# Patient Record
Sex: MALE | Race: WHITE | NOT HISPANIC OR LATINO | Employment: UNEMPLOYED | URBAN - METROPOLITAN AREA
[De-identification: names, ages, dates, MRNs, and addresses within clinical notes are randomized per-mention and may not be internally consistent; named-entity substitution may affect disease eponyms.]

---

## 2018-07-01 ENCOUNTER — OFFICE VISIT (OUTPATIENT)
Dept: URGENT CARE | Facility: CLINIC | Age: 43
End: 2018-07-01
Payer: COMMERCIAL

## 2018-07-01 VITALS
OXYGEN SATURATION: 98 % | RESPIRATION RATE: 18 BRPM | TEMPERATURE: 97.9 F | DIASTOLIC BLOOD PRESSURE: 92 MMHG | SYSTOLIC BLOOD PRESSURE: 160 MMHG | HEART RATE: 69 BPM

## 2018-07-01 DIAGNOSIS — L25.9 CONTACT DERMATITIS, UNSPECIFIED CONTACT DERMATITIS TYPE, UNSPECIFIED TRIGGER: Primary | ICD-10-CM

## 2018-07-01 PROCEDURE — 99283 EMERGENCY DEPT VISIT LOW MDM: CPT | Performed by: PHYSICIAN ASSISTANT

## 2018-07-01 PROCEDURE — G0382 LEV 3 HOSP TYPE B ED VISIT: HCPCS | Performed by: PHYSICIAN ASSISTANT

## 2018-07-01 RX ORDER — PREDNISONE 10 MG/1
TABLET ORAL
Qty: 26 TABLET | Refills: 0 | Status: SHIPPED | OUTPATIENT
Start: 2018-07-01 | End: 2018-12-16 | Stop reason: ALTCHOICE

## 2018-07-01 NOTE — PROGRESS NOTES
[address]    NAME: Beatris Yi is a 39 y o  male  : 1975    MRN: [id number]  DATE: 2018  TIME: 1:37 PM    Assessment and Plan   Contact dermatitis, unspecified contact dermatitis type, unspecified trigger [L25 9]  1  Contact dermatitis, unspecified contact dermatitis type, unspecified trigger  predniSONE 10 mg tablet       Patient Instructions     Patient Instructions   Start prednisone take as directed  Recommend over-the-counter Benadryl to help with itching  Chief Complaint     Chief Complaint   Patient presents with    Rash     Pt c/o a rash on his face and right arm since yesterday  History of Present Illness   54-year-old male here with rash all over his body  Very erythematous and itchy  Patient states that he was fishing and was exposed to poison ivy  Review of Systems   Review of Systems   Constitutional: Negative for activity change, appetite change, chills, diaphoresis, fatigue, fever and unexpected weight change  HENT: Negative for congestion, ear pain, hearing loss, sinus pressure, sneezing, sore throat and tinnitus  Eyes: Negative for photophobia, redness and visual disturbance  Respiratory: Negative for apnea, cough, chest tightness, shortness of breath, wheezing and stridor  Cardiovascular: Negative for chest pain, palpitations and leg swelling  Gastrointestinal: Negative for abdominal distention, abdominal pain, blood in stool, constipation, diarrhea, nausea and vomiting  Endocrine: Negative for cold intolerance, heat intolerance, polydipsia, polyphagia and polyuria  Genitourinary: Negative for difficulty urinating, dysuria, flank pain, hematuria and urgency  Musculoskeletal: Negative for arthralgias, back pain, gait problem, myalgias, neck pain and neck stiffness  Skin: Positive for rash  Negative for wound  Neurological: Negative for dizziness, tremors, seizures, syncope, weakness, light-headedness, numbness and headaches  Hematological: Negative for adenopathy  Does not bruise/bleed easily  Psychiatric/Behavioral: Negative for agitation, behavioral problems, confusion and decreased concentration  The patient is not nervous/anxious  All other systems reviewed and are negative  Current Medications     Current Outpatient Prescriptions:     predniSONE 10 mg tablet, Take 3 tabs BID X 2 days, 2 tabs BID X 2 days, 1 tab BID X 2 days, 1 tab daily X 2 days, Disp: 26 tablet, Rfl: 0    Current Allergies     Allergies as of 07/01/2018    (No Known Allergies)          The following portions of the patient's history were reviewed and updated as appropriate: allergies, current medications, past family history, past medical history, past social history, past surgical history and problem list    No past medical history on file  No past surgical history on file  No family history on file  Social History     Social History    Marital status: Single     Spouse name: N/A    Number of children: N/A    Years of education: N/A     Occupational History    Not on file  Social History Main Topics    Smoking status: Not on file    Smokeless tobacco: Not on file    Alcohol use Not on file    Drug use: Unknown    Sexual activity: Not on file     Other Topics Concern    Not on file     Social History Narrative    No narrative on file     Medications have been verified  Objective   /92   Pulse 69   Temp 97 9 °F (36 6 °C)   Resp 18   SpO2 98%      Physical Exam   Physical Exam   Constitutional: He appears well-developed and well-nourished  No distress  HENT:   Head: Normocephalic  Right Ear: External ear normal    Left Ear: External ear normal    Nose: Nose normal    Mouth/Throat: Oropharynx is clear and moist  No oropharyngeal exudate  Neck: Normal range of motion  Neck supple  Cardiovascular: Normal rate, regular rhythm and normal heart sounds  No murmur heard    Pulmonary/Chest: Effort normal and breath sounds normal  No respiratory distress  He has no wheezes  He has no rales  Abdominal: Soft  Bowel sounds are normal  There is no tenderness  Musculoskeletal: Normal range of motion  Lymphadenopathy:     He has no cervical adenopathy  Skin: Skin is warm  Rash (Erythematous rash with some small vesicles on the surface on face, arms and legs  Vesicles in linear distribution ) noted  Vitals reviewed

## 2018-12-16 ENCOUNTER — HOSPITAL ENCOUNTER (EMERGENCY)
Facility: HOSPITAL | Age: 43
Discharge: HOME | End: 2018-12-16
Attending: EMERGENCY MEDICINE | Admitting: EMERGENCY MEDICINE
Payer: COMMERCIAL

## 2018-12-16 VITALS
SYSTOLIC BLOOD PRESSURE: 142 MMHG | DIASTOLIC BLOOD PRESSURE: 90 MMHG | BODY MASS INDEX: 31.14 KG/M2 | RESPIRATION RATE: 16 BRPM | WEIGHT: 235 LBS | TEMPERATURE: 98.3 F | OXYGEN SATURATION: 98 % | HEART RATE: 72 BPM | HEIGHT: 73 IN

## 2018-12-16 DIAGNOSIS — S61.412A LACERATION OF LEFT HAND WITHOUT FOREIGN BODY, INITIAL ENCOUNTER: Primary | ICD-10-CM

## 2018-12-16 PROCEDURE — 99282 EMERGENCY DEPT VISIT SF MDM: CPT

## 2018-12-16 RX ORDER — GINSENG 100 MG
1 CAPSULE ORAL ONCE
Status: COMPLETED | OUTPATIENT
Start: 2018-12-16 | End: 2018-12-16

## 2018-12-16 RX ORDER — LIDOCAINE HYDROCHLORIDE 10 MG/ML
5 INJECTION, SOLUTION EPIDURAL; INFILTRATION; INTRACAUDAL; PERINEURAL ONCE
Status: COMPLETED | OUTPATIENT
Start: 2018-12-16 | End: 2018-12-16

## 2018-12-16 RX ORDER — LISINOPRIL 10 MG/1
15 TABLET ORAL
COMMUNITY

## 2018-12-16 RX ADMIN — LIDOCAINE HYDROCHLORIDE 5 ML: 10 INJECTION, SOLUTION EPIDURAL; INFILTRATION; INTRACAUDAL; PERINEURAL at 14:25

## 2018-12-16 RX ADMIN — BACITRACIN ZINC 1 SMALL APPLICATION: 500 OINTMENT TOPICAL at 14:25

## 2018-12-16 NOTE — DISCHARGE INSTRUCTIONS
Laceration   WHAT YOU NEED TO KNOW:   A laceration is an injury to the skin and the soft tissue underneath it  Lacerations happen when you are cut or hit by something  They can happen anywhere on the body  DISCHARGE INSTRUCTIONS:   Return to the emergency department if:   · You have heavy bleeding or bleeding that does not stop after 10 minutes of holding firm, direct pressure over the wound  · Your wound opens up  Contact your healthcare provider if:   · You have a fever or chills  · Your laceration is red, warm, or swollen  · You have red streaks on your skin coming from your wound  · You have white or yellow drainage from the wound that smells bad  · You have pain that gets worse, even after treatment  · You have questions or concerns about your condition or care  Medicines:   · Prescription pain medicine  may be given  Ask how to take this medicine safely  · Antibiotics  help treat or prevent a bacterial infection  · Take your medicine as directed  Contact your healthcare provider if you think your medicine is not helping or if you have side effects  Tell him or her if you are allergic to any medicine  Keep a list of the medicines, vitamins, and herbs you take  Include the amounts, and when and why you take them  Bring the list or the pill bottles to follow-up visits  Carry your medicine list with you in case of an emergency  Care for your wound as directed:   · Do not get your wound wet  until your healthcare provider says it is okay  Do not soak your wound in water  Do not go swimming until your healthcare provider says it is okay  Carefully wash the wound with soap and water  Gently pat the area dry or allow it to air dry  · Change your bandages  when they get wet, dirty, or after washing  Apply new, clean bandages as directed  Do not apply elastic bandages or tape too tight  Do not put powders or lotions over your incision  · Apply antibiotic ointment as directed  Your healthcare provider may give you antibiotic ointment to put over your wound if you have stitches  If you have strips of tape over your incision, let them dry up and fall off on their own  If they do not fall off within 14 days, gently remove them  If you have glue over your wound, do not remove or pick at it  If your glue comes off, do not replace it with glue that you have at home  · Check your wound every day for signs of infection such as swelling, redness, or pus  Self-care:   · Apply ice  on your wound for 15 to 20 minutes every hour or as directed  Use an ice pack, or put crushed ice in a plastic bag  Cover it with a towel  Ice helps prevent tissue damage and decreases swelling and pain  · Use a splint as directed  A splint will decrease movement and stress on your wound  It may help it heal faster  A splint may be used for lacerations over joints or areas of your body that bend  Ask your healthcare provider how to apply and remove a splint  · Decrease scarring of your wound  by applying ointments as directed  Do not apply ointments until your healthcare provider says it is okay  You may need to wait until your wound is healed  Ask which ointment to buy and how often to use it  After your wound is healed, use sunscreen over the area when you are out in the sun  You should do this for at least 6 months to 1 year after your injury  Follow up with your healthcare provider as directed: You may need to follow up in 24 to 48 hours to have your wound checked for infection  You will need to return in 3 to 14 days if you have stitches or staples so they can be removed  Care for your wound as directed to prevent infection and help it heal  Write down your questions so you remember to ask them during your visits  © 2017 [employer] Information is for End User's use only and may not be sold, redistributed or otherwise used for commercial purposes   All illustrations and images included in [facility] are the copyrighted property of A [employer] Inc  or Aime Benavides  The above information is an  only  It is not intended as medical advice for individual conditions or treatments  Talk to your doctor, nurse or pharmacist before following any medical regimen to see if it is safe and effective for you

## 2018-12-16 NOTE — ED NOTES
Please disregard this "NO" answer, entered by error  Pt answered "yes" that he feels safe       Barry Albright RN  12/16/18 1082

## 2018-12-16 NOTE — ED PROVIDER NOTES
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 [employer]  39 y o male presents for laceration of left hand that occurred around 11am today  He was working with copper at a [employer]  Patient states last tetanus shot was this year  Patient denies any fever, chills, shortness of breath difficulty breathing, chest pain  Denies any abdominal pain, nausea vomiting diarrhea  Patient went to be evaluated, as he cannot stop bleeding when it 1st occurred  He did apply bacitracin ointment to it when it first happened  It is around the MCP on the palm aspect  Approx 1 5cm  History provided by:  Patient   used: No    Laceration   Location:  Hand  Hand laceration location:  L hand  Length:  1 5cm  Depth: Through dermis  Quality: straight    Bleeding: controlled with pressure    Time since incident:  3 hours  Injury mechanism: copper  Pain details:     Quality:  Unable to specify    Severity:  No pain    Timing:  Constant    Progression:  Unchanged  Foreign body present:  No foreign bodies  Relieved by:  Nothing  Worsened by:  Nothing  Ineffective treatments:  None tried  Tetanus status:  Up to date  Associated symptoms: no fever, no focal weakness, no numbness, no rash, no redness, no swelling and no streaking        Prior to Admission Medications   Prescriptions Last Dose Informant Patient Reported? Taking?   lisinopril (ZESTRIL) 10 mg tablet 12/16/2018 at Unknown time  Yes Yes   Sig: Take 15 mg by mouth daily      Facility-Administered Medications: None       Past Medical History:   Diagnosis Date    Hypertension        Past Surgical History:   Procedure Laterality Date    HERNIA REPAIR         History reviewed  No pertinent family history  I have reviewed and agree with the history as documented      Social History   Substance Use Topics    Smoking status: Former Smoker     Quit date: 12/16/2003    Smokeless tobacco: Never Used    Alcohol use No        Review of Systems   Constitutional: Negative for chills and fever  HENT: Negative for rhinorrhea and sore throat  Eyes: Negative for visual disturbance  Respiratory: Negative for cough and shortness of breath  Cardiovascular: Negative for chest pain and leg swelling  Gastrointestinal: Negative for abdominal pain, diarrhea, nausea and vomiting  Genitourinary: Negative for dysuria  Musculoskeletal: Negative for back pain and myalgias  Skin: Positive for wound  Negative for rash  Neurological: Negative for dizziness, focal weakness and headaches  Psychiatric/Behavioral: Negative for confusion  All other systems reviewed and are negative  Physical Exam  Physical Exam   Constitutional: He is oriented to person, place, and time  He appears well-developed and well-nourished  HENT:   Nose: Nose normal    Mouth/Throat: Oropharynx is clear and moist  No oropharyngeal exudate  Eyes: Pupils are equal, round, and reactive to light  Conjunctivae and EOM are normal  No scleral icterus  Neck: Normal range of motion  Neck supple  No JVD present  No tracheal deviation present  Cardiovascular: Normal rate, regular rhythm and normal heart sounds  Pulmonary/Chest: Effort normal and breath sounds normal  No respiratory distress  He has no wheezes  He has no rales  Abdominal: Soft  Bowel sounds are normal  There is no tenderness  There is no guarding  Musculoskeletal: Normal range of motion  He exhibits no edema or tenderness  Hands:  Small visible, approximately 1 5 cm laceration left palmar aspect close to the 1st MCP  No tendon involved  Patient has full active range of motion of left hand, left fingers  Neurovascular sensation is fully intact around the wound site  Cap refill less than 2 sec  Lymphadenopathy:     He has no cervical adenopathy     Neurological: He is alert and oriented to person, place, and time  No cranial nerve deficit or sensory deficit  He exhibits normal muscle tone  5/5 motor, nl sens   Skin: Skin is warm and dry  Psychiatric: He has a normal mood and affect  His behavior is normal    Nursing note and vitals reviewed  Vital Signs  ED Triage Vitals [12/16/18 1414]   Temperature Pulse Respirations Blood Pressure SpO2   98 3 °F (36 8 °C) 77 16 (!) 175/100 100 %      Temp Source Heart Rate Source Patient Position - Orthostatic VS BP Location FiO2 (%)   Tympanic Monitor Sitting Right arm --      Pain Score       --           Vitals:    12/16/18 1415 12/16/18 1430 12/16/18 1445 12/16/18 1500   BP: (!) 175/100 160/90  142/90   Pulse: 76 81 72 72   Patient Position - Orthostatic VS:           Visual Acuity      ED Medications  Medications   lidocaine (PF) (XYLOCAINE-MPF) 1 % injection 5 mL (5 mL Infiltration Given 12/16/18 1425)   bacitracin topical ointment 1 small application (1 small application Topical Given 12/16/18 1425)       Diagnostic Studies  Results Reviewed     None                 No orders to display              Procedures  Lac Repair  Date/Time: 12/16/2018 2:43 PM  Performed by: Angelica Nelson by: Meera Johnson   Consent: Verbal consent obtained  Consent given by: patient  Patient understanding: patient states understanding of the procedure being performed  Patient consent: the patient's understanding of the procedure matches consent given  Patient identity confirmed: verbally with patient and arm band  Time out: Immediately prior to procedure a "time out" was called to verify the correct patient, procedure, equipment, support staff and site/side marked as required    Body area: upper extremity  Location details: left hand  Laceration length: 1 5 cm  Foreign bodies: no foreign bodies  Tendon involvement: none  Nerve involvement: none  Vascular damage: no  Anesthesia: local infiltration    Anesthesia:  Local Anesthetic: lidocaine 1% without epinephrine  Anesthetic total: 2 mL    Sedation:  Patient sedated: no    Wound Dehiscence:  Superficial Wound Dehiscence: simple closure      Procedure Details:  Preparation: Patient was prepped and draped in the usual sterile fashion  Irrigation solution: saline  Irrigation method: syringe  Amount of cleaning: standard  Debridement: none  Degree of undermining: none  Skin closure: 5-0 nylon  Number of sutures: 1  Technique: simple  Approximation: close  Approximation difficulty: simple  Dressing: antibiotic ointment (band-aid)  Patient tolerance: Patient tolerated the procedure well with no immediate complications             Phone Contacts  ED Phone Contact    ED Course                               MDM  Number of Diagnoses or Management Options  Laceration of left hand without foreign body, initial encounter: new and does not require workup  Diagnosis management comments: Patient with a small superficial laceration that was repaired with 1 suture  Did advise suture would need to be taken out approximately 10 days  He expressed understanding  He left in stable condition  Patient full neurologic, muscular function of that hand  He tolerated the procedure well no immediate complications  He was afebrile  No history of being on any anticoagulation medications, as well as no history of anticoagulation disorders         Amount and/or Complexity of Data Reviewed  Review and summarize past medical records: yes    Risk of Complications, Morbidity, and/or Mortality  Presenting problems: low    Patient Progress  Patient progress: stable    CritCare Time    Disposition  Final diagnoses:   Laceration of left hand without foreign body, initial encounter     Time reflects when diagnosis was documented in both MDM as applicable and the Disposition within this note     Time User Action Codes Description Comment    12/16/2018  2:42 PM Shandra Alberto Add [Z80 348A] Laceration of left hand without foreign body, initial encounter       ED Disposition     ED Disposition Condition Comment    Discharge  [place] discharge to home/self care  Condition at discharge: Stable        Follow-up Information     Follow up With Specialties Details Why Contact Info Additional Carlos Orozco DO Family Medicine   [facility] [address] [place]  [phone]       [address] [facility] In 1 week For suture removal [address] [facility] [id number] [phone] [address] [facility], [address] [facility], [facility], [place], [address]  Emergency Department Emergency Medicine In 1 week For suture removal [address] [facility] [address] [phone]           Discharge Medication List as of 12/16/2018  2:43 PM      CONTINUE these medications which have NOT CHANGED    Details   lisinopril (ZESTRIL) 10 mg tablet Take 15 mg by mouth daily, Historical Med           No discharge procedures on file      ED Provider  Electronically Signed by           Laquita Herrera PA-C  12/16/18 [address]  12/16/18 5405

## 2019-11-27 ENCOUNTER — TELEPHONE (OUTPATIENT)
Dept: SURGERY | Facility: CLINIC | Age: 44
End: 2019-11-27

## 2019-11-27 NOTE — TELEPHONE ENCOUNTER
Left a message for patient to call our office to schedule an appt per a referral from Dr Uri Falk for a ventral hernia

## 2019-12-12 ENCOUNTER — TRANSCRIBE ORDERS (OUTPATIENT)
Dept: SURGERY | Facility: CLINIC | Age: 44
End: 2019-12-12

## 2019-12-12 DIAGNOSIS — K43.9 VENTRAL HERNIA WITHOUT OBSTRUCTION OR GANGRENE: Primary | ICD-10-CM

## 2019-12-20 ENCOUNTER — OFFICE VISIT (OUTPATIENT)
Dept: SURGERY | Facility: CLINIC | Age: 44
End: 2019-12-20
Payer: COMMERCIAL

## 2019-12-20 VITALS
RESPIRATION RATE: 18 BRPM | HEART RATE: 88 BPM | TEMPERATURE: 97.6 F | HEIGHT: 73 IN | BODY MASS INDEX: 32.87 KG/M2 | WEIGHT: 248 LBS | DIASTOLIC BLOOD PRESSURE: 68 MMHG | SYSTOLIC BLOOD PRESSURE: 124 MMHG

## 2019-12-20 DIAGNOSIS — M62.08 DIASTASIS RECTI: ICD-10-CM

## 2019-12-20 PROCEDURE — 99243 OFF/OP CNSLTJ NEW/EST LOW 30: CPT | Performed by: PHYSICIAN ASSISTANT

## 2019-12-20 RX ORDER — SILDENAFIL 100 MG/1
100 TABLET, FILM COATED ORAL AS NEEDED
COMMUNITY
Start: 2019-11-26 | End: 2022-05-10

## 2019-12-20 RX ORDER — PAROXETINE HYDROCHLORIDE 20 MG/1
20 TABLET, FILM COATED ORAL DAILY
COMMUNITY
Start: 2019-11-26 | End: 2022-05-10

## 2019-12-20 NOTE — PROGRESS NOTES
Consult - General Surgery   [facility] Shivamer 38 y o  male MRN: [id number]  Encounter: [id number]    Assessment/Plan    Diastasis recti  Patient presents with painful abdominal lump, present for years exacerbated by exertion  On exam there is diastasis recti of the upper abdomen without appreciable fascial defect or true hernia  This diagnosis explained to the patient  Advised rest, compression, anti-inflammatories for pain, and core strengthening for long-term management  Advised follow-up in 1 month with Dr Luis Suarez for repeat examination  Advised that he feed develops a non reducible lump with associated pain or signs of a true hernia he should return immediately for evaluation  Will hold off on CT scan at this time and try to obtain records from his prior abdominal wall surgery  Questions answered follow-up arranged  Diagnoses and all orders for this visit:    Diastasis recti  -     Ambulatory referral to General Surgery    Other orders  -     sildenafil (VIAGRA) 100 mg tablet; Take 100 mg by mouth as needed  -     PARoxetine (PAXIL) 20 mg tablet; Take 20 mg by mouth daily        History of Present Illness   Chief Complaint   Patient presents with    Hernia     ventral      12- Patient is here today for pre op eval ventral hernia  Patient states that he is having pain at his mid abdominal area and can palpate a lump  Stated that bending over to  things or even to bring up his legs to tie his shoes the abdomen area causes pain and he gets short of breath  Joana Cue    Pleasant 76-year-old male with history of hypertension and bipolar disorder here for evaluation of a lump in possible hernia  Present for a few years and worse and the past 1 year  Exacerbated by heavy lifting and exertion associated  Focal to the upper midline abdomen  Denies a non reducible lump  Associated nausea or vomiting  Occasional constipation  No other complaints today        Review of Systems Constitutional: Negative for chills and fever  HENT: Negative for congestion  Eyes: Negative for visual disturbance  Respiratory: Negative for shortness of breath  Cardiovascular: Negative for chest pain  Gastrointestinal: Positive for abdominal pain and constipation  Negative for diarrhea, nausea and vomiting  Genitourinary: Negative for dysuria  Musculoskeletal: Negative for back pain  Skin: Negative for rash and wound  Neurological: Negative for dizziness and headaches  Psychiatric/Behavioral: Negative for confusion         Historical Information   Past Medical History:   Diagnosis Date    Bipolar disorder in partial remission ([facility] [address] )     Erectile dysfunction     unspecified erectile dysfunction type    Hypertension      Past Surgical History:   Procedure Laterality Date    HERNIA REPAIR       Social History   Social History     Substance and Sexual Activity   Alcohol Use Yes    Comment: occassionally     Social History     Substance and Sexual Activity   Drug Use No     Social History     Tobacco Use   Smoking Status Former Smoker    Types: Pipe, Cigarettes    Last attempt to quit: 2003    Years since quittin 0   Smokeless Tobacco Never Used     Family History   Problem Relation Age of Onset    Diabetes Mother     Thyroid disease Mother     Cancer Father     Thyroid disease Sister     Depression Brother     Cancer Maternal Aunt     Cancer Paternal Uncle     Cancer Paternal Grandmother     No Known Problems Brother     Thyroid disease Sister        Meds/Allergies     Current Outpatient Medications:     lisinopril (ZESTRIL) 10 mg tablet, Take 15 mg by mouth daily, Disp: , Rfl:     PARoxetine (PAXIL) 20 mg tablet, Take 20 mg by mouth daily, Disp: , Rfl:     sildenafil (VIAGRA) 100 mg tablet, Take 100 mg by mouth as needed, Disp: , Rfl:   Allergies   Allergen Reactions    Tomato        The following portions of the patient's history were reviewed and updated as appropriate: allergies, current medications, past family history, past medical history, past social history, past surgical history and problem list     Objective   Current Vitals:   Blood pressure 124/68, pulse 88, temperature 97 6 °F (36 4 °C), temperature source Tympanic, resp  rate 18, height 6' 1" (1 854 m), weight 112 kg (248 lb)  Physical Exam   Constitutional: He is oriented to person, place, and time  He appears well-developed and well-nourished  No distress  HENT:   Head: Normocephalic and atraumatic  Eyes: Pupils are equal, round, and reactive to light  Neck: Normal range of motion  Cardiovascular: Normal rate, regular rhythm, normal heart sounds and intact distal pulses  No murmur heard  Pulmonary/Chest: Effort normal and breath sounds normal  No respiratory distress  He has no wheezes  Abdominal: Soft  Bowel sounds are normal  He exhibits no distension and no mass  There is no tenderness  There is no rebound and no guarding  Diastasis recti of the upper midline  No appreciable fascial defects in this area or elsewhere on the abdominal wall  Musculoskeletal: Normal range of motion  Neurological: He is alert and oriented to person, place, and time  Skin: Skin is warm and dry  He is not diaphoretic  Psychiatric: He has a normal mood and affect         Signature:  Keyla Estes PA-C  Date: 12/20/2019 Time: 10:35 AM

## 2019-12-20 NOTE — ASSESSMENT & PLAN NOTE
Patient presents with painful abdominal lump, present for years exacerbated by exertion  On exam there is diastasis recti of the upper abdomen without appreciable fascial defect or true hernia  This diagnosis explained to the patient  Advised rest, compression, anti-inflammatories for pain, and core strengthening for long-term management  Advised follow-up in 1 month with Dr Sophy Shahid for repeat examination  Advised that he feed develops a non reducible lump with associated pain or signs of a true hernia he should return immediately for evaluation  Will hold off on CT scan at this time and try to obtain records from his prior abdominal wall surgery  Questions answered follow-up arranged

## 2020-01-20 ENCOUNTER — TELEPHONE (OUTPATIENT)
Dept: SURGERY | Facility: CLINIC | Age: 45
End: 2020-01-20

## 2020-01-28 NOTE — TELEPHONE ENCOUNTER
Left a second message for the patient to call our office to reschedule his appointment that he had missed

## 2020-03-09 ENCOUNTER — TELEPHONE (OUTPATIENT)
Dept: SURGERY | Facility: CLINIC | Age: 45
End: 2020-03-09

## 2020-03-09 NOTE — TELEPHONE ENCOUNTER
Patient was a no show for today's appt  I spoke with his mother, Lata Rogers and she took a message for him to call us back to reschedule

## 2021-10-02 ENCOUNTER — APPOINTMENT (EMERGENCY)
Dept: CT IMAGING | Facility: HOSPITAL | Age: 46
DRG: 710 | End: 2021-10-02
Payer: COMMERCIAL

## 2021-10-02 ENCOUNTER — HOSPITAL ENCOUNTER (EMERGENCY)
Facility: HOSPITAL | Age: 46
Discharge: HOME | DRG: 710 | End: 2021-10-02
Attending: EMERGENCY MEDICINE
Payer: COMMERCIAL

## 2021-10-02 VITALS
RESPIRATION RATE: 19 BRPM | TEMPERATURE: 97.5 F | SYSTOLIC BLOOD PRESSURE: 124 MMHG | DIASTOLIC BLOOD PRESSURE: 59 MMHG | OXYGEN SATURATION: 95 % | HEART RATE: 69 BPM

## 2021-10-02 DIAGNOSIS — K59.00 CONSTIPATION: ICD-10-CM

## 2021-10-02 DIAGNOSIS — N20.1 URETERAL STONE: ICD-10-CM

## 2021-10-02 DIAGNOSIS — K63.89 MESENTERIC MASS: Primary | ICD-10-CM

## 2021-10-02 LAB
ALBUMIN SERPL BCP-MCNC: 4.8 G/DL (ref 3.5–5.7)
ALP SERPL-CCNC: 106 U/L (ref 40–150)
ALT SERPL W P-5'-P-CCNC: 28 U/L (ref 7–52)
ANION GAP SERPL CALCULATED.3IONS-SCNC: 12 MMOL/L (ref 4–13)
APTT PPP: 30 SECONDS (ref 23–37)
AST SERPL W P-5'-P-CCNC: 35 U/L (ref 13–39)
BACTERIA UR QL AUTO: ABNORMAL /HPF
BASOPHILS # BLD AUTO: 0.1 THOUSANDS/ΜL (ref 0–0.1)
BASOPHILS NFR BLD AUTO: 1 % (ref 0–2)
BILIRUB SERPL-MCNC: 1.9 MG/DL (ref 0.2–1)
BILIRUB UR QL STRIP: NEGATIVE
BUN SERPL-MCNC: 14 MG/DL (ref 7–25)
CALCIUM SERPL-MCNC: 10 MG/DL (ref 8.6–10.5)
CHLORIDE SERPL-SCNC: 99 MMOL/L (ref 98–107)
CLARITY UR: CLEAR
CO2 SERPL-SCNC: 27 MMOL/L (ref 21–31)
COLOR UR: YELLOW
CREAT SERPL-MCNC: 1.14 MG/DL (ref 0.7–1.3)
EOSINOPHIL # BLD AUTO: 0.2 THOUSAND/ΜL (ref 0–0.61)
EOSINOPHIL NFR BLD AUTO: 2 % (ref 0–5)
ERYTHROCYTE [DISTWIDTH] IN BLOOD BY AUTOMATED COUNT: 14.1 % (ref 11.5–14.5)
ETHANOL SERPL-MCNC: <10 MG/DL
GFR SERPL CREATININE-BSD FRML MDRD: 76 ML/MIN/1.73SQ M
GLUCOSE SERPL-MCNC: 166 MG/DL (ref 65–99)
GLUCOSE UR STRIP-MCNC: ABNORMAL MG/DL
HCT VFR BLD AUTO: 46.2 % (ref 42–47)
HGB BLD-MCNC: 16 G/DL (ref 14–18)
HGB UR QL STRIP.AUTO: ABNORMAL
INR PPP: 1.01 (ref 0.84–1.19)
KETONES UR STRIP-MCNC: ABNORMAL MG/DL
LACTATE SERPL-SCNC: 1.3 MMOL/L (ref 0.5–2)
LEUKOCYTE ESTERASE UR QL STRIP: NEGATIVE
LYMPHOCYTES # BLD AUTO: 2 THOUSANDS/ΜL (ref 0.6–4.47)
LYMPHOCYTES NFR BLD AUTO: 23 % (ref 21–51)
MAGNESIUM SERPL-MCNC: 2.2 MG/DL (ref 1.9–2.7)
MCH RBC QN AUTO: 29.2 PG (ref 26–34)
MCHC RBC AUTO-ENTMCNC: 34.7 G/DL (ref 31–37)
MCV RBC AUTO: 84 FL (ref 81–99)
MONOCYTES # BLD AUTO: 0.5 THOUSAND/ΜL (ref 0.17–1.22)
MONOCYTES NFR BLD AUTO: 5 % (ref 2–12)
MUCOUS THREADS UR QL AUTO: ABNORMAL
NEUTROPHILS # BLD AUTO: 6.3 THOUSANDS/ΜL (ref 1.4–6.5)
NEUTS SEG NFR BLD AUTO: 70 % (ref 42–75)
NITRITE UR QL STRIP: NEGATIVE
NON-SQ EPI CELLS URNS QL MICRO: ABNORMAL /HPF
PH UR STRIP.AUTO: 6 [PH]
PLATELET # BLD AUTO: 238 THOUSANDS/UL (ref 149–390)
PMV BLD AUTO: 7.6 FL (ref 8.6–11.7)
POTASSIUM SERPL-SCNC: 4 MMOL/L (ref 3.5–5.5)
PROT SERPL-MCNC: 8.6 G/DL (ref 6.4–8.9)
PROT UR STRIP-MCNC: NEGATIVE MG/DL
PROTHROMBIN TIME: 13.4 SECONDS (ref 11.6–14.5)
RBC # BLD AUTO: 5.48 MILLION/UL (ref 4.3–5.9)
RBC #/AREA URNS AUTO: ABNORMAL /HPF
SODIUM SERPL-SCNC: 138 MMOL/L (ref 134–143)
SP GR UR STRIP.AUTO: <=1.005 (ref 1–1.03)
TROPONIN I SERPL-MCNC: <0.03 NG/ML
UROBILINOGEN UR QL STRIP.AUTO: 4 E.U./DL
WBC # BLD AUTO: 9 THOUSAND/UL (ref 4.8–10.8)
WBC #/AREA URNS AUTO: ABNORMAL /HPF

## 2021-10-02 PROCEDURE — 99284 EMERGENCY DEPT VISIT MOD MDM: CPT

## 2021-10-02 PROCEDURE — 85025 COMPLETE CBC W/AUTO DIFF WBC: CPT | Performed by: EMERGENCY MEDICINE

## 2021-10-02 PROCEDURE — 82077 ASSAY SPEC XCP UR&BREATH IA: CPT | Performed by: EMERGENCY MEDICINE

## 2021-10-02 PROCEDURE — 81003 URINALYSIS AUTO W/O SCOPE: CPT | Performed by: EMERGENCY MEDICINE

## 2021-10-02 PROCEDURE — 96374 THER/PROPH/DIAG INJ IV PUSH: CPT

## 2021-10-02 PROCEDURE — 83735 ASSAY OF MAGNESIUM: CPT | Performed by: EMERGENCY MEDICINE

## 2021-10-02 PROCEDURE — 36415 COLL VENOUS BLD VENIPUNCTURE: CPT | Performed by: EMERGENCY MEDICINE

## 2021-10-02 PROCEDURE — 99285 EMERGENCY DEPT VISIT HI MDM: CPT | Performed by: PHYSICIAN ASSISTANT

## 2021-10-02 PROCEDURE — 83605 ASSAY OF LACTIC ACID: CPT | Performed by: EMERGENCY MEDICINE

## 2021-10-02 PROCEDURE — 84484 ASSAY OF TROPONIN QUANT: CPT | Performed by: EMERGENCY MEDICINE

## 2021-10-02 PROCEDURE — 71275 CT ANGIOGRAPHY CHEST: CPT

## 2021-10-02 PROCEDURE — 85610 PROTHROMBIN TIME: CPT | Performed by: EMERGENCY MEDICINE

## 2021-10-02 PROCEDURE — 85730 THROMBOPLASTIN TIME PARTIAL: CPT | Performed by: EMERGENCY MEDICINE

## 2021-10-02 PROCEDURE — 96361 HYDRATE IV INFUSION ADD-ON: CPT

## 2021-10-02 PROCEDURE — 81001 URINALYSIS AUTO W/SCOPE: CPT | Performed by: EMERGENCY MEDICINE

## 2021-10-02 PROCEDURE — G1004 CDSM NDSC: HCPCS

## 2021-10-02 PROCEDURE — 96375 TX/PRO/DX INJ NEW DRUG ADDON: CPT

## 2021-10-02 PROCEDURE — 74174 CTA ABD&PLVS W/CONTRAST: CPT

## 2021-10-02 PROCEDURE — 87040 BLOOD CULTURE FOR BACTERIA: CPT | Performed by: PHYSICIAN ASSISTANT

## 2021-10-02 PROCEDURE — 80053 COMPREHEN METABOLIC PANEL: CPT | Performed by: EMERGENCY MEDICINE

## 2021-10-02 RX ORDER — MAGNESIUM CARB/ALUMINUM HYDROX 105-160MG
296 TABLET,CHEWABLE ORAL ONCE
Qty: 296 ML | Refills: 0 | Status: SHIPPED | OUTPATIENT
Start: 2021-10-02 | End: 2021-10-02 | Stop reason: SDUPTHER

## 2021-10-02 RX ORDER — HYDROMORPHONE HCL/PF 1 MG/ML
0.5 SYRINGE (ML) INJECTION ONCE
Status: DISCONTINUED | OUTPATIENT
Start: 2021-10-02 | End: 2021-10-02 | Stop reason: HOSPADM

## 2021-10-02 RX ORDER — OXYCODONE HYDROCHLORIDE 5 MG/1
5 TABLET ORAL EVERY 4 HOURS PRN
Qty: 5 TABLET | Refills: 0 | Status: SHIPPED | OUTPATIENT
Start: 2021-10-02 | End: 2021-10-02

## 2021-10-02 RX ORDER — ONDANSETRON 2 MG/ML
4 INJECTION INTRAMUSCULAR; INTRAVENOUS ONCE
Status: COMPLETED | OUTPATIENT
Start: 2021-10-02 | End: 2021-10-02

## 2021-10-02 RX ORDER — FENTANYL CITRATE 50 UG/ML
100 INJECTION, SOLUTION INTRAMUSCULAR; INTRAVENOUS ONCE
Status: COMPLETED | OUTPATIENT
Start: 2021-10-02 | End: 2021-10-02

## 2021-10-02 RX ORDER — OXYCODONE HYDROCHLORIDE 5 MG/1
5 TABLET ORAL EVERY 4 HOURS PRN
Qty: 5 TABLET | Refills: 0 | Status: SHIPPED | OUTPATIENT
Start: 2021-10-02 | End: 2021-10-12

## 2021-10-02 RX ORDER — MAGNESIUM CARB/ALUMINUM HYDROX 105-160MG
296 TABLET,CHEWABLE ORAL ONCE
Qty: 296 ML | Refills: 0 | Status: SHIPPED | OUTPATIENT
Start: 2021-10-02 | End: 2022-05-10

## 2021-10-02 RX ORDER — KETOROLAC TROMETHAMINE 30 MG/ML
15 INJECTION, SOLUTION INTRAMUSCULAR; INTRAVENOUS ONCE
Status: COMPLETED | OUTPATIENT
Start: 2021-10-02 | End: 2021-10-02

## 2021-10-02 RX ADMIN — FENTANYL CITRATE 100 MCG: 50 INJECTION INTRAMUSCULAR; INTRAVENOUS at 13:55

## 2021-10-02 RX ADMIN — SODIUM CHLORIDE 1000 ML: 0.9 INJECTION, SOLUTION INTRAVENOUS at 14:49

## 2021-10-02 RX ADMIN — IOHEXOL 100 ML: 350 INJECTION, SOLUTION INTRAVENOUS at 14:09

## 2021-10-02 RX ADMIN — ONDANSETRON 4 MG: 2 INJECTION INTRAMUSCULAR; INTRAVENOUS at 16:06

## 2021-10-02 RX ADMIN — KETOROLAC TROMETHAMINE 15 MG: 30 INJECTION, SOLUTION INTRAMUSCULAR; INTRAVENOUS at 16:07

## 2021-10-04 ENCOUNTER — APPOINTMENT (EMERGENCY)
Dept: CT IMAGING | Facility: HOSPITAL | Age: 46
DRG: 710 | End: 2021-10-04
Payer: COMMERCIAL

## 2021-10-04 ENCOUNTER — HOSPITAL ENCOUNTER (INPATIENT)
Facility: HOSPITAL | Age: 46
LOS: 2 days | Discharge: HOME | DRG: 710 | End: 2021-10-06
Attending: INTERNAL MEDICINE | Admitting: INTERNAL MEDICINE
Payer: COMMERCIAL

## 2021-10-04 DIAGNOSIS — R93.89 ABNORMAL CAT SCAN: ICD-10-CM

## 2021-10-04 DIAGNOSIS — N13.30 HYDRONEPHROSIS: ICD-10-CM

## 2021-10-04 DIAGNOSIS — N17.9 AKI (ACUTE KIDNEY INJURY) (HCC): ICD-10-CM

## 2021-10-04 DIAGNOSIS — N13.9 OBSTRUCTIVE UROPATHY: Primary | ICD-10-CM

## 2021-10-04 PROBLEM — I10 ESSENTIAL HYPERTENSION: Chronic | Status: ACTIVE | Noted: 2021-10-04

## 2021-10-04 PROBLEM — N13.2 HYDRONEPHROSIS WITH URINARY OBSTRUCTION DUE TO URETERAL CALCULUS: Status: ACTIVE | Noted: 2021-10-04

## 2021-10-04 PROBLEM — A41.9 SEPSIS (HCC): Status: ACTIVE | Noted: 2021-10-04

## 2021-10-04 LAB
ALBUMIN SERPL BCP-MCNC: 4.8 G/DL (ref 3.5–5.7)
ALP SERPL-CCNC: 106 U/L (ref 40–150)
ALT SERPL W P-5'-P-CCNC: 20 U/L (ref 7–52)
ANION GAP SERPL CALCULATED.3IONS-SCNC: 11 MMOL/L (ref 4–13)
AST SERPL W P-5'-P-CCNC: 18 U/L (ref 13–39)
BACTERIA UR QL AUTO: ABNORMAL /HPF
BILIRUB SERPL-MCNC: 3.2 MG/DL (ref 0.2–1)
BILIRUB UR QL STRIP: NEGATIVE
BUN SERPL-MCNC: 13 MG/DL (ref 7–25)
CALCIUM SERPL-MCNC: 10 MG/DL (ref 8.6–10.5)
CHLORIDE SERPL-SCNC: 93 MMOL/L (ref 98–107)
CLARITY UR: CLEAR
CO2 SERPL-SCNC: 29 MMOL/L (ref 21–31)
COLOR UR: YELLOW
CREAT SERPL-MCNC: 1.61 MG/DL (ref 0.7–1.3)
ERYTHROCYTE [DISTWIDTH] IN BLOOD BY AUTOMATED COUNT: 13.4 % (ref 11.5–14.5)
GFR SERPL CREATININE-BSD FRML MDRD: 50 ML/MIN/1.73SQ M
GLUCOSE SERPL-MCNC: 114 MG/DL (ref 65–99)
GLUCOSE UR STRIP-MCNC: NEGATIVE MG/DL
HCT VFR BLD AUTO: 45.3 % (ref 42–47)
HGB BLD-MCNC: 15.7 G/DL (ref 14–18)
HGB UR QL STRIP.AUTO: ABNORMAL
KETONES UR STRIP-MCNC: ABNORMAL MG/DL
LEUKOCYTE ESTERASE UR QL STRIP: NEGATIVE
LIPASE SERPL-CCNC: <10 U/L (ref 11–82)
LYMPHOCYTES # BLD AUTO: 15 % (ref 20–51)
LYMPHOCYTES # BLD AUTO: 2.1 THOUSAND/UL (ref 0.6–4.47)
MCH RBC QN AUTO: 29.3 PG (ref 26–34)
MCHC RBC AUTO-ENTMCNC: 34.6 G/DL (ref 31–37)
MCV RBC AUTO: 85 FL (ref 81–99)
MONOCYTES # BLD AUTO: 0.7 THOUSAND/UL (ref 0–1.22)
MONOCYTES NFR BLD AUTO: 5 % (ref 4–12)
NEUTS SEG # BLD: 11.2 THOUSAND/UL (ref 1.81–6.82)
NEUTS SEG NFR BLD AUTO: 80 % (ref 42–75)
NITRITE UR QL STRIP: NEGATIVE
NON-SQ EPI CELLS URNS QL MICRO: ABNORMAL /HPF
PH UR STRIP.AUTO: 5.5 [PH]
PLATELET # BLD AUTO: 227 THOUSANDS/UL (ref 149–390)
PLATELET BLD QL SMEAR: ADEQUATE
PMV BLD AUTO: 7.2 FL (ref 8.6–11.7)
POTASSIUM SERPL-SCNC: 3.5 MMOL/L (ref 3.5–5.5)
PROT SERPL-MCNC: 8.9 G/DL (ref 6.4–8.9)
PROT UR STRIP-MCNC: ABNORMAL MG/DL
RBC # BLD AUTO: 5.36 MILLION/UL (ref 4.3–5.9)
RBC #/AREA URNS AUTO: ABNORMAL /HPF
RBC MORPH BLD: NORMAL
SODIUM SERPL-SCNC: 133 MMOL/L (ref 134–143)
SP GR UR STRIP.AUTO: 1.02 (ref 1–1.03)
TOTAL CELLS COUNTED SPEC: 100
UROBILINOGEN UR QL STRIP.AUTO: 1 E.U./DL
WBC # BLD AUTO: 14 THOUSAND/UL (ref 4.8–10.8)
WBC #/AREA URNS AUTO: ABNORMAL /HPF

## 2021-10-04 PROCEDURE — 85027 COMPLETE CBC AUTOMATED: CPT | Performed by: INTERNAL MEDICINE

## 2021-10-04 PROCEDURE — 96374 THER/PROPH/DIAG INJ IV PUSH: CPT

## 2021-10-04 PROCEDURE — G1004 CDSM NDSC: HCPCS

## 2021-10-04 PROCEDURE — 99285 EMERGENCY DEPT VISIT HI MDM: CPT

## 2021-10-04 PROCEDURE — 96375 TX/PRO/DX INJ NEW DRUG ADDON: CPT

## 2021-10-04 PROCEDURE — 80053 COMPREHEN METABOLIC PANEL: CPT | Performed by: INTERNAL MEDICINE

## 2021-10-04 PROCEDURE — 83690 ASSAY OF LIPASE: CPT | Performed by: INTERNAL MEDICINE

## 2021-10-04 PROCEDURE — 74176 CT ABD & PELVIS W/O CONTRAST: CPT

## 2021-10-04 PROCEDURE — 36415 COLL VENOUS BLD VENIPUNCTURE: CPT | Performed by: INTERNAL MEDICINE

## 2021-10-04 PROCEDURE — 99285 EMERGENCY DEPT VISIT HI MDM: CPT | Performed by: INTERNAL MEDICINE

## 2021-10-04 PROCEDURE — 81003 URINALYSIS AUTO W/O SCOPE: CPT | Performed by: INTERNAL MEDICINE

## 2021-10-04 PROCEDURE — 87086 URINE CULTURE/COLONY COUNT: CPT | Performed by: PHYSICIAN ASSISTANT

## 2021-10-04 PROCEDURE — 99223 1ST HOSP IP/OBS HIGH 75: CPT | Performed by: PHYSICIAN ASSISTANT

## 2021-10-04 PROCEDURE — 85007 BL SMEAR W/DIFF WBC COUNT: CPT | Performed by: INTERNAL MEDICINE

## 2021-10-04 PROCEDURE — 96361 HYDRATE IV INFUSION ADD-ON: CPT

## 2021-10-04 PROCEDURE — 81001 URINALYSIS AUTO W/SCOPE: CPT | Performed by: INTERNAL MEDICINE

## 2021-10-04 RX ORDER — ONDANSETRON 2 MG/ML
4 INJECTION INTRAMUSCULAR; INTRAVENOUS EVERY 6 HOURS PRN
Status: DISCONTINUED | OUTPATIENT
Start: 2021-10-04 | End: 2021-10-06 | Stop reason: HOSPADM

## 2021-10-04 RX ORDER — KETOROLAC TROMETHAMINE 30 MG/ML
15 INJECTION, SOLUTION INTRAMUSCULAR; INTRAVENOUS ONCE
Status: COMPLETED | OUTPATIENT
Start: 2021-10-04 | End: 2021-10-04

## 2021-10-04 RX ORDER — ACETAMINOPHEN 325 MG/1
650 TABLET ORAL EVERY 4 HOURS PRN
Status: DISCONTINUED | OUTPATIENT
Start: 2021-10-04 | End: 2021-10-06 | Stop reason: HOSPADM

## 2021-10-04 RX ORDER — SODIUM CHLORIDE 9 MG/ML
150 INJECTION, SOLUTION INTRAVENOUS CONTINUOUS
Status: DISCONTINUED | OUTPATIENT
Start: 2021-10-04 | End: 2021-10-05

## 2021-10-04 RX ORDER — CEFTRIAXONE 1 G/50ML
1000 INJECTION, SOLUTION INTRAVENOUS EVERY 24 HOURS
Status: DISCONTINUED | OUTPATIENT
Start: 2021-10-05 | End: 2021-10-06 | Stop reason: HOSPADM

## 2021-10-04 RX ORDER — MORPHINE SULFATE 4 MG/ML
2 INJECTION, SOLUTION INTRAMUSCULAR; INTRAVENOUS EVERY 4 HOURS PRN
Status: DISCONTINUED | OUTPATIENT
Start: 2021-10-04 | End: 2021-10-06 | Stop reason: HOSPADM

## 2021-10-04 RX ORDER — CEFTRIAXONE 1 G/50ML
1000 INJECTION, SOLUTION INTRAVENOUS ONCE
Status: COMPLETED | OUTPATIENT
Start: 2021-10-04 | End: 2021-10-04

## 2021-10-04 RX ORDER — OXYCODONE HYDROCHLORIDE 5 MG/1
5 TABLET ORAL EVERY 4 HOURS PRN
Status: DISCONTINUED | OUTPATIENT
Start: 2021-10-04 | End: 2021-10-06 | Stop reason: HOSPADM

## 2021-10-04 RX ORDER — SODIUM CHLORIDE 9 MG/ML
1000 INJECTION, SOLUTION INTRAVENOUS CONTINUOUS
Status: DISCONTINUED | OUTPATIENT
Start: 2021-10-04 | End: 2021-10-04

## 2021-10-04 RX ORDER — ONDANSETRON 2 MG/ML
4 INJECTION INTRAMUSCULAR; INTRAVENOUS ONCE
Status: COMPLETED | OUTPATIENT
Start: 2021-10-04 | End: 2021-10-04

## 2021-10-04 RX ORDER — SODIUM CHLORIDE 9 MG/ML
125 INJECTION, SOLUTION INTRAVENOUS CONTINUOUS
Status: DISCONTINUED | OUTPATIENT
Start: 2021-10-04 | End: 2021-10-06 | Stop reason: HOSPADM

## 2021-10-04 RX ADMIN — SODIUM CHLORIDE 150 ML/HR: 0.9 INJECTION, SOLUTION INTRAVENOUS at 22:02

## 2021-10-04 RX ADMIN — CEFTRIAXONE 1000 MG: 1 INJECTION, SOLUTION INTRAVENOUS at 21:11

## 2021-10-04 RX ADMIN — SODIUM CHLORIDE 1000 ML/HR: 0.9 INJECTION, SOLUTION INTRAVENOUS at 18:36

## 2021-10-04 RX ADMIN — ONDANSETRON 4 MG: 2 INJECTION INTRAMUSCULAR; INTRAVENOUS at 18:38

## 2021-10-04 RX ADMIN — KETOROLAC TROMETHAMINE 15 MG: 30 INJECTION, SOLUTION INTRAMUSCULAR; INTRAVENOUS at 18:37

## 2021-10-05 ENCOUNTER — APPOINTMENT (INPATIENT)
Dept: RADIOLOGY | Facility: HOSPITAL | Age: 46
DRG: 710 | End: 2021-10-05
Payer: COMMERCIAL

## 2021-10-05 LAB
ANION GAP SERPL CALCULATED.3IONS-SCNC: 8 MMOL/L (ref 4–13)
BUN SERPL-MCNC: 15 MG/DL (ref 7–25)
CALCIUM SERPL-MCNC: 8.8 MG/DL (ref 8.6–10.5)
CHLORIDE SERPL-SCNC: 99 MMOL/L (ref 98–107)
CO2 SERPL-SCNC: 28 MMOL/L (ref 21–31)
CREAT SERPL-MCNC: 1.66 MG/DL (ref 0.7–1.3)
ERYTHROCYTE [DISTWIDTH] IN BLOOD BY AUTOMATED COUNT: 13.7 % (ref 11.5–14.5)
GFR SERPL CREATININE-BSD FRML MDRD: 48 ML/MIN/1.73SQ M
GLUCOSE SERPL-MCNC: 101 MG/DL (ref 65–99)
HCT VFR BLD AUTO: 36.5 % (ref 42–47)
HGB BLD-MCNC: 12.8 G/DL (ref 14–18)
MCH RBC QN AUTO: 29.4 PG (ref 26–34)
MCHC RBC AUTO-ENTMCNC: 35.2 G/DL (ref 31–37)
MCV RBC AUTO: 84 FL (ref 81–99)
PLATELET # BLD AUTO: 174 THOUSANDS/UL (ref 149–390)
PMV BLD AUTO: 7 FL (ref 8.6–11.7)
POTASSIUM SERPL-SCNC: 3.4 MMOL/L (ref 3.5–5.5)
RBC # BLD AUTO: 4.36 MILLION/UL (ref 4.3–5.9)
SODIUM SERPL-SCNC: 135 MMOL/L (ref 134–143)
WBC # BLD AUTO: 10.2 THOUSAND/UL (ref 4.8–10.8)

## 2021-10-05 PROCEDURE — 85027 COMPLETE CBC AUTOMATED: CPT | Performed by: PHYSICIAN ASSISTANT

## 2021-10-05 PROCEDURE — 36415 COLL VENOUS BLD VENIPUNCTURE: CPT | Performed by: PHYSICIAN ASSISTANT

## 2021-10-05 PROCEDURE — 80048 BASIC METABOLIC PNL TOTAL CA: CPT | Performed by: PHYSICIAN ASSISTANT

## 2021-10-05 PROCEDURE — 99232 SBSQ HOSP IP/OBS MODERATE 35: CPT | Performed by: INTERNAL MEDICINE

## 2021-10-05 PROCEDURE — 74018 RADEX ABDOMEN 1 VIEW: CPT

## 2021-10-05 RX ORDER — AMLODIPINE BESYLATE 5 MG/1
5 TABLET ORAL DAILY
Status: DISCONTINUED | OUTPATIENT
Start: 2021-10-05 | End: 2021-10-06 | Stop reason: HOSPADM

## 2021-10-05 RX ORDER — HYDRALAZINE HYDROCHLORIDE 20 MG/ML
5 INJECTION INTRAMUSCULAR; INTRAVENOUS EVERY 6 HOURS PRN
Status: DISCONTINUED | OUTPATIENT
Start: 2021-10-05 | End: 2021-10-06 | Stop reason: HOSPADM

## 2021-10-05 RX ORDER — TAMSULOSIN HYDROCHLORIDE 0.4 MG/1
0.4 CAPSULE ORAL DAILY
Status: DISCONTINUED | OUTPATIENT
Start: 2021-10-05 | End: 2021-10-06 | Stop reason: HOSPADM

## 2021-10-05 RX ADMIN — TAMSULOSIN HYDROCHLORIDE 0.4 MG: 0.4 CAPSULE ORAL at 20:00

## 2021-10-05 RX ADMIN — HYDRALAZINE HYDROCHLORIDE 5 MG: 20 INJECTION INTRAMUSCULAR; INTRAVENOUS at 17:56

## 2021-10-05 RX ADMIN — AMLODIPINE BESYLATE 5 MG: 5 TABLET ORAL at 17:55

## 2021-10-05 RX ADMIN — SODIUM CHLORIDE 125 ML/HR: 0.9 INJECTION, SOLUTION INTRAVENOUS at 15:52

## 2021-10-05 RX ADMIN — CEFTRIAXONE 1000 MG: 1 INJECTION, SOLUTION INTRAVENOUS at 20:45

## 2021-10-06 ENCOUNTER — ANESTHESIA EVENT (INPATIENT)
Dept: PERIOP | Facility: HOSPITAL | Age: 46
DRG: 710 | End: 2021-10-06
Payer: COMMERCIAL

## 2021-10-06 ENCOUNTER — ANESTHESIA (INPATIENT)
Dept: PERIOP | Facility: HOSPITAL | Age: 46
DRG: 710 | End: 2021-10-06
Payer: COMMERCIAL

## 2021-10-06 ENCOUNTER — APPOINTMENT (INPATIENT)
Dept: RADIOLOGY | Facility: HOSPITAL | Age: 46
DRG: 710 | End: 2021-10-06
Payer: COMMERCIAL

## 2021-10-06 ENCOUNTER — TELEPHONE (OUTPATIENT)
Dept: PULMONOLOGY | Facility: CLINIC | Age: 46
End: 2021-10-06

## 2021-10-06 VITALS
RESPIRATION RATE: 22 BRPM | HEIGHT: 73 IN | DIASTOLIC BLOOD PRESSURE: 72 MMHG | TEMPERATURE: 97.1 F | OXYGEN SATURATION: 97 % | HEART RATE: 85 BPM | BODY MASS INDEX: 30.47 KG/M2 | SYSTOLIC BLOOD PRESSURE: 146 MMHG | WEIGHT: 229.94 LBS

## 2021-10-06 LAB
ALBUMIN SERPL BCP-MCNC: 3.9 G/DL (ref 3.5–5.7)
ALP SERPL-CCNC: 87 U/L (ref 40–150)
ALT SERPL W P-5'-P-CCNC: 13 U/L (ref 7–52)
ANION GAP SERPL CALCULATED.3IONS-SCNC: 9 MMOL/L (ref 4–13)
AST SERPL W P-5'-P-CCNC: 12 U/L (ref 13–39)
BACTERIA UR CULT: NORMAL
BACTERIA UR QL AUTO: NORMAL /HPF
BASOPHILS # BLD AUTO: 0 THOUSANDS/ΜL (ref 0–0.1)
BASOPHILS NFR BLD AUTO: 0 % (ref 0–2)
BILIRUB DIRECT SERPL-MCNC: 0.3 MG/DL (ref 0–0.2)
BILIRUB SERPL-MCNC: 1.3 MG/DL (ref 0.2–1)
BILIRUB UR QL STRIP: NEGATIVE
BUN SERPL-MCNC: 22 MG/DL (ref 7–25)
CALCIUM SERPL-MCNC: 9.1 MG/DL (ref 8.6–10.5)
CHLORIDE SERPL-SCNC: 101 MMOL/L (ref 98–107)
CLARITY UR: CLEAR
CO2 SERPL-SCNC: 25 MMOL/L (ref 21–31)
COLOR UR: YELLOW
CREAT SERPL-MCNC: 1.68 MG/DL (ref 0.7–1.3)
EOSINOPHIL # BLD AUTO: 0.3 THOUSAND/ΜL (ref 0–0.61)
EOSINOPHIL NFR BLD AUTO: 3 % (ref 0–5)
ERYTHROCYTE [DISTWIDTH] IN BLOOD BY AUTOMATED COUNT: 13.6 % (ref 11.5–14.5)
GFR SERPL CREATININE-BSD FRML MDRD: 48 ML/MIN/1.73SQ M
GLUCOSE SERPL-MCNC: 92 MG/DL (ref 65–99)
GLUCOSE UR STRIP-MCNC: NEGATIVE MG/DL
HCT VFR BLD AUTO: 38.2 % (ref 42–47)
HGB BLD-MCNC: 13.3 G/DL (ref 14–18)
HGB UR QL STRIP.AUTO: ABNORMAL
KETONES UR STRIP-MCNC: ABNORMAL MG/DL
LEUKOCYTE ESTERASE UR QL STRIP: NEGATIVE
LYMPHOCYTES # BLD AUTO: 1.7 THOUSANDS/ΜL (ref 0.6–4.47)
LYMPHOCYTES NFR BLD AUTO: 19 % (ref 21–51)
MCH RBC QN AUTO: 29.1 PG (ref 26–34)
MCHC RBC AUTO-ENTMCNC: 34.7 G/DL (ref 31–37)
MCV RBC AUTO: 84 FL (ref 81–99)
MONOCYTES # BLD AUTO: 0.5 THOUSAND/ΜL (ref 0.17–1.22)
MONOCYTES NFR BLD AUTO: 6 % (ref 2–12)
NEUTROPHILS # BLD AUTO: 6.3 THOUSANDS/ΜL (ref 1.4–6.5)
NEUTS SEG NFR BLD AUTO: 72 % (ref 42–75)
NITRITE UR QL STRIP: NEGATIVE
NON-SQ EPI CELLS URNS QL MICRO: NORMAL /HPF
PH UR STRIP.AUTO: 5.5 [PH]
PLATELET # BLD AUTO: 209 THOUSANDS/UL (ref 149–390)
PMV BLD AUTO: 7.6 FL (ref 8.6–11.7)
POTASSIUM SERPL-SCNC: 3.4 MMOL/L (ref 3.5–5.5)
PROT SERPL-MCNC: 7.2 G/DL (ref 6.4–8.9)
PROT UR STRIP-MCNC: NEGATIVE MG/DL
RBC # BLD AUTO: 4.56 MILLION/UL (ref 4.3–5.9)
RBC #/AREA URNS AUTO: NORMAL /HPF
SODIUM SERPL-SCNC: 135 MMOL/L (ref 134–143)
SP GR UR STRIP.AUTO: 1.02 (ref 1–1.03)
UROBILINOGEN UR QL STRIP.AUTO: 0.2 E.U./DL
WBC # BLD AUTO: 8.8 THOUSAND/UL (ref 4.8–10.8)
WBC #/AREA URNS AUTO: NORMAL /HPF

## 2021-10-06 PROCEDURE — C1758 CATHETER, URETERAL: HCPCS | Performed by: UROLOGY

## 2021-10-06 PROCEDURE — 85025 COMPLETE CBC W/AUTO DIFF WBC: CPT | Performed by: INTERNAL MEDICINE

## 2021-10-06 PROCEDURE — 0TC68ZZ EXTIRPATION OF MATTER FROM RIGHT URETER, VIA NATURAL OR ARTIFICIAL OPENING ENDOSCOPIC: ICD-10-PCS | Performed by: UROLOGY

## 2021-10-06 PROCEDURE — 87086 URINE CULTURE/COLONY COUNT: CPT | Performed by: UROLOGY

## 2021-10-06 PROCEDURE — C1769 GUIDE WIRE: HCPCS | Performed by: UROLOGY

## 2021-10-06 PROCEDURE — 90471 IMMUNIZATION ADMIN: CPT | Performed by: INTERNAL MEDICINE

## 2021-10-06 PROCEDURE — 74420 UROGRAPHY RTRGR +-KUB: CPT

## 2021-10-06 PROCEDURE — 82248 BILIRUBIN DIRECT: CPT | Performed by: SURGERY

## 2021-10-06 PROCEDURE — 99239 HOSP IP/OBS DSCHRG MGMT >30: CPT | Performed by: INTERNAL MEDICINE

## 2021-10-06 PROCEDURE — 86316 IMMUNOASSAY TUMOR OTHER: CPT | Performed by: INTERNAL MEDICINE

## 2021-10-06 PROCEDURE — BT1D1ZZ FLUOROSCOPY OF RIGHT KIDNEY, URETER AND BLADDER USING LOW OSMOLAR CONTRAST: ICD-10-PCS | Performed by: UROLOGY

## 2021-10-06 PROCEDURE — 81003 URINALYSIS AUTO W/O SCOPE: CPT | Performed by: UROLOGY

## 2021-10-06 PROCEDURE — C1726 CATH, BAL DIL, NON-VASCULAR: HCPCS | Performed by: UROLOGY

## 2021-10-06 PROCEDURE — 81001 URINALYSIS AUTO W/SCOPE: CPT | Performed by: UROLOGY

## 2021-10-06 PROCEDURE — 82360 CALCULUS ASSAY QUANT: CPT | Performed by: UROLOGY

## 2021-10-06 PROCEDURE — 0T768DZ DILATION OF RIGHT URETER WITH INTRALUMINAL DEVICE, VIA NATURAL OR ARTIFICIAL OPENING ENDOSCOPIC: ICD-10-PCS | Performed by: UROLOGY

## 2021-10-06 PROCEDURE — 80053 COMPREHEN METABOLIC PANEL: CPT | Performed by: INTERNAL MEDICINE

## 2021-10-06 PROCEDURE — C2617 STENT, NON-COR, TEM W/O DEL: HCPCS | Performed by: UROLOGY

## 2021-10-06 PROCEDURE — 90686 IIV4 VACC NO PRSV 0.5 ML IM: CPT | Performed by: INTERNAL MEDICINE

## 2021-10-06 PROCEDURE — 74018 RADEX ABDOMEN 1 VIEW: CPT

## 2021-10-06 DEVICE — INLAY OPTIMA URETERAL STENT W/O GUIDEWIRE
Type: IMPLANTABLE DEVICE | Site: URETER | Status: NON-FUNCTIONAL
Brand: BARD® INLAY OPTIMA® URETERAL STENT

## 2021-10-06 RX ORDER — ONDANSETRON 2 MG/ML
INJECTION INTRAMUSCULAR; INTRAVENOUS AS NEEDED
Status: DISCONTINUED | OUTPATIENT
Start: 2021-10-06 | End: 2021-10-06

## 2021-10-06 RX ORDER — HYDROMORPHONE HCL/PF 1 MG/ML
0.5 SYRINGE (ML) INJECTION
Status: DISCONTINUED | OUTPATIENT
Start: 2021-10-06 | End: 2021-10-06 | Stop reason: HOSPADM

## 2021-10-06 RX ORDER — CEPHALEXIN 500 MG/1
500 CAPSULE ORAL EVERY 6 HOURS SCHEDULED
Qty: 20 CAPSULE | Refills: 0 | Status: SHIPPED | OUTPATIENT
Start: 2021-10-06 | End: 2021-10-11

## 2021-10-06 RX ORDER — MIDAZOLAM HYDROCHLORIDE 2 MG/2ML
INJECTION, SOLUTION INTRAMUSCULAR; INTRAVENOUS AS NEEDED
Status: DISCONTINUED | OUTPATIENT
Start: 2021-10-06 | End: 2021-10-06

## 2021-10-06 RX ORDER — FENTANYL CITRATE 50 UG/ML
INJECTION, SOLUTION INTRAMUSCULAR; INTRAVENOUS AS NEEDED
Status: DISCONTINUED | OUTPATIENT
Start: 2021-10-06 | End: 2021-10-06

## 2021-10-06 RX ORDER — FENTANYL CITRATE/PF 50 MCG/ML
25 SYRINGE (ML) INJECTION
Status: DISCONTINUED | OUTPATIENT
Start: 2021-10-06 | End: 2021-10-06 | Stop reason: HOSPADM

## 2021-10-06 RX ORDER — DEXAMETHASONE SODIUM PHOSPHATE 10 MG/ML
INJECTION, SOLUTION INTRAMUSCULAR; INTRAVENOUS AS NEEDED
Status: DISCONTINUED | OUTPATIENT
Start: 2021-10-06 | End: 2021-10-06

## 2021-10-06 RX ORDER — LIDOCAINE HYDROCHLORIDE 10 MG/ML
INJECTION, SOLUTION EPIDURAL; INFILTRATION; INTRACAUDAL; PERINEURAL AS NEEDED
Status: DISCONTINUED | OUTPATIENT
Start: 2021-10-06 | End: 2021-10-06

## 2021-10-06 RX ORDER — PROPOFOL 10 MG/ML
INJECTION, EMULSION INTRAVENOUS AS NEEDED
Status: DISCONTINUED | OUTPATIENT
Start: 2021-10-06 | End: 2021-10-06

## 2021-10-06 RX ORDER — METOCLOPRAMIDE HYDROCHLORIDE 5 MG/ML
10 INJECTION INTRAMUSCULAR; INTRAVENOUS EVERY 4 HOURS PRN
Status: DISCONTINUED | OUTPATIENT
Start: 2021-10-06 | End: 2021-10-06 | Stop reason: HOSPADM

## 2021-10-06 RX ORDER — EPHEDRINE SULFATE 50 MG/ML
INJECTION INTRAVENOUS AS NEEDED
Status: DISCONTINUED | OUTPATIENT
Start: 2021-10-06 | End: 2021-10-06

## 2021-10-06 RX ORDER — MAGNESIUM HYDROXIDE 1200 MG/15ML
LIQUID ORAL AS NEEDED
Status: DISCONTINUED | OUTPATIENT
Start: 2021-10-06 | End: 2021-10-06 | Stop reason: HOSPADM

## 2021-10-06 RX ORDER — ONDANSETRON 2 MG/ML
4 INJECTION INTRAMUSCULAR; INTRAVENOUS EVERY 4 HOURS PRN
Status: DISCONTINUED | OUTPATIENT
Start: 2021-10-06 | End: 2021-10-06 | Stop reason: HOSPADM

## 2021-10-06 RX ORDER — TAMSULOSIN HYDROCHLORIDE 0.4 MG/1
0.4 CAPSULE ORAL DAILY
Qty: 30 CAPSULE | Refills: 0 | Status: SHIPPED | OUTPATIENT
Start: 2021-10-06 | End: 2022-05-10

## 2021-10-06 RX ORDER — SODIUM CHLORIDE, SODIUM LACTATE, POTASSIUM CHLORIDE, CALCIUM CHLORIDE 600; 310; 30; 20 MG/100ML; MG/100ML; MG/100ML; MG/100ML
125 INJECTION, SOLUTION INTRAVENOUS CONTINUOUS
Status: DISCONTINUED | OUTPATIENT
Start: 2021-10-06 | End: 2021-10-06 | Stop reason: HOSPADM

## 2021-10-06 RX ADMIN — LIDOCAINE HYDROCHLORIDE 100 MG: 10 INJECTION, SOLUTION EPIDURAL; INFILTRATION; INTRACAUDAL; PERINEURAL at 07:38

## 2021-10-06 RX ADMIN — DEXAMETHASONE SODIUM PHOSPHATE 4 MG: 10 INJECTION, SOLUTION INTRAMUSCULAR; INTRAVENOUS at 07:55

## 2021-10-06 RX ADMIN — TAMSULOSIN HYDROCHLORIDE 0.4 MG: 0.4 CAPSULE ORAL at 12:32

## 2021-10-06 RX ADMIN — ONDANSETRON 4 MG: 2 INJECTION INTRAMUSCULAR; INTRAVENOUS at 07:55

## 2021-10-06 RX ADMIN — ENOXAPARIN SODIUM 40 MG: 100 INJECTION SUBCUTANEOUS at 12:32

## 2021-10-06 RX ADMIN — SODIUM CHLORIDE 125 ML/HR: 0.9 INJECTION, SOLUTION INTRAVENOUS at 02:54

## 2021-10-06 RX ADMIN — FENTANYL CITRATE 50 MCG: 50 INJECTION INTRAMUSCULAR; INTRAVENOUS at 07:43

## 2021-10-06 RX ADMIN — MIDAZOLAM HYDROCHLORIDE 2 MG: 1 INJECTION, SOLUTION INTRAMUSCULAR; INTRAVENOUS at 07:34

## 2021-10-06 RX ADMIN — FENTANYL CITRATE 50 MCG: 50 INJECTION INTRAMUSCULAR; INTRAVENOUS at 07:34

## 2021-10-06 RX ADMIN — OXYCODONE HYDROCHLORIDE 5 MG: 5 TABLET ORAL at 06:22

## 2021-10-06 RX ADMIN — INFLUENZA VIRUS VACCINE 0.5 ML: 15; 15; 15; 15 SUSPENSION INTRAMUSCULAR at 12:30

## 2021-10-06 RX ADMIN — PROPOFOL 200 MG: 10 INJECTION, EMULSION INTRAVENOUS at 07:38

## 2021-10-06 RX ADMIN — EPHEDRINE SULFATE 5 MG: 50 INJECTION, SOLUTION INTRAVENOUS at 08:03

## 2021-10-06 RX ADMIN — AMLODIPINE BESYLATE 5 MG: 5 TABLET ORAL at 12:35

## 2021-10-07 LAB — BACTERIA UR CULT: NORMAL

## 2021-10-08 LAB
BACTERIA BLD CULT: NORMAL
BACTERIA BLD CULT: NORMAL
CGA SERPL-MCNC: 134.5 NG/ML (ref 0–101.8)

## 2021-10-11 ENCOUNTER — CONSULT (OUTPATIENT)
Dept: SURGERY | Facility: CLINIC | Age: 46
End: 2021-10-11
Payer: COMMERCIAL

## 2021-10-11 VITALS
HEIGHT: 73 IN | WEIGHT: 229.2 LBS | RESPIRATION RATE: 16 BRPM | SYSTOLIC BLOOD PRESSURE: 142 MMHG | BODY MASS INDEX: 30.38 KG/M2 | TEMPERATURE: 97.6 F | HEART RATE: 89 BPM | DIASTOLIC BLOOD PRESSURE: 90 MMHG

## 2021-10-11 DIAGNOSIS — K63.89 MESENTERIC MASS: Primary | ICD-10-CM

## 2021-10-11 LAB
CALCIUM OXALATE DIHYDRATE MFR STONE IR: 10 %
COLOR STONE: NORMAL
COM MFR STONE: 90 %
COMMENT-STONE3: NORMAL
COMPOSITION: NORMAL
LABORATORY COMMENT REPORT: NORMAL
PHOTO: NORMAL
SIZE STONE: NORMAL MM
SPEC SOURCE SUBJ: NORMAL
STONE ANALYSIS-IMP: NORMAL
WT STONE: 5 MG

## 2021-10-11 PROCEDURE — 99214 OFFICE O/P EST MOD 30 MIN: CPT | Performed by: SURGERY

## 2021-10-12 PROBLEM — K66.8 CALCIFIED MESENTERIC MASS: Status: ACTIVE | Noted: 2021-10-04

## 2021-10-12 PROBLEM — A41.9 SEPSIS (HCC): Status: RESOLVED | Noted: 2021-10-04 | Resolved: 2021-10-12

## 2021-10-13 ENCOUNTER — CONSULT (OUTPATIENT)
Dept: GASTROENTEROLOGY | Facility: CLINIC | Age: 46
End: 2021-10-13
Payer: COMMERCIAL

## 2021-10-13 VITALS
WEIGHT: 230 LBS | TEMPERATURE: 98.1 F | SYSTOLIC BLOOD PRESSURE: 138 MMHG | HEIGHT: 73 IN | HEART RATE: 76 BPM | BODY MASS INDEX: 30.48 KG/M2 | RESPIRATION RATE: 16 BRPM | DIASTOLIC BLOOD PRESSURE: 96 MMHG

## 2021-10-13 DIAGNOSIS — Z12.12 ENCOUNTER FOR COLORECTAL CANCER SCREENING: Primary | ICD-10-CM

## 2021-10-13 DIAGNOSIS — K63.89 MESENTERIC MASS: ICD-10-CM

## 2021-10-13 DIAGNOSIS — Z12.11 ENCOUNTER FOR COLORECTAL CANCER SCREENING: Primary | ICD-10-CM

## 2021-10-13 PROCEDURE — 99243 OFF/OP CNSLTJ NEW/EST LOW 30: CPT | Performed by: INTERNAL MEDICINE

## 2021-10-13 RX ORDER — POLYETHYLENE GLYCOL 3350 17 G/17G
17 POWDER, FOR SOLUTION ORAL DAILY
Qty: 510 G | Refills: 0 | Status: SHIPPED | OUTPATIENT
Start: 2021-10-13 | End: 2022-05-10

## 2021-10-14 ENCOUNTER — TELEPHONE (OUTPATIENT)
Dept: GENETICS | Facility: CLINIC | Age: 46
End: 2021-10-14

## 2021-11-01 ENCOUNTER — TELEPHONE (OUTPATIENT)
Dept: GASTROENTEROLOGY | Facility: CLINIC | Age: 46
End: 2021-11-01

## 2021-11-16 ENCOUNTER — TELEPHONE (OUTPATIENT)
Dept: SURGERY | Facility: CLINIC | Age: 46
End: 2021-11-16

## 2021-12-02 ENCOUNTER — TELEPHONE (OUTPATIENT)
Dept: GASTROENTEROLOGY | Facility: CLINIC | Age: 46
End: 2021-12-02

## 2021-12-06 ENCOUNTER — ANESTHESIA (OUTPATIENT)
Dept: ANESTHESIOLOGY | Facility: HOSPITAL | Age: 46
End: 2021-12-06

## 2021-12-06 ENCOUNTER — ANESTHESIA EVENT (OUTPATIENT)
Dept: ANESTHESIOLOGY | Facility: HOSPITAL | Age: 46
End: 2021-12-06

## 2022-02-01 ENCOUNTER — HOSPITAL ENCOUNTER (EMERGENCY)
Facility: HOSPITAL | Age: 47
Discharge: HOME | End: 2022-02-01
Attending: EMERGENCY MEDICINE | Admitting: EMERGENCY MEDICINE
Payer: COMMERCIAL

## 2022-02-01 ENCOUNTER — APPOINTMENT (EMERGENCY)
Dept: CT IMAGING | Facility: HOSPITAL | Age: 47
End: 2022-02-01
Payer: COMMERCIAL

## 2022-02-01 ENCOUNTER — OFFICE VISIT (OUTPATIENT)
Dept: URGENT CARE | Facility: CLINIC | Age: 47
End: 2022-02-01
Payer: COMMERCIAL

## 2022-02-01 VITALS
OXYGEN SATURATION: 97 % | DIASTOLIC BLOOD PRESSURE: 110 MMHG | HEART RATE: 86 BPM | WEIGHT: 230 LBS | BODY MASS INDEX: 30.34 KG/M2 | SYSTOLIC BLOOD PRESSURE: 188 MMHG | TEMPERATURE: 97.2 F | RESPIRATION RATE: 18 BRPM

## 2022-02-01 VITALS
TEMPERATURE: 98.7 F | DIASTOLIC BLOOD PRESSURE: 98 MMHG | HEART RATE: 82 BPM | HEIGHT: 73 IN | OXYGEN SATURATION: 97 % | BODY MASS INDEX: 30.48 KG/M2 | WEIGHT: 230 LBS | RESPIRATION RATE: 18 BRPM | SYSTOLIC BLOOD PRESSURE: 176 MMHG

## 2022-02-01 DIAGNOSIS — R10.33 PERIUMBILICAL ABDOMINAL PAIN: Primary | ICD-10-CM

## 2022-02-01 DIAGNOSIS — R10.84 GENERALIZED ABDOMINAL PAIN: Primary | ICD-10-CM

## 2022-02-01 LAB
ALBUMIN SERPL BCP-MCNC: 4.8 G/DL (ref 3.5–5)
ALP SERPL-CCNC: 126 U/L (ref 34–104)
ALT SERPL W P-5'-P-CCNC: 19 U/L (ref 7–52)
ANION GAP SERPL CALCULATED.3IONS-SCNC: 7 MMOL/L (ref 4–13)
AST SERPL W P-5'-P-CCNC: 21 U/L (ref 13–39)
BASOPHILS # BLD AUTO: 0.02 THOUSANDS/ΜL (ref 0–0.1)
BASOPHILS NFR BLD AUTO: 0 % (ref 0–1)
BILIRUB SERPL-MCNC: 1.47 MG/DL (ref 0.2–1)
BUN SERPL-MCNC: 13 MG/DL (ref 5–25)
CALCIUM SERPL-MCNC: 10 MG/DL (ref 8.4–10.2)
CHLORIDE SERPL-SCNC: 100 MMOL/L (ref 96–108)
CO2 SERPL-SCNC: 31 MMOL/L (ref 21–32)
CREAT SERPL-MCNC: 0.9 MG/DL (ref 0.6–1.3)
EOSINOPHIL # BLD AUTO: 0.1 THOUSAND/ΜL (ref 0–0.61)
EOSINOPHIL NFR BLD AUTO: 1 % (ref 0–6)
ERYTHROCYTE [DISTWIDTH] IN BLOOD BY AUTOMATED COUNT: 14.1 % (ref 11.6–15.1)
GFR SERPL CREATININE-BSD FRML MDRD: 101 ML/MIN/1.73SQ M
GLUCOSE SERPL-MCNC: 113 MG/DL (ref 65–140)
HCT VFR BLD AUTO: 44.9 % (ref 36.5–49.3)
HGB BLD-MCNC: 15.6 G/DL (ref 12–17)
IMM GRANULOCYTES # BLD AUTO: 0.02 THOUSAND/UL (ref 0–0.2)
IMM GRANULOCYTES NFR BLD AUTO: 0 % (ref 0–2)
LIPASE SERPL-CCNC: 13 U/L (ref 11–82)
LYMPHOCYTES # BLD AUTO: 1.73 THOUSANDS/ΜL (ref 0.6–4.47)
LYMPHOCYTES NFR BLD AUTO: 17 % (ref 14–44)
MCH RBC QN AUTO: 28.7 PG (ref 26.8–34.3)
MCHC RBC AUTO-ENTMCNC: 34.7 G/DL (ref 31.4–37.4)
MCV RBC AUTO: 83 FL (ref 82–98)
MONOCYTES # BLD AUTO: 0.38 THOUSAND/ΜL (ref 0.17–1.22)
MONOCYTES NFR BLD AUTO: 4 % (ref 4–12)
NEUTROPHILS # BLD AUTO: 8.19 THOUSANDS/ΜL (ref 1.85–7.62)
NEUTS SEG NFR BLD AUTO: 78 % (ref 43–75)
NRBC BLD AUTO-RTO: 0 /100 WBCS
PLATELET # BLD AUTO: 196 THOUSANDS/UL (ref 149–390)
PMV BLD AUTO: 9.5 FL (ref 8.9–12.7)
POTASSIUM SERPL-SCNC: 3.9 MMOL/L (ref 3.5–5.3)
PROT SERPL-MCNC: 8.2 G/DL (ref 6.4–8.4)
RBC # BLD AUTO: 5.44 MILLION/UL (ref 3.88–5.62)
SODIUM SERPL-SCNC: 138 MMOL/L (ref 135–147)
WBC # BLD AUTO: 10.44 THOUSAND/UL (ref 4.31–10.16)

## 2022-02-01 PROCEDURE — 83690 ASSAY OF LIPASE: CPT | Performed by: EMERGENCY MEDICINE

## 2022-02-01 PROCEDURE — 74177 CT ABD & PELVIS W/CONTRAST: CPT

## 2022-02-01 PROCEDURE — 85025 COMPLETE CBC W/AUTO DIFF WBC: CPT | Performed by: EMERGENCY MEDICINE

## 2022-02-01 PROCEDURE — 99213 OFFICE O/P EST LOW 20 MIN: CPT | Performed by: PHYSICIAN ASSISTANT

## 2022-02-01 PROCEDURE — G1004 CDSM NDSC: HCPCS

## 2022-02-01 PROCEDURE — 80053 COMPREHEN METABOLIC PANEL: CPT | Performed by: EMERGENCY MEDICINE

## 2022-02-01 PROCEDURE — 99284 EMERGENCY DEPT VISIT MOD MDM: CPT | Performed by: EMERGENCY MEDICINE

## 2022-02-01 PROCEDURE — 99284 EMERGENCY DEPT VISIT MOD MDM: CPT

## 2022-02-01 PROCEDURE — 36415 COLL VENOUS BLD VENIPUNCTURE: CPT | Performed by: EMERGENCY MEDICINE

## 2022-02-01 PROCEDURE — 96360 HYDRATION IV INFUSION INIT: CPT

## 2022-02-01 RX ADMIN — IOHEXOL 100 ML: 350 INJECTION, SOLUTION INTRAVENOUS at 18:11

## 2022-02-01 RX ADMIN — SODIUM CHLORIDE 1000 ML: 0.9 INJECTION, SOLUTION INTRAVENOUS at 17:26

## 2022-02-01 NOTE — ED PROVIDER NOTES
History  Chief Complaint   Patient presents with    Abdominal Pain     Patient is a 41-year-old male with history of hypertension, who presents for evaluation of periumbilical abdominal pain  Patient says the pain started yesterday after lifting something heavy at home  He says the pain is worse with bending and movement  Patient says that he had 2 umbilical hernias fixed in the past   He also admits to a lump in his stomach and he was supposed follow-up in [facility]  According to chart review, the patient was seen in October and found to have what was possibly a neuroendocrine tumor in his abdomen  He has not followed up with GI as an outpatient for this yet  He denies any chest pain, shortness of breath, lightheadedness dizziness, nausea or vomiting  Prior to Admission Medications   Prescriptions Last Dose Informant Patient Reported? Taking?    PARoxetine (PAXIL) 20 mg tablet  Self Yes No   Sig: Take 20 mg by mouth daily   Patient not taking: Reported on 10/11/2021   bisacodyl (DULCOLAX) 5 mg EC tablet  Self No No   Sig: Take 1 tablet (5 mg total) by mouth 2 (two) times a day   Patient not taking: Reported on 10/11/2021   lisinopril (ZESTRIL) 10 mg tablet  Self Yes No   Sig: Take 15 mg by mouth daily   magnesium citrate (CITROMA) 1 745 g/30 mL oral solution   No No   Sig: Take 296 mL by mouth once for 1 dose   Patient not taking: Reported on 10/11/2021   polyethylene glycol (GLYCOLAX) 17 GM/SCOOP powder   No No   Sig: Take 17 g by mouth daily Take daily at least 1 week prior to colonoscopy prep   sildenafil (VIAGRA) 100 mg tablet  Self Yes No   Sig: Take 100 mg by mouth as needed   Patient not taking: Reported on 10/11/2021   tamsulosin (FLOMAX) 0 4 mg  Self No No   Sig: Take 1 capsule (0 4 mg total) by mouth daily   Patient not taking: Reported on 2/1/2022       Facility-Administered Medications: None       Past Medical History:   Diagnosis Date    Allergic     [facility] disorder [facility] partial [facility] ([facility] )     Erectile dysfunction     unspecified erectile dysfunction type    Hypertension        Past Surgical History:   Procedure Laterality Date    FL RETROGRADE PYELOGRAM  10/6/2021    HERNIA REPAIR      RI CYSTOURETHROSCOPY,URETER CATHETER Right 10/6/2021    Procedure: CYSTOSCOPY RETROGRADE PYELOGRAM WITH INSERTION STENT URETERAL, RIGHT URETEROSCOPY, STONE EXTRACTION;  Surgeon: Alicja Ewing MD;  Location: [facility] MAIN OR;  Service: Urology       Family History   Problem Relation Age of Onset    Diabetes Mother     Thyroid disease Mother     Cancer Father     Thyroid disease Sister     Depression Brother     Cancer Maternal Aunt     Cancer Paternal Uncle     Cancer Paternal Grandmother     No Known Problems Brother     Thyroid disease Sister      I have reviewed and agree with the history as documented  E-Cigarette/Vaping    E-Cigarette Use Never User      E-Cigarette/Vaping Substances     Social History     Tobacco Use    Smoking status: Former Smoker     Types: Pipe, Cigarettes     Quit date: 2003     Years since quittin 1    Smokeless tobacco: Never Used   Vaping Use    Vaping Use: Never used   Substance Use Topics    Alcohol use: Yes     Comment: occassionally    Drug use: Not Currently       Review of Systems   Constitutional: Negative for chills, fever and unexpected weight change  HENT: Negative for congestion, sore throat and trouble swallowing  Eyes: Negative for pain, discharge and itching  Respiratory: Negative for cough, chest tightness, shortness of breath and wheezing  Cardiovascular: Negative for chest pain, palpitations and leg swelling  Gastrointestinal: Positive for abdominal pain  Negative for blood in stool, diarrhea, nausea and vomiting  Endocrine: Negative for polyuria  Genitourinary: Negative for difficulty urinating, dysuria, frequency and hematuria  Musculoskeletal: Negative for arthralgias and back pain     Neurological: Negative for dizziness, syncope, weakness, light-headedness and headaches  Physical Exam  Physical Exam  Vitals and nursing note reviewed  Constitutional:       General: He is not in acute distress  Appearance: He is well-developed  HENT:      Head: Normocephalic and atraumatic  Right Ear: External ear normal       Left Ear: External ear normal    Eyes:      Conjunctiva/sclera: Conjunctivae normal       Pupils: Pupils are equal, round, and reactive to light  Cardiovascular:      Rate and Rhythm: Normal rate and regular rhythm  Heart sounds: Normal heart sounds  No murmur heard  No friction rub  No gallop  Pulmonary:      Effort: Pulmonary effort is normal  No respiratory distress  Breath sounds: Normal breath sounds  No wheezing or rales  Abdominal:      General: Bowel sounds are normal  There is no distension  Palpations: Abdomen is soft  Tenderness: There is abdominal tenderness in the periumbilical area  There is no guarding  Musculoskeletal:         General: No tenderness or deformity  Normal range of motion  Cervical back: Normal range of motion  Lymphadenopathy:      Cervical: No cervical adenopathy  Skin:     General: Skin is warm and dry  Neurological:      General: No focal deficit present  Mental Status: He is alert and oriented to person, place, and time  Mental status is at baseline  Cranial Nerves: No cranial nerve deficit  Sensory: No sensory deficit  Motor: No weakness or abnormal muscle tone     Psychiatric:         Behavior: Behavior normal          Vital Signs  ED Triage Vitals [02/01/22 1656]   Temperature Pulse Respirations Blood Pressure SpO2   (!) 97 2 °F (36 2 °C) 86 18 (!) 201/114 97 %      Temp Source Heart Rate Source Patient Position - Orthostatic VS BP Location FiO2 (%)   Tympanic Monitor Sitting Left arm --      Pain Score       7           Vitals:    02/01/22 1656 02/01/22 1914   BP: (!) 201/114 (!) 188/110   Pulse: 86    Patient Position - Orthostatic VS: Sitting          Visual Acuity      ED Medications  Medications   sodium chloride 0 9 % bolus 1,000 mL (0 mL Intravenous Stopped 2/1/22 1826)   iohexol (OMNIPAQUE) 350 MG/ML injection (SINGLE-DOSE) 100 mL (100 mL Intravenous Given 2/1/22 1811)       Diagnostic Studies  Results Reviewed     Procedure Component Value Units Date/Time    Comprehensive metabolic panel [[id number]]  (Abnormal) Collected: 02/01/22 1724    Lab Status: Final result Specimen: Blood from Arm, Right Updated: 02/01/22 1751     Sodium 138 mmol/L      Potassium 3 9 mmol/L      Chloride 100 mmol/L      CO2 31 mmol/L      ANION GAP 7 mmol/L      BUN 13 mg/dL      Creatinine 0 90 mg/dL      Glucose 113 mg/dL      Calcium 10 0 mg/dL      AST 21 U/L      ALT 19 U/L      Alkaline Phosphatase 126 U/L      Total Protein 8 2 g/dL      Albumin 4 8 g/dL      Total Bilirubin 1 47 mg/dL      eGFR 101 ml/min/1 73sq m     Narrative:      [place] guidelines for Chronic Kidney Disease (CKD):     Stage 1 with normal or high GFR (GFR > 90 mL/min/1 73 square meters)    Stage 2 Mild CKD (GFR = 60-89 mL/min/1 73 square meters)    Stage 3A Moderate CKD (GFR = 45-59 mL/min/1 73 square meters)    Stage 3B Moderate CKD (GFR = 30-44 mL/min/1 73 square meters)    Stage 4 Severe CKD (GFR = 15-29 mL/min/1 73 square meters)    Stage 5 End Stage CKD (GFR <15 mL/min/1 73 square meters)  Note: GFR calculation is accurate only with a steady state creatinine    Lipase [[id number]]  (Normal) Collected: 02/01/22 1724    Lab Status: Final result Specimen: Blood from Arm, Right Updated: 02/01/22 1751     Lipase 13 u/L     CBC and differential [[id number]]  (Abnormal) Collected: 02/01/22 1724    Lab Status: Final result Specimen: Blood from Arm, Right Updated: 02/01/22 1730     WBC 10 44 Thousand/uL      RBC 5 44 Million/uL      Hemoglobin 15 6 g/dL      Hematocrit 44 9 %      MCV 83 fL      MCH 28 7 pg      MCHC 34 7 g/dL      RDW 14 1 %      MPV 9 5 fL      Platelets 929 Thousands/uL      nRBC 0 /100 WBCs      Neutrophils Relative 78 %      Immat GRANS % 0 %      Lymphocytes Relative 17 %      Monocytes Relative 4 %      Eosinophils Relative 1 %      Basophils Relative 0 %      Neutrophils Absolute 8 19 Thousands/µL      Immature Grans Absolute 0 02 Thousand/uL      Lymphocytes Absolute 1 73 Thousands/µL      Monocytes Absolute 0 38 Thousand/µL      Eosinophils Absolute 0 10 Thousand/µL      Basophils Absolute 0 02 Thousands/µL                  CT abdomen pelvis with contrast   Final Result by Scarlet Bay MD (02/01 1904)      Since October 2021, no significant change in the partially calcified mesenteric masses with mesenteric tethering concerning for a neuroendocrine tumor  Note is made of patient's elevated, chromogranin A level  Gallium DOTATE PET/CT can provide further    diagnostic information  No hydronephrosis or nephrolithiasis identified  Workstation performed: [id number]                    Procedures  Procedures         ED Course                               SBIRT 22yo+      Most Recent Value   SBIRT (24 yo +)    In order to provide better care to our patients, we are screening all of our patients for alcohol and drug use  Would it be okay to ask you these screening questions? No Filed at: 02/01/2022 1920                    [facility]  Number of Diagnoses or Management Options  Diagnosis management comments: 43-year-old male presenting for evaluation of periumbilical abdominal pain  Occurred after lifting something heavy yesterday  Worse with bending and movement  Does have a history of umbilical hernias in that area  According to chart review, was seen here in October  Found to have Will is possibly a neuroendocrine tumor in the abdomen  Has not followed up with GI as an outpatient for that yet    Has periumbilical tenderness on exam   Is hypertensive, rest of vitals within normal limits  Will obtain belly labs, CT and pelvis with contrast       Disposition  Final diagnoses:   Periumbilical abdominal pain     Time reflects when diagnosis was documented in both MDM as applicable and the Disposition within this note     Time User Action Codes Description Comment    2/1/2022  7:10 PM Nicole Telles Add [D88 11] Periumbilical abdominal pain       ED Disposition     ED Disposition Condition Date/Time Comment    Discharge Stable Tue Feb 1, 2022  7:10 PM Hernandez Kenny discharge to home/self care              Follow-up Information     Follow up With Specialties Details Why Contact Info Additional Information    Darryl Milner,  Family Medicine Schedule an appointment as soon as possible for a visit  For follow up of abdominal pain and your high blood pressure [facility] [address] [place] [address] [facility] Schedule an appointment as soon as possible for a visit  For follow up of CT findings: [address] [id number]  [address] [facility] [place], [address] [facility], [address] [facility] [place], [place], [address], [phone]          Discharge Medication List as of 2/1/2022  7:18 PM      CONTINUE these medications which have NOT CHANGED    Details   bisacodyl (DULCOLAX) 5 mg EC tablet Take 1 tablet (5 mg total) by mouth 2 (two) times a day, Starting Sat 10/2/2021, Normal      lisinopril (ZESTRIL) 10 mg tablet Take 15 mg by mouth daily, Historical Med      magnesium citrate (CITROMA) 1 745 g/30 mL oral solution Take 296 mL by mouth once for 1 dose, Starting Sat 10/2/2021, Normal      PARoxetine (PAXIL) 20 mg tablet Take 20 mg by mouth daily, Starting Tue 11/26/2019, Until Wed 11/25/2020, Historical Med      polyethylene glycol (GLYCOLAX) 17 GM/SCOOP powder Take 17 g by mouth daily Take daily at least 1 week prior to colonoscopy prep, Starting Wed 10/13/2021, Until Fri 11/12/2021, Normal sildenafil (VIAGRA) 100 mg tablet Take 100 mg by mouth as needed, Starting Tue 11/26/2019, Until Wed 11/25/2020, Historical Med      tamsulosin (FLOMAX) 0 4 mg Take 1 capsule (0 4 mg total) by mouth daily, Starting Wed 10/6/2021, Normal             No discharge procedures on file      PDMP Review     None          ED Provider  Electronically Signed by           Constantino Arvizu DO  02/01/22 0609

## 2022-02-01 NOTE — Clinical Note
Gregorio López was seen and treated in our emergency department on 2/1/2022  Diagnosis: abdominal pain/strain    Adalid Busch    He may return on this date: 02/03/2022         If you have any questions or concerns, please don't hesitate to call        Ying Armstrong DO    ______________________________           _______________          _______________  Hospital Representative                              Date                                Time

## 2022-02-01 NOTE — PROGRESS NOTES
[email or web address]        NAME: Roland Pastrana is a 51 y o  male  : 1975    MRN: [id number]  DATE: 2022  TIME: 4:34 PM    Assessment and Plan   Generalized abdominal pain [R10 84]  1  Generalized abdominal pain       Follow up with surgeon who did your hernia surgeries   If the pain in unbearable go to the ER      Patient Instructions   Patient Instructions   Follow up with surgeon who did your hernia surgeries   If the pain in unbearable go to the ER          Follow up with PCP in 3-5 days  Proceed to  ER if symptoms worsen  Chief Complaint     Chief Complaint   Patient presents with    Abdominal Pain     was helping move some filing cabients heavynow having pain         History of Present Illness       The pt is a 54-year-old male presenting for abdominal pain x 1 day  He was helping move cabinets and now is having pain mid abdomen around his diastasis recti  Associate nausea but no vomiting  Review of Systems   Review of Systems   Constitutional: Negative for activity change, appetite change, chills, diaphoresis and fever  HENT: Negative for congestion, rhinorrhea and sore throat  Respiratory: Negative for cough, chest tightness and shortness of breath  Cardiovascular: Negative for chest pain and palpitations  Gastrointestinal: Positive for abdominal pain and nausea  Negative for diarrhea and vomiting  Musculoskeletal: Negative for arthralgias and myalgias  Skin: Negative for color change and pallor  Neurological: Negative for headaches           Current Medications       Current Outpatient Medications:     lisinopril (ZESTRIL) 10 mg tablet, Take 15 mg by mouth daily, Disp: , Rfl:     bisacodyl (DULCOLAX) 5 mg EC tablet, Take 1 tablet (5 mg total) by mouth 2 (two) times a day (Patient not taking: Reported on 10/11/2021), Disp: 14 tablet, Rfl: 0    magnesium citrate (CITROMA) 1 745 g/30 mL oral solution, Take 296 mL by mouth once for 1 dose (Patient not taking: Reported on 10/11/2021), Disp: 296 mL, Rfl: 0    PARoxetine (PAXIL) 20 mg tablet, Take 20 mg by mouth daily (Patient not taking: Reported on 10/11/2021), Disp: , Rfl:     polyethylene glycol (GLYCOLAX) 17 GM/SCOOP powder, Take 17 g by mouth daily Take daily at least 1 week prior to colonoscopy prep, Disp: 510 g, Rfl: 0    sildenafil (VIAGRA) 100 mg tablet, Take 100 mg by mouth as needed (Patient not taking: Reported on 10/11/2021), Disp: , Rfl:     tamsulosin (FLOMAX) 0 4 mg, Take 1 capsule (0 4 mg total) by mouth daily (Patient not taking: Reported on 2/1/2022 ), Disp: 30 capsule, Rfl: 0    Current Allergies     Allergies as of 02/01/2022 - Reviewed 02/01/2022   Allergen Reaction Noted    Tomato - food allergy Anaphylaxis 11/26/2019    Poison sumac extract Hives 10/11/2021    Poison ivy extract Rash 10/11/2021            The following portions of the patient's history were reviewed and updated as appropriate: allergies, current medications, past family history, past medical history, past social history, past surgical history and problem list      Past Medical History:   Diagnosis Date    Allergic     [facility] disorder in partial remission ([facility] )     Erectile dysfunction     unspecified erectile dysfunction type    Hypertension        Past Surgical History:   Procedure Laterality Date    FL RETROGRADE PYELOGRAM  10/6/2021    HERNIA REPAIR      CT CYSTOURETHROSCOPY,URETER CATHETER Right 10/6/2021    Procedure: CYSTOSCOPY RETROGRADE PYELOGRAM WITH INSERTION STENT URETERAL, RIGHT URETEROSCOPY, STONE EXTRACTION;  Surgeon: Marcy Vargas MD;  Location: [facility] MAIN OR;  Service: Urology       Family History   Problem Relation Age of Onset    Diabetes Mother     Thyroid disease Mother     Cancer Father     Thyroid disease Sister     Depression Brother     Cancer Maternal Aunt     Cancer Paternal Uncle     Cancer Paternal Grandmother     No Known Problems Brother     Thyroid disease Sister Medications have been verified  Objective   BP (!) 176/98 Comment: did not take meds today  Pulse 82   Temp 98 7 °F (37 1 °C)   Resp 18   Ht 6' 1" (1 854 m)   Wt 104 kg (230 lb)   SpO2 97%   BMI 30 34 kg/m²        Physical Exam     Physical Exam  Vitals and nursing note reviewed  Constitutional:       General: He is not in acute distress  Appearance: Normal appearance  He is well-developed and normal weight  He is not ill-appearing, toxic-appearing or diaphoretic  HENT:      Head: Normocephalic and atraumatic  Nose: Nose normal  No congestion or rhinorrhea  Mouth/Throat:      Mouth: Mucous membranes are moist    Cardiovascular:      Rate and Rhythm: Normal rate and regular rhythm  Heart sounds: Normal heart sounds  No murmur heard  No friction rub  No gallop  Pulmonary:      Effort: Pulmonary effort is normal  No respiratory distress  Breath sounds: Normal breath sounds  No stridor  No wheezing, rhonchi or rales  Chest:      Chest wall: No tenderness  Abdominal:      General: Abdomen is flat  Bowel sounds are normal  There is no distension or abdominal bruit  There are no signs of injury  Palpations: Abdomen is soft  There is no shifting dullness, fluid wave, hepatomegaly, splenomegaly, mass or pulsatile mass  Tenderness: There is abdominal tenderness in the epigastric area and periumbilical area  There is no right CVA tenderness, left CVA tenderness, guarding or rebound  Negative signs include Figueroa's sign, Rovsing's sign, McBurney's sign, psoas sign and obturator sign  Comments: diastasis recti    Musculoskeletal:      Cervical back: Normal range of motion  Lymphadenopathy:      Cervical: No cervical adenopathy  Skin:     General: Skin is warm and dry  Capillary Refill: Capillary refill takes less than 2 seconds  Neurological:      Mental Status: He is alert

## 2022-02-02 ENCOUNTER — TELEPHONE (OUTPATIENT)
Dept: SURGERY | Facility: CLINIC | Age: 47
End: 2022-02-02

## 2022-02-02 NOTE — TELEPHONE ENCOUNTER
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 I hope that he will take our call after his recent ED visit with actual clinical symptoms  He needs workup and intervention for sure  Thanks

## 2022-02-02 NOTE — DISCHARGE INSTRUCTIONS
It is important that you follow up with your family doctor/GI for the following CT findings:    Since October 2021, no significant change in the partially calcified mesenteric masses with mesenteric tethering concerning for a neuroendocrine tumor  Note is made of patient's elevated, chromogranin A level  Gallium DOTATE PET/CT can provide further   diagnostic information

## 2022-02-08 NOTE — TELEPHONE ENCOUNTER
Placed call to patient, he stated he has a lot going on with his mom right now and she has an enlarged heart  He will call us to schedule after her appointment this week

## 2022-03-03 NOTE — TELEPHONE ENCOUNTER
Placed call to patient and left a message to return our call as I see none of the testing / procedures have been scheduled yet

## 2022-04-11 ENCOUNTER — PREP FOR PROCEDURE (OUTPATIENT)
Dept: GASTROENTEROLOGY | Facility: CLINIC | Age: 47
End: 2022-04-11

## 2022-04-11 DIAGNOSIS — K63.89 MESENTERIC MASS: ICD-10-CM

## 2022-04-11 DIAGNOSIS — Z12.11 ENCOUNTER FOR COLORECTAL CANCER SCREENING: Primary | ICD-10-CM

## 2022-04-11 DIAGNOSIS — Z12.12 ENCOUNTER FOR COLORECTAL CANCER SCREENING: Primary | ICD-10-CM

## 2022-04-11 RX ORDER — POLYETHYLENE GLYCOL 3350 17 G/17G
POWDER, FOR SOLUTION ORAL
Qty: 238 G | Refills: 0 | Status: SHIPPED | OUTPATIENT
Start: 2022-04-11 | End: 2022-05-10

## 2022-04-11 NOTE — TELEPHONE ENCOUNTER
Placed call to patient, they only do pet scans in [facility]  Patient has no way of transportation down there, I did have him schedule his colonoscopy and EGD at [place] with Dr Chung Contreras

## 2022-04-17 ENCOUNTER — APPOINTMENT (EMERGENCY)
Dept: CT IMAGING | Facility: HOSPITAL | Age: 47
End: 2022-04-17
Payer: COMMERCIAL

## 2022-04-17 ENCOUNTER — HOSPITAL ENCOUNTER (EMERGENCY)
Facility: HOSPITAL | Age: 47
Discharge: HOME | End: 2022-04-17
Attending: EMERGENCY MEDICINE
Payer: COMMERCIAL

## 2022-04-17 VITALS
DIASTOLIC BLOOD PRESSURE: 116 MMHG | TEMPERATURE: 97.4 F | RESPIRATION RATE: 18 BRPM | OXYGEN SATURATION: 98 % | HEART RATE: 77 BPM | HEIGHT: 73 IN | WEIGHT: 230 LBS | SYSTOLIC BLOOD PRESSURE: 200 MMHG | BODY MASS INDEX: 30.48 KG/M2

## 2022-04-17 DIAGNOSIS — R19.00 ABDOMINAL MASS: Primary | ICD-10-CM

## 2022-04-17 DIAGNOSIS — D3A.8 NEUROENDOCRINE TUMOR: ICD-10-CM

## 2022-04-17 DIAGNOSIS — I10 HYPERTENSION: ICD-10-CM

## 2022-04-17 LAB
ALBUMIN SERPL BCP-MCNC: 4.6 G/DL (ref 3.5–5)
ALP SERPL-CCNC: 119 U/L (ref 34–104)
ALT SERPL W P-5'-P-CCNC: 12 U/L (ref 7–52)
ANION GAP SERPL CALCULATED.3IONS-SCNC: 10 MMOL/L (ref 4–13)
AST SERPL W P-5'-P-CCNC: 15 U/L (ref 13–39)
BASOPHILS # BLD AUTO: 0.03 THOUSANDS/ΜL (ref 0–0.1)
BASOPHILS NFR BLD AUTO: 0 % (ref 0–1)
BILIRUB SERPL-MCNC: 2.24 MG/DL (ref 0.2–1)
BUN SERPL-MCNC: 8 MG/DL (ref 5–25)
CALCIUM SERPL-MCNC: 9.8 MG/DL (ref 8.4–10.2)
CHLORIDE SERPL-SCNC: 98 MMOL/L (ref 96–108)
CO2 SERPL-SCNC: 27 MMOL/L (ref 21–32)
CREAT SERPL-MCNC: 0.78 MG/DL (ref 0.6–1.3)
EOSINOPHIL # BLD AUTO: 0.14 THOUSAND/ΜL (ref 0–0.61)
EOSINOPHIL NFR BLD AUTO: 1 % (ref 0–6)
ERYTHROCYTE [DISTWIDTH] IN BLOOD BY AUTOMATED COUNT: 13.7 % (ref 11.6–15.1)
GFR SERPL CREATININE-BSD FRML MDRD: 106 ML/MIN/1.73SQ M
GLUCOSE SERPL-MCNC: 119 MG/DL (ref 65–140)
HCT VFR BLD AUTO: 43.8 % (ref 36.5–49.3)
HGB BLD-MCNC: 15.7 G/DL (ref 12–17)
IMM GRANULOCYTES # BLD AUTO: 0.03 THOUSAND/UL (ref 0–0.2)
IMM GRANULOCYTES NFR BLD AUTO: 0 % (ref 0–2)
LIPASE SERPL-CCNC: 9 U/L (ref 11–82)
LYMPHOCYTES # BLD AUTO: 1.52 THOUSANDS/ΜL (ref 0.6–4.47)
LYMPHOCYTES NFR BLD AUTO: 14 % (ref 14–44)
MCH RBC QN AUTO: 29.4 PG (ref 26.8–34.3)
MCHC RBC AUTO-ENTMCNC: 35.8 G/DL (ref 31.4–37.4)
MCV RBC AUTO: 82 FL (ref 82–98)
MONOCYTES # BLD AUTO: 0.58 THOUSAND/ΜL (ref 0.17–1.22)
MONOCYTES NFR BLD AUTO: 5 % (ref 4–12)
NEUTROPHILS # BLD AUTO: 8.98 THOUSANDS/ΜL (ref 1.85–7.62)
NEUTS SEG NFR BLD AUTO: 80 % (ref 43–75)
NRBC BLD AUTO-RTO: 0 /100 WBCS
PLATELET # BLD AUTO: 177 THOUSANDS/UL (ref 149–390)
PMV BLD AUTO: 9.2 FL (ref 8.9–12.7)
POTASSIUM SERPL-SCNC: 3.2 MMOL/L (ref 3.5–5.3)
PROT SERPL-MCNC: 8.3 G/DL (ref 6.4–8.4)
RBC # BLD AUTO: 5.34 MILLION/UL (ref 3.88–5.62)
SODIUM SERPL-SCNC: 135 MMOL/L (ref 135–147)
WBC # BLD AUTO: 11.28 THOUSAND/UL (ref 4.31–10.16)

## 2022-04-17 PROCEDURE — 96361 HYDRATE IV INFUSION ADD-ON: CPT

## 2022-04-17 PROCEDURE — 80053 COMPREHEN METABOLIC PANEL: CPT | Performed by: EMERGENCY MEDICINE

## 2022-04-17 PROCEDURE — 99284 EMERGENCY DEPT VISIT MOD MDM: CPT

## 2022-04-17 PROCEDURE — 96375 TX/PRO/DX INJ NEW DRUG ADDON: CPT

## 2022-04-17 PROCEDURE — G1004 CDSM NDSC: HCPCS

## 2022-04-17 PROCEDURE — 74177 CT ABD & PELVIS W/CONTRAST: CPT

## 2022-04-17 PROCEDURE — 96374 THER/PROPH/DIAG INJ IV PUSH: CPT

## 2022-04-17 PROCEDURE — 83690 ASSAY OF LIPASE: CPT | Performed by: EMERGENCY MEDICINE

## 2022-04-17 PROCEDURE — 85025 COMPLETE CBC W/AUTO DIFF WBC: CPT | Performed by: EMERGENCY MEDICINE

## 2022-04-17 PROCEDURE — 36415 COLL VENOUS BLD VENIPUNCTURE: CPT | Performed by: EMERGENCY MEDICINE

## 2022-04-17 PROCEDURE — 99284 EMERGENCY DEPT VISIT MOD MDM: CPT | Performed by: EMERGENCY MEDICINE

## 2022-04-17 PROCEDURE — 96376 TX/PRO/DX INJ SAME DRUG ADON: CPT

## 2022-04-17 RX ORDER — MAGNESIUM HYDROXIDE/ALUMINUM HYDROXICE/SIMETHICONE 120; 1200; 1200 MG/30ML; MG/30ML; MG/30ML
30 SUSPENSION ORAL ONCE
Status: COMPLETED | OUTPATIENT
Start: 2022-04-17 | End: 2022-04-17

## 2022-04-17 RX ORDER — ONDANSETRON 2 MG/ML
4 INJECTION INTRAMUSCULAR; INTRAVENOUS ONCE
Status: COMPLETED | OUTPATIENT
Start: 2022-04-17 | End: 2022-04-17

## 2022-04-17 RX ORDER — POTASSIUM CHLORIDE 20 MEQ/1
40 TABLET, EXTENDED RELEASE ORAL ONCE
Status: COMPLETED | OUTPATIENT
Start: 2022-04-17 | End: 2022-04-17

## 2022-04-17 RX ORDER — LIDOCAINE HYDROCHLORIDE 20 MG/ML
15 SOLUTION OROPHARYNGEAL ONCE
Status: COMPLETED | OUTPATIENT
Start: 2022-04-17 | End: 2022-04-17

## 2022-04-17 RX ORDER — ONDANSETRON 4 MG/1
4 TABLET, ORALLY DISINTEGRATING ORAL EVERY 6 HOURS PRN
Qty: 28 TABLET | Refills: 0 | Status: SHIPPED | OUTPATIENT
Start: 2022-04-17 | End: 2022-05-10

## 2022-04-17 RX ORDER — OXYCODONE HYDROCHLORIDE 5 MG/1
5 TABLET ORAL EVERY 6 HOURS PRN
Qty: 20 TABLET | Refills: 0 | Status: SHIPPED | OUTPATIENT
Start: 2022-04-17 | End: 2022-04-22

## 2022-04-17 RX ORDER — FENTANYL CITRATE 50 UG/ML
100 INJECTION, SOLUTION INTRAMUSCULAR; INTRAVENOUS ONCE
Status: COMPLETED | OUTPATIENT
Start: 2022-04-17 | End: 2022-04-17

## 2022-04-17 RX ORDER — OXYCODONE HYDROCHLORIDE 5 MG/1
5 TABLET ORAL ONCE
Status: COMPLETED | OUTPATIENT
Start: 2022-04-17 | End: 2022-04-17

## 2022-04-17 RX ADMIN — ONDANSETRON 4 MG: 2 INJECTION INTRAMUSCULAR; INTRAVENOUS at 18:49

## 2022-04-17 RX ADMIN — OXYCODONE HYDROCHLORIDE 5 MG: 5 TABLET ORAL at 18:50

## 2022-04-17 RX ADMIN — SODIUM CHLORIDE 1000 ML: 0.9 INJECTION, SOLUTION INTRAVENOUS at 14:09

## 2022-04-17 RX ADMIN — LIDOCAINE HYDROCHLORIDE 15 ML: 20 SOLUTION ORAL; TOPICAL at 14:01

## 2022-04-17 RX ADMIN — POTASSIUM CHLORIDE 40 MEQ: 1500 TABLET, EXTENDED RELEASE ORAL at 15:17

## 2022-04-17 RX ADMIN — FENTANYL CITRATE 100 MCG: 50 INJECTION, SOLUTION INTRAMUSCULAR; INTRAVENOUS at 16:54

## 2022-04-17 RX ADMIN — FAMOTIDINE 40 MG: 10 INJECTION INTRAVENOUS at 14:01

## 2022-04-17 RX ADMIN — IOHEXOL 100 ML: 350 INJECTION, SOLUTION INTRAVENOUS at 16:19

## 2022-04-17 RX ADMIN — ONDANSETRON 4 MG: 2 INJECTION INTRAMUSCULAR; INTRAVENOUS at 14:01

## 2022-04-17 RX ADMIN — ALUMINUM HYDROXIDE, MAGNESIUM HYDROXIDE, AND SIMETHICONE 30 ML: 200; 200; 20 SUSPENSION ORAL at 14:00

## 2022-04-17 NOTE — DISCHARGE INSTRUCTIONS
Your CT scan is concerning for Neuroendocrine tumors (concerning for cancer) which are larger from prior CT scans  It is very important you follow up with you GI appt on 4/22/22 for colonoscopy and endoscopy  It is also important that you call central scheduling to schedule your PET scan at [phone]  It is very important that you follow up with these tests and appointments as this can condition can significantly worsen if not treated immediately  Please follow up with Dr Laura Lopez for better blood pressure control

## 2022-04-17 NOTE — ED PROVIDER NOTES
History  Chief Complaint   Patient presents with    Abdominal Pain     right upper abdominal pain that began yesterday    Dizziness     48 yo male with chronic abdominal pain presenting to the ed for abdominal pain which started yesterday with associated nausea/vomiting  States hes had intermittent abdominal pain since having hernia surgery about 6 years ago  States this pain seemed to start up after helping his brother move a large television a few days ago  Denies fevers, night sweats chills, CP, sob beyond baseline secondary to multiple broken noses, diarrhea, constipation, dysuria, hematuria  Prior to Admission Medications   Prescriptions Last Dose Informant Patient Reported? Taking?    PARoxetine (PAXIL) 20 mg tablet  Self Yes No   Sig: Take 20 mg by mouth daily   Patient not taking: Reported on 10/11/2021   bisacodyl (DULCOLAX) 5 mg EC tablet  Self No No   Sig: Take 1 tablet (5 mg total) by mouth 2 (two) times a day   Patient not taking: Reported on 10/11/2021   bisacodyl (DULCOLAX) 5 mg EC tablet   No No   Sig: Take 1 tablet (5 mg total) by mouth daily as needed for constipation Take as directed by office   lisinopril (ZESTRIL) 10 mg tablet  Self Yes No   Sig: Take 15 mg by mouth daily   magnesium citrate (CITROMA) 1 745 g/30 mL oral solution   No No   Sig: Take 296 mL by mouth once for 1 dose   Patient not taking: Reported on 10/11/2021   polyethylene glycol (GLYCOLAX) 17 GM/SCOOP powder   No No   Sig: Take 17 g by mouth daily Take daily at least 1 week prior to colonoscopy prep   polyethylene glycol (GLYCOLAX) 17 GM/SCOOP powder   No No   Sig: Take as directed by office   sildenafil (VIAGRA) 100 mg tablet  Self Yes No   Sig: Take 100 mg by mouth as needed   Patient not taking: Reported on 10/11/2021   tamsulosin (FLOMAX) 0 4 mg  Self No No   Sig: Take 1 capsule (0 4 mg total) by mouth daily   Patient not taking: Reported on 2/1/2022       Facility-Administered Medications: None       Past Medical History:   Diagnosis Date    Allergic     Bipolar disorder in partial remission ([facility] [address] )     Erectile dysfunction     unspecified erectile dysfunction type    Hypertension        Past Surgical History:   Procedure Laterality Date    FL RETROGRADE PYELOGRAM  10/6/2021    HERNIA REPAIR      [place] CYSTOURETHROSCOPY,URETER CATHETER Right 10/6/2021    Procedure: CYSTOSCOPY RETROGRADE PYELOGRAM WITH INSERTION STENT URETERAL, RIGHT URETEROSCOPY, STONE EXTRACTION;  Surgeon: Alicia Xiong MD;  Location: [address] [place] [address] MAIN OR;  Service: Urology       Family History   Problem Relation Age of Onset    Diabetes Mother     Thyroid disease Mother     Cancer Father     Thyroid disease Sister     Depression Brother     Cancer Maternal Aunt     Cancer Paternal Uncle     Cancer Paternal Grandmother     No Known Problems Brother     Thyroid disease Sister      I have reviewed and agree with the history as documented  E-Cigarette/Vaping    E-Cigarette Use Never User      E-Cigarette/Vaping Substances     Social History     Tobacco Use    Smoking status: Former Smoker     Types: Pipe, Cigarettes     Quit date: 2003     Years since quittin 3    Smokeless tobacco: Never Used   Vaping Use    Vaping Use: Never used   Substance Use Topics    Alcohol use: Yes     Comment: occassionally    Drug use: Not Currently       Review of Systems   Gastrointestinal: Positive for abdominal pain, nausea and vomiting  All other systems reviewed and are negative  Physical Exam  Physical Exam  Vitals and nursing note reviewed  Constitutional:       General: He is not in acute distress  Appearance: He is well-developed  He is not diaphoretic  HENT:      Head: Normocephalic and atraumatic  Right Ear: External ear normal       Left Ear: External ear normal       Nose: Nose normal    Eyes:      General: No scleral icterus  Right eye: No discharge  Left eye: No discharge        Conjunctiva/sclera: Conjunctivae normal    Cardiovascular:      Rate and Rhythm: Normal rate and regular rhythm  Heart sounds: Normal heart sounds  No murmur heard  No friction rub  No gallop  Pulmonary:      Effort: Pulmonary effort is normal  No respiratory distress  Breath sounds: Normal breath sounds  No wheezing or rales  Abdominal:      General: Bowel sounds are normal  There is no distension  Palpations: Abdomen is soft  There is no mass  Tenderness: There is abdominal tenderness in the right upper quadrant, epigastric area and periumbilical area  There is no right CVA tenderness, left CVA tenderness, guarding or rebound  Negative signs include Figueroa's sign and McBurney's sign  Musculoskeletal:         General: No tenderness or deformity  Normal range of motion  Cervical back: Normal range of motion and neck supple  Skin:     General: Skin is warm and dry  Coloration: Skin is not pale  Findings: No erythema or rash  Neurological:      Mental Status: He is alert and oriented to person, place, and time  Psychiatric:         Behavior: Behavior normal          Thought Content:  Thought content normal          Judgment: Judgment normal          Vital Signs  ED Triage Vitals [04/17/22 1331]   Temperature Pulse Respirations Blood Pressure SpO2   (!) 97 4 °F (36 3 °C) 91 18 (!) 187/131 100 %      Temp Source Heart Rate Source Patient Position - Orthostatic VS BP Location FiO2 (%)   Temporal Monitor Sitting Right arm --      Pain Score       6           Vitals:    04/17/22 1331 04/17/22 1649   BP: (!) 187/131 (!) 200/116   Pulse: 91 77   Patient Position - Orthostatic VS: Sitting Lying         Visual Acuity      ED Medications  Medications   sodium chloride 0 9 % bolus 1,000 mL (0 mL Intravenous Stopped 4/17/22 1852)   ondansetron (ZOFRAN) injection 4 mg (4 mg Intravenous Given 4/17/22 1401)   aluminum-magnesium hydroxide-simethicone (MYLANTA) oral suspension 30 mL (30 mL Oral Given 4/17/22 1400)   Lidocaine Viscous HCl (XYLOCAINE) 2 % mucosal solution 15 mL (15 mL Swish & Spit Given 4/17/22 1401)   famotidine (PEPCID) injection 40 mg (40 mg Intravenous Given 4/17/22 1401)   potassium chloride (K-DUR,KLOR-CON) CR tablet 40 mEq (40 mEq Oral Given 4/17/22 1517)   iohexol (OMNIPAQUE) 350 MG/ML injection (SINGLE-DOSE) 100 mL (100 mL Intravenous Given 4/17/22 1619)   fentanyl citrate (PF) 100 MCG/2ML 100 mcg (100 mcg Intravenous Given 4/17/22 1654)   oxyCODONE (ROXICODONE) IR tablet 5 mg (5 mg Oral Given 4/17/22 1850)   ondansetron (ZOFRAN) injection 4 mg (4 mg Intravenous Given 4/17/22 1849)       Diagnostic Studies  Results Reviewed     Procedure Component Value Units Date/Time    Comprehensive metabolic panel [[id number]]  (Abnormal) Collected: 04/17/22 1406    Lab Status: Final result Specimen: Blood from Arm, Left Updated: 04/17/22 1427     Sodium 135 mmol/L      Potassium 3 2 mmol/L      Chloride 98 mmol/L      CO2 27 mmol/L      ANION GAP 10 mmol/L      BUN 8 mg/dL      Creatinine 0 78 mg/dL      Glucose 119 mg/dL      Calcium 9 8 mg/dL      AST 15 U/L      ALT 12 U/L      Alkaline Phosphatase 119 U/L      Total Protein 8 3 g/dL      Albumin 4 6 g/dL      Total Bilirubin 2 24 mg/dL      eGFR 106 ml/min/1 73sq m     Narrative:      [place] guidelines for Chronic Kidney Disease (CKD):     Stage 1 with normal or high GFR (GFR > 90 mL/min/1 73 square meters)    Stage 2 Mild CKD (GFR = 60-89 mL/min/1 73 square meters)    Stage 3A Moderate CKD (GFR = 45-59 mL/min/1 73 square meters)    Stage 3B Moderate CKD (GFR = 30-44 mL/min/1 73 square meters)    Stage 4 Severe CKD (GFR = 15-29 mL/min/1 73 square meters)    Stage 5 End Stage CKD (GFR <15 mL/min/1 73 square meters)  Note: GFR calculation is accurate only with a steady state creatinine    Lipase [[id number]]  (Abnormal) Collected: 04/17/22 1406    Lab Status: Final result Specimen: Blood from Arm, Left Updated: 04/17/22 1427     Lipase 9 u/L     CBC and differential [[id number]]  (Abnormal) Collected: 04/17/22 1406    Lab Status: Final result Specimen: Blood from Arm, Left Updated: 04/17/22 1412     WBC 11 28 Thousand/uL      RBC 5 34 Million/uL      Hemoglobin 15 7 g/dL      Hematocrit 43 8 %      MCV 82 fL      MCH 29 4 pg      MCHC 35 8 g/dL      RDW 13 7 %      MPV 9 2 fL      Platelets 078 Thousands/uL      nRBC 0 /100 WBCs      Neutrophils Relative 80 %      Immat GRANS % 0 %      Lymphocytes Relative 14 %      Monocytes Relative 5 %      Eosinophils Relative 1 %      Basophils Relative 0 %      Neutrophils Absolute 8 98 Thousands/µL      Immature Grans Absolute 0 03 Thousand/uL      Lymphocytes Absolute 1 52 Thousands/µL      Monocytes Absolute 0 58 Thousand/µL      Eosinophils Absolute 0 14 Thousand/µL      Basophils Absolute 0 03 Thousands/µL                  CT abdomen pelvis with contrast   Final Result by Fredy Jhaveri MD (04/17 1755)      Again seen are mesenteric masses as described above, which has mildly progressed compared to the 2/1/2022 CT  Again, these are suspicious for neuroendocrine tumor  I personally discussed this study with Denton Gonzalez on 4/17/2022 at 5:55 PM                      Workstation performed: [id number]                    Procedures  Procedures         ED Course  ED Course as of 04/17/22 1923   Sun Apr 17, 2022   1430 Potassium(!): 3 2  40mEq ordered   1809 Discussed CT scan findings with patient, mom and sister concerning finding showing that he likely has neuroendocrine tumors and the concern is for cancer  Discussed with them followup and he has a colonscopy and endoscopy in 5 days  He states that he never did the PET scan because they are only done in [address] and "they dont go down there"   [facility] radiology and spoke with Dr Ming Ely and he states unfortunately PET scans are only performed at [facility] and [address] [place] [address]     [address] Message sent to Dr Jorge Luis Lamar SBIRT 20yo+      Most Recent Value   SBIRT (22 yo +)    In order to provide better care to our patients, we are screening all of our patients for alcohol and drug use  Would it be okay to ask you these screening questions? Yes Filed at: 04/17/2022 1409   Initial Alcohol Screen: US AUDIT-C     1  How often do you have a drink containing alcohol? 0 Filed at: 04/17/2022 1409   2  How many drinks containing alcohol do you have on a typical day you are drinking? 0 Filed at: 04/17/2022 1409   3a  Male UNDER 65: How often do you have five or more drinks on one occasion? 0 Filed at: 04/17/2022 1409   3b  FEMALE Any Age, or MALE 65+: How often do you have 4 or more drinks on one occassion? 0 Filed at: 04/17/2022 1409   Audit-C Score 0 Filed at: 04/17/2022 1409   JURGEN: How many times in the past year have you    Used an illegal drug or used a prescription medication for non-medical reasons? Never Filed at: 04/17/2022 1409                    MDM  Number of Diagnoses or Management Options  Abdominal mass  Hypertension  Neuroendocrine tumor  Diagnosis management comments: Abdominal pain with nausea and vomiting  CT scan, abdominal blood work, nausea and pain control  Discussed all CT findings with patient, sister, mother at bedside  Stressed the importance of immediate follow-up as he has a colonoscopy in 5 days but required a PET scan  Stressed that is concerning for possible neuroendocrine tumor which would be cancer  Discharge this time  Pain and nausea control provided        Disposition  Final diagnoses:   Abdominal mass   Neuroendocrine tumor   Hypertension     Time reflects when diagnosis was documented in both MDM as applicable and the Disposition within this note     Time User Action Codes Description Comment    4/17/2022  6:34 PM Tj Ho Add [R19 00] Abdominal mass     4/17/2022  6:35 PM Tj Ho Add [D3A 8] Neuroendocrine tumor     4/17/2022  6:39 PM [address] Hypertension       ED Disposition     ED Disposition Condition Date/Time Comment    Discharge Stable Sun Apr 17, 2022  6:34 PM Ricky Mendiola discharge to home/self care              Follow-up Information     Follow up With Specialties Details Why Contact Info Additional [phone] Edna Andrews, DO Family Medicine   [phone] [facility] [address]       Avani Campbell, DO Gastroenterology   [facility]  [address]  [address] Emergency Department Emergency Medicine Go to  As needed, If symptoms worsen [address] Luiz Reeves [id number] [phone] [facility] Emergency Department, [address], Alicja pimentel, [address]          Discharge Medication List as of 4/17/2022  6:40 PM      START taking these medications    Details   ondansetron (Zofran ODT) 4 mg disintegrating tablet Take 1 tablet (4 mg total) by mouth every 6 (six) hours as needed for nausea or vomiting for up to 7 days, Starting Sun 4/17/2022, Until Sun 4/24/2022 at 2359, Normal      oxyCODONE (ROXICODONE) 5 immediate release tablet Take 1 tablet (5 mg total) by mouth every 6 (six) hours as needed for moderate pain for up to 5 days Max Daily Amount: 20 mg, Starting Sun 4/17/2022, Until Fri 4/22/2022 at 2359, Normal         CONTINUE these medications which have NOT CHANGED    Details   !! bisacodyl (DULCOLAX) 5 mg EC tablet Take 1 tablet (5 mg total) by mouth 2 (two) times a day, Starting Sat 10/2/2021, Normal      !! bisacodyl (DULCOLAX) 5 mg EC tablet Take 1 tablet (5 mg total) by mouth daily as needed for constipation Take as directed by office, Starting Mon 4/11/2022, Normal      lisinopril (ZESTRIL) 10 mg tablet Take 15 mg by mouth daily, Historical Med      magnesium citrate (CITROMA) 1 745 g/30 mL oral solution Take 296 mL by mouth once for 1 dose, Starting Sat 10/2/2021, Normal PARoxetine (PAXIL) 20 mg tablet Take 20 mg by mouth daily, Starting Tue 11/26/2019, Until Wed 11/25/2020, Historical Med      polyethylene glycol (GLYCOLAX) 17 GM/SCOOP powder Take as directed by office, Normal      sildenafil (VIAGRA) 100 mg tablet Take 100 mg by mouth as needed, Starting Tue 11/26/2019, Until Wed 11/25/2020, Historical Med      tamsulosin (FLOMAX) 0 4 mg Take 1 capsule (0 4 mg total) by mouth daily, Starting Wed 10/6/2021, Normal       !! - Potential duplicate medications found  Please discuss with provider  No discharge procedures on file      PDMP Review     None          ED Provider  Electronically Signed by           Sandhya Burr DO  04/17/22 8008

## 2022-04-19 NOTE — TELEPHONE ENCOUNTER
Called to schedule PET scan, they are out of office today  Will call them tomorrow to schedule at [phone]  Emailed [employer] also inquiring about transportation

## 2022-04-20 NOTE — TELEPHONE ENCOUNTER
Placed call to PET scan department and left a message to return my call  Also constantino can transport patient

## 2022-04-20 NOTE — TELEPHONE ENCOUNTER
Scheduled PET scan by calling [phone] as they are the only place who does the gallium protocol for 5/4/22 at 11:30 am, advised patient of this and transportation scheduled  He verbalized understanding and had no questions at this time

## 2022-04-20 NOTE — TELEPHONE ENCOUNTER
[facility] [employer] does not provide this service from [place] to [facility], will contact Zachery

## 2022-04-22 ENCOUNTER — HOSPITAL ENCOUNTER (OUTPATIENT)
Dept: GASTROENTEROLOGY | Facility: HOSPITAL | Age: 47
Setting detail: OUTPATIENT SURGERY
Discharge: HOME | End: 2022-04-22
Payer: COMMERCIAL

## 2022-04-22 ENCOUNTER — TELEPHONE (OUTPATIENT)
Dept: VASCULAR SURGERY | Facility: CLINIC | Age: 47
End: 2022-04-22

## 2022-04-22 ENCOUNTER — ANESTHESIA (OUTPATIENT)
Dept: GASTROENTEROLOGY | Facility: HOSPITAL | Age: 47
End: 2022-04-22

## 2022-04-22 ENCOUNTER — ANESTHESIA EVENT (OUTPATIENT)
Dept: GASTROENTEROLOGY | Facility: HOSPITAL | Age: 47
End: 2022-04-22

## 2022-04-22 VITALS
TEMPERATURE: 97.4 F | RESPIRATION RATE: 16 BRPM | SYSTOLIC BLOOD PRESSURE: 136 MMHG | BODY MASS INDEX: 30.48 KG/M2 | DIASTOLIC BLOOD PRESSURE: 91 MMHG | HEART RATE: 78 BPM | OXYGEN SATURATION: 100 % | WEIGHT: 230 LBS | HEIGHT: 73 IN

## 2022-04-22 DIAGNOSIS — K63.89 MESENTERIC MASS: ICD-10-CM

## 2022-04-22 DIAGNOSIS — Z12.12 ENCOUNTER FOR COLORECTAL CANCER SCREENING: ICD-10-CM

## 2022-04-22 DIAGNOSIS — Z12.11 ENCOUNTER FOR COLORECTAL CANCER SCREENING: ICD-10-CM

## 2022-04-22 PROCEDURE — 88313 SPECIAL STAINS GROUP 2: CPT | Performed by: SPECIALIST

## 2022-04-22 PROCEDURE — 43239 EGD BIOPSY SINGLE/MULTIPLE: CPT | Performed by: INTERNAL MEDICINE

## 2022-04-22 PROCEDURE — 88305 TISSUE EXAM BY PATHOLOGIST: CPT | Performed by: SPECIALIST

## 2022-04-22 PROCEDURE — 45378 DIAGNOSTIC COLONOSCOPY: CPT | Performed by: INTERNAL MEDICINE

## 2022-04-22 RX ORDER — PROPOFOL 10 MG/ML
INJECTION, EMULSION INTRAVENOUS AS NEEDED
Status: DISCONTINUED | OUTPATIENT
Start: 2022-04-22 | End: 2022-04-22

## 2022-04-22 RX ORDER — SODIUM CHLORIDE, SODIUM LACTATE, POTASSIUM CHLORIDE, CALCIUM CHLORIDE 600; 310; 30; 20 MG/100ML; MG/100ML; MG/100ML; MG/100ML
50 INJECTION, SOLUTION INTRAVENOUS CONTINUOUS
Status: DISCONTINUED | OUTPATIENT
Start: 2022-04-22 | End: 2022-04-26 | Stop reason: HOSPADM

## 2022-04-22 RX ADMIN — SODIUM CHLORIDE, SODIUM LACTATE, POTASSIUM CHLORIDE, AND CALCIUM CHLORIDE: .6; .31; .03; .02 INJECTION, SOLUTION INTRAVENOUS at 08:20

## 2022-04-22 RX ADMIN — PROPOFOL 100 MG: 10 INJECTION, EMULSION INTRAVENOUS at 08:59

## 2022-04-22 RX ADMIN — PROPOFOL 50 MG: 10 INJECTION, EMULSION INTRAVENOUS at 09:00

## 2022-04-22 RX ADMIN — PROPOFOL 50 MG: 10 INJECTION, EMULSION INTRAVENOUS at 09:08

## 2022-04-22 RX ADMIN — PROPOFOL 50 MG: 10 INJECTION, EMULSION INTRAVENOUS at 09:05

## 2022-04-22 RX ADMIN — PROPOFOL 50 MG: 10 INJECTION, EMULSION INTRAVENOUS at 09:02

## 2022-04-22 NOTE — H&P
History and Physical - [facility]  Jessie Graff 49 y o  male MRN: [id number]                  HPI: Jessie Graff is a 49y o  year old male who presents for EGD/colonoscopy for evaluation of possible neuroendocrine tumor      REVIEW OF SYSTEMS: Per the HPI, and otherwise unremarkable      Historical Information   Past Medical History:   Diagnosis Date    Allergic     Bipolar disorder in partial remission ([facility] [address] )     Erectile dysfunction     unspecified erectile dysfunction type    Hypertension     Kidney stone      Past Surgical History:   Procedure Laterality Date    FL RETROGRADE PYELOGRAM  10/6/2021    HERNIA REPAIR      [place] CYSTOURETHROSCOPY,URETER CATHETER Right 10/6/2021    Procedure: CYSTOSCOPY RETROGRADE PYELOGRAM WITH INSERTION STENT URETERAL, RIGHT URETEROSCOPY, STONE EXTRACTION;  Surgeon: Anish Carter MD;  Location: [address] [place] [address] MAIN OR;  Service: Urology     Social History   Social History     Substance and Sexual Activity   Alcohol Use Yes    Comment: Drinks a quart of moonshine/night     Social History     Substance and Sexual Activity   Drug Use Yes    Types: Marijuana     Social History     Tobacco Use   Smoking Status Former Smoker    Types: Pipe, Cigarettes    Quit date: 2003    Years since quittin 3   Smokeless Tobacco Never Used     Family History   Problem Relation Age of Onset    Diabetes Mother     Thyroid disease Mother     Cancer Father     Thyroid disease Sister     Depression Brother     Cancer Maternal Aunt     Cancer Paternal Uncle     Cancer Paternal Grandmother     No Known Problems Brother     Thyroid disease Sister        Meds/Allergies       Current Outpatient Medications:     bisacodyl (DULCOLAX) 5 mg EC tablet    bisacodyl (DULCOLAX) 5 mg EC tablet    lisinopril (ZESTRIL) 10 mg tablet    oxyCODONE (ROXICODONE) 5 immediate release tablet    magnesium citrate (CITROMA) 1 745 g/30 mL oral solution    ondansetron (Zofran ODT) 4 mg disintegrating tablet    PARoxetine (PAXIL) 20 mg tablet    polyethylene glycol (GLYCOLAX) 17 GM/SCOOP powder    polyethylene glycol (GLYCOLAX) 17 GM/SCOOP powder    sildenafil (VIAGRA) 100 mg tablet    tamsulosin (FLOMAX) 0 4 mg    Current Facility-Administered Medications:     lactated ringers infusion, 50 mL/hr, Intravenous, Continuous, New Bag at 04/22/22 0820    Allergies   Allergen Reactions    Tomato - Food Allergy Anaphylaxis     raw    Poison Sumac Extract Hives    Poison Ivy Extract Rash       Objective     BP (!) 145/103   Pulse 98   Temp 98 1 °F (36 7 °C) (Temporal)   Resp 18   Ht 6' 1" (1 854 m)   Wt 104 kg (230 lb)   SpO2 100%   BMI 30 34 kg/m²       PHYSICAL EXAM    Gen: NAD  Head: NCAT  CV: RRR  CHEST: Clear  ABD: soft, NT/ND  EXT: no edema      ASSESSMENT/PLAN:  This is a 49y o  year old male here for EGD/colonoscopy, and he is stable and optimized for his procedure

## 2022-04-22 NOTE — ANESTHESIA PREPROCEDURE EVALUATION
Procedure:  COLONOSCOPY  EGD    Relevant Problems   CARDIO   (+) Essential hypertension      /RENAL   (+) MEHDI (acute kidney injury) ([facility] [address] )   (+) Hydronephrosis with urinary obstruction due to ureteral calculus      MUSCULOSKELETAL   (+) Diastasis recti        Physical Exam    Airway    Mallampati score: II  TM Distance: >3 FB  Neck ROM: full     Dental       Cardiovascular      Pulmonary      Other Findings        Anesthesia Plan  ASA Score- 2     Anesthesia Type- IV sedation with anesthesia with ASA Monitors  Additional Monitors:   Airway Plan:           Plan Factors-Exercise tolerance (METS): >4 METS  Chart reviewed  Patient summary reviewed  Patient is not a current smoker  Patient did not smoke on day of surgery  Obstructive sleep apnea risk education given perioperatively  Induction- intravenous  Postoperative Plan-     Informed Consent- Anesthetic plan and risks discussed with patient  I personally reviewed this patient with the CRNA  Discussed and agreed on the Anesthesia Plan with the CRNA  Amy Tinajero

## 2022-04-22 NOTE — ANESTHESIA POSTPROCEDURE EVALUATION
Post-Op Assessment Note    CV Status:  Stable  Pain Score: 0    Pain management: adequate     Mental Status:  Alert   Hydration Status:  Stable   PONV Controlled:  Controlled   Airway Patency:  Patent      Post Op Vitals Reviewed: Yes      Staff: CRNA         No complications documented      BP  110/60   Temp      Pulse 68   Resp 20   SpO2 99

## 2022-05-04 ENCOUNTER — HOSPITAL ENCOUNTER (OUTPATIENT)
Dept: RADIOLOGY | Age: 47
Discharge: HOME | End: 2022-05-04
Payer: COMMERCIAL

## 2022-05-04 DIAGNOSIS — K63.89 MESENTERIC MASS: ICD-10-CM

## 2022-05-04 PROCEDURE — 78815 PET IMAGE W/CT SKULL-THIGH: CPT

## 2022-05-04 PROCEDURE — A9587 GALLIUM GA-68: HCPCS

## 2022-05-09 ENCOUNTER — OFFICE VISIT (OUTPATIENT)
Dept: SURGERY | Facility: CLINIC | Age: 47
End: 2022-05-09
Payer: COMMERCIAL

## 2022-05-09 VITALS
SYSTOLIC BLOOD PRESSURE: 182 MMHG | HEIGHT: 73 IN | HEART RATE: 78 BPM | RESPIRATION RATE: 16 BRPM | DIASTOLIC BLOOD PRESSURE: 98 MMHG | BODY MASS INDEX: 31.36 KG/M2 | OXYGEN SATURATION: 99 % | WEIGHT: 236.6 LBS | TEMPERATURE: 97.9 F

## 2022-05-09 DIAGNOSIS — C7A.8 NEUROENDOCRINE CARCINOMA OF SMALL BOWEL (HCC): Primary | ICD-10-CM

## 2022-05-09 DIAGNOSIS — K66.8 CALCIFIED MESENTERIC MASS: ICD-10-CM

## 2022-05-09 PROCEDURE — 99213 OFFICE O/P EST LOW 20 MIN: CPT | Performed by: SURGERY

## 2022-05-09 RX ORDER — IBUPROFEN 800 MG/1
800 TABLET ORAL EVERY 6 HOURS PRN
COMMUNITY
Start: 2022-04-20 | End: 2022-05-10

## 2022-05-10 NOTE — PROGRESS NOTES
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, no evidence of distant metastatic disease  Patient remains clinically symptomatic with intermittent abdominal pain  EGD/colonoscopy without other acute disease  Gallium PET and CT images reviewed directly by me and with the patient to highlight the findings of a likely small bowel primary with mesenteric galo involvement, no evidence of distant disease, no liver disease  We discussed the recommended management of exploratory laparotomy, small bowel resection with en bloc resection of mesenteric masses  We discussed the risks and benefits and the challenges including the need to preserve enough small bowel to avoid short gut syndrome, malabsorption, and frequent stools but to be able to clear the disease from the small bowel and mesentery leaving in only healthy, well perfused bowel  We discussed that the final surgical pathology will confirm the diagnosis and tell us more about the differentiation and Ki67 and help with overall prognosis and decision making regarding additional adjuvant therapies  Will review this case with my other surgical oncology colleagues and at an upcoming GI tumor board for imaging reviewed  I shared with the patient my concern for the extent of mesenteric disease as well as the centrally located nodularity along the early SMA branches  We will be in touch with him for final planning once his case is discussed at tumor board  He will need a colonoscopy in 1 year as his was normal but with inadequate prep  Will need preop testing and medical clearance if surgery is planned      Subjective       Chief Complaint   Patient presents with    Follow-up      Has intermittent mid abdominal cramping, sometimes related to food, other times not, feels some relief if he lays directly on his abdomen, sometimes has loose stools if he eats poorly but overall has 1-2 BMs per week that are formed  No additional issues with kidney stones which is what he was being treated for when the mesenteric masses were first seen incidentally  Had EGD and colonoscopy without significant findings although colonoscopy prep was inadequate and repeat needed in 1 year  Has had prior umbilical hernia repair with mesh and tacks  Reviewed diagnosis and imaging in detail and talked through typical management goals  Review of Systems   Constitutional: Negative for appetite change, diaphoresis and unexpected weight change  Gastrointestinal: Positive for abdominal pain, constipation and diarrhea  Negative for abdominal distention, blood in stool, nausea and vomiting  Hematological: Negative for adenopathy  Does not bruise/bleed easily  All other systems reviewed and are negative  The following portions of the patient's history were reviewed and updated as appropriate: allergies, current medications, past family history, past medical history, past social history, past surgical history and problem list     Objective      Blood pressure (!) 182/98, pulse 78, temperature 97 9 °F (36 6 °C), temperature source Tympanic, resp  rate 16, height 6' 1" (1 854 m), weight 107 kg (236 lb 9 6 oz), SpO2 99 %  Physical Exam  Vitals reviewed  Constitutional:       General: He is not in acute distress  HENT:      Head: Normocephalic  Mouth/Throat:      Mouth: Mucous membranes are moist    Eyes:      Pupils: Pupils are equal, round, and reactive to light  Cardiovascular:      Rate and Rhythm: Normal rate  Pulmonary:      Effort: Pulmonary effort is normal  No respiratory distress  Abdominal:      General: Abdomen is flat  There is no distension  Palpations: Abdomen is soft  There is no mass  Tenderness: There is no abdominal tenderness  There is no guarding or rebound     Musculoskeletal:      Cervical back: Normal range of motion  Right lower leg: No edema  Left lower leg: No edema  Skin:     General: Skin is warm and dry  Coloration: Skin is not jaundiced or pale  Neurological:      General: No focal deficit present  Mental Status: He is alert  Mental status is at baseline  Signature:   Sigifredo Huizar MD  Date: 5/10/2022 Time: 1:53 PM

## 2022-05-25 ENCOUNTER — TELEPHONE (OUTPATIENT)
Dept: SURGERY | Facility: CLINIC | Age: 47
End: 2022-05-25

## 2022-05-25 NOTE — TELEPHONE ENCOUNTER
Patient called, he was seen in the office a couple weeks ago and said Dr Caleb Shore was meeting with a group to discuss his case  He is looking for an update  Can someone call him please?

## 2022-05-26 NOTE — TELEPHONE ENCOUNTER
Please call the patient and schedule him for an in person visit for next week  I will want to update him on the detailed tumor board discussion and we can decide together about next steps based on the discussion, thanks

## 2022-05-30 ENCOUNTER — HOSPITAL ENCOUNTER (EMERGENCY)
Facility: HOSPITAL | Age: 47
Discharge: HOME | End: 2022-05-30
Attending: FAMILY MEDICINE
Payer: COMMERCIAL

## 2022-05-30 ENCOUNTER — APPOINTMENT (EMERGENCY)
Dept: CT IMAGING | Facility: HOSPITAL | Age: 47
End: 2022-05-30
Payer: COMMERCIAL

## 2022-05-30 VITALS
OXYGEN SATURATION: 98 % | SYSTOLIC BLOOD PRESSURE: 164 MMHG | DIASTOLIC BLOOD PRESSURE: 95 MMHG | RESPIRATION RATE: 16 BRPM | HEART RATE: 72 BPM | TEMPERATURE: 96.9 F

## 2022-05-30 DIAGNOSIS — R10.84 GENERALIZED ABDOMINAL PAIN: Primary | ICD-10-CM

## 2022-05-30 LAB
ANION GAP SERPL CALCULATED.3IONS-SCNC: 10 MMOL/L (ref 4–13)
BASOPHILS # BLD AUTO: 0.02 THOUSANDS/ΜL (ref 0–0.1)
BASOPHILS NFR BLD AUTO: 0 % (ref 0–1)
BUN SERPL-MCNC: 12 MG/DL (ref 5–25)
CALCIUM SERPL-MCNC: 10.3 MG/DL (ref 8.4–10.2)
CHLORIDE SERPL-SCNC: 101 MMOL/L (ref 96–108)
CO2 SERPL-SCNC: 27 MMOL/L (ref 21–32)
CREAT SERPL-MCNC: 0.8 MG/DL (ref 0.6–1.3)
EOSINOPHIL # BLD AUTO: 0.03 THOUSAND/ΜL (ref 0–0.61)
EOSINOPHIL NFR BLD AUTO: 0 % (ref 0–6)
ERYTHROCYTE [DISTWIDTH] IN BLOOD BY AUTOMATED COUNT: 14.2 % (ref 11.6–15.1)
GFR SERPL CREATININE-BSD FRML MDRD: 105 ML/MIN/1.73SQ M
GLUCOSE SERPL-MCNC: 155 MG/DL (ref 65–140)
HCT VFR BLD AUTO: 43.3 % (ref 36.5–49.3)
HGB BLD-MCNC: 15.2 G/DL (ref 12–17)
IMM GRANULOCYTES # BLD AUTO: 0.04 THOUSAND/UL (ref 0–0.2)
IMM GRANULOCYTES NFR BLD AUTO: 0 % (ref 0–2)
LYMPHOCYTES # BLD AUTO: 1.21 THOUSANDS/ΜL (ref 0.6–4.47)
LYMPHOCYTES NFR BLD AUTO: 10 % (ref 14–44)
MCH RBC QN AUTO: 29.5 PG (ref 26.8–34.3)
MCHC RBC AUTO-ENTMCNC: 35.1 G/DL (ref 31.4–37.4)
MCV RBC AUTO: 84 FL (ref 82–98)
MONOCYTES # BLD AUTO: 0.47 THOUSAND/ΜL (ref 0.17–1.22)
MONOCYTES NFR BLD AUTO: 4 % (ref 4–12)
NEUTROPHILS # BLD AUTO: 10.97 THOUSANDS/ΜL (ref 1.85–7.62)
NEUTS SEG NFR BLD AUTO: 86 % (ref 43–75)
NRBC BLD AUTO-RTO: 0 /100 WBCS
PLATELET # BLD AUTO: 181 THOUSANDS/UL (ref 149–390)
PMV BLD AUTO: 9.5 FL (ref 8.9–12.7)
POTASSIUM SERPL-SCNC: 3.6 MMOL/L (ref 3.5–5.3)
RBC # BLD AUTO: 5.16 MILLION/UL (ref 3.88–5.62)
SODIUM SERPL-SCNC: 138 MMOL/L (ref 135–147)
WBC # BLD AUTO: 12.74 THOUSAND/UL (ref 4.31–10.16)

## 2022-05-30 PROCEDURE — 96361 HYDRATE IV INFUSION ADD-ON: CPT

## 2022-05-30 PROCEDURE — G1004 CDSM NDSC: HCPCS

## 2022-05-30 PROCEDURE — 80048 BASIC METABOLIC PNL TOTAL CA: CPT | Performed by: FAMILY MEDICINE

## 2022-05-30 PROCEDURE — 74176 CT ABD & PELVIS W/O CONTRAST: CPT

## 2022-05-30 PROCEDURE — 99284 EMERGENCY DEPT VISIT MOD MDM: CPT

## 2022-05-30 PROCEDURE — 96375 TX/PRO/DX INJ NEW DRUG ADDON: CPT

## 2022-05-30 PROCEDURE — 96376 TX/PRO/DX INJ SAME DRUG ADON: CPT

## 2022-05-30 PROCEDURE — 85025 COMPLETE CBC W/AUTO DIFF WBC: CPT | Performed by: FAMILY MEDICINE

## 2022-05-30 PROCEDURE — 96374 THER/PROPH/DIAG INJ IV PUSH: CPT

## 2022-05-30 PROCEDURE — 36415 COLL VENOUS BLD VENIPUNCTURE: CPT | Performed by: FAMILY MEDICINE

## 2022-05-30 PROCEDURE — 99285 EMERGENCY DEPT VISIT HI MDM: CPT | Performed by: FAMILY MEDICINE

## 2022-05-30 RX ORDER — HYDROMORPHONE HCL/PF 1 MG/ML
1 SYRINGE (ML) INJECTION ONCE
Status: COMPLETED | OUTPATIENT
Start: 2022-05-30 | End: 2022-05-30

## 2022-05-30 RX ORDER — ONDANSETRON 2 MG/ML
4 INJECTION INTRAMUSCULAR; INTRAVENOUS ONCE
Status: COMPLETED | OUTPATIENT
Start: 2022-05-30 | End: 2022-05-30

## 2022-05-30 RX ORDER — HYDROCODONE BITARTRATE AND ACETAMINOPHEN 5; 325 MG/1; MG/1
1 TABLET ORAL EVERY 6 HOURS PRN
Qty: 5 TABLET | Refills: 0 | Status: SHIPPED | OUTPATIENT
Start: 2022-05-30 | End: 2022-06-02

## 2022-05-30 RX ORDER — LABETALOL HYDROCHLORIDE 5 MG/ML
15 INJECTION, SOLUTION INTRAVENOUS ONCE
Status: COMPLETED | OUTPATIENT
Start: 2022-05-30 | End: 2022-05-30

## 2022-05-30 RX ADMIN — LABETALOL HYDROCHLORIDE 15 MG: 5 INJECTION, SOLUTION INTRAVENOUS at 08:52

## 2022-05-30 RX ADMIN — HYDROMORPHONE HYDROCHLORIDE 1 MG: 1 INJECTION, SOLUTION INTRAMUSCULAR; INTRAVENOUS; SUBCUTANEOUS at 08:45

## 2022-05-30 RX ADMIN — SODIUM CHLORIDE 1000 ML: 0.9 INJECTION, SOLUTION INTRAVENOUS at 08:42

## 2022-05-30 RX ADMIN — ONDANSETRON 4 MG: 2 INJECTION INTRAMUSCULAR; INTRAVENOUS at 08:47

## 2022-05-30 RX ADMIN — HYDROMORPHONE HYDROCHLORIDE 1 MG: 1 INJECTION, SOLUTION INTRAMUSCULAR; INTRAVENOUS; SUBCUTANEOUS at 11:30

## 2022-05-30 NOTE — ED PROVIDER NOTES
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 without any evidence of distant metastatic disease presented to ED with complain of abdominal pain and vomiting  Patient states that he is out of his oxycodone and pain was worse which prompted this ED visit  He also complain of 1 episode of vomiting  Patient had consultation with the General surgery who recommended surgery after discussing with other oncologist   Patient states that he has pain is 10/10 at this time nothing makes the pain better or worse and he did not take anything for pain  He is denying any fever chills at this time  Awake alert oriented x3 GCS 15  Patient blood pressure was elevated states that he takes lisinopril but his pressure is always high when he is in a lot of pain  Denies any chest pain or shortness of breath  Prior to Admission Medications   Prescriptions Last Dose Informant Patient Reported?  Taking?   lisinopril (ZESTRIL) 10 mg tablet  Self Yes No   Sig: Take 15 mg by mouth daily      Facility-Administered Medications: None       Past Medical History:   Diagnosis Date    Allergic     Bipolar disorder in partial remission ([facility] [address] )     Erectile dysfunction     unspecified erectile dysfunction type    Hypertension     Kidney stone        Past Surgical History:   Procedure Laterality Date    FL RETROGRADE PYELOGRAM  10/6/2021    HERNIA REPAIR      MS CYSTOURETHROSCOPY,URETER CATHETER Right 10/6/2021    Procedure: CYSTOSCOPY RETROGRADE PYELOGRAM WITH INSERTION STENT URETERAL, RIGHT URETEROSCOPY, STONE EXTRACTION;  Surgeon: Emily Gonzalez MD;  Location: [facility] MAIN OR;  Service: Urology       Family History   Problem Relation Age of Onset    Diabetes Mother     Thyroid disease Mother     Cancer Father     Thyroid disease Sister     Depression Brother     Cancer Maternal Aunt     Cancer Paternal Uncle     Cancer Paternal Grandmother     No Known Problems Brother     Thyroid disease Sister      I have reviewed and agree with the history as documented  E-Cigarette/Vaping    E-Cigarette Use Never User      E-Cigarette/Vaping Substances     Social History     Tobacco Use    Smoking status: Former Smoker     Types: Pipe, Cigarettes     Quit date: 2003     Years since quittin 4    Smokeless tobacco: Never Used   Vaping Use    Vaping Use: Never used   Substance Use Topics    Alcohol use: Yes     Comment: Drinks a quart of moonshine/night    Drug use: Yes     Types: Marijuana       Review of Systems   Constitutional: Negative  HENT: Negative  Eyes: Negative  Respiratory: Negative  Cardiovascular: Negative  Gastrointestinal: Positive for abdominal pain and vomiting  Negative for nausea  Endocrine: Negative  Genitourinary: Negative  Musculoskeletal: Negative  Skin: Negative  Allergic/Immunologic: Negative  Neurological: Negative  Hematological: Negative  Psychiatric/Behavioral: Negative  Physical Exam  Physical Exam  Vitals and nursing note reviewed  Constitutional:       General: He is not in acute distress  Appearance: He is well-developed  He is not diaphoretic  HENT:      Head: Normocephalic and atraumatic  Right Ear: External ear normal       Left Ear: External ear normal       Nose: Nose normal       Mouth/Throat:      Mouth: Mucous membranes are moist       Pharynx: Oropharynx is clear  No posterior oropharyngeal erythema  Eyes:      Conjunctiva/sclera: Conjunctivae normal       Pupils: Pupils are equal, round, and reactive to light  Cardiovascular:      Rate and Rhythm: Normal rate and regular rhythm  Pulses: Normal pulses  Heart sounds: Normal heart sounds  Pulmonary:      Effort: Pulmonary effort is normal  No respiratory distress  Breath sounds: Normal breath sounds  No wheezing  Abdominal:      General: Bowel sounds are normal  There is no distension  Palpations: Abdomen is soft  Tenderness: There is generalized abdominal tenderness  Musculoskeletal:         General: Normal range of motion  Cervical back: Normal range of motion and neck supple  Lymphadenopathy:      Cervical: No cervical adenopathy  Skin:     General: Skin is warm and dry  Capillary Refill: Capillary refill takes less than 2 seconds  Neurological:      Mental Status: He is alert and oriented to person, place, and time     Psychiatric:         Mood and Affect: Mood normal          Behavior: Behavior normal          Vital Signs  ED Triage Vitals   Temperature Pulse Respirations Blood Pressure SpO2   05/30/22 0750 05/30/22 0750 05/30/22 0750 05/30/22 0752 05/30/22 0752   (!) 96 9 °F (36 1 °C) 88 18 (!) 199/112 98 %      Temp Source Heart Rate Source Patient Position - Orthostatic VS BP Location FiO2 (%)   05/30/22 0750 05/30/22 0750 05/30/22 0752 05/30/22 0900 --   Tympanic Monitor Sitting Left arm       Pain Score       05/30/22 0752       9           Vitals:    05/30/22 0930 05/30/22 1000 05/30/22 1100 05/30/22 1130   BP: (!) 169/108 (!) 170/104 (!) 173/107 (!) 185/109   Pulse:    78   Patient Position - Orthostatic VS: Lying Lying Lying Lying         Visual Acuity      ED Medications  Medications   sodium chloride 0 9 % bolus 1,000 mL (0 mL Intravenous Stopped 5/30/22 1000)   HYDROmorphone (DILAUDID) injection 1 mg (1 mg Intravenous Given 5/30/22 0845)   ondansetron (ZOFRAN) injection 4 mg (4 mg Intravenous Given 5/30/22 0847)   labetalol (NORMODYNE) injection 15 mg (15 mg Intravenous Given 5/30/22 0852)   HYDROmorphone (DILAUDID) injection 1 mg (1 mg Intravenous Given 5/30/22 1130)       Diagnostic Studies  Results Reviewed     Procedure Component Value Units Date/Time    Basic metabolic panel [[id number]]  (Abnormal) Collected: 05/30/22 0839    Lab Status: Final result Specimen: Blood from Arm, Left Updated: 05/30/22 0946     Sodium 138 mmol/L      Potassium 3 6 mmol/L      Chloride 101 mmol/L      CO2 27 mmol/L      ANION GAP 10 mmol/L      BUN 12 mg/dL      Creatinine 0 80 mg/dL      Glucose 155 mg/dL      Calcium 10 3 mg/dL      eGFR 105 ml/min/1 73sq m     Narrative:      National Kidney Disease Foundation guidelines for Chronic Kidney Disease (CKD):     Stage 1 with normal or high GFR (GFR > 90 mL/min/1 73 square meters)    Stage 2 Mild CKD (GFR = 60-89 mL/min/1 73 square meters)    Stage 3A Moderate CKD (GFR = 45-59 mL/min/1 73 square meters)    Stage 3B Moderate CKD (GFR = 30-44 mL/min/1 73 square meters)    Stage 4 Severe CKD (GFR = 15-29 mL/min/1 73 square meters)    Stage 5 End Stage CKD (GFR <15 mL/min/1 73 square meters)  Note: GFR calculation is accurate only with a steady state creatinine    CBC and differential [[id number]]  (Abnormal) Collected: 05/30/22 0839    Lab Status: Final result Specimen: Blood from Arm, Left Updated: 05/30/22 0902     WBC 12 74 Thousand/uL      RBC 5 16 Million/uL      Hemoglobin 15 2 g/dL      Hematocrit 43 3 %      MCV 84 fL      MCH 29 5 pg      MCHC 35 1 g/dL      RDW 14 2 %      MPV 9 5 fL      Platelets 758 Thousands/uL      nRBC 0 /100 WBCs      Neutrophils Relative 86 %      Immat GRANS % 0 %      Lymphocytes Relative 10 %      Monocytes Relative 4 %      Eosinophils Relative 0 %      Basophils Relative 0 %      Neutrophils Absolute 10 97 Thousands/µL      Immature Grans Absolute 0 04 Thousand/uL      Lymphocytes Absolute 1 21 Thousands/µL      Monocytes Absolute 0 47 Thousand/µL      Eosinophils Absolute 0 03 Thousand/µL      Basophils Absolute 0 02 Thousands/µL                  CT abdomen pelvis wo contrast   Final Result by Jerri Vann MD (05/30 1017)      Known neuroendocrine tumor is similar in extent to prior study  No bowel obstruction               I personally discussed this study with BUBBA GRULLON on 5/30/2022 at 10:15 AM  Workstation performed: [id number]                    Procedures  Procedures         ED Course  ED Course as of 05/30/22 1140   Mon May 30, 2022   1054 Patient states he has pain is better but not completely resolved requesting for pain medication  CT lab result discussed with patient states he has an appointment with Abe Villarreal on Thursday at 3:30 a m  and will follow-up with them  He is requesting for pain medication to go home with since he is out of his meds  He states he will call if surgery tomorrow  SBIRT 22yo+    Flowsheet Row Most Recent Value   SBIRT (25 yo +)    In order to provide better care to our patients, we are screening all of our patients for alcohol and drug use  Would it be okay to ask you these screening questions? Yes Filed at: 05/30/2022 6735   Initial Alcohol Screen: US AUDIT-C     1  How often do you have a drink containing alcohol? 1 Filed at: 05/30/2022 0853   2  How many drinks containing alcohol do you have on a typical day you are drinking? 5 Filed at: 05/30/2022 0853   3a  Male UNDER 65: How often do you have five or more drinks on one occasion? 0 Filed at: 05/30/2022 0853   3b  FEMALE Any Age, or MALE 65+: How often do you have 4 or more drinks on one occassion? 0 Filed at: 05/30/2022 0853   Audit-C Score 6 Filed at: 05/30/2022 5632   JURGEN: How many times in the past year have you    Used an illegal drug or used a prescription medication for non-medical reasons? Monthly Filed at: 05/30/2022 9995   DAST-10: In the past 12 months       1  Have you used drugs other than those required for medical reasons? 1  [employer] ] Filed at: 05/30/2022 0853   2  Do you use more than one drug at a time? 0 Filed at: 05/30/2022 0853   3  Have you had medical problems as a result of your drug use (e g , memory loss, hepatitis, convulsions, bleeding, etc )? 0 Filed at: 05/30/2022 0853   4  Have you had "blackouts" or "flashbacks" as a result of drug use? YesNo 0 Filed at: 05/30/2022 0853   5  Do you ever feel bad or guilty about your drug use? 0 Filed at: 05/30/2022 0853   6  Does your spouse (or parent) ever complain about your involvement with drugs? 0 Filed at: 05/30/2022 0853   7  Have you neglected your family because of your use of drugs? 0 Filed at: 05/30/2022 0853   8  Have you engaged in illegal activities in order to obtain drugs? 0 Filed at: 05/30/2022 0853   9  Have you ever experienced withdrawal symptoms (felt sick) when you stopped taking drugs? 0 Filed at: 05/30/2022 0853   10  Are you always able to stop using drugs when you want to? 0 Filed at: 05/30/2022 0853   DAST-10 Score 1 Filed at: 05/30/2022 5719                    MDM    Disposition  Final diagnoses:   Generalized abdominal pain     Time reflects when diagnosis was documented in both MDM as applicable and the Disposition within this note     Time User Action Codes Description Comment    5/30/2022 11:39 AM Karolina Ivory Add [R10 84] Generalized abdominal pain       ED Disposition     ED Disposition   Discharge    Condition   Stable    Date/Time   Mon May 30, 2022 11:39 AM    Comment   Bonita Broaden discharge to home/self care  Follow-up Information     Follow up With Specialties Details Why Contact Saul Quarles DO Family Medicine Schedule an appointment as soon as possible for a visit in 2 days If symptoms worsen [id number] [address] MD General Surgery, Surgical Oncology Schedule an appointment as soon as possible for a visit in 2 days If symptoms worsen [address] [facility]  [place] [address] [address]  [phone]            Patient's Medications   Discharge Prescriptions    HYDROCODONE-ACETAMINOPHEN (NORCO) 5-325 MG PER TABLET    Take 1 tablet by mouth every 6 (six) hours as needed for pain for up to 3 days Max Daily Amount: 4 tablets       Start Date: 5/30/2022 End Date: 6/2/2022       Order Dose: 1 tablet       Quantity: 5 tablet    Refills: 0       No discharge procedures on file      PDMP Review     None          ED Provider  Electronically Signed by           Jill Horton MD  05/30/22 7236

## 2022-06-01 ENCOUNTER — DOCUMENTATION (OUTPATIENT)
Dept: HEMATOLOGY ONCOLOGY | Facility: CLINIC | Age: 47
End: 2022-06-01

## 2022-06-01 NOTE — PROGRESS NOTES
RECTAL/GI MULTIDISCIPLINARY CASE REVIEW    DATE: 5/26/2022      PRESENTING DOCTOR: Dr Juju Maharaj      DIAGNOSIS: Small Bowel Neuroendocrine Tumor    Ashley Medina was presented at the Rectal/GI Multidisciplinary Conference today  PHYSICIAN RECOMMENDED PLAN:    -Recommend surgical resection    -Dotatate PET and consider treatment with Octreotide and possibly treat with Luthatera  -Patient is scheduled to see Dr Juju Maharaj on 6/2/2022 to discuss recommendations  Team agreed to plan  The final treatment plan will be left to the discretion of the patient and the treating physician  DISCLAIMERS:  TO THE TREATING PHYSICIAN:  This conference is a meeting of clinicians from various specialty areas who evaluate and discuss patients for whom a multidisciplinary treatment approach is being considered  Please note that the above opinion was a consensus of the conference attendees and is intended only to assist in quality care of the discussed patient  The responsibility for follow up on the input given during the conference, along with any final decisions regarding plan of care, is that of the patient and the patient's provider  Accordingly, appointments have only been recommended based on this information and have NOT been scheduled unless otherwise noted  TO THE PATIENT:  This summary is a brief record of major aspects of your cancer treatment  You may choose to share a copy with any of your doctors or nurses  However, this is not a detailed or comprehensive record of your care        NCCN guidelines were readily available for review at this discussion

## 2022-06-02 ENCOUNTER — OFFICE VISIT (OUTPATIENT)
Dept: SURGERY | Facility: CLINIC | Age: 47
End: 2022-06-02
Payer: COMMERCIAL

## 2022-06-02 VITALS
RESPIRATION RATE: 16 BRPM | BODY MASS INDEX: 29.77 KG/M2 | HEIGHT: 73 IN | HEART RATE: 110 BPM | OXYGEN SATURATION: 99 % | DIASTOLIC BLOOD PRESSURE: 90 MMHG | WEIGHT: 224.6 LBS | TEMPERATURE: 97.5 F | SYSTOLIC BLOOD PRESSURE: 150 MMHG

## 2022-06-02 DIAGNOSIS — K66.8 CALCIFIED MESENTERIC MASS: ICD-10-CM

## 2022-06-02 DIAGNOSIS — D3A.8 NEUROENDOCRINE TUMOR: Primary | ICD-10-CM

## 2022-06-02 PROCEDURE — 99213 OFFICE O/P EST LOW 20 MIN: CPT | Performed by: SURGERY

## 2022-06-08 ENCOUNTER — TELEPHONE (OUTPATIENT)
Dept: SURGERY | Facility: CLINIC | Age: 47
End: 2022-06-08

## 2022-06-08 DIAGNOSIS — D3A.8 NEUROENDOCRINE TUMOR: Primary | ICD-10-CM

## 2022-06-08 NOTE — PROGRESS NOTES
Progress Note - General Surgery   Jsoe Rush 49 y o  male MRN: [id number]  Encounter: [id number]    Assessment/Plan     47M with a clinical picture consistent with small bowel neuroendocrine tumor with slowly increasing disease burden with growth of calcified mesenteric galo disease  Patient is clinically symptomatic  Has no evidence of distant metastatic disease  Previous chromogranin A level elevated at 134  Gallium PET diagnostic although no definitive small bowel primary identified, which is not uncommon  His case was presented at our multidisciplinary tumor board  I updated Doug Molina today on that meeting and we discussed in detail the concerns of the group regarding the proximal nature of the mesenteric galo burden, potentially requiring a very extensive small bowel resection in order to clear the disease which puts him at high risk for short gut syndrome, nutritional, and intestinal issues in the future  He understands that we will use our surgical judgement at the time of planned exploratory laparotomy to determine what is safe and appropriate  Options include exploration without resection, exploration with resection of the primary lesion only, exploration with en bloc resection of the primary lesion and the mesenteric galo masses  We have also discussed his case with our medical oncology colleagues regarding the use of lanreotide and lutathera therapy either in a neoadjuvant or adjuvant fashion  The consensus is that those treatments are often beneficial in the adjuvant setting with lanreotide being effective for disease control and lutathera often with a measurable disease improvement  The patient would like to proceed with resection and understands that there is some unpredictable nature to the exploration but that we will be weighing the pros and cons of resection on his behalf and not proceed with a resection that would severely limit his quality of life in the future   We will work together for surgical planning in the near future  Subjective       Chief Complaint   Patient presents with    Follow-up      Additional intermittent visceral abdominal pain with nausea/vomiting  ED visits continue  Patient discussed at tumor board recently  Patient now willing to proceed with surgical planning, he understands the high risk nature of his situation and that surgical resection remains the curative option for him  Review of Systems   Constitutional: Negative for unexpected weight change  Gastrointestinal: Positive for abdominal pain, nausea and vomiting  Hematological: Does not bruise/bleed easily  All other systems reviewed and are negative  The following portions of the patient's history were reviewed and updated as appropriate: allergies, current medications, past family history, past medical history, past social history, past surgical history and problem list     Objective      Blood pressure 150/90, pulse (!) 110, temperature 97 5 °F (36 4 °C), temperature source Temporal, resp  rate 16, height 6' 1" (1 854 m), weight 102 kg (224 lb 9 6 oz), SpO2 99 %  Physical Exam  Vitals reviewed  Constitutional:       General: He is not in acute distress  HENT:      Head: Normocephalic and atraumatic  Mouth/Throat:      Mouth: Mucous membranes are moist    Eyes:      Extraocular Movements: Extraocular movements intact  Pupils: Pupils are equal, round, and reactive to light  Cardiovascular:      Rate and Rhythm: Tachycardia present  Pulmonary:      Effort: Pulmonary effort is normal  No respiratory distress  Abdominal:      General: There is no distension  Palpations: Abdomen is soft  There is no mass  Tenderness: There is no abdominal tenderness  There is no guarding or rebound  Hernia: No hernia is present  Skin:     General: Skin is warm and dry  Neurological:      Mental Status: He is alert and oriented to person, place, and time  Mental status is at baseline  Psychiatric:         Mood and Affect: Mood normal          Behavior: Behavior normal          Signature:   Chris Tinsley MD  Date: 6/8/2022 Time: 8:11 AM

## 2022-06-08 NOTE — TELEPHONE ENCOUNTER
Spoke with the patient at length  He would like to proceed with operative exploration with both me and Dr Keyona Blas present  Is willing to travel to [address] as needed  Will place booking order when location clear  Will need preop labs with type and cross, chromogranin A prior to OR, will order those now but let's hold on having him go until we know the date/location  Will work on [address] scheduling options at those other campus and update the patient and his PCP on the final plan in the coming days

## 2022-06-10 RX ORDER — HEPARIN SODIUM 5000 [USP'U]/ML
5000 INJECTION, SOLUTION INTRAVENOUS; SUBCUTANEOUS ONCE
Status: CANCELLED | OUTPATIENT
Start: 2022-06-10 | End: 2022-06-10

## 2022-06-10 RX ORDER — CHLORHEXIDINE GLUCONATE 4 G/100ML
SOLUTION TOPICAL DAILY PRN
Status: CANCELLED | OUTPATIENT
Start: 2022-06-10

## 2022-06-10 RX ORDER — SODIUM CHLORIDE, SODIUM LACTATE, POTASSIUM CHLORIDE, CALCIUM CHLORIDE 600; 310; 30; 20 MG/100ML; MG/100ML; MG/100ML; MG/100ML
125 INJECTION, SOLUTION INTRAVENOUS CONTINUOUS
Status: CANCELLED | OUTPATIENT
Start: 2022-06-10

## 2022-06-10 NOTE — PROGRESS NOTES
Discussed case with patient again and Dr Tari Gomez  Plan is for exploratory laparotomy, small bowel resection with en bloc mesenteric galo resection in conjunction with Dr aTri Gomez  We will look to schedule this in [place]  The patient is in agreement  He will need repeat preop labs with chromogranin and type and screen

## 2022-06-13 DIAGNOSIS — D3A.8 NEUROENDOCRINE TUMOR: Primary | ICD-10-CM

## 2022-06-24 ENCOUNTER — LAB REQUISITION (OUTPATIENT)
Dept: LAB | Facility: HOSPITAL | Age: 47
End: 2022-06-24
Payer: COMMERCIAL

## 2022-06-24 ENCOUNTER — APPOINTMENT (OUTPATIENT)
Dept: LAB | Facility: HOSPITAL | Age: 47
End: 2022-06-24
Attending: SURGERY
Payer: COMMERCIAL

## 2022-06-24 ENCOUNTER — OFFICE VISIT (OUTPATIENT)
Dept: LAB | Facility: HOSPITAL | Age: 47
End: 2022-06-24
Attending: SURGERY
Payer: COMMERCIAL

## 2022-06-24 DIAGNOSIS — D3A.8 NEUROENDOCRINE TUMOR: ICD-10-CM

## 2022-06-24 DIAGNOSIS — D3A.8 OTHER BENIGN NEUROENDOCRINE TUMORS: ICD-10-CM

## 2022-06-24 LAB
ABO GROUP BLD: NORMAL
ALBUMIN SERPL BCP-MCNC: 3.8 G/DL (ref 3.5–5)
ALP SERPL-CCNC: 121 U/L (ref 46–116)
ALT SERPL W P-5'-P-CCNC: 21 U/L (ref 12–78)
ANION GAP SERPL CALCULATED.3IONS-SCNC: 3 MMOL/L (ref 4–13)
AST SERPL W P-5'-P-CCNC: 17 U/L (ref 5–45)
BASOPHILS # BLD AUTO: 0.02 THOUSANDS/ΜL (ref 0–0.1)
BASOPHILS NFR BLD AUTO: 0 % (ref 0–1)
BILIRUB SERPL-MCNC: 1.39 MG/DL (ref 0.2–1)
BLD GP AB SCN SERPL QL: NEGATIVE
BUN SERPL-MCNC: 13 MG/DL (ref 5–25)
CALCIUM SERPL-MCNC: 9.8 MG/DL (ref 8.3–10.1)
CHLORIDE SERPL-SCNC: 107 MMOL/L (ref 100–108)
CO2 SERPL-SCNC: 30 MMOL/L (ref 21–32)
CREAT SERPL-MCNC: 0.99 MG/DL (ref 0.6–1.3)
EOSINOPHIL # BLD AUTO: 0.13 THOUSAND/ΜL (ref 0–0.61)
EOSINOPHIL NFR BLD AUTO: 2 % (ref 0–6)
ERYTHROCYTE [DISTWIDTH] IN BLOOD BY AUTOMATED COUNT: 14.1 % (ref 11.6–15.1)
GFR SERPL CREATININE-BSD FRML MDRD: 89 ML/MIN/1.73SQ M
GLUCOSE P FAST SERPL-MCNC: 103 MG/DL (ref 65–99)
HCT VFR BLD AUTO: 40.9 % (ref 36.5–49.3)
HGB BLD-MCNC: 14.5 G/DL (ref 12–17)
IMM GRANULOCYTES # BLD AUTO: 0.02 THOUSAND/UL (ref 0–0.2)
IMM GRANULOCYTES NFR BLD AUTO: 0 % (ref 0–2)
LYMPHOCYTES # BLD AUTO: 1.97 THOUSANDS/ΜL (ref 0.6–4.47)
LYMPHOCYTES NFR BLD AUTO: 30 % (ref 14–44)
MCH RBC QN AUTO: 29.5 PG (ref 26.8–34.3)
MCHC RBC AUTO-ENTMCNC: 35.5 G/DL (ref 31.4–37.4)
MCV RBC AUTO: 83 FL (ref 82–98)
MONOCYTES # BLD AUTO: 0.38 THOUSAND/ΜL (ref 0.17–1.22)
MONOCYTES NFR BLD AUTO: 6 % (ref 4–12)
NEUTROPHILS # BLD AUTO: 4.16 THOUSANDS/ΜL (ref 1.85–7.62)
NEUTS SEG NFR BLD AUTO: 62 % (ref 43–75)
NRBC BLD AUTO-RTO: 0 /100 WBCS
PLATELET # BLD AUTO: 201 THOUSANDS/UL (ref 149–390)
PMV BLD AUTO: 10 FL (ref 8.9–12.7)
POTASSIUM SERPL-SCNC: 3.6 MMOL/L (ref 3.5–5.3)
PROT SERPL-MCNC: 7.9 G/DL (ref 6.4–8.2)
RBC # BLD AUTO: 4.91 MILLION/UL (ref 3.88–5.62)
RH BLD: POSITIVE
SODIUM SERPL-SCNC: 140 MMOL/L (ref 136–145)
SPECIMEN EXPIRATION DATE: NORMAL
WBC # BLD AUTO: 6.68 THOUSAND/UL (ref 4.31–10.16)

## 2022-06-24 PROCEDURE — 85025 COMPLETE CBC W/AUTO DIFF WBC: CPT

## 2022-06-24 PROCEDURE — 80053 COMPREHEN METABOLIC PANEL: CPT

## 2022-06-24 PROCEDURE — 36415 COLL VENOUS BLD VENIPUNCTURE: CPT

## 2022-06-24 PROCEDURE — 93005 ELECTROCARDIOGRAM TRACING: CPT

## 2022-06-24 PROCEDURE — 86850 RBC ANTIBODY SCREEN: CPT | Performed by: SURGERY

## 2022-06-24 PROCEDURE — 86901 BLOOD TYPING SEROLOGIC RH(D): CPT | Performed by: SURGERY

## 2022-06-24 PROCEDURE — 86900 BLOOD TYPING SEROLOGIC ABO: CPT | Performed by: SURGERY

## 2022-06-24 PROCEDURE — 86316 IMMUNOASSAY TUMOR OTHER: CPT

## 2022-06-25 LAB
ATRIAL RATE: 72 BPM
P AXIS: 51 DEGREES
PR INTERVAL: 170 MS
QRS AXIS: -33 DEGREES
QRSD INTERVAL: 88 MS
QT INTERVAL: 380 MS
QTC INTERVAL: 416 MS
T WAVE AXIS: 87 DEGREES
VENTRICULAR RATE: 72 BPM

## 2022-06-25 PROCEDURE — 93010 ELECTROCARDIOGRAM REPORT: CPT | Performed by: INTERNAL MEDICINE

## 2022-06-27 LAB — CGA SERPL-MCNC: 164.3 NG/ML (ref 0–101.8)

## 2022-07-01 NOTE — PRE-PROCEDURE INSTRUCTIONS
Pre-Surgery Instructions:   Medication Instructions    lisinopril (ZESTRIL) 10 mg tablet Hold day of surgery  Pre op instructions per My Surgical Experience booklet,medications per anesthesia guidelines and showering instructions per [facility] protocol reviewed-Patient getting CHG  Pt  Verbalized understanding of current visitor restrictions  Aware he should wear a mask DOS  Instructed to call surgeon with any illness or covid exposure 1 week prior to procedure till DOS  No Bowel prep instructions given to patient by surgeon  Patient states he would feel more comfortable with a non male/non  nurse DOS  Aware that that request may not be possible  Instructed to avoid all ASA and OTC Vit/Supp 1 week prior to surgery and to avoid NSAIDs 3 days prior to surgery per anesthesia instructions  Tylenol ok to take prn  Pt  Verbalized an understanding of all instructions reviewed and offers no concerns at this time

## 2022-07-05 ENCOUNTER — TELEPHONE (OUTPATIENT)
Dept: SURGERY | Facility: CLINIC | Age: 47
End: 2022-07-05

## 2022-07-06 DIAGNOSIS — K63.89 MESENTERIC MASS: Primary | ICD-10-CM

## 2022-07-06 RX ORDER — SIMETHICONE 125 MG
125 CAPSULE ORAL ONCE
Qty: 4 CAPSULE | Refills: 0 | Status: SHIPPED | OUTPATIENT
Start: 2022-07-06 | End: 2022-07-06

## 2022-07-06 RX ORDER — POLYETHYLENE GLYCOL 3350 17 G/17G
POWDER, FOR SOLUTION ORAL
Qty: 238 G | Refills: 0 | Status: ON HOLD | OUTPATIENT
Start: 2022-07-06 | End: 2022-07-21 | Stop reason: ALTCHOICE

## 2022-07-06 NOTE — TELEPHONE ENCOUNTER
Discussed plan for miralax bowel prep should the patient require colon resection as part of his planned procedure  Please send the patient the instructions and review them with him, thank you

## 2022-07-15 DIAGNOSIS — K66.8 CALCIFIED MESENTERIC MASS: ICD-10-CM

## 2022-07-15 DIAGNOSIS — D3A.8 NEUROENDOCRINE TUMOR: ICD-10-CM

## 2022-07-15 PROCEDURE — U0005 INFEC AGEN DETEC AMPLI PROBE: HCPCS

## 2022-07-15 PROCEDURE — U0003 INFECTIOUS AGENT DETECTION BY NUCLEIC ACID (DNA OR RNA); SEVERE ACUTE RESPIRATORY SYNDROME CORONAVIRUS 2 (SARS-COV-2) (CORONAVIRUS DISEASE [COVID-19]), AMPLIFIED PROBE TECHNIQUE, MAKING USE OF HIGH THROUGHPUT TECHNOLOGIES AS DESCRIBED BY CMS-2020-01-R: HCPCS

## 2022-07-16 LAB — SARS-COV-2 RNA RESP QL NAA+PROBE: NEGATIVE

## 2022-07-20 ENCOUNTER — ANESTHESIA EVENT (OUTPATIENT)
Dept: PERIOP | Facility: HOSPITAL | Age: 47
DRG: 680 | End: 2022-07-20
Payer: COMMERCIAL

## 2022-07-21 ENCOUNTER — ANESTHESIA (OUTPATIENT)
Dept: PERIOP | Facility: HOSPITAL | Age: 47
DRG: 680 | End: 2022-07-21
Payer: COMMERCIAL

## 2022-07-21 ENCOUNTER — HOSPITAL ENCOUNTER (INPATIENT)
Facility: HOSPITAL | Age: 47
LOS: 4 days | Discharge: HOME | DRG: 680 | End: 2022-07-25
Attending: SURGERY | Admitting: SURGERY
Payer: COMMERCIAL

## 2022-07-21 DIAGNOSIS — K66.8 CALCIFIED MESENTERIC MASS: ICD-10-CM

## 2022-07-21 DIAGNOSIS — D3A.8 NEUROENDOCRINE TUMOR: Primary | ICD-10-CM

## 2022-07-21 LAB
ABO GROUP BLD: NORMAL
RH BLD: POSITIVE

## 2022-07-21 PROCEDURE — 88309 TISSUE EXAM BY PATHOLOGIST: CPT | Performed by: PATHOLOGY

## 2022-07-21 PROCEDURE — 88307 TISSUE EXAM BY PATHOLOGIST: CPT | Performed by: PATHOLOGY

## 2022-07-21 PROCEDURE — 47100 WEDGE BIOPSY OF LIVER: CPT | Performed by: SURGERY

## 2022-07-21 PROCEDURE — 0DBA0ZZ EXCISION OF JEJUNUM, OPEN APPROACH: ICD-10-PCS | Performed by: SURGERY

## 2022-07-21 PROCEDURE — 44120 REMOVAL OF SMALL INTESTINE: CPT | Performed by: SURGERY

## 2022-07-21 PROCEDURE — 88342 IMHCHEM/IMCYTCHM 1ST ANTB: CPT | Performed by: PATHOLOGY

## 2022-07-21 PROCEDURE — 88331 PATH CONSLTJ SURG 1 BLK 1SPC: CPT | Performed by: PATHOLOGY

## 2022-07-21 PROCEDURE — 88341 IMHCHEM/IMCYTCHM EA ADD ANTB: CPT | Performed by: PATHOLOGY

## 2022-07-21 PROCEDURE — NC001 PR NO CHARGE: Performed by: SURGERY

## 2022-07-21 PROCEDURE — 0FB00ZX EXCISION OF LIVER, OPEN APPROACH, DIAGNOSTIC: ICD-10-PCS | Performed by: SURGERY

## 2022-07-21 PROCEDURE — 0DBB0ZZ EXCISION OF ILEUM, OPEN APPROACH: ICD-10-PCS | Performed by: SURGERY

## 2022-07-21 RX ORDER — HEPARIN SODIUM 5000 [USP'U]/ML
5000 INJECTION, SOLUTION INTRAVENOUS; SUBCUTANEOUS EVERY 8 HOURS SCHEDULED
Status: DISCONTINUED | OUTPATIENT
Start: 2022-07-21 | End: 2022-07-25 | Stop reason: HOSPADM

## 2022-07-21 RX ORDER — LIDOCAINE HYDROCHLORIDE 10 MG/ML
0.5 INJECTION, SOLUTION EPIDURAL; INFILTRATION; INTRACAUDAL; PERINEURAL ONCE AS NEEDED
Status: COMPLETED | OUTPATIENT
Start: 2022-07-21 | End: 2022-07-21

## 2022-07-21 RX ORDER — DIPHENHYDRAMINE HYDROCHLORIDE 50 MG/ML
12.5 INJECTION INTRAMUSCULAR; INTRAVENOUS ONCE AS NEEDED
Status: DISCONTINUED | OUTPATIENT
Start: 2022-07-21 | End: 2022-07-21 | Stop reason: HOSPADM

## 2022-07-21 RX ORDER — FENTANYL CITRATE 50 UG/ML
INJECTION, SOLUTION INTRAMUSCULAR; INTRAVENOUS AS NEEDED
Status: DISCONTINUED | OUTPATIENT
Start: 2022-07-21 | End: 2022-07-21

## 2022-07-21 RX ORDER — LIDOCAINE HYDROCHLORIDE 10 MG/ML
INJECTION, SOLUTION EPIDURAL; INFILTRATION; INTRACAUDAL; PERINEURAL AS NEEDED
Status: DISCONTINUED | OUTPATIENT
Start: 2022-07-21 | End: 2022-07-21

## 2022-07-21 RX ORDER — SODIUM CHLORIDE 9 MG/ML
125 INJECTION, SOLUTION INTRAVENOUS CONTINUOUS
Status: DISCONTINUED | OUTPATIENT
Start: 2022-07-21 | End: 2022-07-21

## 2022-07-21 RX ORDER — BUPIVACAINE HYDROCHLORIDE 2.5 MG/ML
INJECTION, SOLUTION EPIDURAL; INFILTRATION; INTRACAUDAL AS NEEDED
Status: DISCONTINUED | OUTPATIENT
Start: 2022-07-21 | End: 2022-07-21

## 2022-07-21 RX ORDER — HEPARIN SODIUM 5000 [USP'U]/ML
5000 INJECTION, SOLUTION INTRAVENOUS; SUBCUTANEOUS ONCE
Status: COMPLETED | OUTPATIENT
Start: 2022-07-21 | End: 2022-07-21

## 2022-07-21 RX ORDER — SODIUM CHLORIDE, SODIUM LACTATE, POTASSIUM CHLORIDE, CALCIUM CHLORIDE 600; 310; 30; 20 MG/100ML; MG/100ML; MG/100ML; MG/100ML
125 INJECTION, SOLUTION INTRAVENOUS CONTINUOUS
Status: DISCONTINUED | OUTPATIENT
Start: 2022-07-21 | End: 2022-07-22 | Stop reason: ALTCHOICE

## 2022-07-21 RX ORDER — DIPHENHYDRAMINE HYDROCHLORIDE 50 MG/ML
25 INJECTION INTRAMUSCULAR; INTRAVENOUS EVERY 6 HOURS PRN
Status: DISCONTINUED | OUTPATIENT
Start: 2022-07-21 | End: 2022-07-25 | Stop reason: HOSPADM

## 2022-07-21 RX ORDER — DEXAMETHASONE SODIUM PHOSPHATE 10 MG/ML
INJECTION, SOLUTION INTRAMUSCULAR; INTRAVENOUS AS NEEDED
Status: DISCONTINUED | OUTPATIENT
Start: 2022-07-21 | End: 2022-07-21

## 2022-07-21 RX ORDER — ONDANSETRON 2 MG/ML
4 INJECTION INTRAMUSCULAR; INTRAVENOUS ONCE AS NEEDED
Status: DISCONTINUED | OUTPATIENT
Start: 2022-07-21 | End: 2022-07-21 | Stop reason: HOSPADM

## 2022-07-21 RX ORDER — GLYCOPYRROLATE 0.2 MG/ML
INJECTION INTRAMUSCULAR; INTRAVENOUS AS NEEDED
Status: DISCONTINUED | OUTPATIENT
Start: 2022-07-21 | End: 2022-07-21

## 2022-07-21 RX ORDER — CLONIDINE 100 UG/ML
INJECTION, SOLUTION EPIDURAL AS NEEDED
Status: DISCONTINUED | OUTPATIENT
Start: 2022-07-21 | End: 2022-07-21

## 2022-07-21 RX ORDER — NEOSTIGMINE METHYLSULFATE 1 MG/ML
INJECTION INTRAVENOUS AS NEEDED
Status: DISCONTINUED | OUTPATIENT
Start: 2022-07-21 | End: 2022-07-21

## 2022-07-21 RX ORDER — CEFAZOLIN SODIUM 2 G/50ML
2000 SOLUTION INTRAVENOUS ONCE
Status: DISCONTINUED | OUTPATIENT
Start: 2022-07-21 | End: 2022-07-21

## 2022-07-21 RX ORDER — HYDROMORPHONE HCL IN WATER/PF 6 MG/30 ML
0.2 PATIENT CONTROLLED ANALGESIA SYRINGE INTRAVENOUS
Status: DISCONTINUED | OUTPATIENT
Start: 2022-07-21 | End: 2022-07-21 | Stop reason: HOSPADM

## 2022-07-21 RX ORDER — FENTANYL CITRATE/PF 50 MCG/ML
50 SYRINGE (ML) INJECTION
Status: DISCONTINUED | OUTPATIENT
Start: 2022-07-21 | End: 2022-07-21 | Stop reason: HOSPADM

## 2022-07-21 RX ORDER — ONDANSETRON 2 MG/ML
4 INJECTION INTRAMUSCULAR; INTRAVENOUS EVERY 4 HOURS PRN
Status: DISCONTINUED | OUTPATIENT
Start: 2022-07-21 | End: 2022-07-25 | Stop reason: HOSPADM

## 2022-07-21 RX ORDER — HYDROMORPHONE HCL/PF 1 MG/ML
SYRINGE (ML) INJECTION AS NEEDED
Status: DISCONTINUED | OUTPATIENT
Start: 2022-07-21 | End: 2022-07-21

## 2022-07-21 RX ORDER — PROPOFOL 10 MG/ML
INJECTION, EMULSION INTRAVENOUS AS NEEDED
Status: DISCONTINUED | OUTPATIENT
Start: 2022-07-21 | End: 2022-07-21

## 2022-07-21 RX ORDER — ALBUMIN, HUMAN INJ 5% 5 %
SOLUTION INTRAVENOUS CONTINUOUS PRN
Status: DISCONTINUED | OUTPATIENT
Start: 2022-07-21 | End: 2022-07-21

## 2022-07-21 RX ORDER — SODIUM CHLORIDE, SODIUM LACTATE, POTASSIUM CHLORIDE, CALCIUM CHLORIDE 600; 310; 30; 20 MG/100ML; MG/100ML; MG/100ML; MG/100ML
125 INJECTION, SOLUTION INTRAVENOUS CONTINUOUS
Status: DISCONTINUED | OUTPATIENT
Start: 2022-07-21 | End: 2022-07-21

## 2022-07-21 RX ORDER — METOCLOPRAMIDE HYDROCHLORIDE 5 MG/ML
5 INJECTION INTRAMUSCULAR; INTRAVENOUS EVERY 6 HOURS PRN
Status: DISCONTINUED | OUTPATIENT
Start: 2022-07-21 | End: 2022-07-22

## 2022-07-21 RX ORDER — SODIUM CHLORIDE 9 MG/ML
INJECTION, SOLUTION INTRAVENOUS CONTINUOUS PRN
Status: DISCONTINUED | OUTPATIENT
Start: 2022-07-21 | End: 2022-07-21

## 2022-07-21 RX ORDER — ROPIVACAINE HYDROCHLORIDE 2 MG/ML
INJECTION, SOLUTION EPIDURAL; INFILTRATION; PERINEURAL AS NEEDED
Status: DISCONTINUED | OUTPATIENT
Start: 2022-07-21 | End: 2022-07-21

## 2022-07-21 RX ORDER — ONDANSETRON 2 MG/ML
4 INJECTION INTRAMUSCULAR; INTRAVENOUS EVERY 6 HOURS PRN
Status: DISCONTINUED | OUTPATIENT
Start: 2022-07-21 | End: 2022-07-22

## 2022-07-21 RX ORDER — MIDAZOLAM HYDROCHLORIDE 2 MG/2ML
INJECTION, SOLUTION INTRAMUSCULAR; INTRAVENOUS AS NEEDED
Status: DISCONTINUED | OUTPATIENT
Start: 2022-07-21 | End: 2022-07-21

## 2022-07-21 RX ORDER — ROCURONIUM BROMIDE 10 MG/ML
INJECTION, SOLUTION INTRAVENOUS AS NEEDED
Status: DISCONTINUED | OUTPATIENT
Start: 2022-07-21 | End: 2022-07-21

## 2022-07-21 RX ORDER — ONDANSETRON 2 MG/ML
INJECTION INTRAMUSCULAR; INTRAVENOUS AS NEEDED
Status: DISCONTINUED | OUTPATIENT
Start: 2022-07-21 | End: 2022-07-21

## 2022-07-21 RX ORDER — SODIUM CHLORIDE, SODIUM LACTATE, POTASSIUM CHLORIDE, CALCIUM CHLORIDE 600; 310; 30; 20 MG/100ML; MG/100ML; MG/100ML; MG/100ML
INJECTION, SOLUTION INTRAVENOUS CONTINUOUS PRN
Status: DISCONTINUED | OUTPATIENT
Start: 2022-07-21 | End: 2022-07-21

## 2022-07-21 RX ORDER — CHLORHEXIDINE GLUCONATE 4 G/100ML
SOLUTION TOPICAL DAILY PRN
Status: DISCONTINUED | OUTPATIENT
Start: 2022-07-21 | End: 2022-07-25 | Stop reason: HOSPADM

## 2022-07-21 RX ORDER — METRONIDAZOLE 500 MG/100ML
500 INJECTION, SOLUTION INTRAVENOUS ONCE
Status: DISCONTINUED | OUTPATIENT
Start: 2022-07-21 | End: 2022-07-21

## 2022-07-21 RX ORDER — ROPIVACAINE HYDROCHLORIDE 2 MG/ML
INJECTION, SOLUTION EPIDURAL; INFILTRATION; PERINEURAL CONTINUOUS PRN
Status: DISCONTINUED | OUTPATIENT
Start: 2022-07-21 | End: 2022-07-21

## 2022-07-21 RX ORDER — CEFAZOLIN SODIUM 1 G/3ML
INJECTION, POWDER, FOR SOLUTION INTRAMUSCULAR; INTRAVENOUS AS NEEDED
Status: DISCONTINUED | OUTPATIENT
Start: 2022-07-21 | End: 2022-07-21

## 2022-07-21 RX ORDER — HYDROMORPHONE HCL/PF 1 MG/ML
0.5 SYRINGE (ML) INJECTION
Status: DISCONTINUED | OUTPATIENT
Start: 2022-07-21 | End: 2022-07-21 | Stop reason: HOSPADM

## 2022-07-21 RX ORDER — METOCLOPRAMIDE HYDROCHLORIDE 5 MG/ML
10 INJECTION INTRAMUSCULAR; INTRAVENOUS ONCE AS NEEDED
Status: DISCONTINUED | OUTPATIENT
Start: 2022-07-21 | End: 2022-07-21 | Stop reason: HOSPADM

## 2022-07-21 RX ADMIN — LIDOCAINE HYDROCHLORIDE 5 ML: 10 INJECTION, SOLUTION EPIDURAL; INFILTRATION; INTRACAUDAL; PERINEURAL at 11:07

## 2022-07-21 RX ADMIN — SODIUM CHLORIDE: 0.9 INJECTION, SOLUTION INTRAVENOUS at 14:02

## 2022-07-21 RX ADMIN — FENTANYL CITRATE: 50 INJECTION INTRAMUSCULAR; INTRAVENOUS at 15:48

## 2022-07-21 RX ADMIN — ROCURONIUM BROMIDE 10 MG: 10 INJECTION INTRAVENOUS at 13:37

## 2022-07-21 RX ADMIN — HYDROMORPHONE HYDROCHLORIDE 0.5 MG: 1 INJECTION, SOLUTION INTRAMUSCULAR; INTRAVENOUS; SUBCUTANEOUS at 14:49

## 2022-07-21 RX ADMIN — PHENYLEPHRINE HYDROCHLORIDE 30 MCG/MIN: 10 INJECTION INTRAVENOUS at 12:15

## 2022-07-21 RX ADMIN — ALBUMIN (HUMAN): 12.5 INJECTION, SOLUTION INTRAVENOUS at 12:18

## 2022-07-21 RX ADMIN — SODIUM CHLORIDE, SODIUM LACTATE, POTASSIUM CHLORIDE, AND CALCIUM CHLORIDE: .6; .31; .03; .02 INJECTION, SOLUTION INTRAVENOUS at 10:52

## 2022-07-21 RX ADMIN — HEPARIN SODIUM 5000 UNITS: 5000 INJECTION INTRAVENOUS; SUBCUTANEOUS at 12:20

## 2022-07-21 RX ADMIN — PHENYLEPHRINE HYDROCHLORIDE 2 DROP: 1 SPRAY NASAL at 11:41

## 2022-07-21 RX ADMIN — ROPIVACAINE HYDROCHLORIDE 6 ML: 2 INJECTION, SOLUTION EPIDURAL; INFILTRATION; PERINEURAL at 14:20

## 2022-07-21 RX ADMIN — NEOSTIGMINE METHYLSULFATE 5 MG: 1 INJECTION INTRAVENOUS at 14:44

## 2022-07-21 RX ADMIN — BUPIVACAINE HYDROCHLORIDE 5 ML: 2.5 INJECTION, SOLUTION EPIDURAL; INFILTRATION; INTRACAUDAL; PERINEURAL at 11:41

## 2022-07-21 RX ADMIN — Medication 50 MG: at 11:21

## 2022-07-21 RX ADMIN — SODIUM CHLORIDE, SODIUM LACTATE, POTASSIUM CHLORIDE, AND CALCIUM CHLORIDE: .6; .31; .03; .02 INJECTION, SOLUTION INTRAVENOUS at 13:42

## 2022-07-21 RX ADMIN — GLYCOPYRROLATE 0.1 MG: 0.2 INJECTION INTRAMUSCULAR; INTRAVENOUS at 14:05

## 2022-07-21 RX ADMIN — PROPOFOL 200 MG: 10 INJECTION, EMULSION INTRAVENOUS at 11:21

## 2022-07-21 RX ADMIN — CLONIDINE HYDROCHLORIDE 100 MCG: 0.1 INJECTION, SOLUTION EPIDURAL at 11:41

## 2022-07-21 RX ADMIN — FENTANYL CITRATE 100 MCG: 50 INJECTION INTRAMUSCULAR; INTRAVENOUS at 11:21

## 2022-07-21 RX ADMIN — ONDANSETRON HYDROCHLORIDE 4 MG: 2 INJECTION, SOLUTION INTRAMUSCULAR; INTRAVENOUS at 11:21

## 2022-07-21 RX ADMIN — BUPIVACAINE HYDROCHLORIDE 5 ML: 2.5 INJECTION, SOLUTION EPIDURAL; INFILTRATION; INTRACAUDAL; PERINEURAL at 14:58

## 2022-07-21 RX ADMIN — DEXAMETHASONE SODIUM PHOSPHATE 10 MG: 10 INJECTION, SOLUTION INTRAMUSCULAR; INTRAVENOUS at 11:21

## 2022-07-21 RX ADMIN — ROCURONIUM BROMIDE 10 MG: 10 INJECTION INTRAVENOUS at 12:42

## 2022-07-21 RX ADMIN — ROCURONIUM BROMIDE 50 MG: 10 INJECTION INTRAVENOUS at 11:21

## 2022-07-21 RX ADMIN — LIDOCAINE HYDROCHLORIDE 50 MG: 10 INJECTION, SOLUTION EPIDURAL; INFILTRATION; INTRACAUDAL; PERINEURAL at 11:21

## 2022-07-21 RX ADMIN — SODIUM CHLORIDE: 0.9 INJECTION, SOLUTION INTRAVENOUS at 11:30

## 2022-07-21 RX ADMIN — GLYCOPYRROLATE 0.8 MG: 0.2 INJECTION INTRAMUSCULAR; INTRAVENOUS at 14:44

## 2022-07-21 RX ADMIN — ROPIVACAINE HYDROCHLORIDE 6 ML/HR: 2 INJECTION, SOLUTION EPIDURAL; INFILTRATION; PERINEURAL at 14:20

## 2022-07-21 RX ADMIN — HEPARIN SODIUM 5000 UNITS: 5000 INJECTION INTRAVENOUS; SUBCUTANEOUS at 21:41

## 2022-07-21 RX ADMIN — MIDAZOLAM 2 MG: 1 INJECTION INTRAMUSCULAR; INTRAVENOUS at 11:13

## 2022-07-21 RX ADMIN — HYDROMORPHONE HYDROCHLORIDE 0.5 MG: 1 INJECTION, SOLUTION INTRAMUSCULAR; INTRAVENOUS; SUBCUTANEOUS at 14:27

## 2022-07-21 RX ADMIN — ROCURONIUM BROMIDE 10 MG: 10 INJECTION INTRAVENOUS at 13:43

## 2022-07-21 RX ADMIN — SODIUM CHLORIDE 0.3 MCG/KG/HR: 9 INJECTION, SOLUTION INTRAVENOUS at 11:48

## 2022-07-21 RX ADMIN — ALBUMIN (HUMAN): 12.5 INJECTION, SOLUTION INTRAVENOUS at 12:30

## 2022-07-21 RX ADMIN — CEFAZOLIN 2000 MG: 1 INJECTION, POWDER, FOR SOLUTION INTRAMUSCULAR; INTRAVENOUS at 11:36

## 2022-07-21 NOTE — OP NOTE
OPERATIVE REPORT  PATIENT NAME: Marko Rodriguez    :  1975  MRN: [id number]  Pt Location: [facility] OR ROOM 07    SURGERY DATE: 2022    Surgeon(s) and Role:     * Cristopher Guaman MD - Primary     * Trent Gonzales MD - Assisting     * Van Davis MD - Assisting    Preop Diagnosis:  Calcified mesenteric mass [K66 8]  Neuroendocrine tumor [D3A 8]    Post-Op Diagnosis Codes:     * Calcified mesenteric mass [K66 8]     * Neuroendocrine tumor [D3A 8]    Procedure:  Exploratory laparotomy, lysis of adhesions, small bowel resection, segment 5 liver biopsy    Specimen(s):  ID Type Source Tests Collected by Time Destination   1 : SEGMENT OF JEJUNUM AND ILEUM Tissue Small Bowel, NOS TISSUE EXAM Cristopher Guaman MD 2022 1314    2 : SEGMENT 5 Tissue Liver TISSUE EXAM Cristopher Guaman MD 2022 1355      Estimated Blood Loss:   200 mL    Drains:  Epidural  Schaefer  NGT    Anesthesia Type:   General    Operative Indications:  Calcified mesenteric mass [K66 8]  Neuroendocrine tumor [D3A 8]  47M with calcified mesenteric masses, found to be avid on gallium dotatate PET scan with elevated chromogranin A, 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  Imaging shows ongoing growth of the mesenteric galo burden, no confirmed primary  Discussion at tumor board regarding the extent of mesenteric involvement proximally along the superior mesenteric artery  Discussion with patient at length regarding options and high risk nature of a resection attempt in terms of the amount of disease and our ability to control it surgically and leave enough small bowel  Multidisciplinary discussion and multiple discussions with the patient  We all agreed an exploration and resection attempt was the best option  Informed consent was obtained      Operative Findings:  -Extensive mesenteric galo disease, extended proximally to the takeoff of the SMA, unresectable disease  -Multifocal primary small bowel tumors identified, 5 palpated, 2 confirmed on initial gross examination at pathology, largest primary tumor site appeared to be spanning 2/3 of the lumen of the small bowel with desmoplastic reaction, 36cm segment of small bowel resected to include all of the palpated small bowel disease  -Small sites of disease seen and palpated on both the right and left liver, segment 5 liver biopsy confirmed metastatic neuroendocrine tumor     Complications:   None    Procedure and Technique:  The patient was seen and identified in the holding area  An epidural was placed  He was taken to the operating room and placed in the supine position on the operating room table  Pneumoboots were on and activated  Perioperative antibiotic and heparin DVT prophylaxis were given  General endotracheal anesthesia was induced  Schaefer catheter, NGT, arterial line placed  Abdomen prepped and draped in the usual sterile fashion  Surgical timeout performed  Midline laparotomy incision made with a scalpel extending above and below the umbilicus  Dissection carried down through the subcutaneous tissues down to the fascia with the electrocautery  Careful dissection down to the peritoneum which was elevated and entered under direct visualization, no injury to underlying structures  Remaining incision opened in both directions  Previous hernia mesh identified just superior to the umbilicus and was opened with a scissors, fasteners were encountered as well  Some omental adhesions were encountered that were adhered to the mesh on both sides  These were lysed with a combination of electrocautery and enseal energy devices  The large mesenteric galo mass was immediately appreciated upon inspection and palpation  There was a significant desmoplastic reaction with tethering of the mesentery  We carefully identified the ligament of Treitz and ran the small bowel proximally to distally and identified multiple sites of primary disease in the small bowel   There was a dominant site that appeared about 2-3cm in size and was partially obstructing the lumen with a desmoplastic reaction  The other sites along the same distal jejunal/proximal ileal segment were smaller and palpable but not as visible through the serosal layer of the bowel  These sites were marked with silk sutures  Careful examination and evaluation was then undertaken regarding the necessary resection that would need to be performed in order to clear the mesenteric galo disease  The potential margins of bowel and mesentery were marked out and measured  We were all in agreement that the mesenteric galo disease was extending too far proximally to the root of the mesentery and the takeoff of the SMA in order to perform a complete resection  We would not be able to control the disease and leave the patient with enough small bowel  Pictures were taken and put into the record and will be attached here  A small bowel resection was undertaken to clear the primary disease, most notably the dominant tumor site that appeared to be threatening eventual obstruction  This segment was 36cm long  Windows were made in the mesentery just under the small bowel and 75mm blue load GRISEL stapler fires were taken to divide the bowel  The intervening mesentery was controlled with the enseal, staying close to the small bowel, not capturing the more proximal metastatic galo disease  A side to side stapled anastomosis was performed  Silk sutures placed along the mesenteric borders to line up the two limbs of small bowel  Enterotomies made with the bovie next to but not through the previous staple lines  A 75mm reload was taken and a stapled anastomosis was performed  The common channel was closed with a TA 60mm stapler  The mesenteric defect was closed with a running 3-0 vicryl and the staple line was oversewn with some interrupted silk sutures  Crotch stitch placed  We palpated and examined the liver and felt small firm nodules in both the right and left liver   A lesion in segment 5 was isolated and elevated with a figure of eight vicryl suture  It was marked out with electrocautery and an excisional biopsy was performed using a scalpel  Electrocautery turned up to 100 and used to obtained hemostasis  Surgiflo placed over the area and a laparotomy pad for hemostasis which was effective  Small bowel specimen taken down to pathology and examined grossly  Liver specimen taken down to pathology and frozen section performed confirming metastatic neuroendocrine tumor  NGT was palpated within the stomach and secured in place  Abdomen irrigated with warm saline, hemostasis achieved  Fascia closed with nonlooped #1 PDS suture from above and below and tied in the middle  Interrupted 3-0 vicryl sutures placed in the dermis  Skin closed with staples  4 x 4 gauze and tegaderm dressings placed  All counts were correct  The patient was extubated and taken to PACU in stable condition  I was present for all portions of the procedure and a surgeon assistant and resident were required because of skills and techniques relevant to speciality      Patient Disposition:  hemodynamically stable      SIGNATURE: Tammie Parisi MD  DATE: July 21, 2022  TIME: 2:33 PM

## 2022-07-21 NOTE — H&P
Plan for exploratory laparotomy, small bowel resection with en bloc resection of mesenteric galo masses for small bowel neuroendocrine tumor  Prior ventral hernia repair with mesh  Has updated EGD/colonoscopy  Preop workup complete  Signed              Progress Note - General Surgery   Hernandez Kenny 49 y o  male MRN: [id number]  Encounter: [id number]     Assessment/Plan      47M with a clinical picture consistent with small bowel neuroendocrine tumor with slowly increasing disease burden with growth of calcified mesenteric galo disease  Patient is clinically symptomatic  Has no evidence of distant metastatic disease  Previous chromogranin A level elevated at 134  Gallium PET diagnostic although no definitive small bowel primary identified, which is not uncommon  His case was presented at our multidisciplinary tumor board  I updated Elli Bradley today on that meeting and we discussed in detail the concerns of the group regarding the proximal nature of the mesenteric galo burden, potentially requiring a very extensive small bowel resection in order to clear the disease which puts him at high risk for short gut syndrome, nutritional, and intestinal issues in the future  He understands that we will use our surgical judgement at the time of planned exploratory laparotomy to determine what is safe and appropriate  Options include exploration without resection, exploration with resection of the primary lesion only, exploration with en bloc resection of the primary lesion and the mesenteric galo masses  We have also discussed his case with our medical oncology colleagues regarding the use of lanreotide and lutathera therapy either in a neoadjuvant or adjuvant fashion  The consensus is that those treatments are often beneficial in the adjuvant setting with lanreotide being effective for disease control and lutathera often with a measurable disease improvement   The patient would like to proceed with resection and understands that there is some unpredictable nature to the exploration but that we will be weighing the pros and cons of resection on his behalf and not proceed with a resection that would severely limit his quality of life in the future  We will work together for surgical planning in the near future      Subjective                            Chief Complaint   Patient presents with    Follow-up                  Additional intermittent visceral abdominal pain with nausea/vomiting  ED visits continue  Patient discussed at tumor board recently  Patient now willing to proceed with surgical planning, he understands the high risk nature of his situation and that surgical resection remains the curative option for him                  Review of Systems   Constitutional: Negative for unexpected weight change  Gastrointestinal: Positive for abdominal pain, nausea and vomiting  Hematological: Does not bruise/bleed easily  All other systems reviewed and are negative         The following portions of the patient's history were reviewed and updated as appropriate: allergies, current medications, past family history, past medical history, past social history, past surgical history and problem list      Objective                        Blood pressure 150/90, pulse (!) 110, temperature 97 5 °F (36 4 °C), temperature source Temporal, resp  rate 16, height 6' 1" (1 854 m), weight 102 kg (224 lb 9 6 oz), SpO2 99 %  Physical Exam  Vitals reviewed  Constitutional:       General: He is not in acute distress  HENT:      Head: Normocephalic and atraumatic  Mouth/Throat:      Mouth: Mucous membranes are moist    Eyes:      Extraocular Movements: Extraocular movements intact  Pupils: Pupils are equal, round, and reactive to light  Cardiovascular:      Rate and Rhythm: Tachycardia present  Pulmonary:      Effort: Pulmonary effort is normal  No respiratory distress     Abdominal:      General: There is no distension  Palpations: Abdomen is soft  There is no mass  Tenderness: There is no abdominal tenderness  There is no guarding or rebound  Hernia: No hernia is present  Skin:     General: Skin is warm and dry  Neurological:      Mental Status: He is alert and oriented to person, place, and time  Mental status is at baseline  Psychiatric:         Mood and Affect: Mood normal          Behavior: Behavior normal             Signature:   Mariaelena Clark MD  Date: 6/8/2022 Time: 8:11 AM

## 2022-07-21 NOTE — ANESTHESIA PREPROCEDURE EVALUATION
Procedure:  LAPAROTOMY EXPLORATORY W/ SMALL BOWEL RESECTION WITH EN BLOC RESECTION OF MESENTERIC MASSES (N/A Abdomen)    Relevant Problems   ANESTHESIA (within normal limits)      CARDIO   (+) Essential hypertension      ENDO (within normal limits)      GI/HEPATIC (within normal limits)      /RENAL   (+) MEHDI (acute kidney injury) ([address] )   (+) Hydronephrosis with urinary obstruction due to ureteral calculus      HEMATOLOGY (within normal limits)      MUSCULOSKELETAL   (+) Diastasis recti      NEURO/PSYCH (within normal limits)      PULMONARY (within normal limits)      Other   (+) Calcified mesenteric mass   (+) Neuroendocrine tumor        Physical Exam    Airway    Mallampati score: II  TM Distance: >3 FB  Neck ROM: full     Dental   No notable dental hx     Cardiovascular  Rhythm: regular, Rate: normal, Cardiovascular exam normal    Pulmonary  Pulmonary exam normal Breath sounds clear to auscultation,     Other Findings        Anesthesia Plan  ASA Score- 3     Anesthesia Type- general with ASA Monitors  Additional Monitors: arterial line  Airway Plan: ETT  Comment: Thoracic epidural for postop pain control  Plan Factors-    Chart reviewed  Existing labs reviewed  Patient summary reviewed  Induction- intravenous  Postoperative Plan- Plan for postoperative opioid use  Informed Consent- Anesthetic plan and risks discussed with patient  I personally reviewed this patient with the CRNA  Discussed and agreed on the Anesthesia Plan with the YVETTE Desai           Recent labs personally reviewed:  Lab Results   Component Value Date    WBC 6 68 06/24/2022    HGB 14 5 06/24/2022     06/24/2022     Lab Results   Component Value Date    K 3 6 06/24/2022    BUN 13 06/24/2022    CREATININE 0 99 06/24/2022     Lab Results   Component Value Date    PTT 30 10/02/2021      Lab Results   Component Value Date    INR 1 01 10/02/2021       Blood type A    No results found for: HGBA1C    IMordecai Ket Vaughn Potter MD, have personally seen and evaluated the patient prior to anesthetic care  I have reviewed the pre-anesthetic record, and other medical records if appropriate to the anesthetic care  If a CRNA is involved in the case, I have reviewed the CRNA assessment, if present, and agree  Risks/benefits and alternatives discussed with patient including possible PONV, sore throat, and possibility of rare anesthetic and surgical emergencies    ]

## 2022-07-21 NOTE — ANESTHESIA PROCEDURE NOTES
Epidural Block    Patient location during procedure: holding area  Start time: 7/21/2022 11:07 AM  Reason for block: procedure for pain and at surgeon's request  Staffing  Performed: Anesthesiologist   Anesthesiologist: Gabino Tejeda MD  Preanesthetic Checklist  Completed: patient identified, IV checked, site marked, risks and benefits discussed, surgical consent, monitors and equipment checked, pre-op evaluation and timeout performed  Epidural  Patient position: sitting  Prep: ChloraPrep  Patient monitoring: cardiac monitor and frequent blood pressure checks  Approach: midline  Location: thoracic  Injection technique: MARIALUISA saline  Needle  Needle type: Tuohy   Needle gauge: 18 G  Catheter type: side hole  Catheter size: 20 G  Catheter at skin depth: 12 cm  Catheter securement method: stabilization device  Test dose: negativelidocaine (PF) (XYLOCAINE-MPF) 1 % injection 0 5 mL - Epidural   5 mL - 7/21/2022 11:07:00 AM  Assessment  Number of attempts: 2negative aspiration for CSF, negative aspiration for heme and no paresthesia on injection  patient tolerated the procedure well with no immediate complications

## 2022-07-22 LAB
ANION GAP SERPL CALCULATED.3IONS-SCNC: 7 MMOL/L (ref 4–13)
BASOPHILS # BLD AUTO: 0.01 THOUSANDS/ΜL (ref 0–0.1)
BASOPHILS NFR BLD AUTO: 0 % (ref 0–1)
BUN SERPL-MCNC: 12 MG/DL (ref 5–25)
CALCIUM SERPL-MCNC: 8.6 MG/DL (ref 8.3–10.1)
CHLORIDE SERPL-SCNC: 109 MMOL/L (ref 96–108)
CO2 SERPL-SCNC: 24 MMOL/L (ref 21–32)
CREAT SERPL-MCNC: 0.91 MG/DL (ref 0.6–1.3)
EOSINOPHIL # BLD AUTO: 0 THOUSAND/ΜL (ref 0–0.61)
EOSINOPHIL NFR BLD AUTO: 0 % (ref 0–6)
ERYTHROCYTE [DISTWIDTH] IN BLOOD BY AUTOMATED COUNT: 14.6 % (ref 11.6–15.1)
GFR SERPL CREATININE-BSD FRML MDRD: 99 ML/MIN/1.73SQ M
GLUCOSE SERPL-MCNC: 105 MG/DL (ref 65–140)
GLUCOSE SERPL-MCNC: 89 MG/DL (ref 65–140)
HCT VFR BLD AUTO: 32.6 % (ref 36.5–49.3)
HGB BLD-MCNC: 11.5 G/DL (ref 12–17)
IMM GRANULOCYTES # BLD AUTO: 0.04 THOUSAND/UL (ref 0–0.2)
IMM GRANULOCYTES NFR BLD AUTO: 0 % (ref 0–2)
LYMPHOCYTES # BLD AUTO: 0.96 THOUSANDS/ΜL (ref 0.6–4.47)
LYMPHOCYTES NFR BLD AUTO: 8 % (ref 14–44)
MCH RBC QN AUTO: 29.9 PG (ref 26.8–34.3)
MCHC RBC AUTO-ENTMCNC: 35.3 G/DL (ref 31.4–37.4)
MCV RBC AUTO: 85 FL (ref 82–98)
MONOCYTES # BLD AUTO: 0.81 THOUSAND/ΜL (ref 0.17–1.22)
MONOCYTES NFR BLD AUTO: 7 % (ref 4–12)
NEUTROPHILS # BLD AUTO: 9.95 THOUSANDS/ΜL (ref 1.85–7.62)
NEUTS SEG NFR BLD AUTO: 85 % (ref 43–75)
NRBC BLD AUTO-RTO: 0 /100 WBCS
PLATELET # BLD AUTO: 161 THOUSANDS/UL (ref 149–390)
PMV BLD AUTO: 10.7 FL (ref 8.9–12.7)
POTASSIUM SERPL-SCNC: 3.6 MMOL/L (ref 3.5–5.3)
RBC # BLD AUTO: 3.84 MILLION/UL (ref 3.88–5.62)
SODIUM SERPL-SCNC: 140 MMOL/L (ref 135–147)
WBC # BLD AUTO: 11.77 THOUSAND/UL (ref 4.31–10.16)

## 2022-07-22 PROCEDURE — NC001 PR NO CHARGE: Performed by: SURGERY

## 2022-07-22 PROCEDURE — 97162 PT EVAL MOD COMPLEX 30 MIN: CPT

## 2022-07-22 PROCEDURE — 80048 BASIC METABOLIC PNL TOTAL CA: CPT | Performed by: SURGERY

## 2022-07-22 PROCEDURE — 99232 SBSQ HOSP IP/OBS MODERATE 35: CPT | Performed by: SURGERY

## 2022-07-22 PROCEDURE — 82948 REAGENT STRIP/BLOOD GLUCOSE: CPT

## 2022-07-22 PROCEDURE — 85025 COMPLETE CBC W/AUTO DIFF WBC: CPT | Performed by: SURGERY

## 2022-07-22 PROCEDURE — 97166 OT EVAL MOD COMPLEX 45 MIN: CPT

## 2022-07-22 RX ORDER — POTASSIUM CHLORIDE 20 MEQ/1
40 TABLET, EXTENDED RELEASE ORAL ONCE
Status: COMPLETED | OUTPATIENT
Start: 2022-07-22 | End: 2022-07-22

## 2022-07-22 RX ORDER — HYDROMORPHONE HCL/PF 1 MG/ML
0.5 SYRINGE (ML) INJECTION EVERY 2 HOUR PRN
Status: DISCONTINUED | OUTPATIENT
Start: 2022-07-22 | End: 2022-07-25 | Stop reason: HOSPADM

## 2022-07-22 RX ORDER — ACETAMINOPHEN 325 MG/1
975 TABLET ORAL EVERY 8 HOURS SCHEDULED
Status: DISCONTINUED | OUTPATIENT
Start: 2022-07-22 | End: 2022-07-25 | Stop reason: HOSPADM

## 2022-07-22 RX ORDER — METOCLOPRAMIDE HYDROCHLORIDE 5 MG/ML
5 INJECTION INTRAMUSCULAR; INTRAVENOUS EVERY 6 HOURS PRN
Status: DISCONTINUED | OUTPATIENT
Start: 2022-07-22 | End: 2022-07-25 | Stop reason: HOSPADM

## 2022-07-22 RX ORDER — DEXTROSE, SODIUM CHLORIDE, AND POTASSIUM CHLORIDE 5; .45; .15 G/100ML; G/100ML; G/100ML
84 INJECTION INTRAVENOUS CONTINUOUS
Status: DISCONTINUED | OUTPATIENT
Start: 2022-07-22 | End: 2022-07-24

## 2022-07-22 RX ADMIN — FENTANYL CITRATE: 50 INJECTION INTRAMUSCULAR; INTRAVENOUS at 13:14

## 2022-07-22 RX ADMIN — ACETAMINOPHEN 975 MG: 325 TABLET ORAL at 21:57

## 2022-07-22 RX ADMIN — HEPARIN SODIUM 5000 UNITS: 5000 INJECTION INTRAVENOUS; SUBCUTANEOUS at 21:59

## 2022-07-22 RX ADMIN — SODIUM CHLORIDE, SODIUM LACTATE, POTASSIUM CHLORIDE, AND CALCIUM CHLORIDE 125 ML/HR: .6; .31; .03; .02 INJECTION, SOLUTION INTRAVENOUS at 03:09

## 2022-07-22 RX ADMIN — POTASSIUM CHLORIDE 40 MEQ: 1500 TABLET, EXTENDED RELEASE ORAL at 09:47

## 2022-07-22 RX ADMIN — DEXTROSE, SODIUM CHLORIDE, AND POTASSIUM CHLORIDE 84 ML/HR: 5; .45; .15 INJECTION INTRAVENOUS at 21:59

## 2022-07-22 RX ADMIN — ONDANSETRON HYDROCHLORIDE 4 MG: 2 INJECTION, SOLUTION INTRAMUSCULAR; INTRAVENOUS at 19:49

## 2022-07-22 RX ADMIN — DEXTROSE, SODIUM CHLORIDE, AND POTASSIUM CHLORIDE 84 ML/HR: 5; .45; .15 INJECTION INTRAVENOUS at 07:38

## 2022-07-22 RX ADMIN — HYDROMORPHONE HYDROCHLORIDE 0.5 MG: 1 INJECTION, SOLUTION INTRAMUSCULAR; INTRAVENOUS; SUBCUTANEOUS at 19:42

## 2022-07-22 RX ADMIN — HEPARIN SODIUM 5000 UNITS: 5000 INJECTION INTRAVENOUS; SUBCUTANEOUS at 15:21

## 2022-07-22 RX ADMIN — HEPARIN SODIUM 5000 UNITS: 5000 INJECTION INTRAVENOUS; SUBCUTANEOUS at 05:39

## 2022-07-22 NOTE — PROGRESS NOTES
Progress Note - Surgical Oncology   Gray Beal 49 y o  male MRN: [id number]  [facility] Encounter: [id number]    Assessment:  49 M p/w  mesenteric and hepatic masses status post exploratory laparotomy, lysis of adhesions, small-bowel resection and segment 5 liver biopsy on 07/21/2022  VS:  Afebrile, heart rate 80s, no tachypnea, BP 150s to 160s over 100s, 92-97% on room air    UOP:  575 cc recorded, also with unmeasured voids overnight    AM Labs:  WBC 9 4 from 11 7  Hemoglobin 11 7 from 11 5  Chemistry pending    Plan:  -clear liquid diet, advance as tolerated  -maintenance IV fluids  -pain control, PCEA, APS input appreciated  -of bed, ambulate  -CIWA protocol  -incentive spirometry, pulmonary toilet  -p r n  Labetalol/hydralazine for hypertension  -wound care to incision  -PTOT:  Home with walker/durable medical equipment  -DVT prophylaxis  -disposition planning    Subjective/Objective       Subjective:  Episodic acute pain and diaphoresis overnight, patient now reports resolution of pain, no nausea or vomiting, no fevers, no chills    Objective:     Blood pressure (!) 156/107, pulse 86, temperature 98 4 °F (36 9 °C), resp  rate 18, height 6' 1" (1 854 m), weight 97 5 kg (215 lb), SpO2 92 %  ,Body mass index is 28 37 kg/m²        Intake/Output Summary (Last 24 hours) at 7/23/2022 0646  Last data filed at 7/22/2022 2157  Gross per 24 hour   Intake 1969 95 ml   Output 650 ml   Net 1319 95 ml       Invasive Devices  Report    Peripheral Intravenous Line  Duration           Peripheral IV 07/22/22 Distal;Left;Upper;Ventral (anterior) Arm <1 day          Epidural Line  Duration           Epidural Catheter 07/21/22 1 day          Drain  Duration           Ureteral Drain/Stent Right ureter 6 Fr  289 days                Physical Exam:    General:  No acute distress  HEENT:  Normocephalic, atraumatic  Cardiovascular:  Regular rate, regular rhythm  Respiratory:  No distress, room air  Abdomen:  Soft, moderate diffuse tenderness, incision C/D/I, no rebound or guarding  Extremities:  No clubbing, cyanosis, or edema  Neuro:  AAO x3  Skin:  Warm, dry      Lab, Imaging and other studies:  I have personally reviewed pertinent lab results    , CBC:   Lab Results   Component Value Date    WBC 9 43 07/23/2022    HGB 11 7 (L) 07/23/2022    HCT 33 9 (L) 07/23/2022    MCV 85 07/23/2022     07/23/2022    MCH 29 4 07/23/2022    MCHC 34 5 07/23/2022    RDW 14 6 07/23/2022    MPV 10 3 07/23/2022    NRBC 0 07/23/2022   , CMP: No results found for: SODIUM, K, CL, CO2, ANIONGAP, BUN, CREATININE, GLUCOSE, CALCIUM, AST, ALT, ALKPHOS, PROT, BILITOT, EGFR  VTE Pharmacologic Prophylaxis: Heparin  VTE Mechanical Prophylaxis: sequential compression device

## 2022-07-22 NOTE — PHYSICAL THERAPY NOTE
Physical Therapy Evaluation     Patient's Name: Patric Duran    Admitting Diagnosis  Calcified mesenteric mass [K66 8]  Neuroendocrine tumor [D3A 8]    Problem List  Patient Active Problem List   Diagnosis    Diastasis recti    Hydronephrosis with urinary obstruction due to ureteral calculus    Calcified mesenteric mass    MEHDI (acute kidney injury) ([facility] )    Essential hypertension    Neuroendocrine tumor       Past Medical History  Past Medical History:   Diagnosis Date    Allergic     Bipolar disorder in partial remission ([facility] )     Erectile dysfunction     unspecified erectile dysfunction type    Hypertension     Kidney stone        Past Surgical History  Past Surgical History:   Procedure Laterality Date    COLONOSCOPY      EXPLORATORY LAPAROTOMY W/ BOWEL RESECTION N/A 7/21/2022    Procedure: LAPAROTOMY EXPLORATORY W/ SMALL BOWEL RESECTION, liver biopsy;  Surgeon: Adalberto Davison MD;  Location:  MAIN OR;  Service: General    FL RETROGRADE PYELOGRAM  10/06/2021    HERNIA REPAIR      IA CYSTOURETHROSCOPY,URETER CATHETER Right 10/06/2021    Procedure: CYSTOSCOPY RETROGRADE PYELOGRAM WITH INSERTION STENT URETERAL, RIGHT URETEROSCOPY, STONE EXTRACTION;  Surgeon: Dolly Lagos MD;  Location: [address] [place], [place] [address]o [place] MAIN OR;  Service: Urology          07/22/22 1203   PT Last Visit   PT Visit Date 07/22/22   Note Type   Note type Evaluation   Pain Assessment   Pain Assessment Tool 0-10   Pain Score No Pain   Restrictions/Precautions   Weight Bearing Precautions Per Order No   Other Precautions Multiple lines; Fall Risk  (PCA pump)   Home Living   Type of [address] [place] [address] Two level;Bed/bath upstairs; Ramped entrance  (1 DIANDRA vs ramp)   Bathroom Shower/Tub Tub/shower unit   Bathroom Toilet Raised   Home Equipment Walker;Cane  (did not use PTA)   Prior Function   Level of [place] Independent with ADLs and functional mobility   Lives With Family  (mother; reports that mother has dementia and is her primary caregiver)   Receives Help From Family;Friend(s)  (reports having sister who lives nearby who can provide assistance as needed; reports that sister is taking care of mother right now when pt is in hospital)   [address] in the last 6 months 0   Vocational Full time employment   Comments + drive   General   Family/Caregiver Present No   Cognition   Overall Cognitive Status WFL   Attention Within functional limits   Orientation Level Oriented X4   Memory Within functional limits   Following Commands Follows all commands and directions without difficulty   Comments pt pleasant and cooperative to participate in therapy session   RLE Assessment   RLE Assessment WFL   LLE Assessment   LLE Assessment WFL   Bed Mobility   Supine to Sit 5  Supervision   Additional items HOB elevated; Increased time required   Sit to Supine Unable to assess   Additional Comments pt supine in bed upon arrival  Pt returned to recliner with all needs wihtin reach   Transfers   Sit to Stand 5  Supervision   Additional items Increased time required   Stand to Sit 5  Supervision   Additional items Increased time required   Additional Comments transfers without AD initially, progresses to use of RW to ease abdominal discomfort with mobility   Ambulation/Elevation   Gait pattern Excessively slow; Foward flexed   Gait Assistance 5  Supervision   Additional items Verbal cues   Assistive Device Rolling walker   Distance 2 x 120ft   Stair Management Assistance 5  Supervision   Stair Management Technique One rail L;Step to pattern; Foreward   Number of Stairs 3   Balance   Static Sitting Fair +   Dynamic Sitting Fair +   Static Standing Fair +   Dynamic Standing Fair   Ambulatory Fair   Activity Tolerance   Activity Tolerance Patient tolerated treatment well   Medical Staff Made Aware OT; co-eval performed this date 2* increased medical complexity and multiple co-morbidities   Nurse Made Aware RN cleared pt to participate in therapy session Assessment   Prognosis Good   Assessment Pt seen for moderate complexity PT evaluation  Pt with active PT eval/treat and up as tolerated orders  Pt is a 49 y o  male who was admitted to [facility] on 7/21/2022 with Calcified mesenteric mass, s/p small bowel resection and hepatic resection  Pt's active problems include: MEHDI, HTN, neuroendocrine tumor  Pt  has a past medical history of Allergic, Bipolar disorder in partial remission ([address] ), Erectile dysfunction, Hypertension, and Kidney stone  Pt resides with mother in 2  with 1 DIANDRA vs ramp and was independent without AD prior to hospital admission  Currently upon evaluation pt performing bed mobility tasks at S level, funational transfers at S level and ambulation at S level using RW to ease abdominal pain  Pt steady on feet throughout session- no LOB noted  Pt was left sitting OOB in bedside chair at the end of PT session with all needs in reach  Pt with no questions or concerns regarding d/c home; pt with no further acute inpatient PT needs at this time- please re-consult if needed  PT to d/c pt and recommends home with family support  The patient's AM-PAC Basic Mobility Inpatient Short Form Raw Score is 19  A Raw score of greater than 16 suggests the patient may benefit from discharge to home  Please also refer to the recommendation of the Physical Therapist for safe discharge planning  Goals   Patient Goals to go home   Plan   Treatment/Interventions ADL retraining;Functional transfer training;LE strengthening/ROM; Elevations; Patient/family training;Equipment eval/education; Bed mobility;Gait training;Spoke to case management;Spoke to nursing;OT   PT Frequency   (eval only-d/c PT)   Recommendation   PT Discharge Recommendation No rehabilitation needs   Equipment Recommended [facility]   Turning in Bed Without Bedrails 4   Lying on Back to Sitting on Edge of Flat Bed 3   Moving Bed to Chair 3   Standing Up From Chair 3 Walk in Room 3   Climb 3-5 Stairs 3   Basic Mobility Inpatient Raw Score 19   Basic Mobility Standardized Score 42 48   Highest Level Of Mobility   JH-HLM Goal 6: Walk 10 steps or more   JH-HLM Achieved 7: Walk 25 feet or more         Kenna Woods, PT

## 2022-07-22 NOTE — OCCUPATIONAL THERAPY NOTE
Occupational Therapy Evaluation     Patient Name: Lala Hoffmann  OIIYV'A Date: 7/22/2022  Problem List  Principal Problem:    Calcified mesenteric mass    Past Medical History  Past Medical History:   Diagnosis Date    Allergic     Bipolar disorder in partial remission ([address] )     Erectile dysfunction     unspecified erectile dysfunction type    Hypertension     Kidney stone      Past Surgical History  Past Surgical History:   Procedure Laterality Date    COLONOSCOPY      EXPLORATORY LAPAROTOMY W/ BOWEL RESECTION N/A 7/21/2022    Procedure: LAPAROTOMY EXPLORATORY W/ SMALL BOWEL RESECTION, liver biopsy;  Surgeon: Evelyn Hernandez MD;  Location:  MAIN OR;  Service: General    FL RETROGRADE PYELOGRAM  10/06/2021    HERNIA REPAIR      IN CYSTOURETHROSCOPY,URETER CATHETER Right 10/06/2021    Procedure: CYSTOSCOPY RETROGRADE PYELOGRAM WITH INSERTION STENT URETERAL, RIGHT URETEROSCOPY, STONE EXTRACTION;  Surgeon: Malou Cadet MD;  Location: [facility] MAIN OR;  Service: Urology           07/22/22 1200   OT Last Visit   OT Visit Date 07/22/22   Note Type   Note type Evaluation   Restrictions/Precautions   Weight Bearing Precautions Per Order No   Pain Assessment   Pain Assessment Tool 0-10   Pain Score No Pain   Home Living   Type of [address] [facility] Two level   [facility]   Prior Function   Level of [place] Independent with ADLs and functional mobility   Lives With [employer] Help From Family   ADL Assistance Independent   IADLs Independent   Falls in the last 6 months 0   Vocational Full time employment   Lifestyle   Autonomy Pt I w/ ADLs, IADLs, and mobility at baseline     Reciprocal Relationships lives w/ supportive family   Service to Others works   Intrinsic Gratification pt active PTA   Psychosocial   Psychosocial (WDL) WDL   ADL   Where Assessed Edge of bed   Eating Assistance 7  Independent   Grooming Assistance 5  [address] [address] Supervision/Setup   LB Bathing Assistance 5  Supervision/Setup   UB Dressing Assistance 5  Supervision/Setup   LB Dressing Assistance 5  Supervision/Setup   Toileting Assistance  5  Supervision/Setup   Functional Assistance 5  Supervision/Setup   Bed Mobility   Supine to Sit 5  Supervision   Sit to Supine 5  Supervision   Transfers   Sit to Stand 5  Supervision   Stand to Sit 5  Supervision   Functional Mobility   Functional Mobility 5  Supervision   Additional items Rolling walker   Balance   Static Sitting Fair   Dynamic Sitting Fair   Static Standing Fair -   Dynamic Standing Fair -   Activity Tolerance   Activity Tolerance Patient tolerated treatment well   Nurse Made Aware Yes   RUE Assessment   RUE Assessment WFL   LUE Assessment   LUE Assessment WFL   Hand Function   Gross Motor Coordination Functional   Fine Motor Coordination Functional   Cognition   Overall Cognitive Status WFL   Arousal/Participation Alert; Responsive; Cooperative   Attention Within functional limits   Orientation Level Oriented X4   Memory Within functional limits   Following Commands Follows all commands and directions without difficulty   Comments Pt pleasant and agreeable to session   Assessment   Limitation Decreased endurance;Decreased high-level ADLs   Prognosis Good   Assessment Pt is a 49 y o  male who was [facility] to [facility] on 7/21/2022 with Calcified mesenteric mass  Pt s/p small bowel resection and hepatic resection  Pt  has a past medical history of Allergic, Bipolar disorder in partial remission ([facility] ), Erectile dysfunction, Hypertension, and Kidney stone  At baseline pt was I w/ ADLs, IADLs, and mobility  Pt lives in a multi level house with family  Currently pt requires S for overall ADLS and S for functional mobility/transfers w/ RW  Pt currently presents with impairments in the following categories -difficulty performing IADLS , activity tolerance, endurance and standing balance/tolerance   These impairments, as well as pt's fatigue and pain  limit pt's ability to safely engage in all baseline areas of occupation, including laundry, house maintenance and leisure activities  The patient's raw score on the AM-PAC Daily Activity inpatient short form is 21, standardized score is 44 27, greater than 39 4  Patients at this level are likely to benefit from discharge to home  Please refer to the recommendation of the Occupational Therapist for safe discharge planning  From OT standpoint, recommend home w/ increased support upon D/C  Anticipate pt will progress to be able to complete his self care routine with support as needed  Rec pt cont participation in mobility and self care with non OT staff while in the hospital  No further acute OT needs warranted at this time  D/C OT     Goals   Patient Goals to go home   Plan   OT Frequency Eval only   Recommendation   OT Discharge Recommendation No rehabilitation needs   AM-PAC Daily Activity Inpatient   Lower Body Dressing 3   Bathing 3   Toileting 3   Upper Body Dressing 4   Grooming 4   Eating 4   Daily Activity Raw Score 21   Daily Activity Standardized Score (Calc for Raw Score >=11) 44 27       Malick Brown OTR/L

## 2022-07-22 NOTE — UTILIZATION REVIEW
Initial Clinical Review    Elective IP surgical procedure  Age/Sex: 49 y o  male  Surgery Date: 7/21/2022  Procedure: Exploratory laparotomy, lysis of adhesions, small bowel resection, segment 5 liver biopsy  Anesthesia: General  Operative Findings:   -Extensive mesenteric galo disease, extended proximally to the takeoff of the SMA, unresectable disease  -Multifocal primary small bowel tumors identified, 5 palpated, 2 confirmed on initial gross examination at pathology, largest primary tumor site appeared to be spanning 2/3 of the lumen of the small bowel with desmoplastic reaction, 36cm segment of small bowel resected to include all of the palpated small bowel disease  -Small sites of disease seen and palpated on both the right and left liver, segment 5 liver biopsy confirmed metastatic neuroendocrine tumor        POD#1 Progress Note: Pt with no acute events overnight  Pt's pain is well controlled today  Schaefer in place  No flatus or bm   cc  NG tube 100 cc bilious output  NGT to low cont suction  Npo  IVFs @ 125 ml/hr  Pain control, epidural in place  Antiemetics prn  F/u final pathology  Encourage ambulation  Incentive spirometry  SCDs, sq Hep  Admission Orders: Date/Time/Statement:   Admission Orders (From admission, onward)     Ordered        07/21/22 1527  Inpatient Admission  Once                      Orders Placed This Encounter   Procedures    Inpatient Admission     Standing Status:   Standing     Number of Occurrences:   1     Order Specific Question:   Level of Care     Answer:   Med Surg [16]     Order Specific Question:   Estimated length of stay     Answer:   More than 2 Midnights     Order Specific Question:   Certification     Answer:   I certify that inpatient services are medically necessary for this patient for a duration of greater than two midnights  See H&P and MD Progress Notes for additional information about the patient's course of treatment       Vital Signs:   Date/Time Temp Pulse Resp BP MAP (mmHg) SpO2 Calculated FIO2 (%) - Nasal Cannula O2 Flow Rate (L/min) Nasal Cannula O2 Flow Rate (L/min) O2 Device   07/22/22 11:04:45 98 4 °F (36 9 °C) 81 18 123/80 94 95 % -- -- -- None (Room air)   07/22/22 0743 -- -- -- -- -- -- 28 -- 2 L/min Nasal cannula   07/22/22 02:36:43 99 1 °F (37 3 °C) 80 16 115/79 91 96 % -- -- -- --   07/21/22 19:35:08 98 7 °F (37 1 °C) 90 16 118/82 94 98 % -- -- -- --   07/21/22 1900 -- 96 18 127/80 93 97 % 28 -- 2 L/min Nasal cannula   07/21/22 1600 -- 82 19 103/64 80 97 % 24 -- 1 L/min Nasal cannula   07/21/22 1525 97 9 °F (36 6 °C) 81 20 109/70 84 99 % -- 6 L/min -- Simple mask   07/21/22 0936 -- -- -- 143/105 Abnormal   -- -- -- -- -- --   07/21/22 0931 97 °F (36 1 °C) Abnormal  86 16 155/102 Abnormal   -- 99 % -- -- -- None (Room air)       Pertinent Labs/Diagnostic Test Results:   Results from last 7 days   Lab Units 07/15/22  1410   SARS-COV-2  Negative     Results from last 7 days   Lab Units 07/22/22  0511   WBC Thousand/uL 11 77*   HEMOGLOBIN g/dL 11 5*   HEMATOCRIT % 32 6*   PLATELETS Thousands/uL 161   NEUTROS ABS Thousands/µL 9 95*     Results from last 7 days   Lab Units 07/22/22  0511   SODIUM mmol/L 140   POTASSIUM mmol/L 3 6   CHLORIDE mmol/L 109*   CO2 mmol/L 24   ANION GAP mmol/L 7   BUN mg/dL 12   CREATININE mg/dL 0 91   EGFR ml/min/1 73sq m 99   CALCIUM mg/dL 8 6     Results from last 7 days   Lab Units 07/22/22  0511   GLUCOSE RANDOM mg/dL 105            Scheduled Medications:  heparin (porcine), 5,000 Units, Subcutaneous, Q8H [address]    Continuous IV Infusions:  dextrose 5 % and sodium chloride 0 45 % with KCl 20 mEq/L, 84 mL/hr, Intravenous, Continuous  ropivacaine 0 1% and fentaNYL 2 mcg/mL, , Epidural, Continuous    PRN Meds:  chlorhexidine, , Topical, Daily PRN  diphenhydrAMINE, 25 mg, Intravenous, Q6H PRN  metoclopramide, 5 mg, Intravenous, Q6H PRN  ondansetron, 4 mg, Intravenous, Q4H PRN        Network Utilization Review Department  ATTENTION: Please call with any questions or concerns to [phone] and carefully listen to the prompts so that you are directed to the right person  All voicemails are confidential   Maggie Guzman all requests for admission clinical reviews, approved or denied determinations and any other requests to dedicated fax number below belonging to the campus where the patient is receiving treatment   List of dedicated fax numbers for the Facilities:  [address] DENIALS (Administrative/Medical Necessity) [phone]   [address] (Maternity/NICU/Pediatrics) [phone]   [address]  [address] [facility] [place] [address] [address] [address]el[address] [place] [address] [phone]

## 2022-07-22 NOTE — PROGRESS NOTES
Post op check: Surgical Oncology    Assessment   Patient is a 49 y o  male who presented with a small-bowel mesenteric mass suspicious for newer endocrine tumor, now status post small-bowel resection and hepatic resection  Plan:  -NPO/NG tube; low continuous suction  -LR at 1:25 a m    -epidural for pain control   -Schaefer    Subjective: Pt feeling well  Pain is well controlled with the epidural  Denies fevers/chills, chest pain, sob  Schaefer in place  NG tube with minimal bilious output  Objective:  Vitals:    07/21/22 1900 07/21/22 1915 07/21/22 1921 07/21/22 1935   BP: 127/80  123/77 118/82   Pulse: 96 78 76 90   Resp: 18 18 18 16   Temp:    98 7 °F (37 1 °C)   TempSrc:       SpO2: 97% 97% 97% 98%   Weight:       Height:           No intake/output data recorded  Scheduled Meds:  Current Facility-Administered Medications   Medication Dose Route Frequency Provider Last Rate    chlorhexidine   Topical Daily PRN Jericho Dominguez MD      diphenhydrAMINE  25 mg Intravenous Q6H PRN Jericho Dominguez MD      heparin (porcine)  5,000 Units Subcutaneous [facility] Jericho Dominguez MD      lactated ringers  125 mL/hr Intravenous Continuous Jericho Dominguez  mL/hr (07/21/22 1525)    metoclopramide  5 mg Intravenous Q6H PRN Jericho Dominguez MD      ondansetron  4 mg Intravenous Q4H PRN Jericho Dominguez MD      ondansetron  4 mg Intravenous Q6H PRN Jericho Dominguez MD      ropivacaine 0 1% and fentaNYL 2 mcg/mL   Epidural Continuous Jericho Dominguez MD       Continuous Infusions:lactated ringers, 125 mL/hr, Last Rate: 125 mL/hr (07/21/22 1525)  ropivacaine 0 1% and fentaNYL 2 mcg/mL,       PRN Meds:    chlorhexidine    diphenhydrAMINE    metoclopramide    ondansetron    ondansetron      Physical Exam   Gen:  Well-developed, well-nourished male in NAD    HEENT: EOMI, NG tube in place as above  CV: RRR,   Lungs: Normal respiratory effort  Abd: soft, nontender, nondistended, Incisions clean dry and intact   Neuro: AxO x3      Norma Harkins MD

## 2022-07-22 NOTE — CONSULTS
Consult Note- Acute Pain Service   Laura Cooper 49 y o  male MRN: [id number]  Unit/Bed#: [facility] 923-01 Encounter: [id number]               Assessment/Plan     Assessment:   Patient Active Problem List   Diagnosis    Diastasis recti    Hydronephrosis with urinary obstruction due to ureteral calculus    Calcified mesenteric mass    MEHDI (acute kidney injury) ([facility] [address] )    Essential hypertension    Neuroendocrine tumor      Laura Cooper is a 49 y o  male with PMHx of HTN who initially presented with worsening abdominal pain secondary to small-bowel neuroendocrine tumor c/b mesenteric disease  He is now POD1 s/p ex-lap/lysis of adhesions/SBR/liver biopsy  A pre-operative thoracic epidural was placed for post-operative analgesia  APS consulted for post-operative pain/epidural management  Upon bedside evaluation, Ahsan Joaquin was sitting in the chair watching TV  Pain is overall well controlled on epidural PCEA  He has some breakthrough pain with deep inspiration but extra PCEA doses help  He is able to get OOB into chair and walk with assistance  No evidence of excessive sympathectomy-induced hypotension/pressor requirements/abnormal sensorimotor deficits  Denies opioid-induced side effects including nausea/vomiting/itching/constipation  Plan:   - Diet advanced to clears this morning  If able to take PO medications, recommend PO tylenol 975mg q8, PO robaxin 500mg q6 scheduled  - Continue thoracic epidural, 0 1% ropivacaine/2mcg/ml fentanyl @8/5/10/3; anticipate epidural analgesia for another 2-3 days  - Will add IV dilaudid 0 5mg q2hr PRN for breakthrough    Bowel Regimen:  - Bowel regimen when appropriate from surgical perspective    APS will continue to follow  Please contact Acute Pain Service - [facility] via [employer] from [phone] with additional questions or concerns  See Kelly or Ananya for additional contacts and after hours information      History of Present Illness    Admit Date:  7/21/2022  Hospital Day:  1 day  Primary Service:  Oncology-Surgical  Attending Provider:  Martin Costa MD  Reason for Consult / Principal Problem: Post-operative acute pain  HPI: Quinton Chris is a 49 y o  male who presents with small-bowel neuroendocrine tumor c/b mesenteric galo disease s/p ex-lap/lysis of adhesions/SBR/liver biopsy on 7/21  Current pain location(s): Abdomen  Pain Scale:   0-4/10  Quality: Diffuse, achy  Current Analgesic regimen:  See above    Pain History: N/A  Pain Management Provider:  N/A    I have reviewed the patient's controlled substance dispensing history in the Prescription Drug Monitoring Program in compliance with the [facility] regulations before prescribing any controlled substances  Inpatient consult to Acute Pain Service  Consult performed by: Todd Alegre MD  Consult ordered by: Kenrick Mendez MD          Review of Systems   Constitutional: Negative  HENT: Negative  Eyes: Negative  Respiratory: Negative  Cardiovascular: Negative  Gastrointestinal: Positive for abdominal pain  Genitourinary: Negative  Musculoskeletal: Negative  Skin: Negative  Neurological: Negative  Psychiatric/Behavioral: Negative          Historical Information   Past Medical History:   Diagnosis Date    Allergic     Bipolar disorder in partial remission ([facility] [address] )     Erectile dysfunction     unspecified erectile dysfunction type    Hypertension     Kidney stone      Past Surgical History:   Procedure Laterality Date    COLONOSCOPY      FL RETROGRADE PYELOGRAM  10/06/2021    HERNIA REPAIR      RI CYSTOURETHROSCOPY,URETER CATHETER Right 10/06/2021    Procedure: CYSTOSCOPY RETROGRADE PYELOGRAM WITH INSERTION STENT URETERAL, RIGHT URETEROSCOPY, STONE EXTRACTION;  Surgeon: Neva Perez MD;  Location: [address] [place] [address] OR;  Service: Urology     Social History   Social History     Substance and Sexual Activity   Alcohol Use Yes    Comment: Drinks a quart of moonshine/night-As of 7/4/22 will quit drinking Social History     Substance and Sexual Activity   Drug Use Yes    Types: Marijuana    Comment: Socially (1-2 Monthly)     Social History     Tobacco Use   Smoking Status Former Smoker    Types: Pipe, Cigarettes    Quit date: 2003    Years since quittin 6   Smokeless Tobacco Never Used     Family History: non-contributory    Meds/Allergies   all current active meds have been reviewed    Allergies   Allergen Reactions    Tomato - Food Allergy Anaphylaxis     raw    Poison Ivy Extract Hives    Poison Sumac Extract Hives       Objective   Temp:  [97 9 °F (36 6 °C)-99 5 °F (37 5 °C)] 98 4 °F (36 9 °C)  HR:  [74-96] 81  Resp:  [15-23] 18  BP: (100-127)/(63-82) 123/80    Intake/Output Summary (Last 24 hours) at 2022 1248  Last data filed at 2022 1248  Gross per 24 hour   Intake 3540 ml   Output 1800 ml   Net 1740 ml       Physical Exam  Vitals reviewed  HENT:      Head: Normocephalic  Mouth/Throat:      Mouth: Mucous membranes are dry  Eyes:      Extraocular Movements: Extraocular movements intact  Cardiovascular:      Rate and Rhythm: Normal rate  Pulses: Normal pulses  Pulmonary:      Effort: Pulmonary effort is normal    Abdominal:      General: Abdomen is flat  Tenderness: There is abdominal tenderness  Musculoskeletal:         General: Normal range of motion  Cervical back: Normal range of motion  Skin:     General: Skin is dry  Neurological:      General: No focal deficit present  Mental Status: He is alert and oriented to person, place, and time  Psychiatric:         Mood and Affect: Mood normal          Lab Results: I have personally reviewed pertinent labs  Imaging Studies: I have personally reviewed pertinent reports  EKG, Pathology, and Other Studies: I have personally reviewed pertinent reports  Counseling / Coordination of Care  Total floor / unit time spent today Level 1 = 20 minutes   Greater than 50% of total time was spent with the patient and / or family counseling and / or coordination of care  Please note that the APS provides consultative services regarding pain management only  With the exception of ketamine and epidural infusions and except when indicated, final decisions regarding starting or changing doses of analgesic medications are at the discretion of the consulting service  Off hours consultation and/or medication management is generally not available      Joseph Hills MD  Acute Pain Service

## 2022-07-22 NOTE — PLAN OF CARE
Problem: Prexisting or High Potential for Compromised Skin Integrity  Goal: Skin integrity is maintained or improved  Description: INTERVENTIONS:  - Identify patients at risk for skin breakdown  - Assess and monitor skin integrity  - Assess and monitor nutrition and hydration status  - Monitor labs   - Assess for incontinence   - Turn and reposition patient  - Assist with mobility/ambulation  - Relieve pressure over bony prominences  - Avoid friction and shearing  - Provide appropriate hygiene as needed including keeping skin clean and dry  - Evaluate need for skin moisturizer/barrier cream  - Collaborate with interdisciplinary team   - Patient/family teaching  - Consider wound care consult   Outcome: Progressing     Problem: Potential for Falls  Goal: Patient will remain free of falls  Description: INTERVENTIONS:  - Educate patient/family on patient safety including physical limitations  - Instruct patient to call for assistance with activity   - Consult OT/PT to assist with strengthening/mobility   - Keep Call bell within reach  - Keep bed low and locked with side rails adjusted as appropriate  - Keep care items and personal belongings within reach  - Initiate and maintain comfort rounds  - Make Fall Risk Sign visible to staff  - Offer Toileting in advance of need  - Apply yellow socks and bracelet for high fall risk patients  - Consider moving patient to room near nurses station  Outcome: Progressing     Problem: MOBILITY - ADULT  Goal: Maintain or return to baseline ADL function  Description: INTERVENTIONS:  -  Assess patient's ability to carry out ADLs; assess patient's baseline for ADL function and identify physical deficits which impact ability to perform ADLs (bathing, care of mouth/teeth, toileting, grooming, dressing, etc )  - Assess/evaluate cause of self-care deficits   - Assess range of motion  - Assess patient's mobility; develop plan if impaired  - Assess patient's need for assistive devices and provide as appropriate  - Encourage maximum independence but intervene and supervise when necessary  - Involve family in performance of ADLs  - Assess for home care needs following discharge   - Consider OT consult to assist with ADL evaluation and planning for discharge  - Provide patient education as appropriate  Outcome: Progressing  Goal: Maintains/Returns to pre admission functional level  Description: INTERVENTIONS:  - Perform BMAT or MOVE assessment daily    - Set and communicate daily mobility goal to care team and patient/family/caregiver     - Collaborate with rehabilitation services on mobility goals if consulted  - Out of bed for toileting  - Record patient progress and toleration of activity level   Outcome: Progressing

## 2022-07-22 NOTE — QUICK NOTE
Patient evaluated for acute onset of severe abdominal pain, diaphoresis, change in appearance      T-max 100 2°, heart rate 80s, 97% on room air, hypertensive to 160s over 100s    Epidural catheter in place, still afforded relief when PCA button pressed    Abdomen soft, moderately appropriately tender with distractible voluntary guarding    Patient reports history of heavy home moonshine consumption, diaphoresis and hypotension may represent early signs of alcohol withdrawal     Additional entities in differential diagnosis include possible enteric leak, less likely in setting of absent tachycardia and no overt worsening of abdominal examination    -p r n  pain medicine administered  -Tylenol 975 mg q 8 hours initiated  -patient placed on CIWA protocol  -we will proceed with serial abdominal examinations through the night, will consider additional investigations if acute change in exam

## 2022-07-22 NOTE — CASE MANAGEMENT
Case Management Assessment & Discharge Planning Note    Patient name Janis Chan  Location [facility] 923/[facility] 800-77 MRN [id number]  : 1975 Date 2022       Current Admission Date: 2022  Current Admission Diagnosis:Calcified mesenteric mass   Patient Active Problem List    Diagnosis Date Noted    Neuroendocrine tumor 2022    Hydronephrosis with urinary obstruction due to ureteral calculus 10/04/2021    Calcified mesenteric mass 10/04/2021    MEHDI (acute kidney injury) ([facility] [address] ) 10/04/2021    Essential hypertension 10/04/2021    Diastasis recti 2019      LOS (days): 1  Geometric Mean LOS (GMLOS) (days):   Days to GMLOS:     OBJECTIVE:    Risk of Unplanned Readmission Score: 10 36         Current admission status: Inpatient       Preferred Pharmacy:   [address] [place] [id number]  Phone: [phone] Fax: [phone]    Primary Care Provider: Trenton Patel DO    Primary Insurance: Tim Rodriguez  Secondary Insurance:     ASSESSMENT:  [address] [place] [address] Representative - Sister   Primary Phone: [phone] (Mobile)                 Patient Information  Admitted from[de-identified] Home  Mental Status: Alert  During Assessment patient was accompanied by: Not accompanied during assessment  Assessment information provided by[de-identified] Patient  Primary Caregiver: Self  Support Systems: Family members  [place] of Residence: [address] do you live in?: [address] [facility] entry access options   Select all that apply : Stairs  Number of steps to enter home : 1  Type of Current Residence: [address] home  Upon entering residence, is there a bedroom on the main floor (no further steps)?: No  A bedroom is located on the following floor levels of residence (select all that apply):: 2nd Floor  Upon entering residence, is there a bathroom on the main floor (no further steps)?: No  Indicate which floors of current residence have a bathroom (select all the apply):: 2nd Floor  Number of steps to 2nd floor from main floor: One Flight  In the last 12 months, was there a time when you were not able to pay the mortgage or rent on time?: No  In the last 12 months, how many places have you lived?: 1  In the last 12 months, was there a time when you did not have a steady place to sleep or slept in a shelter (including now)?: No  Homeless/housing insecurity resource given?: N/A  Living Arrangements: Lives w/ Parent(s) (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)    Activities of Daily Living Prior to Admission  Functional Status: Independent  Completes ADLs independently?: Yes  Ambulates independently?: Yes  Does patient use assisted devices?: Yes  Assisted Devices (DME) used: Straight Cane  Does patient currently own DME?: Yes  What DME does the patient currently own?: Straight Cane, Rollator, Wheelchair, Bedside Commode  Does patient have a history of Outpatient Therapy (PT/OT)?: No  Does the patient have a history of Short-Term Rehab?: No  Does patient have a history of HHC?: No  Does patient currently have [facility]?: No         Patient Information Continued  Income Source: Employed  Does patient have prescription coverage?: Yes  Within the past 12 months, you worried that your food would run out before you got the money to buy more : Never true  Within the past 12 months, the food you bought just didn't last and you didn't have money to get more : Never true  Food insecurity resource given?: N/A  Does patient receive dialysis treatments?: No  Does patient have a history of substance abuse?: No  Does patient have a history of Mental Health Diagnosis?: Yes  Has patient received inpatient treatment related to mental health in the last 2 years?: No (Per pt he was in patient over 20 yrs ago)         Means of [address] of Transport to Appts[de-identified] Family transport  In the past 12 months, has lack of transportation kept you from medical appointments or from getting medications?: No  In the past 12 months, has lack of transportation kept you from meetings, work, or from getting things needed for daily living?: No  Was application for public transport provided?: N/A        DISCHARGE DETAILS:    Discharge planning discussed with[de-identified] pt  Freedom of Choice: Yes  Comments - Freedom of Choice: pending recs             Patient/caregiver received discharge checklist   Content reviewed  Patient/caregiver encouraged to participate in discharge plan of care prior to discharge home  CM reviewed d/c planning process including the following: identifying help at home, patient preference for d/c planning needs, Discharge Lounge, [facility] Meds to Bed program, availability of treatment team to discuss questions or concerns patient and/or family may have regarding understanding medications and recognizing signs and symptoms once discharged  CM also encouraged patient to follow up with all recommended appointments after discharge  Patient advised of importance for patient and family to participate in managing patients medical well being

## 2022-07-22 NOTE — PROGRESS NOTES
Surgical Oncology  Progress Note   Kilo Stable 49 y o  male MRN: [id number]  Unit/Bed#: [facility] 923-01 Encounter: [id number]    Assessment:  Patient is a 49 y o  male who presented with a small-bowel mesenteric mass suspicious for newer endocrine tumor, now status post small-bowel resection and hepatic resection  Afebrile, vital signs stable, room air  Morning labs pending    Urine output: 850 cc  NG tube:  100 cc bilious output    Plan:  -NG tube; low continuous suction   -NPO and [email or web address]  -pain control; epidural in place  -nausea control as needed   -follow-up final pathology  -encourage ambulation  -incentive spirometer   -DVT prophylaxis   -disposition planning    Subjective/Objective     Subjective: Patient seen and examined at bedside, in no acute distress  No acute events overnight  Patient's pain is well controlled  Schaefer in place  No flatus or bowel movement  Objective:     Vitals:Blood pressure 115/79, pulse 80, temperature 99 1 °F (37 3 °C), resp  rate 16, height 6' 1" (1 854 m), weight 97 5 kg (215 lb), SpO2 96 %  ,Body mass index is 28 37 kg/m²       Temp (24hrs), Av 4 °F (36 9 °C), Min:97 °F (36 1 °C), Max:99 3 °F (37 4 °C)  Current: Temperature: 99 1 °F (37 3 °C)      Intake/Output Summary (Last 24 hours) at 2022 0456  Last data filed at 2022 0236  Gross per 24 hour   Intake 3500 ml   Output 1250 ml   Net 2250 ml       Invasive Devices  Report    Peripheral Intravenous Line  Duration           Peripheral IV 22 Left;Ventral (anterior) Forearm <1 day          Epidural Line  Duration           Epidural Catheter 22 <1 day          Drain  Duration           Ureteral Drain/Stent Right ureter 6 Fr  288 days    NG/OG Tube Right nare <1 day    Urethral Catheter Latex 16 Fr  <1 day                Physical Exam:  General: No acute distress, alert and oriented  CV: Well perfused, regular rate and rhythm  Lungs: Normal work of breathing, no increased respiratory effort  Abdomen: Soft, non-tender, non-distended  Incision/s clean, dry and intact    Extremities: No edema, clubbing or cyanosis  Skin: Warm, dry    Lab Results: Results: I have personally reviewed all pertinent laboratory/tests results  VTE Prophylaxis: Heparin    Lyle Finn MD  7/22/2022

## 2022-07-23 LAB
ANION GAP SERPL CALCULATED.3IONS-SCNC: 7 MMOL/L (ref 4–13)
BASOPHILS # BLD AUTO: 0.02 THOUSANDS/ΜL (ref 0–0.1)
BASOPHILS NFR BLD AUTO: 0 % (ref 0–1)
BUN SERPL-MCNC: 11 MG/DL (ref 5–25)
CALCIUM SERPL-MCNC: 8.5 MG/DL (ref 8.3–10.1)
CHLORIDE SERPL-SCNC: 105 MMOL/L (ref 96–108)
CO2 SERPL-SCNC: 26 MMOL/L (ref 21–32)
CREAT SERPL-MCNC: 0.89 MG/DL (ref 0.6–1.3)
EOSINOPHIL # BLD AUTO: 0.05 THOUSAND/ΜL (ref 0–0.61)
EOSINOPHIL NFR BLD AUTO: 1 % (ref 0–6)
ERYTHROCYTE [DISTWIDTH] IN BLOOD BY AUTOMATED COUNT: 14.6 % (ref 11.6–15.1)
GFR SERPL CREATININE-BSD FRML MDRD: 101 ML/MIN/1.73SQ M
GLUCOSE SERPL-MCNC: 120 MG/DL (ref 65–140)
GLUCOSE SERPL-MCNC: 158 MG/DL (ref 65–140)
HCT VFR BLD AUTO: 33.9 % (ref 36.5–49.3)
HGB BLD-MCNC: 11.7 G/DL (ref 12–17)
IMM GRANULOCYTES # BLD AUTO: 0.03 THOUSAND/UL (ref 0–0.2)
IMM GRANULOCYTES NFR BLD AUTO: 0 % (ref 0–2)
LYMPHOCYTES # BLD AUTO: 1.38 THOUSANDS/ΜL (ref 0.6–4.47)
LYMPHOCYTES NFR BLD AUTO: 15 % (ref 14–44)
MCH RBC QN AUTO: 29.4 PG (ref 26.8–34.3)
MCHC RBC AUTO-ENTMCNC: 34.5 G/DL (ref 31.4–37.4)
MCV RBC AUTO: 85 FL (ref 82–98)
MONOCYTES # BLD AUTO: 0.78 THOUSAND/ΜL (ref 0.17–1.22)
MONOCYTES NFR BLD AUTO: 8 % (ref 4–12)
NEUTROPHILS # BLD AUTO: 7.17 THOUSANDS/ΜL (ref 1.85–7.62)
NEUTS SEG NFR BLD AUTO: 76 % (ref 43–75)
NRBC BLD AUTO-RTO: 0 /100 WBCS
PLATELET # BLD AUTO: 162 THOUSANDS/UL (ref 149–390)
PMV BLD AUTO: 10.3 FL (ref 8.9–12.7)
POTASSIUM SERPL-SCNC: 3.6 MMOL/L (ref 3.5–5.3)
RBC # BLD AUTO: 3.98 MILLION/UL (ref 3.88–5.62)
SODIUM SERPL-SCNC: 138 MMOL/L (ref 135–147)
WBC # BLD AUTO: 9.43 THOUSAND/UL (ref 4.31–10.16)

## 2022-07-23 PROCEDURE — 99232 SBSQ HOSP IP/OBS MODERATE 35: CPT | Performed by: STUDENT IN AN ORGANIZED HEALTH CARE EDUCATION/TRAINING PROGRAM

## 2022-07-23 PROCEDURE — 82948 REAGENT STRIP/BLOOD GLUCOSE: CPT

## 2022-07-23 PROCEDURE — 85025 COMPLETE CBC W/AUTO DIFF WBC: CPT | Performed by: PHYSICIAN ASSISTANT

## 2022-07-23 PROCEDURE — 80048 BASIC METABOLIC PNL TOTAL CA: CPT | Performed by: PHYSICIAN ASSISTANT

## 2022-07-23 RX ORDER — POTASSIUM CHLORIDE 20 MEQ/1
40 TABLET, EXTENDED RELEASE ORAL ONCE
Status: COMPLETED | OUTPATIENT
Start: 2022-07-23 | End: 2022-07-23

## 2022-07-23 RX ORDER — LABETALOL HYDROCHLORIDE 5 MG/ML
10 INJECTION, SOLUTION INTRAVENOUS EVERY 6 HOURS PRN
Status: DISCONTINUED | OUTPATIENT
Start: 2022-07-23 | End: 2022-07-25 | Stop reason: HOSPADM

## 2022-07-23 RX ADMIN — HEPARIN SODIUM 5000 UNITS: 5000 INJECTION INTRAVENOUS; SUBCUTANEOUS at 06:09

## 2022-07-23 RX ADMIN — LABETALOL HYDROCHLORIDE 10 MG: 5 INJECTION, SOLUTION INTRAVENOUS at 10:54

## 2022-07-23 RX ADMIN — ACETAMINOPHEN 975 MG: 325 TABLET ORAL at 13:02

## 2022-07-23 RX ADMIN — HEPARIN SODIUM 5000 UNITS: 5000 INJECTION INTRAVENOUS; SUBCUTANEOUS at 21:57

## 2022-07-23 RX ADMIN — HEPARIN SODIUM 5000 UNITS: 5000 INJECTION INTRAVENOUS; SUBCUTANEOUS at 13:02

## 2022-07-23 RX ADMIN — ACETAMINOPHEN 975 MG: 325 TABLET ORAL at 21:57

## 2022-07-23 RX ADMIN — FENTANYL CITRATE: 50 INJECTION INTRAMUSCULAR; INTRAVENOUS at 23:43

## 2022-07-23 RX ADMIN — FENTANYL CITRATE: 50 INJECTION INTRAMUSCULAR; INTRAVENOUS at 08:07

## 2022-07-23 RX ADMIN — DEXTROSE, SODIUM CHLORIDE, AND POTASSIUM CHLORIDE 84 ML/HR: 5; .45; .15 INJECTION INTRAVENOUS at 08:23

## 2022-07-23 RX ADMIN — POTASSIUM CHLORIDE 40 MEQ: 1500 TABLET, EXTENDED RELEASE ORAL at 08:06

## 2022-07-23 RX ADMIN — DEXTROSE, SODIUM CHLORIDE, AND POTASSIUM CHLORIDE 84 ML/HR: 5; .45; .15 INJECTION INTRAVENOUS at 19:56

## 2022-07-23 NOTE — RESTORATIVE TECHNICIAN NOTE
Restorative Technician Note      Patient Name: Janeen Mena     Restorative Tech Visit Date: 7/23/2022  Note Type: Mobility  Patient Position Upon Consult: Bedside chair  Activity Performed: Ambulated  Assistive Device: Roller walker  Patient Position at End of Consult: All needs within reach;  Supine    Ericka Thomas  Restorative Technician

## 2022-07-23 NOTE — RESTORATIVE TECHNICIAN NOTE
Restorative Technician Note      Patient Name: Collette Guild     Restorative Tech Visit Date: 7/23/2022  Note Type: Mobility  Patient Position Upon Consult: Supine  Activity Performed: Ambulated  Assistive Device: Roller walker  Patient Position at End of Consult: Bedside chair;  All needs within [facility]

## 2022-07-23 NOTE — PROGRESS NOTES
Epidural Follow-up Note - Acute Pain Service    Ligia Cabral 49 y o  male MRN: [id number]  Unit/Bed#: [facility] 923-01 Encounter: [id number]      Assessment:   Principal Problem:    Calcified mesenteric mass  Assessment:       Patient Active Problem List   Diagnosis    Diastasis recti    Hydronephrosis with urinary obstruction due to ureteral calculus    Calcified mesenteric mass    MEHDI (acute kidney injury) ([facility] [address]    Essential hypertension    Neuroendocrine tumor         Ligia Cabral is a 49y o  year old male with PMHx of HTN who initially presented with worsening abdominal pain secondary to small-bowel neuroendocrine tumor c/b mesenteric disease  He is now POD2 s/p ex-lap/lysis of adhesions/SBR/liver biopsy  A pre-operative thoracic epidural was placed for post-operative analgesia  APS consulted for post-operative pain/epidural management    Pt seen and examined today, doing well with epidural   Breakthrough pain with coughing however pushing demand button with relief  Getting OOB as able, no other issues at this time  Denies n/v/pruritus/LE weakness  Advanced to toast diet today    Plan:  Analgesia:  - Continue Thoracic epidural infusion   - plan to remove tomorrow if continues to tolerate diet   - please hold AM SQH  - IV Dilaudid 0 5mg q2hrs PRN breakthrough pain  - MMA when on PO's    Bowel Regimen:  - Bowel regimen when appropriate from surgical perspective    APS will continue to follow  Please contact Acute Pain Service - [facility] via [employer] from [phone] with additional questions or concerns  See Kelly or Ananya for additional contacts and after hours information      Pain History  Current pain location(s): abdomen  Pain Scale:   0-4, as high as 8  Quality: shooting  24 hour history: as above    PCEA use: moderate  Opioid requirement previous 24 hours: 0 5mg IV Dilaudid    Meds/Allergies   all current active meds have been reviewed    Allergies   Allergen Reactions    Tomato - Food Allergy Anaphylaxis     raw    Poison Ivy Extract Hives    Poison Sumac Extract Hives       Objective     Temp:  [97 7 °F (36 5 °C)-100 2 °F (37 9 °C)] 98 6 °F (37 °C)  HR:  [79-94] 79  Resp:  [18-20] 20  BP: (117-165)/() 145/95    Physical Exam  Vitals and nursing note reviewed  Constitutional:       General: He is not in acute distress  Appearance: Normal appearance  HENT:      Head: Normocephalic and atraumatic  Mouth/Throat:      Mouth: Mucous membranes are moist    Eyes:      Extraocular Movements: Extraocular movements intact  Cardiovascular:      Rate and Rhythm: Normal rate  Pulmonary:      Effort: Pulmonary effort is normal    Abdominal:      General: There is no distension  Palpations: Abdomen is soft  Tenderness: There is abdominal tenderness  Musculoskeletal:         General: No swelling  Skin:     General: Skin is warm and dry  Neurological:      Mental Status: He is alert and oriented to person, place, and time  Psychiatric:         Mood and Affect: Mood normal          Behavior: Behavior normal        Epidural: Site clean/dry/intact, no surrounding erythema/edema/induration, infusion functioning appropriately    Lab Results:   Results from last 7 days   Lab Units 07/23/22  0614   WBC Thousand/uL 9 43   HEMOGLOBIN g/dL 11 7*   HEMATOCRIT % 33 9*   PLATELETS Thousands/uL 162      Results from last 7 days   Lab Units 07/23/22  0614   POTASSIUM mmol/L 3 6   CHLORIDE mmol/L 105   CO2 mmol/L 26   BUN mg/dL 11   CREATININE mg/dL 0 89   CALCIUM mg/dL 8 5          Imaging Studies: I have personally reviewed pertinent reports  EKG, Pathology, and Other Studies: I have personally reviewed pertinent reports  Counseling / Coordination of Care  Total floor / unit time spent today 20 minutes  Greater than 50% of total time was spent with the patient and / or family counseling and / or coordination of care   A description of the counseling / coordination of care: chart review, post op pain and regional/neuraxial pain management, discussion/planning with nursing/medical/surgical teams    Please note that the APS provides consultative services regarding pain management only  With the exception of ketamine, peripheral nerve catheters, and epidural infusions (and except when indicated), final decisions regarding starting or changing doses of analgesic medications are at the discretion of the consulting service  Off hours consultation and/or medication management is generally not available      Chante Harrison MD  Acute Pain Service

## 2022-07-23 NOTE — PROGRESS NOTES
Progress Note - Surgical Oncology  Jeni Benavides 49 y o  male MRN: [id number]  Unit/Bed#: [facility] 923-01 Encounter: [id number]    Assessment:  Patient is a 49 y o  male who presented with Small Bowel Mesenteric Mass, now status post Ex-lap, small bowel resection, resection of liver lesion (likely metastasis) on 7/21/POD3  Afebrile,VSS    UOP: 1 2L plus 1x    No BM; minimal flatus and burping  Mildly distended on exam    Plan:  CLD with toast, Ensures--keep on current diet for now  [email or web address]  Epidural--dc on 7/24  Hold am dose of SQH  CIWA protocol  DVT px  Encourage out of bed and ambulation  PT/OT: home with walker    Subjective/Objective     Subjective:   No acute events overnight  Objective:    Blood pressure 139/87, pulse 89, temperature 99 1 °F (37 3 °C), resp  rate 20, height 6' 1" (1 854 m), weight 97 5 kg (215 lb), SpO2 98 %  ,Body mass index is 28 37 kg/m²  Intake/Output Summary (Last 24 hours) at 7/23/2022 1801  Last data filed at 7/23/2022 1309  Gross per 24 hour   Intake 1664 6 ml   Output 700 ml   Net 964 6 ml       Invasive Devices  Report    Peripheral Intravenous Line  Duration           Peripheral IV 07/22/22 Distal;Left;Upper;Ventral (anterior) Arm <1 day          Epidural Line  Duration           Epidural Catheter 07/21/22 2 days          Drain  Duration           Ureteral Drain/Stent Right ureter 6 Fr  290 days                Physical Exam  Vitals reviewed  Constitutional:       General: He is not in acute distress  Appearance: He is not ill-appearing, toxic-appearing or diaphoretic  HENT:      Head: Normocephalic and atraumatic  Eyes:      Extraocular Movements: Extraocular movements intact  Cardiovascular:      Rate and Rhythm: Normal rate  Pulmonary:      Effort: Pulmonary effort is normal  No respiratory distress  Abdominal:      General: There is distension (mild)  Palpations: Abdomen is soft  Tenderness: There is no abdominal tenderness   There is no guarding or rebound  Comments: Incisions clean, dry and intact   Skin:     General: Skin is warm and dry  Neurological:      Mental Status: He is alert and oriented to person, place, and time     Psychiatric:         Mood and Affect: Mood normal          Behavior: Behavior normal              Results from last 7 days   Lab Units 07/23/22  0614 07/22/22  0511   WBC Thousand/uL 9 43 11 77*   HEMOGLOBIN g/dL 11 7* 11 5*   HEMATOCRIT % 33 9* 32 6*   PLATELETS Thousands/uL 162 161     Results from last 7 days   Lab Units 07/23/22  0614 07/22/22  0511   POTASSIUM mmol/L 3 6 3 6   CHLORIDE mmol/L 105 109*   CO2 mmol/L 26 24   BUN mg/dL 11 12   CREATININE mg/dL 0 89 0 91   CALCIUM mg/dL 8 5 8 6

## 2022-07-24 LAB
ANION GAP SERPL CALCULATED.3IONS-SCNC: 3 MMOL/L (ref 4–13)
BUN SERPL-MCNC: 6 MG/DL (ref 5–25)
CALCIUM SERPL-MCNC: 8.7 MG/DL (ref 8.3–10.1)
CHLORIDE SERPL-SCNC: 105 MMOL/L (ref 96–108)
CO2 SERPL-SCNC: 28 MMOL/L (ref 21–32)
CREAT SERPL-MCNC: 0.79 MG/DL (ref 0.6–1.3)
ERYTHROCYTE [DISTWIDTH] IN BLOOD BY AUTOMATED COUNT: 14.6 % (ref 11.6–15.1)
GFR SERPL CREATININE-BSD FRML MDRD: 106 ML/MIN/1.73SQ M
GLUCOSE SERPL-MCNC: 100 MG/DL (ref 65–140)
HCT VFR BLD AUTO: 33.7 % (ref 36.5–49.3)
HGB BLD-MCNC: 11.4 G/DL (ref 12–17)
MAGNESIUM SERPL-MCNC: 2.2 MG/DL (ref 1.6–2.6)
MCH RBC QN AUTO: 29.5 PG (ref 26.8–34.3)
MCHC RBC AUTO-ENTMCNC: 33.8 G/DL (ref 31.4–37.4)
MCV RBC AUTO: 87 FL (ref 82–98)
PHOSPHATE SERPL-MCNC: 3 MG/DL (ref 2.7–4.5)
PLATELET # BLD AUTO: 191 THOUSANDS/UL (ref 149–390)
PMV BLD AUTO: 10.4 FL (ref 8.9–12.7)
POTASSIUM SERPL-SCNC: 3.4 MMOL/L (ref 3.5–5.3)
RBC # BLD AUTO: 3.87 MILLION/UL (ref 3.88–5.62)
SODIUM SERPL-SCNC: 136 MMOL/L (ref 135–147)
WBC # BLD AUTO: 7.48 THOUSAND/UL (ref 4.31–10.16)

## 2022-07-24 PROCEDURE — 85027 COMPLETE CBC AUTOMATED: CPT | Performed by: SURGERY

## 2022-07-24 PROCEDURE — 83735 ASSAY OF MAGNESIUM: CPT | Performed by: SURGERY

## 2022-07-24 PROCEDURE — 80048 BASIC METABOLIC PNL TOTAL CA: CPT | Performed by: SURGERY

## 2022-07-24 PROCEDURE — 84100 ASSAY OF PHOSPHORUS: CPT | Performed by: SURGERY

## 2022-07-24 RX ORDER — POLYETHYLENE GLYCOL 3350 17 G/17G
17 POWDER, FOR SOLUTION ORAL DAILY
Status: DISCONTINUED | OUTPATIENT
Start: 2022-07-24 | End: 2022-07-25 | Stop reason: HOSPADM

## 2022-07-24 RX ORDER — POTASSIUM CHLORIDE 20 MEQ/1
40 TABLET, EXTENDED RELEASE ORAL 2 TIMES DAILY
Status: COMPLETED | OUTPATIENT
Start: 2022-07-24 | End: 2022-07-24

## 2022-07-24 RX ORDER — OXYCODONE HYDROCHLORIDE 5 MG/1
5 TABLET ORAL EVERY 4 HOURS PRN
Status: DISCONTINUED | OUTPATIENT
Start: 2022-07-24 | End: 2022-07-25 | Stop reason: HOSPADM

## 2022-07-24 RX ORDER — OXYCODONE HYDROCHLORIDE 10 MG/1
10 TABLET ORAL EVERY 4 HOURS PRN
Status: DISCONTINUED | OUTPATIENT
Start: 2022-07-24 | End: 2022-07-25 | Stop reason: HOSPADM

## 2022-07-24 RX ADMIN — ACETAMINOPHEN 975 MG: 325 TABLET ORAL at 05:27

## 2022-07-24 RX ADMIN — HEPARIN SODIUM 5000 UNITS: 5000 INJECTION INTRAVENOUS; SUBCUTANEOUS at 21:46

## 2022-07-24 RX ADMIN — ACETAMINOPHEN 975 MG: 325 TABLET ORAL at 13:08

## 2022-07-24 RX ADMIN — POLYETHYLENE GLYCOL 3350 17 G: 17 POWDER, FOR SOLUTION ORAL at 14:58

## 2022-07-24 RX ADMIN — OXYCODONE HYDROCHLORIDE 5 MG: 5 TABLET ORAL at 14:51

## 2022-07-24 RX ADMIN — ACETAMINOPHEN 975 MG: 325 TABLET ORAL at 21:47

## 2022-07-24 RX ADMIN — OXYCODONE HYDROCHLORIDE 10 MG: 10 TABLET ORAL at 20:33

## 2022-07-24 RX ADMIN — POTASSIUM CHLORIDE 40 MEQ: 1500 TABLET, EXTENDED RELEASE ORAL at 17:21

## 2022-07-24 RX ADMIN — POTASSIUM CHLORIDE 40 MEQ: 1500 TABLET, EXTENDED RELEASE ORAL at 09:14

## 2022-07-24 RX ADMIN — HYDROMORPHONE HYDROCHLORIDE 0.5 MG: 1 INJECTION, SOLUTION INTRAMUSCULAR; INTRAVENOUS; SUBCUTANEOUS at 21:47

## 2022-07-24 NOTE — RESTORATIVE TECHNICIAN NOTE
Restorative Technician Note      Patient Name: Patric Duran     Restorative Tech Visit Date: 7/24/2022  Note Type: Mobility  Patient Position Upon Consult: Supine  Activity Performed: Ambulated  Assistive Device: Other (Comment) (none)  Patient Position at End of Consult: Bedside chair;  All needs within [facility]

## 2022-07-24 NOTE — QUICK NOTE
Patient seen and examined  Doing well, passing small amounts of gas, no nausea, tolerating slow diet advancement  Epidural is out and he is taking PO pain medication  He is out of bed to chair on room air  Discussed intraoperative findings again and outpatient follow up plans once pathology is back  Will have our office arrange a postop appointment

## 2022-07-24 NOTE — PROGRESS NOTES
Epidural Follow-up Note - Acute Pain Service    Edu Clarke 49 y o  male MRN: [id number]  Unit/Bed#: [facility] 923-01 Encounter: [id number]      Assessment:   Principal Problem:    Calcified mesenteric mass      Edu Clarke is a 49y o  year old male with PMHx of HTN who initially presented with worsening abdominal pain secondary to small-bowel neuroendocrine tumor c/b mesenteric disease  He is now  s/p ex-lap/lysis of adhesions/SBR/liver biopsy on 7/21  A pre-operative thoracic epidural was placed for post-operative analgesia  APS consulted for post-operative pain/epidural management    Upon bedside evaluation, Milagro Spence was doing well without epidural support  Able to get OOB into chair  Denies opioid-induced side effects including nausea/vomiting/itching/constipation  Reports making urine and passing gas  Plan:  Analgesia:  - Discontinue Thoracic epidural infusion   - heparin SQ last at 2200 on 7/23   - Last platelet count   Lab Results   Component Value Date     07/24/2022     - Transition to standard oral opioid regimen with oxycodone 5 mg/10 mg PO q4hrs PRN for moderate/severe pain, Dilaudid 0 5 mg IV q2hrs PRN for breakthrough pain  Dilaudid 0 5 mg IV q2hr PRN for breakthrough pain   - Consider adding PO robaxin 500mg q6 if pain worsens    Multimodal analgesia:  - Tylenol 975 mg PO q8hrs standing    Bowel Regimen:  - Miralax daily     APS will continue to follow  Please contact Acute Pain Service - [facility] via [employer] from [phone] with additional questions or concerns  See Kelly or Ananya for additional contacts and after hours information      Plan discussed with primary team    Pain History  Current pain location(s): Abdomen  Pain Scale:   5-6/10  Quality: Achy, diffuse  24 hour history: See above    PCEA use: 55 doses requested, 47 doses given  Opioid requirement previous 24 hours: PO oxycodone 5mg    Meds/Allergies   all current active meds have been reviewed    Allergies   Allergen Reactions  Tomato - Food Allergy Anaphylaxis     raw    Poison Ivy Extract Hives    Poison Sumac Extract Hives       Objective     Temp:  [97 7 °F (36 5 °C)-100 °F (37 8 °C)] 98 4 °F (36 9 °C)  HR:  [69-87] 77  Resp:  [18-20] 20  BP: (103-152)/(63-93) 152/93    Physical Exam  Vitals reviewed  HENT:      Head: Normocephalic  Mouth/Throat:      Mouth: Mucous membranes are dry  Eyes:      Extraocular Movements: Extraocular movements intact  Cardiovascular:      Rate and Rhythm: Normal rate  Pulses: Normal pulses  Pulmonary:      Effort: Pulmonary effort is normal    Abdominal:      General: Abdomen is flat  Tenderness: There is abdominal tenderness  Musculoskeletal:         General: Normal range of motion  Cervical back: Normal range of motion  Skin:     General: Skin is dry  Neurological:      General: No focal deficit present  Mental Status: He is alert and oriented to person, place, and time  Psychiatric:         Mood and Affect: Mood normal        Epidural: Site clean/dry/intact, no surrounding erythema/edema/induration, infusion functioning appropriately    Lab Results:   Results from last 7 days   Lab Units 07/24/22  0536   WBC Thousand/uL 7 48   HEMOGLOBIN g/dL 11 4*   HEMATOCRIT % 33 7*   PLATELETS Thousands/uL 191      Results from last 7 days   Lab Units 07/24/22  0536   POTASSIUM mmol/L 3 4*   CHLORIDE mmol/L 105   CO2 mmol/L 28   BUN mg/dL 6   CREATININE mg/dL 0 79   CALCIUM mg/dL 8 7          Imaging Studies: I have personally reviewed pertinent reports  EKG, Pathology, and Other Studies: I have personally reviewed pertinent reports  Counseling / Coordination of Care  Total floor / unit time spent today 20 minutes  Greater than 50% of total time was spent with the patient and / or family counseling and / or coordination of care  Please note that the APS provides consultative services regarding pain management only    With the exception of ketamine, peripheral nerve catheters, and epidural infusions (and except when indicated), final decisions regarding starting or changing doses of analgesic medications are at the discretion of the consulting service  Off hours consultation and/or medication management is generally not available      Margie Ferrell MD  Acute Pain Service None

## 2022-07-24 NOTE — PROGRESS NOTES
Progress Note - Surgical Oncology   Hernandez Bulls 49 y o  male MRN: [id number]  [facility] Encounter: [id number]    Assessment:  49 M p/w mesenteric and hepatic masses s/p laparotomy, small bowel resection, and liver lesion excision on 7/21/22  VS: AVSS, room air     UOP: unmeasured voids    AM Labs: pending     Plan:  -continue soft diet as tolerated  -pain control  -out of bed, ambulate  -pulmonary toilet  -DVT PPx  -disposition planning     Subjective/Objective       Subjective: pain well-controlled, tolerating diet, passing flatus but no BM, and has been ambulating out of bed     Objective:     Blood pressure 155/100, pulse 87, temperature 98 4 °F (36 9 °C), resp  rate 20, height 6' 1" (1 854 m), weight 97 5 kg (215 lb), SpO2 98 %  ,Body mass index is 28 37 kg/m²        Intake/Output Summary (Last 24 hours) at 7/25/2022 0610  Last data filed at 7/24/2022 2125  Gross per 24 hour   Intake 1456 6 ml   Output 0 ml   Net 1456 6 ml       Invasive Devices  Report    Peripheral Intravenous Line  Duration           Peripheral IV 07/22/22 Distal;Left;Upper;Ventral (anterior) Arm 2 days          Epidural Line  Duration           Epidural Catheter 07/21/22 3 days          Drain  Duration           Ureteral Drain/Stent Right ureter 6 Fr  291 days                Physical Exam:     General: NAD  HEENT: normocephalic, atraumatic   Cardiovascular: RRR  Respiratory: no distress, room air   Abdomen: soft, minimally tender, incision c/d/i, non-distended   Extremities: no c/c/e   Neuro: AAOx3  Skin: warm, dry         Lab, Imaging and other studies:pending   VTE Pharmacologic Prophylaxis: Heparin  VTE Mechanical Prophylaxis: sequential compression device

## 2022-07-24 NOTE — PLAN OF CARE
Problem: Prexisting or High Potential for Compromised Skin Integrity  Goal: Skin integrity is maintained or improved  Description: INTERVENTIONS:  - Identify patients at risk for skin breakdown  - Assess and monitor skin integrity  - Assess and monitor nutrition and hydration status  - Monitor labs   - Assess for incontinence   - Turn and reposition patient  - Assist with mobility/ambulation  - Relieve pressure over bony prominences  - Avoid friction and shearing  - Provide appropriate hygiene as needed including keeping skin clean and dry  - Evaluate need for skin moisturizer/barrier cream  - Collaborate with interdisciplinary team   - Patient/family teaching  - Consider wound care consult   Outcome: Progressing     Problem: Potential for Falls  Goal: Patient will remain free of falls  Description: INTERVENTIONS:  - Educate patient/family on patient safety including physical limitations  - Instruct patient to call for assistance with activity   - Consult OT/PT to assist with strengthening/mobility   - Keep Call bell within reach  - Keep bed low and locked with side rails adjusted as appropriate  - Keep care items and personal belongings within reach  - Initiate and maintain comfort rounds  - Make Fall Risk Sign visible to staff  - Offer Toileting in advance of need  - Apply yellow socks and bracelet for high fall risk patients  - Consider moving patient to room near nurses station  Outcome: Progressing

## 2022-07-24 NOTE — RESTORATIVE TECHNICIAN NOTE
Restorative Technician Note      Patient Name: Lala Hoffmann     Restorative Tech Visit Date: 7/24/2022  Note Type: Mobility  Patient Position Upon Consult: Supine  Activity Performed: Ambulated  Assistive Device: Other (Comment) (none)  Patient Position at End of Consult: Bedside chair;  [facility]

## 2022-07-25 VITALS
SYSTOLIC BLOOD PRESSURE: 132 MMHG | RESPIRATION RATE: 18 BRPM | WEIGHT: 215 LBS | HEART RATE: 68 BPM | HEIGHT: 73 IN | DIASTOLIC BLOOD PRESSURE: 80 MMHG | OXYGEN SATURATION: 96 % | TEMPERATURE: 97.8 F | BODY MASS INDEX: 28.49 KG/M2

## 2022-07-25 LAB
ANION GAP SERPL CALCULATED.3IONS-SCNC: 5 MMOL/L (ref 4–13)
BASOPHILS # BLD AUTO: 0.01 THOUSANDS/ΜL (ref 0–0.1)
BASOPHILS NFR BLD AUTO: 0 % (ref 0–1)
BUN SERPL-MCNC: 7 MG/DL (ref 5–25)
CALCIUM SERPL-MCNC: 9.1 MG/DL (ref 8.3–10.1)
CHLORIDE SERPL-SCNC: 104 MMOL/L (ref 96–108)
CO2 SERPL-SCNC: 28 MMOL/L (ref 21–32)
CREAT SERPL-MCNC: 0.81 MG/DL (ref 0.6–1.3)
EOSINOPHIL # BLD AUTO: 0.17 THOUSAND/ΜL (ref 0–0.61)
EOSINOPHIL NFR BLD AUTO: 3 % (ref 0–6)
ERYTHROCYTE [DISTWIDTH] IN BLOOD BY AUTOMATED COUNT: 14.4 % (ref 11.6–15.1)
GFR SERPL CREATININE-BSD FRML MDRD: 105 ML/MIN/1.73SQ M
GLUCOSE SERPL-MCNC: 94 MG/DL (ref 65–140)
HCT VFR BLD AUTO: 33.6 % (ref 36.5–49.3)
HGB BLD-MCNC: 11.5 G/DL (ref 12–17)
IMM GRANULOCYTES # BLD AUTO: 0.01 THOUSAND/UL (ref 0–0.2)
IMM GRANULOCYTES NFR BLD AUTO: 0 % (ref 0–2)
LYMPHOCYTES # BLD AUTO: 1.22 THOUSANDS/ΜL (ref 0.6–4.47)
LYMPHOCYTES NFR BLD AUTO: 21 % (ref 14–44)
MCH RBC QN AUTO: 29.6 PG (ref 26.8–34.3)
MCHC RBC AUTO-ENTMCNC: 34.2 G/DL (ref 31.4–37.4)
MCV RBC AUTO: 87 FL (ref 82–98)
MONOCYTES # BLD AUTO: 0.44 THOUSAND/ΜL (ref 0.17–1.22)
MONOCYTES NFR BLD AUTO: 8 % (ref 4–12)
NEUTROPHILS # BLD AUTO: 3.93 THOUSANDS/ΜL (ref 1.85–7.62)
NEUTS SEG NFR BLD AUTO: 68 % (ref 43–75)
NRBC BLD AUTO-RTO: 0 /100 WBCS
PLATELET # BLD AUTO: 190 THOUSANDS/UL (ref 149–390)
PMV BLD AUTO: 10.1 FL (ref 8.9–12.7)
POTASSIUM SERPL-SCNC: 3.8 MMOL/L (ref 3.5–5.3)
RBC # BLD AUTO: 3.88 MILLION/UL (ref 3.88–5.62)
SODIUM SERPL-SCNC: 137 MMOL/L (ref 135–147)
WBC # BLD AUTO: 5.78 THOUSAND/UL (ref 4.31–10.16)

## 2022-07-25 PROCEDURE — 80048 BASIC METABOLIC PNL TOTAL CA: CPT | Performed by: SURGERY

## 2022-07-25 PROCEDURE — NC001 PR NO CHARGE: Performed by: PHYSICIAN ASSISTANT

## 2022-07-25 PROCEDURE — 85025 COMPLETE CBC W/AUTO DIFF WBC: CPT | Performed by: SURGERY

## 2022-07-25 PROCEDURE — NC001 PR NO CHARGE: Performed by: STUDENT IN AN ORGANIZED HEALTH CARE EDUCATION/TRAINING PROGRAM

## 2022-07-25 PROCEDURE — 99232 SBSQ HOSP IP/OBS MODERATE 35: CPT | Performed by: SURGERY

## 2022-07-25 PROCEDURE — 99232 SBSQ HOSP IP/OBS MODERATE 35: CPT

## 2022-07-25 RX ORDER — POTASSIUM CHLORIDE 20 MEQ/1
40 TABLET, EXTENDED RELEASE ORAL ONCE
Status: COMPLETED | OUTPATIENT
Start: 2022-07-25 | End: 2022-07-25

## 2022-07-25 RX ORDER — ACETAMINOPHEN 325 MG/1
975 TABLET ORAL EVERY 8 HOURS SCHEDULED
Qty: 63 TABLET | Refills: 0 | Status: SHIPPED | OUTPATIENT
Start: 2022-07-25 | End: 2022-08-01

## 2022-07-25 RX ORDER — OXYCODONE HYDROCHLORIDE 5 MG/1
5 TABLET ORAL EVERY 6 HOURS PRN
Qty: 16 TABLET | Refills: 0 | Status: SHIPPED | OUTPATIENT
Start: 2022-07-25 | End: 2022-07-29

## 2022-07-25 RX ORDER — ENOXAPARIN SODIUM 100 MG/ML
40 INJECTION SUBCUTANEOUS DAILY
Qty: 9.6 ML | Refills: 0 | Status: SHIPPED | OUTPATIENT
Start: 2022-07-25 | End: 2022-08-18

## 2022-07-25 RX ADMIN — ACETAMINOPHEN 975 MG: 325 TABLET ORAL at 13:15

## 2022-07-25 RX ADMIN — POTASSIUM CHLORIDE 40 MEQ: 1500 TABLET, EXTENDED RELEASE ORAL at 09:40

## 2022-07-25 RX ADMIN — HEPARIN SODIUM 5000 UNITS: 5000 INJECTION INTRAVENOUS; SUBCUTANEOUS at 05:24

## 2022-07-25 RX ADMIN — OXYCODONE HYDROCHLORIDE 10 MG: 10 TABLET ORAL at 11:43

## 2022-07-25 RX ADMIN — HEPARIN SODIUM 5000 UNITS: 5000 INJECTION INTRAVENOUS; SUBCUTANEOUS at 13:16

## 2022-07-25 RX ADMIN — POLYETHYLENE GLYCOL 3350 17 G: 17 POWDER, FOR SOLUTION ORAL at 09:40

## 2022-07-25 RX ADMIN — ACETAMINOPHEN 975 MG: 325 TABLET ORAL at 05:24

## 2022-07-25 RX ADMIN — OXYCODONE HYDROCHLORIDE 10 MG: 10 TABLET ORAL at 05:25

## 2022-07-25 NOTE — RESTORATIVE TECHNICIAN NOTE
Restorative Technician Note      Patient Name: Gerry Matias     Restorative Tech Visit Date: 7/25/2022  Note Type: Mobility  Patient Position Upon Consult: Bedside chair  Activity Performed: Ambulated  Assistive Device: Other (Comment) (none)  Patient Position at End of Consult: Bedside chair;  All needs within [facility]

## 2022-07-25 NOTE — DISCHARGE INSTRUCTIONS
DISCHARGE INSTRUCTIONS    Follow Up: Follow up with Dr David Aponte on 8/19 at 3:45PM  Follow up with Dr Rosaura Montes on 8/4 at [facility]: You may resume a regular diet    Pain: Oxycodone 5mg every 6 hours as needed for moderate/severe pain  Tylenol for mild pain control  Shower: You may shower over the wound  Do not bathe or use a pool or hot tub until cleared by the physician  Activity: As tolerated  You may go up and down stairs, walk as much as you are comfortable, but walk at least 3 times each day  Do not lift anything heavier than 15 pounds for at least 2-4 weeks, unless cleared by the doctor  Driving: Do not drive or make any important decisions while on narcotic pain medication  Generally, you may drive when your off all narcotic pain medications  Medications: Resume all of your previous medications, unless told otherwise by the doctor  You do not need to take the narcotic pain medications unless you are having significant pain and discomfort  Call the office: If you are experiencing any of the following, fevers above 101 5° or chills, significant nausea or vomiting, increase in abdominal pain, if the wound develops drainage and/or is excessive redness around the wound, or if you have significant diarrhea or other worsening symptoms

## 2022-07-25 NOTE — DISCHARGE SUMMARY
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]  Neuroendocrine tumor [D3A 8]    Discharge Diagnosis: NET s/p ex lap, SB resection, resection of liver lesion    Attending and Service: Dr Javon Mckenzie, Surgical Oncology Services  Consulting Physician(s): DIONTE    Imaging and Procedures Performed:   7/21 Ex-lap, small bowel resection, resection of liver lesion (likely metastasis)- Jane/Nila    Pathology: Pending    Hospital Course: Marcus Tovar is a 50-year-old female with PMHx of HTN and previous hernia repair w/ mesh presented for elective surgical intervention as listed above  Patient underwent surgical intervention without complications  Post-op he was transitioned to the medical surgical floor with an NGT in place, NPO, IVF running, PRN anti-emetics and PCEA, ISS, and DVT prophylaxis  CIWA protocol was started on POD1 for symptoms of withdrawal  His diet was slowly advanced as tolerated  His epidural was removed on POD3 and he was transitioned to PO regimen  On POD4, patient was cleared for dc from a surgical perspective  He was OOB and ambulating, his pain remained controlled on PO regimen, he was tolerating diet without N/V, and he was utilizing his ISS  All discharge instructions as well as post operative appointments were discussed with the patient  Patient was in agreement with plan  All questions answered  On discharge, the patient is instructed to follow-up with the patient's primary care provider within the next 2 weeks to review the events of the recent hospitalization  The patient is instructed to follow the provided discharge instructions       Condition at Discharge: good     Discharge instructions/Information to patient and family:   See after visit summary for information provided to patient and family  Provisions for Follow-Up Care:  See after visit summary for information related to follow-up care and any pertinent home health orders  Disposition: Home w/ walker    Planned Readmission: No    Discharge Statement   I spent 30 minutes discharging the patient  This time was spent on the day of discharge  I had direct contact with the patient on the day of discharge  Additional documentation is required if more than 30 minutes were spent on discharge  Discharge Medications:  See after visit summary for reconciled discharge medications provided to patient and family      Rj Carlson PA-C  7/25/2022  9:37 AM

## 2022-07-25 NOTE — CASE MANAGEMENT
Case Management Discharge Planning Note    Patient name Vaughn Pabon  [facility]N [id number]  : 1975 Date 2022       Current Admission Date: 2022  Current Admission Diagnosis:Calcified mesenteric mass   Patient Active Problem List    Diagnosis Date Noted    Neuroendocrine tumor 2022    Hydronephrosis with urinary obstruction due to ureteral calculus 10/04/2021    Calcified mesenteric mass 10/04/2021    MEHDI (acute kidney injury) ([address] ) 10/04/2021    Essential hypertension 10/04/2021    Diastasis recti 2019      LOS (days): 4  Geometric Mean LOS (GMLOS) (days):   Days to GMLOS:     OBJECTIVE:  Risk of Unplanned Readmission Score: 12 08         Current admission status: Inpatient   Preferred Pharmacy:   [address] [place] [address]  Phone: [phone] Fax: [phone]    Primary Care Provider: Clelia Homans, DO    Primary Insurance: [address] [place] [address]  Secondary Insurance:     DISCHARGE DETAILS:      DME Referral Provided  Referral made for DME?: Yes  DME referral completed for the following items[de-identified] Dianne Camara  DME Supplier Name[de-identified] [employer]              Treatment Team Recommendation: Home  Discharge Destination Plan[de-identified] Home         Additional Comments: CM was made aware that pt is cleared for d/c  CM ordered RW as requested

## 2022-07-25 NOTE — DISCHARGE INSTR - AVS FIRST PAGE
DISCHARGE INSTRUCTIONS    Follow Up: Follow up with Dr Edson Kaur on 8/19 at 3:45PM  Follow up with Dr Kali Christensen on 8/4 at [facility]: You may resume a regular diet    Pain: Oxycodone 5mg every 6 hours as needed for moderate/severe pain  Tylenol for mild pain control  Shower: You may shower over the wound  Do not bathe or use a pool or hot tub until cleared by the physician  Activity: As tolerated  You may go up and down stairs, walk as much as you are comfortable, but walk at least 3 times each day  Do not lift anything heavier than 15 pounds for at least 2-4 weeks, unless cleared by the doctor  Driving: Do not drive or make any important decisions while on narcotic pain medication  Generally, you may drive when your off all narcotic pain medications  Medications: Resume all of your previous medications, unless told otherwise by the doctor  You do not need to take the narcotic pain medications unless you are having significant pain and discomfort  Call the office: If you are experiencing any of the following, fevers above 101 5° or chills, significant nausea or vomiting, increase in abdominal pain, if the wound develops drainage and/or is excessive redness around the wound, or if you have significant diarrhea or other worsening symptoms

## 2022-07-25 NOTE — RESTORATIVE TECHNICIAN NOTE
Restorative Technician Note      Patient Name: Hemanth Gibbons     Restorative Tech Visit Date: 7/25/2022  Note Type: Mobility  Patient Position Upon Consult: Supine  Activity Performed: Ambulated  Assistive Device: Roller walker  Patient Position at End of Consult: Bedside chair;  All needs within [facility]

## 2022-07-25 NOTE — PROGRESS NOTES
Progress Note - Acute Pain Service    Benny Garcia 49 y o  male MRN: [id number]  Unit/Bed#: [facility] 923-01 Encounter: [id number]      Assessment:   Principal Problem:    Calcified mesenteric mass    Benny Garcia is a 49 y o  male  with past medical history of hypertension who initially presented with worsening abdominal pain secondary to small-bowel neuroendocrine tumor and mesenteric disease  He is s/p ex-lap, lysis of adhesions, small bowel resection, and liver biopsy on 07/21/22  A pre-operative thoracic epidural was placed for post-operative analgesia  APS was consulted for post-operative pain/epidural management  Epidural was removed on 7/24 and patient is currently sitting up in chair reporting adequate pain control  He is utilizing minimal breakthrough IV opioids and is cleared for discharge from a pain management standpoint  Discharge recommendations are as follows  Plan:   Patient may be discharged on a short course of oxycodone  - Oxycodone 5 mg Q4hrs PRN for moderate pain  - Oxycodone 10 mg Q4hrs PRN for severe pain    - Continue Tylenol 975 mg Q8hrs Scheduled      Bowel Regimen:  Per surgical team    Treatment plan was discussed with bedside nursing as well as primary care team     APS will sign off at this time  Thank you for the consult  All opioids and other analgesics to be written at discretion of primary team  Please contact Acute Pain Service - [facility] via [employer] from [phone] with additional questions or concerns  See Kelly or Ananya for additional contacts and after hours information  Pain History  Current pain location(s): Abdomen  Pain Scale:   5  Quality: Aching/ Dull  24 hour history:  Patient is sitting up in chair at time of examination  He slept well overnight and his pain is minimal on PO regimen  He is hoping to be discharged this afternoon  He is tolerating his diet with no complaints of nausea/vomiting, constipation/diarrhea        Opioid requirement previous 24 hours:   Oxycodone 25 mg  IV Dilaudid 0 5 mg    Meds/Allergies   all current active meds have been reviewed and current meds:   Current Facility-Administered Medications   Medication Dose Route Frequency    acetaminophen (TYLENOL) tablet 975 mg  975 mg Oral Q8H [facility]    chlorhexidine (HIBICLENS) 4 % topical liquid   Topical Daily PRN    diphenhydrAMINE (BENADRYL) injection 25 mg  25 mg Intravenous Q6H PRN    heparin (porcine) subcutaneous injection 5,000 Units  5,000 Units Subcutaneous Q8H [facility]    HYDROmorphone (DILAUDID) injection 0 5 mg  0 5 mg Intravenous Q2H PRN    labetalol (NORMODYNE) injection 10 mg  10 mg Intravenous Q6H PRN    metoclopramide (REGLAN) injection 5 mg  5 mg Intravenous Q6H PRN    ondansetron (ZOFRAN) injection 4 mg  4 mg Intravenous Q4H PRN    oxyCODONE (ROXICODONE) immediate release tablet 10 mg  10 mg Oral Q4H PRN    oxyCODONE (ROXICODONE) IR tablet 5 mg  5 mg Oral Q4H PRN    polyethylene glycol (MIRALAX) packet 17 g  17 g Oral Daily       Allergies   Allergen Reactions    Tomato - Food Allergy Anaphylaxis     raw    Poison Ivy Extract Hives    Poison Sumac Extract Hives       Objective     Temp:  [97 8 °F (36 6 °C)-98 4 °F (36 9 °C)] 97 8 °F (36 6 °C)  HR:  [68-87] 68  Resp:  [18-20] 18  BP: (132-155)/() 132/80    Physical Exam  Vitals reviewed  Constitutional:       General: He is not in acute distress  Appearance: Normal appearance  He is not ill-appearing or toxic-appearing  HENT:      Head: Normocephalic and atraumatic  Nose: Nose normal  No congestion or rhinorrhea  Mouth/Throat:      Mouth: Mucous membranes are moist    Eyes:      Extraocular Movements: Extraocular movements intact  Cardiovascular:      Rate and Rhythm: Normal rate  Pulmonary:      Effort: Pulmonary effort is normal  No tachypnea, bradypnea or respiratory distress  Breath sounds: No wheezing, rhonchi or rales     Abdominal:      General: Bowel sounds are normal  Palpations: Abdomen is soft  Tenderness: There is abdominal tenderness  Musculoskeletal:         General: No swelling  Normal range of motion  Cervical back: Normal range of motion  Right lower leg: No edema  Left lower leg: No edema  Skin:     General: Skin is warm and dry  Coloration: Skin is not pale  Findings: No rash  Neurological:      Mental Status: He is alert and oriented to person, place, and time  Mental status is at baseline  Sensory: Sensation is intact  No sensory deficit  Motor: Motor function is intact  No weakness or tremor  Psychiatric:         Mood and Affect: Mood normal          Speech: Speech normal          Behavior: Behavior normal  Behavior is cooperative  Lab Results:   Results from last 7 days   Lab Units 07/25/22  0532   WBC Thousand/uL 5 78   HEMOGLOBIN g/dL 11 5*   HEMATOCRIT % 33 6*   PLATELETS Thousands/uL 190      Results from last 7 days   Lab Units 07/25/22  0532   POTASSIUM mmol/L 3 8   CHLORIDE mmol/L 104   CO2 mmol/L 28   BUN mg/dL 7   CREATININE mg/dL 0 81   CALCIUM mg/dL 9 1       Imaging Studies: I have personally reviewed pertinent reports  Please note that the APS provides consultative services regarding pain management only  With the exception of ketamine and epidural infusions and except when indicated, final decisions regarding starting or changing doses of analgesic medications are at the discretion of the consulting service  Off hours consultation and/or medication management is generally not available      PIERRE Lopez  Acute Pain Service

## 2022-07-26 NOTE — UTILIZATION REVIEW
Notification of Discharge   This is a Notification of Discharge from our facility [address]  Please be advised that this patient has been discharge from our facility  Below you will find the admission and discharge date and time including the patients disposition  UTILIZATION REVIEW CONTACT:  Mana Chacon  Utilization   Network Utilization Review Department  Phone: [phone] x carefully listen to the prompts  All voicemails are confidential   Email: [email or web address]     PHYSICIAN ADVISORY SERVICES:  FOR [id number] REVIEW - MEDICAL NECESSITY DENIAL  Phone: [phone]  Fax: [phone]  Email: [email or web address]     PRESENTATION DATE: 7/21/2022  8:50 AM  OBERVATION ADMISSION DATE:   INPATIENT ADMISSION DATE: 7/21/22  3:28 PM   DISCHARGE DATE: 7/25/2022  3:20 PM  DISPOSITION: Home/Self Care Home/Self Care      IMPORTANT INFORMATION:  Send all requests for admission clinical reviews, approved or denied determinations and any other requests to dedicated fax number below belonging to the campus where the patient is receiving treatment   List of dedicated fax numbers:  [address] DENIALS (Administrative/Medical Necessity) [phone]   1000  [facility] (Maternity/NICU/Pediatrics) [phone]   Lito Zaidi [phone]   Nathaniel Vazquez [phone]   [address] [place] [address]  [phone]   [address] [phone]   [facility]  [phone]   [address] [place] [address], [place] [address] [address] [phone]

## 2022-08-04 ENCOUNTER — TELEPHONE (OUTPATIENT)
Dept: HEMATOLOGY ONCOLOGY | Facility: CLINIC | Age: 47
End: 2022-08-04

## 2022-08-04 ENCOUNTER — OFFICE VISIT (OUTPATIENT)
Dept: SURGERY | Facility: CLINIC | Age: 47
End: 2022-08-04

## 2022-08-04 VITALS
TEMPERATURE: 97.6 F | HEART RATE: 112 BPM | WEIGHT: 202.6 LBS | HEIGHT: 73 IN | OXYGEN SATURATION: 99 % | RESPIRATION RATE: 16 BRPM | DIASTOLIC BLOOD PRESSURE: 100 MMHG | BODY MASS INDEX: 26.85 KG/M2 | SYSTOLIC BLOOD PRESSURE: 160 MMHG

## 2022-08-04 DIAGNOSIS — C7B.8 METASTATIC MALIGNANT NEUROENDOCRINE TUMOR TO LIVER (HCC): ICD-10-CM

## 2022-08-04 DIAGNOSIS — K66.8 CALCIFIED MESENTERIC MASS: ICD-10-CM

## 2022-08-04 DIAGNOSIS — C7A.8 NEUROENDOCRINE CARCINOMA OF SMALL BOWEL (HCC): Primary | ICD-10-CM

## 2022-08-04 PROCEDURE — 99024 POSTOP FOLLOW-UP VISIT: CPT | Performed by: SURGERY

## 2022-08-04 RX ORDER — HYDROCODONE BITARTRATE AND ACETAMINOPHEN 5; 325 MG/1; MG/1
TABLET ORAL
COMMUNITY
Start: 2022-05-30 | End: 2022-08-05

## 2022-08-05 ENCOUNTER — TELEPHONE (OUTPATIENT)
Dept: HEMATOLOGY ONCOLOGY | Facility: CLINIC | Age: 47
End: 2022-08-05

## 2022-08-05 PROBLEM — C7B.8 METASTATIC MALIGNANT NEUROENDOCRINE TUMOR TO LIVER (HCC): Status: ACTIVE | Noted: 2022-08-05

## 2022-08-05 PROBLEM — C7A.8 NEUROENDOCRINE CARCINOMA OF SMALL BOWEL (HCC): Status: ACTIVE | Noted: 2022-08-05

## 2022-08-05 NOTE — PROGRESS NOTES
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 that was unresectable based on extension to the root of the SMA  Grade 1 disease with Ki67 1 7%  Recovering well, staples removed today  Some staple line erythema but no signs of infection  Patient avoiding heavy lifting and straining, tolerating a diet, moving his bowels normally  Discussed his pathology at length and gave him a copy of the report  Referral placed to medical oncology and message sent to Dr Steven Sandoval and Neva Weeks regarding his case  We had previously discussed lanreotide preoperatively as well as lutathera  They will see him and finalize a plan  We will work together to make a surveillance plan as well  We will see him back here in about 3 months for a clinical visit or sooner if they are any issues with his recovery  If he has persistent voice concerns over time, he will let us know and we will refer him to ENT  His symptoms are mild and improving and I sense related to intubation/NGT  He will let us know if they persist     Subjective      Chief Complaint   Patient presents with    Post-op     Doing well, no fevers or chills, no abdominal pain, not needing pain medication  Eating well, no nausea/vomiting  Passing gas and stool, voiding normally  Some subtle hoarseness to his voice, will continue to observe this  Avoiding heavy lifting and straining  Pathology resulted and reviewed in detail  Review of Systems   Constitutional: Positive for activity change  Negative for appetite change, fatigue, fever and unexpected weight change  Gastrointestinal: Negative for abdominal distention, abdominal pain, constipation, diarrhea, nausea and vomiting  Skin: Positive for wound  Mild staple erythema   Hematological: Does not bruise/bleed easily  All other systems reviewed and are negative  The following portions of the patient's history were reviewed and updated as appropriate: allergies, current medications, past family history, past medical history, past social history, past surgical history and problem list     Objective      Blood pressure 160/100, pulse (!) 112, temperature 97 6 °F (36 4 °C), temperature source Temporal, resp  rate 16, height 6' 1" (1 854 m), weight 91 9 kg (202 lb 9 6 oz), SpO2 99 %  Physical Exam  Vitals reviewed  Constitutional:       General: He is not in acute distress  HENT:      Head: Normocephalic and atraumatic  Mouth/Throat:      Mouth: Mucous membranes are moist    Eyes:      Conjunctiva/sclera: Conjunctivae normal       Pupils: Pupils are equal, round, and reactive to light  Cardiovascular:      Rate and Rhythm: Tachycardia present  Pulmonary:      Effort: Pulmonary effort is normal  No respiratory distress  Abdominal:      General: Abdomen is flat  There is no distension  Palpations: Abdomen is soft  Tenderness: There is no abdominal tenderness  There is no guarding or rebound  Hernia: No hernia is present  Comments: Incision with staples, staple erythema, no signs of infection, wound healing well, staples removed without incident, wound cleansed and dry dressing placed   Skin:     General: Skin is warm and dry  Neurological:      General: No focal deficit present  Mental Status: He is alert and oriented to person, place, and time  Signature:   Mariaelena Clark MD  Date: 8/5/2022 Time: 7:31 AM

## 2022-08-08 ENCOUNTER — TELEPHONE (OUTPATIENT)
Dept: HEMATOLOGY ONCOLOGY | Facility: CLINIC | Age: 47
End: 2022-08-08

## 2022-08-08 ENCOUNTER — TELEPHONE (OUTPATIENT)
Dept: SURGICAL ONCOLOGY | Facility: CLINIC | Age: 47
End: 2022-08-08

## 2022-08-08 NOTE — TELEPHONE ENCOUNTER
Made a 3rd attempt to schedule a new patient appointment, a voicemail was left, the referral has been closed and a letter has been sent to the patient  Referring provider has been notified

## 2022-08-24 ENCOUNTER — CONSULT (OUTPATIENT)
Dept: HEMATOLOGY ONCOLOGY | Facility: CLINIC | Age: 47
End: 2022-08-24
Payer: COMMERCIAL

## 2022-08-24 ENCOUNTER — DOCUMENTATION (OUTPATIENT)
Dept: HEMATOLOGY ONCOLOGY | Facility: CLINIC | Age: 47
End: 2022-08-24

## 2022-08-24 ENCOUNTER — TELEPHONE (OUTPATIENT)
Dept: HEMATOLOGY ONCOLOGY | Facility: CLINIC | Age: 47
End: 2022-08-24

## 2022-08-24 VITALS
DIASTOLIC BLOOD PRESSURE: 80 MMHG | SYSTOLIC BLOOD PRESSURE: 142 MMHG | TEMPERATURE: 97.9 F | HEIGHT: 73 IN | BODY MASS INDEX: 27.04 KG/M2 | HEART RATE: 113 BPM | OXYGEN SATURATION: 97 % | WEIGHT: 204 LBS | RESPIRATION RATE: 18 BRPM

## 2022-08-24 DIAGNOSIS — D3A.8 NEUROENDOCRINE TUMOR: Primary | ICD-10-CM

## 2022-08-24 DIAGNOSIS — C7A.8 NEUROENDOCRINE CARCINOMA OF SMALL BOWEL (HCC): ICD-10-CM

## 2022-08-24 DIAGNOSIS — C7B.8 METASTATIC MALIGNANT NEUROENDOCRINE TUMOR TO LIVER (HCC): ICD-10-CM

## 2022-08-24 DIAGNOSIS — K66.8 CALCIFIED MESENTERIC MASS: ICD-10-CM

## 2022-08-24 PROCEDURE — 99245 OFF/OP CONSLTJ NEW/EST HI 55: CPT | Performed by: INTERNAL MEDICINE

## 2022-08-24 RX ORDER — LANREOTIDE ACETATE 120 MG/.5ML
120 INJECTION SUBCUTANEOUS ONCE
Status: CANCELLED | OUTPATIENT
Start: 2022-08-29

## 2022-08-24 NOTE — PROGRESS NOTES
Teaching provided to pt regarding Lanreotide 120 mg SQ Q 4 weeks  Pt provided with phone contact list, information on drug and diarrhea management sheet   Pt was scheduled to start at [address] [facility] [address] infusion Chemo and Blood consents were signed

## 2022-08-24 NOTE — PROGRESS NOTES
Hematology/Oncology Outpatient Follow-up  Patric Duran 49 y o  male 1975 [id number]    Date:  8/24/2022        Assessment and Plan:  1  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 which was partial resected  He seems to have bulky disease involving the root of the SMA  The patient does not seem to have significant GI symptoms at this point  He denied diarrhea or flushing  His abdominal pain seems to be intermittent  He was educated about somatostatin analogs, like lanreotide which will be given on every 4 week basis at a dose of 120 mg subcutaneously  We went through the benefit of lanreotide treatment which usually controls symptoms related to hormone secretion associated with well-differentiated small bowel neuroendocrine tumor  Lanreotide has also been shown to slow down the progression by controlling the tumor growth  He was told that we will pursue imaging every 3-4 months on regular basis to monitor the progression of his disease  Peptide receptor radiotherapy like Lutetium Christine-177 dotatate would be used in case of obvious progression of his well-differentiated neuroendocrine tumor  He was educated about the potential side effects from lanreotide extensively  We did discuss referring the patient to the palliative care team for pain management  The patient stated that this is not necessary at this point in time  He was found to have diffuse prostatic activity on the recent DOTATATE PET scan she does not seem to be specific  We will check his PSA level prior to his next visit  He also seems to have mild anemia which is most likely related to the recent surgery  We will check his iron panel prior to his next visit as well      He seems to have stable 8 mm nodule in the right mid lung which will be monitored on the subsequent imaging     - CBC and differential; Future  - Comprehensive metabolic panel; Future  - Iron Panel (Includes Ferritin, Iron Sat%, Iron, and TIBC); Future  - Ferritin; Future  - LD,Blood; Future  - C-reactive protein; Future  - Sedimentation rate, automated; Future  - Chromogranin A  - 5 HIAA, urine, quantitative, 24 hour; Future    2  Calcified mesenteric mass      3  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 [facility])          HPI:  This is a 70-year-old male with history of bipolar disorder, schizophrenic personality, hypertension, etc   The patient apparently was admitted the hospital around October of 2021 for symptomatic left ureteral calculus  Incidentally he was noted to have mesenteric masses with calcification with cefepime on the CTA from 10/02/2021  Another CT scan of the abdomen pelvis with contrast was done on 02/01/2022 which showed no significant change in the partially calcified mesenteric masses with mesenteric tethering concerning for neuroendocrine tumor  He had a DOTATATE PET-CT scan on 05/04/2022 which showed:  IMPRESSION:     1  Dotatate avid clustered central mesenteric masses suspicious for underlying neuroendocrine neoplasm  2   There does appear to be subjacent focal radiotracer uptake in a proximal small bowel loop for which underlying small bowel lesion should be excluded  Consider further evaluation with CT enterography or small bowel pill study  3   A few low-level foci of radiotracer uptake in the bilateral scapula, non-specific  Activity is lower than expected for neuroendocrine metastasis given the intensity of the mesenteric foci, however early metastasis is not entirely excluded  Consider   further evaluation with MRI of these regions vs reassessment on follow up Dotatate PET  4   Diffuse prostatic activity, non-specific, question inflammatory  Correlate with PSA levels and urologic evaluation as warranted  5    Stable 8 mm nodule in the right mid lung  No suspicious radiotracer uptake here    Given the stability since 10/2/2021 this can be followed up with chest CT in 12 months  Chromogranin serum level on 10/06/2021 was 134 5  On 06/24/2022 was 164  The patient then underwent small bowel resection on 07/21/2022 at the area where he was thought to be high-risk for obstruction  The pathology revealed well-differentiated neuroendocrine tumor, G1 come multifocal, the tumor invaded through the muscularis propria into the taye colorectal tissue  He also had liver lesion biopsy at segment 5 which also was compatible with metastatic well-differentiated neuroendocrine tumor consistent with intestinal primary  Ki 67 was 1 7%  The final pathology was pT3(m)rKcpH3w multifocal well differentiated small bowel neuroendocrine tumor with known extensive mesenteric galo involvement that was unresectable based on extension to the root of the SMA  The patient recovered nicely from his recent surgery  Interval history:  He stated that he had mild trauma to his abdomen just recently which cause some pain  The wound healing seems to be normal   Blood work on 07/25/2022 showed hemoglobin of 11 5 with normal white cells and platelets  Creatinine 0 8 with normal calcium  He denied diarrhea or flushing symptoms  ROS: Review of Systems   Constitutional: Positive for appetite change  Negative for chills and fever  HENT: Positive for mouth sores  Negative for ear pain and sore throat  Eyes: Negative for pain and visual disturbance  Respiratory: Positive for cough and shortness of breath  Cardiovascular: Negative for chest pain and palpitations  Gastrointestinal: Positive for abdominal pain  Negative for vomiting  Genitourinary: Negative for dysuria and hematuria  Musculoskeletal: Negative for arthralgias and back pain  Skin: Negative for color change and rash  Neurological: Negative for seizures and syncope  Psychiatric/Behavioral: Positive for sleep disturbance     All other systems reviewed and are negative  Past Medical History:   Diagnosis Date    Allergic     [facility]     Erectile dysfunction     unspecified erectile dysfunction type    Hypertension     Kidney stone        Past Surgical History:   Procedure Laterality Date    COLONOSCOPY      EXPLORATORY LAPAROTOMY W/ BOWEL RESECTION N/A 2022    Procedure: LAPAROTOMY EXPLORATORY W/ SMALL BOWEL RESECTION, liver biopsy;  Surgeon: Edmundo Graff MD;  Location:  MAIN OR;  Service: General    FL RETROGRADE PYELOGRAM  10/06/2021    HERNIA REPAIR      NV CYSTOURETHROSCOPY,URETER CATHETER Right 10/06/2021    Procedure: CYSTOSCOPY RETROGRADE PYELOGRAM WITH INSERTION STENT URETERAL, RIGHT URETEROSCOPY, STONE EXTRACTION;  Surgeon: Charles Scott MD;  Location: [facility] MAIN OR;  Service: Urology       Social History     Socioeconomic History    Marital status: Single     Spouse name: None    Number of children: None    Years of education: None    Highest education level: None   Occupational History    None   Tobacco Use    Smoking status: Former Smoker     Types: Pipe, Cigarettes     Quit date: 2003     Years since quittin 7    Smokeless tobacco: Never Used   Vaping Use    Vaping Use: Never used   Substance and Sexual Activity    Alcohol use: Yes     Comment: Drinks a quart of moonshine/night-As of 21 will quit drinking    Drug use: Yes     Types: Marijuana     Comment: Socially (1-2 Monthly)    Sexual activity: None   Other Topics Concern    None   Social History Narrative    None     Social Determinants of Health     Financial Resource Strain: Not on file   Food Insecurity: No Food Insecurity    Worried About Running Out of Food in the Last Year: Never true    Coco of Food in the Last Year: Never true   Transportation Needs: No Transportation Needs    Lack of Transportation (Medical): No    Lack of Transportation (Non-Medical):  No   Physical Activity: Not on file   Stress: Not on file   Social Connections: Not on file   Intimate Partner Violence: Not on file   Housing Stability: [address] Unable to Pay for Housing in the Last Year: No    Number of Places Lived in the Last Year: 1    Unstable Housing in the Last Year: No       Family History   Problem Relation Age of Onset    Diabetes Mother     Thyroid disease Mother     Cancer Father     Thyroid disease Sister     Depression Brother     Cancer Maternal Aunt     Cancer Paternal Uncle     Cancer Paternal Grandmother     No Known Problems Brother     Thyroid disease Sister        Allergies   Allergen Reactions    Tomato - Food Allergy Anaphylaxis     raw    Poison Ivy Extract Hives    Poison Sumac Extract Hives         Current Outpatient Medications:     lisinopril (ZESTRIL) 10 mg tablet, Take 15 mg by mouth daily in the early morning, Disp: , Rfl:     enoxaparin (Lovenox) 40 mg/0 4 mL, Inject 0 4 mL (40 mg total) under the skin in the morning for 24 days (Patient not taking: Reported on 8/24/2022), Disp: 9 6 mL, Rfl: 0      Physical Exam:  /80 (BP Location: Right arm, Patient Position: Sitting, Cuff Size: Adult)   Pulse (!) 113   Temp 97 9 °F (36 6 °C)   Resp 18   Ht 6' 1" (1 854 m)   Wt 92 5 kg (204 lb)   SpO2 97%   BMI 26 91 kg/m²     Physical Exam  Constitutional:       Appearance: He is well-developed  HENT:      Head: Normocephalic and atraumatic  Nose: Nose normal    Eyes:      General: No scleral icterus  Right eye: No discharge  Left eye: No discharge  Conjunctiva/sclera: Conjunctivae normal       Pupils: Pupils are equal, round, and reactive to light  Neck:      Thyroid: No thyromegaly  Trachea: No tracheal deviation  Cardiovascular:      Rate and Rhythm: Normal rate and regular rhythm  Heart sounds: Normal heart sounds  No murmur heard  No friction rub  Pulmonary:      Effort: Pulmonary effort is normal  No respiratory distress        Breath sounds: Normal breath sounds  No wheezing or rales  Chest:      Chest wall: No tenderness  Abdominal:      General: There is no distension  Palpations: Abdomen is soft  There is no hepatomegaly or splenomegaly  Tenderness: There is abdominal tenderness (On palpation in the mid abdominal area)  There is no guarding or rebound  Comments: Mid abdominal scar well healed   Musculoskeletal:         General: No tenderness or deformity  Normal range of motion  Cervical back: Normal range of motion and neck supple  Lymphadenopathy:      Cervical: No cervical adenopathy  Skin:     General: Skin is warm and dry  Coloration: Skin is not pale  Findings: No erythema or rash  Neurological:      Mental Status: He is alert and oriented to person, place, and time  Cranial Nerves: No cranial nerve deficit  Coordination: Coordination normal       Deep Tendon Reflexes: Reflexes are normal and symmetric  Psychiatric:         Behavior: Behavior normal          Thought Content: Thought content normal          Judgment: Judgment normal            Labs:  Lab Results   Component Value Date    WBC 5 78 07/25/2022    HGB 11 5 (L) 07/25/2022    HCT 33 6 (L) 07/25/2022    MCV 87 07/25/2022     07/25/2022     Lab Results   Component Value Date    K 3 8 07/25/2022     07/25/2022    CO2 28 07/25/2022    BUN 7 07/25/2022    CREATININE 0 81 07/25/2022    GLUF 103 (H) 06/24/2022    CALCIUM 9 1 07/25/2022    AST 17 06/24/2022    ALT 21 06/24/2022    ALKPHOS 121 (H) 06/24/2022    EGFR 105 07/25/2022     No results found for: TSH    Patient voiced understanding and agreement in the above discussion  Aware to contact our office with questions/symptoms in the interim

## 2022-08-24 NOTE — TELEPHONE ENCOUNTER
First 2 injection dates & times reviewed  He will get the rest at his follow up appointment   He has my Teams # to call for any questions

## 2022-08-24 NOTE — TELEPHONE ENCOUNTER
While we try to accommodate patient requests, our priority is to schedule treatment according to Doctor's orders and site availability  1  Does the Provider use the intake sheet or checkout note?yes  2  What would be a preferred day of the week that would work best for your infusion appointment? anyday  3  Do you prefer mornings or afternoons for your appointments? Any time after 10 am  4  Are there any days or dates that do not work for your schedule, including any upcoming vacations? none  5  We are going to try our best to schedule you at the infusion center closest to your home  In the event that we are unable to what would be your next preferred infusion site or sites? 1  gh  2  gh  3  gh    6  Do you have transportation to take you to all of your appointments? yes  7   Would you like the infusion center to draw labs from your port? (disregard if patient doesn't have a port or need labs for infusion appointment)

## 2022-08-24 NOTE — TELEPHONE ENCOUNTER
Called patient, he was at another doctor appointment and said he would call back when he was finished

## 2022-08-29 ENCOUNTER — HOSPITAL ENCOUNTER (OUTPATIENT)
Dept: INFUSION CENTER | Facility: HOSPITAL | Age: 47
Discharge: HOME | End: 2022-08-29
Attending: INTERNAL MEDICINE
Payer: COMMERCIAL

## 2022-08-29 VITALS
HEART RATE: 73 BPM | SYSTOLIC BLOOD PRESSURE: 152 MMHG | DIASTOLIC BLOOD PRESSURE: 93 MMHG | RESPIRATION RATE: 18 BRPM | TEMPERATURE: 98.2 F

## 2022-08-29 DIAGNOSIS — D3A.8 NEUROENDOCRINE TUMOR: Primary | ICD-10-CM

## 2022-08-29 DIAGNOSIS — C7A.8 NEUROENDOCRINE CARCINOMA OF SMALL BOWEL (HCC): ICD-10-CM

## 2022-08-29 PROCEDURE — 96372 THER/PROPH/DIAG INJ SC/IM: CPT

## 2022-08-29 RX ORDER — LANREOTIDE ACETATE 120 MG/.5ML
120 INJECTION SUBCUTANEOUS ONCE
Status: COMPLETED | OUTPATIENT
Start: 2022-08-29 | End: 2022-08-29

## 2022-08-29 RX ORDER — PAROXETINE HYDROCHLORIDE 20 MG/1
20 TABLET, FILM COATED ORAL DAILY
COMMUNITY

## 2022-08-29 RX ORDER — LANREOTIDE ACETATE 120 MG/.5ML
120 INJECTION SUBCUTANEOUS ONCE
Status: CANCELLED | OUTPATIENT
Start: 2022-09-26

## 2022-08-29 RX ADMIN — LANREOTIDE ACETATE 120 MG: 120 INJECTION SUBCUTANEOUS at 11:53

## 2022-08-29 NOTE — PROGRESS NOTES
Pt offers no complaints  Tolerated lanreotide injection well without incident  Discharged in stable condition and is aware of next infusion appointment  AVS provided

## 2022-09-26 ENCOUNTER — TELEPHONE (OUTPATIENT)
Dept: HEMATOLOGY ONCOLOGY | Facility: CLINIC | Age: 47
End: 2022-09-26

## 2022-09-26 ENCOUNTER — HOSPITAL ENCOUNTER (OUTPATIENT)
Dept: INFUSION CENTER | Facility: HOSPITAL | Age: 47
Discharge: HOME | End: 2022-09-26
Attending: INTERNAL MEDICINE

## 2022-09-26 NOTE — TELEPHONE ENCOUNTER
LVM to the patient in regards to his lab work that needs to be completed prior to his appt on 9/28/22 at 1:20pm

## 2022-09-28 ENCOUNTER — OFFICE VISIT (OUTPATIENT)
Dept: HEMATOLOGY ONCOLOGY | Facility: CLINIC | Age: 47
End: 2022-09-28
Payer: COMMERCIAL

## 2022-09-28 ENCOUNTER — HOSPITAL ENCOUNTER (OUTPATIENT)
Dept: INFUSION CENTER | Facility: HOSPITAL | Age: 47
Discharge: HOME | End: 2022-09-28
Attending: INTERNAL MEDICINE
Payer: COMMERCIAL

## 2022-09-28 VITALS
HEART RATE: 82 BPM | WEIGHT: 196 LBS | BODY MASS INDEX: 25.98 KG/M2 | OXYGEN SATURATION: 99 % | SYSTOLIC BLOOD PRESSURE: 146 MMHG | RESPIRATION RATE: 18 BRPM | TEMPERATURE: 97.8 F | DIASTOLIC BLOOD PRESSURE: 84 MMHG | HEIGHT: 73 IN

## 2022-09-28 VITALS — TEMPERATURE: 96 F

## 2022-09-28 DIAGNOSIS — C7A.8 NEUROENDOCRINE CARCINOMA OF SMALL BOWEL (HCC): Primary | ICD-10-CM

## 2022-09-28 DIAGNOSIS — D3A.8 NEUROENDOCRINE TUMOR: ICD-10-CM

## 2022-09-28 PROCEDURE — 99214 OFFICE O/P EST MOD 30 MIN: CPT | Performed by: INTERNAL MEDICINE

## 2022-09-28 PROCEDURE — 96372 THER/PROPH/DIAG INJ SC/IM: CPT

## 2022-09-28 RX ORDER — LANREOTIDE ACETATE 120 MG/.5ML
120 INJECTION SUBCUTANEOUS ONCE
Status: CANCELLED | OUTPATIENT
Start: 2022-10-24

## 2022-09-28 RX ORDER — LANREOTIDE ACETATE 120 MG/.5ML
120 INJECTION SUBCUTANEOUS ONCE
Status: COMPLETED | OUTPATIENT
Start: 2022-09-28 | End: 2022-09-28

## 2022-09-28 RX ADMIN — LANREOTIDE ACETATE 120 MG: 120 INJECTION SUBCUTANEOUS at 15:43

## 2022-09-28 NOTE — PROGRESS NOTES
Hematology/Oncology Outpatient Follow-up  Melvi Ingram 49 y o  male 1975 [id number]    Date:  9/28/2022        Assessment and Plan:  1  Neuroendocrine carcinoma of small bowel ([facility] [address] )  The patient seems to be tolerating the lanreotide which will be continued on every 4 week basis  He did seem to be concerned about his weight loss and decreased appetite  I did ask him to eat healthy on regular basis  I also asked him to get his blood work prior to each office visit  - CBC and differential; Future  - Comprehensive metabolic panel; Future  - Magnesium; Future  - Iron Panel (Includes Ferritin, Iron Sat%, Iron, and TIBC); Future  - Ferritin; Future  - LD,Blood; Future  - Chromogranin A; Future        HPI:  This is a 54-year-old male with history of bipolar disorder, schizophrenic personality, hypertension, etc   The patient apparently was admitted the hospital around October of 2021 for symptomatic left ureteral calculus  Incidentally he was noted to have mesenteric masses with calcification with cefepime on the CTA from 10/02/2021  Another CT scan of the abdomen pelvis with contrast was done on 02/01/2022 which showed no significant change in the partially calcified mesenteric masses with mesenteric tethering concerning for neuroendocrine tumor  He had a DOTATATE PET-CT scan on 05/04/2022 which showed:  IMPRESSION:     1   Dotatate avid clustered central mesenteric masses suspicious for underlying neuroendocrine neoplasm  2  Dellie Siskin does appear to be subjacent focal radiotracer uptake in a proximal small bowel loop for which underlying small bowel lesion should be excluded   Consider further evaluation with CT enterography or small bowel pill study  3   A few low-level foci of radiotracer uptake in the bilateral scapula, non-specific   Activity is lower than expected for neuroendocrine metastasis given the intensity of the mesenteric foci, however early metastasis is not entirely excluded   Consider   further evaluation with MRI of these regions vs reassessment on follow up Dotatate PET  4   Diffuse prostatic activity, non-specific, question inflammatory   Correlate with PSA levels and urologic evaluation as warranted  5    Stable 8 mm nodule in the right mid lung   No suspicious radiotracer uptake here  Sudhir Tobias the stability since 10/2/2021 this can be followed up with chest CT in 12 months  Chromogranin serum level on 10/06/2021 was 134 5  On 06/24/2022 was 164  The patient then underwent small bowel resection on 07/21/2022 at the area where he was thought to be high-risk for obstruction  The pathology revealed well-differentiated neuroendocrine tumor, G1 come multifocal, the tumor invaded through the muscularis propria into the taye colorectal tissue  He also had liver lesion biopsy at segment 5 which also was compatible with metastatic well-differentiated neuroendocrine tumor consistent with intestinal primary  Ki 67 was 1 7%  The final pathology was pT3(m)[id number] multifocal well differentiated small bowel neuroendocrine tumor with known extensive mesenteric galo involvement that was unresectable based on extension to the root of the SMA  Interval history:  The patient came today for follow-up visit  He was recently started on lanreotide  He denies any diarrhea or abdominal symptoms  He did not do his blood work prior to this office visit for unknown reason  He did complain about decreased appetite and some weight loss  ROS: Review of Systems   Constitutional: Positive for appetite change and unexpected weight change  Negative for chills and fever  HENT: Positive for mouth sores  Negative for ear pain and sore throat  Eyes: Negative for pain and visual disturbance  Respiratory: Negative for cough and shortness of breath  Cardiovascular: Negative for chest pain and palpitations  Gastrointestinal: Negative for abdominal pain and vomiting     Genitourinary: Negative for dysuria and hematuria  Musculoskeletal: Negative for arthralgias and back pain  Skin: Negative for color change and rash  Neurological: Positive for numbness  Negative for seizures and syncope  Psychiatric/Behavioral: Positive for sleep disturbance  All other systems reviewed and are negative        Past Medical History:   Diagnosis Date    Allergic     Bipolar disorder in partial remission ([facility] [address] )     Erectile dysfunction     unspecified erectile dysfunction type    Hypertension     Kidney stone        Past Surgical History:   Procedure Laterality Date    COLONOSCOPY      EXPLORATORY LAPAROTOMY W/ BOWEL RESECTION N/A 2022    Procedure: LAPAROTOMY EXPLORATORY W/ SMALL BOWEL RESECTION, liver biopsy;  Surgeon: Mallory Wayne MD;  Location:  MAIN OR;  Service: General    FL RETROGRADE PYELOGRAM  10/06/2021    HERNIA REPAIR      IN CYSTOURETHROSCOPY,URETER CATHETER Right 10/06/2021    Procedure: CYSTOSCOPY RETROGRADE PYELOGRAM WITH INSERTION STENT URETERAL, RIGHT URETEROSCOPY, STONE EXTRACTION;  Surgeon: Maria R Lloyd MD;  Location: [facility] MAIN OR;  Service: Urology       Social History     Socioeconomic History    Marital status: Single     Spouse name: None    Number of children: None    Years of education: None    Highest education level: None   Occupational History    None   Tobacco Use    Smoking status: Former Smoker     Types: Pipe, Cigarettes     Quit date: 2003     Years since quittin 7    Smokeless tobacco: Never Used   Vaping Use    Vaping Use: Never used   Substance and Sexual Activity    Alcohol use: Yes     Comment: Drinks a quart of moonshine/night-As of 21 will quit drinking    Drug use: Yes     Types: Marijuana     Comment: Socially (1-2 Monthly)    Sexual activity: None   Other Topics Concern    None   Social History Narrative    None     Social Determinants of Health     Financial Resource Strain: Not on file   Food Insecurity: No Food Insecurity    Worried About [address] in the Last Year: Never true    Coco of Food in the Last Year: Never true   Transportation Needs: No Transportation Needs    Lack of Transportation (Medical): No    Lack of Transportation (Non-Medical): No   Physical Activity: Not on file   Stress: Not on file   Social Connections: Not on file   Intimate Partner Violence: Not on file   Housing Stability: Low Risk     Unable to Pay for Housing in the Last Year: No    Number of Places Lived in the Last Year: 1    Unstable Housing in the Last Year: No       Family History   Problem Relation Age of Onset    Diabetes Mother     Thyroid disease Mother     Cancer Father     Thyroid disease Sister     Depression Brother     Cancer Maternal Aunt     Cancer Paternal Uncle     Cancer Paternal Grandmother     No Known Problems Brother     Thyroid disease Sister        Allergies   Allergen Reactions    Tomato - Food Allergy Anaphylaxis     raw    Poison Ivy Extract Hives    Poison Sumac Extract Hives         Current Outpatient Medications:     lisinopril (ZESTRIL) 10 mg tablet, Take 15 mg by mouth daily in the early morning, Disp: , Rfl:     PARoxetine (PAXIL) 20 mg tablet, Take 20 mg by mouth daily, Disp: , Rfl:     enoxaparin (Lovenox) 40 mg/0 4 mL, Inject 0 4 mL (40 mg total) under the skin in the morning for 24 days (Patient not taking: Reported on 9/28/2022), Disp: 9 6 mL, Rfl: 0      Physical Exam:  /84 (BP Location: Left arm, Patient Position: Sitting, Cuff Size: Adult)   Pulse 82   Temp 97 8 °F (36 6 °C)   Resp 18   Ht 6' 1" (1 854 m)   Wt 88 9 kg (196 lb)   SpO2 99%   BMI 25 86 kg/m²     Physical Exam  Constitutional:       Appearance: He is well-developed  HENT:      Head: Normocephalic and atraumatic  Nose: Nose normal    Eyes:      General: No scleral icterus  Right eye: No discharge  Left eye: No discharge        Conjunctiva/sclera: Conjunctivae normal       Pupils: Pupils are equal, round, and reactive to light  Neck:      Thyroid: No thyromegaly  Trachea: No tracheal deviation  Cardiovascular:      Rate and Rhythm: Normal rate and regular rhythm  Heart sounds: Normal heart sounds  No murmur heard  No friction rub  Pulmonary:      Effort: Pulmonary effort is normal  No respiratory distress  Breath sounds: Normal breath sounds  No wheezing or rales  Chest:      Chest wall: No tenderness  Abdominal:      General: There is no distension  Palpations: Abdomen is soft  There is no hepatomegaly or splenomegaly  Tenderness: There is no abdominal tenderness  There is no guarding or rebound  Comments: Mid abdominal scar well-healed   Musculoskeletal:         General: No tenderness or deformity  Normal range of motion  Cervical back: Normal range of motion and neck supple  Lymphadenopathy:      Cervical: No cervical adenopathy  Skin:     General: Skin is warm and dry  Coloration: Skin is not pale  Findings: No erythema or rash  Neurological:      Mental Status: He is alert and oriented to person, place, and time  Cranial Nerves: No cranial nerve deficit  Coordination: Coordination normal       Deep Tendon Reflexes: Reflexes are normal and symmetric  Psychiatric:         Behavior: Behavior normal          Thought Content:  Thought content normal          Judgment: Judgment normal            Labs:  Lab Results   Component Value Date    WBC 5 78 07/25/2022    HGB 11 5 (L) 07/25/2022    HCT 33 6 (L) 07/25/2022    MCV 87 07/25/2022     07/25/2022     Lab Results   Component Value Date    K 3 8 07/25/2022     07/25/2022    CO2 28 07/25/2022    BUN 7 07/25/2022    CREATININE 0 81 07/25/2022    GLUF 103 (H) 06/24/2022    CALCIUM 9 1 07/25/2022    AST 17 06/24/2022    ALT 21 06/24/2022    ALKPHOS 121 (H) 06/24/2022    EGFR 105 07/25/2022     No results found for: TSH    Patient voiced understanding and agreement in the above discussion  Aware to contact our office with questions/symptoms in the interim

## 2022-09-28 NOTE — PROGRESS NOTES
Patient tolerated injection well, patient left in stable condition  AVS printed  Griseofulvin Pregnancy And Lactation Text: This medication is Pregnancy Category X and is known to cause serious birth defects. It is unknown if this medication is excreted in breast milk but breast feeding should be avoided.

## 2022-09-29 ENCOUNTER — TELEPHONE (OUTPATIENT)
Dept: HEMATOLOGY ONCOLOGY | Facility: CLINIC | Age: 47
End: 2022-09-29

## 2022-09-29 NOTE — TELEPHONE ENCOUNTER
Called pt and let him know when his next appt with Karen Roberts is and reminded him not to be late and he was fine with that

## 2022-10-13 ENCOUNTER — TELEPHONE (OUTPATIENT)
Dept: HEMATOLOGY ONCOLOGY | Facility: CLINIC | Age: 47
End: 2022-10-13

## 2022-10-13 ENCOUNTER — HOSPITAL ENCOUNTER (EMERGENCY)
Facility: HOSPITAL | Age: 47
Discharge: HOME | End: 2022-10-13
Attending: EMERGENCY MEDICINE
Payer: COMMERCIAL

## 2022-10-13 VITALS
DIASTOLIC BLOOD PRESSURE: 77 MMHG | OXYGEN SATURATION: 94 % | WEIGHT: 180 LBS | TEMPERATURE: 97.9 F | HEIGHT: 73 IN | BODY MASS INDEX: 23.86 KG/M2 | HEART RATE: 83 BPM | RESPIRATION RATE: 16 BRPM | SYSTOLIC BLOOD PRESSURE: 139 MMHG

## 2022-10-13 DIAGNOSIS — T07.XXXA MULTIPLE EXCORIATIONS: ICD-10-CM

## 2022-10-13 DIAGNOSIS — F15.10 METHAMPHETAMINE USE (HCC): Primary | ICD-10-CM

## 2022-10-13 DIAGNOSIS — F22 DELUSIONS OF PARASITOSIS (HCC): ICD-10-CM

## 2022-10-13 PROCEDURE — 99282 EMERGENCY DEPT VISIT SF MDM: CPT

## 2022-10-13 PROCEDURE — 99284 EMERGENCY DEPT VISIT MOD MDM: CPT | Performed by: EMERGENCY MEDICINE

## 2022-10-13 PROCEDURE — 96372 THER/PROPH/DIAG INJ SC/IM: CPT

## 2022-10-13 RX ORDER — GINSENG 100 MG
1 CAPSULE ORAL ONCE
Status: DISCONTINUED | OUTPATIENT
Start: 2022-10-13 | End: 2022-10-13 | Stop reason: HOSPADM

## 2022-10-13 RX ORDER — LORAZEPAM 2 MG/ML
2 INJECTION INTRAMUSCULAR ONCE
Status: DISCONTINUED | OUTPATIENT
Start: 2022-10-13 | End: 2022-10-13

## 2022-10-13 RX ORDER — OLANZAPINE 10 MG/1
10 INJECTION, POWDER, LYOPHILIZED, FOR SOLUTION INTRAMUSCULAR ONCE
Status: COMPLETED | OUTPATIENT
Start: 2022-10-13 | End: 2022-10-13

## 2022-10-13 RX ORDER — HYDROXYZINE HYDROCHLORIDE 25 MG/1
25 TABLET, FILM COATED ORAL ONCE
Status: DISCONTINUED | OUTPATIENT
Start: 2022-10-13 | End: 2022-10-13

## 2022-10-13 RX ADMIN — OLANZAPINE 10 MG: 10 INJECTION, POWDER, FOR SOLUTION INTRAMUSCULAR at 02:01

## 2022-10-13 NOTE — ED NOTES
Report given to WILDA Lowe  No additional questions at this time        Júnior Awan, WILDA  10/13/22 3000
Home

## 2022-10-13 NOTE — TELEPHONE ENCOUNTER
LVM to the patient in regards to his lab work that needs to be completed prior to his appt on 10/20/22 at 2:00pm

## 2022-10-13 NOTE — ED PROVIDER NOTES
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 presents to the emergency department for evaluation of delusional parasitosis  Patient reports that he helped a friend “mom” a house because it had a lot of different bugs in it, states after that used meth and then began to see different in 10 and worms crawling on his skin  He reports diffuse pruritus and has multiple superficial wounds from scratching  He continues to report the feeling of bugs crawling on him and he states that he sees them as well  He states this is the 1st time he ever used meth  Denies any headache, fever, chills, chest pain, shortness of breath, abdominal pain, nausea or vomiting  No SI or HI  Prior to Admission Medications   Prescriptions Last Dose Informant Patient Reported? Taking?    PARoxetine (PAXIL) 20 mg tablet  Self Yes No   Sig: Take 20 mg by mouth daily   enoxaparin (Lovenox) 40 mg/0 4 mL   No No   Sig: Inject 0 4 mL (40 mg total) under the skin in the morning for 24 days   Patient not taking: Reported on 9/28/2022   lisinopril (ZESTRIL) 10 mg tablet  Self Yes No   Sig: Take 15 mg by mouth daily in the early morning      Facility-Administered Medications: None       Past Medical History:   Diagnosis Date   • Allergic    • Bipolar disorder in partial remission ([facility] [address] )    • Erectile dysfunction     unspecified erectile dysfunction type   • Hypertension    • Kidney stone        Past Surgical History:   Procedure Laterality Date   • COLONOSCOPY     • EXPLORATORY LAPAROTOMY W/ BOWEL RESECTION N/A 7/21/2022    Procedure: LAPAROTOMY EXPLORATORY W/ SMALL BOWEL RESECTION, liver biopsy;  Surgeon: Shira Smith MD;  Location: [facility] MAIN OR;  Service: General   • FL RETROGRADE PYELOGRAM  10/06/2021   • HERNIA REPAIR     • NV CYSTOURETHROSCOPY,URETER CATHETER Right 10/06/2021 Procedure: CYSTOSCOPY RETROGRADE PYELOGRAM WITH INSERTION STENT URETERAL, RIGHT URETEROSCOPY, STONE EXTRACTION;  Surgeon: Michael Topete MD;  Location: [facility] MAIN OR;  Service: Urology       Family History   Problem Relation Age of Onset   • Diabetes Mother    • Thyroid disease Mother    • Cancer Father    • Thyroid disease Sister    • Depression Brother    • Cancer Maternal Aunt    • Cancer Paternal Uncle    • Cancer Paternal Grandmother    • No Known Problems Brother    • Thyroid disease Sister      I have reviewed and agree with the history as documented  E-Cigarette/Vaping   • E-Cigarette Use Never User      E-Cigarette/Vaping Substances     Social History     Tobacco Use   • Smoking status: Former Smoker     Types: Pipe, Cigarettes     Quit date: 2003     Years since quittin 8   • Smokeless tobacco: Never Used   Vaping Use   • Vaping Use: Never used   Substance Use Topics   • Alcohol use: Yes     Comment: Drinks a quart of moonshine/night-As of 21 will quit drinking   • Drug use: Yes     Types: Marijuana     Comment: Socially (1-2 Monthly)       Review of Systems   Unable to perform ROS: Psychiatric disorder       Physical Exam  Physical Exam  Vitals and nursing note reviewed  Constitutional:       General: He is not in acute distress  HENT:      Head: Normocephalic and atraumatic  Right Ear: External ear normal       Left Ear: External ear normal       Nose: Nose normal       Mouth/Throat:      Mouth: Mucous membranes are moist    Eyes:      Extraocular Movements: Extraocular movements intact  Conjunctiva/sclera: Conjunctivae normal       Pupils: Pupils are equal, round, and reactive to light  Cardiovascular:      Rate and Rhythm: Normal rate and regular rhythm  Pulses: Normal pulses  Heart sounds: Normal heart sounds  Pulmonary:      Effort: Pulmonary effort is normal  No respiratory distress  Breath sounds: No stridor  Abdominal:      General: Abdomen is flat  Bowel sounds are normal       Tenderness: There is no abdominal tenderness  There is no guarding or rebound  Comments: Vertical scar from prior abdominal surgery appears to be well healing, no surrounding erythema, fluctuance, or induration  Musculoskeletal:         General: No deformity  Normal range of motion  Cervical back: Normal range of motion and neck supple  Skin:     General: Skin is warm and dry  Capillary Refill: Capillary refill takes less than 2 seconds  Comments: The multiple superficial wounds from excoriations, no active bleeding   Neurological:      General: No focal deficit present  Mental Status: He is alert  Psychiatric:      Comments: Anxious, having delusions of parasites, no SI or HI         Vital Signs  ED Triage Vitals [10/13/22 0122]   Temperature Pulse Respirations Blood Pressure SpO2   97 9 °F (36 6 °C) 104 (!) 24 (!) 172/121 98 %      Temp Source Heart Rate Source Patient Position - Orthostatic VS BP Location FiO2 (%)   Oral Monitor Sitting Left arm --      Pain Score       9           Vitals:    10/13/22 0122 10/13/22 0235   BP: (!) 172/121 139/77   Pulse: 104 83   Patient Position - Orthostatic VS: Sitting Lying         Visual Acuity      ED Medications  Medications   OLANZapine (ZyPREXA) IM injection 10 mg (10 mg Intramuscular Given 10/13/22 0201)       Diagnostic Studies  Results Reviewed     None                 No orders to display              Procedures  Procedures         ED Course  ED Course as of 10/13/22 0645   Thu Oct 13, 2022   0229 Following Zyprexa, patient is now resting comfortably in bed                                             MDM  Number of Diagnoses or Management Options  Delusions of parasitosis ([facility] [address] )  Methamphetamine use ([address] )  Multiple excoriations  Diagnosis management comments: 58-year-old male presents the ED for evaluation of delusional parasitosis in the setting of methamphetamine abuse    On exam he is very anxious appearing  No SI or HI  Does admit to methamphetamine abuse prior to the onset of symptoms  Will treat symptomatically with Zyprexa and monitor clinically  Patient signed out to Dr Jennifer Allen pending repeat evaluation as patient remained sedated and sleeping throughout the night  He remained hemodynamically stable  Family was at bedside reports that he has not slept in 2 days  Disposition  Final diagnoses:   Methamphetamine use ([facility] )   Delusions of parasitosis ([facility] )   Multiple excoriations     Time reflects when diagnosis was documented in both MDM as applicable and the Disposition within this note     Time User Action Codes Description Comment    10/13/2022  1:34 AM Check, Marguerite Face Add [F15 10] [facility] ([facility] )     10/13/2022  1:36 AM Check, Marguerite Face Add [F22] Ekbom's delusional parasitosis ([facility] )     10/13/2022  1:36 AM Check, Yrn Bowles [F22] Ekbom's delusional parasitosis ([facility] )     10/13/2022  1:36 AM Check, Marguerite Face Add [F22] Delusions of parasitosis ([facility] )     10/13/2022  6:45 AM Check, Marguerite Face Add [T07  XXXA] Multiple excoriations       ED Disposition     None      Follow-up Information    None         Patient's Medications   Discharge Prescriptions    No medications on file       No discharge procedures on file      PDMP Review     None          ED Provider  Electronically Signed by           Leeanna Beach MD  10/13/22 9457

## 2022-10-21 ENCOUNTER — APPOINTMENT (OUTPATIENT)
Dept: LAB | Facility: CLINIC | Age: 47
End: 2022-10-21
Payer: COMMERCIAL

## 2022-10-21 DIAGNOSIS — C7A.8 NEUROENDOCRINE CARCINOMA OF SMALL BOWEL (HCC): ICD-10-CM

## 2022-10-21 LAB
ALBUMIN SERPL BCP-MCNC: 3.1 G/DL (ref 3.5–5)
ALP SERPL-CCNC: 137 U/L (ref 46–116)
ALT SERPL W P-5'-P-CCNC: 20 U/L (ref 12–78)
ANION GAP SERPL CALCULATED.3IONS-SCNC: 5 MMOL/L (ref 4–13)
AST SERPL W P-5'-P-CCNC: 23 U/L (ref 5–45)
BASOPHILS # BLD AUTO: 0.02 THOUSANDS/ÂΜL (ref 0–0.1)
BASOPHILS NFR BLD AUTO: 0 % (ref 0–1)
BILIRUB SERPL-MCNC: 0.96 MG/DL (ref 0.2–1)
BUN SERPL-MCNC: 14 MG/DL (ref 5–25)
CALCIUM ALBUM COR SERPL-MCNC: 9.7 MG/DL (ref 8.3–10.1)
CALCIUM SERPL-MCNC: 9 MG/DL (ref 8.3–10.1)
CHLORIDE SERPL-SCNC: 106 MMOL/L (ref 96–108)
CO2 SERPL-SCNC: 29 MMOL/L (ref 21–32)
CREAT SERPL-MCNC: 0.85 MG/DL (ref 0.6–1.3)
CRP SERPL QL: 16.7 MG/L
EOSINOPHIL # BLD AUTO: 0.17 THOUSAND/ÂΜL (ref 0–0.61)
EOSINOPHIL NFR BLD AUTO: 2 % (ref 0–6)
ERYTHROCYTE [DISTWIDTH] IN BLOOD BY AUTOMATED COUNT: 15.5 % (ref 11.6–15.1)
ERYTHROCYTE [SEDIMENTATION RATE] IN BLOOD: 41 MM/HOUR (ref 0–14)
FERRITIN SERPL-MCNC: 120 NG/ML (ref 8–388)
GFR SERPL CREATININE-BSD FRML MDRD: 103 ML/MIN/1.73SQ M
GLUCOSE P FAST SERPL-MCNC: 130 MG/DL (ref 65–99)
HCT VFR BLD AUTO: 36.8 % (ref 36.5–49.3)
HGB BLD-MCNC: 12.8 G/DL (ref 12–17)
IMM GRANULOCYTES # BLD AUTO: 0.06 THOUSAND/UL (ref 0–0.2)
IMM GRANULOCYTES NFR BLD AUTO: 1 % (ref 0–2)
IRON SATN MFR SERPL: 26 % (ref 20–50)
IRON SERPL-MCNC: 58 UG/DL (ref 65–175)
LDH SERPL-CCNC: 166 U/L (ref 81–234)
LYMPHOCYTES # BLD AUTO: 1.51 THOUSANDS/ÂΜL (ref 0.6–4.47)
LYMPHOCYTES NFR BLD AUTO: 16 % (ref 14–44)
MAGNESIUM SERPL-MCNC: 2 MG/DL (ref 1.6–2.6)
MCH RBC QN AUTO: 29.3 PG (ref 26.8–34.3)
MCHC RBC AUTO-ENTMCNC: 34.8 G/DL (ref 31.4–37.4)
MCV RBC AUTO: 84 FL (ref 82–98)
MONOCYTES # BLD AUTO: 0.46 THOUSAND/ÂΜL (ref 0.17–1.22)
MONOCYTES NFR BLD AUTO: 5 % (ref 4–12)
NEUTROPHILS # BLD AUTO: 7.34 THOUSANDS/ÂΜL (ref 1.85–7.62)
NEUTS SEG NFR BLD AUTO: 76 % (ref 43–75)
NRBC BLD AUTO-RTO: 0 /100 WBCS
PLATELET # BLD AUTO: 248 THOUSANDS/UL (ref 149–390)
PMV BLD AUTO: 9.2 FL (ref 8.9–12.7)
POTASSIUM SERPL-SCNC: 3.5 MMOL/L (ref 3.5–5.3)
PROT SERPL-MCNC: 7.5 G/DL (ref 6.4–8.4)
PSA SERPL-MCNC: 0.4 NG/ML (ref 0–4)
RBC # BLD AUTO: 4.37 MILLION/UL (ref 3.88–5.62)
SODIUM SERPL-SCNC: 140 MMOL/L (ref 135–147)
TIBC SERPL-MCNC: 227 UG/DL (ref 250–450)
WBC # BLD AUTO: 9.56 THOUSAND/UL (ref 4.31–10.16)

## 2022-10-21 PROCEDURE — 84153 ASSAY OF PSA TOTAL: CPT | Performed by: INTERNAL MEDICINE

## 2022-10-21 PROCEDURE — 83735 ASSAY OF MAGNESIUM: CPT

## 2022-10-21 PROCEDURE — 86316 IMMUNOASSAY TUMOR OTHER: CPT | Performed by: INTERNAL MEDICINE

## 2022-10-21 PROCEDURE — 83550 IRON BINDING TEST: CPT

## 2022-10-21 PROCEDURE — 86140 C-REACTIVE PROTEIN: CPT

## 2022-10-21 PROCEDURE — 82728 ASSAY OF FERRITIN: CPT

## 2022-10-21 PROCEDURE — 83540 ASSAY OF IRON: CPT

## 2022-10-21 PROCEDURE — 85652 RBC SED RATE AUTOMATED: CPT

## 2022-10-21 PROCEDURE — 83615 LACTATE (LD) (LDH) ENZYME: CPT

## 2022-10-21 PROCEDURE — 85025 COMPLETE CBC W/AUTO DIFF WBC: CPT

## 2022-10-21 PROCEDURE — 80053 COMPREHEN METABOLIC PANEL: CPT

## 2022-10-21 PROCEDURE — 36415 COLL VENOUS BLD VENIPUNCTURE: CPT

## 2022-10-24 ENCOUNTER — HOSPITAL ENCOUNTER (OUTPATIENT)
Dept: INFUSION CENTER | Facility: HOSPITAL | Age: 47
Discharge: HOME | End: 2022-10-24
Attending: INTERNAL MEDICINE
Payer: COMMERCIAL

## 2022-10-24 VITALS
TEMPERATURE: 97 F | HEART RATE: 100 BPM | RESPIRATION RATE: 18 BRPM | SYSTOLIC BLOOD PRESSURE: 162 MMHG | DIASTOLIC BLOOD PRESSURE: 98 MMHG | OXYGEN SATURATION: 98 %

## 2022-10-24 DIAGNOSIS — C7A.8 NEUROENDOCRINE CARCINOMA OF SMALL BOWEL (HCC): ICD-10-CM

## 2022-10-24 DIAGNOSIS — D3A.8 NEUROENDOCRINE TUMOR: Primary | ICD-10-CM

## 2022-10-24 RX ORDER — LANREOTIDE ACETATE 120 MG/.5ML
120 INJECTION SUBCUTANEOUS ONCE
Status: COMPLETED | OUTPATIENT
Start: 2022-10-24 | End: 2022-10-24

## 2022-10-24 RX ORDER — LANREOTIDE ACETATE 120 MG/.5ML
120 INJECTION SUBCUTANEOUS ONCE
OUTPATIENT
Start: 2022-10-26

## 2022-10-24 RX ADMIN — LANREOTIDE ACETATE 120 MG: 120 INJECTION SUBCUTANEOUS at 11:24

## 2022-10-24 NOTE — PROGRESS NOTES
Pt no showed office appointment on 10/20  Per Nazia Roque RN, pt needs labs performed (pt had labs drawn 10/21) and ok to be treated with lanreotide today  Pt to reschedule office appointment

## 2022-10-24 NOTE — PROGRESS NOTES
Pt tolerated lanreotide injection well without incident  Office visit rescheduled and pt made aware to time and day  Pt discharged in stable condition and AVS provided

## 2022-10-25 LAB — CGA SERPL-MCNC: 112.7 NG/ML (ref 0–101.8)

## 2022-11-10 ENCOUNTER — OFFICE VISIT (OUTPATIENT)
Dept: SURGERY | Facility: CLINIC | Age: 47
End: 2022-11-10

## 2022-11-10 VITALS
HEART RATE: 107 BPM | SYSTOLIC BLOOD PRESSURE: 142 MMHG | HEIGHT: 73 IN | WEIGHT: 201.8 LBS | RESPIRATION RATE: 16 BRPM | DIASTOLIC BLOOD PRESSURE: 86 MMHG | TEMPERATURE: 97.5 F | OXYGEN SATURATION: 99 % | BODY MASS INDEX: 26.74 KG/M2

## 2022-11-10 DIAGNOSIS — B96.89 SKIN INFECTION, BACTERIAL: ICD-10-CM

## 2022-11-10 DIAGNOSIS — C7B.8 METASTATIC MALIGNANT NEUROENDOCRINE TUMOR TO LIVER (HCC): Primary | ICD-10-CM

## 2022-11-10 DIAGNOSIS — L08.9 SKIN INFECTION, BACTERIAL: ICD-10-CM

## 2022-11-10 DIAGNOSIS — C7A.8 NEUROENDOCRINE CARCINOMA OF SMALL BOWEL (HCC): ICD-10-CM

## 2022-11-10 RX ORDER — SULFAMETHOXAZOLE AND TRIMETHOPRIM 800; 160 MG/1; MG/1
1 TABLET ORAL EVERY 12 HOURS SCHEDULED
Qty: 14 TABLET | Refills: 0 | Status: SHIPPED | OUTPATIENT
Start: 2022-11-10 | End: 2022-11-17

## 2022-11-10 NOTE — PATIENT INSTRUCTIONS
-Do warm compresses 3 times day on the abdominal wall skin infection sites  -Take ibuprofen 600mg 3 times a day with food  -Take bactrim twice a day x 7 days  -Follow up with Dr Ron Bustos as scheduled for the office visit and the octreotide medication doses  -We will schedule imaging in the coming weeks  -Return in 6 months

## 2022-11-10 NOTE — PROGRESS NOTES
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 with known extensive mesenteric galo involvement that was unresectable based on extension to the root of the SMA  Grade 1 disease with Ki67 1 7%       He is continuing on monthly lanreotide injections with medical oncology and due to see them again in a week or so for a clinical visit  Recent labwork reviewed by me notable for albumin low at 3 1, chromogranin downtrended to 112 from 164, elevated ESR and CRP  Clinically today, he reports normal PO intake, normal bowel function, no abdominal pain, no ED visits for abdominal related complaints  Abdominal wall with multiple bacterial skin infections, patient reports spider bites and skin picking, I am concerned about poor hygiene and his overall social situation as he mentions multiple times during our visit exposure to drugs, violence, court dates, challenging situations  I have prescribed bactrim and advised NSAIDs, warm compresses, and an antibiotic course for his abdominal wall  I have reached out to Dr Ron Bustos to give him a heads up on the abdominal wall skin infections and regarding next steps in imaging surveillance, likely needs imaging before end of 2022 to assess disease stability  Patient reports a strong preference for CT scan imaging as he currently does not have easy access to rides outside of the area for gallium PET scan imaging   We will likely need to arrange [facility] for him if he does require gallium PET imaging  He will follow up here for a clinical visit in 6 months or sooner as needed  Subjective       Chief Complaint   Patient presents with   • Follow-up      Denies diarrhea or constipation, denies abdominal pain, denies nausea/vomiting  Says PO intake is variable but no major issues  Has multiple abdominal wall scabs and skin infections, look consistent with MRSA abscesses and he admits to skin picking, poor hygiene, spider bites  Has been getting lanreotide monthly injections, due for imaging this fall to assess disease stability  Upcoming visit with Dr Naheed Baird  Review of Systems   Constitutional: Negative for activity change, appetite change, chills, diaphoresis, fever and unexpected weight change  Gastrointestinal: Negative for abdominal distention, abdominal pain, blood in stool, constipation, diarrhea, nausea and vomiting  Skin: Positive for color change, rash and wound  Hematological: Does not bruise/bleed easily  Psychiatric/Behavioral: Positive for dysphoric mood  All other systems reviewed and are negative  The following portions of the patient's history were reviewed and updated as appropriate: allergies, current medications, past family history, past medical history, past social history, past surgical history and problem list     Objective      Blood pressure 142/86, pulse (!) 107, temperature 97 5 °F (36 4 °C), temperature source Temporal, resp  rate 16, height 6' 1" (1 854 m), weight 91 5 kg (201 lb 12 8 oz), SpO2 99 %  Physical Exam  Vitals reviewed  Constitutional:       General: He is not in acute distress  HENT:      Head: Normocephalic  Mouth/Throat:      Mouth: Mucous membranes are moist    Eyes:      Pupils: Pupils are equal, round, and reactive to light  Cardiovascular:      Rate and Rhythm: Tachycardia present  Pulmonary:      Effort: Pulmonary effort is normal  No respiratory distress  Abdominal:      General: Abdomen is flat  There is no distension  Palpations: Abdomen is soft  Tenderness: There is no abdominal tenderness  There is no guarding or rebound  Skin:     General: Skin is warm and dry  Capillary Refill: Capillary refill takes less than 2 seconds  Findings: Erythema, lesion and rash present  Comments: Scattered abdominal wall scabs, erythema, wounds, abscesses look consistent with MRSA infection   Neurological:      General: No focal deficit present  Mental Status: He is alert  Psychiatric:         Mood and Affect: Mood normal          Signature:   Rom Berman MD  Date: 11/10/2022 Time: 10:08 AM

## 2022-11-11 ENCOUNTER — HOSPITAL ENCOUNTER (OUTPATIENT)
Dept: CT IMAGING | Facility: HOSPITAL | Age: 47
End: 2022-11-11
Attending: SURGERY

## 2022-11-11 DIAGNOSIS — C7A.8 NEUROENDOCRINE CARCINOMA OF SMALL BOWEL (HCC): ICD-10-CM

## 2022-11-11 DIAGNOSIS — C7B.8 METASTATIC MALIGNANT NEUROENDOCRINE TUMOR TO LIVER (HCC): ICD-10-CM

## 2022-11-11 RX ADMIN — IOHEXOL 100 ML: 350 INJECTION, SOLUTION INTRAVENOUS at 17:26

## 2022-11-17 ENCOUNTER — OFFICE VISIT (OUTPATIENT)
Dept: HEMATOLOGY ONCOLOGY | Facility: CLINIC | Age: 47
End: 2022-11-17

## 2022-11-17 VITALS
WEIGHT: 203.6 LBS | OXYGEN SATURATION: 99 % | RESPIRATION RATE: 18 BRPM | TEMPERATURE: 97.6 F | HEART RATE: 85 BPM | DIASTOLIC BLOOD PRESSURE: 100 MMHG | SYSTOLIC BLOOD PRESSURE: 162 MMHG | BODY MASS INDEX: 26.98 KG/M2 | HEIGHT: 73 IN

## 2022-11-17 DIAGNOSIS — C7B.8 METASTATIC MALIGNANT NEUROENDOCRINE TUMOR TO LIVER (HCC): Primary | ICD-10-CM

## 2022-11-17 DIAGNOSIS — C7A.8 NEUROENDOCRINE CARCINOMA OF SMALL BOWEL (HCC): Primary | ICD-10-CM

## 2022-11-17 DIAGNOSIS — C7B.8 METASTATIC MALIGNANT NEUROENDOCRINE TUMOR TO LIVER (HCC): ICD-10-CM

## 2022-11-17 NOTE — PROGRESS NOTES
Hematology/Oncology Outpatient Follow-up  Patric Duran 49 y o  male 1975 [id number]    Date:  11/17/2022        Assessment and Plan:  1  Neuroendocrine carcinoma of small bowel ([facility] [address] )  The patient was educated about the recent CT scan of the chest abdomen pelvis which showed unexpected progression of his well-differentiated neuroendocrine tumor with a new liver lesion and several subcentimeter mesenteric nodes which have increased in size with new mild nodularity in the mesentery lateral to the ascending colon concerning for progression  The patient was told that we will need to pursue a DOTATATE PET-CT scan then see if he would be a candidate for peptide receptor radionuclide gain therapy with lutetium 177  Meanwhile we will continue him on the long-acting somatostatin analog the lanreotide on every 4 week basis  - CBC and differential; Future  - Comprehensive metabolic panel; Future  - Magnesium  - Chromogranin A  - NM PET CT skull base to mid thigh; Future    2  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 [facility])          HPI:    This is a 44-year-old male with history of bipolar disorder, schizophrenic personality, hypertension, etc   The patient apparently was admitted the hospital around October of 2021 for symptomatic left ureteral calculus   Incidentally he was noted to have mesenteric masses with calcification with cefepime on the CTA from 10/02/2021   Another CT scan of the abdomen pelvis with contrast was done on 02/01/2022 which showed no significant change in the partially calcified mesenteric masses with mesenteric tethering concerning for neuroendocrine tumor  Tigre Boudreaux had a DOTATATE PET-CT scan on 05/04/2022 which showed:  IMPRESSION:     1   Dotatate avid clustered central mesenteric masses suspicious for underlying neuroendocrine neoplasm    2  Aryan Fontan does appear to be subjacent focal radiotracer uptake in a proximal small bowel loop for which underlying small bowel lesion should be excluded  Alonzo Ponsusanne further evaluation with CT enterography or small bowel pill study  3   A few low-level foci of radiotracer uptake in the bilateral scapula, non-specific   Activity is lower than expected for neuroendocrine metastasis given the intensity of the mesenteric foci, however early metastasis is not entirely excluded   Consider   further evaluation with MRI of these regions vs reassessment on follow up Dotatate PET  4   Diffuse prostatic activity, non-specific, question inflammatory   Correlate with PSA levels and urologic evaluation as warranted  5    Stable 8 mm nodule in the right mid lung   No suspicious radiotracer uptake here  Polly Ku the stability since 10/2/2021 this can be followed up with chest CT in 12 months  Chromogranin serum level on 10/06/2021 was 134 5   On 06/24/2022 was 164  The patient then underwent small bowel resection on 07/21/2022 at the area where he was thought to be high-risk for obstruction   The pathology revealed well-differentiated neuroendocrine tumor, G1 come multifocal, the tumor invaded through the muscularis propria into the taye colorectal tissue   He also had liver lesion biopsy at segment 5 which also was compatible with metastatic well-differentiated neuroendocrine tumor consistent with intestinal primary   Ki 67 was 1 7%  The final pathology was [id number] multifocal well differentiated small bowel neuroendocrine tumor with known extensive mesenteric galo involvement that was unresectable based on extension to the root of the SMA      Interval history:  The patient came today for follow-up visit  He continues to be on the lanreotide which is being given on every 4 week basis  Due to abdominal wall skin infection he had another CT scan of the chest abdomen pelvis with contrast ordered by the surgical team on 11/11/2022  The CT scan showed:  IMPRESSION:     Mesenteric mass compatible with known neuroendocrine tumor is stable in size     New 1 2 cm liver lesion, incompletely characterized but suspicious for metastasis  Several subcentimeter mesenteric nodes have increased in size and new mild nodularity in the mesentery lateral to the ascending colon , also concerning for progression of disease  Continued stability of 9 mm right lung nodule  Recent blood work on 10/21/2022 showed hemoglobin of 12 8 with normal white cells and platelets  Creatinine 0 8 with normal calcium and liver enzymes  LDH was normal   C-reactive protein 16 7  Sed rate 41  Magnesium 2 0  Ferritin 120  Chromogranin serum level went down from 164 to 112  ROS: Review of Systems   Constitutional: Negative for chills and fever  HENT: Negative for ear pain and sore throat  Eyes: Negative for pain and visual disturbance  Respiratory: Positive for shortness of breath  Negative for cough  Cardiovascular: Negative for chest pain and palpitations  Gastrointestinal: Negative for abdominal pain and vomiting  Genitourinary: Negative for dysuria and hematuria  Musculoskeletal: Positive for arthralgias  Negative for back pain  Skin: Negative for color change and rash  Neurological: Negative for seizures and syncope  Psychiatric/Behavioral: Positive for sleep disturbance  All other systems reviewed and are negative        Past Medical History:   Diagnosis Date   • Allergic    • Bipolar disorder in partial remission ([facility] [address] )    • Erectile dysfunction     unspecified erectile dysfunction type   • Hypertension    • Kidney stone        Past Surgical History:   Procedure Laterality Date   • COLONOSCOPY     • EXPLORATORY LAPAROTOMY W/ BOWEL RESECTION N/A 7/21/2022    Procedure: LAPAROTOMY EXPLORATORY W/ SMALL BOWEL RESECTION, liver biopsy;  Surgeon: Inocencio Alas MD;  Location: [facility] MAIN OR;  Service: General   • FL RETROGRADE PYELOGRAM  10/06/2021   • HERNIA REPAIR     • CA CYSTOURETHROSCOPY,URETER CATHETER Right 10/06/2021    Procedure: CYSTOSCOPY RETROGRADE PYELOGRAM WITH INSERTION STENT URETERAL, RIGHT URETEROSCOPY, STONE EXTRACTION;  Surgeon: Che Pisano MD;  Location: [facility] MAIN OR;  Service: Urology       Social History     Socioeconomic History   • Marital status: Single     Spouse name: None   • Number of children: None   • Years of education: None   • Highest education level: None   Occupational History   • None   Tobacco Use   • Smoking status: Former     Types: Pipe, Cigarettes     Quit date: 2003     Years since quittin 9   • Smokeless tobacco: Never   Vaping Use   • Vaping Use: Never used   Substance and Sexual Activity   • Alcohol use: Yes     Comment: Drinks a quart of moonshine/night-As of 21 will quit drinking   • Drug use: Yes     Types: Marijuana     Comment: Socially (1-2 Monthly)   • Sexual activity: None   Other Topics Concern   • None   Social History Narrative   • None     Social Determinants of Health     Financial Resource Strain: Not on file   Food Insecurity: No Food Insecurity   • Worried About Running Out of Food in the Last Year: Never true   • Ran Out of Food in the Last Year: Never true   Transportation Needs: No Transportation Needs   • Lack of Transportation (Medical): No   • Lack of Transportation (Non-Medical):  No   Physical Activity: Not on file   Stress: Not on file   Social Connections: Not on file   Intimate Partner Violence: Not on file   Housing Stability: Low Risk    • Unable to Pay for Housing in the Last Year: No   • Number of Places Lived in the Last Year: 1   • Unstable Housing in the Last Year: No       Family History   Problem Relation Age of Onset   • Diabetes Mother    • Thyroid disease Mother    • Cancer Father    • Thyroid disease Sister    • Depression Brother    • Cancer Maternal Aunt    • Cancer Paternal Uncle    • Cancer Paternal Grandmother    • No Known Problems Brother    • Thyroid disease Sister        Allergies   Allergen Reactions   • Tomato - Food Allergy Anaphylaxis     raw   • Poison Ivy Extract Hives   • Poison Sumisael Extract Hives         Current Outpatient Medications:   •  lisinopril (ZESTRIL) 10 mg tablet, Take 15 mg by mouth daily in the early morning, Disp: , Rfl:   •  PARoxetine (PAXIL) 20 mg tablet, Take 20 mg by mouth daily, Disp: , Rfl:   •  sulfamethoxazole-trimethoprim (BACTRIM DS) 800-160 mg per tablet, Take 1 tablet by mouth every 12 (twelve) hours for 7 days (Patient not taking: Reported on 11/17/2022), Disp: 14 tablet, Rfl: 0      Physical Exam:  /100 (BP Location: Right leg, Patient Position: Sitting, Cuff Size: Standard)   Pulse 85   Temp 97 6 °F (36 4 °C)   Resp 18   Ht 6' 1" (1 854 m)   Wt 92 4 kg (203 lb 9 6 oz)   SpO2 99%   BMI 26 86 kg/m²     Physical Exam  Constitutional:       Appearance: He is well-developed and well-nourished  HENT:      Head: Normocephalic and atraumatic  Mouth/Throat:      Mouth: Oropharynx is clear and moist    Eyes:      General: No scleral icterus  Right eye: No discharge  Left eye: No discharge  Extraocular Movements: EOM normal       Conjunctiva/sclera: Conjunctivae normal       Pupils: Pupils are equal, round, and reactive to light  Neck:      Thyroid: No thyromegaly  Trachea: No tracheal deviation  Cardiovascular:      Rate and Rhythm: Normal rate and regular rhythm  Heart sounds: Normal heart sounds  No murmur heard  No friction rub  Pulmonary:      Effort: Pulmonary effort is normal  No respiratory distress  Breath sounds: Normal breath sounds  No wheezing or rales  Chest:      Chest wall: No tenderness  Comments: Mild redness and irritation at the right anterior chest wall  Abdominal:      General: There is no distension  Palpations: Abdomen is soft  There is no hepatomegaly, splenomegaly or hepatosplenomegaly  Tenderness: There is no abdominal tenderness  There is no guarding or rebound  Comments: The mid abdominal scar is healing as expected with some redness  Musculoskeletal:         General: No tenderness, deformity or edema  Normal range of motion  Cervical back: Normal range of motion and neck supple  Lymphadenopathy:      Cervical: No cervical adenopathy  Skin:     General: Skin is warm and dry  Coloration: Skin is not pale  Findings: No erythema or rash  Neurological:      Mental Status: He is alert and oriented to person, place, and time  Cranial Nerves: No cranial nerve deficit  Coordination: Coordination normal       Deep Tendon Reflexes: Reflexes are normal and symmetric  Psychiatric:         Mood and Affect: Mood and affect normal          Behavior: Behavior normal          Thought Content: Thought content normal          Judgment: Judgment normal            Labs:  Lab Results   Component Value Date    WBC 9 56 10/21/2022    HGB 12 8 10/21/2022    HCT 36 8 10/21/2022    MCV 84 10/21/2022     10/21/2022     Lab Results   Component Value Date    K 3 5 10/21/2022     10/21/2022    CO2 29 10/21/2022    BUN 14 10/21/2022    CREATININE 0 85 10/21/2022    GLUF 130 (H) 10/21/2022    CALCIUM 9 0 10/21/2022    CORRECTEDCA 9 7 10/21/2022    AST 23 10/21/2022    ALT 20 10/21/2022    ALKPHOS 137 (H) 10/21/2022    EGFR 103 10/21/2022     No results found for: TSH    Patient voiced understanding and agreement in the above discussion  Aware to contact our office with questions/symptoms in the interim

## 2022-11-21 ENCOUNTER — HOSPITAL ENCOUNTER (OUTPATIENT)
Dept: INFUSION CENTER | Facility: HOSPITAL | Age: 47
Discharge: HOME | End: 2022-11-21
Attending: INTERNAL MEDICINE

## 2022-11-21 VITALS — TEMPERATURE: 97.7 F

## 2022-11-21 DIAGNOSIS — D3A.8 NEUROENDOCRINE TUMOR: Primary | ICD-10-CM

## 2022-11-21 DIAGNOSIS — C7A.8 NEUROENDOCRINE CARCINOMA OF SMALL BOWEL (HCC): ICD-10-CM

## 2022-11-21 RX ORDER — LANREOTIDE ACETATE 120 MG/.5ML
120 INJECTION SUBCUTANEOUS ONCE
OUTPATIENT
Start: 2022-12-19

## 2022-11-21 RX ORDER — LANREOTIDE ACETATE 120 MG/.5ML
120 INJECTION SUBCUTANEOUS ONCE
Status: COMPLETED | OUTPATIENT
Start: 2022-11-21 | End: 2022-11-21

## 2022-11-21 RX ADMIN — LANREOTIDE ACETATE 120 MG: 120 INJECTION SUBCUTANEOUS at 10:53

## 2022-11-21 NOTE — PROGRESS NOTES
Patient tolerated Lanreotide injection in right upper outer gluteal without reaction or issues  AVS given to patient  Patient ambulated off unit without incident  All personal belongings taken with patient

## 2022-12-06 ENCOUNTER — TELEPHONE (OUTPATIENT)
Dept: HEMATOLOGY ONCOLOGY | Facility: CLINIC | Age: 47
End: 2022-12-06

## 2022-12-06 NOTE — TELEPHONE ENCOUNTER
Called pt letting him know that I scheduled Eder for his PET Scan and his next f/u appt wit Dr Shahram Noel and to call back if any of the dates and times dont work for him

## 2022-12-07 ENCOUNTER — TELEPHONE (OUTPATIENT)
Dept: SURGERY | Facility: CLINIC | Age: 47
End: 2022-12-07

## 2022-12-07 NOTE — TELEPHONE ENCOUNTER
Patient called, he was upset and stated he wanted to cancel "all" his appointments  He would not confirm his name or  with me but I was able to find his chart  I did try to let him know that he should contact each physician he has appointments with to cancel those appointments  I spoke with Eliceo Peter (via Teams) and she will try to reach out to this patient  I did not cancel his appointment with Dr Sunil Miramontes with the hopes that he will reconsider once someone is able to speak with him  Patient is currently scheduled for follow with Dr Sunil Miramontes on 2023

## 2022-12-07 NOTE — TELEPHONE ENCOUNTER
Placed call to patient and left a message with my direct line to call back at  Patient is scheduled for infusions, imaging, Dr Wily Jurado, etc  Allegra Prophet from this month until May

## 2022-12-08 NOTE — TELEPHONE ENCOUNTER
Placed call to patient and left a message to return our call, as his PET scan is scheduled for next week

## 2022-12-12 NOTE — TELEPHONE ENCOUNTER
Placed call to patient and left a message to return our call, sending as an FYI as I know he has a PET scan scheduled in 2 days and has not answered his phone yet

## 2022-12-12 NOTE — TELEPHONE ENCOUNTER
Certified letter mailed to patient as we are unable to contact him, certified letter with number [phone]

## 2022-12-14 ENCOUNTER — HOSPITAL ENCOUNTER (OUTPATIENT)
Dept: RADIOLOGY | Age: 47
Discharge: HOME | End: 2022-12-14

## 2022-12-22 ENCOUNTER — TELEPHONE (OUTPATIENT)
Dept: HEMATOLOGY ONCOLOGY | Facility: CLINIC | Age: 47
End: 2022-12-22

## 2022-12-22 NOTE — TELEPHONE ENCOUNTER
LVM to the patient in regards to his lab work that needs to be completed prior to his appt on 1/5/23 at 1:00pm  Informed patient that I do see he also has a PET CT that will need to be scheduled as well  Informed patient if he has any questions or concerns he can give the office a call back at [phone]

## 2023-03-21 ENCOUNTER — HOSPITAL ENCOUNTER (INPATIENT)
Facility: HOSPITAL | Age: 48
LOS: 4 days | Discharge: HOME | End: 2023-03-27
Attending: EMERGENCY MEDICINE | Admitting: SPECIALIST

## 2023-03-21 ENCOUNTER — APPOINTMENT (EMERGENCY)
Dept: RADIOLOGY | Facility: HOSPITAL | Age: 48
End: 2023-03-21

## 2023-03-21 ENCOUNTER — APPOINTMENT (EMERGENCY)
Dept: CT IMAGING | Facility: HOSPITAL | Age: 48
End: 2023-03-21

## 2023-03-21 DIAGNOSIS — R10.9 ABDOMINAL PAIN: Primary | ICD-10-CM

## 2023-03-21 DIAGNOSIS — C7B.8 METASTATIC MALIGNANT NEUROENDOCRINE TUMOR TO LIVER (HCC): ICD-10-CM

## 2023-03-21 DIAGNOSIS — D3A.8 NEUROENDOCRINE TUMOR: ICD-10-CM

## 2023-03-21 LAB
2HR DELTA HS TROPONIN: 0 NG/L
4HR DELTA HS TROPONIN: 0 NG/L
ABO GROUP BLD: NORMAL
ALBUMIN SERPL BCP-MCNC: 4.6 G/DL (ref 3.5–5)
ALP SERPL-CCNC: 107 U/L (ref 34–104)
ALT SERPL W P-5'-P-CCNC: 8 U/L (ref 7–52)
ANION GAP SERPL CALCULATED.3IONS-SCNC: 9 MMOL/L (ref 4–13)
APTT PPP: 30 SECONDS (ref 23–37)
AST SERPL W P-5'-P-CCNC: 12 U/L (ref 13–39)
BASOPHILS # BLD AUTO: 0.02 THOUSANDS/ÂΜL (ref 0–0.1)
BASOPHILS NFR BLD AUTO: 0 % (ref 0–1)
BILIRUB SERPL-MCNC: 1.25 MG/DL (ref 0.2–1)
BLD GP AB SCN SERPL QL: NEGATIVE
BUN SERPL-MCNC: 17 MG/DL (ref 5–25)
CALCIUM SERPL-MCNC: 9.7 MG/DL (ref 8.4–10.2)
CARDIAC TROPONIN I PNL SERPL HS: 5 NG/L
CHLORIDE SERPL-SCNC: 98 MMOL/L (ref 96–108)
CO2 SERPL-SCNC: 27 MMOL/L (ref 21–32)
CREAT SERPL-MCNC: 0.78 MG/DL (ref 0.6–1.3)
EOSINOPHIL # BLD AUTO: 0.17 THOUSAND/ÂΜL (ref 0–0.61)
EOSINOPHIL NFR BLD AUTO: 2 % (ref 0–6)
ERYTHROCYTE [DISTWIDTH] IN BLOOD BY AUTOMATED COUNT: 13.8 % (ref 11.6–15.1)
GFR SERPL CREATININE-BSD FRML MDRD: 105 ML/MIN/1.73SQ M
GLUCOSE SERPL-MCNC: 111 MG/DL (ref 65–140)
HCT VFR BLD AUTO: 46.4 % (ref 36.5–49.3)
HGB BLD-MCNC: 16 G/DL (ref 12–17)
IMM GRANULOCYTES # BLD AUTO: 0.03 THOUSAND/UL (ref 0–0.2)
IMM GRANULOCYTES NFR BLD AUTO: 0 % (ref 0–2)
INR PPP: 1.07 (ref 0.84–1.19)
LACTATE SERPL-SCNC: 1.7 MMOL/L (ref 0.5–2)
LIPASE SERPL-CCNC: 22 U/L (ref 11–82)
LYMPHOCYTES # BLD AUTO: 1.51 THOUSANDS/ÂΜL (ref 0.6–4.47)
LYMPHOCYTES NFR BLD AUTO: 16 % (ref 14–44)
MCH RBC QN AUTO: 29 PG (ref 26.8–34.3)
MCHC RBC AUTO-ENTMCNC: 34.5 G/DL (ref 31.4–37.4)
MCV RBC AUTO: 84 FL (ref 82–98)
MONOCYTES # BLD AUTO: 0.37 THOUSAND/ÂΜL (ref 0.17–1.22)
MONOCYTES NFR BLD AUTO: 4 % (ref 4–12)
NEUTROPHILS # BLD AUTO: 7.41 THOUSANDS/ÂΜL (ref 1.85–7.62)
NEUTS SEG NFR BLD AUTO: 78 % (ref 43–75)
NRBC BLD AUTO-RTO: 0 /100 WBCS
PLATELET # BLD AUTO: 170 THOUSANDS/UL (ref 149–390)
PMV BLD AUTO: 8.8 FL (ref 8.9–12.7)
POTASSIUM SERPL-SCNC: 4 MMOL/L (ref 3.5–5.3)
PROCALCITONIN SERPL-MCNC: 0.09 NG/ML
PROT SERPL-MCNC: 8.1 G/DL (ref 6.4–8.4)
PROTHROMBIN TIME: 13.9 SECONDS (ref 11.6–14.5)
RBC # BLD AUTO: 5.51 MILLION/UL (ref 3.88–5.62)
RH BLD: POSITIVE
SODIUM SERPL-SCNC: 134 MMOL/L (ref 135–147)
SPECIMEN EXPIRATION DATE: NORMAL
WBC # BLD AUTO: 9.51 THOUSAND/UL (ref 4.31–10.16)

## 2023-03-21 RX ORDER — PAROXETINE HYDROCHLORIDE 20 MG/1
20 TABLET, FILM COATED ORAL DAILY
Status: DISCONTINUED | OUTPATIENT
Start: 2023-03-22 | End: 2023-03-27 | Stop reason: HOSPADM

## 2023-03-21 RX ORDER — AMLODIPINE BESYLATE 5 MG/1
5 TABLET ORAL ONCE
Status: COMPLETED | OUTPATIENT
Start: 2023-03-21 | End: 2023-03-21

## 2023-03-21 RX ORDER — ENOXAPARIN SODIUM 100 MG/ML
40 INJECTION SUBCUTANEOUS DAILY
Status: DISCONTINUED | OUTPATIENT
Start: 2023-03-22 | End: 2023-03-27 | Stop reason: HOSPADM

## 2023-03-21 RX ORDER — HYDROMORPHONE HCL/PF 1 MG/ML
0.5 SYRINGE (ML) INJECTION ONCE
Status: COMPLETED | OUTPATIENT
Start: 2023-03-21 | End: 2023-03-21

## 2023-03-21 RX ORDER — HYDROMORPHONE HCL/PF 1 MG/ML
1 SYRINGE (ML) INJECTION ONCE
Status: DISCONTINUED | OUTPATIENT
Start: 2023-03-21 | End: 2023-03-21

## 2023-03-21 RX ORDER — HYDROMORPHONE HCL/PF 1 MG/ML
0.5 SYRINGE (ML) INJECTION EVERY 2 HOUR PRN
Status: DISCONTINUED | OUTPATIENT
Start: 2023-03-21 | End: 2023-03-27 | Stop reason: HOSPADM

## 2023-03-21 RX ORDER — DEXTROSE, SODIUM CHLORIDE, AND POTASSIUM CHLORIDE 5; .45; .15 G/100ML; G/100ML; G/100ML
100 INJECTION INTRAVENOUS CONTINUOUS
Status: DISCONTINUED | OUTPATIENT
Start: 2023-03-21 | End: 2023-03-24

## 2023-03-21 RX ORDER — HYDROMORPHONE HCL/PF 1 MG/ML
1 SYRINGE (ML) INJECTION ONCE
Status: COMPLETED | OUTPATIENT
Start: 2023-03-21 | End: 2023-03-21

## 2023-03-21 RX ORDER — ONDANSETRON 2 MG/ML
4 INJECTION INTRAMUSCULAR; INTRAVENOUS EVERY 4 HOURS PRN
Status: DISCONTINUED | OUTPATIENT
Start: 2023-03-21 | End: 2023-03-21 | Stop reason: SDUPTHER

## 2023-03-21 RX ORDER — ONDANSETRON 2 MG/ML
4 INJECTION INTRAMUSCULAR; INTRAVENOUS EVERY 6 HOURS PRN
Status: DISCONTINUED | OUTPATIENT
Start: 2023-03-21 | End: 2023-03-27 | Stop reason: HOSPADM

## 2023-03-21 RX ORDER — AMOXICILLIN 250 MG
1 CAPSULE ORAL
Status: DISCONTINUED | OUTPATIENT
Start: 2023-03-21 | End: 2023-03-27 | Stop reason: HOSPADM

## 2023-03-21 RX ORDER — OXYCODONE HYDROCHLORIDE 5 MG/1
5 TABLET ORAL EVERY 4 HOURS PRN
Status: DISCONTINUED | OUTPATIENT
Start: 2023-03-21 | End: 2023-03-27 | Stop reason: HOSPADM

## 2023-03-21 RX ORDER — OXYCODONE HYDROCHLORIDE 10 MG/1
10 TABLET ORAL EVERY 4 HOURS PRN
Status: DISCONTINUED | OUTPATIENT
Start: 2023-03-21 | End: 2023-03-27 | Stop reason: HOSPADM

## 2023-03-21 RX ADMIN — AMLODIPINE BESYLATE 5 MG: 5 TABLET ORAL at 21:48

## 2023-03-21 RX ADMIN — SENNOSIDES AND DOCUSATE SODIUM 1 TABLET: 50; 8.6 TABLET ORAL at 23:04

## 2023-03-21 RX ADMIN — HYDROMORPHONE HYDROCHLORIDE 0.5 MG: 1 INJECTION, SOLUTION INTRAMUSCULAR; INTRAVENOUS; SUBCUTANEOUS at 19:24

## 2023-03-21 RX ADMIN — SODIUM CHLORIDE 1000 ML: 0.9 INJECTION, SOLUTION INTRAVENOUS at 18:47

## 2023-03-21 RX ADMIN — DEXTROSE, SODIUM CHLORIDE, AND POTASSIUM CHLORIDE 50 ML/HR: 5; .45; .15 INJECTION INTRAVENOUS at 23:04

## 2023-03-21 RX ADMIN — HYDROMORPHONE HYDROCHLORIDE 1 MG: 1 INJECTION, SOLUTION INTRAMUSCULAR; INTRAVENOUS; SUBCUTANEOUS at 18:27

## 2023-03-21 RX ADMIN — OXYCODONE HYDROCHLORIDE 10 MG: 10 TABLET ORAL at 21:48

## 2023-03-21 RX ADMIN — IOHEXOL 100 ML: 350 INJECTION, SOLUTION INTRAVENOUS at 19:25

## 2023-03-21 NOTE — ED PROVIDER NOTES
History  Chief Complaint   Patient presents with   • Abdominal Pain     50-year-old male presenting to the ED for severe abdominal pain  States that this is been ongoing for 5 or 6 days  Per patient and family members he was diagnosed with some form of stomach cancer and had surgery remove some of the tumors last year but there were reportedly more tumors  Patient denies fevers, sore throat, cough, chest pain, shortness of breath, nausea vomiting, diarrhea, dysuria, hematuria  Patient states the pain is severe and in the upper abdomen and is constant, last BM was reportedly 3 days ago  Prior to Admission Medications   Prescriptions Last Dose Informant Patient Reported? Taking?    PARoxetine (PAXIL) 20 mg tablet 3/21/2023  Yes Yes   Sig: Take 20 mg by mouth daily   lisinopril (ZESTRIL) 10 mg tablet 3/21/2023  Yes Yes   Sig: Take 15 mg by mouth daily in the early morning      Facility-Administered Medications: None       Past Medical History:   Diagnosis Date   • Allergic    • Bipolar disorder in partial remission ([facility] [address] )    • Erectile dysfunction     unspecified erectile dysfunction type   • Hypertension    • Kidney stone        Past Surgical History:   Procedure Laterality Date   • COLONOSCOPY     • EXPLORATORY LAPAROTOMY W/ BOWEL RESECTION N/A 7/21/2022    Procedure: LAPAROTOMY EXPLORATORY W/ SMALL BOWEL RESECTION, liver biopsy;  Surgeon: Sheila Funes MD;  Location:  MAIN OR;  Service: General   • FL RETROGRADE PYELOGRAM  10/06/2021   • HERNIA REPAIR     • SC CYSTO BLADDER W/URETERAL CATHETERIZATION Right 10/06/2021    Procedure: CYSTOSCOPY RETROGRADE PYELOGRAM WITH INSERTION STENT URETERAL, RIGHT URETEROSCOPY, STONE EXTRACTION;  Surgeon: Yousuf Bradley MD;  [facility] MA[facility] OR;  Service: Urology       Family History   Problem Relation Age of Onset   • Diabetes Mother    • Thyroid disease Mother    • Cancer Father    • Thyroid disease Sister    • Depression Brother    • Cancer Maternal Aunt    • Cancer Paternal Uncle    • Cancer Paternal Grandmother    • No Known Problems Brother    • Thyroid disease Sister      I have reviewed and agree with the history as documented  E-Cigarette/Vaping   • E-Cigarette Use Never User      E-Cigarette/Vaping Substances     Social History     Tobacco Use   • Smoking status: Former     Types: Pipe, Cigarettes     Quit date: 2003     Years since quittin 2   • Smokeless tobacco: Never   Vaping Use   • Vaping Use: Never used   Substance Use Topics   • Alcohol use: Yes     Comment: Drinks a quart of moonshine/night-As of 21 will quit drinking   • Drug use: Yes     Types: Marijuana     Comment: Socially (1-2 Monthly)       Review of Systems   Gastrointestinal: Positive for abdominal pain and constipation  All other systems reviewed and are negative  Physical Exam  Physical Exam  Vitals and nursing note reviewed  Constitutional:       General: He is not in acute distress  Appearance: He is well-developed  He is not diaphoretic  HENT:      Head: Normocephalic and atraumatic  Right Ear: External ear normal       Left Ear: External ear normal       Nose: Nose normal    Eyes:      General: No scleral icterus  Right eye: No discharge  Left eye: No discharge  Conjunctiva/sclera: Conjunctivae normal    Cardiovascular:      Rate and Rhythm: Normal rate and regular rhythm  Heart sounds: Normal heart sounds  No murmur heard  No friction rub  No gallop  Pulmonary:      Effort: Pulmonary effort is normal  No respiratory distress  Breath sounds: Normal breath sounds  No wheezing or rales  Abdominal:      General: Bowel sounds are normal  There is no distension  Palpations: Abdomen is soft  There is no mass  Tenderness: There is no abdominal tenderness  There is no guarding  Musculoskeletal:         General: No tenderness or deformity  Normal range of motion        Cervical back: Normal range of motion and neck supple  Skin:     General: Skin is warm and dry  Coloration: Skin is not pale  Findings: No erythema or rash  Neurological:      Mental Status: He is alert and oriented to person, place, and time  Psychiatric:         Behavior: Behavior normal          Thought Content:  Thought content normal          Judgment: Judgment normal          Vital Signs  ED Triage Vitals   Temperature Pulse Respirations Blood Pressure SpO2   03/21/23 1821 03/21/23 1820 03/21/23 1820 03/21/23 1820 03/21/23 1820   (!) 97 4 °F (36 3 °C) 91 22 (!) 213/122 98 %      Temp Source Heart Rate Source Patient Position - Orthostatic VS BP Location FiO2 (%)   03/21/23 1845 03/21/23 1820 03/21/23 1820 03/21/23 1820 --   Tympanic Monitor Lying Left arm       Pain Score       03/21/23 1820       10 - Worst Possible Pain           Vitals:    03/21/23 2145 03/21/23 2238 03/22/23 0622 03/22/23 0743   BP: (!) 184/114 (!) 191/122 (!) 184/119 (!) 183/128   Pulse: 84 79 80 95   Patient Position - Orthostatic VS:             Visual Acuity  Visual Acuity    Flowsheet Row Most Recent Value   L Pupil Size (mm) 3   R Pupil Size (mm) 3   L Pupil Shape Round   R Pupil Shape Round          ED Medications  Medications   oxyCODONE (ROXICODONE) IR tablet 5 mg (has no administration in time range)   oxyCODONE (ROXICODONE) immediate release tablet 10 mg (10 mg Oral Given 3/22/23 0754)   HYDROmorphone (DILAUDID) injection 0 5 mg (0 5 mg Intravenous Given 3/22/23 0914)   senna-docusate sodium (SENOKOT S) 8 6-50 mg per tablet 1 tablet (1 tablet Oral Given 3/21/23 2304)   naloxone (NARCAN) 0 04 mg/mL syringe 0 04 mg (has no administration in time range)   dextrose 5 % and sodium chloride 0 45 % with KCl 20 mEq/L infusion (50 mL/hr Intravenous New Bag 3/22/23 1148)   ondansetron (ZOFRAN) injection 4 mg (has no administration in time range)   enoxaparin (LOVENOX) subcutaneous injection 40 mg (40 mg Subcutaneous Given 3/22/23 5903)   lisinopril (ZESTRIL) tablet 15 mg (15 mg Oral Given 3/22/23 0623)   PARoxetine (PAXIL) tablet 20 mg (20 mg Oral Given 3/22/23 0909)   polyethylene glycol (MIRALAX) packet 17 g (17 g Oral Given 3/22/23 1150)   octreotide (SandoSTATIN) injection 100 mcg (100 mcg Subcutaneous Given 3/22/23 1341)   HYDROmorphone (DILAUDID) injection 1 mg (1 mg Intravenous Given 3/21/23 1827)   sodium chloride 0 9 % bolus 1,000 mL (0 mL Intravenous Stopped 3/21/23 2047)   HYDROmorphone (DILAUDID) injection 0 5 mg (0 5 mg Intravenous Given 3/21/23 1924)   iohexol (OMNIPAQUE) 350 MG/ML injection (SINGLE-DOSE) 100 mL (100 mL Intravenous Given 3/21/23 1925)   amLODIPine (NORVASC) tablet 5 mg (5 mg Oral Given 3/21/23 2148)       Diagnostic Studies  Results Reviewed     Procedure Component Value Units Date/Time    Lipase [[id number]]  (Normal) Collected: 03/22/23 0434    Lab Status: Final result Specimen: Blood from Arm, Right Updated: 03/22/23 0531     Lipase 13 u/L     Rapid drug screen, urine [[id number]]  (Abnormal) Collected: 03/22/23 0434    Lab Status: Final result Specimen: Urine, Clean Catch Updated: 03/22/23 0501     Amph/Meth UR Negative     Barbiturate Ur Negative     Benzodiazepine Urine Negative     Cocaine Urine Negative     Methadone Urine Negative     Opiate Urine Positive     PCP Ur Negative     THC Urine Negative     Oxycodone Urine Positive    Narrative:      Presumptive report  If requested, specimen will be sent to reference lab for confirmation  FOR MEDICAL PURPOSES ONLY  IF CONFIRMATION NEEDED PLEASE CONTACT THE LAB WITHIN 5 DAYS      Drug Screen Cutoff Levels:  AMPHETAMINE/METHAMPHETAMINES  1000 ng/mL  BARBITURATES     200 ng/mL  BENZODIAZEPINES     200 ng/mL  COCAINE      300 ng/mL  METHADONE      300 ng/mL  OPIATES      300 ng/mL  PHENCYCLIDINE     25 ng/mL  THC       50 ng/mL  OXYCODONE      100 ng/mL    Blood culture #1 [[id number]] Collected: 03/21/23 1830    Lab Status: Preliminary result Specimen: Blood from Arm, Right Updated: 03/22/23 0101 Blood Culture Received in Microbiology Lab  Culture in Progress  Blood culture #2 [[id number]] Collected: 03/21/23 1830    Lab Status: Preliminary result Specimen: Blood from Arm, Right Updated: 03/22/23 0101     Blood Culture Received in Microbiology Lab  Culture in Progress      HS Troponin I 4hr [[id number]]  (Normal) Collected: 03/21/23 2255    Lab Status: Final result Specimen: Blood from Arm, Left Updated: 03/21/23 2358     hs TnI 4hr 5 ng/L      Delta 4hr hsTnI 0 ng/L     HS Troponin I 2hr [[id number]]  (Normal) Collected: 03/21/23 2151    Lab Status: Final result Specimen: Blood from Arm, Left Updated: 03/21/23 2219     hs TnI 2hr 5 ng/L      Delta 2hr hsTnI 0 ng/L     Procalcitonin [[id number]]  (Normal) Collected: 03/21/23 1830    Lab Status: Final result Specimen: Blood from Arm, Right Updated: 03/21/23 1911     Procalcitonin 0 09 ng/ml     HS Troponin 0hr (reflex protocol) [[id number]]  (Normal) Collected: 03/21/23 1830    Lab Status: Final result Specimen: Blood from Arm, Right Updated: 03/21/23 1908     hs TnI 0hr 5 ng/L     Comprehensive metabolic panel [[id number]]  (Abnormal) Collected: 03/21/23 1830    Lab Status: Final result Specimen: Blood from Arm, Right Updated: 03/21/23 1902     Sodium 134 mmol/L      Potassium 4 0 mmol/L      Chloride 98 mmol/L      CO2 27 mmol/L      ANION GAP 9 mmol/L      BUN 17 mg/dL      Creatinine 0 78 mg/dL      Glucose 111 mg/dL      Calcium 9 7 mg/dL      AST 12 U/L      ALT 8 U/L      Alkaline Phosphatase 107 U/L      Total Protein 8 1 g/dL      Albumin 4 6 g/dL      Total Bilirubin 1 25 mg/dL      eGFR 105 ml/min/1 73sq m     Narrative:      [place] guidelines for Chronic Kidney Disease (CKD):   •  Stage 1 with normal or high GFR (GFR > 90 mL/min/1 73 square meters)  •  Stage 2 Mild CKD (GFR = 60-89 mL/min/1 73 square meters)  •  Stage 3A Moderate CKD (GFR = 45-59 mL/min/1 73 square meters)  •  Stage 3B Moderate CKD (GFR = 30-44 mL/min/1 73 square meters)  •  Stage 4 Severe CKD (GFR = 15-29 mL/min/1 73 square meters)  •  Stage 5 End Stage CKD (GFR <15 mL/min/1 73 square meters)  Note: GFR calculation is accurate only with a steady state creatinine    Lipase [[id number]]  (Normal) Collected: 03/21/23 1830    Lab Status: Final result Specimen: Blood from Arm, Right Updated: 03/21/23 1902     Lipase 22 u/L     Lactic acid [[id number]]  (Normal) Collected: 03/21/23 1830    Lab Status: Final result Specimen: Blood from Arm, Right Updated: 03/21/23 1900     LACTIC ACID 1 7 mmol/L     Narrative:      Result may be elevated if tourniquet was used during collection      Protime-INR [[id number]]  (Normal) Collected: 03/21/23 1830    Lab Status: Final result Specimen: Blood from Arm, Right Updated: 03/21/23 1855     Protime 13 9 seconds      INR 1 07    APTT [[id number]]  (Normal) Collected: 03/21/23 1830    Lab Status: Final result Specimen: Blood from Arm, Right Updated: 03/21/23 1855     PTT 30 seconds     CBC and differential [[id number]]  (Abnormal) Collected: 03/21/23 1830    Lab Status: Final result Specimen: Blood from Arm, Right Updated: 03/21/23 1843     WBC 9 51 Thousand/uL      RBC 5 51 Million/uL      Hemoglobin 16 0 g/dL      Hematocrit 46 4 %      MCV 84 fL      MCH 29 0 pg      MCHC 34 5 g/dL      RDW 13 8 %      MPV 8 8 fL      Platelets 641 Thousands/uL      nRBC 0 /100 WBCs      Neutrophils Relative 78 %      Immat GRANS % 0 %      Lymphocytes Relative 16 %      Monocytes Relative 4 %      Eosinophils Relative 2 %      Basophils Relative 0 %      Neutrophils Absolute 7 41 Thousands/µL      Immature Grans Absolute 0 03 Thousand/uL      Lymphocytes Absolute 1 51 Thousands/µL      Monocytes Absolute 0 37 Thousand/µL      Eosinophils Absolute 0 17 Thousand/µL      Basophils Absolute 0 02 Thousands/µL                  CT abdomen pelvis with contrast   Final Result by Madhuri Mccormack MD (03/21 2054)      Stable mesenteric masses/nodes compatible with neuroendocrine tumor  Stable right lobe liver lesion measuring 1 2 cm  No acute inflammatory process identified  Workstation performed: [id number]         XR chest 1 view portable   Final Result by Marta Holland MD (03/22 0630)      No acute cardiopulmonary disease  Findings are stable, although the previous 9 mm right upper lobe nodule is not well visualized            Workstation performed: [id number]                    Procedures  ECG 12 Lead Documentation Only    Date/Time: 3/21/2023 6:34 PM  Performed by: Marian Whitfield DO  Authorized by: Marian Whitfield DO     Interpretation:     Interpretation: abnormal    Rate:     ECG rate:  85    ECG rate assessment: normal    Rhythm:     Rhythm: sinus rhythm    Ectopy:     Ectopy: none    QRS:     QRS axis:  Left    QRS intervals:  Normal  Conduction:     Conduction: normal    ST segments:     ST segments:  Normal  T waves:     T waves: normal               ED Course             HEART Risk Score    Flowsheet Row Most Recent Value   Heart Score Risk Calculator    History 0 Filed at: 03/22/2023 1435   ECG 0 Filed at: 03/22/2023 1435   Age 1 Filed at: 03/22/2023 1435   Risk Factors 1 Filed at: 03/22/2023 1435   Troponin 0 Filed at: 03/22/2023 1435   HEART Score 2 Filed at: 03/22/2023 1435                        SBIRT 22yo+    Flowsheet Row Most Recent Value   SBIRT (23 yo +)    In order to provide better care to our patients, we are screening all of our patients for alcohol and drug use  Would it be okay to ask you these screening questions? Yes Filed at: 03/21/2023 1853   Initial Alcohol Screen: US AUDIT-C     1  How often do you have a drink containing alcohol? 0 Filed at: 03/21/2023 1853   2  How many drinks containing alcohol do you have on a typical day you are drinking? 0 Filed at: 03/21/2023 1853   3a  Male UNDER 65: How often do you have five or more drinks on one occasion? 0 Filed at: 03/21/2023 1853   3b  "FEMALE Any Age, or MALE 65+: How often do you have 4 or more drinks on one occassion? 0 Filed at: 03/21/2023 1853   Audit-C Score 0 Filed at: 03/21/2023 1853   JURGEN: How many times in the past year have you    Used an illegal drug or used a prescription medication for non-medical reasons? Once or Twice Filed at: 03/21/2023 1853   DAST-10: In the past 12 months       1  Have you used drugs other than those required for medical reasons? 0 Filed at: 03/21/2023 1853   2  Do you use more than one drug at a time? 0 Filed at: 03/21/2023 1853   3  Have you had medical problems as a result of your drug use (e g , memory loss, hepatitis, convulsions, bleeding, etc )? 0 Filed at: 03/21/2023 1853   4  Have you had \"blackouts\" or \"flashbacks\" as a result of drug use? YesNo 0 Filed at: 03/21/2023 1853   5  Do you ever feel bad or guilty about your drug use? 0 Filed at: 03/21/2023 1853   6  Does your spouse (or parent) ever complain about your involvement with drugs? 0 Filed at: 03/21/2023 1853   7  Have you neglected your family because of your use of drugs? 0 Filed at: 03/21/2023 1853   8  Have you engaged in illegal activities in order to obtain drugs? 0 Filed at: 03/21/2023 1853   9  Have you ever experienced withdrawal symptoms (felt sick) when you stopped taking drugs? 0 Filed at: 03/21/2023 1853   10  Are you always able to stop using drugs when you want to? 0 Filed at: 03/21/2023 1853   DAST-10 Score 0 Filed at: 03/21/2023 Merlyn Pedraza Making  Patient with history of what appears to be neuroendocrine tumor based on chart review presenting for significant abdominal pain for the last 5 to 6 days with no reported bowel movement last 3 days  Septic work-up along with EKG and troponin and CT abdomen pelvis and pain control  We will give IV fluids      Case discussed with on call General Surgery as patient with severe uncontrolled abdominal pain likely related to underlying Neuroendocrine tumors " without obvious acute process noted on CT scans/bloodwork  Patient admitted to their service as observation for pain control and further evaluation  Amount and/or Complexity of Data Reviewed  Labs: ordered  Radiology: ordered  Risk  OTC drugs  Prescription drug management  Decision regarding hospitalization  Disposition  Final diagnoses:   Abdominal pain   Neuroendocrine tumor     Time reflects when diagnosis was documented in both MDM as applicable and the Disposition within this note     Time User Action Codes Description Comment    3/21/2023  6:31 PM Ning Moore Add [Q69 231] Right knee pain     3/21/2023  9:26 PM Yina Damme [Q82 431] Right knee pain     3/21/2023  9:28 PM Jignesh Alosa Add [R10 9] Abdominal pain     3/21/2023  9:29 PM Jignesh Alosa Add [C7A 8] Neuroendocrine cancer ([facility] [address] )     3/21/2023  9:29 PM Jignesh Alosa Remove [C7A 8] Neuroendocrine cancer ([facility] [address] )     3/21/2023  9:29 PM Jignesh Alosa Add [D3A 8] Neuroendocrine tumor     3/22/2023  2:11 PM Rosana COX Add [C7B 8] 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 [facility])       ED Disposition     ED Disposition   Admit    Condition   Stable    Date/Time   Tue Mar 21, 2023  9:26 PM    Comment   Case was discussed with General Surgery and the patient's admission status was agreed to be Admission Status: observation status to the service of Dr Gala Dorado  Follow-up Information     Follow up With Specialties Details Why Tasha [address], DO Family Medicine   [address]  [phone]            Current Discharge Medication List      CONTINUE these medications which have NOT CHANGED    Details   lisinopril (ZESTRIL) 10 mg tablet Take 15 mg by mouth daily in the early morning      PARoxetine (PAXIL) 20 mg tablet Take 20 mg by mouth daily             No discharge procedures on file      PDMP Review     None          ED Provider  Electronically Signed by           Sindy Vegas, DO  03/22/23 7699

## 2023-03-22 LAB
AMPHETAMINES SERPL QL SCN: NEGATIVE
ATRIAL RATE: 85 BPM
ATRIAL RATE: 88 BPM
BARBITURATES UR QL: NEGATIVE
BENZODIAZ UR QL: NEGATIVE
COCAINE UR QL: NEGATIVE
LIPASE SERPL-CCNC: 13 U/L (ref 11–82)
METHADONE UR QL: NEGATIVE
OPIATES UR QL SCN: POSITIVE
OXYCODONE+OXYMORPHONE UR QL SCN: POSITIVE
P AXIS: 45 DEGREES
P AXIS: 57 DEGREES
PCP UR QL: NEGATIVE
PR INTERVAL: 152 MS
PR INTERVAL: 160 MS
QRS AXIS: -43 DEGREES
QRS AXIS: -47 DEGREES
QRSD INTERVAL: 80 MS
QRSD INTERVAL: 82 MS
QT INTERVAL: 350 MS
QT INTERVAL: 354 MS
QTC INTERVAL: 416 MS
QTC INTERVAL: 428 MS
T WAVE AXIS: 32 DEGREES
T WAVE AXIS: 49 DEGREES
THC UR QL: NEGATIVE
VENTRICULAR RATE: 85 BPM
VENTRICULAR RATE: 88 BPM

## 2023-03-22 RX ORDER — LANREOTIDE ACETATE 120 MG/.5ML
90 INJECTION SUBCUTANEOUS ONCE
Status: DISCONTINUED | OUTPATIENT
Start: 2023-03-22 | End: 2023-03-22

## 2023-03-22 RX ORDER — OCTREOTIDE ACETATE 100 UG/ML
100 INJECTION, SOLUTION INTRAVENOUS; SUBCUTANEOUS EVERY 8 HOURS SCHEDULED
Status: DISCONTINUED | OUTPATIENT
Start: 2023-03-22 | End: 2023-03-27 | Stop reason: HOSPADM

## 2023-03-22 RX ORDER — POLYETHYLENE GLYCOL 3350 17 G/17G
17 POWDER, FOR SOLUTION ORAL DAILY
Status: DISCONTINUED | OUTPATIENT
Start: 2023-03-22 | End: 2023-03-27 | Stop reason: HOSPADM

## 2023-03-22 RX ORDER — METOCLOPRAMIDE HYDROCHLORIDE 5 MG/ML
10 INJECTION INTRAMUSCULAR; INTRAVENOUS EVERY 6 HOURS PRN
Status: DISCONTINUED | OUTPATIENT
Start: 2023-03-22 | End: 2023-03-23

## 2023-03-22 RX ADMIN — DEXTROSE, SODIUM CHLORIDE, AND POTASSIUM CHLORIDE 150 ML/HR: 5; .45; .15 INJECTION INTRAVENOUS at 19:18

## 2023-03-22 RX ADMIN — DEXTROSE, SODIUM CHLORIDE, AND POTASSIUM CHLORIDE 50 ML/HR: 5; .45; .15 INJECTION INTRAVENOUS at 11:48

## 2023-03-22 RX ADMIN — ONDANSETRON 4 MG: 2 INJECTION INTRAMUSCULAR; INTRAVENOUS at 14:37

## 2023-03-22 RX ADMIN — OXYCODONE HYDROCHLORIDE 10 MG: 10 TABLET ORAL at 22:29

## 2023-03-22 RX ADMIN — LISINOPRIL 15 MG: 10 TABLET ORAL at 06:23

## 2023-03-22 RX ADMIN — PAROXETINE HYDROCHLORIDE 20 MG: 20 TABLET, FILM COATED ORAL at 09:09

## 2023-03-22 RX ADMIN — POLYETHYLENE GLYCOL 3350 17 G: 17 POWDER, FOR SOLUTION ORAL at 11:50

## 2023-03-22 RX ADMIN — HYDROMORPHONE HYDROCHLORIDE 0.5 MG: 1 INJECTION, SOLUTION INTRAMUSCULAR; INTRAVENOUS; SUBCUTANEOUS at 09:14

## 2023-03-22 RX ADMIN — ENOXAPARIN SODIUM 40 MG: 40 INJECTION SUBCUTANEOUS at 09:10

## 2023-03-22 RX ADMIN — OCTREOTIDE ACETATE 100 MCG: 100 INJECTION, SOLUTION INTRAVENOUS; SUBCUTANEOUS at 22:29

## 2023-03-22 RX ADMIN — OCTREOTIDE ACETATE 100 MCG: 100 INJECTION, SOLUTION INTRAVENOUS; SUBCUTANEOUS at 13:41

## 2023-03-22 RX ADMIN — SENNOSIDES AND DOCUSATE SODIUM 1 TABLET: 50; 8.6 TABLET ORAL at 22:29

## 2023-03-22 RX ADMIN — OXYCODONE HYDROCHLORIDE 10 MG: 10 TABLET ORAL at 07:54

## 2023-03-22 NOTE — QUICK NOTE
Discussed with the patient at some length  His primary concern is intractable epigastric pain, as well as ongoing issues with constipation  The patient has not received lanreotide for some time, and has shunned treatment from Dr Yamileth Horvath, but is now willing to resume outpatient care  Started octreotide 100 mcg q 8 hr, as lanreotide is not available through the inpatient pharmacy  MiraLax ordered  With transient response to oxycodone  His social situation is now apparently more stable, and he should be more compliant with follow up care

## 2023-03-22 NOTE — UTILIZATION REVIEW
"Initial Clinical Review    Admission: Date/Time/Statement:   Admission Orders (From admission, onward)     Ordered        03/21/23 2124  Place in Observation  Once                      Orders Placed This Encounter   Procedures   • Place in Observation     Standing Status:   Standing     Number of Occurrences:   1     Order Specific Question:   Level of Care     Answer:   Med Surg [16]     ED Arrival Information     Expected   -    Arrival   3/21/2023 18:16    Acuity   Emergent            Means of arrival   Wheelchair    Escorted by   Family Member    Service   Surgery-General    Admission type   Emergency            Arrival complaint   abdominal pain           Chief Complaint   Patient presents with   • Abdominal Pain       Initial Presentation: 51 y o  male with past medical history significant for bipolar disorder, HTN, neuroendocrine tumor s/p exploratory laparotomy with small bowel resection and liver biopsy in July 2022 presenting with epigastric abdominal pain and dizziness  Patient reports he has had multiple episodes of dizziness with at least 2 falls at home  Patient reports that his legs \"gave out\" as he was walking  Patient required assistance of 2 family members/friends after episodes  Associated with blurred vision and hot flashes  Additionally, with onset of dizziness patient notes epigastric abdominal pain  He describes pain as squeezing in character  Plan: Observation for abdominal pain: regular diet, IV fluids, antiemetics and algesics as needed, initiate octretide 100 mcg q 8 hrs, serial abd exams  Miralax  3/22:    Surgery: Patient appears uncomfortable but in no acute distress  Rates his pain at 10/10  Abdomen soft, tender with epigastric palpation, bowel sounds hypoactive  Octreotide ordered, begin bowel regime with MiraLax      ED Triage Vitals   Temperature Pulse Respirations Blood Pressure SpO2   03/21/23 1821 03/21/23 1820 03/21/23 1820 03/21/23 1820 03/21/23 1820   (!) 97 4 °F (36 3 " °C) 91 22 (!) 213/122 98 %      Temp Source Heart Rate Source Patient Position - Orthostatic VS BP Location FiO2 (%)   03/21/23 1845 03/21/23 1820 03/21/23 1820 03/21/23 1820 --   Tympanic Monitor Lying Left arm       Pain Score       03/21/23 1820       10 - Worst Possible Pain          Wt Readings from Last 1 Encounters:   03/21/23 95 kg (209 lb 5 2 oz)     Additional Vital Signs:     Date/Time Temp Pulse Resp BP MAP (mmHg) SpO2 O2 Device   03/22/23 07:43:11 96 6 °F (35 9 °C) Abnormal  95 20 183/128 Abnormal  146 98 % --   03/22/23 06:22:02 -- 80 -- 184/119 Abnormal  141 98 % --   03/21/23 22:38:34 98 °F (36 7 °C) 79 18 191/122 Abnormal  145 98 % --   03/21/23 2145 -- 84 26 Abnormal  184/114 Abnormal  146 97 % --   03/21/23 2100 -- 73 14 194/108 Abnormal  144 97 % --   03/21/23 2050 -- 76 19 202/111 Abnormal  147 97 % --   03/21/23 2030 -- 76 20 202/112 Abnormal  149 98 % --   03/21/23 2020 -- 75 16 203/117 Abnormal  152 98 % --   03/21/23 2000 -- 78 16 199/116 Abnormal  150 98 % --   03/21/23 1950 -- 79 14 199/116 Abnormal  149 97 % --   03/21/23 1930 -- 83 15 197/109 Abnormal  149 98 % --   03/21/23 1900 -- 80 19 206/117 Abnormal  150 97 % None (Room air)   03/21/23 1845 -- 84 21 201/118 Abnormal  153 97 % None (Room air)   03/21/23 1821 97 4 °F (36 3 °C) Abnormal  -- -- -- -- -- --     Pertinent Labs/Diagnostic Test Results:   CT abdomen pelvis with contrast   Final Result by Allen Hardy MD (03/21 2054)      Stable mesenteric masses/nodes compatible with neuroendocrine tumor  Stable right lobe liver lesion measuring 1 2 cm  No acute inflammatory process identified  Workstation performed: [id number]         XR chest 1 view portable   Final Result by Edgardo Romero MD (03/22 0630)      No acute cardiopulmonary disease        Findings are stable, although the previous 9 mm right upper lobe nodule is not well visualized            Workstation performed: [id number]               Results from last 7 days   Lab Units 03/21/23  1830   WBC Thousand/uL 9 51   HEMOGLOBIN g/dL 16 0   HEMATOCRIT % 46 4   PLATELETS Thousands/uL 170   NEUTROS ABS Thousands/µL 7 41         Results from last 7 days   Lab Units 03/21/23  1830   SODIUM mmol/L 134*   POTASSIUM mmol/L 4 0   CHLORIDE mmol/L 98   CO2 mmol/L 27   ANION GAP mmol/L 9   BUN mg/dL 17   CREATININE mg/dL 0 78   EGFR ml/min/1 73sq m 105   CALCIUM mg/dL 9 7     Results from last 7 days   Lab Units 03/21/23  1830   AST U/L 12*   ALT U/L 8   ALK PHOS U/L 107*   TOTAL PROTEIN g/dL 8 1   ALBUMIN g/dL 4 6   TOTAL BILIRUBIN mg/dL 1 25*         Results from last 7 days   Lab Units 03/21/23  1830   GLUCOSE RANDOM mg/dL 111           Results from last 7 days   Lab Units 03/21/23  2255 03/21/23  2151 03/21/23  1830   HS TNI 0HR ng/L  --   --  5   HS TNI 2HR ng/L  --  5  --    HSTNI D2 ng/L  --  0  --    HS TNI 4HR ng/L 5  --   --    HSTNI D4 ng/L 0  --   --          Results from last 7 days   Lab Units 03/21/23  1830   PROTIME seconds 13 9   INR  1 07   PTT seconds 30         Results from last 7 days   Lab Units 03/21/23  1830   PROCALCITONIN ng/ml 0 09     Results from last 7 days   Lab Units 03/21/23  1830   LACTIC ACID mmol/L 1 7           Results from last 7 days   Lab Units 03/22/23  0434 03/21/23  1830   LIPASE u/L 13 22           Results from last 7 days   Lab Units 03/22/23  0434   AMPH/METH  Negative   BARBITURATE UR  Negative   BENZODIAZEPINE UR  Negative   COCAINE UR  Negative   METHADONE URINE  Negative   OPIATE UR  Positive*   PCP UR  Negative   THC UR  Negative         Results from last 7 days   Lab Units 03/21/23  1830   BLOOD CULTURE  Received in Microbiology Lab  Culture in Progress  Received in Microbiology Lab  Culture in Progress                 ED Treatment:   Medication Administration from 03/21/2023 1816 to 03/21/2023 2214       Date/Time Order Dose Route Action     03/21/2023 1827 EDT HYDROmorphone (DILAUDID) injection 1 mg 1 mg Intravenous Given     03/21/2023 1847 EDT sodium chloride 0 9 % bolus 1,000 mL 1,000 mL Intravenous New Bag     03/21/2023 1924 EDT HYDROmorphone (DILAUDID) injection 0 5 mg 0 5 mg Intravenous Given     03/21/2023 1925 EDT iohexol (OMNIPAQUE) 350 MG/ML injection (SINGLE-DOSE) 100 mL 100 mL Intravenous Given     03/21/2023 2148 EDT amLODIPine (NORVASC) tablet 5 mg 5 mg Oral Given     03/21/2023 2148 EDT oxyCODONE (ROXICODONE) immediate release tablet 10 mg 10 mg Oral Given        Past Medical History:   Diagnosis Date   • Allergic    • Bipolar disorder in partial remission (HCC)    • Erectile dysfunction     unspecified erectile dysfunction type   • Hypertension    • Kidney stone      Present on Admission:  **None**      Admitting Diagnosis: Abdominal pain [R10 9]  Neuroendocrine tumor [D3A 8]  Age/Sex: 51 y o  male  Admission Orders:  Scheduled Medications:  enoxaparin, 40 mg, Subcutaneous, Daily  lisinopril, 15 mg, Oral, Early Morning  octreotide, 100 mcg, Subcutaneous, Q8H BERENICE  PARoxetine, 20 mg, Oral, Daily  polyethylene glycol, 17 g, Oral, Daily  senna-docusate sodium, 1 tablet, Oral, HS      Continuous IV Infusions:  dextrose 5 % and sodium chloride 0 45 % with KCl 20 mEq/L, 150 mL/hr, Intravenous, Continuous      PRN Meds:  HYDROmorphone, 0 5 mg, Intravenous, Q2H PRN  metoclopramide, 10 mg, Intravenous, Q6H PRN  naloxone, 0 04 mg, Intravenous, Q1MIN PRN  ondansetron, 4 mg, Intravenous, Q6H PRN  oxyCODONE, 10 mg, Oral, Q4H PRN  oxyCODONE, 5 mg, Oral, Q4H PRN        IP CONSULT TO PALLIATIVE CARE    Network Utilization Review Department  ATTENTION: Please call with any questions or concerns to [phone] and carefully listen to the prompts so that you are directed to the right person  All voicemails are confidential   [facility] all requests for admission clinical reviews, approved or denied determinations and any other requests to dedicated fax number below belonging to the campus where the patient is receiving treatment   List of dedicated fax numbers for the Facilities:  [address] [place] [address] DENIALS (Administrative/Medical Necessity) [phone]   [address] (Maternity/NICU/Pediatrics) [phone]   [address] Ariella Fernández [phone]   To [place]  [phone]   [address] [place] [address] [facility] [phone]   [address] [facility] Adrianna Szymanski Critical access hospital [address] [phone]

## 2023-03-22 NOTE — CASE MANAGEMENT
Case Management Assessment & Discharge Planning Note    Patient name Pia Cody  Location /-51 MRN [id number]  : 1975 Date 3/22/2023       Current Admission Date: 3/21/2023  Current Admission Diagnosis:Abdominal pain, Neuroendocrine tumor   Patient Active Problem List    Diagnosis Date Noted   • Neuroendocrine carcinoma of small bowel ([facility] [address] ) 2022   • 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 ([facility] [address] ) 2022   • Neuroendocrine tumor 2022   • Hydronephrosis with urinary obstruction due to ureteral calculus 10/04/2021   • Calcified mesenteric mass 10/04/2021   • MEHDI (acute kidney injury) ([facility] [address] ) 10/04/2021   • Essential hypertension 10/04/2021   • Diastasis recti 2019      LOS (days): 0  Geometric Mean LOS (GMLOS) (days):   Days to GMLOS:     OBJECTIVE:           Current admission status: Observation  Referral Reason: Other (assess for discharge needs)    Preferred Pharmacy:   [address] [place] [id number]  Phone: [phone] Fax: [phone]    Primary Care Provider: Robin Meredith DO    Primary Insurance: [address] [place] [address]  Secondary Insurance:     ASSESSMENT:  [address] [place] [address] Representative - [facility]   Primary Phone: [phone] (Mobile)               Readmission Root Cause  30 Day Readmission: No    Patient Information  Admitted from[de-identified] Home  Mental Status: Alert  During Assessment patient was accompanied by: Not accompanied during assessment  Assessment information provided by[de-identified] Patient  Primary Caregiver: Self  Support Systems: Spouse/significant other, Self, Family members  [place] of Residence: [address] do you live in?: [address] [email or web address] entry access options   Select all that apply : No steps to enter home  Type of Current Residence: 2 story home  Upon entering residence, is there a bedroom on the main floor (no further steps)?: No  A bedroom is located on the following floor levels of residence (select all that apply):: 2nd Floor  Upon entering residence, is there a bathroom on the main floor (no further steps)?: Yes  Number of steps to 2nd floor from main floor: One Flight  In the last 12 months, was there a time when you were not able to pay the mortgage or rent on time?: No  In the last 12 months, how many places have you lived?: 1  In the last 12 months, was there a time when you did not have a steady place to sleep or slept in a shelter (including now)?: No  Homeless/housing insecurity resource given?: No  Living Arrangements: Lives Alone  Is patient a ?: No    Activities of Daily Living Prior to Admission  Functional Status: Independent  Completes ADLs independently?: Yes  Ambulates independently?: Yes  Does patient use assisted devices?: No  Does patient currently own DME?: No  Does patient have a history of Outpatient Therapy (PT/OT)?: No  Does the patient have a history of Short-Term Rehab?: No  Does patient have a history of HHC?: No  Does patient currently have [employer] ?: No    Patient Information Continued  Income Source: Unknown  Does patient have prescription coverage?: Yes  Within the past 12 months, you worried that your food would run out before you got the money to buy more : Never true  Within the past 12 months, the food you bought just didn't last and you didn't have money to get more : Never true  Food insecurity resource given?: N/A  Does patient receive dialysis treatments?: No  Does patient have a history of Mental Health Diagnosis?: Yes  Is patient receiving treatment for mental health?: Yes  Has patient received inpatient treatment related to mental health in the last 2 years?: No    Means of Transportation  Means of Transport to Appts[de-identified] Drives Self  In the past 12 months, has lack of transportation kept you from medical appointments or from getting medications?: No  In the past 12 months, has lack of transportation kept you from meetings, work, or from getting things needed for daily living?: No  Was application for public transport provided?: N/A    DISCHARGE DETAILS:    Discharge planning discussed with[de-identified] Patient     CM contacted family/caregiver?: No- see comments (Patient AAOx3)    Other Referral/Resources/Interventions Provided:  Interventions: Transportation to treatment    Treatment Team Recommendation: Home  Discharge Destination Plan[de-identified] Home  Transport at Discharge : Family     Additional Comments: Met with patient at bedside  No DC needs identified

## 2023-03-22 NOTE — PROGRESS NOTES
Pt admitted to [address] [place] [address]  Family at bedside  Ambulates with SBA  BP elevated, provider aware  Abdominal tenderness and guarding present  Safety measures in place, call bell within reach

## 2023-03-22 NOTE — PLAN OF CARE
Problem: Potential for Falls  Goal: Patient will remain free of falls  Description: INTERVENTIONS:  - Educate patient/family on patient safety including physical limitations  - Instruct patient to call for assistance with activity   - Consult OT/PT to assist with strengthening/mobility   - Keep Call bell within reach  - Keep bed low and locked with side rails adjusted as appropriate  - Keep care items and personal belongings within reach  - Initiate and maintain comfort rounds  - Make Fall Risk Sign visible to staff  - Offer Toileting every 2 Hours, in advance of need  - Initiate/Maintain bed alarm  - Obtain necessary fall risk management equipment:  - Apply yellow socks and bracelet for high fall risk patients  - Consider moving patient to room near nurses station  3/22/2023 0031 by Lew Vega RN  Outcome: Progressing  3/22/2023 0031 by Lew Vega RN  Outcome: Progressing     Problem: PAIN - ADULT  Goal: Verbalizes/displays adequate comfort level or baseline comfort level  Description: Interventions:  - Encourage patient to monitor pain and request assistance  - Assess pain using appropriate pain scale  - Administer analgesics based on type and severity of pain and evaluate response  - Implement non-pharmacological measures as appropriate and evaluate response  - Consider cultural and social influences on pain and pain management  - Notify physician/advanced practitioner if interventions unsuccessful or patient reports new pain  Outcome: Progressing

## 2023-03-22 NOTE — NURSING NOTE
Per patient request, the following family members are not welcome to visit for the duration of his hospital stay: Nereida Amador, Gina Flores, [facility], Rosalee Pino, Geena Landry, Effie Alicea, and [address]  Per patient request, the following few are approved visitors: Maya Finney, [employer] Samuel, and Jay Trevino

## 2023-03-22 NOTE — H&P
"H&P - General Surgery   Melany Saul 51 y o  male MRN: [id number]  Unit/Bed#: -01 Encounter: [id number]  Assessment:  25-year-old male with past medical history significant for bipolar disorder, HTN, neuroendocrine tumor s/p exploratory laparotomy with small bowel resection and liver biopsy in July 2022 presenting with epigastric abdominal pain and dizziness  -Afebrile, hypertensive with SBP 180s to 200s, other vital signs stable on room air  - cc  -No new labs this morning however labs reviewed from arrival to ED which are within normal limits other than elevated T  bili at 1 25  -CTAP 03/21/2023 with \"Stable mesenteric masses/nodes compatible with neuroendocrine tumor  Stable right lobe liver lesion measuring 1 2 cm  No acute inflammatory process identified  \"    Plan:  Admit to the general surgery service for observation  Regular diet as tolerated  IVF @ 49, can likely DC later today versus tomorrow  Antiemetics and analgesics as needed  Home meds  Will initiate octreotide 100 mcg every 8 hours  Serial abdominal exams  Monitor bowel function  Added MiraLAX for constipation  Evaluated at bedside by attending    History of Present Illness   Chief Complaint: Dizziness, abdominal pain    HPI:  Melany Saul is a 51 y o  male with past medical history significant for bipolar disorder, HTN, neuroendocrine tumor who initially presented to [facility] ED on 03/21/2023 with complaints of epigastric abdominal pain and dizziness  Patient reports he has had multiple episodes of dizziness with at least 2 falls at home  Patient reports that his legs \"gave out\" as he was walking  Patient required assistance of 2 family members/friends after episodes  Associated with blurred vision and hot flashes  Additionally, with onset of dizziness patient notes epigastric abdominal pain  He describes pain as squeezing in character  Denies nausea or vomiting, diarrhea    Reports that his last bowel movement was approximately 3 " to 4 days ago  Reorts uneventful recovery from surgery in July 2022  No significant changes to past medical or surgical history since last evaluation  Review of Systems   Constitutional: Positive for diaphoresis  Negative for activity change, appetite change, chills and fever  HENT: Negative  Eyes: Positive for visual disturbance  Respiratory: Positive for shortness of breath  Negative for cough  Cardiovascular: Negative for chest pain, palpitations and leg swelling  Gastrointestinal: Positive for abdominal pain and constipation  Negative for abdominal distention, diarrhea, nausea and vomiting  Endocrine: Negative for polydipsia, polyphagia and polyuria  Genitourinary: Negative for difficulty urinating, dysuria, flank pain, frequency, hematuria and urgency  Musculoskeletal: Negative for arthralgias and myalgias  Skin: Negative for color change, rash and wound  Neurological: Positive for dizziness and light-headedness  Negative for weakness and headaches       Historical Information   Past Medical History:   Diagnosis Date   • Allergic    • Bipolar disorder in partial remission ([facility] [address] )    • Erectile dysfunction     unspecified erectile dysfunction type   • Hypertension    • Kidney stone      Past Surgical History:   Procedure Laterality Date   • COLONOSCOPY     • EXPLORATORY LAPAROTOMY W/ BOWEL RESECTION N/A 7/21/2022    Procedure: LAPAROTOMY EXPLORATORY W/ SMALL BOWEL RESECTION, liver biopsy;  Surgeon: Sandhya Huitron MD;  Location:  MAIN OR;  Service: General   • FL RETROGRADE PYELOGRAM  10/06/2021   • HERNIA REPAIR     • DE CYSTO BLADDER W/URETERAL CATHETERIZATION Right 10/06/2021    Procedure: CYSTOSCOPY RETROGRADE PYELOGRAM WITH INSERTION STENT URETERAL, RIGHT URETEROSCOPY, STONE EXTRACTION;  Surgeon: Butch Cruz MD;  Location: [address] [place] [address] MAIN OR;  Service: Urology     Social History   Social History     Substance and Sexual Activity   Alcohol Use Yes    Comment: Drinks a quart of "moonshine/night-As of 21 will quit drinking     Social History     Substance and Sexual Activity   Drug Use Yes   • Types: Marijuana    Comment: Socially (1-2 Monthly)     Social History     Tobacco Use   Smoking Status Former   • Types: Pipe, Cigarettes   • Quit date: 2003   • Years since quittin 2   Smokeless Tobacco Never     E-Cigarette/Vaping   • E-Cigarette Use Never User      E-Cigarette/Vaping Substances     Family History:   Family History   Problem Relation Age of Onset   • Diabetes Mother    • Thyroid disease Mother    • Cancer Father    • Thyroid disease Sister    • Depression Brother    • Cancer Maternal Aunt    • Cancer Paternal Uncle    • Cancer Paternal Grandmother    • No Known Problems Brother    • Thyroid disease Sister        Meds/Allergies   all medications and allergies reviewed  Allergies   Allergen Reactions   • Tomato - Food Allergy Anaphylaxis     raw   • Poison Ivy Extract Hives   • Poison Sumac Extract Hives       Objective   First Vitals:   Blood Pressure: (!) 213/122 (23)  Pulse: 91 (23)  Temperature: (!) 97 4 °F (36 3 °C) (23)  Temp Source: Tympanic (23 184)  Respirations: 22 (23)  Height: 6' 1\" (185 4 cm) (23)  Weight - Scale: 92 1 kg (203 lb) (23)  SpO2: 98 % (23)    Current Vitals:   Blood Pressure: (!) 183/128 (23 0743)  Pulse: 95 (23)  Temperature: (!) 96 6 °F (35 9 °C) (23)  Temp Source: Tympanic (23 184)  Respirations: 20 (23)  Height: 6' 1\" (185 4 cm) (23)  Weight - Scale: 95 kg (209 lb 5 2 oz) (23)  SpO2: 98 % (23 0743)      Intake/Output Summary (Last 24 hours) at 3/22/2023 0919  Last data filed at 3/22/2023 0847  Gross per 24 hour   Intake 120 ml   Output 350 ml   Net -230 ml       Invasive Devices     Peripheral Intravenous Line  Duration           Peripheral IV 23 Distal;Left;Upper;Ventral " (anterior) Arm <1 day    Peripheral IV 03/21/23 Left;Ventral (anterior) Forearm <1 day          Drain  Duration           Ureteral Drain/Stent Right ureter 6 Fr  532 days                Physical Exam  Vitals and nursing note reviewed  Constitutional:       General: He is not in acute distress  Appearance: Normal appearance  He is obese  He is not ill-appearing or toxic-appearing  HENT:      Head: Normocephalic and atraumatic  Cardiovascular:      Rate and Rhythm: Normal rate and regular rhythm  Pulses: Normal pulses  Heart sounds: Normal heart sounds  Pulmonary:      Effort: Pulmonary effort is normal    Abdominal:      General: A surgical scar is present  Bowel sounds are normal  There is no distension  Palpations: Abdomen is soft  Tenderness: There is abdominal tenderness in the right upper quadrant and epigastric area  There is no guarding  Musculoskeletal:         General: Normal range of motion  Right lower leg: No edema  Left lower leg: No edema  Skin:     General: Skin is warm and dry  Neurological:      General: No focal deficit present  Mental Status: He is alert  Mental status is at baseline  Psychiatric:         Mood and Affect: Mood normal          Behavior: Behavior normal          Thought Content: Thought content normal        Lab Results:   I have personally reviewed pertinent lab results      CBC:   Lab Results   Component Value Date    WBC 9 51 03/21/2023    HGB 16 0 03/21/2023    HCT 46 4 03/21/2023    MCV 84 03/21/2023     03/21/2023    MCH 29 0 03/21/2023    MCHC 34 5 03/21/2023    RDW 13 8 03/21/2023    MPV 8 8 (L) 03/21/2023    NRBC 0 03/21/2023     CMP:   Lab Results   Component Value Date    SODIUM 134 (L) 03/21/2023    K 4 0 03/21/2023    CL 98 03/21/2023    CO2 27 03/21/2023    BUN 17 03/21/2023    CREATININE 0 78 03/21/2023    CALCIUM 9 7 03/21/2023    AST 12 (L) 03/21/2023    ALT 8 03/21/2023    ALKPHOS 107 (H) 03/21/2023 EGFR 105 03/21/2023   , Coagulation:   Lab Results   Component Value Date    INR 1 07 03/21/2023     Lipase:   Lab Results   Component Value Date    LIPASE 13 03/22/2023     Imaging: I have personally reviewed pertinent reports  EKG, Pathology, and Other Studies: I have personally reviewed pertinent reports  Code Status: Level 1 - Full Code  Advance Directive and Living Will:      Power of :    POLST:      Counseling / Coordination of Care  Total floor / unit time spent today 5 minutes  Greater than 50% of total time was spent with the patient and / or family counseling and / or coordination of care  A description of the counseling / coordination of care: Reviewing pertinent diagnostic imaging and laboratory studies, evaluation of patient, discussion with attending        Jad Vigil PA-C

## 2023-03-23 LAB
ANION GAP SERPL CALCULATED.3IONS-SCNC: 10 MMOL/L (ref 4–13)
BASOPHILS # BLD AUTO: 0.02 THOUSANDS/ÂΜL (ref 0–0.1)
BASOPHILS NFR BLD AUTO: 0 % (ref 0–1)
BUN SERPL-MCNC: 8 MG/DL (ref 5–25)
CALCIUM SERPL-MCNC: 9.6 MG/DL (ref 8.4–10.2)
CHLORIDE SERPL-SCNC: 96 MMOL/L (ref 96–108)
CO2 SERPL-SCNC: 27 MMOL/L (ref 21–32)
CREAT SERPL-MCNC: 0.74 MG/DL (ref 0.6–1.3)
EOSINOPHIL # BLD AUTO: 0.35 THOUSAND/ÂΜL (ref 0–0.61)
EOSINOPHIL NFR BLD AUTO: 4 % (ref 0–6)
ERYTHROCYTE [DISTWIDTH] IN BLOOD BY AUTOMATED COUNT: 13.6 % (ref 11.6–15.1)
GFR SERPL CREATININE-BSD FRML MDRD: 108 ML/MIN/1.73SQ M
GLUCOSE P FAST SERPL-MCNC: 128 MG/DL (ref 65–99)
GLUCOSE SERPL-MCNC: 128 MG/DL (ref 65–140)
HCT VFR BLD AUTO: 45.7 % (ref 36.5–49.3)
HGB BLD-MCNC: 16.2 G/DL (ref 12–17)
IMM GRANULOCYTES # BLD AUTO: 0.03 THOUSAND/UL (ref 0–0.2)
IMM GRANULOCYTES NFR BLD AUTO: 0 % (ref 0–2)
LYMPHOCYTES # BLD AUTO: 1.47 THOUSANDS/ÂΜL (ref 0.6–4.47)
LYMPHOCYTES NFR BLD AUTO: 15 % (ref 14–44)
MAGNESIUM SERPL-MCNC: 1.9 MG/DL (ref 1.9–2.7)
MCH RBC QN AUTO: 28.9 PG (ref 26.8–34.3)
MCHC RBC AUTO-ENTMCNC: 35.4 G/DL (ref 31.4–37.4)
MCV RBC AUTO: 82 FL (ref 82–98)
MONOCYTES # BLD AUTO: 0.58 THOUSAND/ÂΜL (ref 0.17–1.22)
MONOCYTES NFR BLD AUTO: 6 % (ref 4–12)
NEUTROPHILS # BLD AUTO: 7.42 THOUSANDS/ÂΜL (ref 1.85–7.62)
NEUTS SEG NFR BLD AUTO: 75 % (ref 43–75)
NRBC BLD AUTO-RTO: 0 /100 WBCS
PLATELET # BLD AUTO: 195 THOUSANDS/UL (ref 149–390)
PMV BLD AUTO: 8.9 FL (ref 8.9–12.7)
POTASSIUM SERPL-SCNC: 3.9 MMOL/L (ref 3.5–5.3)
RBC # BLD AUTO: 5.6 MILLION/UL (ref 3.88–5.62)
SODIUM SERPL-SCNC: 133 MMOL/L (ref 135–147)
WBC # BLD AUTO: 9.87 THOUSAND/UL (ref 4.31–10.16)

## 2023-03-23 RX ORDER — METOCLOPRAMIDE HYDROCHLORIDE 5 MG/ML
10 INJECTION INTRAMUSCULAR; INTRAVENOUS EVERY 6 HOURS PRN
Status: DISCONTINUED | OUTPATIENT
Start: 2023-03-23 | End: 2023-03-27 | Stop reason: HOSPADM

## 2023-03-23 RX ORDER — LISINOPRIL 20 MG/1
20 TABLET ORAL
Status: DISCONTINUED | OUTPATIENT
Start: 2023-03-24 | End: 2023-03-27 | Stop reason: HOSPADM

## 2023-03-23 RX ADMIN — OXYCODONE HYDROCHLORIDE 10 MG: 10 TABLET ORAL at 22:18

## 2023-03-23 RX ADMIN — DEXTROSE, SODIUM CHLORIDE, AND POTASSIUM CHLORIDE 150 ML/HR: 5; .45; .15 INJECTION INTRAVENOUS at 00:58

## 2023-03-23 RX ADMIN — OXYCODONE HYDROCHLORIDE 10 MG: 10 TABLET ORAL at 05:47

## 2023-03-23 RX ADMIN — LISINOPRIL 15 MG: 10 TABLET ORAL at 05:48

## 2023-03-23 RX ADMIN — OCTREOTIDE ACETATE 100 MCG: 100 INJECTION, SOLUTION INTRAVENOUS; SUBCUTANEOUS at 22:18

## 2023-03-23 RX ADMIN — ENOXAPARIN SODIUM 40 MG: 40 INJECTION SUBCUTANEOUS at 09:26

## 2023-03-23 RX ADMIN — DEXTROSE, SODIUM CHLORIDE, AND POTASSIUM CHLORIDE 150 ML/HR: 5; .45; .15 INJECTION INTRAVENOUS at 08:26

## 2023-03-23 RX ADMIN — POLYETHYLENE GLYCOL 3350 17 G: 17 POWDER, FOR SOLUTION ORAL at 09:26

## 2023-03-23 RX ADMIN — SENNOSIDES AND DOCUSATE SODIUM 1 TABLET: 50; 8.6 TABLET ORAL at 22:18

## 2023-03-23 RX ADMIN — OXYCODONE HYDROCHLORIDE 10 MG: 10 TABLET ORAL at 18:12

## 2023-03-23 RX ADMIN — DEXTROSE, SODIUM CHLORIDE, AND POTASSIUM CHLORIDE 100 ML/HR: 5; .45; .15 INJECTION INTRAVENOUS at 18:19

## 2023-03-23 RX ADMIN — PAROXETINE HYDROCHLORIDE 20 MG: 20 TABLET, FILM COATED ORAL at 09:25

## 2023-03-23 RX ADMIN — OCTREOTIDE ACETATE 100 MCG: 100 INJECTION, SOLUTION INTRAVENOUS; SUBCUTANEOUS at 05:48

## 2023-03-23 RX ADMIN — OCTREOTIDE ACETATE 100 MCG: 100 INJECTION, SOLUTION INTRAVENOUS; SUBCUTANEOUS at 14:21

## 2023-03-23 NOTE — PLAN OF CARE
Problem: Nutrition/Hydration-ADULT  Goal: Nutrient/Hydration intake appropriate for improving, restoring or maintaining nutritional needs  Description: Monitor and assess patient's nutrition/hydration status for malnutrition  Collaborate with interdisciplinary team and initiate plan and interventions as ordered  Monitor patient's weight and dietary intake as ordered or per policy  Utilize nutrition screening tool and intervene as necessary  Determine patient's food preferences and provide high-protein, high-caloric foods as appropriate       INTERVENTIONS:  - Monitor oral intake, urinary output, labs, and treatment plans  - Assess nutrition and hydration status and recommend course of action  - Evaluate amount of meals eaten  - Assist patient with eating if necessary   - Allow adequate time for meals  - Recommend/ encourage appropriate diets, oral nutritional supplements, and vitamin/mineral supplements  - Order, calculate, and assess calorie counts as needed  - Recommend, monitor, and adjust tube feedings and TPN/PPN based on assessed needs  - Assess need for intravenous fluids  - Provide specific nutrition/hydration education as appropriate  - Include patient/family/caregiver in decisions related to nutrition  Outcome: Progressing     Problem: MOBILITY - ADULT  Goal: Maintain or return to baseline ADL function  Description: INTERVENTIONS:  -  Assess patient's ability to carry out ADLs; assess patient's baseline for ADL function and identify physical deficits which impact ability to perform ADLs (bathing, care of mouth/teeth, toileting, grooming, dressing, etc )  - Assess/evaluate cause of self-care deficits   - Assess range of motion  - Assess patient's mobility; develop plan if impaired  - Assess patient's need for assistive devices and provide as appropriate  - Encourage maximum independence but intervene and supervise when necessary  - Involve family in performance of ADLs  - Assess for home care needs following discharge   - Consider OT consult to assist with ADL evaluation and planning for discharge  - Provide patient education as appropriate  Outcome: Progressing     Problem: INFECTION - ADULT  Goal: Absence or prevention of progression during hospitalization  Description: INTERVENTIONS:  - Assess and monitor for signs and symptoms of infection  - Monitor lab/diagnostic results  - Monitor all insertion sites, i e  indwelling lines, tubes, and drains  - Monitor endotracheal if appropriate and nasal secretions for changes in amount and color  - [place] appropriate cooling/warming therapies per order  - Administer medications as ordered  - Instruct and encourage patient and family to use good hand hygiene technique  - Identify and instruct in appropriate isolation precautions for identified infection/condition  Outcome: Progressing     Problem: SAFETY ADULT  Goal: Maintain or return to baseline ADL function  Description: INTERVENTIONS:  -  Assess patient's ability to carry out ADLs; assess patient's baseline for ADL function and identify physical deficits which impact ability to perform ADLs (bathing, care of mouth/teeth, toileting, grooming, dressing, etc )  - Assess/evaluate cause of self-care deficits   - Assess range of motion  - Assess patient's mobility; develop plan if impaired  - Assess patient's need for assistive devices and provide as appropriate  - Encourage maximum independence but intervene and supervise when necessary  - Involve family in performance of ADLs  - Assess for home care needs following discharge   - Consider OT consult to assist with ADL evaluation and planning for discharge  - Provide patient education as appropriate  Outcome: Progressing

## 2023-03-23 NOTE — UTILIZATION REVIEW
"Continued Stay Review    Date:   3/23                          Admission Orders (From admission, onward)     Ordered        03/23/23 1507  Inpatient Admission  Once            03/21/23 2124  Place in Observation  Once                        HPI:48 y o  male initially admitted on  3/21  @  2124,  OBS Status/MS  Level of care     W/epigastric abd pain/dizziness   (h/o neuroendocrine tumor s/p exploratory laparotomy with small bowel resection and liver biopsy in July 2022)     CTAP 03/21/2023 with \"Stable mesenteric masses/nodes compatible with neuroendocrine tumor  Stable right lobe liver lesion measuring 1 2 cm  No acute inflammatory process identified  \"  Admitted to Surgery service,  OBS status/ MS Level of care   for serial abd exams, IVF/ IV electrolytes repletion prn ,    clears  diet,  IV pain/nausea control,  Senokot tab daily      3/22    Continues c/o discomfort but not as \"acute\"  As yesterday  Abdomen soft, tender with epigastric palpation, bowel sounds hypoactive  Octreotide ordered, begin bowel regime with MiraLax  3/23   CHANGED to INPATIENT   STATUS,  MS  Level of care       For ongoing management of   abd pain/ constipation in setting of metastatic Ca  Initiate   Octreotide IV q8H,  miralax for constipation/bowel regimen  Passing flatus, no bowel movement  Denies nausea  Med adjustments for better BP control  Hypertensive (192/124) -likely an element of carcinoid syndrome, remainder of vital signs stable  Hyponatremit  Passing flatus, no BM  Will adv to surgical soft diet, cont IVF @  100 cc/hr  increased lisinopril dosing from 15mg to 20mg daily - hypertension likely a component of carcinoid syndrome, will continue to monitor  Order Palliative Care & Case mgt consults for  difficult social situation, and metastatic malignant neuroendocrine tumor , assist w/pain control  ENC  Ambulation          Vital Signs:   03/23/23 1500 97 2 °F (36 2 °C) Abnormal  72 20 155/104 Abnormal  -- " 96 %   03/23/23 08:03:57 -- 83 20 155/104 Abnormal  121 100 %   03/22/23 22:23:27 -- 87 18 192/124 Abnormal  147 98 %   03/22/23 07:43:11 96 6 °F (35 9 °C) Abnormal  95 20 183/128 Abnormal  146 98 %   03/22/23 06:22:02 -- 80 -- 184/119 Abnormal  141 98 %   03/21/23 22:38:34 98 °F (36 7 °C) 79 18 191/122 Abnormal  145 98 %   03/21/23 2145 -- 84 26  184/114 Abnormal  146 97 %   03/21/23 2100 -- 73 14 194/108 Abnormal  144 97 %   03/21/23 2050 -- 76 19 202/111 Abnormal  147 97 %   03/21/23 2030 -- 76 20 202/112 Abnormal  149 98 %   03/21/23 2020 -- 75 16 203/117 Abnormal  152 98 %   03/21/23 2000 -- 78 16 199/116 Abnormal  150 98 %   03/21/23 1950 -- 79 14 199/116 Abnormal  149 97 %   03/21/23 1930 -- 83 15 197/109 Abnormal  149 98 %   03/21/23 1900 -- 80 19 206/117 Abnormal  150 97 %   03/21/23 1845 -- 84 21 201/118 Abnormal  153 97 %     Pertinent Labs/Diagnostic Results:       Results from last 7 days   Lab Units 03/23/23  0601 03/21/23  1830   WBC Thousand/uL 9 87 9 51   HEMOGLOBIN g/dL 16 2 16 0   HEMATOCRIT % 45 7 46 4   PLATELETS Thousands/uL 195 170   NEUTROS ABS Thousands/µL 7 42 7 41         Results from last 7 days   Lab Units 03/23/23  0601 03/21/23  1830   SODIUM mmol/L 133* 134*   POTASSIUM mmol/L 3 9 4 0   CHLORIDE mmol/L 96 98   CO2 mmol/L 27 27   ANION GAP mmol/L 10 9   BUN mg/dL 8 17   CREATININE mg/dL 0 74 0 78   EGFR ml/min/1 73sq m 108 105   CALCIUM mg/dL 9 6 9 7   MAGNESIUM mg/dL 1 9  --      Results from last 7 days   Lab Units 03/21/23  1830   AST U/L 12*   ALT U/L 8   ALK PHOS U/L 107*   TOTAL PROTEIN g/dL 8 1   ALBUMIN g/dL 4 6   TOTAL BILIRUBIN mg/dL 1 25*         Results from last 7 days   Lab Units 03/23/23  0601 03/21/23  1830   GLUCOSE RANDOM mg/dL 128 111       Results from last 7 days   Lab Units 03/21/23  2255 03/21/23  2151 03/21/23  1830   HS TNI 0HR ng/L  --   --  5   HS TNI 2HR ng/L  --  5  --    HSTNI D2 ng/L  --  0  --    HS TNI 4HR ng/L 5  --   --    HSTNI D4 ng/L 0  --   -- Results from last 7 days   Lab Units 03/21/23  1830   PROTIME seconds 13 9   INR  1 07   PTT seconds 30         Results from last 7 days   Lab Units 03/21/23  1830   PROCALCITONIN ng/ml 0 09     Results from last 7 days   Lab Units 03/21/23  1830   LACTIC ACID mmol/L 1 7       Results from last 7 days   Lab Units 03/22/23  0434 03/21/23  1830   LIPASE u/L 13 22       Results from last 7 days   Lab Units 03/22/23  0434   AMPH/METH  Negative   BARBITURATE UR  Negative   BENZODIAZEPINE UR  Negative   COCAINE UR  Negative   METHADONE URINE  Negative   OPIATE UR  Positive*   PCP UR  Negative   THC UR  Negative     Results from last 7 days   Lab Units 03/21/23  1830   BLOOD CULTURE  No Growth at 24 hrs  No Growth at 24 hrs  Medications:   Scheduled Medications:  enoxaparin, 40 mg, Subcutaneous, Daily  [START ON 3/24/2023] lisinopril, 20 mg, Oral, Early Morning  octreotide, 100 mcg, Subcutaneous, Q8H BERENICE  PARoxetine, 20 mg, Oral, Daily  polyethylene glycol, 17 g, Oral, Daily  senna-docusate sodium, 1 tablet, Oral, HS      Continuous IV Infusions:  dextrose 5 % and sodium chloride 0 45 % with KCl 20 mEq/L, 100 mL/hr, Intravenous, Continuous      PRN Meds:  HYDROmorphone, 0 5 mg, Intravenous, Q2H PRN  metoclopramide, 10 mg, Intravenous, Q6H PRN  naloxone, 0 04 mg, Intravenous, Q1MIN PRN  ondansetron, 4 mg, Intravenous, Q6H PRN  oxyCODONE, 10 mg, Oral, Q4H PRN  oxyCODONE, 5 mg, Oral, Q4H PRN        Discharge Plan: [facility]    Network Utilization Review Department  ATTENTION: Please call with any questions or concerns to [phone] and carefully listen to the prompts so that you are directed to the right person  All voicemails are confidential   Parker Best all requests for admission clinical reviews, approved or denied determinations and any other requests to dedicated fax number below belonging to the campus where the patient is receiving treatment   List of dedicated fax numbers for the Facilities:  Mauricio Ryan NAME UR FAX NUMBER   ADMISSION DENIALS (Administrative/Medical Necessity) [phone]   PARENT CHILD HEALTH (Maternity/NICU/Pediatrics) Alyssa Cox [address] [place]  [phone]   [address] [place] [address] [facility] [address] [facility] 310 [facility] 134 815 [facility] [phone]

## 2023-03-23 NOTE — NURSING NOTE
Patient awake and alert  Pain to upper abdomen, mostly on R, pain 5 out of 10    Denies any nausea at present, tolerated clear liquids for breakfast

## 2023-03-23 NOTE — PROGRESS NOTES
"Progress Note - General Surgery   Primo Perez 51 y o  male MRN: [id number]  Unit/Bed#: -01 Encounter: [id number]    Assessment:  51 y o  presented with dizziness and abdominal pain  Past medical history significant for neuroendocrine tumor status post exploratory laparotomy with small bowel resection and liver biopsy (July 2022), bipolar disorder, hypertension  -Afebrile, hypertensive (192/124) -likely an element of carcinoid syndrome, remainder of vital signs stable  -No leukocytosis  -Hyponatremia, 133  -Creatinine, 0 74  -Passing flatus, no bowel movement    Plan:  - Advanced to surgical soft diet  - decreased IVF to rate of 100   -Octreotide started yesterday; was previously on lanreotide infusions - patient is to follow up with Dr Mango Garcia to re-establish care and arrange these outpatient infusions  - increased lisinopril dosing from 15mg to 20mg daily - hypertension likely a component of carcinoid syndrome, will continue to monitor   - Monitor abdominal exams, vitals, and labs  - Analgesia and antiemetics prn -palliative care consult ordered to assist in pain control, difficult social situation, and metastatic malignant neuroendocrine tumor  - Bowel regimen   - Encourage ambulation   - DVT ppx   - Seen with Dr Vern Alegre    Subjective/Objective   Subjective: Passing flatus, no bowel movement  Denies nausea  Objective:     Blood pressure (!) 155/104, pulse 83, temperature (!) 96 6 °F (35 9 °C), resp  rate 20, height 6' 1\" (1 854 m), weight 95 kg (209 lb 5 2 oz), SpO2 100 %  ,Body mass index is 27 62 kg/m²        Intake/Output Summary (Last 24 hours) at 3/23/2023 1115  Last data filed at 3/23/2023 0502  Gross per 24 hour   Intake 2320 ml   Output 1900 ml   Net 420 ml       Invasive Devices     Peripheral Intravenous Line  Duration           Peripheral IV 03/21/23 Distal;Left;Upper;Ventral (anterior) Arm 1 day    Peripheral IV 03/21/23 Left;Ventral (anterior) Forearm 1 day          Drain  Duration           " Ureteral Drain/Stent Right ureter 6 Fr  533 days                Physical Exam:   General - no acute distress  Heart - RRR  Lungs - unlabored respirations, no supplemental O2  Abdomen -distended, mild generalized tenderness to palpation; no guarding or rebound (abdominal exam completed by Dr Nancy Rebolledo)  Neuro - no focal deficit    Lab, Imaging and other studies:I have personally reviewed pertinent lab results      VTE Pharmacologic Prophylaxis: Enoxaparin (Lovenox)    Ida Topete  3/23/2023

## 2023-03-23 NOTE — PLAN OF CARE
Problem: Potential for Falls  Goal: Patient will remain free of falls  Description: INTERVENTIONS:  - Educate patient/family on patient safety including physical limitations  - Instruct patient to call for assistance with activity   - Consult OT/PT to assist with strengthening/mobility   - Keep Call bell within reach  - Keep bed low and locked with side rails adjusted as appropriate  - Keep care items and personal belongings within reach  - Initiate and maintain comfort rounds  - Make Fall Risk Sign visible to staff  Problem: Knowledge Deficit  Goal: Patient/family/caregiver demonstrates understanding of disease process, treatment plan, medications, and discharge instructions  Description: Complete learning assessment and assess knowledge base    Interventions:  - Provide teaching at level of understanding  - Provide teaching via preferred learning methods  Outcome: Progressing     Problem: PAIN - ADULT  Goal: Verbalizes/displays adequate comfort level or baseline comfort level  Description: Interventions:  - Encourage patient to monitor pain and request assistance  - Assess pain using appropriate pain scale  - Administer analgesics based on type and severity of pain and evaluate response  - Implement non-pharmacological measures as appropriate and evaluate response  - Consider cultural and social influences on pain and pain management  - Notify physician/advanced practitioner if interventions unsuccessful or patient reports new pain  Outcome: Progressing     - Apply yellow socks and bracelet for high fall risk patients  - Consider moving patient to room near nurses station  Outcome: Progressing

## 2023-03-24 LAB
ANION GAP SERPL CALCULATED.3IONS-SCNC: 7 MMOL/L (ref 4–13)
BASOPHILS # BLD AUTO: 0.02 THOUSANDS/ÂΜL (ref 0–0.1)
BASOPHILS NFR BLD AUTO: 0 % (ref 0–1)
BUN SERPL-MCNC: 14 MG/DL (ref 5–25)
CALCIUM SERPL-MCNC: 9.5 MG/DL (ref 8.4–10.2)
CHLORIDE SERPL-SCNC: 99 MMOL/L (ref 96–108)
CO2 SERPL-SCNC: 27 MMOL/L (ref 21–32)
CREAT SERPL-MCNC: 0.87 MG/DL (ref 0.6–1.3)
EOSINOPHIL # BLD AUTO: 0.31 THOUSAND/ÂΜL (ref 0–0.61)
EOSINOPHIL NFR BLD AUTO: 4 % (ref 0–6)
ERYTHROCYTE [DISTWIDTH] IN BLOOD BY AUTOMATED COUNT: 13.9 % (ref 11.6–15.1)
GFR SERPL CREATININE-BSD FRML MDRD: 101 ML/MIN/1.73SQ M
GLUCOSE SERPL-MCNC: 111 MG/DL (ref 65–140)
HCT VFR BLD AUTO: 43.1 % (ref 36.5–49.3)
HGB BLD-MCNC: 14.9 G/DL (ref 12–17)
IMM GRANULOCYTES # BLD AUTO: 0.02 THOUSAND/UL (ref 0–0.2)
IMM GRANULOCYTES NFR BLD AUTO: 0 % (ref 0–2)
LYMPHOCYTES # BLD AUTO: 2.05 THOUSANDS/ÂΜL (ref 0.6–4.47)
LYMPHOCYTES NFR BLD AUTO: 26 % (ref 14–44)
MCH RBC QN AUTO: 28.9 PG (ref 26.8–34.3)
MCHC RBC AUTO-ENTMCNC: 34.6 G/DL (ref 31.4–37.4)
MCV RBC AUTO: 84 FL (ref 82–98)
MONOCYTES # BLD AUTO: 0.56 THOUSAND/ÂΜL (ref 0.17–1.22)
MONOCYTES NFR BLD AUTO: 7 % (ref 4–12)
NEUTROPHILS # BLD AUTO: 4.8 THOUSANDS/ÂΜL (ref 1.85–7.62)
NEUTS SEG NFR BLD AUTO: 63 % (ref 43–75)
NRBC BLD AUTO-RTO: 0 /100 WBCS
PLATELET # BLD AUTO: 160 THOUSANDS/UL (ref 149–390)
PMV BLD AUTO: 8.8 FL (ref 8.9–12.7)
POTASSIUM SERPL-SCNC: 4.2 MMOL/L (ref 3.5–5.3)
RBC # BLD AUTO: 5.15 MILLION/UL (ref 3.88–5.62)
SODIUM SERPL-SCNC: 133 MMOL/L (ref 135–147)
WBC # BLD AUTO: 7.76 THOUSAND/UL (ref 4.31–10.16)

## 2023-03-24 RX ADMIN — ENOXAPARIN SODIUM 40 MG: 40 INJECTION SUBCUTANEOUS at 08:13

## 2023-03-24 RX ADMIN — OCTREOTIDE ACETATE 100 MCG: 100 INJECTION, SOLUTION INTRAVENOUS; SUBCUTANEOUS at 05:42

## 2023-03-24 RX ADMIN — POLYETHYLENE GLYCOL 3350 17 G: 17 POWDER, FOR SOLUTION ORAL at 08:13

## 2023-03-24 RX ADMIN — DEXTROSE, SODIUM CHLORIDE, AND POTASSIUM CHLORIDE 100 ML/HR: 5; .45; .15 INJECTION INTRAVENOUS at 05:44

## 2023-03-24 RX ADMIN — SENNOSIDES AND DOCUSATE SODIUM 1 TABLET: 50; 8.6 TABLET ORAL at 22:26

## 2023-03-24 RX ADMIN — PAROXETINE HYDROCHLORIDE 20 MG: 20 TABLET, FILM COATED ORAL at 08:13

## 2023-03-24 RX ADMIN — OCTREOTIDE ACETATE 100 MCG: 100 INJECTION, SOLUTION INTRAVENOUS; SUBCUTANEOUS at 13:26

## 2023-03-24 RX ADMIN — OXYCODONE HYDROCHLORIDE 10 MG: 10 TABLET ORAL at 22:26

## 2023-03-24 RX ADMIN — DEXTROSE, SODIUM CHLORIDE, AND POTASSIUM CHLORIDE 100 ML/HR: 5; .45; .15 INJECTION INTRAVENOUS at 13:25

## 2023-03-24 RX ADMIN — OCTREOTIDE ACETATE 100 MCG: 100 INJECTION, SOLUTION INTRAVENOUS; SUBCUTANEOUS at 22:26

## 2023-03-24 RX ADMIN — LISINOPRIL 20 MG: 20 TABLET ORAL at 05:42

## 2023-03-24 NOTE — CONSULTS
TeleConsultation - [address] 51 y o  male MRN: [id number]  Unit/Bed#: -01 Encounter: [id number]      REQUIRED DOCUMENTATION:     1  This service was provided via Telemedicine  2  Provider located at [facility]  3  TeleMed provider: Kirstin Ferrera MD   4  Identify all parties in room with patient during tele consult:  None - RN Ms Post set up iPad and left room  5 Patient was then informed that this was a Telemedicine visit and that the exam was being conducted confidentially over secure lines  My office door was closed  No one else was in the room  Patient acknowledged consent and understanding of privacy and security of the Telemedicine visit, and gave us permission to have the assistant stay in the room in order to assist with the history and to conduct the exam   I informed the patient that I have reviewed their record in Epic and presented the opportunity for them to ask any questions regarding the visit today  The patient or their Representative/Agent agreed to participate  Patient Active Problem List   Diagnosis   • Diastasis recti   • Hydronephrosis with urinary obstruction due to ureteral calculus   • Calcified mesenteric mass   • MEHDI (acute kidney injury) ([facility] [address] )   • Essential hypertension   • Neuroendocrine tumor   • Neuroendocrine carcinoma of small bowel ([facility] [address] )   • 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 [facility])     Active issues specifically addressed today include:   - concrete thinking  - vulnerable adult  - cancer pain  -     Recommendations:  1  Symptom management - pt may continue opioids as written   - I will personally manage his opioid medications if he is able to follow in my clinic in [address] [place] [address]   - Defer all Psychiatric medication management to an appropriate professional; it may be useful to have a Psychiatry or Neuropsychology visit to better understand this gentlemans' neurocognitive and behavioral status      2  Goals - full cares, no limits   - Pt does not appear very insightful today     Code Status: Level 1 - Full Code   Decisional apparatus:  Patient is not independently competent on my exam today  If competence is lost, patient's substitute decision maker would default to siblings by [place]  Advance Directive and Living Will:      Power of :    POLST:           We appreciate the invitation to be involved in this patient's care  We will not be able to follow in person, but please do not hesitate to reach our on-call provider for acute symptom control concerns  We can continue to support the primary team remotely via phone and medidametricsect  After hours and on weekends, please use our clinic answering service at   Dagmar Anaya MD  Palliative and Supportive Care  Clinic/Answering Service: [phone]  You can find me on [employer]! History of Present Illness     Reason for Consult / Principal Problem: goals, support, pain  Hx and PE limited by: wooden affect, concrete thinking  HPI: Mathew Willis is a 51y o  year old male who presents with fall and epigastric pain  Inpatient consult to Palliative Care  Consult performed by: Dagmar Anaya MD  Consult ordered by: Marisa Unger MD          Pt's conversation with me appears quite distracted, and his history appears scattered and of limited orientation to the current situation  Pt notes that he has 'intermittent explosive disorder' and for this he takes Paxil  He previously was on Lithium for this  He states he stopped taking Lithium because of side effects of severe tiredness  He states that his illness is 'like bipolar', but he does not relate any other history besides anxiety  Medically, he can report that multiple tumors have been discovered throughout the abdomen and liver, and some of these have been removed recently    He does not state why he has not been better connected to the health care system, aside from 'I manage a double home' 'Mom is a packrat because I can't call her a hoarder' and 'I cant drive unless my sister borrows my brother's car'  Attempts to have pt explain himself, his understanding of his health situation, or elaborate on his social system are more confusing than revealing  He seems to have little insight to the nature or treatment plan for his cancer; he denies that any community members or family are involved in his life as helpful people; and he appears to become very distracted and fixated on Dr Karoline Kawasaki and getting a surgery when I attempt to help him identify that my clinic in [facility] occupies the same place that Dr Joaquín Mariano uses for her clinic      Review of Systems   Unable to perform ROS: Psychiatric disorder (rigid, concrete, and bradyphonic)       Historical Information   Past Medical History:   Diagnosis Date   • Allergic    • Bipolar disorder in partial remission ([facility] [address] )    • Erectile dysfunction     unspecified erectile dysfunction type   • Hypertension    • Kidney stone      Past Surgical History:   Procedure Laterality Date   • COLONOSCOPY     • EXPLORATORY LAPAROTOMY W/ BOWEL RESECTION N/A 7/21/2022    Procedure: LAPAROTOMY EXPLORATORY W/ SMALL BOWEL RESECTION, liver biopsy;  Surgeon: Milvia Mayo MD;  Location:  MAIN OR;  Service: General   • FL RETROGRADE PYELOGRAM  10/06/2021   • HERNIA REPAIR     • KY CYSTO BLADDER W/URETERAL CATHETERIZATION Right 10/06/2021    Procedure: CYSTOSCOPY RETROGRADE PYELOGRAM WITH INSERTION STENT URETERAL, RIGHT URETEROSCOPY, STONE EXTRACTION;  Surgeon: Kaylah Roque MD;  Location: [address] [place] [address] MAIN OR;  Service: Urology     E-Cigarette/Vaping   • E-Cigarette Use Never User      E-Cigarette/Vaping Substances     Social History     Socioeconomic History   • Marital status: Single     Spouse name: None   • Number of children: None   • Years of education: None   • Highest education level: None   Occupational History   • None   Tobacco Use   • Smoking status: Former     Types: Pipe, Cigarettes     Quit date: 2003     Years since quittin 2   • Smokeless tobacco: Never   Vaping Use   • Vaping Use: Never used   Substance and Sexual Activity   • Alcohol use: Yes     Comment: Drinks a quart of moonshine/night-As of 21 will quit drinking   • Drug use: Yes     Types: Marijuana     Comment: Socially (1-2 Monthly)   • Sexual activity: None   Other Topics Concern   • None   Social History Narrative   • None     Social Determinants of Health     Financial Resource Strain: Not on file   Food Insecurity: No Food Insecurity   • Worried About Running Out of Food in the Last Year: Never true   • Ran Out of Food in the Last Year: Never true   Transportation Needs: No Transportation Needs   • Lack of Transportation (Medical): No   • Lack of Transportation (Non-Medical):  No   Physical Activity: Not on file   Stress: Not on file   Social Connections: Not on file   Intimate Partner Violence: Not on file   Housing Stability: Low Risk    • Unable to Pay for Housing in the Last Year: No   • Number of Places Lived in the Last Year: 1   • Unstable Housing in the Last Year: No     Family History   Problem Relation Age of Onset   • Diabetes Mother    • Thyroid disease Mother    • Cancer Father    • Thyroid disease Sister    • Depression Brother    • Cancer Maternal Aunt    • Cancer Paternal Uncle    • Cancer Paternal Grandmother    • No Known Problems Brother    • Thyroid disease Sister        Meds/Allergies   all current active meds have been reviewed and current meds:  Current Facility-Administered Medications   Medication Dose Route Frequency   • dextrose 5 % and sodium chloride 0 45 % with KCl 20 mEq/L infusion  100 mL/hr Intravenous Continuous   • enoxaparin (LOVENOX) subcutaneous injection 40 mg  40 mg Subcutaneous Daily   • HYDROmorphone (DILAUDID) injection 0 5 mg  0 5 mg Intravenous Q2H PRN   • lisinopril (ZESTRIL) tablet 20 mg  20 mg Oral Early Morning   • metoclopramide (REGLAN) injection 10 mg  10 mg Intravenous Q6H PRN   • naloxone (NARCAN) 0 04 mg/mL syringe 0 04 mg  0 04 mg Intravenous Q1MIN PRN   • octreotide (SandoSTATIN) injection 100 mcg  100 mcg Subcutaneous Q8H [facility]   • ondansetron (ZOFRAN) injection 4 mg  4 mg Intravenous Q6H PRN   • oxyCODONE (ROXICODONE) immediate release tablet 10 mg  10 mg Oral Q4H PRN   • oxyCODONE (ROXICODONE) IR tablet 5 mg  5 mg Oral Q4H PRN   • PARoxetine (PAXIL) tablet 20 mg  20 mg Oral Daily   • polyethylene glycol (MIRALAX) packet 17 g  17 g Oral Daily   • senna-docusate sodium (SENOKOT S) 8 6-50 mg per tablet 1 tablet  1 tablet Oral HS       Allergies   Allergen Reactions   • Tomato - Food Allergy Anaphylaxis     raw   • Poison Ivy Extract Hives   • Poison Sumac Extract Hives       Objective     Physical Exam  Constitutional:       General: He is not in acute distress  Appearance: He is obese  He is ill-appearing  He is not toxic-appearing or diaphoretic  HENT:      Head: Normocephalic and atraumatic  Right Ear: External ear normal       Left Ear: External ear normal       Nose: No congestion  Mouth/Throat:      Mouth: Mucous membranes are dry  Eyes:      General:         Right eye: No discharge  Left eye: No discharge  Conjunctiva/sclera: Conjunctivae normal       Pupils: Pupils are equal, round, and reactive to light  Neck:      Trachea: No tracheal deviation  Cardiovascular:      Rate and Rhythm: Normal rate and regular rhythm  Comments: On monitors  Pulmonary:      Effort: Pulmonary effort is normal  No respiratory distress  Breath sounds: No stridor  Skin:     General: Skin is dry  Coloration: Skin is pale  Findings: No erythema or rash  Neurological:      General: No focal deficit present  Mental Status: He is oriented to person, place, and time  Cranial Nerves: No cranial nerve deficit     Psychiatric:      Comments: Poor eye contact  Wooden affect, with little to no mobility  Thoughts concrete and hyperlinear, occ difficult to redirect  Mood not endorsed  Judgment and insight seem limited  Appears to be responding to internal stimuli       Lab Results:   I have personally reviewed pertinent labs  , CBC:   Lab Results   Component Value Date    WBC 7 76 03/24/2023    HGB 14 9 03/24/2023    HCT 43 1 03/24/2023    MCV 84 03/24/2023     03/24/2023    MCH 28 9 03/24/2023    MCHC 34 6 03/24/2023    RDW 13 9 03/24/2023    MPV 8 8 (L) 03/24/2023    NRBC 0 03/24/2023   , CMP:   Lab Results   Component Value Date    SODIUM 133 (L) 03/24/2023    K 4 2 03/24/2023    CL 99 03/24/2023    CO2 27 03/24/2023    BUN 14 03/24/2023    CREATININE 0 87 03/24/2023    CALCIUM 9 5 03/24/2023    EGFR 101 03/24/2023   , BMP:  Lab Results   Component Value Date    SODIUM 133 (L) 03/24/2023    K 4 2 03/24/2023    CL 99 03/24/2023    CO2 27 03/24/2023    BUN 14 03/24/2023    CREATININE 0 87 03/24/2023    GLUC 111 03/24/2023    CALCIUM 9 5 03/24/2023    AGAP 7 03/24/2023    EGFR 101 03/24/2023   , PT/PTT:No results found for: PT, PTT  Imaging Studies: I have personally reviewed pertinent reports  EKG, Pathology, and Other Studies: I have personally reviewed pertinent reports  Counseling / Coordination of Care  Total floor / unit time spent today 40+ minutes  Greater than 50% of total time was spent with the patient and / or family counseling and / or coordination of care  A description of the counseling / coordination of care: chart review; symptom pursuit; supportive listening; anticipatory guidance

## 2023-03-24 NOTE — UTILIZATION REVIEW
"Initial Clinical Review    Admission: Date/Time/Statement:   Admission Orders (From admission, onward)     Ordered        03/23/23 1507  Inpatient Admission  Once            03/21/23 2124  Place in Observation  Once                      Orders Placed This Encounter   Procedures   • Place in Observation     Standing Status:   Standing     Number of Occurrences:   1     Order Specific Question:   Level of Care     Answer:   Med Surg [16]   • Inpatient Admission     Standing Status:   Standing     Number of Occurrences:   1     Order Specific Question:   Level of Care     Answer:   Med Surg [16]     Order Specific Question:   Estimated length of stay     Answer:   More than 2 Midnights     Order Specific Question:   Certification     Answer:   I certify that inpatient services are medically necessary for this patient for a duration of greater than two midnights  See H&P and MD Progress Notes for additional information about the patient's course of treatment  ED Arrival Information     Expected   -    Arrival   3/21/2023 18:16    Acuity   Emergent            Means of arrival   Wheelchair    Escorted by   Family Member    Service   Surgery-General    Admission type   Emergency            Arrival complaint   abdominal pain           Chief Complaint   Patient presents with   • Abdominal Pain       Initial Presentation: 51 y o  male with past medical history significant for bipolar disorder, HTN, neuroendocrine tumor s/p exploratory laparotomy with small bowel resection and liver biopsy in July 2022 presenting with epigastric abdominal pain and dizziness  Patient reports he has had multiple episodes of dizziness with at least 2 falls at home  Patient reports that his legs \"gave out\" as he was walking  Patient required assistance of 2 family members/friends after episodes  Associated with blurred vision and hot flashes  Additionally, with onset of dizziness patient notes epigastric abdominal pain   He describes pain as " squeezing in character  Plan: Observation for abdominal pain: regular diet, IV fluids, antiemetics and algesics as needed, initiate octretide 100 mcg q 8 hrs, serial abd exams  Miralax  3/22:    Surgery: Patient appears uncomfortable but in no acute distress  Rates his pain at 10/10  Abdomen soft, tender with epigastric palpation, bowel sounds hypoactive  Octreotide ordered, begin bowel regime with MiraLax      ED Triage Vitals   Temperature Pulse Respirations Blood Pressure SpO2   03/21/23 1821 03/21/23 1820 03/21/23 1820 03/21/23 1820 03/21/23 1820   (!) 97 4 °F (36 3 °C) 91 22 (!) 213/122 98 %      Temp Source Heart Rate Source Patient Position - Orthostatic VS BP Location FiO2 (%)   03/21/23 1845 03/21/23 1820 03/21/23 1820 03/21/23 1820 --   Tympanic Monitor Lying Left arm       Pain Score       03/21/23 1820       10 - Worst Possible Pain          Wt Readings from Last 1 Encounters:   03/21/23 95 kg (209 lb 5 2 oz)     Additional Vital Signs:     Date/Time Temp Pulse Resp BP MAP (mmHg) SpO2 O2 Device   03/22/23 07:43:11 96 6 °F (35 9 °C) Abnormal  95 20 183/128 Abnormal  146 98 % --   03/22/23 06:22:02 -- 80 -- 184/119 Abnormal  141 98 % --   03/21/23 22:38:34 98 °F (36 7 °C) 79 18 191/122 Abnormal  145 98 % --   03/21/23 2145 -- 84 26 Abnormal  184/114 Abnormal  146 97 % --   03/21/23 2100 -- 73 14 194/108 Abnormal  144 97 % --   03/21/23 2050 -- 76 19 202/111 Abnormal  147 97 % --   03/21/23 2030 -- 76 20 202/112 Abnormal  149 98 % --   03/21/23 2020 -- 75 16 203/117 Abnormal  152 98 % --   03/21/23 2000 -- 78 16 199/116 Abnormal  150 98 % --   03/21/23 1950 -- 79 14 199/116 Abnormal  149 97 % --   03/21/23 1930 -- 83 15 197/109 Abnormal  149 98 % --   03/21/23 1900 -- 80 19 206/117 Abnormal  150 97 % None (Room air)   03/21/23 1845 -- 84 21 201/118 Abnormal  153 97 % None (Room air)   03/21/23 1821 97 4 °F (36 3 °C) Abnormal  -- -- -- -- -- --     Pertinent Labs/Diagnostic Test Results:   CT abdomen pelvis with contrast   Final Result by Nova Samson MD (03/21 2054)      Stable mesenteric masses/nodes compatible with neuroendocrine tumor  Stable right lobe liver lesion measuring 1 2 cm  No acute inflammatory process identified  Workstation performed: [id number]         XR chest 1 view portable   Final Result by Coy Beatty MD (03/22 0630)      No acute cardiopulmonary disease        Findings are stable, although the previous 9 mm right upper lobe nodule is not well visualized            Workstation performed: [id number]               Results from last 7 days   Lab Units 03/24/23  0533 03/23/23  0601 03/21/23  1830   WBC Thousand/uL 7 76 9 87 9 51   HEMOGLOBIN g/dL 14 9 16 2 16 0   HEMATOCRIT % 43 1 45 7 46 4   PLATELETS Thousands/uL 160 195 170   NEUTROS ABS Thousands/µL 4 80 7 42 7 41         Results from last 7 days   Lab Units 03/24/23  0533 03/23/23  0601 03/21/23  1830   SODIUM mmol/L 133* 133* 134*   POTASSIUM mmol/L 4 2 3 9 4 0   CHLORIDE mmol/L 99 96 98   CO2 mmol/L 27 27 27   ANION GAP mmol/L 7 10 9   BUN mg/dL 14 8 17   CREATININE mg/dL 0 87 0 74 0 78   EGFR ml/min/1 73sq m 101 108 105   CALCIUM mg/dL 9 5 9 6 9 7   MAGNESIUM mg/dL  --  1 9  --      Results from last 7 days   Lab Units 03/21/23  1830   AST U/L 12*   ALT U/L 8   ALK PHOS U/L 107*   TOTAL PROTEIN g/dL 8 1   ALBUMIN g/dL 4 6   TOTAL BILIRUBIN mg/dL 1 25*         Results from last 7 days   Lab Units 03/24/23  0533 03/23/23  0601 03/21/23  1830   GLUCOSE RANDOM mg/dL 111 128 111           Results from last 7 days   Lab Units 03/21/23  2255 03/21/23  2151 03/21/23  1830   HS TNI 0HR ng/L  --   --  5   HS TNI 2HR ng/L  --  5  --    HSTNI D2 ng/L  --  0  --    HS TNI 4HR ng/L 5  --   --    HSTNI D4 ng/L 0  --   --          Results from last 7 days   Lab Units 03/21/23  1830   PROTIME seconds 13 9   INR  1 07   PTT seconds 30         Results from last 7 days   Lab Units 03/21/23  1830   PROCALCITONIN ng/ml 0 09 Results from last 7 days   Lab Units 03/21/23  1830   LACTIC ACID mmol/L 1 7           Results from last 7 days   Lab Units 03/22/23  0434 03/21/23  1830   LIPASE u/L 13 22           Results from last 7 days   Lab Units 03/22/23  0434   AMPH/METH  Negative   BARBITURATE UR  Negative   BENZODIAZEPINE UR  Negative   COCAINE UR  Negative   METHADONE URINE  Negative   OPIATE UR  Positive*   PCP UR  Negative   THC UR  Negative         Results from last 7 days   Lab Units 03/21/23  1830   BLOOD CULTURE  No Growth at 48 hrs  No Growth at 48 hrs                 ED Treatment:   Medication Administration from 03/21/2023 1816 to 03/21/2023 2214       Date/Time Order Dose Route Action     03/21/2023 1827 EDT HYDROmorphone (DILAUDID) injection 1 mg 1 mg Intravenous Given     03/21/2023 1847 EDT sodium chloride 0 9 % bolus 1,000 mL 1,000 mL Intravenous New Bag     03/21/2023 1924 EDT HYDROmorphone (DILAUDID) injection 0 5 mg 0 5 mg Intravenous Given     03/21/2023 1925 EDT iohexol (OMNIPAQUE) 350 MG/ML injection (SINGLE-DOSE) 100 mL 100 mL Intravenous Given     03/21/2023 2148 EDT amLODIPine (NORVASC) tablet 5 mg 5 mg Oral Given     03/21/2023 2148 EDT oxyCODONE (ROXICODONE) immediate release tablet 10 mg 10 mg Oral Given        Past Medical History:   Diagnosis Date   • Allergic    • Bipolar disorder in partial remission (HCC)    • Erectile dysfunction     unspecified erectile dysfunction type   • Hypertension    • Kidney stone      Present on Admission:  **None**      Admitting Diagnosis: Abdominal pain [R10 9]  Neuroendocrine tumor [D3A 8]  Age/Sex: 51 y o  male  Admission Orders:  Scheduled Medications:  enoxaparin, 40 mg, Subcutaneous, Daily  lisinopril, 20 mg, Oral, Early Morning  octreotide, 100 mcg, Subcutaneous, Q8H BERENICE  PARoxetine, 20 mg, Oral, Daily  polyethylene glycol, 17 g, Oral, Daily  senna-docusate sodium, 1 tablet, Oral, HS      Continuous IV Infusions:  dextrose 5 % and sodium chloride 0 45 % with KCl 20 mEq/L, 100 mL/hr, Intravenous, Continuous      PRN Meds:  HYDROmorphone, 0 5 mg, Intravenous, Q2H PRN  metoclopramide, 10 mg, Intravenous, Q6H PRN  naloxone, 0 04 mg, Intravenous, Q1MIN PRN  ondansetron, 4 mg, Intravenous, Q6H PRN  oxyCODONE, 10 mg, Oral, Q4H PRN  oxyCODONE, 5 mg, Oral, Q4H PRN        IP CONSULT TO PALLIATIVE CARE    Network Utilization Review Department  ATTENTION: Please call with any questions or concerns to [phone] and carefully listen to the prompts so that you are directed to the right person  All voicemails are confidential   Tiago Clemente all requests for admission clinical reviews, approved or denied determinations and any other requests to dedicated fax number below belonging to the campus where the patient is receiving treatment   List of dedicated fax numbers for the Facilities:  [address] DENIALS (Administrative/Medical Necessity) [phone]   [address] (Maternity/NICU/Pediatrics) [phone]   3 Ariella Fernández [phone]   [facility] 77 [phone]   [address] [place] [address] Albert Mullen 28 [phone]   155 [facility] Adrianna Szymanski [facility] 134 815 [facility] [phone]

## 2023-03-24 NOTE — PLAN OF CARE
Problem: Potential for Falls  Goal: Patient will remain free of falls  Description: INTERVENTIONS:  - Educate patient/family on patient safety including physical limitations  - Instruct patient to call for assistance with activity   - Consult OT/PT to assist with strengthening/mobility   - Keep Call bell within reach  - Keep bed low and locked with side rails adjusted as appropriate  - Keep care items and personal belongings within reach  - Initiate and maintain comfort rounds  - Make Fall Risk Sign visible to staff  - Offer Toileting every 2 Hours, in advance of need  - Initiate/Maintain alarm  - Obtain necessary fall risk management equipment:   - Apply yellow socks and bracelet for high fall risk patients  - Consider moving patient to room near nurses station  Outcome: Progressing     Problem: Nutrition/Hydration-ADULT  Goal: Nutrient/Hydration intake appropriate for improving, restoring or maintaining nutritional needs  Description: Monitor and assess patient's nutrition/hydration status for malnutrition  Collaborate with interdisciplinary team and initiate plan and interventions as ordered  Monitor patient's weight and dietary intake as ordered or per policy  Utilize nutrition screening tool and intervene as necessary  Determine patient's food preferences and provide high-protein, high-caloric foods as appropriate       INTERVENTIONS:  - Monitor oral intake, urinary output, labs, and treatment plans  - Assess nutrition and hydration status and recommend course of action  - Evaluate amount of meals eaten  - Assist patient with eating if necessary   - Allow adequate time for meals  - Recommend/ encourage appropriate diets, oral nutritional supplements, and vitamin/mineral supplements  - Order, calculate, and assess calorie counts as needed  - Recommend, monitor, and adjust tube feedings and TPN/PPN based on assessed needs  - Assess need for intravenous fluids  - Provide specific nutrition/hydration education as appropriate  - Include patient/family/caregiver in decisions related to nutrition  Outcome: Progressing     Problem: MOBILITY - ADULT  Goal: Maintain or return to baseline ADL function  Description: INTERVENTIONS:  -  Assess patient's ability to carry out ADLs; assess patient's baseline for ADL function and identify physical deficits which impact ability to perform ADLs (bathing, care of mouth/teeth, toileting, grooming, dressing, etc )  - Assess/evaluate cause of self-care deficits   - Assess range of motion  - Assess patient's mobility; develop plan if impaired  - Assess patient's need for assistive devices and provide as appropriate  - Encourage maximum independence but intervene and supervise when necessary  - Involve family in performance of ADLs  - Assess for home care needs following discharge   - Consider OT consult to assist with ADL evaluation and planning for discharge  - Provide patient education as appropriate  Outcome: Progressing  Goal: Maintains/Returns to pre admission functional level  Description: INTERVENTIONS:  - Perform BMAT or MOVE assessment daily    - Set and communicate daily mobility goal to care team and patient/family/caregiver  - Collaborate with rehabilitation services on mobility goals if consulted  - Perform Range of Motion 3 times a day  - Reposition patient every 2 hours    - Dangle patient 3 times a day  - Stand patient 3 times a day  - Ambulate patient 3 times a day  - Out of bed to chair 3 times a day   - Out of bed for meals 3 times a day  - Out of bed for toileting  - Record patient progress and toleration of activity level   Outcome: Progressing     Problem: PAIN - ADULT  Goal: Verbalizes/displays adequate comfort level or baseline comfort level  Description: Interventions:  - Encourage patient to monitor pain and request assistance  - Assess pain using appropriate pain scale  - Administer analgesics based on type and severity of pain and evaluate response  - Implement non-pharmacological measures as appropriate and evaluate response  - Consider cultural and social influences on pain and pain management  - Notify physician/advanced practitioner if interventions unsuccessful or patient reports new pain  Outcome: Progressing     Problem: INFECTION - ADULT  Goal: Absence or prevention of progression during hospitalization  Description: INTERVENTIONS:  - Assess and monitor for signs and symptoms of infection  - Monitor lab/diagnostic results  - Monitor all insertion sites, i e  indwelling lines, tubes, and drains  - Monitor endotracheal if appropriate and nasal secretions for changes in amount and color  - Flomot appropriate cooling/warming therapies per order  - Administer medications as ordered  - Instruct and encourage patient and family to use good hand hygiene technique  - Identify and instruct in appropriate isolation precautions for identified infection/condition  Outcome: Progressing  Goal: Absence of fever/infection during neutropenic period  Description: INTERVENTIONS:  - Monitor WBC    Outcome: Progressing     Problem: SAFETY ADULT  Goal: Patient will remain free of falls  Description: INTERVENTIONS:  - Educate patient/family on patient safety including physical limitations  - Instruct patient to call for assistance with activity   - Consult OT/PT to assist with strengthening/mobility   - Keep Call bell within reach  - Keep bed low and locked with side rails adjusted as appropriate  - Keep care items and personal belongings within reach  - Initiate and maintain comfort rounds  - Make Fall Risk Sign visible to staff  - Offer Toileting every 2 Hours, in advance of need  - Initiate/Maintain alarm  - Obtain necessary fall risk management equipment:   - Apply yellow socks and bracelet for high fall risk patients  - Consider moving patient to room near nurses station  Outcome: Progressing  Goal: Maintain or return to baseline ADL function  Description: INTERVENTIONS:  - Assess patient's ability to carry out ADLs; assess patient's baseline for ADL function and identify physical deficits which impact ability to perform ADLs (bathing, care of mouth/teeth, toileting, grooming, dressing, etc )  - Assess/evaluate cause of self-care deficits   - Assess range of motion  - Assess patient's mobility; develop plan if impaired  - Assess patient's need for assistive devices and provide as appropriate  - Encourage maximum independence but intervene and supervise when necessary  - Involve family in performance of ADLs  - Assess for home care needs following discharge   - Consider OT consult to assist with ADL evaluation and planning for discharge  - Provide patient education as appropriate  Outcome: Progressing  Goal: Maintains/Returns to pre admission functional level  Description: INTERVENTIONS:  - Perform BMAT or MOVE assessment daily    - Set and communicate daily mobility goal to care team and patient/family/caregiver  - Collaborate with rehabilitation services on mobility goals if consulted  - Perform Range of Motion 3 times a day  - Reposition patient every 2 hours    - Dangle patient 3 times a day  - Stand patient 3 times a day  - Ambulate patient 3 times a day  - Out of bed to chair 3 times a day   - Out of bed for meals 3 times a day  - Out of bed for toileting  - Record patient progress and toleration of activity level   Outcome: Progressing     Problem: DISCHARGE PLANNING  Goal: Discharge to home or other facility with appropriate resources  Description: INTERVENTIONS:  - Identify barriers to discharge w/patient and caregiver  - Arrange for needed discharge resources and transportation as appropriate  - Identify discharge learning needs (meds, wound care, etc )  - Arrange for interpretive services to assist at discharge as needed  - Refer to Case Management Department for coordinating discharge planning if the patient needs post-hospital services based on physician/advanced practitioner order or complex needs related to functional status, cognitive ability, or social support system  Outcome: Progressing     Problem: Knowledge Deficit  Goal: Patient/family/caregiver demonstrates understanding of disease process, treatment plan, medications, and discharge instructions  Description: Complete learning assessment and assess knowledge base    Interventions:  - Provide teaching at level of understanding  - Provide teaching via preferred learning methods  Outcome: Progressing

## 2023-03-24 NOTE — PROGRESS NOTES
"Progress Note - General Surgery   Mathew Willis 51 y o  male MRN: [id number]  Unit/Bed#: -01 Encounter: [id number]    Assessment:  51year old male with PMH of neuroendocrine tumor s/p ex lap with small bowel resection and liver biopsy in 7/2022, HTN, bipolar disorder presented with dizziness and abdominal pain   · Afebrile, hypertensive, likely secondary to carcinoid syndrome   · Hyponatremia, 133  · Cr 0 87  · No leukocytosis   · Passing flatus and reports one episode of a small bowel movement    Plan:  · Await palliative care consult for assistance in pain control, neuroendocrine tumor  Patient has difficult social situation  Reached out via TT  · Analgesia and antiemetics prn   · Continue surgical soft diet   · Continue octreotide inpatient and recommended to re-establish outpatient care with Dr Bere Del Valle  · Bowel regimen   · Encourage OOB  · Serial abdominal exams  · Will discuss with Dr Gala Dorado    Subjective/Objective   Subjective: Patient has no complaints at this time  Denies n/v, abdominal pain  States he is passing flatus, and had one episode of a small bowel movement  Feels he will really have to move his bowels soon  Tolerating his diet  Objective:   Blood pressure (!) 151/108, pulse 81, temperature 98 °F (36 7 °C), resp  rate 17, height 6' 1\" (1 854 m), weight 95 kg (209 lb 5 2 oz), SpO2 97 %  ,Body mass index is 27 62 kg/m²  Intake/Output Summary (Last 24 hours) at 3/24/2023 0857  Last data filed at 3/24/2023 0730  Gross per 24 hour   Intake 240 ml   Output 2900 ml   Net -2660 ml       Invasive Devices     Peripheral Intravenous Line  Duration           Peripheral IV 03/21/23 Left;Ventral (anterior) Forearm 2 days          Drain  Duration           Ureteral Drain/Stent Right ureter 6 Fr  534 days                Physical Exam  Vitals reviewed  Constitutional:       General: He is not in acute distress  Appearance: He is normal weight  HENT:      Head: Normocephalic and atraumatic   " Cardiovascular:      Rate and Rhythm: Normal rate and regular rhythm  Heart sounds: Normal heart sounds  Pulmonary:      Effort: Pulmonary effort is normal  No respiratory distress  Abdominal:      General: Abdomen is flat  Bowel sounds are normal  There is distension  Palpations: Abdomen is soft  Tenderness: There is no abdominal tenderness  There is no guarding  Musculoskeletal:      Right lower leg: No edema  Left lower leg: No edema  Skin:     General: Skin is warm and dry  Neurological:      General: No focal deficit present  Mental Status: He is alert  Mental status is at baseline  Psychiatric:         Mood and Affect: Affect is flat  Lab, Imaging and other studies:  I have personally reviewed pertinent lab results      CBC:   Lab Results   Component Value Date    WBC 7 76 03/24/2023    HGB 14 9 03/24/2023    HCT 43 1 03/24/2023    MCV 84 03/24/2023     03/24/2023    MCH 28 9 03/24/2023    MCHC 34 6 03/24/2023    RDW 13 9 03/24/2023    MPV 8 8 (L) 03/24/2023    NRBC 0 03/24/2023     CMP:   Lab Results   Component Value Date    SODIUM 133 (L) 03/24/2023    K 4 2 03/24/2023    CL 99 03/24/2023    CO2 27 03/24/2023    BUN 14 03/24/2023    CREATININE 0 87 03/24/2023    CALCIUM 9 5 03/24/2023    EGFR 101 03/24/2023     VTE Pharmacologic Prophylaxis: Enoxaparin (Lovenox)  VTE Mechanical Prophylaxis: sequential compression device  Mee Leonard PA-C

## 2023-03-25 RX ADMIN — OCTREOTIDE ACETATE 100 MCG: 100 INJECTION, SOLUTION INTRAVENOUS; SUBCUTANEOUS at 13:35

## 2023-03-25 RX ADMIN — ENOXAPARIN SODIUM 40 MG: 40 INJECTION SUBCUTANEOUS at 08:28

## 2023-03-25 RX ADMIN — PAROXETINE HYDROCHLORIDE 20 MG: 20 TABLET, FILM COATED ORAL at 08:28

## 2023-03-25 RX ADMIN — POLYETHYLENE GLYCOL 3350 17 G: 17 POWDER, FOR SOLUTION ORAL at 08:28

## 2023-03-25 RX ADMIN — OCTREOTIDE ACETATE 100 MCG: 100 INJECTION, SOLUTION INTRAVENOUS; SUBCUTANEOUS at 21:57

## 2023-03-25 RX ADMIN — SENNOSIDES AND DOCUSATE SODIUM 1 TABLET: 50; 8.6 TABLET ORAL at 21:57

## 2023-03-25 NOTE — PLAN OF CARE
Problem: PAIN - ADULT  Goal: Verbalizes/displays adequate comfort level or baseline comfort level  Description: Interventions:  - Encourage patient to monitor pain and request assistance  - Assess pain using appropriate pain scale  - Administer analgesics based on type and severity of pain and evaluate response  - Implement non-pharmacological measures as appropriate and evaluate response  - Consider cultural and social influences on pain and pain management  - Notify physician/advanced practitioner if interventions unsuccessful or patient reports new pain  3/25/2023 0038 by Magdi Chawla RN  Outcome: Progressing  3/25/2023 0038 by Magdi Chawla RN  Outcome: Progressing     Problem: SAFETY ADULT  Goal: Patient will remain free of falls  Description: INTERVENTIONS:  - Educate patient/family on patient safety including physical limitations  - Instruct patient to call for assistance with activity   - Consult OT/PT to assist with strengthening/mobility   - Keep Call bell within reach  - Keep bed low and locked with side rails adjusted as appropriate  - Keep care items and personal belongings within reach  - Initiate and maintain comfort rounds  - Make Fall Risk Sign visible to staff  - Apply yellow socks and bracelet for high fall risk patients  - Consider moving patient to room near nurses station  3/25/2023 0038 by Magdi Chawla RN  Outcome: Progressing  3/25/2023 0038 by Magdi Chawla RN  Outcome: Progressing

## 2023-03-25 NOTE — PROGRESS NOTES
"Progress Note - General Surgery   Ale Boswell 51 y o  male MRN: [id number]  Unit/Bed#: -01 Encounter: [id number]    ASSESSMENT:  51year old male with PMH of neuroendocrine tumor s/p ex lap with small bowel resection and liver biopsy in 7/2022, HTN, bipolar disorder presented with dizziness and abdominal pain   · Symptoms resolved   · AF, VSS  · Mild stable hyponatremia 133  · No other lab abnormal values   · Blood cx NGTD  · UO 2 6L  · Last BM 3/23     PLAN:  · Palliative care evaluated and provided pain regimen recommendations upon discharge  Appreciate recs  · Soft surg diet as tolerated  · Continue miralax regimen  · Symptoms much improved after receiving octreotide  · In the process of setting up outpatient infusions of lanreotide, however this can not be done over the weekend due to availability  · Plan for D/C Monday once outpatient infusion plan in place  SUBJECTIVE:  No acute complaints  Tolerating diet  Having bowel fx  OBJECTIVE:   Vitals:  Blood pressure 139/88, pulse 80, temperature 98 °F (36 7 °C), resp  rate 19, height 6' 1\" (1 854 m), weight 95 kg (209 lb 5 2 oz), SpO2 98 %  ,Body mass index is 27 62 kg/m²  I/Os:    Intake/Output Summary (Last 24 hours) at 3/25/2023 0737  Last data filed at 3/25/2023 2883  Gross per 24 hour   Intake 1110 ml   Output 1875 ml   Net -765 ml       Lines/Drains:  Invasive Devices     Peripheral Intravenous Line  Duration           Peripheral IV 03/21/23 Left;Ventral (anterior) Forearm 3 days          Drain  Duration           Ureteral Drain/Stent Right ureter 6 Fr  534 days                Physical Exam  Vitals reviewed  Constitutional:       General: He is not in acute distress  HENT:      Head: Normocephalic and atraumatic  Cardiovascular:      Rate and Rhythm: Normal rate and regular rhythm  Heart sounds: No murmur heard  Pulmonary:      Effort: Pulmonary effort is normal       Breath sounds: No wheezing, rhonchi or rales   " Abdominal:      General: Bowel sounds are normal  There is no distension  Palpations: Abdomen is soft  Tenderness: There is no abdominal tenderness  There is no guarding or rebound  Neurological:      Mental Status: He is alert  Diagnostics:  I have personally reviewed pertinent lab results  XR chest 1 view portable    Result Date: 3/22/2023  Impression: No acute cardiopulmonary disease  Findings are stable, although the previous 9 mm right upper lobe nodule is not well visualized Workstation performed: [id number]     CT abdomen pelvis with contrast    Result Date: 3/21/2023  Impression: Stable mesenteric masses/nodes compatible with neuroendocrine tumor  Stable right lobe liver lesion measuring 1 2 cm  No acute inflammatory process identified   Workstation performed: [id number]     Recent Results (from the past 36 hour(s))   CBC and differential    Collection Time: 03/24/23  5:33 AM   Result Value Ref Range    WBC 7 76 4 31 - 10 16 Thousand/uL    RBC 5 15 3 88 - 5 62 Million/uL    Hemoglobin 14 9 12 0 - 17 0 g/dL    Hematocrit 43 1 36 5 - 49 3 %    MCV 84 82 - 98 fL    MCH 28 9 26 8 - 34 3 pg    MCHC 34 6 31 4 - 37 4 g/dL    RDW 13 9 11 6 - 15 1 %    MPV 8 8 (L) 8 9 - 12 7 fL    Platelets 092 539 - 112 Thousands/uL    nRBC 0 /100 WBCs    Neutrophils Relative 63 43 - 75 %    Immat GRANS % 0 0 - 2 %    Lymphocytes Relative 26 14 - 44 %    Monocytes Relative 7 4 - 12 %    Eosinophils Relative 4 0 - 6 %    Basophils Relative 0 0 - 1 %    Neutrophils Absolute 4 80 1 85 - 7 62 Thousands/µL    Immature Grans Absolute 0 02 0 00 - 0 20 Thousand/uL    Lymphocytes Absolute 2 05 0 60 - 4 47 Thousands/µL    Monocytes Absolute 0 56 0 17 - 1 22 Thousand/µL    Eosinophils Absolute 0 31 0 00 - 0 61 Thousand/µL    Basophils Absolute 0 02 0 00 - 0 10 Thousands/µL   Basic metabolic panel    Collection Time: 03/24/23  5:33 AM   Result Value Ref Range    Sodium 133 (L) 135 - 147 mmol/L    Potassium 4 2 3 5 - 5 3 mmol/L    Chloride 99 96 - 108 mmol/L    CO2 27 21 - 32 mmol/L    ANION GAP 7 4 - 13 mmol/L    BUN 14 5 - 25 mg/dL    Creatinine 0 87 0 60 - 1 30 mg/dL    Glucose 111 65 - 140 mg/dL    Calcium 9 5 8 4 - 10 2 mg/dL    eGFR 101 ml/min/1 73sq m       Current Medications:  Scheduled Meds:  Current Facility-Administered Medications   Medication Dose Route Frequency Provider Last Rate   • enoxaparin  40 mg Subcutaneous Daily Michelle Gaming MD     • HYDROmorphone  0 5 mg Intravenous Q2H PRN Kristine Card DO     • lisinopril  20 mg Oral Early Morning Ida Casas PA-C     • metoclopramide  10 mg Intravenous Q6H PRN Michelle Gaming MD     • naloxone  0 04 mg Intravenous Q1MIN PRN Kristine Card DO     • octreotide  100 mcg Subcutaneous [facility] MD     • ondansetron  4 mg Intravenous Q6H PRN Michelle Gaming MD     • oxyCODONE  10 mg Oral Q4H PRN Kristine Card DO     • oxyCODONE  5 mg Oral Q4H PRN Kristine Card DO     • PARoxetine  20 mg Oral Daily Michelle Gaming MD     • polyethylene glycol  17 g Oral Daily Michelle Gaming MD     • senna-docusate sodium  1 tablet Oral HS Christopher Monsalve DO       Continuous Infusions:   PRN Meds:  •  HYDROmorphone  •  metoclopramide  •  naloxone  •  ondansetron  •  oxyCODONE  •  oxyCODONE      Josue Fraser PA-C  3/25/2023

## 2023-03-26 RX ADMIN — POLYETHYLENE GLYCOL 3350 17 G: 17 POWDER, FOR SOLUTION ORAL at 09:23

## 2023-03-26 RX ADMIN — OCTREOTIDE ACETATE 100 MCG: 100 INJECTION, SOLUTION INTRAVENOUS; SUBCUTANEOUS at 05:47

## 2023-03-26 RX ADMIN — LISINOPRIL 20 MG: 20 TABLET ORAL at 05:48

## 2023-03-26 RX ADMIN — OCTREOTIDE ACETATE 100 MCG: 100 INJECTION, SOLUTION INTRAVENOUS; SUBCUTANEOUS at 21:13

## 2023-03-26 RX ADMIN — OCTREOTIDE ACETATE 100 MCG: 100 INJECTION, SOLUTION INTRAVENOUS; SUBCUTANEOUS at 13:54

## 2023-03-26 RX ADMIN — SENNOSIDES AND DOCUSATE SODIUM 1 TABLET: 50; 8.6 TABLET ORAL at 21:14

## 2023-03-26 RX ADMIN — PAROXETINE HYDROCHLORIDE 20 MG: 20 TABLET, FILM COATED ORAL at 09:23

## 2023-03-26 RX ADMIN — ENOXAPARIN SODIUM 40 MG: 40 INJECTION SUBCUTANEOUS at 09:24

## 2023-03-26 NOTE — PLAN OF CARE
Problem: Potential for Falls  Goal: Patient will remain free of falls  Description: INTERVENTIONS:  - Educate patient/family on patient safety including physical limitations  - Instruct patient to call for assistance with activity   - Consult OT/PT to assist with strengthening/mobility   - Keep Call bell within reach  - Keep bed low and locked with side rails adjusted as appropriate  - Keep care items and personal belongings within reach  - Initiate and maintain comfort rounds  - Make Fall Risk Sign visible to staff  - Offer Toileting every 2 Hours, in advance of need  - Initiate/Maintain alarm  - Obtain necessary fall risk management equipment:   - Apply yellow socks and bracelet for high fall risk patients  - Consider moving patient to room near nurses station  Outcome: Progressing     Problem: Nutrition/Hydration-ADULT  Goal: Nutrient/Hydration intake appropriate for improving, restoring or maintaining nutritional needs  Description: Monitor and assess patient's nutrition/hydration status for malnutrition  Collaborate with interdisciplinary team and initiate plan and interventions as ordered  Monitor patient's weight and dietary intake as ordered or per policy  Utilize nutrition screening tool and intervene as necessary  Determine patient's food preferences and provide high-protein, high-caloric foods as appropriate       INTERVENTIONS:  - Monitor oral intake, urinary output, labs, and treatment plans  - Assess nutrition and hydration status and recommend course of action  - Evaluate amount of meals eaten  - Assist patient with eating if necessary   - Allow adequate time for meals  - Recommend/ encourage appropriate diets, oral nutritional supplements, and vitamin/mineral supplements  - Order, calculate, and assess calorie counts as needed  - Recommend, monitor, and adjust tube feedings and TPN/PPN based on assessed needs  - Assess need for intravenous fluids  - Provide specific nutrition/hydration education as appropriate  - Include patient/family/caregiver in decisions related to nutrition  Outcome: Progressing     Problem: MOBILITY - ADULT  Goal: Maintain or return to baseline ADL function  Description: INTERVENTIONS:  -  Assess patient's ability to carry out ADLs; assess patient's baseline for ADL function and identify physical deficits which impact ability to perform ADLs (bathing, care of mouth/teeth, toileting, grooming, dressing, etc )  - Assess/evaluate cause of self-care deficits   - Assess range of motion  - Assess patient's mobility; develop plan if impaired  - Assess patient's need for assistive devices and provide as appropriate  - Encourage maximum independence but intervene and supervise when necessary  - Involve family in performance of ADLs  - Assess for home care needs following discharge   - Consider OT consult to assist with ADL evaluation and planning for discharge  - Provide patient education as appropriate  Outcome: Progressing  Goal: Maintains/Returns to pre admission functional level  Description: INTERVENTIONS:  - Perform BMAT or MOVE assessment daily    - Set and communicate daily mobility goal to care team and patient/family/caregiver  - Collaborate with rehabilitation services on mobility goals if consulted  - Perform Range of Motion 3 times a day  - Reposition patient every 2 hours    - Dangle patient 3 times a day  - Stand patient 3 times a day  - Ambulate patient 3 times a day  - Out of bed to chair 3 times a day   - Out of bed for meals 3 times a day  - Out of bed for toileting  - Record patient progress and toleration of activity level   Outcome: Progressing     Problem: PAIN - ADULT  Goal: Verbalizes/displays adequate comfort level or baseline comfort level  Description: Interventions:  - Encourage patient to monitor pain and request assistance  - Assess pain using appropriate pain scale  - Administer analgesics based on type and severity of pain and evaluate response  - Implement non-pharmacological measures as appropriate and evaluate response  - Consider cultural and social influences on pain and pain management  - Notify physician/advanced practitioner if interventions unsuccessful or patient reports new pain  Outcome: Progressing     Problem: INFECTION - ADULT  Goal: Absence or prevention of progression during hospitalization  Description: INTERVENTIONS:  - Assess and monitor for signs and symptoms of infection  - Monitor lab/diagnostic results  - Monitor all insertion sites, i e  indwelling lines, tubes, and drains  - Monitor endotracheal if appropriate and nasal secretions for changes in amount and color  - [place] appropriate cooling/warming therapies per order  - Administer medications as ordered  - Instruct and encourage patient and family to use good hand hygiene technique  - Identify and instruct in appropriate isolation precautions for identified infection/condition  Outcome: Progressing  Goal: Absence of fever/infection during neutropenic period  Description: INTERVENTIONS:  - Monitor WBC    Outcome: Progressing     Problem: SAFETY ADULT  Goal: Patient will remain free of falls  Description: INTERVENTIONS:  - Educate patient/family on patient safety including physical limitations  - Instruct patient to call for assistance with activity   - Consult OT/PT to assist with strengthening/mobility   - Keep Call bell within reach  - Keep bed low and locked with side rails adjusted as appropriate  - Keep care items and personal belongings within reach  - Initiate and maintain comfort rounds  - Make Fall Risk Sign visible to staff  - Offer Toileting every 2 Hours, in advance of need  - Initiate/Maintain alarm  - Obtain necessary fall risk management equipment:   - Apply yellow socks and bracelet for high fall risk patients  - Consider moving patient to room near nurses station  Outcome: Progressing  Goal: Maintain or return to baseline ADL function  Description: INTERVENTIONS:  - Assess patient's ability to carry out ADLs; assess patient's baseline for ADL function and identify physical deficits which impact ability to perform ADLs (bathing, care of mouth/teeth, toileting, grooming, dressing, etc )  - Assess/evaluate cause of self-care deficits   - Assess range of motion  - Assess patient's mobility; develop plan if impaired  - Assess patient's need for assistive devices and provide as appropriate  - Encourage maximum independence but intervene and supervise when necessary  - Involve family in performance of ADLs  - Assess for home care needs following discharge   - Consider OT consult to assist with ADL evaluation and planning for discharge  - Provide patient education as appropriate  Outcome: Progressing  Goal: Maintains/Returns to pre admission functional level  Description: INTERVENTIONS:  - Perform BMAT or MOVE assessment daily    - Set and communicate daily mobility goal to care team and patient/family/caregiver  - Collaborate with rehabilitation services on mobility goals if consulted  - Perform Range of Motion 3 times a day  - Reposition patient every 2 hours    - Dangle patient 3 times a day  - Stand patient 3 times a day  - Ambulate patient 3 times a day  - Out of bed to chair 3 times a day   - Out of bed for meals 3 times a day  - Out of bed for toileting  - Record patient progress and toleration of activity level   Outcome: Progressing     Problem: DISCHARGE PLANNING  Goal: Discharge to home or other facility with appropriate resources  Description: INTERVENTIONS:  - Identify barriers to discharge w/patient and caregiver  - Arrange for needed discharge resources and transportation as appropriate  - Identify discharge learning needs (meds, wound care, etc )  - Arrange for interpretive services to assist at discharge as needed  - Refer to Case Management Department for coordinating discharge planning if the patient needs post-hospital services based on physician/advanced practitioner order or complex needs related to functional status, cognitive ability, or social support system  Outcome: Progressing     Problem: Knowledge Deficit  Goal: Patient/family/caregiver demonstrates understanding of disease process, treatment plan, medications, and discharge instructions  Description: Complete learning assessment and assess knowledge base    Interventions:  - Provide teaching at level of understanding  - Provide teaching via preferred learning methods  Outcome: Progressing

## 2023-03-26 NOTE — PROGRESS NOTES
"Progress Note - General Surgery   Ria Zambrano 51 y o  male MRN: [id number]  Unit/Bed#: -01 Encounter: [id number]    ASSESSMENT:  51 M with PMH of neuroendocrine tumor s/p ex lap with small bowel resection and liver biopsy in 7/2022, HTN, bipolar disorder who presented with dizziness and abdominal pain  Hospital day #5  · AF, VSS  • Mild stable hyponatremia 133  • No other lab abnormal values   • Blood cx NGTD    PLAN:  • Soft surg diet as tolerated  • Continue miralax regimen  • Symptoms much improved after receiving octreotide  • Will discuss with CM to ensure home lanreotide home infusion is in the process of being set up  • Plan for D/C tomorrow once outpatient infusion plan in place  SUBJECTIVE:  No acute complaints  Voiding  Moving his bowels  OBJECTIVE:   Vitals:  Blood pressure (!) 143/101, pulse 84, temperature 98 6 °F (37 °C), resp  rate 18, height 6' 1\" (1 854 m), weight 95 kg (209 lb 5 2 oz), SpO2 94 %  ,Body mass index is 27 62 kg/m²  I/Os:  No intake or output data in the 24 hours ending 03/26/23 1058    Lines/Drains:  Invasive Devices     Peripheral Intravenous Line  Duration           Peripheral IV 03/21/23 Left;Ventral (anterior) Forearm 4 days          Drain  Duration           Ureteral Drain/Stent Right ureter 6 Fr  536 days                Physical Exam  Vitals reviewed  Constitutional:       General: He is not in acute distress  HENT:      Head: Normocephalic and atraumatic  Cardiovascular:      Rate and Rhythm: Normal rate and regular rhythm  Heart sounds: No murmur heard  Pulmonary:      Effort: Pulmonary effort is normal       Breath sounds: No wheezing, rhonchi or rales  Abdominal:      General: Bowel sounds are normal  There is no distension  Palpations: Abdomen is soft  Tenderness: There is no abdominal tenderness  There is no guarding or rebound     Neurological:      Mental Status: He is alert          Diagnostics:  I have personally " reviewed pertinent lab results  XR chest 1 view portable    Result Date: 3/22/2023  Impression: No acute cardiopulmonary disease  Findings are stable, although the previous 9 mm right upper lobe nodule is not well visualized Workstation performed: [id number]     CT abdomen pelvis with contrast    Result Date: 3/21/2023  Impression: Stable mesenteric masses/nodes compatible with neuroendocrine tumor  Stable right lobe liver lesion measuring 1 2 cm  No acute inflammatory process identified  Workstation performed: [id number]     No results found for this or any previous visit (from the past 36 hour(s))      Current Medications:  Scheduled Meds:  Current Facility-Administered Medications   Medication Dose Route Frequency Provider Last Rate   • enoxaparin  40 mg Subcutaneous Daily Vera Kwon MD     • HYDROmorphone  0 5 mg Intravenous Q2H PRN Rue Siemens, DO     • lisinopril  20 mg Oral Early Morning Ida May Casas, [place]     • metoclopramide  10 mg Intravenous Q6H PRN Vera Kwon MD     • naloxone  0 04 mg Intravenous Q1MIN PRN Rue Siemens, DO     • octreotide  100 mcg Subcutaneous [facility] Vera Kwon MD     • ondansetron  4 mg Intravenous Q6H PRN Vera Kwon MD     • oxyCODONE  10 mg Oral Q4H PRN Alyssa Siemens, DO     • oxyCODONE  5 mg Oral Q4H PRN Rue Siemens, DO     • PARoxetine  20 mg Oral Daily Vera Kwon MD     • polyethylene glycol  17 g Oral Daily Vera Kwon MD     • senna-docusate sodium  1 tablet Oral HS Christopher Monsalve DO       Continuous Infusions:   PRN Meds:  •  HYDROmorphone  •  metoclopramide  •  naloxone  •  ondansetron  •  oxyCODONE  •  oxyCODONE      Tima Flores PA-C  3/26/2023

## 2023-03-26 NOTE — NURSING NOTE
AWAKE AND ALERT/ORIENTED X4  R/A LUNGS CLEAR 02 SAT 97%  HR 86/MIN  PAINIS 4/10 TO ABDOMEN  PASSING FLATUS  NO DISTRESS  CALL BELL NEAR

## 2023-03-27 ENCOUNTER — TELEPHONE (OUTPATIENT)
Dept: HEMATOLOGY ONCOLOGY | Facility: CLINIC | Age: 48
End: 2023-03-27

## 2023-03-27 VITALS
HEART RATE: 75 BPM | SYSTOLIC BLOOD PRESSURE: 140 MMHG | OXYGEN SATURATION: 96 % | TEMPERATURE: 97.1 F | RESPIRATION RATE: 20 BRPM | BODY MASS INDEX: 27.74 KG/M2 | WEIGHT: 209.33 LBS | HEIGHT: 73 IN | DIASTOLIC BLOOD PRESSURE: 102 MMHG

## 2023-03-27 PROBLEM — R10.9 ABDOMINAL PAIN: Status: ACTIVE | Noted: 2023-03-27

## 2023-03-27 LAB
BACTERIA BLD CULT: NORMAL
BACTERIA BLD CULT: NORMAL

## 2023-03-27 RX ADMIN — ENOXAPARIN SODIUM 40 MG: 40 INJECTION SUBCUTANEOUS at 09:24

## 2023-03-27 RX ADMIN — OCTREOTIDE ACETATE 100 MCG: 100 INJECTION, SOLUTION INTRAVENOUS; SUBCUTANEOUS at 14:18

## 2023-03-27 RX ADMIN — PAROXETINE HYDROCHLORIDE 20 MG: 20 TABLET, FILM COATED ORAL at 09:24

## 2023-03-27 RX ADMIN — POLYETHYLENE GLYCOL 3350 17 G: 17 POWDER, FOR SOLUTION ORAL at 09:24

## 2023-03-27 RX ADMIN — OCTREOTIDE ACETATE 100 MCG: 100 INJECTION, SOLUTION INTRAVENOUS; SUBCUTANEOUS at 05:14

## 2023-03-27 RX ADMIN — LISINOPRIL 20 MG: 20 TABLET ORAL at 05:14

## 2023-03-27 NOTE — DISCHARGE SUMMARY
"Discharge Summary - General Surgery  Junior Jacobo 51 y o  male MRN: [id number]  Unit/Bed#: -01 Encounter: [id number]    Admission Date:   Admission Orders (From admission, onward)     Ordered        03/23/23 1507  Inpatient Admission  Once            03/21/23 2124  Place in Observation  Once                         Discharge Date: 3/27/2023    Admitting Diagnosis: Abdominal pain [R10 9]  Neuroendocrine tumor [D3A 8]     Discharge Diagnosis: Same as admitting    Medical Problems     Resolved Problems  Date Reviewed: 3/27/2023   None         Attending: Dr Anayeli Bonner Physician(s): Palliative care    Significant Findings: None    HPI: as per admit note by Ugo Drew PA-C: Vern Dominguez is a 51 y o  male with past medical history significant for bipolar disorder, HTN, neuroendocrine tumor who initially presented to [facility] ED on 03/21/2023 with complaints of epigastric abdominal pain and dizziness  Patient reports he has had multiple episodes of dizziness with at least 2 falls at home  Patient reports that his legs \"gave out\" as he was walking  Patient required assistance of 2 family members/friends after episodes  Associated with blurred vision and hot flashes  Additionally, with onset of dizziness patient notes epigastric abdominal pain  He describes pain as squeezing in character  Denies nausea or vomiting, diarrhea  Reports that his last bowel movement was approximately 3 to 4 days ago  Reorts uneventful recovery from surgery in July 2022  No significant changes to past medical or surgical history since last evaluation  \"    Physical Exam:  Visit Vitals  BP (!) 140/102   Pulse 75   Temp (!) 97 1 °F (36 2 °C) (Temporal)   Resp 20   Ht 6' 1\" (1 854 m)   Wt 95 kg (209 lb 5 2 oz)   SpO2 96%   BMI 27 62 kg/m²   Smoking Status Former   BSA 2 19 m²       Physical Exam  Vitals reviewed  Constitutional:       General: He is not in acute distress  Appearance: He is obese     HENT:      " Head: Normocephalic and atraumatic  Cardiovascular:      Rate and Rhythm: Normal rate and regular rhythm  Pulmonary:      Effort: Pulmonary effort is normal  No respiratory distress  Abdominal:      General: There is no distension  Palpations: Abdomen is soft  Tenderness: There is no abdominal tenderness  There is no guarding  Musculoskeletal:      Right lower leg: No edema  Left lower leg: No edema  Skin:     General: Skin is warm and dry  Neurological:      General: No focal deficit present  Mental Status: He is alert  Mental status is at baseline  Psychiatric:         Mood and Affect: Mood normal          Behavior: Behavior normal         Hospital Course: Patient presented to the ED with epigastric pain and dizziness  He was subsequently admitted and was started on Octreotide given his history of neuroendocrine tumor  Patient was monitored and his diet was adjusted as tolerated  His abdominal pain and distension improved over the course of his stay  Palliative care was also consulted for assistance in pain secondary to his neuroendocrine tumor  After discussion with the patient, he is agreeable to reestablishing outpatient follow up with Dr Sukhjinder Landis to continue receiving the lanreotide injections  The patient was having bowel movements and able to tolerate his diet  No difficulty urinating or n/v  Denies fever, chills, chest pain, sob  Patient was stable for discharge with recommendations to follow up with Dr Ibarra Prom  The general surgery team called Dr Benny Nagy office who was able to schedule him an office visit for this Friday 3/31/2023  All questions were answered and the patient was agreeable to discharge  Condition at Discharge: stable     Discharge instructions/Information to patient and family:   See after visit summary for information provided to patient and family        Provisions for Follow-Up Care:  See after visit summary for information related to follow-up care and any pertinent home health orders  Disposition: Home    Planned Readmission: No    Discharge Statement   I spent 25 minutes discharging the patient  This time was spent on the day of discharge  I had direct contact with the patient on the day of discharge  Additional documentation is required if more than 30 minutes were spent on discharge  Discharge Medications:  See after visit summary for reconciled discharge medications provided to patient and family        Myles Messina PA-C

## 2023-03-27 NOTE — DISCHARGE INSTR - AVS FIRST PAGE
DISCHARGE INSTRUCTIONS:     Please follow up with Dr Lori Pond on Friday 3/31/2023 at 1pm in the [facility] office  If you need Octreotide before Friday, please return to the ED       Please notify general surgery office if you experience:  Fever over 101 5F  Persistent nausea or vomiting  Severe uncontrolled pain  Redness, tenderness, or signs of infection near incisions (pain, swelling, redness, yellow/green drainage)  Active or persistent bleeding from incisions  Chest pain, shortness of breath     Please call to schedule a follow up appointment in general surgery office in 2 weeks   Please call our office with any additional questions or concerns

## 2023-03-27 NOTE — TELEPHONE ENCOUNTER
Received a call from Dr Ana Rosa Gomes requesting for a much sooner appointment  I have rescheduled his appointment and have reached out to patient and left voicemail to inform of new appointment date , time and location  Appointment Cancellation or Reschedule     Person calling in Physician Office   If someone other than patient calling, are they listed on the communication consent form? N/A   Provider Dr Jagruti Rubin   Office Visit Date and Time 04/26 at 2:20pm   Office Visit Location [address] [place] [address]   Did patient want to reschedule their visit? If so, when was it scheduled to? 03/31 at 1:00pm with Chrystal    Did the patient have STAR scheduled for this appointment? No   Does the patient need STAR scheduled for their new appointment? No   Is this patient calling to reschedule an infusion appointment? No   When is their next infusion appointment? n/a   Is this patient a Chemo patient? No   Reason for Cancellation or Reschedule Earlier appointment requested    Was the No show policy reviewed with patient if patient is canceling within 24 hours? No     If the patient is cancelling an appointment and needs their STAR Transport cancelled, please route to [facility]  If the patient is a treatment patient, please route this to the office nurse  If the patient is not on treatment, please route to the Clerical pool based on location  If the patient is a surgical oncology patient, please route to surg/onc clinical pool  Route message as high priority if same day cancellation

## 2023-03-28 NOTE — UTILIZATION REVIEW
NOTIFICATION OF ADMISSION DISCHARGE   This is a Notification of Discharge from [address]  Please be advised that this patient has been discharge from our facility  Below you will find the admission and discharge date and time including the patient’s disposition  UTILIZATION REVIEW CONTACT:  Anselmo Miramontes  Utilization   Network Utilization Review Department  Phone: [phone] carefully listen to the prompts  All voicemails are confidential   Email: [email or web address]     ADMISSION INFORMATION  PRESENTATION DATE: 3/21/2023  6:25 PM  OBERVATION ADMISSION DATE:   INPATIENT ADMISSION DATE: 3/23/23  3:07 PM   DISCHARGE DATE: 3/27/2023  3:27 PM   DISPOSITION:Home/Self Care    IMPORTANT INFORMATION:  Send all requests for admission clinical reviews, approved or denied determinations and any other requests to dedicated fax number below belonging to the campus where the patient is receiving treatment   List of dedicated fax numbers:  [address] DENIALS (Administrative/Medical Necessity) [phone]   [address] (Maternity/NICU/Pediatrics) [phone]   [facility] [phone]   [place] [address] [phone]   [facility] [phone]   220 [facility] [phone]   [facility] [phone]   [address] [place] [address] 119 [phone]   [facility]  [phone]   [address] [facility] [phone]   412 [facility] [address] [facility] [phone]

## 2023-03-29 ENCOUNTER — TELEPHONE (OUTPATIENT)
Dept: OTHER | Facility: OTHER | Age: 48
End: 2023-03-29

## 2023-03-30 NOTE — TELEPHONE ENCOUNTER
Pt will not be able to come to appointment on Friday 03/31 @1pm  Would like a call back to reschedule

## 2023-05-02 ENCOUNTER — OFFICE VISIT (OUTPATIENT)
Dept: HEMATOLOGY ONCOLOGY | Facility: CLINIC | Age: 48
End: 2023-05-02

## 2023-05-02 VITALS
SYSTOLIC BLOOD PRESSURE: 160 MMHG | WEIGHT: 218 LBS | OXYGEN SATURATION: 100 % | HEART RATE: 98 BPM | RESPIRATION RATE: 17 BRPM | DIASTOLIC BLOOD PRESSURE: 100 MMHG | HEIGHT: 73 IN | BODY MASS INDEX: 28.89 KG/M2 | TEMPERATURE: 97.2 F

## 2023-05-02 DIAGNOSIS — D3A.8 NEUROENDOCRINE TUMOR: ICD-10-CM

## 2023-05-02 DIAGNOSIS — C7B.8 METASTATIC MALIGNANT NEUROENDOCRINE TUMOR TO LIVER (HCC): ICD-10-CM

## 2023-05-02 DIAGNOSIS — C7A.8 NEUROENDOCRINE CARCINOMA OF SMALL BOWEL (HCC): Primary | ICD-10-CM

## 2023-05-02 RX ORDER — LANREOTIDE ACETATE 120 MG/.5ML
120 INJECTION SUBCUTANEOUS ONCE
OUTPATIENT
Start: 2023-05-08

## 2023-05-02 NOTE — PROGRESS NOTES
Hematology/Oncology Outpatient Follow-up  Chely Reyna 51 y o  male 1975 [id number]    Date:  5/2/2023        Assessment and Plan:  1  Neuroendocrine carcinoma of small bowel ([address] )  The patient seems to have stable metastatic carcinoid tumor according to the recent CT scan of the abdomen pelvis which was compared with the prior imaging from November of last year  He seems to have stable mesenteric masses/nodules and stable 1 2 cm liver lesion  The patient seems to be interested in going back on the lanreotide on every 4-week basis which most likely will improve his symptoms  At one point in the near future once he is more stable, we would consider dotatate PET CT scan to see if he would be a candidate for peptide receptor radionucleotide therapy with lutetium 177     - CBC and differential; Standing  - Comprehensive metabolic panel; Standing  - Magnesium; Standing  - Chromogranin A; Standing  - CBC and differential  - Comprehensive metabolic panel  - Magnesium  - Chromogranin A  - Ambulatory referral to Palliative Care; Future    2  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 [facility])  Since he has abdominal pain and multiple psych symptoms I think would be appropriate to get palliative care involved for symptom management  The patient agreed with the plan  - Ambulatory referral to Palliative Care; Future        HPI:  The patient came there for follow-up visit after lengthy interruption of care  His last office visit was on 11/17/2022  He stated that he has been taking care of his sick mother  Today he complained about abdominal pain and flushing symptoms and change of his taste    Recent imaging from 3/21/2023 with a CT scan of the abdomen and pelvis showed:  IMPRESSION:     Stable mesenteric masses/nodes compatible with neuroendocrine tumor      Stable right lobe liver lesion measuring 1 2 cm      No acute inflammatory process identified      Recent blood work from 3/24/2023 showed hemoglobin of 14 9 with normal white cells and platelets  Creatinine was 0 8 with normal calcium  Oncology History Overview Note   This is a 43-year-old male with history of bipolar disorder, schizophrenic personality, hypertension, etc   The patient apparently was admitted the hospital around October of 2021 for symptomatic left ureteral calculus  Incidentally he was noted to have mesenteric masses with calcification with cefepime on the CTA from 10/02/2021  Another CT scan of the abdomen pelvis with contrast was done on 02/01/2022 which showed no significant change in the partially calcified mesenteric masses with mesenteric tethering concerning for neuroendocrine tumor  He had a DOTATATE PET-CT scan on 05/04/2022 which showed:  IMPRESSION:     1  Dotatate avid clustered central mesenteric masses suspicious for underlying neuroendocrine neoplasm  2   There does appear to be subjacent focal radiotracer uptake in a proximal small bowel loop for which underlying small bowel lesion should be excluded  Consider further evaluation with CT enterography or small bowel pill study  3   A few low-level foci of radiotracer uptake in the bilateral scapula, non-specific  Activity is lower than expected for neuroendocrine metastasis given the intensity of the mesenteric foci, however early metastasis is not entirely excluded  Consider   further evaluation with MRI of these regions vs reassessment on follow up Dotatate PET  4   Diffuse prostatic activity, non-specific, question inflammatory  Correlate with PSA levels and urologic evaluation as warranted  5    Stable 8 mm nodule in the right mid lung  No suspicious radiotracer uptake here  Given the stability since 10/2/2021 this can be followed up with chest CT in 12 months  Chromogranin serum level on 10/06/2021 was 134 5  On 06/24/2022 was 164  The patient then underwent small bowel resection on 07/21/2022 at the area where he was thought to be high-risk for obstruction  The pathology revealed well-differentiated neuroendocrine tumor, G1 come multifocal, the tumor invaded through the muscularis propria into the taye colorectal tissue  He also had liver lesion biopsy at segment 5 which also was compatible with metastatic well-differentiated neuroendocrine tumor consistent with intestinal primary  Ki 67 was 1 7%  The final pathology was pT3(m)[id number] multifocal well differentiated small bowel neuroendocrine tumor with known extensive mesenteric galo involvement that was unresectable based on extension to the root of the SMA  Neuroendocrine carcinoma of small bowel ([facility] [address] )   8/5/2022 Initial Diagnosis    Neuroendocrine carcinoma of small bowel (HCC)         Interval history:    ROS: Review of Systems   Constitutional: Positive for appetite change and fatigue  Negative for chills and fever  HENT: Negative for ear pain and sore throat  Eyes: Negative for pain and visual disturbance  Respiratory: Positive for cough  Negative for shortness of breath  Cardiovascular: Negative for chest pain and palpitations  Gastrointestinal: Negative for abdominal pain and vomiting  Genitourinary: Negative for dysuria and hematuria  Musculoskeletal: Negative for arthralgias and back pain  Skin: Negative for color change and rash  Neurological: Positive for dizziness, numbness and headaches  Negative for seizures and syncope  Psychiatric/Behavioral: Positive for sleep disturbance  All other systems reviewed and are negative        Past Medical History:   Diagnosis Date    Allergic     [facility]     Erectile dysfunction     unspecified erectile dysfunction type    Hypertension     Kidney stone        Past Surgical History:   Procedure Laterality Date    COLONOSCOPY      EXPLORATORY LAPAROTOMY W/ BOWEL RESECTION N/A 7/21/2022    Procedure: LAPAROTOMY EXPLORATORY W/ SMALL BOWEL RESECTION, liver biopsy;  Surgeon: David Harris MD; Location:  MAIN OR;  Service: General    FL RETROGRADE PYELOGRAM  10/06/2021    HERNIA REPAIR      MT CYSTO BLADDER W/URETERAL CATHETERIZATION Right 10/06/2021    Procedure: CYSTOSCOPY RETROGRADE PYELOGRAM WITH INSERTION STENT URETERAL, RIGHT URETEROSCOPY, STONE EXTRACTION;  Surgeon: Es Talyor MD;  Location: [address] [place] [address] MAIN OR;  Service: Urology       Social History     Socioeconomic History    Marital status: Single     Spouse name: None    Number of children: None    Years of education: None    Highest education level: None   Occupational History    None   Tobacco Use    Smoking status: Former     Types: Pipe, Cigarettes     Quit date: 2003     Years since quittin 3    Smokeless tobacco: Never   Vaping Use    Vaping Use: Never used   Substance and Sexual Activity    Alcohol use: Yes     Comment: Drinks a quart of moonshine/night-As of 21 will quit drinking    Drug use: Yes     Types: Marijuana     Comment: Socially (1-2 Monthly)    Sexual activity: None   Other Topics Concern    None   Social History Narrative    None     Social Determinants of Health     Financial Resource Strain: Not on file   Food Insecurity: No Food Insecurity    Worried About Running Out of Food in the Last Year: Never true    Coco of Food in the Last Year: Never true   Transportation Needs: No Transportation Needs    Lack of Transportation (Medical): No    Lack of Transportation (Non-Medical):  No   Physical Activity: Not on file   Stress: Not on file   Social Connections: Not on file   Intimate Partner Violence: Not on file   Housing Stability: Low Risk     Unable to Pay for Housing in the Last Year: No    Number of Places Lived in the Last Year: 1    Unstable Housing in the Last Year: No       Family History   Problem Relation Age of Onset    Diabetes Mother     Thyroid disease Mother     Cancer Father     Thyroid disease Sister     Depression Brother     Cancer Maternal Aunt     Cancer Paternal "Uncle     Cancer Paternal Grandmother     No Known Problems Brother     Thyroid disease Sister        Allergies   Allergen Reactions    Tomato - Food Allergy Anaphylaxis     raw    Poison Ivy Extract Hives    Poison Sumac Extract Hives         Current Outpatient Medications:     lisinopril (ZESTRIL) 10 mg tablet, Take 15 mg by mouth daily in the early morning (Patient not taking: Reported on 5/2/2023), Disp: , Rfl:     PARoxetine (PAXIL) 20 mg tablet, Take 20 mg by mouth daily (Patient not taking: Reported on 5/2/2023), Disp: , Rfl:       Physical Exam:  /100 (BP Location: Left arm, Patient Position: Sitting, Cuff Size: Adult)   Pulse 98   Temp (!) 97 2 °F (36 2 °C)   Resp 17   Ht 6' 1\" (1 854 m)   Wt 98 9 kg (218 lb)   SpO2 100%   BMI 28 76 kg/m²     Physical Exam  Constitutional:       Appearance: He is well-developed  HENT:      Head: Normocephalic and atraumatic  Nose: Nose normal    Eyes:      General: No scleral icterus  Right eye: No discharge  Left eye: No discharge  Conjunctiva/sclera: Conjunctivae normal       Pupils: Pupils are equal, round, and reactive to light  Neck:      Thyroid: No thyromegaly  Trachea: No tracheal deviation  Cardiovascular:      Rate and Rhythm: Normal rate and regular rhythm  Heart sounds: Normal heart sounds  No murmur heard  No friction rub  Pulmonary:      Effort: Pulmonary effort is normal  No respiratory distress  Breath sounds: Normal breath sounds  No wheezing or rales  Chest:      Chest wall: No tenderness  Abdominal:      General: There is no distension  Palpations: Abdomen is soft  There is no hepatomegaly or splenomegaly  Tenderness: There is abdominal tenderness (On palpation in the mid abdominal area)  There is no guarding or rebound  Musculoskeletal:         General: No tenderness or deformity  Normal range of motion  Cervical back: Normal range of motion and neck supple   " Lymphadenopathy:      Cervical: No cervical adenopathy  Skin:     General: Skin is warm and dry  Coloration: Skin is not pale  Findings: No erythema or rash  Neurological:      Mental Status: He is alert and oriented to person, place, and time  Cranial Nerves: No cranial nerve deficit  Coordination: Coordination normal       Deep Tendon Reflexes: Reflexes are normal and symmetric  Psychiatric:         Behavior: Behavior normal          Thought Content: Thought content normal          Judgment: Judgment normal            Labs:  Lab Results   Component Value Date    WBC 7 76 03/24/2023    HGB 14 9 03/24/2023    HCT 43 1 03/24/2023    MCV 84 03/24/2023     03/24/2023     Lab Results   Component Value Date    K 4 2 03/24/2023    CL 99 03/24/2023    CO2 27 03/24/2023    BUN 14 03/24/2023    CREATININE 0 87 03/24/2023    GLUF 128 (H) 03/23/2023    CALCIUM 9 5 03/24/2023    CORRECTEDCA 9 7 10/21/2022    AST 12 (L) 03/21/2023    ALT 8 03/21/2023    ALKPHOS 107 (H) 03/21/2023    EGFR 101 03/24/2023     No results found for: TSH    Patient voiced understanding and agreement in the above discussion  Aware to contact our office with questions/symptoms in the interim

## 2023-05-08 ENCOUNTER — HOSPITAL ENCOUNTER (OUTPATIENT)
Dept: INFUSION CENTER | Facility: HOSPITAL | Age: 48
Discharge: HOME | End: 2023-05-08
Attending: INTERNAL MEDICINE

## 2023-05-08 VITALS — TEMPERATURE: 96.9 F

## 2023-05-08 DIAGNOSIS — C7A.8 NEUROENDOCRINE CARCINOMA OF SMALL BOWEL (HCC): ICD-10-CM

## 2023-05-08 DIAGNOSIS — D3A.8 NEUROENDOCRINE TUMOR: Primary | ICD-10-CM

## 2023-05-08 RX ORDER — LANREOTIDE ACETATE 120 MG/.5ML
120 INJECTION SUBCUTANEOUS ONCE
OUTPATIENT
Start: 2023-06-01

## 2023-05-08 RX ORDER — LANREOTIDE ACETATE 120 MG/.5ML
120 INJECTION SUBCUTANEOUS ONCE
Status: COMPLETED | OUTPATIENT
Start: 2023-05-08 | End: 2023-05-08

## 2023-05-08 RX ADMIN — LANREOTIDE ACETATE 120 MG: 120 INJECTION SUBCUTANEOUS at 11:01

## 2023-05-10 ENCOUNTER — OFFICE VISIT (OUTPATIENT)
Dept: SURGERY | Facility: CLINIC | Age: 48
End: 2023-05-10

## 2023-05-10 VITALS
BODY MASS INDEX: 28.6 KG/M2 | SYSTOLIC BLOOD PRESSURE: 170 MMHG | HEART RATE: 84 BPM | OXYGEN SATURATION: 98 % | HEIGHT: 73 IN | WEIGHT: 215.8 LBS | DIASTOLIC BLOOD PRESSURE: 98 MMHG | TEMPERATURE: 98 F | RESPIRATION RATE: 16 BRPM

## 2023-05-10 DIAGNOSIS — C7A.8 NEUROENDOCRINE CARCINOMA OF SMALL BOWEL (HCC): ICD-10-CM

## 2023-05-10 DIAGNOSIS — C7B.8 METASTATIC MALIGNANT NEUROENDOCRINE TUMOR TO LIVER (HCC): Primary | ICD-10-CM

## 2023-05-11 ENCOUNTER — PATIENT OUTREACH (OUTPATIENT)
Dept: CASE MANAGEMENT | Facility: HOSPITAL | Age: 48
End: 2023-05-11

## 2023-05-11 NOTE — PROGRESS NOTES
Progress Note - Surgical Oncology  Miquel Oliver 51 y o  male MRN: [id number]  Encounter: [id number]    Assessment/Plan     48M 10 months status post exploratory laparotomy, small bowel resection, liver biopsy for pT3(m)[id number] multifocal well differentiated small bowel neuroendocrine tumor with known extensive mesenteric galo involvement that was unresectable based on extension to the root of the SMA  Grade 1 disease with Ki67 1 7%, biopsy proven liver involvement      He was receiving monthly lanreotide injections with medical oncology but was then lost to follow up and off of his adjuvant treatment for about 6 months due to social and personal situations  He has now reestablished after a recent hospital admission for abdominal pain  His known residual disease is stable by CT imaging upon my review  He is now receiving his injections again  Dr Meet Ratliff may pursue nuclear medicine imaging and possible lutathera therapy in the coming months once he is settled back out on his lanreotide      He is asking for help from a  regarding his complex social, financial, and personal situations at home  I have placed a referral to oncology social work and reached out to them with an update, they will contact him  He will continue his medical oncology follow up and his lanreotide  Dr Meet Ratliff had referred him to palliative care, not yet scheduled  Patient willing to proceed with scheduling to help manage his chronic symptoms as he undergoes ongoing care for his incurable metastatic small bowel neuroendocrine tumor  We have helped him to schedule today  We will see him back for a clinical visit in 6 months  Subjective       Chief Complaint   Patient presents with   • Follow-up      Recent admission to the hospital for increasing abdominal pain, no CT evidence of bowel obstruction   Now reestablishing for his oncology follow up and management, had been lost to follow up for a handful of months due to "personal social situations, had been off of his lanreotide, started noticing some symptoms of flushing and abdominal pain per his report  Now back on therapy with Dr Mars Lundy  CT evidence shows stable disease  Patient asking for social work assistance for applying for disability and other financial challenges  Patient pending palliative care evaluation, not yet scheduled  Review of Systems   Constitutional: Positive for diaphoresis and fatigue  Negative for activity change, appetite change, chills and fever  Gastrointestinal: Positive for abdominal pain  Negative for constipation, diarrhea, nausea and vomiting  Musculoskeletal: Positive for back pain  Psychiatric/Behavioral: Positive for agitation and dysphoric mood  The patient is hyperactive  All other systems reviewed and are negative  The following portions of the patient's history were reviewed and updated as appropriate: allergies, current medications, past family history, past medical history, past social history, past surgical history and problem list     Objective      Blood pressure 170/98, pulse 84, temperature 98 °F (36 7 °C), temperature source Temporal, resp  rate 16, height 6' 1\" (1 854 m), weight 97 9 kg (215 lb 12 8 oz), SpO2 98 %  Physical Exam  Vitals reviewed  Constitutional:       General: He is not in acute distress  Appearance: He is not ill-appearing or toxic-appearing  HENT:      Head: Normocephalic  Mouth/Throat:      Mouth: Mucous membranes are moist    Eyes:      Pupils: Pupils are equal, round, and reactive to light  Cardiovascular:      Rate and Rhythm: Normal rate  Pulmonary:      Effort: Pulmonary effort is normal  No respiratory distress  Abdominal:      General: Abdomen is flat  There is no distension  Palpations: Abdomen is soft  There is no mass  Tenderness: There is no abdominal tenderness  There is no guarding or rebound  Hernia: No hernia is present     Musculoskeletal:         " General: Normal range of motion  Cervical back: Normal range of motion  Skin:     General: Skin is warm and dry  Capillary Refill: Capillary refill takes less than 2 seconds  Neurological:      Mental Status: He is alert  Mental status is at baseline  Psychiatric:      Comments: Somewhat agitated and irregular mood         Signature:   Delisa Forbes MD  Date: 5/11/2023 Time: 1:38 PM

## 2023-05-11 NOTE — PROGRESS NOTES
OncSW referral received, chart review completed  Pt is already receiving tx and is asking for a call to assist with psychosocial needs  MSW will outreach in the near future to assess and assist as I am able

## 2023-05-15 ENCOUNTER — CONSULT (OUTPATIENT)
Dept: PALLIATIVE MEDICINE | Facility: CLINIC | Age: 48
End: 2023-05-15

## 2023-05-15 VITALS
OXYGEN SATURATION: 98 % | SYSTOLIC BLOOD PRESSURE: 140 MMHG | TEMPERATURE: 98 F | WEIGHT: 217.6 LBS | HEIGHT: 73 IN | DIASTOLIC BLOOD PRESSURE: 90 MMHG | HEART RATE: 75 BPM | BODY MASS INDEX: 28.84 KG/M2

## 2023-05-15 DIAGNOSIS — F99 PSYCHIATRIC DISORDER: ICD-10-CM

## 2023-05-15 DIAGNOSIS — C7B.8 METASTATIC MALIGNANT NEUROENDOCRINE TUMOR TO LIVER (HCC): ICD-10-CM

## 2023-05-15 DIAGNOSIS — C7A.8 NEUROENDOCRINE CARCINOMA OF SMALL BOWEL (HCC): Primary | ICD-10-CM
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
## 2023-05-15 NOTE — PATIENT INSTRUCTIONS
Refer to psychiatry  Will have palliative care  follow up  Follow up in 3 months or sooner if needed

## 2023-05-16 ENCOUNTER — PATIENT OUTREACH (OUTPATIENT)
Dept: CASE MANAGEMENT | Facility: HOSPITAL | Age: 48
End: 2023-05-16

## 2023-05-25 ENCOUNTER — TELEMEDICINE (OUTPATIENT)
Dept: PALLIATIVE MEDICINE | Facility: CLINIC | Age: 48
End: 2023-05-25

## 2023-05-25 DIAGNOSIS — Z71.89 COUNSELING AND COORDINATION OF CARE: Primary | ICD-10-CM

## 2023-05-26 NOTE — PROGRESS NOTES
Palliative Outpatient Assessment of Need    LSW completed an assessment of need which was completed with patient via phone    Relationship status:   Duration of relationship:   Name of significant other:  Children and Ages:   Pets:  Other important family information:  Living situation (where and whom):  Alone in parents old home  Patient's primary caregiver:  Self   Any limitations of caregiver:  Environmental concerns or barriers:   history:  Employment history/source of income: Applied for disability a couple of times  Patient reported that his sister helped with recent application  Patient reported that he is unable to work for a variety of reasons such as Intermittent explosive d/o, cancer  Patient has been selling things in the home to pay bills  Disability:    Concerns regarding literacy:   Spirituality/ Protestant:  Dad was an    Patient's strengths, social supports, and resources:  Patient reported that his sister is a support to him but reported that she recently had surgery on her foot so she is process of healing  Patient reported that he does have a friend and his goddaughter who is supportive of him  Cultural information:   Mental Health current or previous: Patient reported that he was involved with [employer] in the past but stopped  Patient reported that he plans on walking to a local [employer] and seeing if he can re-engage  Per chart review, hx of bipolar, paranoid delusions, auditory hallucinations, hx suicide attempts, intermittent explosive d/o  Substance use or history:   Sleep: patient reported that his sleep is so-so  Reported that he worries about his mom who has dementia and is staying with his brother now that he doesn't speak with  He reported that his brother will not let him speak/see his mother  Exercise:  Diet/nutrition: Patient does access 2 local food pantries to assist with meeting his needs     Durable Medical Equipment needs:  Transportation: Reported that "his sister assists with transport  Financial concerns: patient does have difficulty making bills  Reported that he has applied for disability and is waiting on determination  Advanced Directive: none  Other medical or social work providers involved:oncology   Patient/caregiver current level of coping:  Understanding: Patient stating that he is feeling \"okay\"  Reported that he is going to reach out to [facility] [address] supports   Patient/family concerns and areas of need: financial   Patient's interests:     I have spent 20 minutes with Patient  today in which greater than 50% of this time was spent in counseling/coordination of care regarding Counseling / Coordination of care and Documenting in the medical record  *All questions may not be answered due to constraints    Follow-up discussions may need to occur          "

## 2023-06-05 ENCOUNTER — PATIENT OUTREACH (OUTPATIENT)
Dept: CASE MANAGEMENT | Facility: HOSPITAL | Age: 48
End: 2023-06-05

## 2023-06-05 ENCOUNTER — TELEPHONE (OUTPATIENT)
Dept: INFUSION CENTER | Facility: HOSPITAL | Age: 48
End: 2023-06-05
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
## 2023-06-06 ENCOUNTER — TELEPHONE (OUTPATIENT)
Dept: PSYCHIATRY | Facility: CLINIC | Age: 48
End: 2023-06-06

## 2023-06-06 NOTE — TELEPHONE ENCOUNTER
MARIELA Llanes   APRN NOTE    Janna Willis is a 86 y.o. female with PMH diabetes who presents to the 2A ICU s/p [facility] with Dr. Miner for chest pain. Patient presented to hospital today after developing chest pain followed by unresponsiveness, blank stare and paleness. She was emergently taken to the cath lab where she was found to be in complete heart block. A temporary transvenous pacer was placed. She additionally was found to have 80% proximal RCA stenosis for which she underwent PCI with HANK placement. Following procedure she was brought to the ICU for management.    I spent 3 minutes of time on this.  Mary Ang, MSN, AGACNP-BC, APRN    Electronically signed by MARIELA Llanse, 06/05/23, 8:20 PM EDT.    [x]   Information obtained by review of electronic health records.(non-face-to-face)  []   Information obtained by face-to-face contact with the patient.    INTENSIVIST   PROGRESS NOTE          SUBJECTIVE     Janna 86 y.o. female is followed for:Slow Heart Rate       Heart block AV third degree    As an Intensivist, we provide an integrated approach to the ICU patient and family, medical management of comorbid conditions, including but not limited to electrolytes, glycemic control, organ dysfunction, lead interdisciplinary rounds and coordinate the care with all other services, including those from other specialists.     HPI:  POD: Day of Surgery ([facility] and Temporary Pacemaker)    Janna is a 86 y.o. female, who presented to this Hospital on 6/5/2023 because of chest pain.    In addition to the chest pain, she became unresponsive. Her HR was in the 20s.    She went emergently to the Cath Lab where she got a temporary PM and a PCI and HANK to the RCA which had an 80% stenosis.    I saw after arrival to 2A ICU, where she is feeling better. Just a bit hungry.    Temp  Min: 97.7 °F (36.5 °C)  Max: 97.7 °F (36.5 °C)     PMH: She  has a past medical history of Diabetes.   PSxH: She  has a past surgical history that  Placed call to [place] transportation / Jennifer and set up transportation for his PET scan on 5/4/2022 in [facility]  Placed call to patient and made him aware of 10:30 am  time  includes [place] surgery.     Medications:  No current facility-administered medications on file prior to encounter.     Current Outpatient Medications on File Prior to Encounter   Medication Sig    aspirin 81 MG EC tablet Take 1 tablet by mouth Daily.    carvedilol (COREG) 6.25 MG tablet Take 6.25 mg by mouth 2 (Two) Times a Day.    Cholecalciferol 25 MCG (1000 UT) tablet Take 2 tablets by mouth Daily.    escitalopram (LEXAPRO) 20 MG tablet Take 1 tablet by mouth Daily.    ezetimibe (ZETIA) 10 MG tablet Take  by mouth Daily.    fluorometholone (FML) 0.1 % ophthalmic suspension Instill 1 drop into both eyes three times a day    gabapentin (NEURONTIN) 100 MG capsule Take 3 capsules by mouth every night at bedtime.    levothyroxine (SYNTHROID, LEVOTHROID) 50 MCG tablet TAKE ONE TABLET EVERY DAY FOR FOR THYROID    losartan-hydrochlorothiazide (HYZAAR) 100-25 MG per tablet take ONE-HALF TABLET ONCE DAILY    omeprazole (priLOSEC) 40 MG capsule TAKE ONE CAPSULE EVERY MORNING FOR gerd    venlafaxine XR (EFFEXOR-XR) 150 MG 24 hr capsule elva 1 capsule with food Once a day for 90 days    vitamin B-12 (CYANOCOBALAMIN) 1000 MCG tablet Take 1 tablet by mouth 1 (One) Time Per Week.    [DISCONTINUED] amLODIPine (NORVASC) 5 MG tablet TAKE ONE TABLET EVERY DAY FOR high blood PRESSURE    [DISCONTINUED] metFORMIN (GLUCOPHAGE) 500 MG tablet Take  by mouth 3 (Three) Times a Day.    [DISCONTINUED] omeprazole (priLOSEC) 20 MG capsule take 1 capsule each morning for gerd.        Allergies: She is allergic to avelox [moxifloxacin]; 2,4-d dimethylamine; azithromycin; lisinopril; metoclopramide; and statins.   FH: Her family history includes Heart attack in her mother; Stroke in her mother.   SH: She  reports that she has never smoked. She has never used smokeless tobacco. She reports that she does not drink alcohol and does not use drugs.     The patient's relevant past medical, surgical and social history were reviewed and updated in Epic as  appropriate.        History     Last Reviewed by Parth Saldana MD on 2023 at 10:18 PM    Sections Reviewed    Medical, Family, Surgical, Tobacco, Alcohol, Drug Use, Sexual Activity,   Social Documentation    Problem list reviewed by Parth Saldana MD on 2023 at 10:18 PM  Medicines reviewed by Parth Saldana MD on 2023 at 10:18 PM  Allergies reviewed by Parth Saldana MD on 2023 at 10:18 PM       Review of Systems  As described in the HPI.       OBJECTIVE     Vitals:  Temp: 97.7 °F (36.5 °C) (23) Temp  Min: 97.7 °F (36.5 °C)  Max: 97.7 °F (36.5 °C)   Temp core:      BP: 132/89 (23) BP  Min: 121/80  Max: 147/71   MAP (non-invasive) Noninvasive MAP (mmHg): 124 (23) Noninvasive MAP (mmHg)  Av.2  Min: 85  Max: 126   Pulse: 80 (23) Pulse  Min: 0  Max: 88   Resp: 18 (23) Resp  Min: 14  Max: 24   SpO2: 96 % (23) SpO2  Min: 89 %  Max: 100 %   Device: nasal cannula (23)    Flow Rate: 2 (23) Flow (L/min)  Min: 2  Max: 2         23  175   Weight: 90.7 kg (200 lb)        Intake/Ouptut 24 hrs (7:00AM - 6:59 AM)  Intake & Output (last 3 days)          0701  06/04 0700  0701   0700  0701   0700    IV Piggyback   260    Total Intake(mL/kg)   260 (2.9)    Net   +260                   Medications (drips):  DOPamine, Last Rate: 5 mcg/kg/min (23)        Physical Examination  Telemetry:  Rhythm: paced rhythm (23)         Constitutional:  No acute distress.   Cardiovascular: RRR.    Respiratory: Normal breath sounds  No adventitious sounds   Abdominal:  Soft with no tenderness.   Extremities: No Edema  R groin hematoma.   Neurological:   Alert, Oriented, Cooperative.  Best Eye Response: 4-->(E4) spontaneous (23)  Best Motor Response: 6-->(M6) obeys commands (23)  Best Verbal Response: 5-->(V5) oriented (23)  Alberto Coma Scale Score: 15  (06/05/23 2017)     Results Reviewed:  Laboratory  Microbiology  Radiology  Pathology    Hematology:  Results from last 7 days   Lab Units 06/05/23  1757   WBC 10*3/mm3 7.47   NEUTROS ABS 10*3/mm3 5.48   IMM GRAN % % 0.5   LYMPHS ABS 10*3/mm3 1.31   MONOS ABS 10*3/mm3 0.51   EOS ABS 10*3/mm3 0.10   BASOS ABS 10*3/mm3 0.03     Results from last 7 days   Lab Units 06/05/23  1757 06/05/23  1756   HEMOGLOBIN g/dL 12.2  --    HEMOGLOBIN, POC g/dL  --  11.9*   MCV fL 90.9  --    RDW % 12.7  --    PLATELETS 10*3/mm3 142  --       Chemistry:  Estimated Creatinine Clearance: 34.6 mL/min (A) (by C-G formula based on SCr of 1.25 mg/dL (H)).    Results from last 7 days   Lab Units 06/05/23  1757 06/05/23  1756   SODIUM mmol/L 132*  --    POTASSIUM mmol/L 4.7  --    CHLORIDE mmol/L 97*  --    CO2 mmol/L 22.0  --    BUN mg/dL 23  --    CREATININE mg/dL 1.25* 1.40*   GLUCOSE mg/dL 175*  --      Results from last 7 days   Lab Units 06/05/23  1757   CALCIUM mg/dL 8.5*   MAGNESIUM mg/dL 1.9       Hepatic Panel:  Results from last 7 days   Lab Units 06/05/23  1757   ALBUMIN g/dL 3.3*   BILIRUBIN mg/dL 0.6   AST (SGOT) U/L 78*   ALT (SGPT) U/L 70*   ALK PHOS U/L 60        Coagulation Labs:  Results from last 7 days   Lab Units 06/05/23  1757   PROTIME Seconds 14.0   INR  1.07   APTT seconds 24.2*        Cardiac Labs:  Results from last 7 days   Lab Units 06/05/23  1757   PROBNP pg/mL 6,329.0*   HSTROP T ng/L 30*       Biomarkers:  Results from last 7 days   Lab Units 06/05/23  1757   D DIMER QUANT MCGFEU/mL 0.44     Images:  XR Chest 1 View    Result Date: 6/5/2023  Impression: Increased opacity at the AP window could be seen with lymphadenopathy or vascular abnormality including aneurysm. A contrast-enhanced CT chest is recommended. Cardiomegaly. Pulmonary edema suspected. Electronically Signed: Rhona Gonzalez  6/5/2023 6:16 PM EDT  Workstation ID: [id number]     Echo:      Results: Reviewed.  I reviewed the patient's new laboratory and  imaging results.  I independently reviewed the patient's new images.    Medications: Reviewed.    Assessment    A/P     Hospital:  LOS: 0 days   ICU: 2h     Active Hospital Problems    Diagnosis  POA    **Heart block AV third degree [I44.2]  Yes     Janna is a 86 y.o. female admitted on 6/5/2023 with Heart block AV third degree [I44.2]    Assessment/Management/Treatment Plan:    CAD with angina  LHC: PCI and HANK to RCA which had an 80% stenosis.  3° AVB - S/P Temporary Pacemaker on admission. Currently 100% paced.  HTN  Dyslipidemia   Endocrine  Body mass index is 34.33 kg/m². Obese Class I: 30-34.9kg/m2  Hypothyroidism    Lab Results   Component Value Date    TSH 11.530 (H) 06/05/2023    FREET4 1.00 06/05/2023      T2 Diabetes per history.    No results found for: HGBA1C    Results from last 7 days   Lab Units 06/05/23  1756   GLUCOSE mg/dL 171*       Diet: NPO Diet NPO Type: Sips with Meds  No active supplement orders      Advance Directives: Code Status and Medical Interventions:   Ordered at: 06/05/23 1950     Level Of Support Discussed With:    Patient     Code Status (Patient has no pulse and is not breathing):    CPR (Attempt to Resuscitate)     Medical Interventions (Patient has pulse or is breathing):    Full        DVT prophylaxis:  No DVT prophylaxis order currently exists.       In brief:  Evaluation per Cardiology re: Permanent Pacemaker.  Goal: Glucose < 180 mg/dL.   Follow up Hb in AM  TSH is elevated but FT4 still within normal limits. Her dose of synthroid may need to be increased a bit, if the dose has not been adjusted recently.  A1c in AM  Disposition: Admit to ICU    Plan of care and goals reviewed during interdisciplinary rounds.  I discussed the patient's findings and my recommendations with patient    MDM:    Problem(s) High due to: Acute or Chronic illness or injury that may poses a threat to life or bodily function  Data: Moderate due to: Review of prior external records from each unique  source, Review or results of each unique test, and Ordering of each unique test    Moderate      [] Primary Attending Intensive Care Medicine - Nutrition Support   [x] Consultant    Copied text in this note has been reviewed and is accurate as of 06/05/23

## 2023-06-06 NOTE — TELEPHONE ENCOUNTER
Patient has been added to the Medication Management and Talk Therapy wait list with a referral     Insurance: [employer]  Insurance Type:    Commercial []   Medicaid [x]   [place] (if applicable)   Medicare []  Location Preference: [place]  Provider Preference: none  Virtual: Yes [] No []      [facility]

## 2023-06-08 ENCOUNTER — PATIENT OUTREACH (OUTPATIENT)
Dept: CASE MANAGEMENT | Facility: HOSPITAL | Age: 48
End: 2023-06-08

## 2023-06-08 DIAGNOSIS — D3A.8 NEUROENDOCRINE TUMOR: ICD-10-CM

## 2023-06-08 DIAGNOSIS — C7A.8 NEUROENDOCRINE CARCINOMA OF SMALL BOWEL (HCC): Primary | ICD-10-CM

## 2023-06-08 RX ORDER — LANREOTIDE ACETATE 120 MG/.5ML
120 INJECTION SUBCUTANEOUS ONCE
Status: CANCELLED | OUTPATIENT
Start: 2023-06-12

## 2023-06-08 NOTE — PROGRESS NOTES
Biopsychosocial and Barriers Assessment    Cancer Diagnosis: metastatic malignant neuroendocrine tumor to liver and small bowel  Home/Cell Phone: [phone]  Emergency Contact: Ariadne Velazquez (Sister) [phone]  Marital Status: single  Interpretation concerns, speaks another language, preferred language: english  Cultural concerns: no  Ability to read or write: yes    Caregiver/Support: sister and goddaughter  Children: no  Child/Elder care: no    Housing: no issues  Home Setup: two story house  Lives With: alone  Daily Living Activities: independent  [address]: no  Ambulation: independent    Preferred Pharmacy: [facility] co-pays with insurance:   High co-pays with medication coverage:  No medication coverage: has MA    Primary Care Provider: Carla Mclean  Hx of [address]: no  Hx of Short term rehab: no  Mental Health Hx: yes, involved with [employer]  Substance Abuse Hx: Hx of drug use  Employment: no   Status/Location: no  Ability to pay bills: yes, family is helping  POA/LW/AD: no  Transportation Plan/Concerns: sister provides      What do you know about your Cancer Diagnosis    What has your doctor told you about your cancer diagnosis: pt reports bowel and liver    What has your doctor told you about your cancer treatment: receiving chemo    What specific concerns do you have about your diagnosis and treatment: no answer    Have you been made aware of any hair loss associated with treatment: yes    Additional Comments:    LSW spoke with patient via phone  A referral was made to [facility] for possible needs of finances and situation at home  Patient lives at home alone  He has a hx of mental health: intermittent explosive disorder and Bipolar  He also has a hx of drug use  LSW offered to obtain counseling services for him  Patient reports e is involved in [employer] from past and he has an appointment tomorrow to complete paperwork       When speaking to patient; he denies any need for financial assistance  LSW explained that [address] [place] [address] has a compassion fund for cancer patients and we can assist with bills that he may be behind on or any other needs he has  Patient said his sister and goddaughter are helping pay his bills  LSW asked if he has food to eat and he answered, yes  Patient was difficult to understand at times during our conversation  Patient was cooperative  He said his anxiety mostly comes from worrying about appointments  Patient said his sister provides transportation  LSW provided patient with name and phone number if any needs arise  Emotional support provided  LSW will also follow up

## 2023-06-12 ENCOUNTER — HOSPITAL ENCOUNTER (OUTPATIENT)
Dept: INFUSION CENTER | Facility: HOSPITAL | Age: 48
Discharge: HOME | End: 2023-06-12
Attending: INTERNAL MEDICINE
Payer: COMMERCIAL

## 2023-06-12 DIAGNOSIS — C7A.8 NEUROENDOCRINE CARCINOMA OF SMALL BOWEL (HCC): Primary | ICD-10-CM

## 2023-06-12 DIAGNOSIS — D3A.8 NEUROENDOCRINE TUMOR: ICD-10-CM

## 2023-06-12 RX ORDER — LANREOTIDE ACETATE 120 MG/.5ML
120 INJECTION SUBCUTANEOUS ONCE
OUTPATIENT
Start: 2023-07-10

## 2023-06-12 RX ORDER — LANREOTIDE ACETATE 120 MG/.5ML
120 INJECTION SUBCUTANEOUS ONCE
Status: COMPLETED | OUTPATIENT
Start: 2023-06-12 | End: 2023-06-12

## 2023-06-12 RX ADMIN — LANREOTIDE ACETATE 120 MG: 120 INJECTION SUBCUTANEOUS at 12:08

## 2023-06-12 NOTE — PROGRESS NOTES
Pt toelrated todays lanreotide injection to left buttock without incident  Next appt rescheduled from 7/3 to 7/10 as he is off schedule  Pt aware   Discharged ambulatory with avs

## 2023-06-20 ENCOUNTER — PATIENT OUTREACH (OUTPATIENT)
Dept: CASE MANAGEMENT | Facility: HOSPITAL | Age: 48
End: 2023-06-20

## 2023-06-20 NOTE — PROGRESS NOTES
LSW reached out to patient this afternoon  Patient had an appointment with [facility] last we spoke  The local [employer] does not offer the support patient needs  Patient has mental health hx and needs outpatient therapist  [place] explained about the [facility] and also local therapists, if he would rather telehealth or live  Patient would rather live counseling  When I began to explain, he put his sister, Carlyn Pacheco on the phone  I provided Carlyn Pacheco with the [facility] phone number and obtained her email and emailed her a link to psychologytoday  com to find a local therapist      Then Carlyn Pacheco placed patient back on the phone  He reports he owes $3000 in back taxes on his family property because his mother and brother did not pay the last quarter last year  LSW suggested he discuss with his family to all pitch in  He said his godmother often helps but he hates to ask her again  LSW suggested maybe she can loan the pal[place]ios to your family and they can slowly pay her back  Pt said he would see if that would work  Otherwise, patient is doing okay  LSW provided emotional support and will follow up as needed  My name and phone number was left with pt's sister who helps explain things to patient

## 2023-06-25 ENCOUNTER — HOSPITAL ENCOUNTER (EMERGENCY)
Facility: HOSPITAL | Age: 48
Discharge: HOME | End: 2023-06-25
Attending: FAMILY MEDICINE | Admitting: FAMILY MEDICINE
Payer: COMMERCIAL

## 2023-06-25 ENCOUNTER — APPOINTMENT (EMERGENCY)
Dept: RADIOLOGY | Facility: HOSPITAL | Age: 48
End: 2023-06-25
Payer: COMMERCIAL

## 2023-06-25 VITALS
OXYGEN SATURATION: 96 % | SYSTOLIC BLOOD PRESSURE: 170 MMHG | HEIGHT: 73 IN | TEMPERATURE: 98.2 F | HEART RATE: 72 BPM | WEIGHT: 240 LBS | DIASTOLIC BLOOD PRESSURE: 90 MMHG | BODY MASS INDEX: 31.81 KG/M2 | RESPIRATION RATE: 17 BRPM

## 2023-06-25 DIAGNOSIS — M25.571 ACUTE RIGHT ANKLE PAIN: ICD-10-CM

## 2023-06-25 DIAGNOSIS — S62.309A METACARPAL BONE FRACTURE: Primary | ICD-10-CM

## 2023-06-25 PROCEDURE — 73130 X-RAY EXAM OF HAND: CPT

## 2023-06-25 PROCEDURE — 73610 X-RAY EXAM OF ANKLE: CPT

## 2023-06-25 RX ORDER — IBUPROFEN 400 MG/1
800 TABLET ORAL ONCE
Status: COMPLETED | OUTPATIENT
Start: 2023-06-25 | End: 2023-06-25

## 2023-06-25 RX ADMIN — IBUPROFEN 800 MG: 400 TABLET, FILM COATED ORAL at 13:57

## 2023-06-25 NOTE — ED PROVIDER NOTES
History  Chief Complaint   Patient presents with   • Hand Injury     Left hand swollen hit door; left ankle pain        HPI  21-year-old male presented to ED with complaint of right hand injury  Patient states that someone was people were trying to get into his house he reached for the door and had his back of his hand on the door  States he noticed the swelling good this morning  Also complained of left ankle pain history of gout  Rating his pain 5 out of 10  States he did not take anything for pain  Denies any fever or chills  Prior to Admission Medications   Prescriptions Last Dose Informant Patient Reported? Taking?    PARoxetine (PAXIL) 20 mg tablet  Self Yes No   Sig: Take 20 mg by mouth daily   lisinopril (ZESTRIL) 10 mg tablet  Self Yes No   Sig: Take 15 mg by mouth daily in the early morning      Facility-Administered Medications: None       Past Medical History:   Diagnosis Date   • Allergic    • Bipolar disorder in partial remission ([facility] [address] )    • Erectile dysfunction     unspecified erectile dysfunction type   • Hypertension    • Kidney stone        Past Surgical History:   Procedure Laterality Date   • COLONOSCOPY     • EXPLORATORY LAPAROTOMY W/ BOWEL RESECTION N/A 7/21/2022    Procedure: LAPAROTOMY EXPLORATORY W/ SMALL BOWEL RESECTION, liver biopsy;  Surgeon: Júnior Knight MD;  Location:  MAIN OR;  Service: General   • FL RETROGRADE PYELOGRAM  10/06/2021   • HERNIA REPAIR     • AR CYSTO BLADDER W/URETERAL CATHETERIZATION Right 10/06/2021    Procedure: CYSTOSCOPY RETROGRADE PYELOGRAM WITH INSERTION STENT URETERAL, RIGHT URETEROSCOPY, STONE EXTRACTION;  Surgeon: Meron Marin MD;  Location: [facility] MAIN OR;  Service: Urology       Family History   Problem Relation Age of Onset   • Diabetes Mother    • Thyroid disease Mother    • Cancer Father    • Thyroid disease Sister    • Depression Brother    • Cancer Maternal Aunt    • Cancer Paternal Uncle    • Cancer Paternal Grandmother    • No Known Problems Brother    • Thyroid disease Sister      I have reviewed and agree with the history as documented  E-Cigarette/Vaping   • E-Cigarette Use Never User      E-Cigarette/Vaping Substances     Social History     Tobacco Use   • Smoking status: Former     Types: Pipe, Cigarettes     Quit date: 2003     Years since quittin 5   • Smokeless tobacco: Never   Vaping Use   • Vaping Use: Never used   Substance Use Topics   • Alcohol use: Yes     Comment: Drinks a quart of moonshine/night-As of 21 will quit drinking   • Drug use: Yes     Types: Marijuana     Comment: Socially (1-2 Monthly)       Review of Systems   Constitutional: Negative  HENT: Negative  Eyes: Negative  Respiratory: Negative  Cardiovascular: Negative  Gastrointestinal: Negative  Endocrine: Negative  Genitourinary: Negative  Musculoskeletal:        Right hand pain and swelling   Right ankle swelling    Skin: Negative  Neurological: Negative  Hematological: Negative  Psychiatric/Behavioral: Negative  Physical Exam  Physical Exam  Vitals and nursing note reviewed  Constitutional:       General: He is not in acute distress  Appearance: Normal appearance  He is well-developed  He is not diaphoretic  HENT:      Head: Normocephalic and atraumatic  Right Ear: External ear normal       Left Ear: External ear normal       Nose: Nose normal       Mouth/Throat:      Mouth: Mucous membranes are moist       Pharynx: Oropharynx is clear  No posterior oropharyngeal erythema  Eyes:      Conjunctiva/sclera: Conjunctivae normal       Pupils: Pupils are equal, round, and reactive to light  Cardiovascular:      Rate and Rhythm: Normal rate and regular rhythm  Pulses: Normal pulses  Heart sounds: Normal heart sounds  Pulmonary:      Effort: Pulmonary effort is normal  No respiratory distress  Breath sounds: Normal breath sounds  No wheezing     Abdominal:      General: Bowel sounds are normal  There is no distension  Palpations: Abdomen is soft  Tenderness: There is no abdominal tenderness  Musculoskeletal:         General: Swelling and tenderness present  Normal range of motion  Cervical back: Normal range of motion and neck supple  Comments: Right hand: there is moderate swelling present at the right hand  ROM intact    Lymphadenopathy:      Cervical: No cervical adenopathy  Skin:     General: Skin is warm and dry  Capillary Refill: Capillary refill takes less than 2 seconds  Neurological:      Mental Status: He is alert and oriented to person, place, and time  Psychiatric:         Mood and Affect: Mood normal          Behavior: Behavior normal          Vital Signs  ED Triage Vitals [06/25/23 1333]   Temperature Pulse Respirations Blood Pressure SpO2   98 2 °F (36 8 °C) 83 18 (!) 211/110 (!) 83 %      Temp Source Heart Rate Source Patient Position - Orthostatic VS BP Location FiO2 (%)   Oral Monitor Sitting Left arm --      Pain Score       7           Vitals:    06/25/23 1354 06/25/23 1500 06/25/23 1600 06/25/23 1650   BP: (!) 173/112 (!) 168/109 (!) 172/115 170/90   Pulse:  72 72    Patient Position - Orthostatic VS:  Sitting Sitting          Visual Acuity      ED Medications  Medications   ibuprofen (MOTRIN) tablet 800 mg (800 mg Oral Given 6/25/23 1357)       Diagnostic Studies  Results Reviewed     None                 XR hand 3+ views RIGHT    (Results Pending)   XR ankle 3+ views RIGHT    (Results Pending)              Procedures  Procedures         ED Course  ED Course as of 06/25/23 1652   Sun Jun 25, 2023   1553 Patient was placed in a ulnar gutter splint  Neurovascular intact  SBIRT 20yo+    Flowsheet Row Most Recent Value   Initial Alcohol Screen: US AUDIT-C     1  How often do you have a drink containing alcohol? 0 Filed at: 06/25/2023 1335   2  How many drinks containing alcohol do you have on a typical day you are drinking? "0 Filed at: 06/25/2023 1335   3a  Male UNDER 65: How often do you have five or more drinks on one occasion? 0 Filed at: 06/25/2023 1335   3b  FEMALE Any Age, or MALE 65+: How often do you have 4 or more drinks on one occassion? 0 Filed at: 06/25/2023 1335   Audit-C Score 0 Filed at: 06/25/2023 1335   JURGEN: How many times in the past year have you    Used an illegal drug or used a prescription medication for non-medical reasons? Once or Twice Filed at: 06/25/2023 1335   DAST-10: In the past 12 months       1  Have you used drugs other than those required for medical reasons? 1 Filed at: 06/25/2023 1335   2  Do you use more than one drug at a time? 0 Filed at: 06/25/2023 1335   3  Have you had medical problems as a result of your drug use (e g , memory loss, hepatitis, convulsions, bleeding, etc )? 0 Filed at: 06/25/2023 1335   4  Have you had \"blackouts\" or \"flashbacks\" as a result of drug use? YesNo 0 Filed at: 06/25/2023 1335   5  Do you ever feel bad or guilty about your drug use? 0 Filed at: 06/25/2023 1335   6  Does your spouse (or parent) ever complain about your involvement with drugs? 0 Filed at: 06/25/2023 1335   7  Have you neglected your family because of your use of drugs? 0 Filed at: 06/25/2023 1335   8  Have you engaged in illegal activities in order to obtain drugs? 0 Filed at: 06/25/2023 1335   9  Have you ever experienced withdrawal symptoms (felt sick) when you stopped taking drugs? 0 Filed at: 06/25/2023 1335   10  Are you always able to stop using drugs when you want to? 0 Filed at: 06/25/2023 1335   DAST-10 Score 1 Filed at: 06/25/2023 1335                    Medical Decision Making  40-year-old male presented with the complaint of right hand pain  The patient sustained an injury by hitting his hand against a cabinet or the door  Denies any other injury  Patient blood pressure is elevated states that today he is anxious about being in the ED  Denies any headache blurry vision double vision   " Does have a history of a  High pressure but does not take any medication  Will obtain x-ray of the hand and x-ray of the ankle  X-ray reviewed which shows right fifth fracture  Case discussed with Dr Morgan Argueta orthopedic on-call recommending to place him on ulnar gutter splint and follow-up outpatient  He was also found to have other problems every 2 improved while in the ED  Recommending follow-up with PCP  Patient was placed in a splint neurovascular intact  Recommending follow-up with orthopedic  Did take Tylenol/ibuprofen as needed for pain  Continue with ice for swelling  Strict precaution regarding return if noticed worsening swelling pain paresthesia return back to the ED immediately  Amount and/or Complexity of Data Reviewed  Radiology: ordered  Risk  Prescription drug management  Disposition  Final diagnoses:   Metacarpal bone fracture   Acute right ankle pain     Time reflects when diagnosis was documented in both MDM as applicable and the Disposition within this note     Time User Action Codes Description Comment    6/25/2023  3:29 PM Osman Gathers [N06 940A] Metacarpal bone fracture     6/25/2023  3:31 PM Jan Kaycee Remove [J11 [id number]] Metacarpal bone fracture     6/25/2023  3:31 PM Jan Kaycee Add [C86 879J] Metacarpal bone fracture     6/25/2023  4:50 PM Jan Kaycee Add [M25 [id number]] Acute right ankle pain       ED Disposition     ED Disposition   Discharge    Condition   Stable    Date/Time   Sun Jun 25, 2023  3:31 PM    Comment   Katherine Mukherjee discharge to home/self care                 Follow-up Information     Follow up With Specialties Details Why Contact Info    Winston Zamarripa DO Family Medicine Schedule an appointment as soon as possible for a visit in 2 days If symptoms worsen [address] DO [facility] Schedule an appointment as soon as possible for a visit in 2 days If symptoms worsen Lien Bourgeois [address] [facility]            Discharge Medication List as of 6/25/2023  3:34 PM      CONTINUE these medications which have NOT CHANGED    Details   lisinopril (ZESTRIL) 10 mg tablet Take 15 mg by mouth daily in the early morning, Historical Med      PARoxetine (PAXIL) 20 mg tablet Take 20 mg by mouth daily, Historical Med             No discharge procedures on file      PDMP Review       Value Time User    PDMP Reviewed  Yes 5/15/2023  1:50 PM Lenka Bartlett PA-C          ED Provider  Electronically Signed by           Delon Kelley MD  06/25/23 7836

## 2023-06-26 ENCOUNTER — TELEPHONE (OUTPATIENT)
Dept: OBGYN CLINIC | Facility: HOSPITAL | Age: 48
End: 2023-06-26

## 2023-06-26 NOTE — TELEPHONE ENCOUNTER
Patient is calling to schedule with hand specialist after ED Visit  Please schedule accordingly      [address]   [phone]

## 2023-06-27 NOTE — TELEPHONE ENCOUNTER
Caller: [email or web address]    Doctor: n/a    Reason for call: sister called to schedule appt for patients right hand fx    Call back#: [phone]

## 2023-07-03 ENCOUNTER — OFFICE VISIT (OUTPATIENT)
Dept: PODIATRY | Facility: CLINIC | Age: 48
End: 2023-07-03
Payer: COMMERCIAL

## 2023-07-03 VITALS
HEIGHT: 73 IN | SYSTOLIC BLOOD PRESSURE: 150 MMHG | BODY MASS INDEX: 29.69 KG/M2 | WEIGHT: 224 LBS | DIASTOLIC BLOOD PRESSURE: 92 MMHG | HEART RATE: 73 BPM

## 2023-07-03 DIAGNOSIS — M25.571 ACUTE RIGHT ANKLE PAIN: Primary | ICD-10-CM

## 2023-07-03 DIAGNOSIS — S93.431A SPRAIN OF TIBIOFIBULAR LIGAMENT OF RIGHT ANKLE, INITIAL ENCOUNTER: ICD-10-CM

## 2023-07-03 PROCEDURE — 99203 OFFICE O/P NEW LOW 30 MIN: CPT | Performed by: STUDENT IN AN ORGANIZED HEALTH CARE EDUCATION/TRAINING PROGRAM

## 2023-07-03 RX ORDER — IBUPROFEN 200 MG
200 TABLET ORAL EVERY 6 HOURS PRN
COMMUNITY

## 2023-07-03 NOTE — PROGRESS NOTES
Assessment/Plan:    No problem-specific Assessment & Plan notes found for this encounter. Diagnoses and all orders for this visit:    Acute right ankle pain  -     Ambulatory referral to Physical Therapy; Future    Sprain of tibiofibular ligament of right ankle, initial encounter  -     Ambulatory referral to Physical Therapy; Future    Other orders  -     ibuprofen (MOTRIN) 200 mg tablet; Take 200 mg by mouth every 6 (six) hours as needed for mild pain      Plan:     Diagnosis and treatment discussed with patient   - Reviewed xrays and discussed findings with patient, no osseous abnormalities noted. - I suspect left ankle sprain with ATFL ligament tear. I educated patient on importance of ankle sprain functional rehab ROM, strengthening, stretching and balance control exercises. Provided rehab protocol and recommended to wear ASO ankle brace. I informed patient it will take about 8-12 weeks for ankle ligament to heal and swelling can take up to an year to subside. Rest, ice and compress with ace wrap. May take NSAIDs as needed for pain. Expresses understanding and will continue with these recommendations. - Rx for physical therapy to start in 2 weeks  - Return in 6-8 weeks for re-evaluation      Subjective:      Patient ID: Madison Marshall is a 51 y.o. male. 39-year-old male with past medical history as below presents for an evaluation of right ankle pain. Patient reports a week ago he had an injury to his right ankle. Patient has history of recurrent ankle sprain. His pain is improving since the injury. Swelling is also controlled. He denies any other complaints at this time. Denies nausea vomit fever chills shortness of breath. The following portions of the patient's history were reviewed and updated as appropriate:   He  has a past medical history of Allergic, Bipolar disorder in partial remission ([address]), Erectile dysfunction, Hypertension, and Kidney stone.   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 .    Review of Systems   All other systems reviewed and are negative. Objective:      /92 (BP Location: Left arm, Patient Position: Sitting, Cuff Size: Standard)   Pulse 73   Ht 6' 1" (1.854 m)   Wt 102 kg (224 lb)   BMI 29.55 kg/m²          Physical Exam  Vitals reviewed. Cardiovascular:      Rate and Rhythm: Normal rate. Musculoskeletal:         General: Tenderness present. Right ankle: Tenderness present over the ATF ligament. Comments: Pain with palpation noted to the right ankle along the ATFL as well as deltoid ligament. Discomfort with palpation noted to the posterior tibial tendon. Weakness with eversion and inversion. No bruising or edema present at this time. Skin:     General: Skin is warm. Neurological:      General: No focal deficit present. Mental Status: He is alert.    Psychiatric:         Mood and Affect: Mood normal.

## 2023-07-03 NOTE — PATIENT INSTRUCTIONS
Ankle Sprain   AMBULATORY CARE:   An ankle sprain  happens when 1 or more ligaments in your ankle joint stretch or tear. Ligaments are tough tissues that connect bones. Ligaments support your joints and keep your bones in place. Common signs and symptoms:   Trouble moving your ankle or foot    Pain when you touch or put weight on your ankle    Bruised, swollen, or misshapen ankle    Seek care immediately if:   You have severe pain in your ankle. Your foot or toes are cold or numb. Your ankle becomes more weak or unstable (wobbly). You are unable to put any weight on your ankle or foot. Your swelling has increased or returned. Call your doctor if:   Your pain does not go away, even after treatment. You have questions or concerns about your condition or care. Treatment:   Medicines:      NSAIDs , such as ibuprofen, help decrease swelling, pain, and fever. This medicine is available with or without a doctor's order. NSAIDs can cause stomach bleeding or kidney problems in certain people. If you take blood thinner medicine, always ask your healthcare provider if NSAIDs are safe for you. Always read the medicine label and follow directions. Acetaminophen  decreases pain and fever. It is available without a doctor's order. Ask how much to take and how often to take it. Follow directions. Read the labels of all other medicines you are using to see if they also contain acetaminophen, or ask your doctor or pharmacist. Acetaminophen can cause liver damage if not taken correctly. Prescription pain medicine  may be given. Ask your healthcare provider how to take this medicine safely. Some prescription pain medicines contain acetaminophen. Do not take other medicines that contain acetaminophen without talking to your healthcare provider. Too much acetaminophen may cause liver damage. Prescription pain medicine may cause constipation.  Ask your healthcare provider how to prevent or treat constipation. Surgery  may be needed to repair or replace a torn ligament if your sprain does not heal with other treatments. Your healthcare provider may use screws to attach the bones in your ankle together. The screws may help support your ankle and make it stable. Ask your healthcare provider for more information about surgery to treat your ankle sprain. Self-care:   Use support devices , such as a brace, cast, or splint, to limit your movement and protect your joint. You may need to use crutches to decrease your pain as you move around. Go to physical therapy  as directed. A physical therapist teaches you exercises to help improve movement and strength, and to decrease pain. Rest  your ankle so that it can heal. Return to normal activities as directed. Apply ice  on your ankle for 15 to 20 minutes every hour or as directed. Use an ice pack, or put crushed ice in a plastic bag. Cover the ice pack or bag with a towel before you put it on your injury. Ice helps prevent tissue damage and decreases swelling and pain. Compress  your ankle. Ask if you should wrap an elastic bandage around your injured ligament. An elastic bandage provides support and helps decrease swelling and movement so your joint can heal. Wear as long as directed. Elevate  your ankle above the level of your heart as often as you can. This will help decrease swelling and pain. Prop your ankle on pillows or blankets to keep it elevated comfortably. Prevent another ankle sprain:   Let your ankle heal.  Find out how long your ligament needs to heal. Do not do any physical activity until your healthcare provider says it is okay. If you start activity too soon, you may develop a more serious injury. Warm up and stretch before you exercise or play sports. This helps your joints become strong and flexible. Use the right equipment. Always wear shoes that fit well and are made for the activity that you are doing.  You may also need ankle supports, elbow and knee pads, or braces. Follow up with your doctor as directed:  Write down your questions so you remember to ask them during your visits. © Copyright Rashawn Bourne 2022 Information is for End User's use only and may not be sold, redistributed or otherwise used for commercial purposes. The above information is an  only. It is not intended as medical advice for individual conditions or treatments. Talk to your doctor, nurse or pharmacist before following any medical regimen to see if it is safe and effective for you.

## 2023-07-05 ENCOUNTER — TELEPHONE (OUTPATIENT)
Dept: HEMATOLOGY ONCOLOGY | Facility: CLINIC | Age: 48
End: 2023-07-05

## 2023-07-05 DIAGNOSIS — D3A.8 NEUROENDOCRINE TUMOR: ICD-10-CM

## 2023-07-05 DIAGNOSIS — C7A.8 NEUROENDOCRINE CARCINOMA OF SMALL BOWEL (HCC): Primary | ICD-10-CM

## 2023-07-05 RX ORDER — LANREOTIDE ACETATE 120 MG/.5ML
120 INJECTION SUBCUTANEOUS ONCE
Status: CANCELLED | OUTPATIENT
Start: 2023-07-10

## 2023-07-05 NOTE — TELEPHONE ENCOUNTER
LVM with labs and appointment reminder. Active standing lab order on file under Dr. Johann Mederos name. [facility] number provided for call back.

## 2023-07-06 ENCOUNTER — APPOINTMENT (OUTPATIENT)
Dept: LAB | Facility: CLINIC | Age: 48
End: 2023-07-06
Payer: COMMERCIAL

## 2023-07-06 ENCOUNTER — OFFICE VISIT (OUTPATIENT)
Dept: HEMATOLOGY ONCOLOGY | Facility: CLINIC | Age: 48
End: 2023-07-06
Payer: COMMERCIAL

## 2023-07-06 VITALS
SYSTOLIC BLOOD PRESSURE: 160 MMHG | DIASTOLIC BLOOD PRESSURE: 94 MMHG | WEIGHT: 220 LBS | HEART RATE: 106 BPM | OXYGEN SATURATION: 97 % | TEMPERATURE: 97.8 F | BODY MASS INDEX: 29.16 KG/M2 | RESPIRATION RATE: 16 BRPM | HEIGHT: 73 IN

## 2023-07-06 DIAGNOSIS — C7A.8 NEUROENDOCRINE CARCINOMA OF SMALL BOWEL (HCC): ICD-10-CM

## 2023-07-06 DIAGNOSIS — C7B.8 METASTATIC MALIGNANT NEUROENDOCRINE TUMOR TO LIVER (HCC): ICD-10-CM

## 2023-07-06 DIAGNOSIS — C7A.8 NEUROENDOCRINE CARCINOMA OF SMALL BOWEL (HCC): Primary | ICD-10-CM

## 2023-07-06 LAB
ALBUMIN SERPL BCP-MCNC: 4.4 G/DL (ref 3.5–5)
ALP SERPL-CCNC: 124 U/L (ref 46–116)
ALT SERPL W P-5'-P-CCNC: 17 U/L (ref 12–78)
ANION GAP SERPL CALCULATED.3IONS-SCNC: 3 MMOL/L
AST SERPL W P-5'-P-CCNC: 15 U/L (ref 5–45)
BASOPHILS # BLD AUTO: 0.04 THOUSANDS/ÂΜL (ref 0–0.1)
BASOPHILS NFR BLD AUTO: 0 % (ref 0–1)
BILIRUB SERPL-MCNC: 2.66 MG/DL (ref 0.2–1)
BUN SERPL-MCNC: 16 MG/DL (ref 5–25)
CALCIUM SERPL-MCNC: 9.8 MG/DL (ref 8.3–10.1)
CHLORIDE SERPL-SCNC: 109 MMOL/L (ref 96–108)
CO2 SERPL-SCNC: 27 MMOL/L (ref 21–32)
CREAT SERPL-MCNC: 1.34 MG/DL (ref 0.6–1.3)
EOSINOPHIL # BLD AUTO: 0.55 THOUSAND/ÂΜL (ref 0–0.61)
EOSINOPHIL NFR BLD AUTO: 6 % (ref 0–6)
ERYTHROCYTE [DISTWIDTH] IN BLOOD BY AUTOMATED COUNT: 14.6 % (ref 11.6–15.1)
GFR SERPL CREATININE-BSD FRML MDRD: 61 ML/MIN/1.73SQ M
GLUCOSE SERPL-MCNC: 108 MG/DL (ref 65–140)
HCT VFR BLD AUTO: 43 % (ref 36.5–49.3)
HGB BLD-MCNC: 15.3 G/DL (ref 12–17)
IMM GRANULOCYTES # BLD AUTO: 0.02 THOUSAND/UL (ref 0–0.2)
IMM GRANULOCYTES NFR BLD AUTO: 0 % (ref 0–2)
LYMPHOCYTES # BLD AUTO: 2.28 THOUSANDS/ÂΜL (ref 0.6–4.47)
LYMPHOCYTES NFR BLD AUTO: 24 % (ref 14–44)
MAGNESIUM SERPL-MCNC: 2.6 MG/DL (ref 1.6–2.6)
MCH RBC QN AUTO: 29.5 PG (ref 26.8–34.3)
MCHC RBC AUTO-ENTMCNC: 35.6 G/DL (ref 31.4–37.4)
MCV RBC AUTO: 83 FL (ref 82–98)
MONOCYTES # BLD AUTO: 0.47 THOUSAND/ÂΜL (ref 0.17–1.22)
MONOCYTES NFR BLD AUTO: 5 % (ref 4–12)
NEUTROPHILS # BLD AUTO: 6.24 THOUSANDS/ÂΜL (ref 1.85–7.62)
NEUTS SEG NFR BLD AUTO: 65 % (ref 43–75)
NRBC BLD AUTO-RTO: 0 /100 WBCS
PLATELET # BLD AUTO: 251 THOUSANDS/UL (ref 149–390)
PMV BLD AUTO: 9.7 FL (ref 8.9–12.7)
POTASSIUM SERPL-SCNC: 3.6 MMOL/L (ref 3.5–5.3)
PROT SERPL-MCNC: 8.7 G/DL (ref 6.4–8.4)
RBC # BLD AUTO: 5.19 MILLION/UL (ref 3.88–5.62)
SODIUM SERPL-SCNC: 139 MMOL/L (ref 135–147)
WBC # BLD AUTO: 9.6 THOUSAND/UL (ref 4.31–10.16)

## 2023-07-06 PROCEDURE — 99214 OFFICE O/P EST MOD 30 MIN: CPT | Performed by: NURSE PRACTITIONER

## 2023-07-06 NOTE — PROGRESS NOTES
Hematology/Oncology Outpatient Follow-up  Johnnie Arboleda 51 y.o. male 1975 [id number]    Date:  7/6/2023      Assessment and Plan:  1. Neuroendocrine carcinoma of small bowel ([address])  Patient will be continued on his current treatment with lanreotide injections on a monthly basis. He is due for another injection this Monday, 7/10/2023. He denies any carcinoid symptoms. Does mention that particular foods will cause some discomfort and sweating and he avoids these things. Patient was told that we will continue to monitor him and his laboratory studies closely. He will getting repeat labs today after the visit which we will review and address once they become available. Request he follow-up again with repeat labs in about 2 months from now or sooner should the need arise. He will be due for repeat imaging before his next office visit. Did recommend dotatate PET CT scan however he would rather do CT imaging locally as he had has difficulty with transportation out of the local area. Recently establish care with palliative care who will be following.    - CBC and differential; Future  - Comprehensive metabolic panel; Future  - Magnesium; Future  - Chromogranin A; Future  - CT chest abdomen pelvis w contrast; Future    2. 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 He inadvertently fractured his right hand on his door recently. Was evaluated in the emergency department for this 6/25/2023. He is being scheduled with a hand specialist at [facility]. Reports that he does have some pain to his right hand that he rates 2 out of 10. Otherwise he has no new complaints today.   He does mention that with particular foods he sometimes will get heartburn or sweats so he cautiously watches what he eats. Has noticed that he cannot tolerate Rob dressing, pepperoni, bologna as well as other things which he avoids. He denies any loose stools or facial flushing. Denies any chest pain or shortness of breath. He did not get his ordered laboratory studies for today's visit but promises to get them immediately after the visit which we will review and address once they become available. Oncology History Overview Note   Patient with history of bipolar disorder, schizophrenic personality, hypertension, etc.  The patient apparently was admitted the hospital around October of 2021 for symptomatic left ureteral calculus. Incidentally he was noted to have mesenteric masses with calcification with cefepime on the CTA from 10/02/2021. Another CT scan of the abdomen pelvis with contrast was done on 02/01/2022 which showed no significant change in the partially calcified mesenteric masses with mesenteric tethering concerning for neuroendocrine tumor. He had a DOTATATE PET-CT scan on 05/04/2022 which showed:  IMPRESSION:  1. Dotatate avid clustered central mesenteric masses suspicious for underlying neuroendocrine neoplasm. 2.  There does appear to be subjacent focal radiotracer uptake in a proximal small bowel loop for which underlying small bowel lesion should be excluded. Consider further evaluation with CT enterography or small bowel pill study. 3.  A few low-level foci of radiotracer uptake in the bilateral scapula, non-specific. Activity is lower than expected for neuroendocrine metastasis given the intensity of the mesenteric foci, however early metastasis is not entirely excluded. Consider   further evaluation with MRI of these regions vs reassessment on follow up Dotatate PET. 4.  Diffuse prostatic activity, non-specific, question inflammatory. Correlate with PSA levels and urologic evaluation as warranted. 5.   Stable 8 mm nodule in the right mid lung.   No suspicious radiotracer uptake here. Given the stability since 10/2/2021 this can be followed up with chest CT in 12 months. Chromogranin A serum level on 10/06/2021 was 134.5. On 06/24/2022 was 164. The patient then underwent small bowel resection on 07/21/2022 at the area where he was thought to be high-risk for obstruction. The pathology revealed well-differentiated neuroendocrine tumor, G1 come multifocal, the tumor invaded through the muscularis propria into the taye colorectal tissue. He also had liver lesion biopsy at segment 5 which also was compatible with metastatic well-differentiated neuroendocrine tumor consistent with intestinal primary. Ki 67 was 1.7%. The final pathology was pT3(m)lLauC2b multifocal well differentiated small bowel neuroendocrine tumor with known extensive mesenteric galo involvement that was unresectable based on extension to the root of the SMA. Neuroendocrine carcinoma of small bowel ([address])   8/5/2022 Initial Diagnosis    Neuroendocrine carcinoma of small bowel (HCC)         Interval history:    ROS: Review of Systems   Constitutional: Positive for appetite change. Musculoskeletal: Positive for arthralgias (right hand d/t fx). Skin: Positive for rash. Psychiatric/Behavioral: Positive for dysphoric mood and sleep disturbance. All other systems reviewed and are negative.       Past Medical History:   Diagnosis Date   • Allergic    • Bipolar disorder in partial remission ([address])    • Erectile dysfunction     unspecified erectile dysfunction type   • Hypertension    • Kidney stone        Past Surgical History:   Procedure Laterality Date   • COLONOSCOPY     • EXPLORATORY LAPAROTOMY W/ BOWEL RESECTION N/A 7/21/2022    Procedure: LAPAROTOMY EXPLORATORY W/ SMALL BOWEL RESECTION, liver biopsy;  Surgeon: Ligia Ann MD;  Location: [facility];  Service: General   • FL RETROGRADE PYELOGRAM  10/06/2021   • HERNIA REPAIR     • PA CYSTO BLADDER W/URETERAL CATHETERIZATION Right 10/06/2021    Procedure: CYSTOSCOPY RETROGRADE PYELOGRAM WITH INSERTION STENT URETERAL, RIGHT URETEROSCOPY, STONE EXTRACTION;  Surgeon: Jessica Orantes MD;  Location: [facility] MAIN OR;  Service: Urology       Social History     Socioeconomic History   • Marital status: Single     Spouse name: Not on file   • Number of children: Not on file   • Years of education: Not on file   • Highest education level: Not on file   Occupational History   • Not on file   Tobacco Use   • Smoking status: Former     Types: Pipe, Cigarettes     Quit date: 2003     Years since quittin.5   • Smokeless tobacco: Never   Vaping Use   • Vaping Use: Some days   • Substances: THC   Substance and Sexual Activity   • Alcohol use: Not Currently     Comment: Drinks a quart of moonshine/night-As of 21 will quit drinking   • Drug use: Yes     Types: Marijuana     Comment: Socially (1-2 Monthly)   • Sexual activity: Not on file   Other Topics Concern   • Not on file   Social History Narrative   • Not on file     Social Determinants of Health     Financial Resource Strain: Not on file   Food Insecurity: No Food Insecurity (3/22/2023)    Hunger Vital Sign    • Worried About Running Out of Food in the Last Year: Never true    • Ran Out of Food in the Last Year: Never true   Transportation Needs: No Transportation Needs (3/22/2023)    PRAPARE - Transportation    • Lack of Transportation (Medical): No    • Lack of Transportation (Non-Medical):  No   Physical Activity: Not on file   Stress: Not on file   Social Connections: Not on file   Intimate Partner Violence: Not on file   Housing Stability: Low Risk  (3/22/2023)    Housing Stability Vital Sign    • Unable to Pay for Housing in the Last Year: No    • Number of Places Lived in the Last Year: 1    • Unstable Housing in the Last Year: No       Family History   Problem Relation Age of Onset   • Diabetes Mother    • Thyroid disease Mother    • Cancer Father    • Thyroid disease Sister    • Depression Brother    • Cancer Maternal Aunt    • Cancer Paternal Uncle    • Cancer Paternal Grandmother    • No Known Problems Brother    • Thyroid disease Sister        Allergies   Allergen Reactions   • Tomato - Food Allergy Anaphylaxis     raw   • Poison Ivy Extract Hives   • Poison Sumac Extract Hives         Current Outpatient Medications:   •  ibuprofen (MOTRIN) 200 mg tablet, Take 200 mg by mouth every 6 (six) hours as needed for mild pain, Disp: , Rfl:   •  lisinopril (ZESTRIL) 10 mg tablet, Take 20 mg by mouth daily in the early morning, Disp: , Rfl:   •  PARoxetine (PAXIL) 20 mg tablet, Take 20 mg by mouth daily (Patient not taking: Reported on 7/3/2023), Disp: , Rfl:       Physical Exam:  /94 (BP Location: Left arm, Patient Position: Sitting, Cuff Size: Adult)   Pulse (!) 106   Temp 97.8 °F (36.6 °C)   Resp 16   Ht 6' 1" (1.854 m)   Wt 99.8 kg (220 lb)   SpO2 97%   BMI 29.03 kg/m²     Physical Exam  Vitals reviewed. Constitutional:       General: He is not in acute distress. Appearance: He is well-developed. He is not diaphoretic. HENT:      Head: Normocephalic and atraumatic. Eyes:      General: Lids are normal. No scleral icterus. Conjunctiva/sclera: Conjunctivae normal.      Pupils: Pupils are equal, round, and reactive to light. Neck:      Thyroid: No thyromegaly. Cardiovascular:      Rate and Rhythm: Normal rate and regular rhythm. Heart sounds: Normal heart sounds. No murmur heard. Pulmonary:      Effort: Pulmonary effort is normal. No respiratory distress. Breath sounds: Normal breath sounds. Abdominal:      General: There is no distension. Palpations: Abdomen is soft. There is no hepatomegaly or splenomegaly. Tenderness: There is no abdominal tenderness. Musculoskeletal:         General: No swelling. Normal range of motion. Cervical back: Normal range of motion and neck supple. Lymphadenopathy:      Cervical: No cervical adenopathy. Upper Body:      Right upper body: No axillary adenopathy. Left upper body: No axillary adenopathy. Skin:     General: Skin is warm and dry. Findings: No erythema or rash. Neurological:      General: No focal deficit present. Mental Status: He is alert and oriented to person, place, and time. Psychiatric:         Mood and Affect: Mood is anxious. Affect is flat. Behavior: Behavior normal. Behavior is cooperative. Thought Content: Thought content normal.         Judgment: Judgment normal.      Comments: Talkative, pleasant           Labs:  Lab Results   Component Value Date    WBC 7.76 03/24/2023    HGB 14.9 03/24/2023    HCT 43.1 03/24/2023    MCV 84 03/24/2023     03/24/2023        Patient voiced understanding and agreement in the above discussion. Aware to contact our office with questions/symptoms in the interim. This note has been generated by voice recognition software system. Therefore, there may be spelling, grammar, and or syntax errors. Please contact if questions arise.

## 2023-07-10 ENCOUNTER — HOSPITAL ENCOUNTER (EMERGENCY)
Facility: HOSPITAL | Age: 48
Discharge: HOME | End: 2023-07-11
Attending: INTERNAL MEDICINE
Payer: COMMERCIAL

## 2023-07-10 ENCOUNTER — HOSPITAL ENCOUNTER (OUTPATIENT)
Dept: INFUSION CENTER | Facility: HOSPITAL | Age: 48
Discharge: HOME | End: 2023-07-10
Attending: INTERNAL MEDICINE
Payer: COMMERCIAL

## 2023-07-10 DIAGNOSIS — C7A.8 NEUROENDOCRINE CARCINOMA OF SMALL BOWEL (HCC): Primary | ICD-10-CM

## 2023-07-10 DIAGNOSIS — D3A.8 NEUROENDOCRINE TUMOR: ICD-10-CM

## 2023-07-10 DIAGNOSIS — R10.9 ABDOMINAL PAIN: Primary | ICD-10-CM

## 2023-07-10 DIAGNOSIS — R11.2 NAUSEA AND VOMITING: ICD-10-CM

## 2023-07-10 LAB
ALBUMIN SERPL BCP-MCNC: 4.4 G/DL (ref 3.5–5)
ALP SERPL-CCNC: 117 U/L (ref 34–104)
ALT SERPL W P-5'-P-CCNC: 12 U/L (ref 7–52)
ANION GAP SERPL CALCULATED.3IONS-SCNC: 10 MMOL/L
AST SERPL W P-5'-P-CCNC: 15 U/L (ref 13–39)
BASOPHILS # BLD AUTO: 0.02 THOUSANDS/ÂΜL (ref 0–0.1)
BASOPHILS NFR BLD AUTO: 0 % (ref 0–1)
BILIRUB SERPL-MCNC: 1.06 MG/DL (ref 0.2–1)
BUN SERPL-MCNC: 12 MG/DL (ref 5–25)
CALCIUM SERPL-MCNC: 9.5 MG/DL (ref 8.4–10.2)
CGA SERPL-MCNC: 96.6 NG/ML (ref 0–101.8)
CHLORIDE SERPL-SCNC: 107 MMOL/L (ref 96–108)
CO2 SERPL-SCNC: 21 MMOL/L (ref 21–32)
CREAT SERPL-MCNC: 1.06 MG/DL (ref 0.6–1.3)
EOSINOPHIL # BLD AUTO: 0.43 THOUSAND/ÂΜL (ref 0–0.61)
EOSINOPHIL NFR BLD AUTO: 5 % (ref 0–6)
ERYTHROCYTE [DISTWIDTH] IN BLOOD BY AUTOMATED COUNT: 14.6 % (ref 11.6–15.1)
GFR SERPL CREATININE-BSD FRML MDRD: 82 ML/MIN/1.73SQ M
GLUCOSE SERPL-MCNC: 184 MG/DL (ref 65–140)
HCT VFR BLD AUTO: 42 % (ref 36.5–49.3)
HGB BLD-MCNC: 14.8 G/DL (ref 12–17)
IMM GRANULOCYTES # BLD AUTO: 0.02 THOUSAND/UL (ref 0–0.2)
IMM GRANULOCYTES NFR BLD AUTO: 0 % (ref 0–2)
LIPASE SERPL-CCNC: 14 U/L (ref 11–82)
LYMPHOCYTES # BLD AUTO: 1.77 THOUSANDS/ÂΜL (ref 0.6–4.47)
LYMPHOCYTES NFR BLD AUTO: 19 % (ref 14–44)
MCH RBC QN AUTO: 29.9 PG (ref 26.8–34.3)
MCHC RBC AUTO-ENTMCNC: 35.2 G/DL (ref 31.4–37.4)
MCV RBC AUTO: 85 FL (ref 82–98)
MONOCYTES # BLD AUTO: 0.52 THOUSAND/ÂΜL (ref 0.17–1.22)
MONOCYTES NFR BLD AUTO: 6 % (ref 4–12)
NEUTROPHILS # BLD AUTO: 6.51 THOUSANDS/ÂΜL (ref 1.85–7.62)
NEUTS SEG NFR BLD AUTO: 70 % (ref 43–75)
NRBC BLD AUTO-RTO: 0 /100 WBCS
PLATELET # BLD AUTO: 211 THOUSANDS/UL (ref 149–390)
PMV BLD AUTO: 9.7 FL (ref 8.9–12.7)
POTASSIUM SERPL-SCNC: 3.6 MMOL/L (ref 3.5–5.3)
PROT SERPL-MCNC: 7.6 G/DL (ref 6.4–8.4)
RBC # BLD AUTO: 4.95 MILLION/UL (ref 3.88–5.62)
SODIUM SERPL-SCNC: 138 MMOL/L (ref 135–147)
WBC # BLD AUTO: 9.27 THOUSAND/UL (ref 4.31–10.16)

## 2023-07-10 PROCEDURE — 80053 COMPREHEN METABOLIC PANEL: CPT | Performed by: INTERNAL MEDICINE

## 2023-07-10 PROCEDURE — 96374 THER/PROPH/DIAG INJ IV PUSH: CPT

## 2023-07-10 PROCEDURE — 99284 EMERGENCY DEPT VISIT MOD MDM: CPT

## 2023-07-10 PROCEDURE — 85025 COMPLETE CBC W/AUTO DIFF WBC: CPT | Performed by: INTERNAL MEDICINE

## 2023-07-10 PROCEDURE — 36415 COLL VENOUS BLD VENIPUNCTURE: CPT | Performed by: INTERNAL MEDICINE

## 2023-07-10 PROCEDURE — 96372 THER/PROPH/DIAG INJ SC/IM: CPT

## 2023-07-10 PROCEDURE — 96361 HYDRATE IV INFUSION ADD-ON: CPT

## 2023-07-10 PROCEDURE — 83690 ASSAY OF LIPASE: CPT | Performed by: INTERNAL MEDICINE

## 2023-07-10 RX ORDER — LANREOTIDE ACETATE 120 MG/.5ML
120 INJECTION SUBCUTANEOUS ONCE
Status: COMPLETED | OUTPATIENT
Start: 2023-07-10 | End: 2023-07-10

## 2023-07-10 RX ORDER — SODIUM CHLORIDE 9 MG/ML
1000 INJECTION, SOLUTION INTRAVENOUS CONTINUOUS
Status: DISCONTINUED | OUTPATIENT
Start: 2023-07-10 | End: 2023-07-11 | Stop reason: HOSPADM

## 2023-07-10 RX ORDER — HYDROMORPHONE HCL/PF 1 MG/ML
1 SYRINGE (ML) INJECTION ONCE
Status: COMPLETED | OUTPATIENT
Start: 2023-07-10 | End: 2023-07-10

## 2023-07-10 RX ORDER — LANREOTIDE ACETATE 120 MG/.5ML
120 INJECTION SUBCUTANEOUS ONCE
OUTPATIENT
Start: 2023-08-07

## 2023-07-10 RX ADMIN — SODIUM CHLORIDE 1000 ML/HR: 0.9 INJECTION, SOLUTION INTRAVENOUS at 22:36

## 2023-07-10 RX ADMIN — HYDROMORPHONE HYDROCHLORIDE 1 MG: 1 INJECTION, SOLUTION INTRAMUSCULAR; INTRAVENOUS; SUBCUTANEOUS at 22:37

## 2023-07-10 RX ADMIN — LANREOTIDE ACETATE 120 MG: 120 INJECTION SUBCUTANEOUS at 10:37

## 2023-07-10 NOTE — PLAN OF CARE
Problem: Knowledge Deficit  Goal: Patient/family/caregiver demonstrates understanding of disease process, treatment plan, medications, and discharge instructions  Description: Complete learning assessment and assess knowledge base.   Interventions:  - Provide teaching at level of understanding  - Provide teaching via preferred learning methods  Reactivated

## 2023-07-10 NOTE — PROGRESS NOTES
Pt offers no complaints. Tolerated lanreotide injection in right buttocks well without incident. Discharged in stable condition and next infusion appointment scheduled in 4 weeks. AVS provided.

## 2023-07-11 ENCOUNTER — APPOINTMENT (EMERGENCY)
Dept: CT IMAGING | Facility: HOSPITAL | Age: 48
End: 2023-07-11
Payer: COMMERCIAL

## 2023-07-11 ENCOUNTER — TELEPHONE (OUTPATIENT)
Dept: HEMATOLOGY ONCOLOGY | Facility: CLINIC | Age: 48
End: 2023-07-11

## 2023-07-11 ENCOUNTER — TELEPHONE (OUTPATIENT)
Dept: OTHER | Facility: HOSPITAL | Age: 48
End: 2023-07-11

## 2023-07-11 VITALS
DIASTOLIC BLOOD PRESSURE: 111 MMHG | WEIGHT: 220 LBS | HEART RATE: 63 BPM | BODY MASS INDEX: 29.16 KG/M2 | TEMPERATURE: 97.6 F | HEIGHT: 73 IN | SYSTOLIC BLOOD PRESSURE: 163 MMHG | RESPIRATION RATE: 18 BRPM | OXYGEN SATURATION: 97 %

## 2023-07-11 LAB
BACTERIA UR QL AUTO: ABNORMAL /HPF
BILIRUB UR QL STRIP: NEGATIVE
CLARITY UR: CLEAR
COLOR UR: YELLOW
FINE GRAN CASTS URNS QL MICRO: ABNORMAL /LPF
GLUCOSE UR STRIP-MCNC: NEGATIVE MG/DL
HGB UR QL STRIP.AUTO: ABNORMAL
HYALINE CASTS #/AREA URNS LPF: ABNORMAL /LPF
KETONES UR STRIP-MCNC: NEGATIVE MG/DL
LEUKOCYTE ESTERASE UR QL STRIP: NEGATIVE
MUCOUS THREADS UR QL AUTO: ABNORMAL
NITRITE UR QL STRIP: NEGATIVE
NON-SQ EPI CELLS URNS QL MICRO: ABNORMAL /HPF
PH UR STRIP.AUTO: 5.5 [PH]
PROT UR STRIP-MCNC: NEGATIVE MG/DL
RBC #/AREA URNS AUTO: ABNORMAL /HPF
SP GR UR STRIP.AUTO: 1.02
UROBILINOGEN UR QL STRIP.AUTO: 1 E.U./DL
WBC #/AREA URNS AUTO: ABNORMAL /HPF

## 2023-07-11 PROCEDURE — 87086 URINE CULTURE/COLONY COUNT: CPT | Performed by: EMERGENCY MEDICINE

## 2023-07-11 PROCEDURE — 96376 TX/PRO/DX INJ SAME DRUG ADON: CPT

## 2023-07-11 PROCEDURE — 74177 CT ABD & PELVIS W/CONTRAST: CPT

## 2023-07-11 PROCEDURE — G1004 CDSM NDSC: HCPCS

## 2023-07-11 PROCEDURE — 81003 URINALYSIS AUTO W/O SCOPE: CPT | Performed by: INTERNAL MEDICINE

## 2023-07-11 PROCEDURE — 81001 URINALYSIS AUTO W/SCOPE: CPT | Performed by: INTERNAL MEDICINE

## 2023-07-11 RX ORDER — OXYCODONE HYDROCHLORIDE AND ACETAMINOPHEN 5; 325 MG/1; MG/1
1-2 TABLET ORAL EVERY 4 HOURS PRN
Qty: 12 TABLET | Refills: 0 | Status: SHIPPED | OUTPATIENT
Start: 2023-07-11 | End: 2023-07-23

## 2023-07-11 RX ORDER — HYDROMORPHONE HCL/PF 1 MG/ML
1 SYRINGE (ML) INJECTION ONCE
Status: COMPLETED | OUTPATIENT
Start: 2023-07-11 | End: 2023-07-11

## 2023-07-11 RX ORDER — ONDANSETRON 4 MG/1
4 TABLET, ORALLY DISINTEGRATING ORAL EVERY 6 HOURS PRN
Qty: 20 TABLET | Refills: 0 | Status: SHIPPED | OUTPATIENT
Start: 2023-07-11

## 2023-07-11 RX ADMIN — IOHEXOL 100 ML: 350 INJECTION, SOLUTION INTRAVENOUS at 01:15

## 2023-07-11 RX ADMIN — HYDROMORPHONE HYDROCHLORIDE 1 MG: 1 INJECTION, SOLUTION INTRAMUSCULAR; INTRAVENOUS; SUBCUTANEOUS at 03:14

## 2023-07-11 NOTE — ED CARE HANDOFF
Emergency Department Sign Out Note        Sign out and transfer of care from Dr Rogelio Rouse. See Separate Emergency Department note. The patient, Annabel Calvin, was evaluated by the previous provider for abdominal pain n/v. Workup Completed:  Cbc,cmp    ED Course / Workup Pending (followup):                                      ED Course as of 07/11/23 0253   Tue Jul 11, 2023   0122 Sign out:     Pt had 5 Neuroendocrine tumors removed. Gets monthly infusions    He has had abdominal pain, n/v, which are typical reactions to the infusion     Has done well here  Likely can be discharged  Awaiting CT scan results    0136 Pt seen and evaluated  Pt is comfortable. Feeling much better    Pt follows at [facility]. Dr Myrtle Blair w/ Dr Thom Galarza here locally and gets infusions for neuroendocrine tumors     Labs all look fine    CT ab/pelv pending      0204 CT ABDOMEN AND PELVIS WITH IV CONTRAST        FINDINGS:     ABDOMEN     LOWER CHEST: Clear lung bases.     LIVER/BILIARY TREE: Hypoattenuating lesion in the right lobe is smaller measuring 0.9 cm.     GALLBLADDER:  No calcified gallstones. No pericholecystic inflammatory change.     SPLEEN:  Unremarkable.     PANCREAS:  Unremarkable.     ADRENAL GLANDS:  Unremarkable.     KIDNEYS/URETERS: Symmetric nephrographic phase enhancement of the kidneys. Mild urothelial enhancement throughout the left renal pelvis and ureter with subtle adjacent fat stranding. No obstructive uropathy.     STOMACH AND BOWEL: Postsurgical change in the small bowel. Diverticulosis without evidence of diverticulitis or colitis. Ventral herniorrhaphy.     APPENDIX: Normal appendix.     ABDOMINOPELVIC CAVITY: Stable spiculated mass with calcification at the root of the mesentery measuring 3 x 3.1 x 5.6 cm.  Stable surrounding lymphadenopathy.     VESSELS: No abdominal aortic aneurysm.     PELVIS     REPRODUCTIVE ORGANS: No prostate enlargement.     URINARY BLADDER: Prominent bladder wall enhancement.     ABDOMINAL WALL/INGUINAL REGIONS:  Unremarkable.     OSSEOUS STRUCTURES:  No acute fracture or destructive osseous lesion.     IMPRESSION:        1. Findings consistent with left pyelitis and ureteritis. No evidence of obstructive uropathy or nephritis. 2. Bladder wall enhancement suggest cystitis. 3. Stable mesenteric mass and surrounding lymphadenopathy consistent with neuroendocrine tumor of the small bowel. 4. Lesion in the right lobe of the liver is smaller, measuring 0.9 cm.   0215 Tiger Texted Dr Eloise Lopez , on for Hem Onc    0228 Second tiger text to Dr Moura Shows Dr Fitz Marino does not see any hard reasons to admit the pt, though he cannot blame the urethritis/Pyelitis findings on the Lanreotide infusion     0248 Pt is happy w plan of dc   Will f/u w/ Hem onc in am  Will return if worse in any way      Procedures  Medical Decision Making    Patient with history as above presented with Patient presents with:  Abdominal Pain: Had infusion today now having abd/back. V/N/D. Shooting pain on the left side. History obtained from patient, sign out    Patient was nontoxic, stable. Ambulatory. Exam as above. Labs reviewed. Differential diagnosis considered. Overall presentation is consistent with post chemo infusion pain/n/v. Low suspicion for sepsis, surgical causes of pain    Patient was treated with IVF, IV dilaudid  with improvement in symptoms. Consideration was given for admission, but the patient declined, felt fine for outpatient management. Disposition:   Discussed need to follow up in am w/ Hem Onc  Discharged with instructions to obtain outpatient follow up of patient's symptoms and findings, with strict return precautions if patient develops new or worsening symptoms. This medical documentation was created using an electronic medical record system with [facility] Mod[employer] voice recognition.   Although this document has been carefully reviewed, there may still be some phonetic and typographical errors. These errors are purely typographical and due to imperfections of the software program, do not reflect any compromise in the patient's medical care. Abdominal pain: self-limited or minor problem  Nausea and vomiting: self-limited or minor problem  Amount and/or Complexity of Data Reviewed  Labs: ordered. Radiology: ordered. Risk  OTC drugs. Prescription drug management. Disposition  Final diagnoses:   Abdominal pain   Nausea and vomiting     Time reflects when diagnosis was documented in both MDM as applicable and the Disposition within this note     Time User Action Codes Description Comment    7/11/2023  2:51 AM Kip Efren Add [R10.9] Abdominal pain     7/11/2023  2:51 AM Kip Efren Add [R11.2] Nausea and vomiting       ED Disposition     ED Disposition   Discharge    Condition   Stable    Date/Time   Tue Jul 11, 2023  2:51 AM    Comment   Agata Prior discharge to home/self care. Follow-up Information     Follow up With Specialties Details Why Contact Info    Your Oncology  Call today      Lyle Salazar DO Family Medicine Call today  [address]  [phone]          Patient's Medications   Discharge Prescriptions    ONDANSETRON (ZOFRAN ODT) 4 MG DISINTEGRATING TABLET    Take 1 tablet (4 mg total) by mouth every 6 (six) hours as needed for nausea or vomiting       Start Date: 7/11/2023 End Date: --       Order Dose: 4 mg       Quantity: 20 tablet    Refills: 0    OXYCODONE-ACETAMINOPHEN (PERCOCET) 5-325 MG PER TABLET    Take 1-2 tablets by mouth every 4 (four) hours as needed for moderate pain for up to 12 days Max Daily Amount: 12 tablets       Start Date: 7/11/2023 End Date: 7/23/2023       Order Dose: 1-2 tablets       Quantity: 12 tablet    Refills: 0     No discharge procedures on file.        ED Provider  Electronically Signed by     Griffin Drew MD  07/11/23 5671

## 2023-07-11 NOTE — DISCHARGE INSTRUCTIONS
RETURN IF WORSE IN ANY WAY:   WORSENING PAIN,   FEVER OR FLU LIKE SYMPTOMS,   OR NEW AND CONCERNING SYMPTOMS SIGNS OR SYMPTOMS      PLEASE CALL YOUR PRIMARY DOCTOR IN THE MORNING TO SET UP FOLLOW UP for TOMORROW or the Next day  PLEASE REVIEW THE WORK UP RESULTS WITH YOUR DOCTOR  Please continue to drink plenty of fluids.     You can take Zofran for nausea or vomiting    USE EXTREME CAUTION WHILE TAKING NARCOTICS FOR PAIN  ONLY TAKE FOR ACUTE PAIN  NEVER TAKE WITH OTHER SEDATIVE MEDICATIONS OR SUBSTANCES, SUCH AS SLEEPING MEDICATIONS OR ALCOHOL OR OTHER SUCH SUBSTANCES  YOU MAY NEED TO TAKE LAXATIVES WHILE TAKING THIS MEDICATION  PLEASE DISCUSS THE ABOVE WITH YOUR FAMILY DOCTOR

## 2023-07-11 NOTE — ED PROVIDER NOTES
History  Chief Complaint   Patient presents with   • Abdominal Pain     Had infusion today now having abd/back. V/N/D. Shooting pain on the left side. 50-year-old male presents with severe abdominal pain and intractable nausea vomiting and diarrhea started today. Patient received a infusion which she receives monthly for his neuroendocrine tumors. She had a small bowel resection done less than a year ago and has had infusions monthly and these are some of the side effects of the infusions according to patient's discharge paperwork. Patient states the pain is 10/10, he is grimacing in pain at this time. He denies any fever, chills, dysuria hematuria bright red blood per rectum or black tarry stool. Prior to Admission Medications   Prescriptions Last Dose Informant Patient Reported? Taking?    PARoxetine (PAXIL) 20 mg tablet  Self Yes No   Sig: Take 20 mg by mouth daily   Patient not taking: Reported on 7/3/2023   ibuprofen (MOTRIN) 200 mg tablet  Self Yes No   Sig: Take 200 mg by mouth every 6 (six) hours as needed for mild pain   lisinopril (ZESTRIL) 10 mg tablet  Self Yes No   Sig: Take 20 mg by mouth daily in the early morning      Facility-Administered Medications: None       Past Medical History:   Diagnosis Date   • Allergic    • Bipolar disorder in partial remission ([address])    • Erectile dysfunction     unspecified erectile dysfunction type   • Hypertension    • Kidney stone        Past Surgical History:   Procedure Laterality Date   • COLONOSCOPY     • EXPLORATORY LAPAROTOMY W/ BOWEL RESECTION N/A 7/21/2022    Procedure: LAPAROTOMY EXPLORATORY W/ SMALL BOWEL RESECTION, liver biopsy;  Surgeon: Jones Severs, MD;  Location: [facility];  Service: General   • FL RETROGRADE PYELOGRAM  10/06/2021   • HERNIA REPAIR     • [place] CYSTO BLADDER W/URETERAL CATHETERIZATION Right 10/06/2021    Procedure: CYSTOSCOPY RETROGRADE PYELOGRAM WITH INSERTION STENT URETERAL, RIGHT URETEROSCOPY, STONE EXTRACTION; Surgeon: Cintia Sutton MD;  Location: [address] [place] [address] MAIN OR;  Service: Urology       Family History   Problem Relation Age of Onset   • Diabetes Mother    • Thyroid disease Mother    • Cancer Father    • Thyroid disease Sister    • Depression Brother    • Cancer Maternal Aunt    • Cancer Paternal Uncle    • Cancer Paternal Grandmother    • No Known Problems Brother    • Thyroid disease Sister      I have reviewed and agree with the history as documented. E-Cigarette/Vaping   • E-Cigarette Use Current Some Day User      E-Cigarette/Vaping Substances   • Nicotine No    • THC Yes    • CBD No    • Flavoring No      Social History     Tobacco Use   • Smoking status: Former     Types: Pipe, Cigarettes     Quit date: 2003     Years since quittin.5   • Smokeless tobacco: Never   Vaping Use   • Vaping Use: Some days   • Substances: THC   Substance Use Topics   • Alcohol use: Not Currently     Comment: Drinks a quart of moonshine/night-As of 21 will quit drinking   • Drug use: Yes     Types: Marijuana     Comment: Socially (1-2 Monthly)       Review of Systems   Constitutional: Negative. Respiratory: Negative. Cardiovascular: Negative. Gastrointestinal: Positive for abdominal pain, diarrhea, nausea and vomiting. Negative for abdominal distention, anal bleeding, blood in stool, constipation and rectal pain. Genitourinary: Negative. Skin: Negative. Neurological: Negative. Hematological: Negative. Psychiatric/Behavioral: Negative. Physical Exam  Physical Exam  Vitals and nursing note reviewed. Constitutional:       General: He is not in acute distress. Appearance: He is well-developed. He is not ill-appearing. HENT:      Head: Normocephalic and atraumatic. Eyes:      General: No scleral icterus. Extraocular Movements: Extraocular movements intact. Cardiovascular:      Rate and Rhythm: Normal rate and regular rhythm. Heart sounds: Normal heart sounds.    Pulmonary: Effort: Pulmonary effort is normal.      Comments: Breath sounds are diminished throughout. Abdominal:      General: Abdomen is flat. Bowel sounds are decreased. There is no distension or abdominal bruit. Tenderness: There is generalized abdominal tenderness and tenderness in the periumbilical area, left upper quadrant and left lower quadrant. There is no right CVA tenderness or left CVA tenderness. Hernia: No hernia is present. Skin:     General: Skin is warm and dry. Neurological:      General: No focal deficit present. Mental Status: He is alert.    Psychiatric:         Behavior: Behavior normal.         Vital Signs  ED Triage Vitals [07/10/23 2133]   Temperature Pulse Respirations Blood Pressure SpO2   97.6 °F (36.4 °C) 83 20 (!) 164/104 96 %      Temp Source Heart Rate Source Patient Position - Orthostatic VS BP Location FiO2 (%)   Tympanic Monitor Lying Left arm --      Pain Score       10 - Worst Possible Pain           Vitals:    07/10/23 2133   BP: (!) 164/104   Pulse: 83   Patient Position - Orthostatic VS: Lying         Visual Acuity      ED Medications  Medications - No data to display    Diagnostic Studies  Results Reviewed     Procedure Component Value Units Date/Time    CBC and differential [[id number]] Collected: 07/10/23 2200    Lab Status: Final result Specimen: Blood from Arm, Left Updated: 07/10/23 2206     WBC 9.27 Thousand/uL      RBC 4.95 Million/uL      Hemoglobin 14.8 g/dL      Hematocrit 42.0 %      MCV 85 fL      MCH 29.9 pg      MCHC 35.2 g/dL      RDW 14.6 %      MPV 9.7 fL      Platelets 229 Thousands/uL      nRBC 0 /100 WBCs      Neutrophils Relative 70 %      Immat GRANS % 0 %      Lymphocytes Relative 19 %      Monocytes Relative 6 %      Eosinophils Relative 5 %      Basophils Relative 0 %      Neutrophils Absolute 6.51 Thousands/µL      Immature Grans Absolute 0.02 Thousand/uL      Lymphocytes Absolute 1.77 Thousands/µL      Monocytes Absolute 0.52 Thousand/µL Eosinophils Absolute 0.43 Thousand/µL      Basophils Absolute 0.02 Thousands/µL     Comprehensive metabolic panel [[id number]] Collected: 07/10/23 2200    Lab Status: In process Specimen: Blood from Arm, Left Updated: 07/10/23 2204    Lipase [[id number]] Collected: 07/10/23 2200    Lab Status: In process Specimen: Blood from Arm, Left Updated: 07/10/23 2204    UA (URINE) with reflex to Scope [[id number]]     Lab Status: No result Specimen: Urine                  No orders to display              Procedures  Procedures         ED Course                               SBIRT 22yo+    Flowsheet Row Most Recent Value   Initial Alcohol Screen: US AUDIT-C     1. How often do you have a drink containing alcohol? 0 Filed at: 07/10/2023 2133   2. How many drinks containing alcohol do you have on a typical day you are drinking? 0 Filed at: 07/10/2023 2133   3a. Male UNDER 65: How often do you have five or more drinks on one occasion? 0 Filed at: 07/10/2023 2133   3b. FEMALE Any Age, or MALE 65+: How often do you have 4 or more drinks on one occassion? 0 Filed at: 07/10/2023 2133   Audit-C Score 0 Filed at: 07/10/2023 2133   JURGEN: How many times in the past year have you. .. Used an illegal drug or used a prescription medication for non-medical reasons? Never Filed at: 07/10/2023 2133                    MDM    Disposition  Final diagnoses:   None     ED Disposition     None      Follow-up Information    None         Patient's Medications   Discharge Prescriptions    No medications on file       No discharge procedures on file.     PDMP Review       Value Time User    PDMP Reviewed  Yes 5/15/2023  1:50 PM Sarah Castillo PA-C          ED Provider  Electronically Signed by           Gayle Shay MD  07/14/23 0496

## 2023-07-11 NOTE — TELEPHONE ENCOUNTER
Phoned pt and left a voice message to see how pt was doing. I did mention that a referral to Urology was placed and they will reach out to pt to schedule an appt.  I asked that pt return a call to me at [phone]

## 2023-07-11 NOTE — TELEPHONE ENCOUNTER
Patient has metastatic neuroendocrine tumor with met to the liver. On lanreotide most recent dose 7/10/23. Patient presented to the ED with abdominal pain, Nausea / vomiting. Pain improved with dilaudid. Imaging indicates pyelitits / ureteritis / cystitis but labs donot indicate any infection. Patient as per Ed physician is stable otherwise. Decision whether antibiotics should be given or not is being taken now at Ed.  ED wanted someone from primary oncology team to follow up with patient in AM.

## 2023-07-12 ENCOUNTER — TELEPHONE (OUTPATIENT)
Dept: HEMATOLOGY ONCOLOGY | Facility: CLINIC | Age: 48
End: 2023-07-12

## 2023-07-12 ENCOUNTER — TELEPHONE (OUTPATIENT)
Dept: UROLOGY | Facility: AMBULATORY SURGERY CENTER | Age: 48
End: 2023-07-12

## 2023-07-12 DIAGNOSIS — Z87.442 HISTORY OF KIDNEY STONES: ICD-10-CM

## 2023-07-12 DIAGNOSIS — N30.90 CYSTITIS: Primary | ICD-10-CM

## 2023-07-12 DIAGNOSIS — R31.29 OTHER MICROSCOPIC HEMATURIA: ICD-10-CM

## 2023-07-12 NOTE — TELEPHONE ENCOUNTER
Spoke with patient's sister/caregiver today. She states that he was evaluated in the emergency department the other day after he presented with back pain, abdominal pain and nausea/vomiting. She states that he does have a history of kidney stones. He had a CT scan of the abdomen and pelvis with contrast done 7/11/2023:  IMPRESSION:  1. Findings consistent with left pyelitis and ureteritis. No evidence of obstructive uropathy or nephritis. 2. Bladder wall enhancement suggest cystitis. 3. Stable mesenteric mass and surrounding lymphadenopathy consistent with neuroendocrine tumor of the small bowel. 4. Lesion in the right lobe of the liver is smaller, measuring 0.9 cm. He was not started on any antibiotics since his urinalysis/overall situation was not suggestive of infection. Urinalysis showed +3 occult blood but otherwise normal, may uroscopic urine did show some red cells, mucus threads and hyaline casts. Urine culture is still pending-await results. ? Recent kidney stone passing. He does have findings consistent with cystitis, left pyelitis and ureteritis without obstructive uropathy noted on imaging. Recommended further evaluation with urology. Patient's sister would like us to order and schedule appointment for her brother today.

## 2023-07-12 NOTE — TELEPHONE ENCOUNTER
New Patient    What is the reason for the patient’s appointment?: History of kidney stones, Other microscopic hematuria, Cystitis    What office location does the patient prefer?: [place]     Does patient have Imaging/Lab Results:    7/11/23 CT     Have patient records been requested?:  If No, are the records showing in Epic:       INSURANCE:   Do we accept the patient's insurance or is the patient Self-Pay?: yes    Insurance Provider: Shan Lopez  Plan Type/Number:   Member ID#: [id number]      HISTORY:   Has the patient had any previous Urologist(s)?: Surgery  10/2021    Was the patient seen in the ED?: 7/10/23    Has the patient had any outside testing done?: no     Does the patient have a personal history of cancer?: no

## 2023-07-13 LAB — BACTERIA UR CULT: NORMAL

## 2023-07-28 ENCOUNTER — PATIENT OUTREACH (OUTPATIENT)
Dept: CASE MANAGEMENT | Facility: HOSPITAL | Age: 48
End: 2023-07-28

## 2023-07-28 NOTE — PROGRESS NOTES
LSW attempted to contact patient for a follow up supportive call. LSW left a voicemail with my reason for calling, along with my name and phone number to return my call.

## 2023-08-04 ENCOUNTER — PATIENT OUTREACH (OUTPATIENT)
Dept: CASE MANAGEMENT | Facility: HOSPITAL | Age: 48
End: 2023-08-04

## 2023-08-07 ENCOUNTER — HOSPITAL ENCOUNTER (OUTPATIENT)
Dept: INFUSION CENTER | Facility: HOSPITAL | Age: 48
Discharge: HOME | End: 2023-08-07
Attending: INTERNAL MEDICINE
Payer: COMMERCIAL

## 2023-08-07 VITALS
DIASTOLIC BLOOD PRESSURE: 98 MMHG | OXYGEN SATURATION: 99 % | HEART RATE: 75 BPM | RESPIRATION RATE: 18 BRPM | TEMPERATURE: 97.1 F | SYSTOLIC BLOOD PRESSURE: 172 MMHG

## 2023-08-07 DIAGNOSIS — C7A.8 NEUROENDOCRINE CARCINOMA OF SMALL BOWEL (HCC): ICD-10-CM

## 2023-08-07 DIAGNOSIS — D3A.8 NEUROENDOCRINE TUMOR: Primary | ICD-10-CM

## 2023-08-07 RX ORDER — LANREOTIDE ACETATE 120 MG/.5ML
120 INJECTION SUBCUTANEOUS ONCE
OUTPATIENT
Start: 2023-09-04

## 2023-08-07 RX ORDER — LANREOTIDE ACETATE 120 MG/.5ML
120 INJECTION SUBCUTANEOUS ONCE
Status: COMPLETED | OUTPATIENT
Start: 2023-08-07 | End: 2023-08-07

## 2023-08-07 RX ADMIN — LANREOTIDE ACETATE 120 MG: 120 INJECTION SUBCUTANEOUS at 11:02

## 2023-08-07 NOTE — PROGRESS NOTES
Pt offers no complaints today. Has first urology consult next week. Tolerated lanreotide injection well without incident and pt discharged in stable condition. Next infusion appointment scheduled in 4 weeks and AVS provided.

## 2023-09-05 ENCOUNTER — HOSPITAL ENCOUNTER (OUTPATIENT)
Dept: INFUSION CENTER | Facility: HOSPITAL | Age: 48
Discharge: HOME | End: 2023-09-05
Attending: INTERNAL MEDICINE
Payer: COMMERCIAL

## 2023-09-05 VITALS — TEMPERATURE: 96.7 F

## 2023-09-05 DIAGNOSIS — D3A.8 NEUROENDOCRINE TUMOR: Primary | ICD-10-CM

## 2023-09-05 DIAGNOSIS — C7A.8 NEUROENDOCRINE CARCINOMA OF SMALL BOWEL (HCC): ICD-10-CM

## 2023-09-05 PROCEDURE — 96372 THER/PROPH/DIAG INJ SC/IM: CPT

## 2023-09-05 RX ORDER — LANREOTIDE ACETATE 120 MG/.5ML
120 INJECTION SUBCUTANEOUS ONCE
Status: COMPLETED | OUTPATIENT
Start: 2023-09-05 | End: 2023-09-05

## 2023-09-05 RX ORDER — LANREOTIDE ACETATE 120 MG/.5ML
120 INJECTION SUBCUTANEOUS ONCE
OUTPATIENT
Start: 2023-10-02

## 2023-09-05 RX ADMIN — LANREOTIDE ACETATE 120 MG: 120 INJECTION SUBCUTANEOUS at 11:03

## 2023-09-05 NOTE — PROGRESS NOTES
Patient tolerated Lanreotide injection in right upper outer gluteal without reaction or issues. AVS declined. Patient given appointment card for next appointment. Patient ambulated off unit without incident. All personal belongings taken with patient.

## 2023-09-06 ENCOUNTER — OFFICE VISIT (OUTPATIENT)
Dept: HEMATOLOGY ONCOLOGY | Facility: CLINIC | Age: 48
End: 2023-09-06
Payer: COMMERCIAL

## 2023-09-06 VITALS
TEMPERATURE: 97.7 F | HEART RATE: 76 BPM | BODY MASS INDEX: 29.95 KG/M2 | DIASTOLIC BLOOD PRESSURE: 100 MMHG | HEIGHT: 73 IN | OXYGEN SATURATION: 99 % | RESPIRATION RATE: 18 BRPM | WEIGHT: 226 LBS | SYSTOLIC BLOOD PRESSURE: 140 MMHG

## 2023-09-06 DIAGNOSIS — C7A.8 NEUROENDOCRINE CARCINOMA OF SMALL BOWEL (HCC): Primary | ICD-10-CM

## 2023-09-06 DIAGNOSIS — C7B.8 METASTATIC MALIGNANT NEUROENDOCRINE TUMOR TO LIVER (HCC): ICD-10-CM

## 2023-09-06 PROCEDURE — 99214 OFFICE O/P EST MOD 30 MIN: CPT | Performed by: INTERNAL MEDICINE

## 2023-09-06 NOTE — PROGRESS NOTES
Hematology/Oncology Outpatient Follow-up  Halie Panda 51 y.o. male 1975 [id number]    Date:  9/6/2023        Assessment and Plan:  1. 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 He stated that his GI symptoms have improved after the start of the lanreotide. We did discuss the potential benefit of pursuing a dotatate PET CT scan prior to his next visit in about 8 weeks from now for close monitoring of his disease while on the current treatment. - CBC and differential; Future  - Comprehensive metabolic panel; Future  - Magnesium; Future  - Chromogranin A; Future  - NM PET CT skull base to mid thigh; Future    2. 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 (HCC)    - CBC and differential; Future  - Comprehensive metabolic panel; Future  - Magnesium; Future  - Chromogranin A; Future  - NM PET CT skull base to mid thigh; Future        HPI:  The patient came there for a follow-up visit. He apparently had a CT scan of the abdomen pelvis on 7/11/2023 which showed findings consistent with left pyelitis and ureteritis which was treated with antibiotics. The lesion in the liver seems smaller with stable mesenteric mass and surrounding lymphadenopathy. The patient continues to be on the lanreotide on every 4-week basis. He denied GI symptoms. He did not do blood work prior to this office visit. Oncology History Overview Note   Patient with history of bipolar disorder, schizophrenic personality, hypertension, etc.  The patient apparently was admitted the hospital around October of 2021 for symptomatic left ureteral calculus. Incidentally he was noted to have mesenteric masses with calcification with cefepime on the CTA from 10/02/2021.   Another CT scan of the abdomen pelvis with contrast was done on 02/01/2022 which showed no significant change in the partially calcified mesenteric masses with mesenteric tethering concerning for neuroendocrine tumor. He had a DOTATATE PET-CT scan on 05/04/2022 which showed:  IMPRESSION:  1. Dotatate avid clustered central mesenteric masses suspicious for underlying neuroendocrine neoplasm. 2.  There does appear to be subjacent focal radiotracer uptake in a proximal small bowel loop for which underlying small bowel lesion should be excluded. Consider further evaluation with CT enterography or small bowel pill study. 3.  A few low-level foci of radiotracer uptake in the bilateral scapula, non-specific. Activity is lower than expected for neuroendocrine metastasis given the intensity of the mesenteric foci, however early metastasis is not entirely excluded. Consider   further evaluation with MRI of these regions vs reassessment on follow up Dotatate PET. 4.  Diffuse prostatic activity, non-specific, question inflammatory. Correlate with PSA levels and urologic evaluation as warranted. 5.   Stable 8 mm nodule in the right mid lung. No suspicious radiotracer uptake here. Given the stability since 10/2/2021 this can be followed up with chest CT in 12 months. Chromogranin A serum level on 10/06/2021 was 134.5. On 06/24/2022 was 164. The patient then underwent small bowel resection on 07/21/2022 at the area where he was thought to be high-risk for obstruction. The pathology revealed well-differentiated neuroendocrine tumor, G1 come multifocal, the tumor invaded through the muscularis propria into the taye colorectal tissue. He also had liver lesion biopsy at segment 5 which also was compatible with metastatic well-differentiated neuroendocrine tumor consistent with intestinal primary. Ki 67 was 1.7%.   The final pathology was pT3([id number] multifocal well differentiated small bowel neuroendocrine tumor with known extensive mesenteric galo involvement that was unresectable based on extension to the root of the SMA. Neuroendocrine carcinoma of small bowel ([address])   8/5/2022 Initial Diagnosis    Neuroendocrine carcinoma of small bowel (HCC)         Interval history:    ROS: Review of Systems   Constitutional: Positive for appetite change and fatigue. Negative for chills and fever. HENT: Negative for ear pain and sore throat. Eyes: Negative for pain and visual disturbance. Respiratory: Positive for shortness of breath. Negative for cough. Cardiovascular: Negative for chest pain and palpitations. Gastrointestinal: Negative for abdominal pain and vomiting. Genitourinary: Negative for dysuria and hematuria. Musculoskeletal: Positive for back pain. Negative for arthralgias. Skin: Negative for color change and rash. Neurological: Positive for numbness. Negative for seizures and syncope. Psychiatric/Behavioral: Positive for sleep disturbance. All other systems reviewed and are negative.       Past Medical History:   Diagnosis Date   • Allergic    • Bipolar disorder in partial remission ([address])    • Erectile dysfunction     unspecified erectile dysfunction type   • Hypertension    • Kidney stone        Past Surgical History:   Procedure Laterality Date   • COLONOSCOPY     • EXPLORATORY LAPAROTOMY W/ BOWEL RESECTION N/A 7/21/2022    Procedure: LAPAROTOMY EXPLORATORY W/ SMALL BOWEL RESECTION, liver biopsy;  Surgeon: Siomara Iyer MD;  Location:  MAIN OR;  Service: General   • FL RETROGRADE PYELOGRAM  10/06/2021   • HERNIA REPAIR     • [place] CYSTO BLADDER W/URETERAL CATHETERIZATION Right 10/06/2021    Procedure: CYSTOSCOPY RETROGRADE PYELOGRAM WITH INSERTION STENT URETERAL, RIGHT URETEROSCOPY, STONE EXTRACTION;  Surgeon: Pam Quevedo MD;  Location: [address] [place] [address] MAIN OR;  Service: Urology       Social History     Socioeconomic History   • Marital status: Single     Spouse name: None   • Number of children: None   • Years of education: None   • Highest education level: None Occupational History   • None   Tobacco Use   • Smoking status: Former     Types: Pipe, Cigarettes     Quit date: 2003     Years since quittin.7   • Smokeless tobacco: Never   Vaping Use   • Vaping Use: Some days   • Substances: THC   Substance and Sexual Activity   • Alcohol use: Not Currently     Comment: Drinks a quart of moonshine/night-As of 21 will quit drinking   • Drug use: Yes     Types: Marijuana     Comment: Socially (1-2 Monthly)   • Sexual activity: None   Other Topics Concern   • None   Social History Narrative   • None     Social Determinants of Health     Financial Resource Strain: Not on file   Food Insecurity: No Food Insecurity (3/22/2023)    Hunger Vital Sign    • Worried About Running Out of Food in the Last Year: Never true    • Ran Out of Food in the Last Year: Never true   Transportation Needs: No Transportation Needs (3/22/2023)    PRAPARE - Transportation    • Lack of Transportation (Medical): No    • Lack of Transportation (Non-Medical):  No   Physical Activity: Not on file   Stress: Not on file   Social Connections: Not on file   Intimate Partner Violence: Not on file   Housing Stability: Low Risk  (3/22/2023)    Housing Stability Vital Sign    • Unable to Pay for Housing in the Last Year: No    • Number of Places Lived in the Last Year: 1    • Unstable Housing in the Last Year: No       Family History   Problem Relation Age of Onset   • Diabetes Mother    • Thyroid disease Mother    • Cancer Father    • Thyroid disease Sister    • Depression Brother    • Cancer Maternal Aunt    • Cancer Paternal Uncle    • Cancer Paternal Grandmother    • No Known Problems Brother    • Thyroid disease Sister        Allergies   Allergen Reactions   • Tomato - Food Allergy Anaphylaxis     raw   • Poison Ivy Extract Hives   • Poison Sumac Extract Hives         Current Outpatient Medications:   •  ibuprofen (MOTRIN) 200 mg tablet, Take 200 mg by mouth every 6 (six) hours as needed for mild pain, Disp: , Rfl:   •  lisinopril (ZESTRIL) 10 mg tablet, Take 20 mg by mouth daily in the early morning, Disp: , Rfl:   •  ondansetron (Zofran ODT) 4 mg disintegrating tablet, Take 1 tablet (4 mg total) by mouth every 6 (six) hours as needed for nausea or vomiting, Disp: 20 tablet, Rfl: 0  •  PARoxetine (PAXIL) 20 mg tablet, Take 20 mg by mouth daily (Patient not taking: Reported on 7/3/2023), Disp: , Rfl:       Physical Exam:  /100 (BP Location: Right arm, Patient Position: Sitting, Cuff Size: Adult)   Pulse 76   Temp 97.7 °F (36.5 °C)   Resp 18   Ht 6' 1" (1.854 m)   Wt 103 kg (226 lb)   SpO2 99%   BMI 29.82 kg/m²     Physical Exam  Constitutional:       Appearance: He is well-developed. HENT:      Head: Normocephalic and atraumatic. Nose: Nose normal.   Eyes:      General: No scleral icterus. Right eye: No discharge. Left eye: No discharge. Conjunctiva/sclera: Conjunctivae normal.      Pupils: Pupils are equal, round, and reactive to light. Neck:      Thyroid: No thyromegaly. Trachea: No tracheal deviation. Cardiovascular:      Rate and Rhythm: Normal rate and regular rhythm. Heart sounds: Normal heart sounds. No murmur heard. No friction rub. Pulmonary:      Effort: Pulmonary effort is normal. No respiratory distress. Breath sounds: Normal breath sounds. No wheezing or rales. Chest:      Chest wall: No tenderness. Abdominal:      General: There is no distension. Palpations: Abdomen is soft. There is no hepatomegaly or splenomegaly. Tenderness: There is no abdominal tenderness. There is no guarding or rebound. Musculoskeletal:         General: No tenderness or deformity. Normal range of motion. Cervical back: Normal range of motion and neck supple. Lymphadenopathy:      Cervical: No cervical adenopathy. Skin:     General: Skin is warm and dry. Coloration: Skin is not pale. Findings: No erythema or rash. Neurological:      Mental Status: He is alert and oriented to person, place, and time. Cranial Nerves: No cranial nerve deficit. Coordination: Coordination normal.      Deep Tendon Reflexes: Reflexes are normal and symmetric. Psychiatric:         Behavior: Behavior normal.         Thought Content: Thought content normal.         Judgment: Judgment normal.           Labs:  Lab Results   Component Value Date    WBC 9.27 07/10/2023    HGB 14.8 07/10/2023    HCT 42.0 07/10/2023    MCV 85 07/10/2023     07/10/2023     Lab Results   Component Value Date    K 3.6 07/10/2023     07/10/2023    CO2 21 07/10/2023    BUN 12 07/10/2023    CREATININE 1.06 07/10/2023    GLUF 128 (H) 03/23/2023    CALCIUM 9.5 07/10/2023    CORRECTEDCA 9.7 10/21/2022    AST 15 07/10/2023    ALT 12 07/10/2023    ALKPHOS 117 (H) 07/10/2023    EGFR 82 07/10/2023     No results found for: "TSH"    Patient voiced understanding and agreement in the above discussion. Aware to contact our office with questions/symptoms in the interim.

## 2023-09-07 ENCOUNTER — OFFICE VISIT (OUTPATIENT)
Dept: PALLIATIVE MEDICINE | Facility: CLINIC | Age: 48
End: 2023-09-07
Payer: COMMERCIAL

## 2023-09-07 ENCOUNTER — HOSPITAL ENCOUNTER (OUTPATIENT)
Dept: CT IMAGING | Facility: HOSPITAL | Age: 48
End: 2023-09-07
Payer: COMMERCIAL

## 2023-09-07 VITALS
RESPIRATION RATE: 20 BRPM | OXYGEN SATURATION: 99 % | HEART RATE: 78 BPM | BODY MASS INDEX: 30.09 KG/M2 | DIASTOLIC BLOOD PRESSURE: 88 MMHG | SYSTOLIC BLOOD PRESSURE: 142 MMHG | TEMPERATURE: 98 F | WEIGHT: 227 LBS | HEIGHT: 73 IN

## 2023-09-07 DIAGNOSIS — F99 PSYCHIATRIC DISORDER: ICD-10-CM

## 2023-09-07 DIAGNOSIS — Z51.5 PALLIATIVE CARE BY SPECIALIST: ICD-10-CM

## 2023-09-07 DIAGNOSIS — Z71.89 COUNSELING AND COORDINATION OF CARE: ICD-10-CM

## 2023-09-07 DIAGNOSIS — C7B.8 METASTATIC MALIGNANT NEUROENDOCRINE TUMOR TO LIVER (HCC): ICD-10-CM

## 2023-09-07 DIAGNOSIS — C7A.8 NEUROENDOCRINE CARCINOMA OF SMALL BOWEL (HCC): Primary | ICD-10-CM

## 2023-09-07 DIAGNOSIS — C7A.8 NEUROENDOCRINE CARCINOMA OF SMALL BOWEL (HCC): ICD-10-CM

## 2023-09-07 PROCEDURE — G1004 CDSM NDSC: HCPCS

## 2023-09-07 PROCEDURE — 99213 OFFICE O/P EST LOW 20 MIN: CPT | Performed by: PHYSICIAN ASSISTANT

## 2023-09-07 PROCEDURE — 71260 CT THORAX DX C+: CPT

## 2023-09-07 PROCEDURE — 74177 CT ABD & PELVIS W/CONTRAST: CPT

## 2023-09-07 RX ADMIN — IOHEXOL 100 ML: 350 INJECTION, SOLUTION INTRAVENOUS at 10:50

## 2023-09-07 NOTE — PATIENT INSTRUCTIONS
The [facility] is located at [address] [place] [address]. [facility], [address] [place] [address]  [phone].

## 2023-09-07 NOTE — PROGRESS NOTES
Follow-up with Palliative and [address] [place] [address] 51 y.o. male [id number]    ASSESSMENT & PLAN:  1. Neuroendocrine carcinoma of small bowel ([address])    2. 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 ([address])    3. Counseling and coordination of care    4. Palliative care by specialist    5. Psychiatric disorder      • No med changes or symptom management today  • Patient has not had Psychiatric follow up. He has ongoing challenges with anger/explosive disorder, paranoia, hallucinations. o He has been compliant with medicines and medical follow ups. His sister helps care for him and track his appointments. o Referred to [facility] for evaluation of psychiatric conditions. Return in about 3 months (around 12/7/2023) for Next scheduled follow up. • Emotional support provided. Requested Prescriptions      No prescriptions requested or ordered in this encounter       There are no discontinued medications. Zamzam Ceballos was seen today for symptoms and planning cares related to above illnesses. I have reviewed the patient's controlled substance dispensing history in the Prescription Drug Monitoring Program in compliance with the [facility] regulations before prescribing any controlled substances. They are invited to continue to follow with us. If there are questions or concerns, please contact us through our clinic/answering service 24 hours a day, seven days a week. 40 minutes were spent in this ambulatory visit with greater than 50% of the time spent face to face with patient in counseling or coordination of care including symptom assessment and management, psychosocial support, chart review, imaging review, lab review, supportive listening and anticipatory guidance. All of the patient's questions were answered during this discussion.     Visit Information    Accompanied By: No one    Source of History: Self    History Limitations: None    Contacts: Extended Emergency Contact Information  Primary Emergency Contact: Tosin Andrews  Mobile Phone: [phone]  Relation: Sister     SUBJECTIVE:  Chief Complaint   Patient presents with   • Follow-up   • Anger Issues        HPI  Alicja Ann is a 51 y.o. male with history of neuroendocrine tumor of small bowel presents for palliative follow up for symptom management. Patient referred by Dr. Edson Vila (Oncology). Dr. Mallory Hernández (PCP)    Today, he appears to be doing reasonably well. However, he expresses frustration regarding the ongoing wait for disability approval, mentioning that he has enlisted the services of a  to handle the paperwork submission on his behalf. He has not yet had a follow-up appointment with a psychiatrist.     He continues to grapple with persistent issues, including paranoia, anger, and auditory hallucinations. Nevertheless, he remains largely compliant with his doctor's appointments and prescribed treatments. Furthermore, he demonstrates a willingness to explore additional avenues for enhancing his overall well-being and quality of life. Today, he has not reported any specific symptoms of concern. It's worth noting that he recently had a hospital stay due to a boxer fracture. This injury occurred when he inadvertently struck a door while attempting to secure it, prompted by late-night disturbances caused by friends. He attributed this incident to his anger at the time. PDMP shows no concerns.   Filled  Written  Sold  ID  Drug  QTY  Days  Prescriber  RX #  Dispenser  Refill  Daily Dose*  Pymt Type  [facility]    07/11/2023 07/11/2023   1  Oxycodone-Acetaminophen 5-325 12.00  3  [facility]  [id number]   Fir (8238)  0  30.00 MME  [place]    07/25/2022 07/25/2022   1  Oxycodone Hcl (Ir) 5 Mg Tablet 16.00  4  Ja O\'  [id number]   Fir (8238)  0  30.00 MME  [place]    05/30/2022 05/30/2022   1  Hydrocodone-Acetamin 5-325 Mg 5.00  1  [facility]  [id number]   Fir (8238)  0  25.00 MME  [place]        The following portions of the medical history were reviewed: past medical history, surgical history, problem list, medication list, family history, and social history. Current Outpatient Medications:   •  ibuprofen (MOTRIN) 200 mg tablet, Take 200 mg by mouth every 6 (six) hours as needed for mild pain, Disp: , Rfl:   •  lisinopril (ZESTRIL) 10 mg tablet, Take 20 mg by mouth daily in the early morning, Disp: , Rfl:   •  ondansetron (Zofran ODT) 4 mg disintegrating tablet, Take 1 tablet (4 mg total) by mouth every 6 (six) hours as needed for nausea or vomiting, Disp: 20 tablet, Rfl: 0  •  PARoxetine (PAXIL) 20 mg tablet, Take 20 mg by mouth daily (Patient not taking: Reported on 7/3/2023), Disp: , Rfl:   No current facility-administered medications for this visit. Past medical, surgical, social, and family histories are reviewed and pertinent updates are made. Review of Systems   Constitutional: Negative for appetite change and fatigue. HENT: Negative. Eyes: Negative. Respiratory: Negative for shortness of breath. Cardiovascular: Negative for chest pain. Gastrointestinal: Negative for abdominal distention, abdominal pain, constipation, diarrhea, nausea and vomiting. Genitourinary: Negative. Musculoskeletal: Negative for arthralgias, back pain, gait problem and myalgias. Skin: Negative for color change and wound. Neurological: Negative for dizziness, weakness, light-headedness and headaches. Psychiatric/Behavioral: Positive for agitation and behavioral problems. Negative for confusion, dysphoric mood, self-injury, sleep disturbance and suicidal ideas. The patient is not nervous/anxious. OBJECTIVE:  /88   Pulse 78   Temp 98 °F (36.7 °C) (Tympanic)   Resp 20   Ht 6' 1" (1.854 m)   Wt 103 kg (227 lb)   SpO2 99%   BMI 29.95 kg/m²   Physical Exam  Vitals and nursing note reviewed. Constitutional:       General: He is not in acute distress.      Appearance: He is well-developed. HENT:      Head: Normocephalic and atraumatic. Eyes:      Conjunctiva/sclera: Conjunctivae normal.   Cardiovascular:      Rate and Rhythm: Normal rate. Heart sounds: No murmur heard. Pulmonary:      Effort: Pulmonary effort is normal. No respiratory distress. Abdominal:      Palpations: Abdomen is soft. Tenderness: There is no abdominal tenderness. Musculoskeletal:         General: No swelling. Cervical back: Neck supple. Skin:     General: Skin is warm and dry. Capillary Refill: Capillary refill takes less than 2 seconds. Neurological:      Mental Status: He is alert. Psychiatric:         Attention and Perception: Attention normal.         Speech: Speech normal.          Radhika AMADA Blue  [facility]  [phone]    Portions of this document may have been created using dictation software and as such some "sound alike" terms may have been generated by the system. Do not hesitate to contact me with any questions or clarifications.

## 2023-09-08 ENCOUNTER — PATIENT OUTREACH (OUTPATIENT)
Dept: CASE MANAGEMENT | Facility: HOSPITAL | Age: 48
End: 2023-09-08

## 2023-09-22 ENCOUNTER — PATIENT OUTREACH (OUTPATIENT)
Dept: CASE MANAGEMENT | Facility: HOSPITAL | Age: 48
End: 2023-09-22

## 2023-10-03 ENCOUNTER — APPOINTMENT (OUTPATIENT)
Dept: LAB | Facility: CLINIC | Age: 48
End: 2023-10-03
Payer: COMMERCIAL

## 2023-10-03 ENCOUNTER — HOSPITAL ENCOUNTER (OUTPATIENT)
Dept: INFUSION CENTER | Facility: HOSPITAL | Age: 48
Discharge: HOME | End: 2023-10-03
Attending: INTERNAL MEDICINE
Payer: COMMERCIAL

## 2023-10-03 DIAGNOSIS — D3A.8 NEUROENDOCRINE TUMOR: Primary | ICD-10-CM

## 2023-10-03 DIAGNOSIS — C7A.8 NEUROENDOCRINE CARCINOMA OF SMALL BOWEL (HCC): ICD-10-CM

## 2023-10-03 DIAGNOSIS — C7B.8 METASTATIC MALIGNANT NEUROENDOCRINE TUMOR TO LIVER (HCC): ICD-10-CM

## 2023-10-03 PROCEDURE — 96372 THER/PROPH/DIAG INJ SC/IM: CPT

## 2023-10-03 RX ORDER — LANREOTIDE ACETATE 120 MG/.5ML
120 INJECTION SUBCUTANEOUS ONCE
OUTPATIENT
Start: 2023-10-31

## 2023-10-03 RX ORDER — LANREOTIDE ACETATE 120 MG/.5ML
120 INJECTION SUBCUTANEOUS ONCE
Status: COMPLETED | OUTPATIENT
Start: 2023-10-03 | End: 2023-10-03

## 2023-10-03 RX ADMIN — LANREOTIDE ACETATE 120 MG: 120 INJECTION SUBCUTANEOUS at 13:04

## 2023-10-03 NOTE — PROGRESS NOTES
Lanreotide administered as ordered in L funmi without incident. AVS provided, next appt reviewed. Discharged in stable condition.

## 2023-10-09 ENCOUNTER — PATIENT OUTREACH (OUTPATIENT)
Dept: CASE MANAGEMENT | Facility: HOSPITAL | Age: 48
End: 2023-10-09

## 2023-10-09 NOTE — PROGRESS NOTES
LSW attempted to contact patient for a follow up supportive call. Patient has not returned my last 4 calls. LSW left a voicemail with my reason for calling, along with my name and phone number to return my call. LSW will close pt's case with [facility]W for now. Pt is welcome to call me if he has any needs.

## 2023-10-23 ENCOUNTER — HOSPITAL ENCOUNTER (OUTPATIENT)
Dept: RADIOLOGY | Age: 48
Discharge: HOME | End: 2023-10-23
Payer: COMMERCIAL

## 2023-10-23 DIAGNOSIS — C7A.8 NEUROENDOCRINE CARCINOMA OF SMALL BOWEL (HCC): ICD-10-CM

## 2023-10-23 DIAGNOSIS — C7B.8 METASTATIC MALIGNANT NEUROENDOCRINE TUMOR TO LIVER (HCC): ICD-10-CM

## 2023-10-23 PROCEDURE — G1004 CDSM NDSC: HCPCS

## 2023-10-23 PROCEDURE — 78815 PET IMAGE W/CT SKULL-THIGH: CPT

## 2023-10-23 PROCEDURE — A9587 GALLIUM GA-68: HCPCS

## 2023-10-31 ENCOUNTER — HOSPITAL ENCOUNTER (OUTPATIENT)
Dept: INFUSION CENTER | Facility: HOSPITAL | Age: 48
Discharge: HOME | End: 2023-10-31
Attending: INTERNAL MEDICINE
Payer: COMMERCIAL

## 2023-10-31 VITALS — TEMPERATURE: 96.9 F

## 2023-10-31 DIAGNOSIS — D3A.8 NEUROENDOCRINE TUMOR: ICD-10-CM

## 2023-10-31 DIAGNOSIS — C7A.8 NEUROENDOCRINE CARCINOMA OF SMALL BOWEL (HCC): Primary | ICD-10-CM

## 2023-10-31 PROCEDURE — 96372 THER/PROPH/DIAG INJ SC/IM: CPT

## 2023-10-31 RX ORDER — LANREOTIDE ACETATE 120 MG/.5ML
120 INJECTION SUBCUTANEOUS ONCE
OUTPATIENT
Start: 2023-11-28

## 2023-10-31 RX ORDER — LITHIUM CARBONATE 300 MG
300 TABLET ORAL 3 TIMES DAILY
COMMUNITY
End: 2023-10-31

## 2023-10-31 RX ORDER — LANREOTIDE ACETATE 120 MG/.5ML
120 INJECTION SUBCUTANEOUS ONCE
Status: COMPLETED | OUTPATIENT
Start: 2023-10-31 | End: 2023-10-31

## 2023-10-31 RX ADMIN — LANREOTIDE ACETATE 120 MG: 120 INJECTION SUBCUTANEOUS at 13:30

## 2023-11-01 ENCOUNTER — OFFICE VISIT (OUTPATIENT)
Dept: HEMATOLOGY ONCOLOGY | Facility: CLINIC | Age: 48
End: 2023-11-01
Payer: COMMERCIAL

## 2023-11-01 VITALS
WEIGHT: 236 LBS | SYSTOLIC BLOOD PRESSURE: 136 MMHG | DIASTOLIC BLOOD PRESSURE: 80 MMHG | OXYGEN SATURATION: 100 % | HEIGHT: 73 IN | RESPIRATION RATE: 18 BRPM | HEART RATE: 81 BPM | TEMPERATURE: 97.2 F | BODY MASS INDEX: 31.28 KG/M2

## 2023-11-01 DIAGNOSIS — C7B.8 METASTATIC MALIGNANT NEUROENDOCRINE TUMOR TO LIVER (HCC): ICD-10-CM

## 2023-11-01 DIAGNOSIS — C7A.8 NEUROENDOCRINE CARCINOMA OF SMALL BOWEL (HCC): Primary | ICD-10-CM

## 2023-11-01 PROCEDURE — 99214 OFFICE O/P EST MOD 30 MIN: CPT | Performed by: NURSE PRACTITIONER

## 2023-11-01 RX ORDER — PRAZOSIN HYDROCHLORIDE 5 MG/1
CAPSULE ORAL
COMMUNITY
Start: 2023-10-19

## 2023-11-01 RX ORDER — LITHIUM CARBONATE 300 MG/1
TABLET, FILM COATED, EXTENDED RELEASE ORAL
COMMUNITY
Start: 2023-10-19

## 2023-11-01 RX ORDER — RISPERIDONE 1 MG/1
TABLET ORAL
COMMUNITY
Start: 2023-10-19

## 2023-11-01 NOTE — PROGRESS NOTES
Hematology/Oncology Outpatient Follow-up  Juan J Youngblood 51 y.o. male 1975 [id number]    Date:  11/1/2023      Assessment and Plan:  1. Neuroendocrine carcinoma of small bowel ([address])  Patient will be continued on his current treatment with lanreotide injections monthly. He denies any symptoms suggestive of carcinoid syndrome/symptoms. Overall his most recent dotatate PET CT scan seems to be stable. Did note new 0.5 left para-aortic lymph node with some activity which will be monitored closely. He was also noted to have persistent foci of activity in both scapula which would be unusual/atypical pattern of metastasis-likely reactive will monitor. He is following with palliative care regularly and states he recently establish care with a new psychiatry team.    We will continue to monitor him and his laboratory studies on a regular basis. Request he follow-up again with repeat labs in about 8 weeks from now or sooner should the need arise.    - CBC and differential; Future  - Comprehensive metabolic panel; Future  - Chromogranin A; Future    2. 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 (HCC)  - CBC and differential; Future  - Comprehensive metabolic panel; Future  - Chromogranin A; Future     HPI:  Patient presents today for a follow-up visit. He states that he establish care with a new psychiatry team who made some changes to the medications and he is pleased with the results thus far. He has no new complaints today. States that he does get pain in his back and right hand where he had former fracture at times typically when the weather is cool and damp. He denies any loose stools, chest pain or shortness of breath. Continues to get his lanreotide injections monthly.     His most recent laboratory studies 10/3/2023 showed normal CBC hemoglobin 14.0.  Glucose 135, total bili elevated 1.67 which is stable remaining liver enzymes/metabolic panel are normal.  Magnesium normal.  Chromogranin A level in the normal range. Component      Latest Ref Rng 10/6/2021 6/24/2022 10/21/2022 7/6/2023   CHROMOGRANIN A      0.0 - 101.8 ng/mL 134.5 (H)  164.3 (H)  112.7 (H)  96.6      Component      Latest Ref Rng 10/3/2023   CHROMOGRANIN A      0.0 - 101.8 ng/mL 70.1       He had repeat imaging with dotatate PET CT scan recently 10/23/2023:  IMPRESSION:  1. The left mesenteric mass is without a significant change in size or radiotracer activity. Adjacent smaller less radiotracer avid nodularity is unchanged. .  2. 0.5 cm left para-aortic lymph node with SUV max of 5.7 may represent small/early galo metastasis  3. Persistent foci of activity within both scapula. Although this would represent an unusual/atypical pattern, osseous metastases are not excluded. 4. No radiotracer avid liver lesions. No evidence of intrathoracic radiotracer avid metastatic disease  5. Hepatic steatosis. 0.8 cm right midlung nodule unchanged in size and without radiotracer activity  6. Mildly enlarged prostate gland with increased radiotracer activity, similar to prior study. Oncology History Overview Note   Patient with history of bipolar disorder, schizophrenic personality, hypertension, etc.  The patient apparently was admitted the hospital around October of 2021 for symptomatic left ureteral calculus. Incidentally he was noted to have mesenteric masses with calcification with cefepime on the CTA from 10/02/2021. Another CT scan of the abdomen pelvis with contrast was done on 02/01/2022 which showed no significant change in the partially calcified mesenteric masses with mesenteric tethering concerning for neuroendocrine tumor. He had a DOTATATE PET-CT scan on 05/04/2022 which showed:  IMPRESSION:  1. Dotatate avid clustered central mesenteric masses suspicious for underlying neuroendocrine neoplasm.   2.  There does appear to be subjacent focal radiotracer uptake in a proximal small bowel loop for which underlying small bowel lesion should be excluded. Consider further evaluation with CT enterography or small bowel pill study. 3.  A few low-level foci of radiotracer uptake in the bilateral scapula, non-specific. Activity is lower than expected for neuroendocrine metastasis given the intensity of the mesenteric foci, however early metastasis is not entirely excluded. Consider   further evaluation with MRI of these regions vs reassessment on follow up Dotatate PET. 4.  Diffuse prostatic activity, non-specific, question inflammatory. Correlate with PSA levels and urologic evaluation as warranted. 5.   Stable 8 mm nodule in the right mid lung. No suspicious radiotracer uptake here. Given the stability since 10/2/2021 this can be followed up with chest CT in 12 months. Chromogranin A serum level on 10/06/2021 was 134.5. On 06/24/2022 was 164. The patient then underwent small bowel resection on 07/21/2022 at the area where he was thought to be high-risk for obstruction. The pathology revealed well-differentiated neuroendocrine tumor, G1 come multifocal, the tumor invaded through the muscularis propria into the taye colorectal tissue. He also had liver lesion biopsy at segment 5 which also was compatible with metastatic well-differentiated neuroendocrine tumor consistent with intestinal primary. Ki 67 was 1.7%. The final pathology was [id number]([id number] multifocal well differentiated small bowel neuroendocrine tumor with known extensive mesenteric galo involvement that was unresectable based on extension to the root of the SMA. Neuroendocrine carcinoma of small bowel ([address])   8/5/2022 Initial Diagnosis    Neuroendocrine carcinoma of small bowel ([address])         ECOG performance status: 0    ROS: Review of Systems   Respiratory:  Positive for cough. Musculoskeletal:  Positive for back pain and myalgias. Neurological:  Positive for numbness. Psychiatric/Behavioral:  Positive for dysphoric mood and sleep disturbance.     All other systems reviewed and are negative.       Past Medical History:   Diagnosis Date    Allergic     Bipolar disorder in partial remission ([address])     Erectile dysfunction     unspecified erectile dysfunction type    Hypertension     Kidney stone        Past Surgical History:   Procedure Laterality Date    COLONOSCOPY      EXPLORATORY LAPAROTOMY W/ BOWEL RESECTION N/A 2022    Procedure: LAPAROTOMY EXPLORATORY W/ SMALL BOWEL RESECTION, liver biopsy;  Surgeon: Esthela Clarke MD;  Location:  MAIN OR;  Service: General    FL RETROGRADE PYELOGRAM  10/06/2021    HERNIA REPAIR      FL CYSTO BLADDER W/URETERAL CATHETERIZATION Right 10/06/2021    Procedure: CYSTOSCOPY RETROGRADE PYELOGRAM WITH INSERTION STENT URETERAL, RIGHT URETEROSCOPY, STONE EXTRACTION;  Surgeon: Alma Camargo MD;  Location: [address] [place] [address] MAIN OR;  Service: Urology       Social History     Socioeconomic History    Marital status: Single     Spouse name: None    Number of children: None    Years of education: None    Highest education level: None   Occupational History    None   Tobacco Use    Smoking status: Former     Types: Pipe, Cigarettes     Quit date: 2003     Years since quittin.8    Smokeless tobacco: Never   Vaping Use    Vaping Use: Some days    Substances: THC   Substance and Sexual Activity    Alcohol use: Not Currently     Comment: Drinks a quart of moonshine/night-As of 21 will quit drinking    Drug use: Yes     Types: Marijuana     Comment: Socially (1-2 Monthly)    Sexual activity: None   Other Topics Concern    None   Social History Narrative    None     Social Determinants of Health     Financial Resource Strain: Not on file   Food Insecurity: No Food Insecurity (3/22/2023)    Hunger Vital Sign     Worried About Running Out of Food in the Last Year: Never true     Ran Out of Food in the Last Year: Never true   Transportation Needs: No Transportation Needs (3/22/2023)    PRAPARE - Transportation     Lack of Transportation (Medical): No     Lack of Transportation (Non-Medical): No   Physical Activity: Not on file   Stress: Not on file   Social Connections: Not on file   Intimate Partner Violence: Not on file   Housing Stability: Low Risk  (3/22/2023)    Housing Stability Vital Sign     Unable to Pay for Housing in the Last Year: No     Number of Places Lived in the Last Year: 1     Unstable Housing in the Last Year: No       Family History   Problem Relation Age of Onset    Diabetes Mother     Thyroid disease Mother     Cancer Father     Thyroid disease Sister     Depression Brother     Cancer Maternal Aunt     Cancer Paternal Uncle     Cancer Paternal Grandmother     No Known Problems Brother     Thyroid disease Sister        Allergies   Allergen Reactions    Tomato - Food Allergy Anaphylaxis     raw    Poison Ivy Extract Hives    Poison Sumac Extract Hives         Current Outpatient Medications:     ibuprofen (MOTRIN) 200 mg tablet, Take 200 mg by mouth every 6 (six) hours as needed for mild pain, Disp: , Rfl:     lisinopril (ZESTRIL) 10 mg tablet, Take 20 mg by mouth daily in the early morning, Disp: , Rfl:     lithium carbonate (LITHOBID) 300 mg CR tablet, , Disp: , Rfl:     prazosin (MINIPRESS) 5 mg capsule, , Disp: , Rfl:     risperiDONE (RisperDAL) 1 mg tablet, , Disp: , Rfl:     ondansetron (Zofran ODT) 4 mg disintegrating tablet, Take 1 tablet (4 mg total) by mouth every 6 (six) hours as needed for nausea or vomiting (Patient not taking: Reported on 11/1/2023), Disp: 20 tablet, Rfl: 0    PARoxetine (PAXIL) 20 mg tablet, Take 20 mg by mouth daily (Patient not taking: Reported on 7/3/2023), Disp: , Rfl:       Physical Exam:  /80 (BP Location: Right arm, Patient Position: Sitting, Cuff Size: Adult)   Pulse 81   Temp (!) 97.2 °F (36.2 °C)   Resp 18   Ht 6' 1" (1.854 m)   Wt 107 kg (236 lb)   SpO2 100%   BMI 31.14 kg/m²     Physical Exam  Vitals reviewed. Constitutional:       General: He is not in acute distress.      Appearance: He is well-developed. He is not diaphoretic. HENT:      Head: Normocephalic and atraumatic. Eyes:      General: Lids are normal. No scleral icterus. Conjunctiva/sclera: Conjunctivae normal.      Pupils: Pupils are equal, round, and reactive to light. Neck:      Thyroid: No thyromegaly. Cardiovascular:      Rate and Rhythm: Normal rate and regular rhythm. Heart sounds: Normal heart sounds. No murmur heard. Pulmonary:      Effort: Pulmonary effort is normal. No respiratory distress. Breath sounds: Normal breath sounds. Abdominal:      General: There is no distension. Palpations: Abdomen is soft. There is no hepatomegaly or splenomegaly. Tenderness: There is no abdominal tenderness. Musculoskeletal:         General: No swelling. Normal range of motion. Cervical back: Normal range of motion and neck supple. Lymphadenopathy:      Cervical: No cervical adenopathy. Upper Body:      Right upper body: No axillary adenopathy. Left upper body: No axillary adenopathy. Skin:     General: Skin is warm and dry. Findings: No erythema or rash. Neurological:      General: No focal deficit present. Mental Status: He is alert and oriented to person, place, and time. Psychiatric:         Mood and Affect: Affect is flat. Behavior: Behavior normal. Behavior is cooperative. Thought Content: Thought content normal.         Judgment: Judgment normal.           Labs:  Lab Results   Component Value Date    WBC 8.19 10/03/2023    HGB 14.0 10/03/2023    HCT 39.5 10/03/2023    MCV 84 10/03/2023     10/03/2023        Patient voiced understanding and agreement in the above discussion. Aware to contact our office with questions/symptoms in the interim. This note has been generated by voice recognition software system. Therefore, there may be spelling, grammar, and or syntax errors. Please contact if questions arise.

## 2023-11-24 ENCOUNTER — TELEPHONE (OUTPATIENT)
Dept: SURGERY | Facility: CLINIC | Age: 48
End: 2023-11-24

## 2023-11-24 NOTE — TELEPHONE ENCOUNTER
Called and left voicemail to give our office a call to reschedule his appt on 11/30/23 for a later date due to provider not being in the office.

## 2023-11-28 ENCOUNTER — HOSPITAL ENCOUNTER (OUTPATIENT)
Dept: INFUSION CENTER | Facility: HOSPITAL | Age: 48
Discharge: HOME | End: 2023-11-28
Attending: INTERNAL MEDICINE
Payer: COMMERCIAL

## 2023-11-28 VITALS — TEMPERATURE: 96.9 F

## 2023-11-28 DIAGNOSIS — C7A.8 NEUROENDOCRINE CARCINOMA OF SMALL BOWEL (HCC): Primary | ICD-10-CM

## 2023-11-28 DIAGNOSIS — D3A.8 NEUROENDOCRINE TUMOR: ICD-10-CM

## 2023-11-28 PROCEDURE — 96372 THER/PROPH/DIAG INJ SC/IM: CPT

## 2023-11-28 RX ORDER — LANREOTIDE ACETATE 120 MG/.5ML
120 INJECTION SUBCUTANEOUS ONCE
OUTPATIENT
Start: 2023-12-26

## 2023-11-28 RX ORDER — LANREOTIDE ACETATE 120 MG/.5ML
120 INJECTION SUBCUTANEOUS ONCE
Status: COMPLETED | OUTPATIENT
Start: 2023-11-28 | End: 2023-11-28

## 2023-11-28 RX ADMIN — LANREOTIDE ACETATE 120 MG: 120 INJECTION SUBCUTANEOUS at 13:03

## 2023-11-28 NOTE — PROGRESS NOTES
Lanreotide administered as ordered in L funmi without incident. Next appt scheduled & appt card provided. Pt discharged in stable condition.

## 2023-11-30 ENCOUNTER — OFFICE VISIT (OUTPATIENT)
Dept: PALLIATIVE MEDICINE | Facility: CLINIC | Age: 48
End: 2023-11-30
Payer: COMMERCIAL

## 2023-11-30 VITALS
WEIGHT: 232 LBS | HEIGHT: 73 IN | DIASTOLIC BLOOD PRESSURE: 88 MMHG | BODY MASS INDEX: 30.75 KG/M2 | SYSTOLIC BLOOD PRESSURE: 148 MMHG | HEART RATE: 62 BPM | OXYGEN SATURATION: 99 % | RESPIRATION RATE: 18 BRPM | TEMPERATURE: 98.6 F

## 2023-11-30 DIAGNOSIS — C7B.8 METASTATIC MALIGNANT NEUROENDOCRINE TUMOR TO LIVER (HCC): ICD-10-CM

## 2023-11-30 DIAGNOSIS — F99 PSYCHIATRIC DISORDER: ICD-10-CM

## 2023-11-30 DIAGNOSIS — Z51.5 PALLIATIVE CARE BY SPECIALIST: ICD-10-CM

## 2023-11-30 DIAGNOSIS — C7A.8 NEUROENDOCRINE CARCINOMA OF SMALL BOWEL (HCC): Primary | ICD-10-CM

## 2023-11-30 PROCEDURE — 99213 OFFICE O/P EST LOW 20 MIN: CPT | Performed by: PHYSICIAN ASSISTANT
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
## 2023-12-13 ENCOUNTER — OFFICE VISIT (OUTPATIENT)
Dept: SURGERY | Facility: CLINIC | Age: 48
End: 2023-12-13
Payer: COMMERCIAL

## 2023-12-13 VITALS
SYSTOLIC BLOOD PRESSURE: 150 MMHG | OXYGEN SATURATION: 97 % | DIASTOLIC BLOOD PRESSURE: 98 MMHG | HEART RATE: 99 BPM | WEIGHT: 237 LBS | HEIGHT: 73 IN | RESPIRATION RATE: 16 BRPM | TEMPERATURE: 97.6 F | BODY MASS INDEX: 31.41 KG/M2

## 2023-12-13 DIAGNOSIS — C7A.8 NEUROENDOCRINE CARCINOMA OF SMALL BOWEL (HCC): Primary | ICD-10-CM

## 2023-12-13 DIAGNOSIS — C7B.8 METASTATIC MALIGNANT NEUROENDOCRINE TUMOR TO LIVER (HCC): ICD-10-CM

## 2023-12-13 PROCEDURE — 99212 OFFICE O/P EST SF 10 MIN: CPT | Performed by: SURGERY

## 2023-12-15 ENCOUNTER — TELEPHONE (OUTPATIENT)
Dept: PSYCHIATRY | Facility: CLINIC | Age: 48
End: 2023-12-15

## 2023-12-15 NOTE — TELEPHONE ENCOUNTER
Contacted patient off of Medication Management  to verify needs of services in attempts to offer patient an appointment at [facility] . spoke with patient whom stated they are still interested in services
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
## 2023-12-26 ENCOUNTER — HOSPITAL ENCOUNTER (OUTPATIENT)
Dept: INFUSION CENTER | Facility: HOSPITAL | Age: 48
Discharge: HOME | End: 2023-12-26
Attending: INTERNAL MEDICINE
Payer: COMMERCIAL

## 2023-12-26 DIAGNOSIS — D3A.8 NEUROENDOCRINE TUMOR: ICD-10-CM

## 2023-12-26 DIAGNOSIS — C7A.8 NEUROENDOCRINE CARCINOMA OF SMALL BOWEL (HCC): Primary | ICD-10-CM

## 2023-12-26 PROCEDURE — 96372 THER/PROPH/DIAG INJ SC/IM: CPT

## 2023-12-26 RX ORDER — LANREOTIDE ACETATE 120 MG/.5ML
120 INJECTION SUBCUTANEOUS ONCE
OUTPATIENT
Start: 2024-01-23

## 2023-12-26 RX ORDER — LANREOTIDE ACETATE 120 MG/.5ML
120 INJECTION SUBCUTANEOUS ONCE
Status: COMPLETED | OUTPATIENT
Start: 2023-12-26 | End: 2023-12-26

## 2023-12-26 RX ADMIN — LANREOTIDE ACETATE 120 MG: 120 INJECTION SUBCUTANEOUS at 13:52

## 2023-12-26 NOTE — PROGRESS NOTES
Pt offers no complaints. Tolerated lanreotide injection in right gluteal without incident. Discharged in stable condition and next infusion appointment scheduled on 1/23/24 at 1:30PM. AVS provided.

## 2024-01-02 ENCOUNTER — OFFICE VISIT (OUTPATIENT)
Dept: HEMATOLOGY ONCOLOGY | Facility: CLINIC | Age: 49
End: 2024-01-02
Payer: COMMERCIAL

## 2024-01-02 VITALS
HEIGHT: 73 IN | SYSTOLIC BLOOD PRESSURE: 124 MMHG | HEART RATE: 66 BPM | OXYGEN SATURATION: 100 % | RESPIRATION RATE: 17 BRPM | TEMPERATURE: 97.3 F | WEIGHT: 233 LBS | BODY MASS INDEX: 30.88 KG/M2 | DIASTOLIC BLOOD PRESSURE: 80 MMHG

## 2024-01-02 DIAGNOSIS — C7B.8 METASTATIC MALIGNANT NEUROENDOCRINE TUMOR TO LIVER (HCC): ICD-10-CM

## 2024-01-02 DIAGNOSIS — C7A.8 NEUROENDOCRINE CARCINOMA OF SMALL BOWEL (HCC): Primary | ICD-10-CM

## 2024-01-02 PROCEDURE — 99214 OFFICE O/P EST MOD 30 MIN: CPT | Performed by: INTERNAL MEDICINE

## 2024-01-02 NOTE — PROGRESS NOTES
Hematology/Oncology Outpatient Follow-up  Osei Trimble 48 y.o. male 1975 [id number]    Date:  1/2/2024        Assessment and Plan:  1. Neuroendocrine carcinoma of small bowel (HCC)  The patient will be continued on the lanreotide injections on a monthly basis.  We did discuss pursuing another dotatate PET CT scan around the end of April or the beginning of May of this year for close monitoring since he did have a new 0.5 left periaortic lymph node with some activity as well as persistent foci of activity in both scapula which is unusual/atypical for the metastatic neuroendocrine carcinoma.  - CBC and differential; Future  - Comprehensive metabolic panel; Future  - Magnesium  - Chromogranin A  - NM PET CT skull base to mid thigh; Future    2. 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 (HCC)    - CBC and differential; Future  - Comprehensive metabolic panel; Future  - Magnesium  - Chromogranin A  - NM PET CT skull base to mid thigh; Future        HPI:  The patient came today for follow-up visit.  He did complain about abdominal pain on and off mainly in the mid abdominal region.  He did complain about some constipation.  He was supposed to get blood work done prior to this office visit which was not done for unknown reason.  Oncology History Overview Note   Patient with history of bipolar disorder, schizophrenic personality, hypertension, etc.  The patient apparently was admitted the hospital around October of 2021 for symptomatic left ureteral calculus.  Incidentally he was noted to have mesenteric masses with calcification with cefepime on the CTA from 10/02/2021.  Another CT scan of the abdomen pelvis with contrast was done on 02/01/2022 which showed no significant change in the partially calcified mesenteric masses with mesenteric tethering concerning for neuroendocrine tumor.  He had a DOTATATE PET-CT scan on 05/04/2022 which showed:  IMPRESSION:  1.  Dotatate avid clustered central mesenteric  masses suspicious for underlying neuroendocrine neoplasm.  2.  There does appear to be subjacent focal radiotracer uptake in a proximal small bowel loop for which underlying small bowel lesion should be excluded.  Consider further evaluation with CT enterography or small bowel pill study.  3.  A few low-level foci of radiotracer uptake in the bilateral scapula, non-specific.  Activity is lower than expected for neuroendocrine metastasis given the intensity of the mesenteric foci, however early metastasis is not entirely excluded.  Consider   further evaluation with MRI of these regions vs reassessment on follow up Dotatate PET.  4.  Diffuse prostatic activity, non-specific, question inflammatory.  Correlate with PSA levels and urologic evaluation as warranted.  5.   Stable 8 mm nodule in the right mid lung.  No suspicious radiotracer uptake here.  Given the stability since 10/2/2021 this can be followed up with chest CT in 12 months.    Chromogranin A serum level on 10/06/2021 was 134.5.  On 06/24/2022 was 164.    The patient then underwent small bowel resection on 07/21/2022 at the area where he was thought to be high-risk for obstruction.  The pathology revealed well-differentiated neuroendocrine tumor, G1 come multifocal, the tumor invaded through the muscularis propria into the taye colorectal tissue.  He also had liver lesion biopsy at segment 5 which also was compatible with metastatic well-differentiated neuroendocrine tumor consistent with intestinal primary.  Ki 67 was 1.7%.  The final pathology was pT3([id number] multifocal well differentiated small bowel neuroendocrine tumor with known extensive mesenteric galo involvement that was unresectable based on extension to the root of the SMA.      Neuroendocrine carcinoma of small bowel (HCC)   8/5/2022 Initial Diagnosis    Neuroendocrine carcinoma of small bowel (HCC)         Interval history:    ROS: Review of Systems   Constitutional:  Positive for appetite  change. Negative for chills and fever.   HENT:  Negative for ear pain and sore throat.    Eyes:  Negative for pain and visual disturbance.   Respiratory:  Positive for cough. Negative for shortness of breath.    Cardiovascular:  Negative for chest pain and palpitations.   Gastrointestinal:  Positive for constipation. Negative for abdominal pain and vomiting.   Genitourinary:  Negative for dysuria and hematuria.   Musculoskeletal:  Negative for arthralgias and back pain.   Skin:  Negative for color change and rash.   Neurological:  Positive for numbness. Negative for seizures and syncope.   Psychiatric/Behavioral:  Positive for sleep disturbance.    All other systems reviewed and are negative.      Past Medical History:   Diagnosis Date    Allergic     Bipolar disorder in partial remission (HCC)     Erectile dysfunction     unspecified erectile dysfunction type    Hypertension     Kidney stone        Past Surgical History:   Procedure Laterality Date    COLONOSCOPY      EXPLORATORY LAPAROTOMY W/ BOWEL RESECTION N/A 2022    Procedure: LAPAROTOMY EXPLORATORY W/ SMALL BOWEL RESECTION, liver biopsy;  Surgeon: Rose Mary Lara MD;  Location: [facility] OR;  Service: General    FL RETROGRADE PYELOGRAM  10/06/2021    HERNIA REPAIR      NM CYSTO BLADDER W/URETERAL CATHETERIZATION Right 10/06/2021    Procedure: CYSTOSCOPY RETROGRADE PYELOGRAM WITH INSERTION STENT URETERAL, RIGHT URETEROSCOPY, STONE EXTRACTION;  Surgeon: Bradly Rivera MD;  Location:  MAIN OR;  Service: Urology       Social History     Socioeconomic History    Marital status: Single     Spouse name: None    Number of children: None    Years of education: None    Highest education level: None   Occupational History    None   Tobacco Use    Smoking status: Former     Current packs/day: 0.00     Types: Pipe, Cigarettes     Quit date: 2003     Years since quittin.0    Smokeless tobacco: Never   Vaping Use    Vaping status: Some Days    Substances: THC    Substance and Sexual Activity    Alcohol use: Not Currently     Comment: Drinks a quart of moonshine/night-As of 7/4/22 will quit drinking    Drug use: Yes     Types: Marijuana     Comment: Socially (1-2 Monthly)    Sexual activity: None   Other Topics Concern    None   Social History Narrative    None     Social Determinants of Health     Financial Resource Strain: Not on file   Food Insecurity: No Food Insecurity (3/22/2023)    Hunger Vital Sign     Worried About Running Out of Food in the Last Year: Never true     Ran Out of Food in the Last Year: Never true   Transportation Needs: No Transportation Needs (3/22/2023)    PRAPARE - Transportation     Lack of Transportation (Medical): No     Lack of Transportation (Non-Medical): No   Physical Activity: Not on file   Stress: Not on file   Social Connections: Not on file   Intimate Partner Violence: Not on file   Housing Stability: Low Risk  (3/22/2023)    Housing Stability Vital Sign     Unable to Pay for Housing in the Last Year: No     Number of Places Lived in the Last Year: 1     Unstable Housing in the Last Year: No       Family History   Problem Relation Age of Onset    Diabetes Mother     Thyroid disease Mother     Cancer Father     Thyroid disease Sister     Depression Brother     Cancer Maternal Aunt     Cancer Paternal Uncle     Cancer Paternal Grandmother     No Known Problems Brother     Thyroid disease Sister        Allergies   Allergen Reactions    Tomato - Food Allergy Anaphylaxis     raw    Poison Ivy Extract Hives    Poison Sumac Extract Hives         Current Outpatient Medications:     ibuprofen (MOTRIN) 200 mg tablet, Take 200 mg by mouth every 6 (six) hours as needed for mild pain, Disp: , Rfl:     lisinopril (ZESTRIL) 10 mg tablet, Take 20 mg by mouth daily in the early morning, Disp: , Rfl:     lithium carbonate (LITHOBID) 300 mg CR tablet, , Disp: , Rfl:     ondansetron (Zofran ODT) 4 mg disintegrating tablet, Take 1 tablet (4 mg total) by  "mouth every 6 (six) hours as needed for nausea or vomiting (Patient not taking: Reported on 11/1/2023), Disp: 20 tablet, Rfl: 0    PARoxetine (PAXIL) 20 mg tablet, Take 20 mg by mouth daily (Patient not taking: Reported on 7/3/2023), Disp: , Rfl:     prazosin (MINIPRESS) 5 mg capsule, , Disp: , Rfl:     risperiDONE (RisperDAL) 1 mg tablet, , Disp: , Rfl:       Physical Exam:  /80 (BP Location: Left arm, Patient Position: Sitting, Cuff Size: Adult)   Pulse 66   Temp (!) 97.3 °F (36.3 °C)   Resp 17   Ht 6' 1\" (1.854 m)   Wt 106 kg (233 lb)   SpO2 100%   BMI 30.74 kg/m²     Physical Exam  Constitutional:       Appearance: He is well-developed.   HENT:      Head: Normocephalic and atraumatic.      Nose: Nose normal.   Eyes:      General: No scleral icterus.        Right eye: No discharge.         Left eye: No discharge.      Conjunctiva/sclera: Conjunctivae normal.      Pupils: Pupils are equal, round, and reactive to light.   Neck:      Thyroid: No thyromegaly.      Trachea: No tracheal deviation.   Cardiovascular:      Rate and Rhythm: Normal rate and regular rhythm.      Heart sounds: Normal heart sounds. No murmur heard.     No friction rub.   Pulmonary:      Effort: Pulmonary effort is normal. No respiratory distress.      Breath sounds: Normal breath sounds. No wheezing or rales.   Chest:      Chest wall: No tenderness.   Abdominal:      General: There is no distension.      Palpations: Abdomen is soft. There is no hepatomegaly or splenomegaly.      Tenderness: There is no abdominal tenderness. There is no guarding or rebound.   Musculoskeletal:         General: No tenderness or deformity. Normal range of motion.      Cervical back: Normal range of motion and neck supple.   Lymphadenopathy:      Cervical: No cervical adenopathy.   Skin:     General: Skin is warm and dry.      Coloration: Skin is not pale.      Findings: No erythema or rash.   Neurological:      Mental Status: He is alert and oriented " "to person, place, and time.      Cranial Nerves: No cranial nerve deficit.      Coordination: Coordination normal.      Deep Tendon Reflexes: Reflexes are normal and symmetric.   Psychiatric:         Behavior: Behavior normal.         Thought Content: Thought content normal.         Judgment: Judgment normal.           Labs:  Lab Results   Component Value Date    WBC 8.19 10/03/2023    HGB 14.0 10/03/2023    HCT 39.5 10/03/2023    MCV 84 10/03/2023     10/03/2023     Lab Results   Component Value Date    K 3.5 10/03/2023     10/03/2023    CO2 29 10/03/2023    BUN 11 10/03/2023    CREATININE 0.87 10/03/2023    GLUF 135 (H) 10/03/2023    CALCIUM 9.8 10/03/2023    CORRECTEDCA 9.7 10/21/2022    AST 19 10/03/2023    ALT 18 10/03/2023    ALKPHOS 102 10/03/2023    EGFR 101 10/03/2023     No results found for: \"TSH\"    Patient voiced understanding and agreement in the above discussion. Aware to contact our office with questions/symptoms in the interim.   "

## 2024-01-10 ENCOUNTER — APPOINTMENT (OUTPATIENT)
Dept: LAB | Facility: CLINIC | Age: 49
End: 2024-01-10
Payer: COMMERCIAL

## 2024-01-10 DIAGNOSIS — C7A.8 NEUROENDOCRINE CARCINOMA OF SMALL BOWEL (HCC): ICD-10-CM

## 2024-01-10 DIAGNOSIS — C7B.8 METASTATIC MALIGNANT NEUROENDOCRINE TUMOR TO LIVER (HCC): ICD-10-CM

## 2024-01-10 LAB
ALBUMIN SERPL BCP-MCNC: 4.8 G/DL (ref 3.5–5)
ALP SERPL-CCNC: 97 U/L (ref 34–104)
ALT SERPL W P-5'-P-CCNC: 15 U/L (ref 7–52)
ANION GAP SERPL CALCULATED.3IONS-SCNC: 10 MMOL/L
AST SERPL W P-5'-P-CCNC: 16 U/L (ref 13–39)
BASOPHILS # BLD AUTO: 0.05 THOUSANDS/ÂΜL (ref 0–0.1)
BASOPHILS NFR BLD AUTO: 1 % (ref 0–1)
BILIRUB SERPL-MCNC: 1.97 MG/DL (ref 0.2–1)
BUN SERPL-MCNC: 6 MG/DL (ref 5–25)
CALCIUM SERPL-MCNC: 9.8 MG/DL (ref 8.4–10.2)
CHLORIDE SERPL-SCNC: 102 MMOL/L (ref 96–108)
CO2 SERPL-SCNC: 29 MMOL/L (ref 21–32)
CREAT SERPL-MCNC: 0.99 MG/DL (ref 0.6–1.3)
EOSINOPHIL # BLD AUTO: 0.3 THOUSAND/ÂΜL (ref 0–0.61)
EOSINOPHIL NFR BLD AUTO: 3 % (ref 0–6)
ERYTHROCYTE [DISTWIDTH] IN BLOOD BY AUTOMATED COUNT: 14.6 % (ref 11.6–15.1)
GFR SERPL CREATININE-BSD FRML MDRD: 89 ML/MIN/1.73SQ M
GLUCOSE P FAST SERPL-MCNC: 150 MG/DL (ref 65–99)
HCT VFR BLD AUTO: 42.7 % (ref 36.5–49.3)
HGB BLD-MCNC: 15.1 G/DL (ref 12–17)
IMM GRANULOCYTES # BLD AUTO: 0.04 THOUSAND/UL (ref 0–0.2)
IMM GRANULOCYTES NFR BLD AUTO: 0 % (ref 0–2)
LYMPHOCYTES # BLD AUTO: 2.7 THOUSANDS/ÂΜL (ref 0.6–4.47)
LYMPHOCYTES NFR BLD AUTO: 26 % (ref 14–44)
MAGNESIUM SERPL-MCNC: 2.1 MG/DL (ref 1.9–2.7)
MCH RBC QN AUTO: 30 PG (ref 26.8–34.3)
MCHC RBC AUTO-ENTMCNC: 35.4 G/DL (ref 31.4–37.4)
MCV RBC AUTO: 85 FL (ref 82–98)
MONOCYTES # BLD AUTO: 0.55 THOUSAND/ÂΜL (ref 0.17–1.22)
MONOCYTES NFR BLD AUTO: 5 % (ref 4–12)
NEUTROPHILS # BLD AUTO: 6.9 THOUSANDS/ÂΜL (ref 1.85–7.62)
NEUTS SEG NFR BLD AUTO: 65 % (ref 43–75)
NRBC BLD AUTO-RTO: 0 /100 WBCS
PLATELET # BLD AUTO: 275 THOUSANDS/UL (ref 149–390)
PMV BLD AUTO: 9.4 FL (ref 8.9–12.7)
POTASSIUM SERPL-SCNC: 3.8 MMOL/L (ref 3.5–5.3)
PROT SERPL-MCNC: 7.7 G/DL (ref 6.4–8.4)
RBC # BLD AUTO: 5.04 MILLION/UL (ref 3.88–5.62)
SODIUM SERPL-SCNC: 141 MMOL/L (ref 135–147)
WBC # BLD AUTO: 10.54 THOUSAND/UL (ref 4.31–10.16)

## 2024-01-10 PROCEDURE — 85025 COMPLETE CBC W/AUTO DIFF WBC: CPT

## 2024-01-10 PROCEDURE — 86316 IMMUNOASSAY TUMOR OTHER: CPT | Performed by: INTERNAL MEDICINE

## 2024-01-10 PROCEDURE — 80053 COMPREHEN METABOLIC PANEL: CPT

## 2024-01-10 PROCEDURE — 83735 ASSAY OF MAGNESIUM: CPT | Performed by: INTERNAL MEDICINE

## 2024-01-10 PROCEDURE — 36415 COLL VENOUS BLD VENIPUNCTURE: CPT

## 2024-01-12 LAB — CGA SERPL-MCNC: 85.6 NG/ML (ref 0–101.8)

## 2024-01-19 NOTE — PROGRESS NOTES
"Progress Note - Surgical Oncology  Osei Trimble 48 y.o. 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 Grade 1 disease with Ki67 1.7%, biopsy proven liver involvement.     He is receiving lanreotide again on a regular basis under Dr. Green's care after a period of lapse in his therapy. He is overall doing well and tolerating the therapy. Prior imaging reviewed in detail by me from 9/2023 CT and 10/2023 PET. Overall disease is stable. He will continue his therapy, follow up with palliative care, and medical oncology. Next PET scan in 4/2024 with med onc visit to follow. I will see him back in 1 year for a clinical visit, sooner on an as needed basis.    Subjective       Chief Complaint   Patient presents with    Follow-up      Overall well. Pain under control. No recent obstructive symptoms, moving his bowels normally. Taking lanreotide at [facility] center with Dr. Green. Had a prior lapse in treatment and now is back on and compliant. Following with palliative care and medical oncology. Prior imaging reviewed by me.       Review of Systems   Constitutional: Negative.    Cardiovascular:  Positive for palpitations.   Gastrointestinal:         Intermittent abdominal pain   All other systems reviewed and are negative.      The following portions of the patient's history were reviewed and updated as appropriate: allergies, current medications, past family history, past medical history, past social history, past surgical history, and problem list.    Objective      Blood pressure 150/98, pulse 99, temperature 97.6 °F (36.4 °C), temperature source Temporal, resp. rate 16, height 6' 1\" (1.854 m), weight 108 kg (237 lb), SpO2 " 97%.   Physical Exam  Vitals reviewed.   Constitutional:       General: He is not in acute distress.  HENT:      Head: Normocephalic.      Mouth/Throat:      Mouth: Mucous membranes are moist.   Eyes:      Pupils: Pupils are equal, round, and reactive to light.   Cardiovascular:      Rate and Rhythm: Normal rate.   Pulmonary:      Effort: Pulmonary effort is normal. No respiratory distress.   Abdominal:      General: There is no distension.      Palpations: Abdomen is soft.      Tenderness: There is no abdominal tenderness. There is no guarding or rebound.      Hernia: No hernia is present.      Comments: Incision well healed   Musculoskeletal:         General: Normal range of motion.      Cervical back: Normal range of motion.   Skin:     General: Skin is warm.      Capillary Refill: Capillary refill takes less than 2 seconds.   Neurological:      General: No focal deficit present.      Mental Status: He is alert.   Psychiatric:         Mood and Affect: Mood normal.         Signature:  Rose Mary Lara MD  Date: 1/19/2024 Time: 4:31 PM

## 2024-01-23 ENCOUNTER — HOSPITAL ENCOUNTER (OUTPATIENT)
Dept: INFUSION CENTER | Facility: HOSPITAL | Age: 49
Discharge: HOME | End: 2024-01-23
Attending: INTERNAL MEDICINE
Payer: COMMERCIAL

## 2024-01-23 DIAGNOSIS — D3A.8 NEUROENDOCRINE TUMOR: ICD-10-CM

## 2024-01-23 DIAGNOSIS — C7A.8 NEUROENDOCRINE CARCINOMA OF SMALL BOWEL (HCC): Primary | ICD-10-CM

## 2024-01-23 PROCEDURE — 96372 THER/PROPH/DIAG INJ SC/IM: CPT

## 2024-01-23 RX ORDER — LANREOTIDE ACETATE 120 MG/.5ML
120 INJECTION SUBCUTANEOUS ONCE
Status: COMPLETED | OUTPATIENT
Start: 2024-01-23 | End: 2024-01-23

## 2024-01-23 RX ORDER — LANREOTIDE ACETATE 120 MG/.5ML
120 INJECTION SUBCUTANEOUS ONCE
OUTPATIENT
Start: 2024-02-20

## 2024-01-23 RX ADMIN — LANREOTIDE ACETATE 120 MG: 120 INJECTION SUBCUTANEOUS at 13:06

## 2024-01-23 NOTE — PROGRESS NOTES
Osei Trimble  tolerated treatment well with no complications.  Lanreotide to L glute.    Osei Trimble is aware of future appt on 2/20 at 2 pm.     AVS declined by Osei Trimble.  Appt card provided to pt with next appt date & time reminder.

## 2024-02-20 ENCOUNTER — HOSPITAL ENCOUNTER (OUTPATIENT)
Dept: INFUSION CENTER | Facility: HOSPITAL | Age: 49
Discharge: HOME | End: 2024-02-20
Attending: INTERNAL MEDICINE
Payer: COMMERCIAL

## 2024-02-20 VITALS — TEMPERATURE: 96.9 F

## 2024-02-20 DIAGNOSIS — D3A.8 NEUROENDOCRINE TUMOR: ICD-10-CM

## 2024-02-20 DIAGNOSIS — C7A.8 NEUROENDOCRINE CARCINOMA OF SMALL BOWEL (HCC): Primary | ICD-10-CM

## 2024-02-20 PROCEDURE — 96372 THER/PROPH/DIAG INJ SC/IM: CPT

## 2024-02-20 RX ORDER — LANREOTIDE ACETATE 120 MG/.5ML
120 INJECTION SUBCUTANEOUS ONCE
Status: COMPLETED | OUTPATIENT
Start: 2024-02-20 | End: 2024-02-20

## 2024-02-20 RX ORDER — LANREOTIDE ACETATE 120 MG/.5ML
120 INJECTION SUBCUTANEOUS ONCE
OUTPATIENT
Start: 2024-03-19

## 2024-02-20 RX ADMIN — LANREOTIDE ACETATE 120 MG: 120 INJECTION SUBCUTANEOUS at 13:52

## 2024-02-20 NOTE — PROGRESS NOTES
Osei Trimble  tolerated Lanreotide treatment well with no complications.      Osei Trimble is aware of future appt on 3/19 at 2PM.     AVS printed and given to Osei Trimble.

## 2024-02-28 RX ORDER — LISINOPRIL 20 MG/1
TABLET ORAL
COMMUNITY
Start: 2024-02-11

## 2024-02-29 ENCOUNTER — PATIENT OUTREACH (OUTPATIENT)
Dept: CASE MANAGEMENT | Facility: HOSPITAL | Age: 49
End: 2024-02-29

## 2024-02-29 ENCOUNTER — OFFICE VISIT (OUTPATIENT)
Dept: PALLIATIVE MEDICINE | Facility: CLINIC | Age: 49
End: 2024-02-29
Payer: COMMERCIAL

## 2024-02-29 VITALS
TEMPERATURE: 98 F | DIASTOLIC BLOOD PRESSURE: 84 MMHG | OXYGEN SATURATION: 99 % | RESPIRATION RATE: 18 BRPM | HEIGHT: 73 IN | HEART RATE: 75 BPM | WEIGHT: 235.6 LBS | SYSTOLIC BLOOD PRESSURE: 132 MMHG | BODY MASS INDEX: 31.23 KG/M2

## 2024-02-29 DIAGNOSIS — C7B.8 METASTATIC MALIGNANT NEUROENDOCRINE TUMOR TO LIVER (HCC): Primary | ICD-10-CM

## 2024-02-29 DIAGNOSIS — F99 PSYCHIATRIC DISORDER: ICD-10-CM

## 2024-02-29 DIAGNOSIS — Z51.5 PALLIATIVE CARE BY SPECIALIST: ICD-10-CM

## 2024-02-29 DIAGNOSIS — Z71.89 COUNSELING AND COORDINATION OF CARE: ICD-10-CM

## 2024-02-29 DIAGNOSIS — C7A.8 NEUROENDOCRINE CARCINOMA OF SMALL BOWEL (HCC): ICD-10-CM

## 2024-02-29 PROCEDURE — 99213 OFFICE O/P EST LOW 20 MIN: CPT | Performed by: PHYSICIAN ASSISTANT

## 2024-02-29 NOTE — PROGRESS NOTES
LSW received a message from José Luis Soni requesting OSW contact pt's sister, Ariadne because pt has financial concerns. LSW called and left a voicemail for Ariadne with my reason for calling and my direct number to return my call.

## 2024-02-29 NOTE — Clinical Note
Hello, would you be able to reach out to his sister Ariadne regarding his financial concerns. He is not a good historian. States trouble with getting disability and says he is looking into getting a ? He is having trouble paying for his mother's home and fears he will lose it or have to sell to his brother. His sister manages appointments and finances for him.

## 2024-02-29 NOTE — PROGRESS NOTES
Follow-up with Palliative and Supportive Care  Osei Trimble 48 y.o. male [id number]    ASSESSMENT & PLAN:  1. 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 Neuroendocrine carcinoma of small bowel (HCC)    3. Palliative care by specialist    4. Psychiatric disorder    5. Counseling and coordination of care      Reports mid back pain that limits him occasionally. He was approved for MMJ and waiting to get card. He plans to use this to address pain.  No other symptom complaints  He has financial concerns, patient is a poor historian - will task oncology social worker to follow up with sister who helps manage this and his follow ups.   Continue to follow with [facility].   Return in about 3 months (around 5/29/2024) for Next scheduled follow up.  Emotional support provided.  Reviewed recent notes, labs, imaging.    Requested Prescriptions      No prescriptions requested or ordered in this encounter       There are no discontinued medications.    Osei Trimble was seen today for symptoms and planning cares related to above illnesses.  I have reviewed the patient's controlled substance dispensing history in the Prescription Drug Monitoring Program in compliance with the [facility] regulations before prescribing any controlled substances.    They are invited to continue to follow with us.  If there are questions or concerns, please contact us through our clinic/answering service 24 hours a day, seven days a week.    40 minutes were spent in this ambulatory visit with greater than 50% of the time spent face to face with patient in counseling or coordination of care including symptom assessment and management, medication review, psychosocial support, chart review, imaging review, lab review, supportive listening, and anticipatory guidance. All of the patient's questions were answered during this discussion.    Visit Information    Accompanied By: No one    Source of History: Self    History Limitations: None     Contacts: Extended Emergency Contact Information  Primary Emergency Contact: Ariadne Velazquez  Mobile Phone: [phone]  Relation: Sister     SUBJECTIVE:  Chief Complaint   Patient presents with    Follow-up    Pain    Counseling        HPI  Osei Trimble is a 48 y.o. male with history of neuroendocrine tumor of small bowel presents for palliative follow up for symptom management. Patient referred by Dr. Green (Oncology). Dr. Mayes (PCP).    Since last visit got MMJ certification. Still pending his card because he needs to get an updated photo license. Main symptom complaints is mood issues which is better controlled with lithium and Risperdal.  He has some pain in his back which he has had since getting an epidural form his surgery. This pain rarely limits his ADLs. He would like to explore using MMJ for this. He has ongoing financial concerns. He states he is talking to a  to help get him approved for disability. Oncology SW had reache dout to him back in the fall to address financial issues but he had not returned calls. He says his sister helps him with his finances and with keeping his medical appointments. He asked that the SW reach out to his sister. He has no other complaints or concerns today.       PDMP shows no concerns.  Filled  Written  ID  Drug  QTY  Days  Prescriber  RX #  Dispenser  Refill  Daily Dose*  Pymt Type      07/11/2023 07/11/2023 2 Oxycodone-Acetaminophen 5-325 12.00 3 Sinhala Bau [id number] Fir (8238) 0 30.00 MME [place]   07/25/2022 07/25/2022 2 Oxycodone Hcl (Ir) 5 Mg Tablet 16.00 4 Ja O\' [id number] Fir (8238) 0 30.00 MME Private Pay [place]     The following portions of the medical history were reviewed: past medical history, surgical history, problem list, medication list, family history, and social history.      Current Outpatient Medications:     ibuprofen (MOTRIN) 200 mg tablet, Take 200 mg by mouth every 6 (six) hours as needed for mild pain, Disp: , Rfl:     lisinopril (ZESTRIL) 10  "mg tablet, Take 20 mg by mouth daily in the early morning, Disp: , Rfl:     lisinopril (ZESTRIL) 20 mg tablet, , Disp: , Rfl:     lithium carbonate (LITHOBID) 300 mg CR tablet, , Disp: , Rfl:     prazosin (MINIPRESS) 5 mg capsule, , Disp: , Rfl:     risperiDONE (RisperDAL) 1 mg tablet, , Disp: , Rfl:     ondansetron (Zofran ODT) 4 mg disintegrating tablet, Take 1 tablet (4 mg total) by mouth every 6 (six) hours as needed for nausea or vomiting (Patient not taking: Reported on 11/1/2023), Disp: 20 tablet, Rfl: 0    PARoxetine (PAXIL) 20 mg tablet, Take 20 mg by mouth daily (Patient not taking: Reported on 7/3/2023), Disp: , Rfl:     Past medical, surgical, social, and family histories are reviewed and pertinent updates are made.    Review of Systems   Constitutional:  Negative for activity change, appetite change, fatigue and unexpected weight change.   HENT:  Negative for mouth sores, trouble swallowing and voice change.    Eyes: Negative.    Respiratory:  Negative for shortness of breath.    Cardiovascular:  Negative for chest pain.   Gastrointestinal:  Negative for abdominal pain, constipation, diarrhea, nausea and vomiting.   Genitourinary: Negative.    Musculoskeletal:  Positive for back pain. Negative for arthralgias, gait problem and myalgias.   Skin:  Negative for color change, pallor and wound.   Neurological:  Negative for dizziness, weakness, light-headedness and headaches.   Hematological: Negative.    Psychiatric/Behavioral:  Negative for confusion and sleep disturbance. The patient is not nervous/anxious.          OBJECTIVE:  /84   Pulse 75   Temp 98 °F (36.7 °C)   Resp 18   Ht 6' 1\" (1.854 m)   Wt 107 kg (235 lb 9.6 oz)   SpO2 99%   BMI 31.08 kg/m²   Physical Exam  Nursing note reviewed.   Constitutional:       General: He is not in acute distress.  HENT:      Head: Normocephalic and atraumatic.      Right Ear: External ear normal.      Left Ear: External ear normal.      Nose: Nose " "normal.      Mouth/Throat:      Pharynx: Oropharynx is clear.   Eyes:      Extraocular Movements: Extraocular movements intact.   Cardiovascular:      Rate and Rhythm: Normal rate.   Pulmonary:      Effort: Pulmonary effort is normal.   Abdominal:      General: Abdomen is flat.   Musculoskeletal:         General: No deformity.   Skin:     Findings: No rash.   Neurological:      Mental Status: He is alert and oriented to person, place, and time.   Psychiatric:         Mood and Affect: Mood normal.         Behavior: Behavior normal.          José Luis Soni PA-C  [facility]  [phone]    Portions of this document may have been created using dictation software and as such some \"sound alike\" terms may have been generated by the system. Do not hesitate to contact me with any questions or clarifications.       "

## 2024-03-19 ENCOUNTER — HOSPITAL ENCOUNTER (OUTPATIENT)
Dept: INFUSION CENTER | Facility: HOSPITAL | Age: 49
Discharge: HOME | End: 2024-03-19
Attending: INTERNAL MEDICINE
Payer: COMMERCIAL

## 2024-03-19 DIAGNOSIS — C7A.8 NEUROENDOCRINE CARCINOMA OF SMALL BOWEL (HCC): Primary | ICD-10-CM

## 2024-03-19 DIAGNOSIS — D3A.8 NEUROENDOCRINE TUMOR: ICD-10-CM

## 2024-03-19 PROCEDURE — 96372 THER/PROPH/DIAG INJ SC/IM: CPT

## 2024-03-19 RX ORDER — LANREOTIDE ACETATE 120 MG/.5ML
120 INJECTION SUBCUTANEOUS ONCE
OUTPATIENT
Start: 2024-04-16

## 2024-03-19 RX ORDER — LANREOTIDE ACETATE 120 MG/.5ML
120 INJECTION SUBCUTANEOUS ONCE
Status: COMPLETED | OUTPATIENT
Start: 2024-03-19 | End: 2024-03-19

## 2024-03-19 RX ADMIN — LANREOTIDE ACETATE 120 MG: 120 INJECTION SUBCUTANEOUS at 11:57

## 2024-03-19 NOTE — PROGRESS NOTES
Osei Trimble  tolerated lanreotide injection to rt buttock well with no complications.      Osei Trimble is aware of future appt on 4/16 at 1130.     AVS printed and given to Osei Trimble:  Yes

## 2024-03-22 ENCOUNTER — TELEPHONE (OUTPATIENT)
Dept: HEMATOLOGY ONCOLOGY | Facility: CLINIC | Age: 49
End: 2024-03-22

## 2024-03-22 NOTE — TELEPHONE ENCOUNTER
Patient Call    Who are you speaking with? Ariande     If it is not the patient, are they listed on an active communication consent form? Yes   What is the reason for this call? For the last 3 days or so, he wants to sleep all day and night. Sister would like a call back to discuss options   Does this require a call back? Yes   If a call back is required, please list best call back number [phone]   If a call back is required, advise that a message will be forwarded to their care team and someone will return their call as soon as possible.   Did you relay this information to the patient? Yes

## 2024-04-16 ENCOUNTER — HOSPITAL ENCOUNTER (OUTPATIENT)
Dept: INFUSION CENTER | Facility: HOSPITAL | Age: 49
Discharge: HOME | End: 2024-04-16
Attending: INTERNAL MEDICINE
Payer: COMMERCIAL

## 2024-04-16 DIAGNOSIS — D3A.8 NEUROENDOCRINE TUMOR: ICD-10-CM

## 2024-04-16 DIAGNOSIS — C7A.8 NEUROENDOCRINE CARCINOMA OF SMALL BOWEL (HCC): Primary | ICD-10-CM

## 2024-04-16 PROCEDURE — 96372 THER/PROPH/DIAG INJ SC/IM: CPT

## 2024-04-16 RX ORDER — LANREOTIDE ACETATE 120 MG/.5ML
120 INJECTION SUBCUTANEOUS ONCE
OUTPATIENT
Start: 2024-05-14

## 2024-04-16 RX ORDER — LANREOTIDE ACETATE 120 MG/.5ML
120 INJECTION SUBCUTANEOUS ONCE
Status: COMPLETED | OUTPATIENT
Start: 2024-04-16 | End: 2024-04-16

## 2024-04-16 RX ADMIN — LANREOTIDE ACETATE 120 MG: 120 INJECTION SUBCUTANEOUS at 11:39

## 2024-04-16 NOTE — PROGRESS NOTES
Osei Trimble tolerated lanreotide injection well with no complications. Pt offers no complaints.    Osei Trimble is aware of future appt on 5/14 at 12PM.    AVS printed and given to Osei Trimble.

## 2024-05-02 ENCOUNTER — OFFICE VISIT (OUTPATIENT)
Dept: HEMATOLOGY ONCOLOGY | Facility: CLINIC | Age: 49
End: 2024-05-02
Payer: COMMERCIAL

## 2024-05-02 VITALS
SYSTOLIC BLOOD PRESSURE: 126 MMHG | HEART RATE: 83 BPM | DIASTOLIC BLOOD PRESSURE: 78 MMHG | HEIGHT: 73 IN | BODY MASS INDEX: 30.62 KG/M2 | WEIGHT: 231 LBS | RESPIRATION RATE: 18 BRPM | TEMPERATURE: 98 F | OXYGEN SATURATION: 98 %

## 2024-05-02 DIAGNOSIS — C7A.8 NEUROENDOCRINE CARCINOMA OF SMALL BOWEL (HCC): Primary | ICD-10-CM

## 2024-05-02 DIAGNOSIS — C7B.8 METASTATIC MALIGNANT NEUROENDOCRINE TUMOR TO LIVER (HCC): ICD-10-CM

## 2024-05-02 PROCEDURE — 99214 OFFICE O/P EST MOD 30 MIN: CPT | Performed by: INTERNAL MEDICINE

## 2024-05-02 NOTE — PROGRESS NOTES
Hematology/Oncology Outpatient Follow-up  Osei Trimble 49 y.o. male 1975 [id number]    Date:  5/2/2024        Assessment and Plan:  1. Neuroendocrine carcinoma of small bowel (HCC)  Small bowel resection on 7/21/2022 at the area where he was thought to be high risk for obstruction.  The pathology revealed well-differentiated neuroendocrine tumor, G1, multifocal, through muscularis propria into the pericolorectal tissue.  Ki-67 was 1.7%.  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      The patient has been on lanreotide injections on every 4-week basis.  He was supposed to get gallium dotatate PET CT scan prior to this office visit to evaluate the status of his disease on the current treatment.  Scan is apparently scheduled for 10 May.  We will get in touch with the patient regarding the result of the PET scan and then act accordingly.    - CBC and differential; Future  - Comprehensive metabolic panel; Future  - Magnesium; Future  - Chromogranin A; Future    2. 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  - CBC and differential; Future  - Comprehensive metabolic panel; Future  - Magnesium; Future  - Chromogranin A; Future        HPI:  The patient came there for follow-up visit.  He continues to be on lanreotide on every 4-week basis.  He stated that this is helping with his symptoms.  He did complain today about abdominal pain especially after he eats with some diaphoresis.  On examination he had epigastric pain on palpation.  He was supposed to get dotatate PET CT scan prior to this office visit which is scheduled for 10 May.  Blood work from 1/10/2024 showed white cell count of 10.5 with normal hemoglobin hematocrit.  Platelet count  was normal as well.  Creatinine 0.9 with normal calcium and liver enzymes.  Chromogranin a level was normal.    Oncology History Overview Note   Patient with history of bipolar disorder, schizophrenic personality, hypertension, etc.  The patient apparently was admitted the hospital around October of 2021 for symptomatic left ureteral calculus.  Incidentally he was noted to have mesenteric masses with calcification with cefepime on the CTA from 10/02/2021.  Another CT scan of the abdomen pelvis with contrast was done on 02/01/2022 which showed no significant change in the partially calcified mesenteric masses with mesenteric tethering concerning for neuroendocrine tumor.  He had a DOTATATE PET-CT scan on 05/04/2022 which showed:  IMPRESSION:  1.  Dotatate avid clustered central mesenteric masses suspicious for underlying neuroendocrine neoplasm.  2.  There does appear to be subjacent focal radiotracer uptake in a proximal small bowel loop for which underlying small bowel lesion should be excluded.  Consider further evaluation with CT enterography or small bowel pill study.  3.  A few low-level foci of radiotracer uptake in the bilateral scapula, non-specific.  Activity is lower than expected for neuroendocrine metastasis given the intensity of the mesenteric foci, however early metastasis is not entirely excluded.  Consider   further evaluation with MRI of these regions vs reassessment on follow up Dotatate PET.  4.  Diffuse prostatic activity, non-specific, question inflammatory.  Correlate with PSA levels and urologic evaluation as warranted.  5.   Stable 8 mm nodule in the right mid lung.  No suspicious radiotracer uptake here.  Given the stability since 10/2/2021 this can be followed up with chest CT in 12 months.    Chromogranin A serum level on 10/06/2021 was 134.5.  On 06/24/2022 was 164.    The patient then underwent small bowel resection on 07/21/2022 at the area where he was thought to be high-risk for  obstruction.  The pathology revealed well-differentiated neuroendocrine tumor, G1 come multifocal, the tumor invaded through the muscularis propria into the taye colorectal tissue.  He also had liver lesion biopsy at segment 5 which also was compatible with metastatic well-differentiated neuroendocrine tumor consistent with intestinal primary.  Ki 67 was 1.7%.  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      Neuroendocrine carcinoma of small bowel (HCC)   8/5/2022 Initial Diagnosis    Neuroendocrine carcinoma of small bowel (HCC)         Interval history:    ROS: Review of Systems   Constitutional:  Negative for chills and fever.   HENT:  Negative for ear pain and sore throat.    Eyes:  Negative for pain and visual disturbance.   Respiratory:  Positive for shortness of breath. Negative for cough.    Cardiovascular:  Negative for chest pain and palpitations.   Gastrointestinal:  Negative for abdominal pain and vomiting.   Genitourinary:  Negative for dysuria and hematuria.   Musculoskeletal:  Negative for arthralgias and back pain.   Skin:  Negative for color change and rash.   Neurological:  Negative for seizures and syncope.   Psychiatric/Behavioral:  Positive for sleep disturbance.    All other systems reviewed and are negative.      Past Medical History:   Diagnosis Date    Allergic     Bipolar disorder in partial remission (HCC)     Erectile dysfunction     unspecified erectile dysfunction type    Hypertension     Kidney stone        Past Surgical History:   Procedure Laterality Date    COLONOSCOPY      EXPLORATORY LAPAROTOMY W/ BOWEL RESECTION N/A 7/21/2022    Procedure: LAPAROTOMY EXPLORATORY W/ SMALL BOWEL RESECTION, liver biopsy;  Surgeon: Rose Mary Lara MD;  Location: [facility];  Service: General    FL RETROGRADE PYELOGRAM  10/06/2021     HERNIA REPAIR      CO CYSTO BLADDER W/URETERAL CATHETERIZATION Right 10/06/2021    Procedure: CYSTOSCOPY RETROGRADE PYELOGRAM WITH INSERTION STENT URETERAL, RIGHT URETEROSCOPY, STONE EXTRACTION;  Surgeon: Bradly Rivera MD;  Location:  MAIN OR;  Service: Urology       Social History     Socioeconomic History    Marital status: Single     Spouse name: None    Number of children: None    Years of education: None    Highest education level: None   Occupational History    None   Tobacco Use    Smoking status: Former     Current packs/day: 0.00     Types: Pipe, Cigarettes     Quit date: 2003     Years since quittin.3    Smokeless tobacco: Never   Vaping Use    Vaping status: Some Days    Substances: THC   Substance and Sexual Activity    Alcohol use: Not Currently     Comment: Drinks a quart of moonshine/night-As of 21 will quit drinking    Drug use: Yes     Types: Marijuana     Comment: Socially (1-2 Monthly)    Sexual activity: None   Other Topics Concern    None   Social History Narrative    None     Social Determinants of Health     Financial Resource Strain: Not on file   Food Insecurity: No Food Insecurity (3/22/2023)    Hunger Vital Sign     Worried About Running Out of Food in the Last Year: Never true     Ran Out of Food in the Last Year: Never true   Transportation Needs: No Transportation Needs (3/22/2023)    PRAPARE - Transportation     Lack of Transportation (Medical): No     Lack of Transportation (Non-Medical): No   Physical Activity: Not on file   Stress: Not on file   Social Connections: Not on file   Intimate Partner Violence: Not on file   Housing Stability: Low Risk  (3/22/2023)    Housing Stability Vital Sign     Unable to Pay for Housing in the Last Year: No     Number of Places Lived in the Last Year: 1     Unstable Housing in the Last Year: No       Family History   Problem Relation Age of Onset    Diabetes Mother     Thyroid disease Mother     Cancer Father     Thyroid disease  "Sister     Depression Brother     Cancer Maternal Aunt     Cancer Paternal Uncle     Cancer Paternal Grandmother     No Known Problems Brother     Thyroid disease Sister        Allergies   Allergen Reactions    Tomato - Food Allergy Anaphylaxis     raw    Poison Ivy Extract Hives    Poison Sumac Extract Hives         Current Outpatient Medications:     ibuprofen (MOTRIN) 200 mg tablet, Take 200 mg by mouth every 6 (six) hours as needed for mild pain, Disp: , Rfl:     lisinopril (ZESTRIL) 10 mg tablet, Take 20 mg by mouth daily in the early morning, Disp: , Rfl:     lisinopril (ZESTRIL) 20 mg tablet, , Disp: , Rfl:     lithium carbonate (LITHOBID) 300 mg CR tablet, , Disp: , Rfl:     prazosin (MINIPRESS) 5 mg capsule, , Disp: , Rfl:     risperiDONE (RisperDAL) 1 mg tablet, , Disp: , Rfl:     ondansetron (Zofran ODT) 4 mg disintegrating tablet, Take 1 tablet (4 mg total) by mouth every 6 (six) hours as needed for nausea or vomiting (Patient not taking: Reported on 11/1/2023), Disp: 20 tablet, Rfl: 0    PARoxetine (PAXIL) 20 mg tablet, Take 20 mg by mouth daily (Patient not taking: Reported on 7/3/2023), Disp: , Rfl:       Physical Exam:  /78 (BP Location: Right arm, Patient Position: Sitting, Cuff Size: Adult)   Pulse 83   Temp 98 °F (36.7 °C)   Resp 18   Ht 6' 1\" (1.854 m)   Wt 105 kg (231 lb)   SpO2 98%   BMI 30.48 kg/m²     Physical Exam  Constitutional:       Appearance: He is well-developed.   HENT:      Head: Normocephalic and atraumatic.      Nose: Nose normal.   Eyes:      General: No scleral icterus.        Right eye: No discharge.         Left eye: No discharge.      Conjunctiva/sclera: Conjunctivae normal.      Pupils: Pupils are equal, round, and reactive to light.   Neck:      Thyroid: No thyromegaly.      Trachea: No tracheal deviation.   Cardiovascular:      Rate and Rhythm: Normal rate and regular rhythm.      Heart sounds: Normal heart sounds. No murmur heard.     No friction rub. " "  Pulmonary:      Effort: Pulmonary effort is normal. No respiratory distress.      Breath sounds: Normal breath sounds. No wheezing or rales.   Chest:      Chest wall: No tenderness.   Abdominal:      General: There is no distension.      Palpations: Abdomen is soft. There is no hepatomegaly, splenomegaly or mass.      Tenderness: There is abdominal tenderness (Epigastric pain on palpation). There is no guarding or rebound.   Musculoskeletal:         General: No tenderness or deformity. Normal range of motion.      Cervical back: Normal range of motion and neck supple.   Lymphadenopathy:      Cervical: No cervical adenopathy.   Skin:     General: Skin is warm and dry.      Coloration: Skin is not pale.      Findings: No erythema or rash.   Neurological:      Mental Status: He is alert and oriented to person, place, and time.      Cranial Nerves: No cranial nerve deficit.      Coordination: Coordination normal.      Deep Tendon Reflexes: Reflexes are normal and symmetric. Reflexes normal.   Psychiatric:         Behavior: Behavior normal.         Thought Content: Thought content normal.         Judgment: Judgment normal.           Labs:  Lab Results   Component Value Date    WBC 10.54 (H) 01/10/2024    HGB 15.1 01/10/2024    HCT 42.7 01/10/2024    MCV 85 01/10/2024     01/10/2024     Lab Results   Component Value Date    K 3.8 01/10/2024     01/10/2024    CO2 29 01/10/2024    BUN 6 01/10/2024    CREATININE 0.99 01/10/2024    GLUF 150 (H) 01/10/2024    CALCIUM 9.8 01/10/2024    CORRECTEDCA 9.7 10/21/2022    AST 16 01/10/2024    ALT 15 01/10/2024    ALKPHOS 97 01/10/2024    EGFR 89 01/10/2024     No results found for: \"TSH\"    Patient voiced understanding and agreement in the above discussion. Aware to contact our office with questions/symptoms in the interim.   "

## 2024-05-08 ENCOUNTER — TELEPHONE (OUTPATIENT)
Dept: HEMATOLOGY ONCOLOGY | Facility: CLINIC | Age: 49
End: 2024-05-08

## 2024-05-08 NOTE — TELEPHONE ENCOUNTER
Disability paperwork completed and faxed to number on form. Unable to get in touch with patient or sister due to their phones both not having a voicemail option.

## 2024-05-10 ENCOUNTER — HOSPITAL ENCOUNTER (OUTPATIENT)
Dept: RADIOLOGY | Age: 49
Discharge: HOME | End: 2024-05-10
Payer: COMMERCIAL

## 2024-05-10 ENCOUNTER — TELEPHONE (OUTPATIENT)
Dept: PALLIATIVE MEDICINE | Facility: CLINIC | Age: 49
End: 2024-05-10

## 2024-05-10 DIAGNOSIS — C7B.8 METASTATIC MALIGNANT NEUROENDOCRINE TUMOR TO LIVER (HCC): ICD-10-CM

## 2024-05-10 DIAGNOSIS — C7A.8 NEUROENDOCRINE CARCINOMA OF SMALL BOWEL (HCC): ICD-10-CM

## 2024-05-10 PROCEDURE — G1004 CDSM NDSC: HCPCS

## 2024-05-10 PROCEDURE — 78815 PET IMAGE W/CT SKULL-THIGH: CPT

## 2024-05-10 NOTE — TELEPHONE ENCOUNTER
Attempted to call pt due to pt no showing 3 month follow up with José Luis Maddox. Unable to leave  due to it not being set up.

## 2024-05-14 ENCOUNTER — HOSPITAL ENCOUNTER (OUTPATIENT)
Dept: INFUSION CENTER | Facility: HOSPITAL | Age: 49
Discharge: HOME | End: 2024-05-14
Attending: INTERNAL MEDICINE
Payer: COMMERCIAL

## 2024-05-14 VITALS — TEMPERATURE: 97.3 F

## 2024-05-14 DIAGNOSIS — D3A.8 NEUROENDOCRINE TUMOR: ICD-10-CM

## 2024-05-14 DIAGNOSIS — C7A.8 NEUROENDOCRINE CARCINOMA OF SMALL BOWEL (HCC): Primary | ICD-10-CM

## 2024-05-14 PROCEDURE — 96372 THER/PROPH/DIAG INJ SC/IM: CPT

## 2024-05-14 RX ORDER — LANREOTIDE ACETATE 120 MG/.5ML
120 INJECTION SUBCUTANEOUS ONCE
OUTPATIENT
Start: 2024-06-11

## 2024-05-14 RX ORDER — LANREOTIDE ACETATE 120 MG/.5ML
120 INJECTION SUBCUTANEOUS ONCE
Status: COMPLETED | OUTPATIENT
Start: 2024-05-14 | End: 2024-05-14

## 2024-05-14 RX ADMIN — LANREOTIDE ACETATE 120 MG: 120 INJECTION SUBCUTANEOUS at 11:03

## 2024-05-14 NOTE — PROGRESS NOTES
Osei Trimble  tolerated Lanreotide treatment well with no complications.      Osei Trimble is aware of future appt on 6/11 at 2PM.     AVS printed and given to Osei Trimble.

## 2024-05-21 ENCOUNTER — TELEPHONE (OUTPATIENT)
Dept: HEMATOLOGY ONCOLOGY | Facility: CLINIC | Age: 49
End: 2024-05-21

## 2024-05-21 NOTE — TELEPHONE ENCOUNTER
Patient Call    Who are you speaking with? Isai     If it is not the patient, are they listed on an active communication consent form? Yes   What is the reason for this call? Pts sister is calling in regards to some shaking pt has been experiencing in his arm. Pt states that it started on Sunday and yesterday the shaking got really bad. Pt was unable to hold anything without spilling it on himself. Pts sister wanted to let Dr Green know that this had happened and if there is anything he can do for it. Pt sister reports the shaking is pretty constant. Pts sister would like a call back regarding this please.    Does this require a call back? Yes   If a call back is required, please list best call back number [phone]   If a call back is required, advise that a message will be forwarded to their care team and someone will return their call as soon as possible.   Did you relay this information to the patient? Yes

## 2024-05-22 NOTE — TELEPHONE ENCOUNTER
Returned call to Ariadne.  Reviewed current treatment plan does not have typical side effects of tremors/shaking.  Encouraged Ariadne to have patient evaluated by PCP.  Ariadne aware and agreeable, stating she will call PCP for appt.  No additional questions at this time

## 2024-06-01 ENCOUNTER — HOSPITAL ENCOUNTER (EMERGENCY)
Facility: HOSPITAL | Age: 49
Discharge: HOME | End: 2024-06-01
Attending: EMERGENCY MEDICINE
Payer: COMMERCIAL

## 2024-06-01 ENCOUNTER — APPOINTMENT (EMERGENCY)
Dept: CT IMAGING | Facility: HOSPITAL | Age: 49
End: 2024-06-01
Payer: COMMERCIAL

## 2024-06-01 ENCOUNTER — APPOINTMENT (EMERGENCY)
Dept: RADIOLOGY | Facility: HOSPITAL | Age: 49
End: 2024-06-01
Payer: COMMERCIAL

## 2024-06-01 VITALS
DIASTOLIC BLOOD PRESSURE: 87 MMHG | RESPIRATION RATE: 21 BRPM | SYSTOLIC BLOOD PRESSURE: 165 MMHG | TEMPERATURE: 97.9 F | BODY MASS INDEX: 30.74 KG/M2 | OXYGEN SATURATION: 98 % | HEART RATE: 80 BPM | WEIGHT: 233 LBS

## 2024-06-01 DIAGNOSIS — R42 DIZZINESS: ICD-10-CM

## 2024-06-01 DIAGNOSIS — R53.83 FATIGUE: Primary | ICD-10-CM

## 2024-06-01 LAB
ALBUMIN SERPL BCP-MCNC: 4.5 G/DL (ref 3.5–5)
ALP SERPL-CCNC: 133 U/L (ref 34–104)
ALT SERPL W P-5'-P-CCNC: 25 U/L (ref 7–52)
ANION GAP SERPL CALCULATED.3IONS-SCNC: 9 MMOL/L (ref 4–13)
AST SERPL W P-5'-P-CCNC: 20 U/L (ref 13–39)
BASOPHILS # BLD AUTO: 0.03 THOUSANDS/ÂΜL (ref 0–0.1)
BASOPHILS NFR BLD AUTO: 0 % (ref 0–1)
BILIRUB SERPL-MCNC: 1.48 MG/DL (ref 0.2–1)
BILIRUB UR QL STRIP: NEGATIVE
BUN SERPL-MCNC: 16 MG/DL (ref 5–25)
CALCIUM SERPL-MCNC: 9.6 MG/DL (ref 8.4–10.2)
CARDIAC TROPONIN I PNL SERPL HS: 3 NG/L
CHLORIDE SERPL-SCNC: 104 MMOL/L (ref 96–108)
CLARITY UR: CLEAR
CO2 SERPL-SCNC: 23 MMOL/L (ref 21–32)
COLOR UR: YELLOW
CREAT SERPL-MCNC: 0.98 MG/DL (ref 0.6–1.3)
EOSINOPHIL # BLD AUTO: 0.21 THOUSAND/ÂΜL (ref 0–0.61)
EOSINOPHIL NFR BLD AUTO: 3 % (ref 0–6)
ERYTHROCYTE [DISTWIDTH] IN BLOOD BY AUTOMATED COUNT: 13.9 % (ref 11.6–15.1)
GFR SERPL CREATININE-BSD FRML MDRD: 89 ML/MIN/1.73SQ M
GLUCOSE SERPL-MCNC: 192 MG/DL (ref 65–140)
GLUCOSE UR STRIP-MCNC: ABNORMAL MG/DL
HCT VFR BLD AUTO: 41.9 % (ref 36.5–49.3)
HGB BLD-MCNC: 14.9 G/DL (ref 12–17)
HGB UR QL STRIP.AUTO: NEGATIVE
IMM GRANULOCYTES # BLD AUTO: 0.03 THOUSAND/UL (ref 0–0.2)
IMM GRANULOCYTES NFR BLD AUTO: 0 % (ref 0–2)
KETONES UR STRIP-MCNC: NEGATIVE MG/DL
LEUKOCYTE ESTERASE UR QL STRIP: NEGATIVE
LIPASE SERPL-CCNC: 9 U/L (ref 11–82)
LYMPHOCYTES # BLD AUTO: 1.69 THOUSANDS/ÂΜL (ref 0.6–4.47)
LYMPHOCYTES NFR BLD AUTO: 20 % (ref 14–44)
MCH RBC QN AUTO: 29.6 PG (ref 26.8–34.3)
MCHC RBC AUTO-ENTMCNC: 35.6 G/DL (ref 31.4–37.4)
MCV RBC AUTO: 83 FL (ref 82–98)
MONOCYTES # BLD AUTO: 0.49 THOUSAND/ÂΜL (ref 0.17–1.22)
MONOCYTES NFR BLD AUTO: 6 % (ref 4–12)
NEUTROPHILS # BLD AUTO: 6.07 THOUSANDS/ÂΜL (ref 1.85–7.62)
NEUTS SEG NFR BLD AUTO: 71 % (ref 43–75)
NITRITE UR QL STRIP: NEGATIVE
NRBC BLD AUTO-RTO: 0 /100 WBCS
PH UR STRIP.AUTO: 6 [PH]
PLATELET # BLD AUTO: 209 THOUSANDS/UL (ref 149–390)
PMV BLD AUTO: 9.6 FL (ref 8.9–12.7)
POTASSIUM SERPL-SCNC: 3.3 MMOL/L (ref 3.5–5.3)
PROT SERPL-MCNC: 7.8 G/DL (ref 6.4–8.4)
PROT UR STRIP-MCNC: NEGATIVE MG/DL
RBC # BLD AUTO: 5.04 MILLION/UL (ref 3.88–5.62)
SODIUM SERPL-SCNC: 136 MMOL/L (ref 135–147)
SP GR UR STRIP.AUTO: 1.01
UROBILINOGEN UR QL STRIP.AUTO: 1 E.U./DL
WBC # BLD AUTO: 8.52 THOUSAND/UL (ref 4.31–10.16)

## 2024-06-01 PROCEDURE — 96361 HYDRATE IV INFUSION ADD-ON: CPT

## 2024-06-01 PROCEDURE — 74177 CT ABD & PELVIS W/CONTRAST: CPT

## 2024-06-01 PROCEDURE — 83690 ASSAY OF LIPASE: CPT | Performed by: EMERGENCY MEDICINE

## 2024-06-01 PROCEDURE — 80053 COMPREHEN METABOLIC PANEL: CPT | Performed by: EMERGENCY MEDICINE

## 2024-06-01 PROCEDURE — 81003 URINALYSIS AUTO W/O SCOPE: CPT | Performed by: EMERGENCY MEDICINE

## 2024-06-01 PROCEDURE — 70496 CT ANGIOGRAPHY HEAD: CPT

## 2024-06-01 PROCEDURE — 84484 ASSAY OF TROPONIN QUANT: CPT | Performed by: EMERGENCY MEDICINE

## 2024-06-01 PROCEDURE — 85025 COMPLETE CBC W/AUTO DIFF WBC: CPT | Performed by: EMERGENCY MEDICINE

## 2024-06-01 PROCEDURE — 36415 COLL VENOUS BLD VENIPUNCTURE: CPT | Performed by: EMERGENCY MEDICINE

## 2024-06-01 PROCEDURE — 93005 ELECTROCARDIOGRAM TRACING: CPT

## 2024-06-01 PROCEDURE — 96360 HYDRATION IV INFUSION INIT: CPT

## 2024-06-01 PROCEDURE — 99285 EMERGENCY DEPT VISIT HI MDM: CPT | Performed by: EMERGENCY MEDICINE

## 2024-06-01 PROCEDURE — 71045 X-RAY EXAM CHEST 1 VIEW: CPT

## 2024-06-01 PROCEDURE — 70498 CT ANGIOGRAPHY NECK: CPT

## 2024-06-01 PROCEDURE — 99285 EMERGENCY DEPT VISIT HI MDM: CPT

## 2024-06-01 RX ORDER — POTASSIUM CHLORIDE 20 MEQ/1
20 TABLET, EXTENDED RELEASE ORAL ONCE
Status: COMPLETED | OUTPATIENT
Start: 2024-06-01 | End: 2024-06-01

## 2024-06-01 RX ORDER — MECLIZINE HCL 12.5 MG/1
25 TABLET ORAL ONCE
Status: COMPLETED | OUTPATIENT
Start: 2024-06-01 | End: 2024-06-01

## 2024-06-01 RX ADMIN — SODIUM CHLORIDE 1000 ML: 0.9 INJECTION, SOLUTION INTRAVENOUS at 19:22

## 2024-06-01 RX ADMIN — MECLIZINE 25 MG: 12.5 TABLET ORAL at 20:54

## 2024-06-01 RX ADMIN — IOHEXOL 100 ML: 350 INJECTION, SOLUTION INTRAVENOUS at 19:56

## 2024-06-01 RX ADMIN — POTASSIUM CHLORIDE 20 MEQ: 1500 TABLET, EXTENDED RELEASE ORAL at 20:05

## 2024-06-02 LAB
ATRIAL RATE: 81 BPM
P AXIS: 47 DEGREES
PR INTERVAL: 174 MS
QRS AXIS: -44 DEGREES
QRSD INTERVAL: 88 MS
QT INTERVAL: 372 MS
QTC INTERVAL: 432 MS
T WAVE AXIS: 61 DEGREES
VENTRICULAR RATE: 81 BPM

## 2024-06-02 PROCEDURE — 93010 ELECTROCARDIOGRAM REPORT: CPT | Performed by: INTERNAL MEDICINE

## 2024-06-06 NOTE — ED PROVIDER NOTES
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  He says that he is having some room spinning dizziness, some intermittent blurred vision, generalized fatigue and weakness.  These symptoms have been recurrent and happening every couple weeks but this episode is worse than normal.  He also does have a mild dull/achy headache and epigastric abdominal discomfort.        Prior to Admission Medications   Prescriptions Last Dose Informant Patient Reported? Taking?   PARoxetine (PAXIL) 20 mg tablet  Self Yes No   Sig: Take 20 mg by mouth daily   Patient not taking: Reported on 7/3/2023   ibuprofen (MOTRIN) 200 mg tablet  Self Yes No   Sig: Take 200 mg by mouth every 6 (six) hours as needed for mild pain   lisinopril (ZESTRIL) 10 mg tablet  Self Yes No   Sig: Take 20 mg by mouth daily in the early morning   lisinopril (ZESTRIL) 20 mg tablet  Self Yes No   lithium carbonate (LITHOBID) 300 mg CR tablet  Self Yes No   ondansetron (Zofran ODT) 4 mg disintegrating tablet  Self No No   Sig: Take 1 tablet (4 mg total) by mouth every 6 (six) hours as needed for nausea or vomiting   Patient not taking: Reported on 11/1/2023   prazosin (MINIPRESS) 5 mg capsule  Self Yes No   risperiDONE (RisperDAL) 1 mg tablet  Self Yes No      Facility-Administered Medications: None       Past Medical History:   Diagnosis Date    Allergic     Bipolar disorder in partial remission (HCC)     Erectile dysfunction     unspecified erectile dysfunction type    Hypertension     Kidney stone        Past Surgical History:   Procedure Laterality Date    COLONOSCOPY      EXPLORATORY LAPAROTOMY W/ BOWEL RESECTION N/A 7/21/2022    Procedure: LAPAROTOMY EXPLORATORY W/ SMALL BOWEL RESECTION, liver biopsy;  Surgeon: Rose Mary Lara MD;  Location:  [facility];  Service: General    FL RETROGRADE PYELOGRAM  10/06/2021    HERNIA REPAIR      AZ CYSTO BLADDER W/URETERAL CATHETERIZATION Right 10/06/2021    Procedure: CYSTOSCOPY RETROGRADE PYELOGRAM WITH INSERTION STENT URETERAL, RIGHT URETEROSCOPY, STONE EXTRACTION;  Surgeon: Bradly Rivera MD;  Location:  MAIN OR;  Service: Urology       Family History   Problem Relation Age of Onset    Diabetes Mother     Thyroid disease Mother     Cancer Father     Thyroid disease Sister     Depression Brother     Cancer Maternal Aunt     Cancer Paternal Uncle     Cancer Paternal Grandmother     No Known Problems Brother     Thyroid disease Sister      I have reviewed and agree with the history as documented.    E-Cigarette/Vaping    E-Cigarette Use Current Some Day User      E-Cigarette/Vaping Substances    Nicotine No     THC Yes     CBD No     Flavoring No      Social History     Tobacco Use    Smoking status: Former     Current packs/day: 0.00     Types: Pipe, Cigarettes     Quit date: 2003     Years since quittin.4    Smokeless tobacco: Never   Vaping Use    Vaping status: Some Days    Substances: THC   Substance Use Topics    Alcohol use: Not Currently     Comment: Drinks a quart of moonshine/night-As of 21 will quit drinking    Drug use: Yes     Types: Marijuana     Comment: Socially (1-2 Monthly)       Review of Systems   Constitutional:  Negative for fever.   HENT:  Negative for sore throat.    Eyes:  Negative for photophobia.   Respiratory:  Negative for shortness of breath.    Cardiovascular:  Negative for chest pain.   Gastrointestinal:  Positive for abdominal pain.   Genitourinary:  Negative for dysuria.   Musculoskeletal:  Negative for back pain.   Skin:  Negative for rash.   Neurological:  Positive for dizziness and headaches. Negative for light-headedness.   Hematological:  Negative for adenopathy.   Psychiatric/Behavioral:  Negative for agitation.    All other systems reviewed and are  negative.      Physical Exam  Physical Exam  Vitals reviewed.   Constitutional:       General: He is not in acute distress.     Appearance: He is well-developed.   HENT:      Head: Normocephalic.   Eyes:      Pupils: Pupils are equal, round, and reactive to light.   Cardiovascular:      Rate and Rhythm: Normal rate and regular rhythm.      Heart sounds: Normal heart sounds. No murmur heard.     No friction rub. No gallop.   Pulmonary:      Effort: Pulmonary effort is normal.      Breath sounds: Normal breath sounds.   Abdominal:      General: Bowel sounds are normal. There is no distension.      Palpations: Abdomen is soft.      Tenderness: There is abdominal tenderness. There is no guarding.      Comments: Mild tenderness over the epigastrium   Musculoskeletal:         General: Normal range of motion.      Cervical back: Normal range of motion and neck supple.   Skin:     Capillary Refill: Capillary refill takes less than 2 seconds.   Neurological:      Mental Status: He is alert and oriented to person, place, and time.      Cranial Nerves: No cranial nerve deficit.      Sensory: No sensory deficit.      Motor: No abnormal muscle tone.      Comments: Alert oriented x3.  GCS 15. Cranial nerves 2-12 intact.  Strength 5/5 in all 4 extremities.  Sensation intact throughout.       Psychiatric:         Behavior: Behavior normal.         Thought Content: Thought content normal.         Judgment: Judgment normal.         Vital Signs  ED Triage Vitals   Temperature Pulse Respirations Blood Pressure SpO2   06/01/24 2018 06/01/24 1855 06/01/24 1855 06/01/24 1855 06/01/24 1855   97.9 °F (36.6 °C) 91 18 (!) 154/105 97 %      Temp Source Heart Rate Source Patient Position - Orthostatic VS BP Location FiO2 (%)   06/01/24 2018 -- -- -- --   Temporal          Pain Score       06/01/24 1855       4           Vitals:    06/01/24 2015 06/01/24 2030 06/01/24 2100 06/01/24 2130   BP: (!) 174/108 169/98 168/99 165/87   Pulse: 83 78 72 80          Visual Acuity  Visual Acuity      Flowsheet Row Most Recent Value   Visual acuity R eye is 20/70   Visual acuity Left eye is 20/30   Visual acuity in both eyes is 20/20   Wearing corrective eyewear/lenses? No   No corrective eyewear/lenses Yes            ED Medications  Medications   sodium chloride 0.9 % bolus 1,000 mL (0 mL Intravenous Stopped 6/1/24 2122)   potassium chloride (Klor-Con M20) CR tablet 20 mEq (20 mEq Oral Given 6/1/24 2005)   iohexol (OMNIPAQUE) 350 MG/ML injection (MULTI-DOSE) 100 mL (100 mL Intravenous Given 6/1/24 1956)   meclizine (ANTIVERT) tablet 25 mg (25 mg Oral Given 6/1/24 2054)       Diagnostic Studies  Results Reviewed       Procedure Component Value Units Date/Time    UA w Reflex to Microscopic w Reflex to Culture [[id number]]  (Abnormal) Collected: 06/01/24 2120    Lab Status: Final result Specimen: Urine, Clean Catch Updated: 06/01/24 2128     Color, UA Yellow     Clarity, UA Clear     Specific Gravity, UA 1.010     pH, UA 6.0     Leukocytes, UA Negative     Nitrite, UA Negative     Protein, UA Negative mg/dl      Glucose, UA 2+ mg/dl      Ketones, UA Negative mg/dl      Urobilinogen, UA 1.0 E.U./dl      Bilirubin, UA Negative     Occult Blood, UA Negative    HS Troponin 0hr (reflex protocol) [[id number]]  (Normal) Collected: 06/01/24 1919    Lab Status: Final result Specimen: Blood from Arm, Left Updated: 06/01/24 1945     hs TnI 0hr 3 ng/L     Lipase [[id number]]  (Abnormal) Collected: 06/01/24 1919    Lab Status: Final result Specimen: Blood from Arm, Left Updated: 06/01/24 1938     Lipase 9 u/L     CMP [[id number]]  (Abnormal) Collected: 06/01/24 1919    Lab Status: Final result Specimen: Blood from Arm, Left Updated: 06/01/24 1938     Sodium 136 mmol/L      Potassium 3.3 mmol/L      Chloride 104 mmol/L      CO2 23 mmol/L      ANION GAP 9 mmol/L      BUN 16 mg/dL      Creatinine 0.98 mg/dL      Glucose 192 mg/dL      Calcium 9.6 mg/dL      AST 20 U/L      ALT 25 U/L       Alkaline Phosphatase 133 U/L      Total Protein 7.8 g/dL      Albumin 4.5 g/dL      Total Bilirubin 1.48 mg/dL      eGFR 89 ml/min/1.73sq m     Narrative:      National Kidney Disease Foundation guidelines for Chronic Kidney Disease (CKD):     Stage 1 with normal or high GFR (GFR > 90 mL/min/1.73 square meters)    Stage 2 Mild CKD (GFR = 60-89 mL/min/1.73 square meters)    Stage 3A Moderate CKD (GFR = 45-59 mL/min/1.73 square meters)    Stage 3B Moderate CKD (GFR = 30-44 mL/min/1.73 square meters)    Stage 4 Severe CKD (GFR = 15-29 mL/min/1.73 square meters)    Stage 5 End Stage CKD (GFR <15 mL/min/1.73 square meters)  Note: GFR calculation is accurate only with a steady state creatinine    CBC and differential [[id number]] Collected: 06/01/24 1919    Lab Status: Final result Specimen: Blood from Arm, Left Updated: 06/01/24 1925     WBC 8.52 Thousand/uL      RBC 5.04 Million/uL      Hemoglobin 14.9 g/dL      Hematocrit 41.9 %      MCV 83 fL      MCH 29.6 pg      MCHC 35.6 g/dL      RDW 13.9 %      MPV 9.6 fL      Platelets 209 Thousands/uL      nRBC 0 /100 WBCs      Segmented % 71 %      Immature Grans % 0 %      Lymphocytes % 20 %      Monocytes % 6 %      Eosinophils Relative 3 %      Basophils Relative 0 %      Absolute Neutrophils 6.07 Thousands/µL      Absolute Immature Grans 0.03 Thousand/uL      Absolute Lymphocytes 1.69 Thousands/µL      Absolute Monocytes 0.49 Thousand/µL      Eosinophils Absolute 0.21 Thousand/µL      Basophils Absolute 0.03 Thousands/µL                    CT abdomen pelvis with contrast   Final Result by Parker Torres MD (06/01 2052)      No acute findings in the abdomen or pelvis.      Unchanged calcified mesenteric mass with adjacent lymphadenopathy in keeping with known neuroendocrine tumor.         Workstation performed: [id number]         CTA head and neck with and without contrast   Final Result by Juan Daniel Johnson MD (06/01 2057)      Please note noncontrast CT head exam  "was not performed due to technical error. This portion of the exam can be repeated as clinically warranted.      No evidence of hemodynamically significant stenosis or large vessel occlusive disease within the major vessels of the [place]. No aneurysm.      No significant stenosis of the cervical vertebral or carotid arteries.            Workstation performed: [id number]         XR chest 1 view portable   Final Result by Ariadne Jade MD (06/01 2123)      No acute cardiopulmonary disease.            Workstation performed: [id number]         MRI brain wo contrast    (Results Pending)              Procedures  Procedures         ED Course                               SBIRT 22yo+      Flowsheet Row Most Recent Value   Initial Alcohol Screen: US AUDIT-C     1. How often do you have a drink containing alcohol? 0 Filed at: 06/01/2024 1857   2. How many drinks containing alcohol do you have on a typical day you are drinking?  0 Filed at: 06/01/2024 1857   3a. Male UNDER 65: How often do you have five or more drinks on one occasion? 0 Filed at: 06/01/2024 1857   3b. FEMALE Any Age, or MALE 65+: How often do you have 4 or more drinks on one occassion? 0 Filed at: 06/01/2024 1857   Audit-C Score 0 Filed at: 06/01/2024 1857   JURGEN: How many times in the past year have you...    Used an illegal drug or used a prescription medication for non-medical reasons? Monthly Filed at: 06/01/2024 1857   DAST-10: In the past 12 months...    1. Have you used drugs other than those required for medical reasons? 0 Filed at: 06/01/2024 1857   2. Do you use more than one drug at a time? 0 Filed at: 06/01/2024 1857   3. Have you had medical problems as a result of your drug use (e.g., memory loss, hepatitis, convulsions, bleeding, etc.)? 0 Filed at: 06/01/2024 1857   4. Have you had \"blackouts\" or \"flashbacks\" as a result of drug use?YesNo 0 Filed at: 06/01/2024 1857   5. Do you ever feel bad or guilty about your drug use? 0 Filed " at: 06/01/2024 1857   6. Does your spouse (or parent) ever complain about your involvement with drugs? 0 Filed at: 06/01/2024 1857   7. Have you neglected your family because of your use of drugs? 0 Filed at: 06/01/2024 1857   8. Have you engaged in illegal activities in order to obtain drugs? 0 Filed at: 06/01/2024 1857   9. Have you ever experienced withdrawal symptoms (felt sick) when you stopped taking drugs? 0 Filed at: 06/01/2024 1857   10. Are you always able to stop using drugs when you want to? 0 Filed at: 06/01/2024 1857   DAST-10 Score 0 Filed at: 06/01/2024 1857                      Medical Decision Making  Patient is a 49-year-old male who presents for evaluation of multiple complaints.  Workup here is largely negative including blood work, CT scans.  Discussed with heme/unk Dr. Green who does not feel like this is necessarily related to chemotherapy.  Possible peripheral vertigo, patient otherwise was advised to follow-up outpatient.  I wrote her for MRI brain to help rule out intracranial metastasis.  Advised outpatient follow-up with heme-onc and strict return precautions.    Amount and/or Complexity of Data Reviewed  Labs: ordered.  Radiology: ordered.    Risk  Prescription drug management.             Disposition  Final diagnoses:   Fatigue   Dizziness     Time reflects when diagnosis was documented in both MDM as applicable and the Disposition within this note       Time User Action Codes Description Comment    6/1/2024  9:41 PM Jose Brewer Add [R53.83] Fatigue     6/1/2024  9:41 PM Jose Brewer Add [R42] Dizziness           ED Disposition       ED Disposition   Discharge    Condition   Stable    Date/Time   Sat Jun 1, 2024 2141    Comment   Osei Trimble discharge to home/self care.                   Follow-up Information       Follow up With Specialties Details Why Contact Info Additional Information    [facility] Emergency Department Emergency Medicine  If symptoms  [address] [place] [id number] [phone] [facility]ly [facility] Emergency Department, [address], [place], [place] [address]            Discharge Medication List as of 6/1/2024  9:41 PM        CONTINUE these medications which have NOT CHANGED    Details   ibuprofen (MOTRIN) 200 mg tablet Take 200 mg by mouth every 6 (six) hours as needed for mild pain, Historical Med      !! lisinopril (ZESTRIL) 10 mg tablet Take 20 mg by mouth daily in the early morning, Historical Med      !! lisinopril (ZESTRIL) 20 mg tablet Historical Med      lithium carbonate (LITHOBID) 300 mg CR tablet Historical Med      ondansetron (Zofran ODT) 4 mg disintegrating tablet Take 1 tablet (4 mg total) by mouth every 6 (six) hours as needed for nausea or vomiting, Starting Tue 7/11/2023, Normal      PARoxetine (PAXIL) 20 mg tablet Take 20 mg by mouth daily, Historical Med      prazosin (MINIPRESS) 5 mg capsule Historical Med      risperiDONE (RisperDAL) 1 mg tablet Historical Med       !! - Potential duplicate medications found. Please discuss with provider.          Outpatient Discharge Orders   MRI brain wo contrast   Standing Status: Future Standing Exp. Date: 06/01/28       PDMP Review         Value Time User    PDMP Reviewed  Yes 2/29/2024  1:16 PM José Luis Soni PA-C            ED Provider  Electronically Signed by             Jose Brewer MD  06/06/24 5698

## 2024-06-11 ENCOUNTER — OFFICE VISIT (OUTPATIENT)
Dept: PALLIATIVE MEDICINE | Facility: CLINIC | Age: 49
End: 2024-06-11
Payer: COMMERCIAL

## 2024-06-11 ENCOUNTER — HOSPITAL ENCOUNTER (OUTPATIENT)
Dept: INFUSION CENTER | Facility: HOSPITAL | Age: 49
Discharge: HOME | End: 2024-06-11
Attending: INTERNAL MEDICINE
Payer: COMMERCIAL

## 2024-06-11 VITALS
HEART RATE: 89 BPM | DIASTOLIC BLOOD PRESSURE: 90 MMHG | RESPIRATION RATE: 18 BRPM | BODY MASS INDEX: 31.01 KG/M2 | SYSTOLIC BLOOD PRESSURE: 142 MMHG | OXYGEN SATURATION: 98 % | WEIGHT: 234 LBS | HEIGHT: 73 IN

## 2024-06-11 DIAGNOSIS — D3A.8 NEUROENDOCRINE TUMOR: ICD-10-CM

## 2024-06-11 DIAGNOSIS — Z51.5 PALLIATIVE CARE BY SPECIALIST: ICD-10-CM

## 2024-06-11 DIAGNOSIS — C7B.8 METASTATIC MALIGNANT NEUROENDOCRINE TUMOR TO LIVER (HCC): ICD-10-CM

## 2024-06-11 DIAGNOSIS — C7A.8 NEUROENDOCRINE CARCINOMA OF SMALL BOWEL (HCC): Primary | ICD-10-CM

## 2024-06-11 DIAGNOSIS — Z71.89 COUNSELING AND COORDINATION OF CARE: ICD-10-CM

## 2024-06-11 PROCEDURE — 96372 THER/PROPH/DIAG INJ SC/IM: CPT

## 2024-06-11 PROCEDURE — 99213 OFFICE O/P EST LOW 20 MIN: CPT | Performed by: PHYSICIAN ASSISTANT

## 2024-06-11 RX ORDER — LANREOTIDE ACETATE 120 MG/.5ML
120 INJECTION SUBCUTANEOUS ONCE
Status: COMPLETED | OUTPATIENT
Start: 2024-06-11 | End: 2024-06-11

## 2024-06-11 RX ORDER — LANREOTIDE ACETATE 120 MG/.5ML
120 INJECTION SUBCUTANEOUS ONCE
OUTPATIENT
Start: 2024-07-09

## 2024-06-11 RX ADMIN — LANREOTIDE ACETATE 120 MG: 120 INJECTION SUBCUTANEOUS at 14:10

## 2024-06-11 NOTE — PROGRESS NOTES
"Ambulatory Visit  Name: Osei Trimble      : 1975      MRN: [id number]  Encounter Provider: José Luis Soni PA-C  Encounter Date: 2024   Encounter department: [facility] [place]    Assessment & Plan   1. Neuroendocrine carcinoma of small bowel (HCC)  2. Palliative care by specialist  3. 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 (HCC)  4. Counseling and coordination of care    No medication changes today  Patient doing well  Follow up in 3 months    History of Present Illness     Osei Trimble is a 49 y.o. male with history of neuroendocrine tumor of small bowel presents for palliative follow up for symptom management. Patient referred by Dr. Green (Oncology). Dr. Mayes (PCP).     Patient presents alone today and reports doing okay. States he is tolerating treatments. Denies any symptom complaints. He is sleeping well, has good appetite. Things are going well at home. Him and his sister are still working at [employer]/disability approved and working with a .       Review of Systems   Constitutional:  Negative for activity change, appetite change, fatigue and unexpected weight change.   HENT:  Negative for mouth sores, trouble swallowing and voice change.    Eyes: Negative.    Respiratory:  Negative for shortness of breath.    Cardiovascular:  Negative for chest pain.   Gastrointestinal:  Negative for abdominal pain, constipation, diarrhea, nausea and vomiting.   Genitourinary: Negative.    Musculoskeletal:  Negative for arthralgias, back pain, gait problem and myalgias.   Skin:  Negative for color change, pallor and wound.   Neurological:  Negative for dizziness, weakness, light-headedness and headaches.   Hematological: Negative.    Psychiatric/Behavioral:  Negative for confusion and sleep disturbance. The patient is not nervous/anxious.      Objective     /90   Pulse 89   Resp 18   Ht 6' 1\" (1.854 m)   Wt 106 kg (234 lb)   SpO2 98%   BMI 30.87 kg/m² "     Physical Exam  Nursing note reviewed.   Constitutional:       General: He is not in acute distress.  HENT:      Head: Normocephalic and atraumatic.      Right Ear: External ear normal.      Left Ear: External ear normal.      Nose: Nose normal.      Mouth/Throat:      Pharynx: Oropharynx is clear.   Eyes:      Extraocular Movements: Extraocular movements intact.   Cardiovascular:      Rate and Rhythm: Normal rate.   Pulmonary:      Effort: Pulmonary effort is normal.   Abdominal:      General: Abdomen is flat.   Musculoskeletal:         General: No deformity.   Neurological:      Mental Status: He is alert and oriented to person, place, and time.   Psychiatric:         Mood and Affect: Mood normal.         Behavior: Behavior normal.       Recent labs:  Lab Results   Component Value Date/Time    SODIUM 136 06/01/2024 07:19 PM    K 3.3 (L) 06/01/2024 07:19 PM    BUN 16 06/01/2024 07:19 PM    CREATININE 0.98 06/01/2024 07:19 PM    GLUC 192 (H) 06/01/2024 07:19 PM    CALCIUM 9.6 06/01/2024 07:19 PM    AST 20 06/01/2024 07:19 PM    ALT 25 06/01/2024 07:19 PM    ALB 4.5 06/01/2024 07:19 PM    TP 7.8 06/01/2024 07:19 PM    EGFR 89 06/01/2024 07:19 PM     Lab Results   Component Value Date/Time    HGB 14.9 06/01/2024 07:19 PM    WBC 8.52 06/01/2024 07:19 PM     06/01/2024 07:19 PM    INR 1.07 03/21/2023 06:30 PM    PTT 30 03/21/2023 06:30 PM       Recent Imaging:  Procedure: XR chest 1 view portable    Result Date: 6/1/2024  Narrative: XR CHEST PORTABLE INDICATION: abd pain. COMPARISON: CTA neck and abdomen CT 6/1/2024, CXR 3/21/2023., PET/CT 5/10/2024, FINDINGS: Clear lungs. No pneumothorax or pleural effusion. Normal cardiomediastinal silhouette. Bones are unremarkable for age. Normal upper abdomen.     Impression: No acute cardiopulmonary disease. Workstation performed: [id number]     Procedure: CTA head and neck with and without contrast    Result Date: 6/1/2024  Narrative: CTA NECK AND BRAIN WITH AND WITHOUT  CONTRAST INDICATION: dizziness and blurred vision COMPARISON:   None. TECHNIQUE:  Routine CT imaging of the Brain without contrast not performed due to technical error.  Post contrast imaging was performed after administration of iodinated contrast through the neck and brain. Post contrast axial 0.625 mm images timed to opacify the arterial system. 3D rendering was performed on an independent workstation.   MIP reconstructions performed. Coronal reconstructions were performed of the noncontrast portion of the brain. Radiation dose length product (DLP) for this visit:  615 mGy-cm .  This examination, like all CT scans performed in the [facility], was performed utilizing techniques to minimize radiation dose exposure, including the use of iterative reconstruction and automated exposure control. IV Contrast:  100 mL of iohexol (OMNIPAQUE) IMAGE QUALITY:   Diagnostic FINDINGS: CERVICAL VASCULATURE somewhat limited secondary artifact from adjacent contrast bolus. Findings below within limitation. AORTIC ARCH AND GREAT VESSELS:  Normal aortic arch and great vessel origins. Normal visualized subclavian vessels. RIGHT VERTEBRAL ARTERY CERVICAL SEGMENT:  Normal origin. The vessel is normal in caliber throughout the neck. LEFT VERTEBRAL ARTERY CERVICAL SEGMENT:  Normal origin. The vessel is normal in caliber throughout the neck. RIGHT EXTRACRANIAL CAROTID SEGMENT:  Normal caliber common carotid artery.  Normal bifurcation and cervical internal carotid artery.  No stenosis or dissection. LEFT EXTRACRANIAL CAROTID SEGMENT:  Normal caliber common carotid artery.  Normal bifurcation and cervical internal carotid artery.  No stenosis or dissection. NASCET criteria was used to determine the degree of internal carotid artery diameter stenosis. INTRACRANIAL VASCULATURE INTERNAL CAROTID ARTERIES:  Normal enhancement of the intracranial portions of the internal carotid arteries.  Normal ophthalmic artery origins.   Normal ICA terminus. ANTERIOR CIRCULATION:  Symmetric A1 segments and anterior cerebral arteries with normal enhancement.  Normal anterior communicating artery. MIDDLE CEREBRAL ARTERY CIRCULATION:  M1 segment and middle cerebral artery branches demonstrate normal enhancement bilaterally. DISTAL VERTEBRAL ARTERIES:  Normal distal vertebral arteries.  Posterior inferior cerebellar artery origins are normal. Normal vertebral basilar junction. BASILAR ARTERY:  Basilar artery is normal in caliber.  Normal superior cerebellar arteries. POSTERIOR CEREBRAL ARTERIES: Both posterior cerebral arteries arises from the basilar tip.  Both arteries demonstrate normal enhancement.   Hypoplastic posterior communicating arteries. VENOUS STRUCTURES: Patent. NON VASCULAR ANATOMY BONY STRUCTURES:  No acute osseous abnormality. SOFT TISSUES OF THE NECK:  Unremarkable. THORACIC INLET:  Normal.     Impression: Please note noncontrast CT head exam was not performed due to technical error. This portion of the exam can be repeated as clinically warranted. No evidence of hemodynamically significant stenosis or large vessel occlusive disease within the major vessels of the [place]. No aneurysm. No significant stenosis of the cervical vertebral or carotid arteries. Workstation performed: [id number]     Procedure: CT abdomen pelvis with contrast    Result Date: 6/1/2024  Narrative: CT ABDOMEN AND PELVIS WITH IV CONTRAST INDICATION: epigastric abd pain hx of cancer. COMPARISON: CT of the chest abdomen and pelvis 9/7/2023. PET/CT 5/10/2024. TECHNIQUE: CT examination of the abdomen and pelvis was performed. Multiplanar 2D reformatted images were created from the source data. This examination, like all CT scans performed in the [facility], was performed utilizing techniques to minimize radiation dose exposure, including the use of iterative reconstruction and automated exposure control. Radiation dose length product (DLP) for  this visit: 1095 mGy-cm IV Contrast: 100 mL of iohexol (OMNIPAQUE) Enteric Contrast: Not administered. FINDINGS: ABDOMEN LOWER CHEST: No acute abnormality in the visualized lower chest. LIVER/BILIARY TREE: Hypoattenuating lesion noted on 9/7/2023 CT, not FDG avid on prior PET CTs, suboptimally visualized due to streak artifacts and likely faintly seen on series 3, image 59.. GALLBLADDER: No calcified gallstones. No pericholecystic inflammatory change. SPLEEN: Unremarkable. PANCREAS: Unremarkable. ADRENAL GLANDS: Unremarkable. KIDNEYS/URETERS: Unremarkable. No hydronephrosis. STOMACH AND BOWEL: No disproportionate dilation of the small or large bowel. Left lower quadrant anastomosis. APPENDIX: No findings to suggest appendicitis. ABDOMINOPELVIC CAVITY: No ascites. No pneumoperitoneum. No lymphadenopathy. Unchanged partially calcified mesenteric mass measures 2.9 x 5.6 cm. Unchanged adjacent mesenteric stranding and lymphadenopathy with lymph nodes measuring up to 9 mm short axis. VESSELS: Unremarkable for patient's age. PELVIS REPRODUCTIVE ORGANS: Unremarkable for patient's age. URINARY BLADDER: Unremarkable. ABDOMINAL WALL/INGUINAL REGIONS: Soft tissue nodularity in the bilateral gluteal regions, unchanged, likely injection sites. Ventral herniorrhaphy. BONES: No acute fracture or suspicious osseous lesion. Unchanged sclerotic lesions in the right iliac wing and right hemisacrum, not previously FDG avid.     Impression: No acute findings in the abdomen or pelvis. Unchanged calcified mesenteric mass with adjacent lymphadenopathy in keeping with known neuroendocrine tumor. Workstation performed: [id number]     Procedure: XR wrist 3+ vw right    Result Date: 5/28/2024  Narrative: History: Bilateral carpal tunnel syndrome Study: XR WRIST 3+ VW BILATERAL Comparison: None    Impression: Findings/impression: Bilateral wrist joint spaces are normal with no erosions. No acute fracture or malalignment. Healed fracture  deformity of the right fifth metacarpal neck with apex posterior angulation noted. Workstation:[id number]    Procedure: NM PET CT skull base to mid thigh    Result Date: 5/10/2024  Narrative: NETSPOT PET/CT SCAN INDICATION:   Metastatic NET of the bowel. C7A.8: Other malignant neuroendocrine tumors C7B.8: Other secondary neuroendocrine tumors MODIFIER: PS COMPARISON: PET/CT 10/23/2023 CELL TYPE: Well-differentiated recurrent carcinoma TECHNIQUE:    5.2   mCi Gallium-68 Dotatate Netspot administered IV. Multiplanar attenuation corrected and non-attenuation corrected PET images were acquired 60 minutes post injection. Contiguous, low dose, axial CT sections were obtained from the vertex through the femurs for anatomic localization. Intravenous contrast was not utilized. FINDINGS: BRAIN:    Normal pituitary gland uptake is demonstrated.  No acute abnormalities are seen. HEAD/NECK:  There is a physiologic distribution of the radiotracer. Normal salivary gland and thyroid uptake is demonstrated. CT images:  Unremarkable. CHEST:   There is a physiologic distribution of the radiotracer. CT images: Stable 8 mm right middle lobe nodule centrally without focal uptake image 124 series 3. Mild coronary artery calcifications. ABDOMEN: Dotatate avid left central mesenteric mass again seen, SUV max of 24, previously 20. Current dimensions are 3.3 x 2.7 cm with central calcification image 210 series 3, previously 3.2 x 2.9 cm measured in a similar fashion, not a significant change. Superiorly, activity may extend to adjacent small bowel similar to prior radiograph Previously noted left para-aortic focus not seen on the current study. This may have been related to adjacent bowel activity rather than galo activity. Normal liver, spleen, kidney, bowel and adrenal gland activity is demonstrated. CT images: Postsurgical changes at the anterior abdominal wall. Again findings for small bowel resection in the left hemiabdomen. Hepatic  steatosis. 2 mm left midpole calculus. PELVIS: No significant activity remaining in the small bilateral inguinal lymph nodes. There is again diffuse prostate activity, SUV max of 12, previously 11. CT images: Scattered nodular foci of uptake in the subcutaneous tissues of the bilateral buttock regions without focal uptake. Prostate is moderately enlarged. OSSEOUS STRUCTURES:   Focal uptake at the base of the left acromion, SUV max of 4.1, previously 3.8. Right scapular focus centrally demonstrates SUV max of 3.0, previously 2.4. Focal uptake in the left upper arm subcutaneous tissues may be related to the site injection in the left arm. CT images: Stable focal sclerosis at the right iliac wing without increased uptake. Stable sclerotic lesion at the right mid sacrum without focal uptake.     Impression: 1. Overall, fairly stable exam. No new findings suspicious for Dotatate avid metastasis. 2. Persistent increased uptake in the left mesenteric mass similar to prior PET exam. This is stable in size. 3. Previously noted small left para-aortic retroperitoneal focus is not seen on the current study. 4. Stable foci of increased uptake in both scapula which may represent metastasis. 5. Stable non-specific diffuse prostate activity. Workstation performed: [id number]        Administrative Statements   I have spent a total time of 25 minutes on 06/11/24 In caring for this patient including Diagnostic results, Risks and benefits of tx options, Instructions for management, Patient and family education, Importance of tx compliance, Risk factor reductions, Counseling / Coordination of care, Documenting in the medical record, Obtaining or reviewing history  , and Communicating with other healthcare professionals .

## 2024-06-11 NOTE — PROGRESS NOTES
Osei Trimble  tolerated lanreotide injection well with no complications.      Osei Trimble is aware of future appt on 7/9/24 at 2pm     AVS printed and given to Osei Trimble

## 2024-06-21 ENCOUNTER — APPOINTMENT (EMERGENCY)
Dept: RADIOLOGY | Facility: HOSPITAL | Age: 49
DRG: 469 | End: 2024-06-21
Payer: COMMERCIAL

## 2024-06-21 ENCOUNTER — APPOINTMENT (EMERGENCY)
Dept: CT IMAGING | Facility: HOSPITAL | Age: 49
DRG: 469 | End: 2024-06-21
Payer: COMMERCIAL

## 2024-06-21 ENCOUNTER — APPOINTMENT (EMERGENCY)
Dept: NON INVASIVE DIAGNOSTICS | Facility: HOSPITAL | Age: 49
DRG: 469 | End: 2024-06-21
Payer: COMMERCIAL

## 2024-06-21 ENCOUNTER — HOSPITAL ENCOUNTER (INPATIENT)
Facility: HOSPITAL | Age: 49
LOS: 6 days | Discharge: HOME | DRG: 469 | End: 2024-06-27
Attending: INTERNAL MEDICINE | Admitting: STUDENT IN AN ORGANIZED HEALTH CARE EDUCATION/TRAINING PROGRAM
Payer: COMMERCIAL

## 2024-06-21 DIAGNOSIS — G93.40 ACUTE ENCEPHALOPATHY: ICD-10-CM

## 2024-06-21 DIAGNOSIS — I95.9 HYPOTENSION: ICD-10-CM

## 2024-06-21 DIAGNOSIS — N17.9 ACUTE KIDNEY INJURY (HCC): Primary | ICD-10-CM

## 2024-06-21 DIAGNOSIS — N17.9 AKI (ACUTE KIDNEY INJURY) (HCC): ICD-10-CM

## 2024-06-21 DIAGNOSIS — M54.50 LOW BACK PAIN, UNSPECIFIED BACK PAIN LATERALITY, UNSPECIFIED CHRONICITY, UNSPECIFIED WHETHER SCIATICA PRESENT: ICD-10-CM

## 2024-06-21 DIAGNOSIS — R07.9 CHEST PAIN: ICD-10-CM

## 2024-06-21 DIAGNOSIS — F60.3 EXPLOSIVE PERSONALITY DISORDER (HCC): ICD-10-CM

## 2024-06-21 DIAGNOSIS — F10.10 ALCOHOL ABUSE: ICD-10-CM

## 2024-06-21 DIAGNOSIS — F31.9 BIPOLAR 1 DISORDER (HCC): ICD-10-CM

## 2024-06-21 DIAGNOSIS — J96.00 ACUTE RESPIRATORY FAILURE (HCC): ICD-10-CM

## 2024-06-21 DIAGNOSIS — R73.9 HYPERGLYCEMIA: ICD-10-CM

## 2024-06-21 DIAGNOSIS — C7A.8 NEUROENDOCRINE CARCINOMA OF SMALL BOWEL (HCC): ICD-10-CM

## 2024-06-21 PROBLEM — R07.89 OTHER CHEST PAIN: Status: ACTIVE | Noted: 2024-06-21

## 2024-06-21 LAB
2HR DELTA HS TROPONIN: -4 NG/L
4HR DELTA HS TROPONIN: -6 NG/L
ALBUMIN SERPL BCG-MCNC: 5.3 G/DL (ref 3.5–5)
ALP SERPL-CCNC: 152 U/L (ref 34–104)
ALT SERPL W P-5'-P-CCNC: 59 U/L (ref 7–52)
ANION GAP SERPL CALCULATED.3IONS-SCNC: 13 MMOL/L (ref 4–13)
APTT PPP: 28 SECONDS (ref 23–37)
AST SERPL W P-5'-P-CCNC: 38 U/L (ref 13–39)
BASOPHILS # BLD AUTO: 0.06 THOUSANDS/ÂΜL (ref 0–0.1)
BASOPHILS NFR BLD AUTO: 1 % (ref 0–1)
BILIRUB SERPL-MCNC: 1.79 MG/DL (ref 0.2–1)
BUN SERPL-MCNC: 21 MG/DL (ref 5–25)
CALCIUM SERPL-MCNC: 10.5 MG/DL (ref 8.4–10.2)
CARDIAC TROPONIN I PNL SERPL HS: 10 NG/L
CARDIAC TROPONIN I PNL SERPL HS: 14 NG/L
CARDIAC TROPONIN I PNL SERPL HS: 8 NG/L
CHLORIDE SERPL-SCNC: 99 MMOL/L (ref 96–108)
CO2 SERPL-SCNC: 22 MMOL/L (ref 21–32)
CREAT SERPL-MCNC: 2.38 MG/DL (ref 0.6–1.3)
CREAT UR-MCNC: 186.7 MG/DL
EOSINOPHIL # BLD AUTO: 0.3 THOUSAND/ÂΜL (ref 0–0.61)
EOSINOPHIL NFR BLD AUTO: 2 % (ref 0–6)
ERYTHROCYTE [DISTWIDTH] IN BLOOD BY AUTOMATED COUNT: 14.6 % (ref 11.6–15.1)
GFR SERPL CREATININE-BSD FRML MDRD: 30 ML/MIN/1.73SQ M
GLUCOSE SERPL-MCNC: 242 MG/DL (ref 65–140)
HCT VFR BLD AUTO: 46.3 % (ref 36.5–49.3)
HGB BLD-MCNC: 16.1 G/DL (ref 12–17)
IMM GRANULOCYTES # BLD AUTO: 0.05 THOUSAND/UL (ref 0–0.2)
IMM GRANULOCYTES NFR BLD AUTO: 0 % (ref 0–2)
INR PPP: 1.12 (ref 0.84–1.19)
LITHIUM SERPL-SCNC: <0.1 MMOL/L (ref 0.6–1.2)
LYMPHOCYTES # BLD AUTO: 2.69 THOUSANDS/ÂΜL (ref 0.6–4.47)
LYMPHOCYTES NFR BLD AUTO: 21 % (ref 14–44)
MCH RBC QN AUTO: 29 PG (ref 26.8–34.3)
MCHC RBC AUTO-ENTMCNC: 34.8 G/DL (ref 31.4–37.4)
MCV RBC AUTO: 83 FL (ref 82–98)
MONOCYTES # BLD AUTO: 1.03 THOUSAND/ÂΜL (ref 0.17–1.22)
MONOCYTES NFR BLD AUTO: 8 % (ref 4–12)
NEUTROPHILS # BLD AUTO: 8.75 THOUSANDS/ÂΜL (ref 1.85–7.62)
NEUTS SEG NFR BLD AUTO: 68 % (ref 43–75)
NRBC BLD AUTO-RTO: 0 /100 WBCS
PLATELET # BLD AUTO: 299 THOUSANDS/UL (ref 149–390)
PMV BLD AUTO: 9.6 FL (ref 8.9–12.7)
POTASSIUM SERPL-SCNC: 4.3 MMOL/L (ref 3.5–5.3)
PROT SERPL-MCNC: 9.2 G/DL (ref 6.4–8.4)
PROT UR-MCNC: 51 MG/DL
PROT/CREAT UR: 0.27 MG/G{CREAT} (ref 0–0.1)
PROTHROMBIN TIME: 14.5 SECONDS (ref 11.6–14.5)
RBC # BLD AUTO: 5.56 MILLION/UL (ref 3.88–5.62)
SODIUM SERPL-SCNC: 134 MMOL/L (ref 135–147)
WBC # BLD AUTO: 12.88 THOUSAND/UL (ref 4.31–10.16)

## 2024-06-21 PROCEDURE — 84484 ASSAY OF TROPONIN QUANT: CPT

## 2024-06-21 PROCEDURE — 99285 EMERGENCY DEPT VISIT HI MDM: CPT

## 2024-06-21 PROCEDURE — 82570 ASSAY OF URINE CREATININE: CPT | Performed by: PHYSICIAN ASSISTANT

## 2024-06-21 PROCEDURE — 85025 COMPLETE CBC W/AUTO DIFF WBC: CPT

## 2024-06-21 PROCEDURE — 99285 EMERGENCY DEPT VISIT HI MDM: CPT | Performed by: INTERNAL MEDICINE

## 2024-06-21 PROCEDURE — 85730 THROMBOPLASTIN TIME PARTIAL: CPT | Performed by: INTERNAL MEDICINE

## 2024-06-21 PROCEDURE — 85610 PROTHROMBIN TIME: CPT | Performed by: INTERNAL MEDICINE

## 2024-06-21 PROCEDURE — 96360 HYDRATION IV INFUSION INIT: CPT

## 2024-06-21 PROCEDURE — 80178 ASSAY OF LITHIUM: CPT | Performed by: PHYSICIAN ASSISTANT

## 2024-06-21 PROCEDURE — 84156 ASSAY OF PROTEIN URINE: CPT | Performed by: PHYSICIAN ASSISTANT

## 2024-06-21 PROCEDURE — 71250 CT THORAX DX C-: CPT

## 2024-06-21 PROCEDURE — 93970 EXTREMITY STUDY: CPT

## 2024-06-21 PROCEDURE — 36415 COLL VENOUS BLD VENIPUNCTURE: CPT

## 2024-06-21 PROCEDURE — 71045 X-RAY EXAM CHEST 1 VIEW: CPT

## 2024-06-21 PROCEDURE — 93005 ELECTROCARDIOGRAM TRACING: CPT

## 2024-06-21 PROCEDURE — 99223 1ST HOSP IP/OBS HIGH 75: CPT | Performed by: PHYSICIAN ASSISTANT

## 2024-06-21 PROCEDURE — 80053 COMPREHEN METABOLIC PANEL: CPT

## 2024-06-21 RX ORDER — PRAZOSIN HYDROCHLORIDE 5 MG/1
5 CAPSULE ORAL DAILY
Status: DISCONTINUED | OUTPATIENT
Start: 2024-06-22 | End: 2024-06-22

## 2024-06-21 RX ORDER — HYDRALAZINE HYDROCHLORIDE 20 MG/ML
5 INJECTION INTRAMUSCULAR; INTRAVENOUS EVERY 6 HOURS PRN
Status: DISCONTINUED | OUTPATIENT
Start: 2024-06-21 | End: 2024-06-22

## 2024-06-21 RX ORDER — ONDANSETRON 2 MG/ML
4 INJECTION INTRAMUSCULAR; INTRAVENOUS EVERY 6 HOURS PRN
Status: DISCONTINUED | OUTPATIENT
Start: 2024-06-21 | End: 2024-06-27 | Stop reason: HOSPADM

## 2024-06-21 RX ORDER — MAGNESIUM HYDROXIDE/ALUMINUM HYDROXICE/SIMETHICONE 120; 1200; 1200 MG/30ML; MG/30ML; MG/30ML
30 SUSPENSION ORAL EVERY 4 HOURS PRN
Status: DISCONTINUED | OUTPATIENT
Start: 2024-06-21 | End: 2024-06-22

## 2024-06-21 RX ORDER — SODIUM CHLORIDE, SODIUM GLUCONATE, SODIUM ACETATE, POTASSIUM CHLORIDE, MAGNESIUM CHLORIDE, SODIUM PHOSPHATE, DIBASIC, AND POTASSIUM PHOSPHATE .53; .5; .37; .037; .03; .012; .00082 G/100ML; G/100ML; G/100ML; G/100ML; G/100ML; G/100ML; G/100ML
75 INJECTION, SOLUTION INTRAVENOUS CONTINUOUS
Status: DISCONTINUED | OUTPATIENT
Start: 2024-06-21 | End: 2024-06-22

## 2024-06-21 RX ADMIN — SODIUM CHLORIDE, SODIUM GLUCONATE, SODIUM ACETATE, POTASSIUM CHLORIDE, MAGNESIUM CHLORIDE, SODIUM PHOSPHATE, DIBASIC, AND POTASSIUM PHOSPHATE 125 ML/HR: .53; .5; .37; .037; .03; .012; .00082 INJECTION, SOLUTION INTRAVENOUS at 19:50

## 2024-06-21 RX ADMIN — SODIUM CHLORIDE 1000 ML: 0.9 INJECTION, SOLUTION INTRAVENOUS at 18:58

## 2024-06-21 NOTE — ED PROVIDER NOTES
History  Chief Complaint   Patient presents with    Chest Pain     According to the patient, he has had chest pain that started about 1 hour with left sided neck stiffness.     Is a 49-year-old male being treated for neuroendocrine tumor and developed chest pain today.  He was simply sitting in the laundromat, developed pain across his chest that went to his neck.  His neck pain was seem to be reproducible and down his arm.  He is diabetic, hypertensive, and vapes from time to time.  Recently getting the medical marijuana card.  He denies fever chills or night sweats.  Has had no cough or congestion.  Remains on chemotherapy.  Recent PET scan revealing some metastatic disease.  He also suffers from intermittent explosive disorder.        Prior to Admission Medications   Prescriptions Last Dose Informant Patient Reported? Taking?   ibuprofen (MOTRIN) 200 mg tablet  Self Yes No   Sig: Take 200 mg by mouth every 6 (six) hours as needed for mild pain   lisinopril (ZESTRIL) 10 mg tablet  Self Yes No   Sig: Take 20 mg by mouth daily in the early morning   lisinopril (ZESTRIL) 20 mg tablet  Self Yes No   lithium carbonate (LITHOBID) 300 mg CR tablet  Self Yes No   prazosin (MINIPRESS) 5 mg capsule  Self Yes No   risperiDONE (RisperDAL) 1 mg tablet  Self Yes No      Facility-Administered Medications: None       Past Medical History:   Diagnosis Date    Allergic     Bipolar disorder in partial remission (HCC)     Erectile dysfunction     unspecified erectile dysfunction type    Hypertension     Kidney stone        Past Surgical History:   Procedure Laterality Date    COLONOSCOPY      EXPLORATORY LAPAROTOMY W/ BOWEL RESECTION N/A 7/21/2022    Procedure: LAPAROTOMY EXPLORATORY W/ SMALL BOWEL RESECTION, liver biopsy;  Surgeon: Rose Mary Lara MD;  Location: [facility];  Service: General    FL RETROGRADE PYELOGRAM  10/06/2021    HERNIA REPAIR      NC CYSTO BLADDER W/URETERAL CATHETERIZATION Right 10/06/2021    Procedure:  CYSTOSCOPY RETROGRADE PYELOGRAM WITH INSERTION STENT URETERAL, RIGHT URETEROSCOPY, STONE EXTRACTION;  Surgeon: Bradly Rivera MD;  Location:  MAIN OR;  Service: Urology       Family History   Problem Relation Age of Onset    Diabetes Mother     Thyroid disease Mother     Cancer Father     Thyroid disease Sister     Depression Brother     Cancer Maternal Aunt     Cancer Paternal Uncle     Cancer Paternal Grandmother     No Known Problems Brother     Thyroid disease Sister      I have reviewed and agree with the history as documented.    E-Cigarette/Vaping    E-Cigarette Use Current Some Day User      E-Cigarette/Vaping Substances    Nicotine No     THC Yes     CBD No     Flavoring No      Social History     Tobacco Use    Smoking status: Former     Current packs/day: 0.00     Types: Pipe, Cigarettes     Quit date: 2003     Years since quittin.5    Smokeless tobacco: Never   Vaping Use    Vaping status: Some Days    Substances: THC   Substance Use Topics    Alcohol use: Not Currently     Comment: Drinks a quart of moonshine/night-As of 21 will quit drinking    Drug use: Yes     Types: Marijuana     Comment: Socially (1-2 Monthly)       Review of Systems   Constitutional:  Negative for chills and fever.   HENT:  Negative for ear pain and sore throat.    Eyes:  Negative for pain and visual disturbance.   Respiratory:  Negative for cough and shortness of breath.    Cardiovascular:  Positive for chest pain. Negative for palpitations.        Chest pains with radiation to the neck and shoulder.   Gastrointestinal:  Negative for abdominal pain and vomiting.   Genitourinary:  Negative for dysuria and hematuria.   Musculoskeletal:  Negative for arthralgias and back pain.   Skin:  Negative for color change and rash.   Neurological:  Negative for seizures and syncope.   All other systems reviewed and are negative.      Physical Exam  Physical Exam  Vitals and nursing note reviewed.   Constitutional:       General:  He is not in acute distress.     Appearance: He is well-developed.   HENT:      Head: Normocephalic and atraumatic.   Eyes:      Conjunctiva/sclera: Conjunctivae normal.   Neck:      Comments: Tenderness of the left-sided strap muscles of the neck.  Cardiovascular:      Rate and Rhythm: Normal rate and regular rhythm.      Heart sounds: Normal heart sounds. No murmur heard.  Pulmonary:      Effort: Pulmonary effort is normal. No respiratory distress.      Breath sounds: Normal breath sounds.   Chest:      Chest wall: No tenderness.   Abdominal:      Palpations: Abdomen is soft.      Tenderness: There is no abdominal tenderness.   Musculoskeletal:         General: No swelling.      Cervical back: Neck supple.   Skin:     General: Skin is warm and dry.      Capillary Refill: Capillary refill takes less than 2 seconds.   Neurological:      Mental Status: He is alert.   Psychiatric:         Mood and Affect: Mood normal.         Vital Signs  ED Triage Vitals   Temperature Pulse Respirations Blood Pressure SpO2   06/21/24 1706 06/21/24 1701 06/21/24 1701 06/21/24 1701 06/21/24 1701   97.5 °F (36.4 °C) 97 18 114/82 97 %      Temp Source Heart Rate Source Patient Position - Orthostatic VS BP Location FiO2 (%)   06/21/24 1706 06/21/24 2034 06/21/24 1701 06/21/24 1701 --   Tympanic Monitor Lying Right arm       Pain Score       06/21/24 1701       3           Vitals:    06/21/24 1900 06/21/24 1950 06/21/24 2034 06/21/24 2216   BP: 112/80 127/76 123/87 145/97   Pulse: 87 77 76    Patient Position - Orthostatic VS:   Lying          Visual Acuity      ED Medications  Medications   prazosin (MINIPRESS) capsule 5 mg (has no administration in time range)   ondansetron (ZOFRAN) injection 4 mg (has no administration in time range)   multi-electrolyte (PLASMALYTE-A/ISOLYTE-S PH 7.4) IV solution (125 mL/hr Intravenous New Bag 6/21/24 1950)   hydrALAZINE (APRESOLINE) injection 5 mg (has no administration in time range)    aluminum-magnesium hydroxide-simethicone (MAALOX) oral suspension 30 mL (has no administration in time range)   sodium chloride 0.9 % bolus 1,000 mL (0 mL Intravenous Stopped 6/21/24 1958)       Diagnostic Studies  Results Reviewed       Procedure Component Value Units Date/Time    HS Troponin I 4hr [[id number]]  (Normal) Collected: 06/21/24 2128    Lab Status: Final result Specimen: Blood from Arm, Right Updated: 06/21/24 2207     hs TnI 4hr 8 ng/L      Delta 4hr hsTnI -6 ng/L     Protein / creatinine ratio, urine [[id number]]  (Abnormal) Collected: 06/21/24 2128    Lab Status: Final result Specimen: Urine, Clean Catch Updated: 06/21/24 2156     Creatinine, Ur 186.7 mg/dL      Protein Urine Random 51 mg/dL      Prot/Creat Ratio, Ur 0.27    HS Troponin I 2hr [[id number]]  (Normal) Collected: 06/21/24 1902    Lab Status: Final result Specimen: Blood from Arm, Left Updated: 06/21/24 1927     hs TnI 2hr 10 ng/L      Delta 2hr hsTnI -4 ng/L     Protime-INR [[id number]]  (Normal) Collected: 06/21/24 1734    Lab Status: Final result Specimen: Blood from Arm, Left Updated: 06/21/24 1800     Protime 14.5 seconds      INR 1.12    APTT [[id number]]  (Normal) Collected: 06/21/24 1734    Lab Status: Final result Specimen: Blood from Arm, Left Updated: 06/21/24 1800     PTT 28 seconds     Comprehensive metabolic panel [[id number]]  (Abnormal) Collected: 06/21/24 1714    Lab Status: Final result Specimen: Blood from Arm, Left Updated: 06/21/24 1755     Sodium 134 mmol/L      Potassium 4.3 mmol/L      Chloride 99 mmol/L      CO2 22 mmol/L      ANION GAP 13 mmol/L      BUN 21 mg/dL      Creatinine 2.38 mg/dL      Glucose 242 mg/dL      Calcium 10.5 mg/dL      AST 38 U/L      ALT 59 U/L      Alkaline Phosphatase 152 U/L      Total Protein 9.2 g/dL      Albumin 5.3 g/dL      Total Bilirubin 1.79 mg/dL      eGFR 30 ml/min/1.73sq m     Narrative:      National Kid[employer] Foundation guidelines for Chronic Kidney Disease (CKD):     Stage  1 with normal or high GFR (GFR > 90 mL/min/1.73 square meters)    Stage 2 Mild CKD (GFR = 60-89 mL/min/1.73 square meters)    Stage 3A Moderate CKD (GFR = 45-59 mL/min/1.73 square meters)    Stage 3B Moderate CKD (GFR = 30-44 mL/min/1.73 square meters)    Stage 4 Severe CKD (GFR = 15-29 mL/min/1.73 square meters)    Stage 5 End Stage CKD (GFR <15 mL/min/1.73 square meters)  Note: GFR calculation is accurate only with a steady state creatinine    HS Troponin 0hr (reflex protocol) [[id number]]  (Normal) Collected: 06/21/24 1714    Lab Status: Final result Specimen: Blood from Arm, Left Updated: 06/21/24 1744     hs TnI 0hr 14 ng/L     CBC and differential [[id number]]  (Abnormal) Collected: 06/21/24 1714    Lab Status: Final result Specimen: Blood from Arm, Left Updated: 06/21/24 1721     WBC 12.88 Thousand/uL      RBC 5.56 Million/uL      Hemoglobin 16.1 g/dL      Hematocrit 46.3 %      MCV 83 fL      MCH 29.0 pg      MCHC 34.8 g/dL      RDW 14.6 %      MPV 9.6 fL      Platelets 299 Thousands/uL      nRBC 0 /100 WBCs      Segmented % 68 %      Immature Grans % 0 %      Lymphocytes % 21 %      Monocytes % 8 %      Eosinophils Relative 2 %      Basophils Relative 1 %      Absolute Neutrophils 8.75 Thousands/µL      Absolute Immature Grans 0.05 Thousand/uL      Absolute Lymphocytes 2.69 Thousands/µL      Absolute Monocytes 1.03 Thousand/µL      Eosinophils Absolute 0.30 Thousand/µL      Basophils Absolute 0.06 Thousands/µL                    CT chest without contrast   Final Result by Parker Torres MD (06/21 1844)      No acute findings in the chest.      Unchanged 9 mm right middle lobe pulmonary nodule, not FDG avid on PET/CT 5/10/2024.               Workstation performed: [id number]         XR chest 1 view portable   ED Interpretation by Tawanda Jerry DO (06/21 1831)   There is no active disease in the chest      Final Result by Parker Torres MD (06/21 1847)      No acute cardiopulmonary  disease.            Workstation performed: [id number]          VAS VENOUS DUPLEX - LOWER LIMB BILATERAL    (Results Pending)   US kidney and bladder with pvr    (Results Pending)              Procedures  ECG 12 Lead Documentation Only    Date/Time: 6/21/2024 5:06 PM    Performed by: Tawanda Jerry DO  Authorized by: Tawanda Jerry DO    Indications / Diagnosis:  Chest pain  ECG reviewed by me, the ED Provider: yes    Patient location:  ED  Previous ECG:     Previous ECG:  Compared to current    Similarity:  No change  Interpretation:     Interpretation: non-specific    Rate:     ECG rate:  91    ECG rate assessment: normal    Rhythm:     Rhythm: sinus rhythm    Ectopy:     Ectopy: none    QRS:     QRS axis:  Left    QRS intervals:  Normal  Conduction:     Conduction: normal    ST segments:     ST segments:  Non-specific  T waves:     T waves: non-specific             ED Course             HEART Risk Score      Flowsheet Row Most Recent Value   Heart Score Risk Calculator    History 1 Filed at: 06/21/2024 1821   ECG 1 Filed at: 06/21/2024 1821   Age 1 Filed at: 06/21/2024 1821   Risk Factors 1 Filed at: 06/21/2024 1821   Troponin 1 Filed at: 06/21/2024 1821   HEART Score 5 Filed at: 06/21/2024 1821                          SBIRT 22yo+      Flowsheet Row Most Recent Value   Initial Alcohol Screen: US AUDIT-C     1. How often do you have a drink containing alcohol? 0 Filed at: 06/21/2024 1705   2. How many drinks containing alcohol do you have on a typical day you are drinking?  0 Filed at: 06/21/2024 1705   3a. Male UNDER 65: How often do you have five or more drinks on one occasion? 0 Filed at: 06/21/2024 1705   Audit-C Score 0 Filed at: 06/21/2024 1705   JURGEN: How many times in the past year have you...    Used an illegal drug or used a prescription medication for non-medical reasons? Never Filed at: 06/21/2024 1705          ERCIK Risk Score      Flowsheet Row Most Recent Value   Age >= 65 0 Filed at: 06/21/2024  2238   Known CAD (stenosis >= 50%) 0 Filed at: 06/21/2024 2238   Recent (<=24 hrs) Service Angina 1 Filed at: 06/21/2024 2238   ST Deviation >= 0.5 mm 0 Filed at: 06/21/2024 2238   3+ CAD Risk Factors (FHx, HTN, HLP, DM, Smoker) 0 Filed at: 06/21/2024 2238   Aspirin Use Past 7 Days 0 Filed at: 06/21/2024 2238   Elevated Cardiac Markers 0 Filed at: 06/21/2024 2238   ERICK Risk Score (Calculated) 1 Filed at: 06/21/2024 2238          Wells' Criteria for PE      Flowsheet Row Most Recent Value   Wells' Criteria for PE    Clinical signs and symptoms of DVT 0 Filed at: 06/21/2024 1727   PE is primary diagnosis or equally likely 3 Filed at: 06/21/2024 1727   HR >100 0 Filed at: 06/21/2024 1727   Immobilization at least 3 days or Surgery in the previous 4 weeks 0 Filed at: 06/21/2024 1727   Previous, objectively diagnosed PE or DVT 0 Filed at: 06/21/2024 1727   Hemoptysis 0 Filed at: 06/21/2024 1727   Malignancy with treatment within 6 months or palliative 1 Filed at: 06/21/2024 1727   Wells' Criteria Total 4 Filed at: 06/21/2024 1727                  Medical Decision Making  Is a 49-year-old male being treated for neuroendocrine tumor and developed chest pain today.  He was simply sitting in the laundromat, developed pain across his chest that went to his neck.  His neck pain was seem to be reproducible and down his arm.  He is diabetic, hypertensive, and vapes from time to time.  Recently getting the medical marijuana card.  He denies fever chills or night sweats.  Has had no cough or congestion.  Remains on chemotherapy.  Recent PET scan revealing some metastatic disease.  He also suffers from intermittent explosive disorder.  Patient with multiple risk factor for coronary disease as well as high risk for pulmonary embolism.  Will put him through CTA of chest rule out pulmonary embolism, and at least do 2 troponins.  He is diabetic,  hypertensive and does smoke/vape.    Amount and/or Complexity of Data Reviewed  Labs:  ordered.     Details: His troponin is not significantly elevated, more importantly he has acute MEHDI with creatinine going from 0.9-2.38  Radiology: ordered and independent interpretation performed.     Details: Chest x-ray failed to reveal any active disease.  Venous Dopplers of the lower extremity was notably negative.  ECG/medicine tests: ordered and independent interpretation performed.     Details: EKG ordered and independently interpreted by me reveals normal sinus rhythm, left axis deviation, diffuse nonspecific ST-T wave abnormalities.  Rate of 91.    Risk  Decision regarding hospitalization.  Risk Details: Patient with acute kidney injury.  His troponins did not trend up with regards to his chest pain.  I feel this is all cervical in nature.  Discussed with Kenroy, given the acute kidney injury of uncertain etiology, admit hydrate.  Chest pain was thought to be gastrointestinal versus musculoskeletal             Disposition  Final diagnoses:   Chest pain   Acute kidney injury (HCC)     Time reflects when diagnosis was documented in both MDM as applicable and the Disposition within this note       Time User Action Codes Description Comment    6/21/2024  7:44 PM Tyrese Carolina Add [N17.9] MEHDI (acute kidney injury) (HCC)     6/22/2024 12:30 AM Tawanda Jerry Add [R07.9] Chest pain     6/22/2024 12:30 AM Tawanda Jerry Add [N17.9] Acute kidney injury (HCC)     6/22/2024 12:30 AM Tawanda Jerry Modify [N17.9] MEHDI (acute kidney injury) (HCC)     6/22/2024 12:30 AM Tawanda Jerry Modify [N17.9] Acute kidney injury (HCC)           ED Disposition       ED Disposition   Admit    Condition   Stable    Date/Time   Sat Jun 22, 2024 0031    Comment   Case was discussed with Tyrese Rene  and the patient's admission status was agreed to be Admission Status: inpatient status to the service of Dr. Lieberman .               Follow-up Information    None         Current Discharge Medication List        CONTINUE these  medications which have NOT CHANGED    Details   ibuprofen (MOTRIN) 200 mg tablet Take 200 mg by mouth every 6 (six) hours as needed for mild pain      !! lisinopril (ZESTRIL) 10 mg tablet Take 20 mg by mouth daily in the early morning      !! lisinopril (ZESTRIL) 20 mg tablet       lithium carbonate (LITHOBID) 300 mg CR tablet       prazosin (MINIPRESS) 5 mg capsule       risperiDONE (RisperDAL) 1 mg tablet        !! - Potential duplicate medications found. Please discuss with provider.          No discharge procedures on file.    PDMP Review         Value Time User    PDMP Reviewed  Yes 2/29/2024  1:16 PM José Luis Soni PA-C            ED Provider  Electronically Signed by             Tawanda Jerry DO  06/22/24 0032

## 2024-06-22 ENCOUNTER — APPOINTMENT (INPATIENT)
Dept: CT IMAGING | Facility: HOSPITAL | Age: 49
DRG: 469 | End: 2024-06-22
Payer: COMMERCIAL

## 2024-06-22 ENCOUNTER — APPOINTMENT (INPATIENT)
Dept: ULTRASOUND IMAGING | Facility: HOSPITAL | Age: 49
DRG: 469 | End: 2024-06-22
Payer: COMMERCIAL

## 2024-06-22 PROBLEM — J96.00 ACUTE RESPIRATORY FAILURE (HCC): Status: ACTIVE | Noted: 2024-06-22

## 2024-06-22 PROBLEM — I95.9 HYPOTENSION: Status: ACTIVE | Noted: 2024-06-22

## 2024-06-22 PROBLEM — R17 TOTAL BILIRUBIN, ELEVATED: Status: ACTIVE | Noted: 2024-06-22

## 2024-06-22 PROBLEM — E83.39 HYPOPHOSPHATEMIA: Status: ACTIVE | Noted: 2024-06-22

## 2024-06-22 PROBLEM — G93.40 ACUTE ENCEPHALOPATHY: Status: ACTIVE | Noted: 2024-06-22

## 2024-06-22 PROBLEM — R73.9 HYPERGLYCEMIA: Status: ACTIVE | Noted: 2024-06-22

## 2024-06-22 PROBLEM — R57.9 SHOCK (HCC): Status: ACTIVE | Noted: 2024-06-22

## 2024-06-22 PROBLEM — E87.20 LACTIC ACIDOSIS: Status: ACTIVE | Noted: 2024-06-22

## 2024-06-22 PROBLEM — D69.6 THROMBOCYTOPENIA (HCC): Status: ACTIVE | Noted: 2024-06-22

## 2024-06-22 LAB
2HR DELTA HS TROPONIN: 11 NG/L
ALBUMIN SERPL BCG-MCNC: 4 G/DL (ref 3.5–5)
ALP SERPL-CCNC: 115 U/L (ref 34–104)
ALT SERPL W P-5'-P-CCNC: 42 U/L (ref 7–52)
AMMONIA PLAS-SCNC: 51 UMOL/L (ref 18–72)
AMPHETAMINES SERPL QL SCN: NEGATIVE
ANION GAP SERPL CALCULATED.3IONS-SCNC: 11 MMOL/L (ref 4–13)
ANION GAP SERPL CALCULATED.3IONS-SCNC: 9 MMOL/L (ref 4–13)
APTT PPP: 23 SECONDS (ref 23–37)
AST SERPL W P-5'-P-CCNC: 32 U/L (ref 13–39)
ATRIAL RATE: 88 BPM
ATRIAL RATE: 91 BPM
BARBITURATES UR QL: NEGATIVE
BASE EXCESS BLDA CALC-SCNC: -6.8 MMOL/L
BASOPHILS # BLD AUTO: 0.01 THOUSANDS/ÂΜL (ref 0–0.1)
BASOPHILS NFR BLD AUTO: 0 % (ref 0–1)
BENZODIAZ UR QL: NEGATIVE
BILIRUB SERPL-MCNC: 1.79 MG/DL (ref 0.2–1)
BILIRUB UR QL STRIP: NEGATIVE
BUN SERPL-MCNC: 17 MG/DL (ref 5–25)
BUN SERPL-MCNC: 18 MG/DL (ref 5–25)
CALCIUM SERPL-MCNC: 9 MG/DL (ref 8.4–10.2)
CALCIUM SERPL-MCNC: 9.1 MG/DL (ref 8.4–10.2)
CARDIAC TROPONIN I PNL SERPL HS: 14 NG/L
CARDIAC TROPONIN I PNL SERPL HS: 3 NG/L
CHLORIDE SERPL-SCNC: 100 MMOL/L (ref 96–108)
CHLORIDE SERPL-SCNC: 102 MMOL/L (ref 96–108)
CK SERPL-CCNC: 112 U/L (ref 39–308)
CLARITY UR: CLEAR
CO2 SERPL-SCNC: 21 MMOL/L (ref 21–32)
CO2 SERPL-SCNC: 23 MMOL/L (ref 21–32)
COCAINE UR QL: NEGATIVE
COLOR UR: YELLOW
CREAT SERPL-MCNC: 1.15 MG/DL (ref 0.6–1.3)
CREAT SERPL-MCNC: 1.23 MG/DL (ref 0.6–1.3)
EOSINOPHIL # BLD AUTO: 0.11 THOUSAND/ÂΜL (ref 0–0.61)
EOSINOPHIL NFR BLD AUTO: 1 % (ref 0–6)
ERYTHROCYTE [DISTWIDTH] IN BLOOD BY AUTOMATED COUNT: 14.2 % (ref 11.6–15.1)
ERYTHROCYTE [DISTWIDTH] IN BLOOD BY AUTOMATED COUNT: 14.5 % (ref 11.6–15.1)
EST. AVERAGE GLUCOSE BLD GHB EST-MCNC: 160 MG/DL
FENTANYL UR QL SCN: POSITIVE
GFR SERPL CREATININE-BSD FRML MDRD: 68 ML/MIN/1.73SQ M
GFR SERPL CREATININE-BSD FRML MDRD: 73 ML/MIN/1.73SQ M
GLUCOSE SERPL-MCNC: 125 MG/DL (ref 65–140)
GLUCOSE SERPL-MCNC: 155 MG/DL (ref 65–140)
GLUCOSE SERPL-MCNC: 211 MG/DL (ref 65–140)
GLUCOSE SERPL-MCNC: 217 MG/DL (ref 65–140)
GLUCOSE SERPL-MCNC: 253 MG/DL (ref 65–140)
GLUCOSE SERPL-MCNC: 333 MG/DL (ref 65–140)
GLUCOSE SERPL-MCNC: 412 MG/DL (ref 65–140)
GLUCOSE SERPL-MCNC: 416 MG/DL (ref 65–140)
GLUCOSE UR STRIP-MCNC: ABNORMAL MG/DL
HBA1C MFR BLD: 7.2 %
HCO3 BLDA-SCNC: 20.5 MMOL/L (ref 22–28)
HCT VFR BLD AUTO: 37.3 % (ref 36.5–49.3)
HCT VFR BLD AUTO: 39 % (ref 36.5–49.3)
HGB BLD-MCNC: 13 G/DL (ref 12–17)
HGB BLD-MCNC: 13.8 G/DL (ref 12–17)
HGB UR QL STRIP.AUTO: NEGATIVE
HYDROCODONE UR QL SCN: NEGATIVE
IMM GRANULOCYTES # BLD AUTO: 0.06 THOUSAND/UL (ref 0–0.2)
IMM GRANULOCYTES NFR BLD AUTO: 1 % (ref 0–2)
INR PPP: 1.14 (ref 0.84–1.19)
KETONES UR STRIP-MCNC: NEGATIVE MG/DL
LACTATE SERPL-SCNC: 1.7 MMOL/L (ref 0.5–2)
LACTATE SERPL-SCNC: 3 MMOL/L (ref 0.5–2)
LACTATE SERPL-SCNC: 3.7 MMOL/L (ref 0.5–2)
LEUKOCYTE ESTERASE UR QL STRIP: NEGATIVE
LIPASE SERPL-CCNC: 11 U/L (ref 11–82)
LYMPHOCYTES # BLD AUTO: 1.91 THOUSANDS/ÂΜL (ref 0.6–4.47)
LYMPHOCYTES NFR BLD AUTO: 19 % (ref 14–44)
MAGNESIUM SERPL-MCNC: 2.1 MG/DL (ref 1.9–2.7)
MCH RBC QN AUTO: 29.5 PG (ref 26.8–34.3)
MCH RBC QN AUTO: 29.7 PG (ref 26.8–34.3)
MCHC RBC AUTO-ENTMCNC: 34.9 G/DL (ref 31.4–37.4)
MCHC RBC AUTO-ENTMCNC: 35.4 G/DL (ref 31.4–37.4)
MCV RBC AUTO: 84 FL (ref 82–98)
MCV RBC AUTO: 85 FL (ref 82–98)
METHADONE UR QL: NEGATIVE
MONOCYTES # BLD AUTO: 0.43 THOUSAND/ÂΜL (ref 0.17–1.22)
MONOCYTES NFR BLD AUTO: 4 % (ref 4–12)
NEUTROPHILS # BLD AUTO: 7.39 THOUSANDS/ÂΜL (ref 1.85–7.62)
NEUTS SEG NFR BLD AUTO: 75 % (ref 43–75)
NITRITE UR QL STRIP: NEGATIVE
NRBC BLD AUTO-RTO: 0 /100 WBCS
O2 CT BLDA-SCNC: 19.9 ML/DL (ref 16–23)
OPIATES UR QL SCN: NEGATIVE
OXYCODONE+OXYMORPHONE UR QL SCN: NEGATIVE
OXYHGB MFR BLDA: 97.7 % (ref 94–97)
P AXIS: 36 DEGREES
P AXIS: 37 DEGREES
PCO2 BLDA: 48.1 MM HG (ref 36–44)
PCP UR QL: NEGATIVE
PH BLDA: 7.25 [PH] (ref 7.35–7.45)
PH UR STRIP.AUTO: 6 [PH]
PHOSPHATE SERPL-MCNC: 1.6 MG/DL (ref 2.7–4.5)
PLATELET # BLD AUTO: 147 THOUSANDS/UL (ref 149–390)
PLATELET # BLD AUTO: 192 THOUSANDS/UL (ref 149–390)
PMV BLD AUTO: 9.7 FL (ref 8.9–12.7)
PMV BLD AUTO: 9.8 FL (ref 8.9–12.7)
PO2 BLDA: 194.9 MM HG (ref 75–129)
POTASSIUM SERPL-SCNC: 3.6 MMOL/L (ref 3.5–5.3)
POTASSIUM SERPL-SCNC: 3.7 MMOL/L (ref 3.5–5.3)
PR INTERVAL: 160 MS
PR INTERVAL: 160 MS
PROCALCITONIN SERPL-MCNC: 0.12 NG/ML
PROT SERPL-MCNC: 6.8 G/DL (ref 6.4–8.4)
PROT UR STRIP-MCNC: NEGATIVE MG/DL
PROTHROMBIN TIME: 14.7 SECONDS (ref 11.6–14.5)
QRS AXIS: -28 DEGREES
QRS AXIS: -38 DEGREES
QRSD INTERVAL: 80 MS
QRSD INTERVAL: 86 MS
QT INTERVAL: 358 MS
QT INTERVAL: 364 MS
QTC INTERVAL: 440 MS
QTC INTERVAL: 440 MS
RBC # BLD AUTO: 4.41 MILLION/UL (ref 3.88–5.62)
RBC # BLD AUTO: 4.65 MILLION/UL (ref 3.88–5.62)
SODIUM SERPL-SCNC: 132 MMOL/L (ref 135–147)
SODIUM SERPL-SCNC: 134 MMOL/L (ref 135–147)
SP GR UR STRIP.AUTO: 1.01
SPECIMEN SOURCE: ABNORMAL
T WAVE AXIS: 35 DEGREES
T WAVE AXIS: 53 DEGREES
THC UR QL: NEGATIVE
UROBILINOGEN UR QL STRIP.AUTO: 0.2 E.U./DL
VENTRICULAR RATE: 88 BPM
VENTRICULAR RATE: 91 BPM
WBC # BLD AUTO: 9.01 THOUSAND/UL (ref 4.31–10.16)
WBC # BLD AUTO: 9.91 THOUSAND/UL (ref 4.31–10.16)

## 2024-06-22 PROCEDURE — 83690 ASSAY OF LIPASE: CPT | Performed by: NURSE PRACTITIONER

## 2024-06-22 PROCEDURE — 76775 US EXAM ABDO BACK WALL LIM: CPT

## 2024-06-22 PROCEDURE — 02HV33Z INSERTION OF INFUSION DEVICE INTO SUPERIOR VENA CAVA, PERCUTANEOUS APPROACH: ICD-10-PCS | Performed by: STUDENT IN AN ORGANIZED HEALTH CARE EDUCATION/TRAINING PROGRAM

## 2024-06-22 PROCEDURE — 36620 INSERTION CATHETER ARTERY: CPT | Performed by: NURSE PRACTITIONER

## 2024-06-22 PROCEDURE — 83735 ASSAY OF MAGNESIUM: CPT | Performed by: INTERNAL MEDICINE

## 2024-06-22 PROCEDURE — NC001 PR NO CHARGE: Performed by: NURSE PRACTITIONER

## 2024-06-22 PROCEDURE — 93970 EXTREMITY STUDY: CPT | Performed by: SURGERY

## 2024-06-22 PROCEDURE — 84100 ASSAY OF PHOSPHORUS: CPT | Performed by: INTERNAL MEDICINE

## 2024-06-22 PROCEDURE — 85027 COMPLETE CBC AUTOMATED: CPT | Performed by: PHYSICIAN ASSISTANT

## 2024-06-22 PROCEDURE — 82948 REAGENT STRIP/BLOOD GLUCOSE: CPT

## 2024-06-22 PROCEDURE — 99291 CRITICAL CARE FIRST HOUR: CPT | Performed by: STUDENT IN AN ORGANIZED HEALTH CARE EDUCATION/TRAINING PROGRAM

## 2024-06-22 PROCEDURE — 5A1945Z RESPIRATORY VENTILATION, 24-96 CONSECUTIVE HOURS: ICD-10-PCS | Performed by: STUDENT IN AN ORGANIZED HEALTH CARE EDUCATION/TRAINING PROGRAM

## 2024-06-22 PROCEDURE — 31500 INSERT EMERGENCY AIRWAY: CPT | Performed by: STUDENT IN AN ORGANIZED HEALTH CARE EDUCATION/TRAINING PROGRAM

## 2024-06-22 PROCEDURE — 03HY32Z INSERTION OF MONITORING DEVICE INTO UPPER ARTERY, PERCUTANEOUS APPROACH: ICD-10-PCS | Performed by: STUDENT IN AN ORGANIZED HEALTH CARE EDUCATION/TRAINING PROGRAM

## 2024-06-22 PROCEDURE — 82140 ASSAY OF AMMONIA: CPT | Performed by: INTERNAL MEDICINE

## 2024-06-22 PROCEDURE — 71250 CT THORAX DX C-: CPT

## 2024-06-22 PROCEDURE — 99232 SBSQ HOSP IP/OBS MODERATE 35: CPT | Performed by: INTERNAL MEDICINE

## 2024-06-22 PROCEDURE — 74176 CT ABD & PELVIS W/O CONTRAST: CPT

## 2024-06-22 PROCEDURE — 99255 IP/OBS CONSLTJ NEW/EST HI 80: CPT | Performed by: INTERNAL MEDICINE

## 2024-06-22 PROCEDURE — 94760 N-INVAS EAR/PLS OXIMETRY 1: CPT

## 2024-06-22 PROCEDURE — 94664 DEMO&/EVAL PT USE INHALER: CPT

## 2024-06-22 PROCEDURE — 84145 PROCALCITONIN (PCT): CPT | Performed by: INTERNAL MEDICINE

## 2024-06-22 PROCEDURE — NC001 PR NO CHARGE: Performed by: STUDENT IN AN ORGANIZED HEALTH CARE EDUCATION/TRAINING PROGRAM

## 2024-06-22 PROCEDURE — 31500 INSERT EMERGENCY AIRWAY: CPT

## 2024-06-22 PROCEDURE — 0BH18EZ INSERTION OF ENDOTRACHEAL AIRWAY INTO TRACHEA, VIA NATURAL OR ARTIFICIAL OPENING ENDOSCOPIC: ICD-10-PCS | Performed by: STUDENT IN AN ORGANIZED HEALTH CARE EDUCATION/TRAINING PROGRAM

## 2024-06-22 PROCEDURE — 80048 BASIC METABOLIC PNL TOTAL CA: CPT | Performed by: PHYSICIAN ASSISTANT

## 2024-06-22 PROCEDURE — 4A133J1 MONITORING OF ARTERIAL PULSE, PERIPHERAL, PERCUTANEOUS APPROACH: ICD-10-PCS | Performed by: STUDENT IN AN ORGANIZED HEALTH CARE EDUCATION/TRAINING PROGRAM

## 2024-06-22 PROCEDURE — 93005 ELECTROCARDIOGRAM TRACING: CPT

## 2024-06-22 PROCEDURE — 4A133B1 MONITORING OF ARTERIAL PRESSURE, PERIPHERAL, PERCUTANEOUS APPROACH: ICD-10-PCS | Performed by: STUDENT IN AN ORGANIZED HEALTH CARE EDUCATION/TRAINING PROGRAM

## 2024-06-22 PROCEDURE — 82550 ASSAY OF CK (CPK): CPT | Performed by: NURSE PRACTITIONER

## 2024-06-22 PROCEDURE — 82805 BLOOD GASES W/O2 SATURATION: CPT | Performed by: NURSE PRACTITIONER

## 2024-06-22 PROCEDURE — 94002 VENT MGMT INPAT INIT DAY: CPT

## 2024-06-22 PROCEDURE — 83605 ASSAY OF LACTIC ACID: CPT

## 2024-06-22 PROCEDURE — 85025 COMPLETE CBC W/AUTO DIFF WBC: CPT | Performed by: NURSE PRACTITIONER

## 2024-06-22 PROCEDURE — 80053 COMPREHEN METABOLIC PANEL: CPT | Performed by: INTERNAL MEDICINE

## 2024-06-22 PROCEDURE — 70450 CT HEAD/BRAIN W/O DYE: CPT

## 2024-06-22 PROCEDURE — 83036 HEMOGLOBIN GLYCOSYLATED A1C: CPT | Performed by: INTERNAL MEDICINE

## 2024-06-22 PROCEDURE — 85730 THROMBOPLASTIN TIME PARTIAL: CPT | Performed by: INTERNAL MEDICINE

## 2024-06-22 PROCEDURE — 80307 DRUG TEST PRSMV CHEM ANLYZR: CPT | Performed by: NURSE PRACTITIONER

## 2024-06-22 PROCEDURE — 93010 ELECTROCARDIOGRAM REPORT: CPT | Performed by: INTERNAL MEDICINE

## 2024-06-22 PROCEDURE — 81003 URINALYSIS AUTO W/O SCOPE: CPT

## 2024-06-22 PROCEDURE — 85610 PROTHROMBIN TIME: CPT | Performed by: INTERNAL MEDICINE

## 2024-06-22 PROCEDURE — 36556 INSERT NON-TUNNEL CV CATH: CPT | Performed by: STUDENT IN AN ORGANIZED HEALTH CARE EDUCATION/TRAINING PROGRAM

## 2024-06-22 PROCEDURE — 84484 ASSAY OF TROPONIN QUANT: CPT | Performed by: NURSE PRACTITIONER

## 2024-06-22 PROCEDURE — 83605 ASSAY OF LACTIC ACID: CPT | Performed by: NURSE PRACTITIONER

## 2024-06-22 RX ORDER — HEPARIN SODIUM 5000 [USP'U]/ML
5000 INJECTION, SOLUTION INTRAVENOUS; SUBCUTANEOUS EVERY 8 HOURS SCHEDULED
Status: DISCONTINUED | OUTPATIENT
Start: 2024-06-22 | End: 2024-06-27 | Stop reason: HOSPADM

## 2024-06-22 RX ORDER — LITHIUM CARBONATE 300 MG/1
300 TABLET, FILM COATED, EXTENDED RELEASE ORAL 2 TIMES DAILY
Status: DISCONTINUED | OUTPATIENT
Start: 2024-06-22 | End: 2024-06-27 | Stop reason: HOSPADM

## 2024-06-22 RX ORDER — CHLORHEXIDINE GLUCONATE ORAL RINSE 1.2 MG/ML
15 SOLUTION DENTAL EVERY 12 HOURS SCHEDULED
Status: DISCONTINUED | OUTPATIENT
Start: 2024-06-22 | End: 2024-06-27 | Stop reason: HOSPADM

## 2024-06-22 RX ORDER — LIDOCAINE 50 MG/G
1 PATCH TOPICAL DAILY
Status: DISCONTINUED | OUTPATIENT
Start: 2024-06-22 | End: 2024-06-23

## 2024-06-22 RX ORDER — INSULIN LISPRO 100 [IU]/ML
1-6 INJECTION, SOLUTION INTRAVENOUS; SUBCUTANEOUS
Status: DISCONTINUED | OUTPATIENT
Start: 2024-06-22 | End: 2024-06-22

## 2024-06-22 RX ORDER — SODIUM CHLORIDE, SODIUM GLUCONATE, SODIUM ACETATE, POTASSIUM CHLORIDE, MAGNESIUM CHLORIDE, SODIUM PHOSPHATE, DIBASIC, AND POTASSIUM PHOSPHATE .53; .5; .37; .037; .03; .012; .00082 G/100ML; G/100ML; G/100ML; G/100ML; G/100ML; G/100ML; G/100ML
1000 INJECTION, SOLUTION INTRAVENOUS ONCE
Status: COMPLETED | OUTPATIENT
Start: 2024-06-22 | End: 2024-06-22

## 2024-06-22 RX ORDER — INSULIN LISPRO 100 [IU]/ML
1-6 INJECTION, SOLUTION INTRAVENOUS; SUBCUTANEOUS EVERY 6 HOURS SCHEDULED
Status: DISCONTINUED | OUTPATIENT
Start: 2024-06-22 | End: 2024-06-22

## 2024-06-22 RX ORDER — ACETAMINOPHEN 160 MG/5ML
650 SUSPENSION ORAL EVERY 4 HOURS PRN
Status: DISCONTINUED | OUTPATIENT
Start: 2024-06-22 | End: 2024-06-23

## 2024-06-22 RX ORDER — FENTANYL CITRATE 50 UG/ML
50 INJECTION, SOLUTION INTRAMUSCULAR; INTRAVENOUS ONCE
Status: COMPLETED | OUTPATIENT
Start: 2024-06-22 | End: 2024-06-22

## 2024-06-22 RX ORDER — MIDAZOLAM HYDROCHLORIDE 2 MG/2ML
2 INJECTION, SOLUTION INTRAMUSCULAR; INTRAVENOUS EVERY 4 HOURS PRN
Status: DISCONTINUED | OUTPATIENT
Start: 2024-06-22 | End: 2024-06-23

## 2024-06-22 RX ORDER — PROPOFOL 10 MG/ML
INJECTION, EMULSION INTRAVENOUS
Status: COMPLETED | OUTPATIENT
Start: 2024-06-22 | End: 2024-06-22

## 2024-06-22 RX ORDER — TIZANIDINE 4 MG/1
4 TABLET ORAL 3 TIMES DAILY
Status: DISCONTINUED | OUTPATIENT
Start: 2024-06-22 | End: 2024-06-22

## 2024-06-22 RX ORDER — FENTANYL CITRATE-0.9 % NACL/PF 10 MCG/ML
50 PLASTIC BAG, INJECTION (ML) INTRAVENOUS CONTINUOUS
Status: DISCONTINUED | OUTPATIENT
Start: 2024-06-22 | End: 2024-06-23

## 2024-06-22 RX ORDER — PROPOFOL 10 MG/ML
5-50 INJECTION, EMULSION INTRAVENOUS
Status: DISCONTINUED | OUTPATIENT
Start: 2024-06-22 | End: 2024-06-23

## 2024-06-22 RX ORDER — DEXMEDETOMIDINE HYDROCHLORIDE 4 UG/ML
.1-1.2 INJECTION, SOLUTION INTRAVENOUS
Status: DISCONTINUED | OUTPATIENT
Start: 2024-06-22 | End: 2024-06-23

## 2024-06-22 RX ADMIN — PROPOFOL 50 MCG/KG/MIN: 10 INJECTION, EMULSION INTRAVENOUS at 16:55

## 2024-06-22 RX ADMIN — LITHIUM CARBONATE 300 MG: 300 TABLET, EXTENDED RELEASE ORAL at 09:42

## 2024-06-22 RX ADMIN — DEXMEDETOMIDINE HYDROCHLORIDE 1.2 MCG/KG/HR: 400 INJECTION INTRAVENOUS at 20:02

## 2024-06-22 RX ADMIN — PROPOFOL 30 MCG/KG/MIN: 10 INJECTION, EMULSION INTRAVENOUS at 12:39

## 2024-06-22 RX ADMIN — LIDOCAINE 1 PATCH: 50 PATCH CUTANEOUS at 09:48

## 2024-06-22 RX ADMIN — SODIUM CHLORIDE 11 UNITS/HR: 9 INJECTION, SOLUTION INTRAVENOUS at 15:29

## 2024-06-22 RX ADMIN — NOREPINEPHRINE BITARTRATE 14 MCG/MIN: 1 INJECTION, SOLUTION, CONCENTRATE INTRAVENOUS at 14:59

## 2024-06-22 RX ADMIN — SODIUM CHLORIDE, SODIUM GLUCONATE, SODIUM ACETATE, POTASSIUM CHLORIDE, MAGNESIUM CHLORIDE, SODIUM PHOSPHATE, DIBASIC, AND POTASSIUM PHOSPHATE 125 ML/HR: .53; .5; .37; .037; .03; .012; .00082 INJECTION, SOLUTION INTRAVENOUS at 04:38

## 2024-06-22 RX ADMIN — HEPARIN SODIUM 5000 UNITS: 5000 INJECTION, SOLUTION INTRAVENOUS; SUBCUTANEOUS at 21:52

## 2024-06-22 RX ADMIN — NOREPINEPHRINE BITARTRATE 12 MCG/MIN: 1 INJECTION, SOLUTION, CONCENTRATE INTRAVENOUS at 19:13

## 2024-06-22 RX ADMIN — SODIUM CHLORIDE, SODIUM GLUCONATE, SODIUM ACETATE, POTASSIUM CHLORIDE, MAGNESIUM CHLORIDE, SODIUM PHOSPHATE, DIBASIC, AND POTASSIUM PHOSPHATE 1000 ML: .53; .5; .37; .037; .03; .012; .00082 INJECTION, SOLUTION INTRAVENOUS at 19:47

## 2024-06-22 RX ADMIN — SODIUM CHLORIDE, SODIUM GLUCONATE, SODIUM ACETATE, POTASSIUM CHLORIDE, MAGNESIUM CHLORIDE, SODIUM PHOSPHATE, DIBASIC, AND POTASSIUM PHOSPHATE 1000 ML: .53; .5; .37; .037; .03; .012; .00082 INJECTION, SOLUTION INTRAVENOUS at 16:52

## 2024-06-22 RX ADMIN — TIZANIDINE 4 MG: 4 TABLET ORAL at 09:48

## 2024-06-22 RX ADMIN — Medication 75 MCG/HR: at 16:54

## 2024-06-22 RX ADMIN — FENTANYL CITRATE 50 MCG: 50 INJECTION INTRAMUSCULAR; INTRAVENOUS at 15:03

## 2024-06-22 RX ADMIN — ACETAMINOPHEN 650 MG: 650 SUSPENSION ORAL at 21:52

## 2024-06-22 RX ADMIN — PROPOFOL 50 MCG/KG/MIN: 10 INJECTION, EMULSION INTRAVENOUS at 20:02

## 2024-06-22 RX ADMIN — POTASSIUM PHOSPHATES 30 MMOL: 236; 224 INJECTION, SOLUTION INTRAVENOUS at 15:50

## 2024-06-22 RX ADMIN — PROPOFOL 50 MCG/KG/MIN: 10 INJECTION, EMULSION INTRAVENOUS at 23:36

## 2024-06-22 RX ADMIN — PRAZOSIN HYDROCHLORIDE 5 MG: 5 CAPSULE ORAL at 09:42

## 2024-06-22 RX ADMIN — DEXMEDETOMIDINE HYDROCHLORIDE 1.2 MCG/KG/HR: 400 INJECTION INTRAVENOUS at 23:37

## 2024-06-22 RX ADMIN — PROPOFOL 50 MCG/KG/MIN: 10 INJECTION, EMULSION INTRAVENOUS at 14:44

## 2024-06-22 RX ADMIN — INSULIN LISPRO 6 UNITS: 100 INJECTION, SOLUTION INTRAVENOUS; SUBCUTANEOUS at 15:14

## 2024-06-22 RX ADMIN — HEPARIN SODIUM 5000 UNITS: 5000 INJECTION, SOLUTION INTRAVENOUS; SUBCUTANEOUS at 15:00

## 2024-06-22 RX ADMIN — NOREPINEPHRINE BITARTRATE 4 MCG/MIN: 1 SOLUTION INTRAVENOUS at 12:33

## 2024-06-22 RX ADMIN — DEXMEDETOMIDINE HYDROCHLORIDE 0.6 MCG/KG/HR: 400 INJECTION INTRAVENOUS at 14:41

## 2024-06-22 RX ADMIN — LITHIUM CARBONATE 300 MG: 300 TABLET, EXTENDED RELEASE ORAL at 18:26

## 2024-06-22 RX ADMIN — SODIUM CHLORIDE 1000 ML: 0.9 INJECTION, SOLUTION INTRAVENOUS at 12:53

## 2024-06-22 RX ADMIN — DEXMEDETOMIDINE HYDROCHLORIDE 1.2 MCG/KG/HR: 400 INJECTION INTRAVENOUS at 16:55

## 2024-06-22 NOTE — RESPIRATORY THERAPY NOTE
06/22/24 1420   Respiratory Assessment   Resp Comments 1355 Patient transported to Cat scan on the portable vent with settings of Inspiratory time of 1 , Expiratory time of 2 , Fio2 100 % and Peep + 8 . Patient placed back on the C3 vent upon return to ICU .   Vent Information   Vent ID [id number]   Vent type Casey C3   Casey Vent Mode (S)CMV   SpO2 98 %   (S)CMV Settings   Resp Rate (BPM) 16 BPM   VT (mL) 450 mL   FIO2 (%) 100 %   PEEP (cmH2O) 8 cmH2O   Insp Time (%) 1 %   Flow Trigger (LPM) 5   Humidification Heater   Heater Temperature (Set) 95 °F (35 °C)   (S)CMV Actuals   Resp Rate (BPM) 24 BPM   VT (mL) 462   MV 10.6   MAP (cmH2O) 7.2 cmH2O   Peak Pressure (cmH2O) 13 cmH2O   I:E Ratio (Obs) 1:1.3   Insp Resistance 12   Heater Temperature (Obs) 89.2 °F (31.8 °C)  (Just turn back on)   Static Compliance (mL/cmH20) 53 mL/cmH2O   (S)CMV Alarms   High Peak Pressure (cmH2O) 40   Low Pressure (cmH2O) 5 cm H2O   High Resp Rate (BPM) 40 BPM   Low Resp Rate (BPM) 8 BPM   High MV (L/min) 20 L/min   Low MV (L/min) 4 L/min   High VT (mL) 850 mL   Low VT (mL) 350 mL   Apnea Time (s) 20 S

## 2024-06-22 NOTE — PROCEDURES
Central Line Insertion    Date/Time: 6/22/2024 3:29 PM    Performed by: Adi Sloan DO  Authorized by: Adi Sloan DO    Patient location:  ICU  Consent:     Consent obtained:  Emergent situation  Universal protocol:     Immediately prior to procedure, a time out was called: yes      Patient identity confirmed:  Arm band  Pre-procedure details:     Hand hygiene: Hand hygiene performed prior to insertion      Sterile barrier technique: All elements of maximal sterile technique followed      Skin preparation:  ChloraPrep    Skin preparation agent: Skin preparation agent completely dried prior to procedure    Indications:     Central line indications: medications requiring central line    Sedation:     Sedation type:  Moderate (conscious) sedation  Anesthesia (see MAR for exact dosages):     Anesthesia method:  None  Procedure details:     Location:  Right internal jugular    Vessel type: vein      Laterality:  Right    Approach: open technique used      Patient position:  Flat    Catheter type:  Triple lumen    Catheter size:  8.5 Fr    Landmarks identified: yes      Ultrasound guidance: yes      Ultrasound image availability:  Images available in PACS    Sterile ultrasound techniques: Sterile gel and sterile probe covers were used      Manometry confirmation: no      Number of attempts:  1    Successful placement: yes      Catheter tip vessel location: superior vena cava    Post-procedure details:     Post-procedure:  Dressing applied and line sutured    Assessment:  Blood return through all ports, no pneumothorax on x-ray, free fluid flow and placement verified by x-ray    Post-procedure complications: none      Patient tolerance of procedure:  Tolerated well, no immediate complications

## 2024-06-22 NOTE — ED NOTES
Pt request sister be called on discharge for a ride home      Yariel Singh, WILDA  06/21/24 2012

## 2024-06-22 NOTE — ASSESSMENT & PLAN NOTE
Hemoglobin A1C pending  Continue with SSI and Q6H glucose monitoring for now  Consider insulin gtt if needed

## 2024-06-22 NOTE — ASSESSMENT & PLAN NOTE
Will obtain lithium level  Patient states he is no longer taking Risperdal at home  Will order Lithobid 300 mg p.o. daily once level returns

## 2024-06-22 NOTE — ASSESSMENT & PLAN NOTE
In the setting of altered mental status  Unable to protect airway   Aspiration event during intubation  Continue mechanical ventilation ACVC 18/500/100/8  Wean vent as able for goal saturation > 90%  VAP bundle

## 2024-06-22 NOTE — ASSESSMENT & PLAN NOTE
Placed on telemetry  ERICK score of 1  Troponins have been flat at 14, 10 and 8  EKG reassuring  Suspect GI source  Will give Maalox as needed

## 2024-06-22 NOTE — RAPID RESPONSE
Rapid Response Note  Osei Trimble 49 y.o. male MRN: [id number]  Unit/Bed#: ICU 07-01 Encounter: [id number]    Rapid Response Notification(s):   Response called date/time:  6/22/2024 12:22 PM  Response team arrival date/time:  6/22/2024 12:24 PM  Response end date/time:  6/22/2024 1:15 PM  Level of care:  [facility]  Rapid response location:  [facility] unit  Primary reason for rapid response call:  Acute change in neuro status    Rapid Response Intervention(s):   Airway:  Endotracheal intubation  Breathing:  Assisted ventilation  Circulation:  None  Fluids administered:  Normal saline  Medications administered:  Paralytics  Line placement:  Place arterial line and place central line       Assessment:   AMS  Acute respiratory failure    Plan:   Stat CT head and CAP   Intubated for airway protection, continue mechanical ventilation  See ICU acceptance note for further plan     Rapid Response Outcome:   Transfer:  Transfer to ICU  Primary service notified of transfer: Yes    Code Status: Level 1 (Full Code)      Family notified: Yes, Name of Family member contacted sister at bedside          Background/Situation:   Osei Trimble is a 49 y.o. male who presented 6/21 with complaints of chest pain thought to be GI related. On admission was noted to have MEHDI, given fluids with improvement in  kidney function. Chest pain resolved, troponins negative and no EKG changes noted. This AM RRT was called when patient became unresponsive while using the bathroom. When he was returned to the bed he remained unresponsive, was noted to be hypotensive with SBP in the 80s. Given 1L IV fluid with minimal response. Noted to have slight roving eye movements. Intubated for airway protection. Levophed started for persistent hypotension. Transferred to ICU for further monitoring and management.     Review of Systems   Unable to perform ROS: Intubated       Objective:   Vitals:    06/22/24 1245 06/22/24 1248 06/22/24 1251 06/22/24 1253   BP:  93/62      Pulse:  (!) 47 91 98   Resp:       Temp:       TempSrc:       SpO2:  (!) 55% 93% 99%   Weight:       Height:         Physical Exam  Vitals and nursing note reviewed.   Constitutional:       Appearance: He is ill-appearing.      Interventions: He is intubated.   HENT:      Head: Normocephalic.      Mouth/Throat:      Mouth: Mucous membranes are dry.   Eyes:      Pupils: Pupils are equal, round, and reactive to light.   Cardiovascular:      Rate and Rhythm: Normal rate and regular rhythm.      Pulses: Normal pulses.      Heart sounds: Normal heart sounds.   Pulmonary:      Effort: He is intubated.      Breath sounds: Rhonchi present.   Abdominal:      General: Bowel sounds are normal.      Palpations: Abdomen is soft.   Musculoskeletal:         General: Normal range of motion.      Cervical back: Normal range of motion.   Skin:     Coloration: Skin is pale.   Neurological:      Mental Status: He is unresponsive.

## 2024-06-22 NOTE — NURSING NOTE
Alerted by PCA that pt was unresponsive on the toilet at this time.  Upon entering the pts BR Pt was noted to be sitting on the toilet slumped over. Pallor pale.  Severe diaphoresis noted.  Unresponsive to voice and sternal rub.  HR was palpable.  Assisted to w/c by 2 male Rns and palced back to bed.  Rapid response was called and critical care was at bedside during this time.  [facility]  Pt was noted to be hypotensive and bradycardic.  Remained unresponsive the entire event.

## 2024-06-22 NOTE — ASSESSMENT & PLAN NOTE
Presented 6/21 with radiating chest pain  Troponins initially negative, no EKG changes noted   Chest pain resolved  Repeat troponins  Obtain EKG and echo

## 2024-06-22 NOTE — H&P
[facility]  H&P  Name: Osei Trimble 49 y.o. male I MRN: [id number]  Unit/Bed#: -01 I Date of Admission: 6/21/2024   Date of Service: 6/21/2024 I Hospital Day: 0      Assessment & Plan   * MEHDI (acute kidney injury) (HCC)  Assessment & Plan  Admit to medicine  Hold prehospital lisinopril  Obtain urine protein creatinine ratio  Hydrate with isolyte at 150 cc/h  Obtain renal ultrasound in a.m.  Continue to monitor with repeat labs in a.m.  Consult nephrology    Other chest pain  Assessment & Plan  Placed on telemetry  ERICK score of 1  Troponins have been flat at 14, 10 and 8  EKG reassuring  Suspect GI source  Will give Maalox as needed    Explosive personality disorder (HCC)  Assessment & Plan  Will obtain lithium level  Patient states he is no longer taking Risperdal at home  Will order Lithobid 300 mg p.o. daily once level returns    Essential hypertension  Assessment & Plan  Prehospital lisinopril currently on hold secondary to acute kidney injury  Continue prehospital prazosin 5 mg p.o. daily add hydralazine 5 mg IV every 6 hours as needed         VTE Prophylaxis:  Patient is low risk, will ambulate  Code Status: Level 1  Discussion with family: None present at bedside at time of exam    Anticipated Length of Stay:  Patient will be admitted on an Inpatient basis with an anticipated length of stay of  > 2 midnights.   Justification for Hospital Stay: Acute kidney injury requiring IV fluid resuscitation and further nephrology evaluation    Total Time for Visit, including Counseling / Coordination of Care: 1 hour.  Greater than 50% of this total time spent on direct patient counseling and coordination of care.    Chief Complaint:   Chest pain x 1 day    History of Present Illness:    Osei Trimble is a 49 y.o. male who presents with chest pain x 1 day.  Patient presents ER for further evaluation and treatment is 1 day history of right-sided chest pain that began while he was sitting  at the [facility].  Patient states that this was accompanied with radiation to the left side of his neck he describes it as stiffness and has now become epigastric pain.  Patient denies any accompanying palpitations, lightheadedness, diaphoresis, nausea or vomiting.  Patient has no known cardiac history does have significant cardiac risk factors to include obesity, hypertension and family history.    While in ER patient was noted to have an acute kidney injury review of medications reveals that he is on lisinopril at home he states that he has had good p.o. fluid intake.  Patient denies any known history of renal insufficiency but does states that he has had kidney stones in the past and required stenting with last one approximately 5 years ago.    Review of Systems:    Review of Systems   Constitutional:  Negative for chills and fever.   Respiratory:  Negative for cough, shortness of breath and wheezing.    Cardiovascular:  Positive for chest pain. Negative for palpitations.   Gastrointestinal:  Negative for abdominal pain, diarrhea, nausea and vomiting.   Genitourinary:  Negative for dysuria, frequency, hematuria and urgency.   Neurological:  Negative for weakness, light-headedness and headaches.   All other systems reviewed and are negative.      Past Medical and Surgical History:     Past Medical History:   Diagnosis Date    Allergic     Bipolar disorder in partial remission (HCC)     Erectile dysfunction     unspecified erectile dysfunction type    Hypertension     Kidney stone        Past Surgical History:   Procedure Laterality Date    COLONOSCOPY      EXPLORATORY LAPAROTOMY W/ BOWEL RESECTION N/A 7/21/2022    Procedure: LAPAROTOMY EXPLORATORY W/ SMALL BOWEL RESECTION, liver biopsy;  Surgeon: Rose Mary Lara MD;  Location: [facility];  Service: General    FL RETROGRADE PYELOGRAM  10/06/2021    HERNIA REPAIR      NJ CYSTO BLADDER W/URETERAL CATHETERIZATION Right 10/06/2021    Procedure: CYSTOSCOPY RETROGRADE  PYELOGRAM WITH INSERTION STENT URETERAL, RIGHT URETEROSCOPY, STONE EXTRACTION;  Surgeon: Bradly Rivera MD;  Location:  MAIN OR;  Service: Urology       Meds/Allergies:    Prior to Admission medications    Medication Sig Start Date End Date Taking? Authorizing Provider   ibuprofen (MOTRIN) 200 mg tablet Take 200 mg by mouth every 6 (six) hours as needed for mild pain    Historical Provider, MD   lisinopril (ZESTRIL) 10 mg tablet Take 20 mg by mouth daily in the early morning    Historical Provider, MD   lisinopril (ZESTRIL) 20 mg tablet  24   Historical Provider, MD   lithium carbonate (LITHOBID) 300 mg CR tablet  10/19/23   Historical Provider, MD   prazosin (MINIPRESS) 5 mg capsule  10/19/23   Historical Provider, MD   risperiDONE (RisperDAL) 1 mg tablet  10/19/23   Historical Provider, MD     all medications and allergies reviewed    Allergies:   Allergies   Allergen Reactions    Tomato - Food Allergy Anaphylaxis     raw    Poison Ivy Extract Hives    Poison Sumac Extract Hives       Social History:     Marital Status: Single   Occupation: Disabled  Patient Pre-hospital Living Situation: Resides at home alone  Patient Pre-hospital Level of Mobility: Full without assist  Patient Pre-hospital Diet Restrictions: None    Social History     Substance and Sexual Activity   Alcohol Use Not Currently    Comment: Drinks a quart of moonshine/night-As of 21 will quit drinking     Social History     Tobacco Use   Smoking Status Former    Current packs/day: 0.00    Types: Pipe, Cigarettes    Quit date: 2003    Years since quittin.5   Smokeless Tobacco Never     Social History     Substance and Sexual Activity   Drug Use Yes    Types: Marijuana    Comment: Socially (1-2 Monthly)       Family History:  I have reviewed the patient's family history    Physical Exam:     Vitals:   Blood Pressure: 145/97 (24)  Pulse: 76 (24)  Temperature: 98.1 °F (36.7 °C) (24)  Temp Source: Oral  "(06/21/24 2034)  Respirations: 18 (06/21/24 2216)  Height: 6' 1\" (185.4 cm) (06/21/24 2034)  Weight - Scale: 104 kg (228 lb 2.8 oz) (06/21/24 2034)  SpO2: 97 % (06/21/24 2034)    Physical Exam  Vitals and nursing note reviewed.   Constitutional:       Appearance: He is well-developed.   HENT:      Head: Normocephalic and atraumatic.      Mouth/Throat:      Pharynx: No oropharyngeal exudate.   Eyes:      General: No scleral icterus.     Pupils: Pupils are equal, round, and reactive to light.   Neck:      Vascular: No JVD.   Cardiovascular:      Rate and Rhythm: Normal rate and regular rhythm.      Heart sounds: Normal heart sounds. No murmur heard.  Pulmonary:      Effort: Pulmonary effort is normal. No respiratory distress.      Breath sounds: Normal breath sounds. No wheezing or rales.   Abdominal:      General: Bowel sounds are normal.      Palpations: Abdomen is soft.      Tenderness: There is no abdominal tenderness. There is no guarding or rebound.   Musculoskeletal:         General: Normal range of motion.      Cervical back: Normal range of motion and neck supple.      Right lower leg: No edema.      Left lower leg: No edema.   Lymphadenopathy:      Cervical: No cervical adenopathy.   Skin:     General: Skin is warm and dry.      Findings: No erythema or rash.   Neurological:      Mental Status: He is alert and oriented to person, place, and time.   Psychiatric:         Behavior: Behavior normal.         Additional Data:     Lab Results: I have personally reviewed pertinent reports.      Results from last 7 days   Lab Units 06/21/24  1714   WBC Thousand/uL 12.88*   HEMOGLOBIN g/dL 16.1   HEMATOCRIT % 46.3   PLATELETS Thousands/uL 299   SEGS PCT % 68   LYMPHO PCT % 21   MONO PCT % 8   EOS PCT % 2     Results from last 7 days   Lab Units 06/21/24  1714   SODIUM mmol/L 134*   POTASSIUM mmol/L 4.3   CHLORIDE mmol/L 99   CO2 mmol/L 22   BUN mg/dL 21   CREATININE mg/dL 2.38*   ANION GAP mmol/L 13   CALCIUM mg/dL " 10.5*   ALBUMIN g/dL 5.3*   TOTAL BILIRUBIN mg/dL 1.79*   ALK PHOS U/L 152*   ALT U/L 59*   AST U/L 38   GLUCOSE RANDOM mg/dL 242*     Results from last 7 days   Lab Units 06/21/24  1734   INR  1.12                   Imaging: I have personally reviewed pertinent reports.    CT chest without contrast   Final Result by Parker Torres MD (06/21 1844)      No acute findings in the chest.      Unchanged 9 mm right middle lobe pulmonary nodule, not FDG avid on PET/CT 5/10/2024.               Workstation performed: [id number]         XR chest 1 view portable   ED Interpretation by Tawanda Jerry DO (06/21 1831)   There is no active disease in the chest      Final Result by Parker Torres MD (06/21 1847)      No acute cardiopulmonary disease.            Workstation performed: [id number]          VAS VENOUS DUPLEX - LOWER LIMB BILATERAL    (Results Pending)   US kidney and bladder with pvr    (Results Pending)         EKG, Pathology, and Other Studies Reviewed on Admission:   EKG: N/A    [facility] / Care Everywhere Records Reviewed: Yes    ** Please Note: This note has been constructed using a voice recognition system. **

## 2024-06-22 NOTE — ASSESSMENT & PLAN NOTE
Cardiac enzymes negative.  Patient actually has left-sided neck pain.  Will trial tizanidine and lidocaine patch    Lab Results   Component Value Date    HSTNI0 14 06/21/2024    HSTNI2 10 06/21/2024    HSTNI4 8 06/21/2024

## 2024-06-22 NOTE — TREATMENT PLAN
Treatment plan    Please see rapid response note. Patient unresponsive during bowel movement and found to he hypotensive.  Patient intubated and transferring to ICU service. Discussed with sister on telephone    Chris Montano DO

## 2024-06-22 NOTE — RESPIRATORY THERAPY NOTE
06/22/24 1431   Respiratory Assessment   Resp Comments Patient ET Tube with drawn 2 CM. Placement is 26 at the teeth.   Vent Information   Vent type Casey C3   (S)CMV Settings   Resp Rate (BPM) (S)  18 BPM  (as per Critical care order)   VT (mL) (S)  500 mL  (As per Critical care order)   FIO2 (%) (S)  90 %  (Decreased to 90 % . Critical care aware)   PEEP (cmH2O) 8 cmH2O   Insp Time (%) 1 %   Flow Trigger (LPM) 5   Humidification Heater   Heater Temperature (Set) 95 °F (35 °C)   (S)CMV Actuals   Resp Rate (BPM) 23 BPM   VT (mL) 509   MV 11.8   MAP (cmH2O) 9.5 cmH2O   Peak Pressure (cmH2O) 16 cmH2O   I:E Ratio (Obs) 1:1.7   Heater Temperature (Obs) 95 °F (35 °C)   (S)CMV Alarms   High Peak Pressure (cmH2O) 40   Low Pressure (cmH2O) 5 cm H2O   High Resp Rate (BPM) 40 BPM   Low Resp Rate (BPM) 8 BPM   High MV (L/min) 20 L/min   Low MV (L/min) 4 L/min   High VT (mL) 850 mL   Low VT (mL) 400 mL   Apnea Time (s) 20 S

## 2024-06-22 NOTE — ASSESSMENT & PLAN NOTE
Baseline creatinine 0.8-1, 2.38 on admission  Received IV fluid resuscitation with improvement in MEHDI   Continue to trend renal indices   Place irene catheter for I & O monitoring

## 2024-06-22 NOTE — ASSESSMENT & PLAN NOTE
6/22 RRT called when patient became unresponsive while using the toilet  Upon return to bed remained unresponsive  Intubated for airway protection  Obtain stat CT head  Hold sedating medication  Consider spot EEG  Monitor neuro exam closely

## 2024-06-22 NOTE — ASSESSMENT & PLAN NOTE
Appears in new onset diabetes.  Follow-up on A1c.    Results from last 7 days   Lab Units 06/22/24  0431 06/21/24  1714   GLUCOSE RANDOM mg/dL 211* 242*

## 2024-06-22 NOTE — PROCEDURES
Intubation    Date/Time: 6/22/2024 4:15 PM    Performed by: Adi Sloan DO  Authorized by: Adi Sloan DO    Patient location:  Bedside  Consent:     Consent obtained:  Emergent situation  Pre-procedure details:     Patient status:  Unresponsive    Pretreatment medications:  None    Paralytics:  Vecuronium  Indications:     Indications for intubation: airway protection    Procedure details:     Preoxygenation:  Nasal cannula    CPR in progress: no      Intubation method:  Oral    Oral intubation technique: Gatica - video laryngoscopy.    Laryngoscope blade:  Mac 3    Tube size (mm):  7.5    Tube type:  Cuffed    Number of attempts:  2    Ventilation between attempts: yes      Cricoid pressure: no      Tube visualized through cords: yes    Placement assessment:     ETT to lip:  26    Tube secured with:  ETT smallwood    Breath sounds:  Equal and absent over the epigastrium    Placement verification: chest rise, condensation, direct visualization, equal breath sounds and ETCO2 detector      CXR findings:  ETT in right main stem (ETT pulled back 2 cm after CT chest findings)  Post-procedure details:     Complication (if applicable):  Aspiration, esophageal intubation with first attempt  Comments:      Patient unresponsive on arrival to patient's room. Remained unresponsive even to painful stimuli. Decision made to intubate patient for airway protection. Noted that he had eaten lunch prior to RR and was going to be a high aspiration risk. Head of the bed elevated. Suction available. Goal to avoid further insufflation of abdomen with bag mask ventilation. Pt was pre-oxygenated with NC. Given hx of neuroendocrine tumor with mass still in place concern for causing carcinoid crises, decision to intubate with vecuronium. During first attempt patient vomited copious brown liquid elements which were suctioned out of the ETT and oropharynx. Patient connected to ambu bag, bilateral ronchorous breath sounds auscultated  and absent epigastric sounds however minimal colorometic change on CO2 detector- thought to be 2/2 aspiration. Patient continued to desat. Decision made to have a second look with the streeter to confirm placement of tube. Noticed that ETT was in the esophagus, ETT cuff deflated and removed. Second attempt with 7.5 ETT successful.

## 2024-06-22 NOTE — ASSESSMENT & PLAN NOTE
Admit to medicine  Hold prehospital lisinopril  Obtain urine protein creatinine ratio  Hydrate with isolyte at 150 cc/h  Obtain renal ultrasound in a.m.  Continue to monitor with repeat labs in a.m.  Consult nephrology

## 2024-06-22 NOTE — ASSESSMENT & PLAN NOTE
History of mood disorder and hypertension presented to the hospital with chest pain found to have kidney injury.  Resolved with IV fluids.  Nephrology consulted.  Renal ultrasound without acute pathology.  Glucose in urine and does appear to have new diabetes.  Adding A1c to available labs.

## 2024-06-22 NOTE — PROCEDURES
Arterial Line Insertion    Date/Time: 6/22/2024 2:00 PM    Performed by: PIERRE Toth  Authorized by: PIERRE Toth    Patient location:  Bedside  Consent:     Consent obtained:  Emergent situation  Universal protocol:     Patient identity confirmed:  Arm band  Indications:     Indications: hemodynamic monitoring and multiple ABGs    Pre-procedure details:     Skin preparation:  Chlorhexidine    Preparation: Patient was prepped and draped in sterile fashion    Anesthesia (see MAR for exact dosages):     Anesthesia method:  None  Procedure details:     Location / Tip of Catheter:  Radial    Laterality:  Left    Number of attempts:  1    Successful placement: yes      Transducer: waveform confirmed    Post-procedure details:     Post-procedure:  Sutured    CMS:  Normal    Patient tolerance of procedure:  Tolerated well, no immediate complications

## 2024-06-22 NOTE — ASSESSMENT & PLAN NOTE
Noted to be hypotensive during RRT when placed back in bed   Given IV fluids with minimal improvement   Levo started, wean for MAP goal > 65  Unclear etiology at this point  Lactic 3.7, fluid resuscitate and trend  CT CAP pending  Monitor BP closely

## 2024-06-22 NOTE — PROGRESS NOTES
[facility]  ICU Acceptance/Transfer Note  Name: Osei Trimble I  MRN: [id number]  Unit/Bed#: ICU 07-01 I Date of Admission: 6/21/2024   Date of Service: 6/22/2024 I Hospital Day: 1    Assessment & Plan   * Acute encephalopathy  Assessment & Plan  6/22 RRT called when patient became unresponsive while using the toilet  Upon return to bed remained unresponsive  Intubated for airway protection  Obtain stat CT head  Hold sedating medication  Consider spot EEG  Monitor neuro exam closely    Hypotension  Assessment & Plan  Noted to be hypotensive during RRT when placed back in bed   Given IV fluids with minimal improvement   Levo started, wean for MAP goal > 65  Unclear etiology at this point  Lactic 3.7, fluid resuscitate and trend  CT CAP pending  Monitor BP closely    Acute respiratory failure (HCC)  Assessment & Plan  In the setting of altered mental status  Unable to protect airway   Aspiration event during intubation  Continue mechanical ventilation ACVC 18/500/100/8  Wean vent as able for goal saturation > 90%  VAP bundle    Hyperglycemia  Assessment & Plan  Hemoglobin A1C pending  Continue with SSI and Q6H glucose monitoring for now  Consider insulin gtt if needed    Explosive personality disorder (HCC)  Assessment & Plan  Hold home psych meds in the setting of encephalopathy    Other chest pain  Assessment & Plan  Presented 6/21 with radiating chest pain  Troponins initially negative, no EKG changes noted   Chest pain resolved  Repeat troponins  Obtain EKG and echo    Neuroendocrine carcinoma of small bowel (HCC)  Assessment & Plan  Follows with Dr. Green as outpatient   Incidentally found to have mesenteric masses in October 2021  PET CT 2022 demonstrated Dotatate avid clustered central mesenteric masses suspicious for underlying neuroendocrine neoplasm  Underwent small bowel resection 07/2022 in the area that was thought to be high risk for obstruction  Currently undergoing chemo  treatment every 4 weeks with lanreotide    Essential hypertension  Assessment & Plan  Hold antihypertensives in the setting of hypotension  Monitor BP    MEHDI (acute kidney injury) (HCC)  Assessment & Plan  Baseline creatinine 0.8-1, 2.38 on admission  Received IV fluid resuscitation with improvement in MEHDI   Continue to trend renal indices   Place irene catheter for I & O monitoring             Disposition: Critical care    ICU Core Measures     A: Assess, Prevent, and Manage Pain Has pain been assessed? NA  Need for changes to pain regimen? NA   B: Both SAT/SAT  N/A   C: Choice of Sedation RASS Goal: N/A patient not on sedation  Need for changes to sedation or analgesia regimen? NA   D: Delirium CAM-ICU: Unable to perform secondary to Acute cognitive dysfunction   E: Early Mobility  Plan for early mobility? NA   F: Family Engagement Plan for family engagement today? Yes       Review of Invasive Devices:    Irene Plan: Continue for accurate I/O monitoring for 48 hours  Central access plan: Medications requiring central line  Debo Plan: Keep arterial line for hemodynamic monitoring and frequent ABGs    Prophylaxis:  VTE VTE covered by:  heparin (porcine), Subcutaneous       Stress Ulcer  not ordered         Significant 24hr Events     24hr events: 49  year old male with PMH significant for hypertension, hyperglycemia, schizophrenia with explosive personality disorder, neuroendocrine carcinoma of the small bowel receiving chemotherapy who initially presented 6/21 with complaints of chest pain that radiated to the left side of his neck that he described as stiffness. Lab workup on presentation was significant for MEHDI. Patient was admitted for fluid resuscitation. Troponins were negative, EKG with no acute changes, chest pain resolved spontaneously.  This AM while using the toilet patient was found to be unresponsive. See RRT note for further detail. Intubated for airway protection and transferred to ICU for further  monitoring and management.     Subjective   Review of Systems: Review of Systems   Unable to perform ROS: Intubated        Objective                            Vitals I/O      Most Recent Min/Max in 24hrs   Temp 98.4 °F (36.9 °C) Temp  Min: 97.5 °F (36.4 °C)  Max: 98.4 °F (36.9 °C)   Pulse 98 Pulse  Min: 47  Max: 98   Resp 18 Resp  Min: 18  Max: 18   BP 93/62 BP  Min: 80/50  Max: 145/97   O2 Sat 99 % SpO2  Min: 55 %  Max: 100 %      Intake/Output Summary (Last 24 hours) at 6/22/2024 1412  Last data filed at 6/22/2024 1215  Gross per 24 hour   Intake 1320 ml   Output 1270 ml   Net 50 ml       Diet NPO    Invasive Monitoring   Arterial Line  Debo BP    No data recorded   MAP    No data recorded           Physical Exam   Physical Exam  Vitals and nursing note reviewed.   Eyes:      Pupils: Pupils are equal, round, and reactive to light.   Skin:     General: Skin is warm.      Coloration: Skin is pale.   HENT:      Head: Normocephalic.      Mouth/Throat:      Mouth: Mucous membranes are dry.   Cardiovascular:      Rate and Rhythm: Normal rate and regular rhythm.      Pulses: Normal pulses.      Heart sounds: Normal heart sounds.   Musculoskeletal:         General: Normal range of motion.   Abdominal:      Palpations: Abdomen is soft.   Constitutional:       Appearance: He is ill-appearing.   Pulmonary:      Effort: Pulmonary effort is normal.      Breath sounds: Normal breath sounds.   Neurological:      Mental Status: He is unresponsive.            Diagnostic Studies      EKG: NSR  Imaging:  I have personally reviewed pertinent reports.   and I have personally reviewed pertinent films in PACS     Medications:  Scheduled PRN   chlorhexidine, 15 mL, Q12H BERENICE  heparin (porcine), 5,000 Units, Q8H BERENICE  insulin lispro, 1-6 Units, 4x Daily (AC & HS)  lidocaine, 1 patch, Daily  lithium carbonate, 300 mg, BID  prazosin, 5 mg, Daily      aluminum-magnesium hydroxide-simethicone, 30 mL, Q4H PRN  hydrALAZINE, 5 mg, Q6H  PRN  ondansetron, 4 mg, Q6H PRN       Continuous    multi-electrolyte, 75 mL/hr, Last Rate: 75 mL/hr (06/22/24 1042)  norepinephrine, 1-30 mcg/min  propofol, 5-50 mcg/kg/min         Labs:    CBC    Recent Labs     06/22/24  0431 06/22/24  1329   WBC 9.01 9.91   HGB 13.8 13.0   HCT 39.0 37.3    147*     BMP    Recent Labs     06/22/24  0431 06/22/24  1248   SODIUM 134* 132*   K 3.7 3.6    100   CO2 23 21   AGAP 9 11   BUN 18 17   CREATININE 1.15 1.23   CALCIUM 9.1 9.0       Coags    Recent Labs     06/21/24  1734 06/22/24  1248   INR 1.12 1.14   PTT 28 23        Additional Electrolytes  Recent Labs     06/22/24  1248   MG 2.1   PHOS 1.6*          Blood Gas    Recent Labs     06/22/24  1333   PHART 7.248*   [id number] 48.1*   PO2ART 194.9*   COM0ECR 20.5*   BEART -6.8   SOURCE Line, Arterial     Recent Labs     06/22/24  1333   SOURCE Line, Arterial    LFTs  Recent Labs     06/21/24  1714 06/22/24  1248   ALT 59* 42   AST 38 32   ALKPHOS 152* 115*   ALB 5.3* 4.0   TBILI 1.79* 1.79*       Infectious  Recent Labs     06/22/24  1248   PROCALCITONI 0.12     Glucose  Recent Labs     06/21/24  1714 06/22/24  0431 06/22/24  1248   GLUC 242* 211* 333*               PIERRE Toth

## 2024-06-22 NOTE — PLAN OF CARE
Problem: INFECTION - ADULT  Goal: Absence or prevention of progression during hospitalization  Description: INTERVENTIONS:  - Assess and monitor for signs and symptoms of infection  - Monitor lab/diagnostic results  - Monitor all insertion sites, i.e. indwelling lines, tubes, and drains  - Monitor endotracheal if appropriate and nasal secretions for changes in amount and color  - Riparius appropriate cooling/warming therapies per order  - Administer medications as ordered  - Instruct and encourage patient and family to use good hand hygiene technique  - Identify and instruct in appropriate isolation precautions for identified infection/condition  Outcome: Progressing  Goal: Absence of fever/infection during neutropenic period  Description: INTERVENTIONS:  - Monitor WBC    Outcome: Progressing

## 2024-06-22 NOTE — RESPIRATORY THERAPY NOTE
06/22/24 1328   Respiratory Assessment   Resp Comments Called to a rapid response at 1222. Patient intubated with a number 7.5 ET Tube by Dr. Sloan. Secured at 28 at the teeth by a commercial tube smallwood/ Placement verified with bilateral BS and Co2 detector. Patient transported to the ICU and placed on the C3 Vent   Vent Information   Vent type Casey C3   Casey Vent Mode (S)CMV   Ventilator Start Yes   $ Pulse Oximetry Spot Check Charge Completed   (S)CMV Settings   Resp Rate (BPM) 16 BPM   VT (mL) 450 mL   FIO2 (%) 100 %   PEEP (cmH2O) 8 cmH2O   Insp Time (%) 1 %   Flow Trigger (LPM) 5   Humidification Heater   Heater Temperature (Set) 95 °F (35 °C)   (S)CMV Actuals   Resp Rate (BPM) 16 BPM   VT (mL) 443   MV 7   MAP (cmH2O) 11 cmH2O   Peak Pressure (cmH2O) 24 cmH2O   I:E Ratio (Obs) 1:2.8   Insp Resistance 19   Heater Temperature (Obs) 95 °F (35 °C)   Static Compliance (mL/cmH20) 39 mL/cmH2O   Plateau Pressure (cm H2O) 20.8 cm H2O   (S)CMV Alarms   High Peak Pressure (cmH2O) 40   Low Pressure (cmH2O) 5 cm H2O   High Resp Rate (BPM) 40 BPM   Low Resp Rate (BPM) 8 BPM   High MV (L/min) 20 L/min   Low MV (L/min) 4 L/min   High VT (mL) 850 mL   Low VT (mL) 360 mL   Apnea Time (s) 20 S   Maintenance   Resuscitation bag with peep valve at bedside Yes

## 2024-06-22 NOTE — CONSULTS
CONSULTATION-NEPHROLOGY   Osei Trimble 49 y.o. male MRN: [id number]  Unit/Bed#: -01 Encounter: [id number]        Assessment and Plan:    Acute kidney injury  Creatinine 1.15 mg/dL, improved from 2.38 mg/dL yesterday.  S/p 1L NSS bolus 6/21 and Plasma-Lyte 125 ml/hr.  Renal ultrasound ordered due to history of kidney stones.  Examines euvolemic.   Appetite improving, 75% of breakfast eaten.  Patient states eating and drinking well.  Decrease Plasma-Lyte to 75 ml/hr as appetite is improving.  Recheck UA and morning BMP.  May discharge from nephrology service if labs are stable.    Hyponatremia, mild asymptomatic chronic  Sodium 134 mmol/L, stable.  Encourage adequate oral intake including high-protein foods.    Hypertension  Blood pressure stable, -140 past 24 hours.  Not currently on blood pressure medication.    Neuroendocrine tumor  Currently getting treated by heme-onc.  Recent PET scan indicates metastatic disease.    Chronic lithium use  Suffers from intermittent explosive disorder and bipolar disorder.  Lithium level <0.10 mmol/L, decreased, 6/21.  Patient states lithium was recently decreased from 3 times daily to twice daily.  States he has been experiencing episodes of increased fatigue and lethargy, most recent this week, where he sleeps 20-22 hours/day.  Advised patient to contact provider who manages lithium medication after discharge.      HPI:    Osei Trimble is a 49 y.o. male who presented to [facility] 6/21 with chest pain.  Patient is currently being treated for neuroendocrine tumor and developed chest pain that radiated into neck and arm.  Past medical history includes bipolar disorder in partial remission, diabetes mellitus hypertension, nephrolithiasis, and erectile dysfunction.    Reason for Consult: Kidney injury and hyponatremia.    Review of Systems:    Review of Systems   Constitutional:  Positive for fatigue. Negative for activity change, appetite change,  chills and fever.   HENT:  Negative for ear pain and sore throat.    Eyes:  Negative for pain and visual disturbance.   Respiratory:  Negative for cough and shortness of breath.    Cardiovascular:  Negative for chest pain, palpitations and leg swelling.   Gastrointestinal:  Negative for abdominal pain, constipation, diarrhea, nausea and vomiting.   Endocrine: Negative.    Genitourinary:  Negative for dysuria and hematuria.   Musculoskeletal:  Negative for arthralgias and back pain.   Skin:  Negative for color change and rash.   Allergic/Immunologic: Negative.    Neurological:  Negative for dizziness, seizures, syncope, weakness, light-headedness and headaches.   Hematological: Negative.    Psychiatric/Behavioral: Negative.     All other systems reviewed and are negative.      Historical Information   Past Medical History:   Diagnosis Date    Allergic     Bipolar disorder in partial remission (HCC)     Erectile dysfunction     unspecified erectile dysfunction type    Hypertension     Kidney stone      Past Surgical History:   Procedure Laterality Date    COLONOSCOPY      EXPLORATORY LAPAROTOMY W/ BOWEL RESECTION N/A 7/21/2022    Procedure: LAPAROTOMY EXPLORATORY W/ SMALL BOWEL RESECTION, liver biopsy;  Surgeon: Rose Mary Lara MD;  Location:  MAIN OR;  Service: General    FL RETROGRADE PYELOGRAM  10/06/2021    HERNIA REPAIR      TX CYSTO BLADDER W/URETERAL CATHETERIZATION Right 10/06/2021    Procedure: CYSTOSCOPY RETROGRADE PYELOGRAM WITH INSERTION STENT URETERAL, RIGHT URETEROSCOPY, STONE EXTRACTION;  Surgeon: Bradly Rivera MD;  Location:  MAIN OR;  Service: Urology     Social History   Social History     Substance and Sexual Activity   Alcohol Use Not Currently    Comment: Drinks a quart of moonshine/night-As of 7/4/22 will quit drinking     Social History     Substance and Sexual Activity   Drug Use Yes    Types: Marijuana    Comment: Socially (1-2 Monthly)     Social History     Tobacco Use   Smoking Status  "Former    Current packs/day: 0.00    Types: Pipe, Cigarettes    Quit date: 2003    Years since quittin.5   Smokeless Tobacco Never       Family History:   Family History   Problem Relation Age of Onset    Diabetes Mother     Thyroid disease Mother     Cancer Father     Thyroid disease Sister     Depression Brother     Cancer Maternal Aunt     Cancer Paternal Uncle     Cancer Paternal Grandmother     No Known Problems Brother     Thyroid disease Sister        Medications:  Pertinent medications were reviewed  Current Facility-Administered Medications   Medication Dose Route Frequency Provider Last Rate    aluminum-magnesium hydroxide-simethicone  30 mL Oral Q4H PRN Tyrese Carolina PA-C      hydrALAZINE  5 mg Intravenous Q6H PRN Tyresestef Carolina PA-C      lidocaine  1 patch Topical Daily Chris Montano, DO      lithium carbonate  300 mg Oral BID Chris Montano DO      multi-electrolyte  125 mL/hr Intravenous Continuous Tyrese Carolina PA-C 125 mL/hr (24 0438)    ondansetron  4 mg Intravenous Q6H PRN Tyrese Carolina PA-C      prazosin  5 mg Oral Daily Tyrese Carolina PA-C      tiZANidine  4 mg Oral TID Chris Dusty, DO           Allergies   Allergen Reactions    Tomato - Food Allergy Anaphylaxis     raw    Poison Ivy Extract Hives    Poison Sumac Extract Hives         Vitals:   /94   Pulse 72   Temp 97.9 °F (36.6 °C)   Resp 18   Ht 6' 1\" (1.854 m)   Wt 104 kg (228 lb 2.8 oz)   SpO2 97%   BMI 30.10 kg/m²   Body mass index is 30.1 kg/m².  SpO2: 97 %,   SpO2 Activity: At Rest,   O2 Device: None (Room air)      Intake/Output Summary (Last 24 hours) at 2024 1014  Last data filed at 2024 0700  Gross per 24 hour   Intake 1040 ml   Output 950 ml   Net 90 ml     Invasive Devices       Peripheral Intravenous Line  Duration             Peripheral IV 24 Left;Proximal;Ventral (anterior) Forearm <1 day              Drain  Duration             Ureteral Drain/Stent Right ureter 6 Fr. 990 days         "            Physical Exam:    Physical Exam  Vitals and nursing note reviewed.   Constitutional:       General: He is not in acute distress.     Appearance: Normal appearance. He is normal weight. He is not ill-appearing.   HENT:      Head: Normocephalic and atraumatic.      Nose: Nose normal.      Mouth/Throat:      Mouth: Mucous membranes are moist.      Pharynx: Oropharynx is clear.   Eyes:      Extraocular Movements: Extraocular movements intact.      Conjunctiva/sclera: Conjunctivae normal.      Pupils: Pupils are equal, round, and reactive to light.   Cardiovascular:      Rate and Rhythm: Normal rate and regular rhythm.      Pulses: Normal pulses.      Heart sounds: Normal heart sounds.   Pulmonary:      Effort: Pulmonary effort is normal.      Breath sounds: Normal breath sounds. No wheezing or rhonchi.   Abdominal:      General: Abdomen is flat. Bowel sounds are normal.      Palpations: Abdomen is soft.   Musculoskeletal:         General: Normal range of motion.      Cervical back: Normal range of motion and neck supple.      Right lower leg: No edema.      Left lower leg: No edema.   Skin:     General: Skin is warm and dry.   Neurological:      General: No focal deficit present.      Mental Status: He is alert and oriented to person, place, and time.   Psychiatric:         Mood and Affect: Mood normal.         Behavior: Behavior normal.         Diagnostic Data:  Lab: I have personally reviewed pertinent lab results.,   CBC:  Results from last 7 days   Lab Units 06/22/24  0431   WBC Thousand/uL 9.01   HEMOGLOBIN g/dL 13.8   HEMATOCRIT % 39.0   PLATELETS Thousands/uL 192      CMP:   Lab Results   Component Value Date    SODIUM 134 (L) 06/22/2024    K 3.7 06/22/2024     06/22/2024    CO2 23 06/22/2024    BUN 18 06/22/2024    CREATININE 1.15 06/22/2024    CALCIUM 9.1 06/22/2024    AST 38 06/21/2024    ALT 59 (H) 06/21/2024    ALKPHOS 152 (H) 06/21/2024    EGFR 73 06/22/2024   ,   PT/INR:   Lab Results  "  Component Value Date    INR 1.12 06/21/2024   ,   Magnesium: No components found for: \"MAG\",  Phosphorous: No results found for: \"PHOS\"    Microbiology:  @[facility],(urinecx:7)@        PIERRE Hernandez    Portions of the record may have been created with voice recognition software. Occasional wrong word or \"sound a like\" substitutions may have occurred due to the inherent limitations of voice recognition software. Read the chart carefully and recognize, using context, where substitutions have occurred.       "

## 2024-06-22 NOTE — RESPIRATORY THERAPY NOTE
RT Protocol Note  Osei Trimble 49 y.o. male MRN: [id number]  Unit/Bed#: ICU  Encounter: [id number]    Assessment    Principal Problem:    Acute encephalopathy  Active Problems:    MEHDI (acute kidney injury) (HCC)    Essential hypertension    Neuroendocrine carcinoma of small bowel (HCC)    Other chest pain    Explosive personality disorder (HCC)    Hyperglycemia    Acute respiratory failure (HCC)    Hypotension      Home Pulmonary Medications:  None    Home Devices/Therapy: Other (Comment) (None)    Past Medical History:   Diagnosis Date    Allergic     Bipolar disorder in partial remission (HCC)     Erectile dysfunction     unspecified erectile dysfunction type    Hypertension     Kidney stone      Social History     Socioeconomic History    Marital status: Single     Spouse name: None    Number of children: None    Years of education: None    Highest education level: None   Occupational History    None   Tobacco Use    Smoking status: Former     Current packs/day: 0.00     Types: Pipe, Cigarettes     Quit date: 2003     Years since quittin.5    Smokeless tobacco: Never   Vaping Use    Vaping status: Some Days    Substances: THC   Substance and Sexual Activity    Alcohol use: Not Currently     Comment: Drinks a quart of moonshine/night-As of 21 will quit drinking    Drug use: Yes     Types: Marijuana     Comment: Socially (1-2 Monthly)    Sexual activity: None   Other Topics Concern    None   Social History Narrative    None     Social Determinants of Health     Financial Resource Strain: Not on file   Food Insecurity: No Food Insecurity (3/22/2023)    Hunger Vital Sign     Worried About Running Out of Food in the Last Year: Never true     Ran Out of Food in the Last Year: Never true   Transportation Needs: No Transportation Needs (3/22/2023)    PRAPARE - Transportation     Lack of Transportation (Medical): No     Lack of Transportation (Non-Medical): No   Physical Activity: Not on file  "  Stress: Not on file   Social Connections: Not on file   Intimate Partner Violence: Not on file   Housing Stability: Low Risk  (3/22/2023)    Housing Stability Vital Sign     Unable to Pay for Housing in the Last Year: No     Number of Places Lived in the Last Year: 1     Unstable Housing in the Last Year: No       Subjective         Objective    Physical Exam:   Assessment Type: Assess only  General Appearance: Sedated  Respiratory Pattern: Assisted  Chest Assessment: Chest expansion symmetrical  Bilateral Breath Sounds: Coarse    Vitals:  Blood pressure 155/72, pulse 76, temperature (!) 97.3 °F (36.3 °C), resp. rate 21, height 6' 1\" (1.854 m), weight 104 kg (228 lb 2.8 oz), SpO2 100%.    Results from last 7 days   Lab Units 06/22/24  1333   PH ART  7.248*   PCO2 ART mm Hg 48.1*   PO2 ART mm Hg 194.9*   HCO3 ART mmol/L 20.5*   BASE EXC ART mmol/L -6.8   O2 CONTENT ART mL/dL 19.9   O2 HGB, ARTERIAL % 97.7*   ABG SOURCE  Line, Arterial       Imaging and other studies: I have personally reviewed pertinent reports.            Plan    Respiratory Plan: Vent/NIV/HFNC        Resp Comments: Patient is intubated. Will continue to monitor the patient.       "

## 2024-06-22 NOTE — PLAN OF CARE
Problem: PAIN - ADULT  Goal: Verbalizes/displays adequate comfort level or baseline comfort level  Description: Interventions:  - Encourage patient to monitor pain and request assistance  - Assess pain using appropriate pain scale  - Administer analgesics based on type and severity of pain and evaluate response  - Implement non-pharmacological measures as appropriate and evaluate response  - Consider cultural and social influences on pain and pain management  - Notify physician/advanced practitioner if interventions unsuccessful or patient reports new pain  Outcome: Progressing     Problem: INFECTION - ADULT  Goal: Absence or prevention of progression during hospitalization  Description: INTERVENTIONS:  - Assess and monitor for signs and symptoms of infection  - Monitor lab/diagnostic results  - Monitor all insertion sites, i.e. indwelling lines, tubes, and drains  - Monitor endotracheal if appropriate and nasal secretions for changes in amount and color  - [place] appropriate cooling/warming therapies per order  - Administer medications as ordered  - Instruct and encourage patient and family to use good hand hygiene technique  - Identify and instruct in appropriate isolation precautions for identified infection/condition  Outcome: Progressing  Goal: Absence of fever/infection during neutropenic period  Description: INTERVENTIONS:  - Monitor WBC    Outcome: Progressing     Problem: SAFETY ADULT  Goal: Patient will remain free of falls  Description: INTERVENTIONS:  - Educate patient/family on patient safety including physical limitations  - Instruct patient to call for assistance with activity   - Consult OT/PT to assist with strengthening/mobility   - Keep Call bell within reach  - Keep bed low and locked with side rails adjusted as appropriate  - Keep care items and personal belongings within reach  - Initiate and maintain comfort rounds  - Make Fall Risk Sign visible to staff  - Offer Toileting every 2 Hours,  in advance of need  - Initiate/Maintain bed alarm  - Obtain necessary fall risk management equipment  - Apply yellow socks and bracelet for high fall risk patients  - Consider moving patient to room near nurses station  Outcome: Progressing  Goal: Maintain or return to baseline ADL function  Description: INTERVENTIONS:  -  Assess patient's ability to carry out ADLs; assess patient's baseline for ADL function and identify physical deficits which impact ability to perform ADLs (bathing, care of mouth/teeth, toileting, grooming, dressing, etc.)  - Assess/evaluate cause of self-care deficits   - Assess range of motion  - Assess patient's mobility; develop plan if impaired  - Assess patient's need for assistive devices and provide as appropriate  - Encourage maximum independence but intervene and supervise when necessary  - Involve family in performance of ADLs  - Assess for home care needs following discharge   - Consider OT consult to assist with ADL evaluation and planning for discharge  - Provide patient education as appropriate  Outcome: Progressing  Goal: Maintains/Returns to pre admission functional level  Description: INTERVENTIONS:  - Perform AM-PAC 6 Click Basic Mobility/ Daily Activity assessment daily.  - Set and communicate daily mobility goal to care team and patient/family/caregiver.   - Collaborate with rehabilitation services on mobility goals if consulted  - Perform Range of Motion 3 times a day.  - Reposition patient every 2 hours.  - Dangle patient 3 times a day  - Stand patient 3 times a day  - Ambulate patient 3 times a day  - Out of bed to chair 3 times a day   - Out of bed for meals 3 times a day  - Out of bed for toileting  - Record patient progress and toleration of activity level   Outcome: Progressing     Problem: DISCHARGE PLANNING  Goal: Discharge to home or other facility with appropriate resources  Description: INTERVENTIONS:  - Identify barriers to discharge w/patient and caregiver  -  Arrange for needed discharge resources and transportation as appropriate  - Identify discharge learning needs (meds, wound care, etc.)  - Arrange for interpretive services to assist at discharge as needed  - Refer to Case Management Department for coordinating discharge planning if the patient needs post-hospital services based on physician/advanced practitioner order or complex needs related to functional status, cognitive ability, or social support system  Outcome: Progressing     Problem: Knowledge Deficit  Goal: Patient/family/caregiver demonstrates understanding of disease process, treatment plan, medications, and discharge instructions  Description: Complete learning assessment and assess knowledge base.  Interventions:  - Provide teaching at level of understanding  - Provide teaching via preferred learning methods  Outcome: Progressing     Problem: SAFETY,RESTRAINT: NV/NON-SELF DESTRUCTIVE BEHAVIOR  Goal: Remains free of harm/injury (restraint for non violent/non self-detsructive behavior)  Description: INTERVENTIONS:  - Instruct patient/family regarding restraint use   - Assess and monitor physiologic and psychological status   - Provide interventions and comfort measures to meet assessed patient needs   - Identify and implement measures to help patient regain control  - Assess readiness for release of restraint   Outcome: Progressing  Goal: Returns to optimal restraint-free functioning  Description: INTERVENTIONS:  - Assess the patient's behavior and symptoms that indicate continued need for restraint  - Identify and implement measures to help patient regain control  - Assess readiness for release of restraint   Outcome: Progressing     Problem: Nutrition/Hydration-ADULT  Goal: Nutrient/Hydration intake appropriate for improving, restoring or maintaining nutritional needs  Description: Monitor and assess patient's nutrition/hydration status for malnutrition. Collaborate with interdisciplinary team and  initiate plan and interventions as ordered.  Monitor patient's weight and dietary intake as ordered or per policy. Utilize nutrition screening tool and intervene as necessary. Determine patient's food preferences and provide high-protein, high-caloric foods as appropriate.     INTERVENTIONS:  - Monitor oral intake, urinary output, labs, and treatment plans  - Assess nutrition and hydration status and recommend course of action  - Evaluate amount of meals eaten  - Assist patient with eating if necessary   - Allow adequate time for meals  - Recommend/ encourage appropriate diets, oral nutritional supplements, and vitamin/mineral supplements  - Order, calculate, and assess calorie counts as needed  - Recommend, monitor, and adjust tube feedings and TPN/PPN based on assessed needs  - Assess need for intravenous fluids  - Provide specific nutrition/hydration education as appropriate  - Include patient/family/caregiver in decisions related to nutrition  Outcome: Progressing     Problem: NEUROSENSORY - ADULT  Goal: Achieves stable or improved neurological status  Description: INTERVENTIONS  - Monitor and report changes in neurological status  - Monitor vital signs such as temperature, blood pressure, glucose, and any other labs ordered   - Initiate measures to prevent increased intracranial pressure  - Monitor for seizure activity and implement precautions if appropriate      Outcome: Progressing  Goal: Remains free of injury related to seizures activity  Description: INTERVENTIONS  - Maintain airway, patient safety  and administer oxygen as ordered  - Monitor patient for seizure activity, document and report duration and description of seizure to physician/advanced practitioner  - If seizure occurs,  ensure patient safety during seizure  - Reorient patient post seizure  - Seizure pads on all 4 side rails  - Instruct patient/family to notify RN of any seizure activity including if an aura is experienced  - Instruct  patient/family to call for assistance with activity based on nursing assessment  - Administer anti-seizure medications if ordered    Outcome: Progressing  Goal: Achieves maximal functionality and self care  Description: INTERVENTIONS  - Monitor swallowing and airway patency with patient fatigue and changes in neurological status  - Encourage and assist patient to increase activity and self care.   - Encourage visually impaired, hearing impaired and aphasic patients to use assistive/communication devices  Outcome: Progressing     Problem: CARDIOVASCULAR - ADULT  Goal: Maintains optimal cardiac output and hemodynamic stability  Description: INTERVENTIONS:  - Monitor I/O, vital signs and rhythm  - Monitor for S/S and trends of decreased cardiac output  - Administer and titrate ordered vasoactive medications to optimize hemodynamic stability  - Assess quality of pulses, skin color and temperature  - Assess for signs of decreased coronary artery perfusion  - Instruct patient to report change in severity of symptoms  Outcome: Progressing  Goal: Absence of cardiac dysrhythmias or at baseline rhythm  Description: INTERVENTIONS:  - Continuous cardiac monitoring, vital signs, obtain 12 lead EKG if ordered  - Administer antiarrhythmic and heart rate control medications as ordered  - Monitor electrolytes and administer replacement therapy as ordered  Outcome: Progressing     Problem: RESPIRATORY - ADULT  Goal: Achieves optimal ventilation and oxygenation  Description: INTERVENTIONS:  - Assess for changes in respiratory status  - Assess for changes in mentation and behavior  - Position to facilitate oxygenation and minimize respiratory effort  - Oxygen administered by appropriate delivery if ordered  - Initiate smoking cessation education as indicated  - Encourage broncho-pulmonary hygiene including cough, deep breathe, Incentive Spirometry  - Assess the need for suctioning and aspirate as needed  - Assess and instruct to report  SOB or any respiratory difficulty  - Respiratory Therapy support as indicated  Outcome: Progressing     Problem: GASTROINTESTINAL - ADULT  Goal: Minimal or absence of nausea and/or vomiting  Description: INTERVENTIONS:  - Administer IV fluids if ordered to ensure adequate hydration  - Maintain NPO status until nausea and vomiting are resolved  - Nasogastric tube if ordered  - Administer ordered antiemetic medications as needed  - Provide nonpharmacologic comfort measures as appropriate  - Advance diet as tolerated, if ordered  - Consider nutrition services referral to assist patient with adequate nutrition and appropriate food choices  Outcome: Progressing  Goal: Maintains or returns to baseline bowel function  Description: INTERVENTIONS:  - Assess bowel function  - Encourage oral fluids to ensure adequate hydration  - Administer IV fluids if ordered to ensure adequate hydration  - Administer ordered medications as needed  - Encourage mobilization and activity  - Consider nutritional services referral to assist patient with adequate nutrition and appropriate food choices  Outcome: Progressing  Goal: Maintains adequate nutritional intake  Description: INTERVENTIONS:  - Monitor percentage of each meal consumed  - Identify factors contributing to decreased intake, treat as appropriate  - Assist with meals as needed  - Monitor I&O, weight, and lab values if indicated  - Obtain nutrition services referral as needed  Outcome: Progressing  Goal: Establish and maintain optimal ostomy function  Description: INTERVENTIONS:  - Assess bowel function  - Encourage oral fluids to ensure adequate hydration  - Administer IV fluids if ordered to ensure adequate hydration   - Administer ordered medications as needed  - Encourage mobilization and activity  - Nutrition services referral to assist patient with appropriate food choices  - Assess stoma site  - Consider wound care consult   Outcome: Progressing  Goal: Oral mucous membranes  remain intact  Description: INTERVENTIONS  - Assess oral mucosa and hygiene practices  - Implement preventative oral hygiene regimen  - Implement oral medicated treatments as ordered  - Initiate Nutrition services referral as needed  Outcome: Progressing     Problem: GENITOURINARY - ADULT  Goal: Maintains or returns to baseline urinary function  Description: INTERVENTIONS:  - Assess urinary function  - Encourage oral fluids to ensure adequate hydration if ordered  - Administer IV fluids as ordered to ensure adequate hydration  - Administer ordered medications as needed  - Offer frequent toileting  - Follow urinary retention protocol if ordered  Outcome: Progressing  Goal: Absence of urinary retention  Description: INTERVENTIONS:  - Assess patient's ability to void and empty bladder  - Monitor I/O  - Bladder scan as needed  - Discuss with physician/AP medications to alleviate retention as needed  - Discuss catheterization for long term situations as appropriate  Outcome: Progressing  Goal: Urinary catheter remains patent  Description: INTERVENTIONS:  - Assess patency of urinary catheter  - If patient has a chronic irene, consider changing catheter if non-functioning  - Follow guidelines for intermittent irrigation of non-functioning urinary catheter  Outcome: Progressing     Problem: METABOLIC, FLUID AND ELECTROLYTES - ADULT  Goal: Electrolytes maintained within normal limits  Description: INTERVENTIONS:  - Monitor labs and assess patient for signs and symptoms of electrolyte imbalances  - Administer electrolyte replacement as ordered  - Monitor response to electrolyte replacements, including repeat lab results as appropriate  - Instruct patient on fluid and nutrition as appropriate  Outcome: Progressing  Goal: Fluid balance maintained  Description: INTERVENTIONS:  - Monitor labs   - Monitor I/O and WT  - Instruct patient on fluid and nutrition as appropriate  - Assess for signs & symptoms of volume excess or  deficit  Outcome: Progressing  Goal: Glucose maintained within target range  Description: INTERVENTIONS:  - Monitor Blood Glucose as ordered  - Assess for signs and symptoms of hyperglycemia and hypoglycemia  - Administer ordered medications to maintain glucose within target range  - Assess nutritional intake and initiate nutrition service referral as needed  Outcome: Progressing     Problem: HEMATOLOGIC - ADULT  Goal: Maintains hematologic stability  Description: INTERVENTIONS  - Assess for signs and symptoms of bleeding or hemorrhage  - Monitor labs  - Administer supportive blood products/factors as ordered and appropriate  Outcome: Progressing     Problem: MUSCULOSKELETAL - ADULT  Goal: Maintain or return mobility to safest level of function  Description: INTERVENTIONS:  - Assess patient's ability to carry out ADLs; assess patient's baseline for ADL function and identify physical deficits which impact ability to perform ADLs (bathing, care of mouth/teeth, toileting, grooming, dressing, etc.)  - Assess/evaluate cause of self-care deficits   - Assess range of motion  - Assess patient's mobility  - Assess patient's need for assistive devices and provide as appropriate  - Encourage maximum independence but intervene and supervise when necessary  - Involve family in performance of ADLs  - Assess for home care needs following discharge   - Consider OT consult to assist with ADL evaluation and planning for discharge  - Provide patient education as appropriate  Outcome: Progressing  Goal: Maintain proper alignment of affected body part  Description: INTERVENTIONS:  - Support, maintain and protect limb and body alignment  - Provide patient/ family with appropriate education  Outcome: Progressing

## 2024-06-22 NOTE — UTILIZATION REVIEW
Initial Clinical Review    Admission: Date/Time/Statement:   Admission Orders (From admission, onward)       Ordered        06/21/24 1945  Inpatient Admission  Once                          Orders Placed This Encounter   Procedures    Inpatient Admission     Standing Status:   Standing     Number of Occurrences:   1     Order Specific Question:   Level of Care     Answer:   Med Surg [16]     Order Specific Question:   Estimated length of stay     Answer:   More than 2 Midnights     Order Specific Question:   Certification     Answer:   I certify that inpatient services are medically necessary for this patient for a duration of greater than two midnights. See H&P and MD Progress Notes for additional information about the patient's course of treatment.     ED Arrival Information       Expected   -    Arrival   6/21/2024 16:54    Acuity   Urgent              Means of arrival   Walk-In    Escorted by   Family Member    Service   Critical Care/ICU    Admission type   Emergency              Arrival complaint   weakness fatigue             Chief Complaint   Patient presents with    Chest Pain     According to the patient, he has had chest pain that started about 1 hour with left sided neck stiffness.       Initial Presentation: 49 y.o. male to ED presents for Chest pain x1 day. Pt c/o right-sided chest pain that began while he was sitting at the [employer]. Pt states that this was accompanied with radiation to the left side of his neck he describes it as stiffness and has now become epigastric pain. No known cardiac history but has significant cardiac risk factors to include obesity, hypertension and family history. In ED, noted with MEHDI, on lisinopril at home. PMH HTN, Neuroendocrine tumor, Heme-Onc following. Recent PET scan indicates metastatic disease. Chronic Lithium use for Intermittent explosive disorder and bipolar disorder.   Admit to Inpatient Dx; MEHDI, Other Chest pain. Explosive personality disorder. Hold home  lisinopril. Urine protein creat ration. IVFs. Renal US. Nephrology consult. Repeat labs in am. Tele monitoring. Troponins have been flat at 14, 10 and 8.  Given Maalox prn, suspect CP is GI related. Lithium level. Order Lithobid 300 mg po daily. No longer takes Risperdal.     Anticipated Length of Stay/Certification Statement:  Patient will be admitted on an Inpatient basis with an anticipated length of stay of  > 2 midnights.   Justification for Hospital Stay: Acute kidney injury requiring IV fluid resuscitation and further nephrology evaluation     Date: 6/22   Day 2:   Nephrology cons; MEHDI, Chronic Hyponatremia. Creat 2.28 yesterday, improved to 1.15. S/p 1L NSS bolus 6/21 and Plasma-Lyte 125 ml/hr. Renal US.   Po intake improved, decrease IVFs to 75 ml/hr. Recheck UA and BMP in am. Lithium level <0.10 mmol/L, decreased, 6/21.  Patient states lithium was recently decreased from 3 times daily to twice daily.  States he has been experiencing episodes of increased fatigue and lethargy, most recent this week, where he sleeps 20-22 hours/day.     Progress notes; Hyperglycemia. Glucose in urine and does appear to have New diabetes.  Adding A1c to available labs.  MEHDI resolved with IVFs. Continue to hold Lisinopril. Continue other home meds.   Still having a lot of left-sided neck pains     S/p Rapid Response @ 1328 pm; Intubated - ICU level of care  Hypotensive Given IVFs with minimal improvement. Levo started, wean for MAP goal > 65.  Lactic 3.7, fluid resuscitate and trend. CT CAP pending. Monitor BP closely  Unable to protect airway. Aspiration event during Intubation/ Mechanical vent for goal sat >90%.    This AM RRT was called when patient became unresponsive while using the bathroom. When he was returned to the bed he remained unresponsive, was noted to be hypotensive with SBP in the 80s. Given 1L IV fluid with minimal response. Noted to have slight roving eye movements. Intubated for airway protection. Levophed  started for persistent hypotension. Transferred to ICU for further monitoring and management.           ED Triage Vitals   Temperature Pulse Respirations Blood Pressure SpO2 Pain Score   06/21/24 1706 06/21/24 1701 06/21/24 1701 06/21/24 1701 06/21/24 1701 06/21/24 1701   97.5 °F (36.4 °C) 97 18 114/82 97 % 3     Weight (last 2 days)       Date/Time Weight    06/21/24 2034 104 (228.18)    06/21/24 1701 106 (234)            Vital Signs (last 3 days)       Date/Time Temp Pulse Resp BP MAP (mmHg) SpO2 FiO2 (%) O2 Device Patient Position - Orthostatic VS Pain    06/22/24 1328 -- -- -- -- -- -- 100 Ventilator -- --    06/22/24 12:53:11 -- 98 -- -- -- 99 % -- -- -- --    06/22/24 12:51:11 -- 91 -- -- -- 93 % -- -- -- --    06/22/24 12:48:11 -- 47 -- -- -- 55 % -- -- -- --    06/22/24 12:45:47 -- -- -- 93/62 -- -- -- -- -- --    06/22/24 12:45:11 -- 61 -- -- -- 64 % -- -- -- --    06/22/24 12:42:11 -- 89 -- -- -- 64 % -- -- -- --    06/22/24 12:38:11 -- 98 -- -- -- 58 % -- -- -- --    06/22/24 12:36:11 -- 80 -- -- -- 99 % -- -- -- --    06/22/24 12:33:58 -- 66 -- 98/57 71 99 % -- -- -- --    06/22/24 12:33:11 -- 65 -- -- -- 100 % -- -- -- --    06/22/24 12:31:30 -- -- -- 96/55 -- -- -- -- -- --    06/22/24 12:30:11 -- 64 -- -- -- 99 % -- -- -- --    06/22/24 12:28:13 -- 65 -- 89/52 64 94 % -- -- -- --    06/22/24 12:27:11 -- 60 -- -- -- 96 % -- -- -- --    06/22/24 12:26:08 -- 58 -- 80/50 60 97 % -- -- -- --    06/22/24 12:25:53 -- -- -- 80/50 -- -- -- -- -- --    06/22/24 12:24:11 -- 71 -- -- -- 98 % -- -- -- --    06/22/24 12:21:11 -- 63 -- -- -- 96 % -- -- -- --    06/22/24 12:18:11 -- 63 -- -- -- 97 % -- -- -- --    06/22/24 12:15:11 -- 70 -- -- -- 97 % -- -- -- --    06/22/24 11:23:16 98.4 °F (36.9 °C) 90 18 130/95 107 96 % -- -- -- --    06/22/24 0953 -- -- -- -- -- -- -- -- -- No Pain    06/22/24 0700 97.9 °F (36.6 °C) -- 18 -- -- -- -- -- -- --    06/22/24 06:59:37 -- 72 -- 127/94 105 97 % -- -- -- --    06/21/24  2300 -- -- -- -- -- -- -- -- -- No Pain    06/21/24 22:16:55 98 °F (36.7 °C) 87 18 145/97 113 97 % -- None (Room air) Lying --    06/21/24 2034 98.1 °F (36.7 °C) 76 18 123/87 97 97 % -- None (Room air) Lying --    06/21/24 1950 -- 77 18 127/76 -- 98 % -- -- -- --    06/21/24 1900 -- 87 18 112/80 -- 97 % -- -- -- --    06/21/24 1800 -- 94 18 125/78 -- 96 % -- -- -- --    06/21/24 1706 97.5 °F (36.4 °C) -- -- -- -- -- -- -- -- --    06/21/24 1701 -- 97 18 114/82 95 97 % -- None (Room air) Lying 3              Pertinent Labs/Diagnostic Test Results:   Radiology:  CT head wo contrast   Final Interpretation by Pallav N Shah, MD (06/22 1453)      Limited by artifact as discussed above.      No gross abnormality.      No intracranial hemorrhage, edema or mass effect.                  Workstation performed: [id number]         US kidney and bladder   Final Interpretation by Demetrius Lawrence MD (06/22 1053)      No hydronephrosis.      The bladder is not well evaluated due to underdistention.            Workstation performed: [id number]          VAS VENOUS DUPLEX - LOWER LIMB BILATERAL   Final Interpretation by Dieudonne Khanna MD (06/22 1002)      CT chest without contrast   Final Interpretation by Parker Torres MD (06/21 1844)      No acute findings in the chest.      Unchanged 9 mm right middle lobe pulmonary nodule, not FDG avid on PET/CT 5/10/2024.               Workstation performed: [id number]         XR chest 1 view portable   ED Interpretation by Tawanda Jerry DO (06/21 1831)   There is no active disease in the chest      Final Interpretation by Parker Torres MD (06/21 1847)      No acute cardiopulmonary disease.            Workstation performed: [id number]         CT chest abdomen pelvis wo contrast    (Results Pending)     Cardiology:  ECG 12 lead   Final Result by Dieudonne Shahid DO (06/22 0813)   Normal sinus rhythm   Nonspecific T wave abnormality   Abnormal ECG   When compared with  ECG of 21-JUN-2024 17:05, (unconfirmed)   No significant change was found   Confirmed by Dieudonne Shahid ([id number]) on 6/22/2024 8:13:42 AM      ECG 12 lead   Final Result by Dieudonne Shahid DO (06/22 0813)   Normal sinus rhythm   Possible Left atrial enlargement   Left axis deviation   Pulmonary disease pattern   Nonspecific T wave abnormality   Abnormal ECG   When compared with ECG of 01-JUN-2024 19:15,   No significant change was found   Confirmed by Dieudonne Shahid ([id number]) on 6/22/2024 8:13:28 AM        GI:  No orders to display           Results from last 7 days   Lab Units 06/22/24  1329 06/22/24  0431 06/21/24  1714   WBC Thousand/uL 9.91 9.01 12.88*   HEMOGLOBIN g/dL 13.0 13.8 16.1   HEMATOCRIT % 37.3 39.0 46.3   PLATELETS Thousands/uL 147* 192 299   TOTAL NEUT ABS Thousands/µL 7.39  --  8.75*         Results from last 7 days   Lab Units 06/22/24  1248 06/22/24  0431 06/21/24  1714   SODIUM mmol/L 132* 134* 134*   POTASSIUM mmol/L 3.6 3.7 4.3   CHLORIDE mmol/L 100 102 99   CO2 mmol/L 21 23 22   ANION GAP mmol/L 11 9 13   BUN mg/dL 17 18 21   CREATININE mg/dL 1.23 1.15 2.38*   EGFR ml/min/1.73sq m 68 73 30   CALCIUM mg/dL 9.0 9.1 10.5*   MAGNESIUM mg/dL 2.1  --   --    PHOSPHORUS mg/dL 1.6*  --   --      Results from last 7 days   Lab Units 06/22/24  1248 06/21/24  1714   AST U/L 32 38   ALT U/L 42 59*   ALK PHOS U/L 115* 152*   TOTAL PROTEIN g/dL 6.8 9.2*   ALBUMIN g/dL 4.0 5.3*   TOTAL BILIRUBIN mg/dL 1.79* 1.79*   AMMONIA umol/L 51  --      Results from last 7 days   Lab Units 06/22/24  1452 06/22/24  1446 06/22/24  1225   POC GLUCOSE mg/dl 416* 412* 253*     Results from last 7 days   Lab Units 06/22/24  1248 06/22/24  0431 06/21/24  1714   GLUCOSE RANDOM mg/dL 333* 211* 242*       Results from last 7 days   Lab Units 06/22/24  1329 06/21/24  2128 06/21/24  1902 06/21/24  1714   HS TNI 0HR ng/L 3  --   --  14   HS TNI 2HR ng/L  --   --  10  --    HSTNI D2 ng/L  --   --  -4  --    HS TNI 4HR ng/L  --  8  --    --    HSTNI D4 ng/L  --  -6  --   --          Results from last 7 days   Lab Units 06/22/24  1248 06/21/24  1734   PROTIME seconds 14.7* 14.5   INR  1.14 1.12   PTT seconds 23 28         Results from last 7 days   Lab Units 06/22/24  1248   PROCALCITONIN ng/ml 0.12       Results from last 7 days   Lab Units 06/22/24  1102 06/21/24  2128   CLARITY UA  Clear  --    COLOR UA  Yellow  --    SPEC GRAV UA  1.015  --    PH UA  6.0  --    GLUCOSE UA mg/dl 3+*  --    KETONES UA mg/dl Negative  --    BLOOD UA  Negative  --    PROTEIN UA mg/dl Negative  --    NITRITE UA  Negative  --    BILIRUBIN UA  Negative  --    UROBILINOGEN UA E.U./dl 0.2  --    LEUKOCYTES UA  Negative  --    CREATININE UR mg/dL  --  186.7   PROTEIN UR mg/dL  --  51   PROT/CREAT RATIO UR   --  0.27*       ED Treatment-Medication Administration from 06/21/2024 1654 to 06/21/2024 2017         Date/Time Order Dose Route Action     06/21/2024 1858 sodium chloride 0.9 % bolus 1,000 mL 1,000 mL Intravenous New Bag     06/21/2024 1950 multi-electrolyte (PLASMALYTE-A/ISOLYTE-S PH 7.4) IV solution 125 mL/hr Intravenous New Bag            Past Medical History:   Diagnosis Date    Allergic     Bipolar disorder in partial remission ([facility])     Erectile dysfunction     unspecified erectile dysfunction type    Hypertension     Kidney stone      Present on Admission:   MEHDI (acute kidney injury) ([facility])   Essential hypertension   Other chest pain   Explosive personality disorder ([facility])   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 male    Admission Orders:  Scheduled Medications:  chlorhexidine, 15 mL, Mouth/Throat, Q12H BERENICE  fentaNYL, 50 mcg, Intravenous, Once  heparin (porcine), 5,000 Units, Subcutaneous, Q8H BERENICE  insulin lispro, 1-6 Units, Subcutaneous, Q6H BERENICE  lidocaine, 1 patch, Topical, Daily  lithium carbonate, 300 mg, Oral, BID  potassium phosphate, 30 mmol,  Intravenous, Once      Continuous IV Infusions:  dexmedetomidine, 0.1-1.2 mcg/kg/hr, Intravenous, Titrated  multi-electrolyte, 75 mL/hr, Intravenous, Continuous  norepinephrine, 1-30 mcg/min, Intravenous, Titrated  propofol, 5-50 mcg/kg/min, Intravenous, Titrated      PRN Meds:  ondansetron, 4 mg, Intravenous, Q6H PRN        IP CONSULT TO NEPHROLOGY  IP CONSULT TO CASE MANAGEMENT    Network Utilization Review Department  ATTENTION: Please call with any questions or concerns to [phone] and carefully listen to the prompts so that you are directed to the right person. All voicemails are confidential.   For Discharge needs, contact Care Management DC Support Team at [phone] opt. 2  Send all requests for admission clinical reviews, approved or denied determinations and any other requests to dedicated fax number below belonging to the [place] where the patient is receiving treatment. List of dedicated fax numbers for the Facilities:  FACILITY NAME UR FAX NUMBER   ADMISSION DENIALS (Administrative/Medical Necessity) [phone]   DISCHARGE SUPPORT TEAM (NETWORK) [phone]   PARENT CHILD HEALTH (Maternity/NICU/Pediatrics) [phone]   [facility] [phone]   [facility] [phone]   [facility] [phone]   [facility] [phone]   [facility] [phone]   [facility] [phone]   [facility] [phone]   [facility] [phone]   [facility] [phone]   [facility] [phone]   [facility] [phone]   [facility] [phone]

## 2024-06-22 NOTE — ASSESSMENT & PLAN NOTE
Prehospital lisinopril currently on hold secondary to acute kidney injury  Continue prehospital prazosin 5 mg p.o. daily add hydralazine 5 mg IV every 6 hours as needed

## 2024-06-22 NOTE — ASSESSMENT & PLAN NOTE
Follows with Dr. Green as outpatient   Incidentally found to have mesenteric masses in October 2021  PET CT 2022 demonstrated Dotatate avid clustered central mesenteric masses suspicious for underlying neuroendocrine neoplasm  Underwent small bowel resection 07/2022 in the area that was thought to be high risk for obstruction  Currently undergoing chemo treatment every 4 weeks with lanreotide

## 2024-06-22 NOTE — PROGRESS NOTES
[facility]  Progress Note  Name: Osei Trimble I  MRN: [id number]  Unit/Bed#: -01 I Date of Admission: 6/21/2024   Date of Service: 6/22/2024 I Hospital Day: 1    Assessment & Plan   * MEHDI (acute kidney injury) (HCC)  Assessment & Plan  History of mood disorder and hypertension presented to the hospital with chest pain found to have kidney injury.  Resolved with IV fluids.  Nephrology consulted.  Renal ultrasound without acute pathology.  Glucose in urine and does appear to have new diabetes.  Adding A1c to available labs.    Hyperglycemia  Assessment & Plan  Appears in new onset diabetes.  Follow-up on A1c.    Results from last 7 days   Lab Units 06/22/24  0431 06/21/24  1714   GLUCOSE RANDOM mg/dL 211* 242*       Explosive personality disorder (HCC)  Assessment & Plan  Continue lithium and prazosin    Other chest pain  Assessment & Plan  Cardiac enzymes negative.  Patient actually has left-sided neck pain.  Will trial tizanidine and lidocaine patch    Lab Results   Component Value Date    HSTNI0 14 06/21/2024    HSTNI2 10 06/21/2024    HSTNI4 8 06/21/2024       Neuroendocrine carcinoma of small bowel (HCC)  Assessment & Plan  Follows with Dr. Green as an outpatient on treatment    Essential hypertension  Assessment & Plan  Lisinopril on hold due to kidney injury.    VTE Pharmacologic Prophylaxis: VTE Score: 4 Moderate Risk (Score 3-4) - Pharmacological DVT Prophylaxis Contraindicated. Sequential Compression Devices Ordered.    Mobility:  Basic Mobility Inpatient Raw Score: 24  JH-HLM Goal: 8: Walk 250 feet or more  JH-HLM Achieved: 7: Walk 25 feet or more  JH-HLM Goal NOT achieved. Continue with multidisciplinary rounding and encourage appropriate mobility to improve upon JH-HLM goals.    Patient Centered Rounds: I have performed bedside rounds with nursing staff today.  Discussions with Specialists or Other Care Team Provider: case mangement    Education and Discussions with  "Family / Patient: Attempted to update  (sister) via phone. Left voicemail.     Time Spent for Care:   This time was spent on one or more of the following: performing physical exam; counseling and coordination of care; obtaining or reviewing history; documenting in the medical record; reviewing/ordering tests, medications or procedures; communicating with other healthcare professionals and discussing with patient's family/caregivers.    Current Length of Stay: 1 day(s)  Current Patient Status: Inpatient   Certification Statement: The patient will continue to require additional inpatient hospital stay due to MEHDI and hyperglycemia  Discharge Plan: Anticipate discharge in 24-48 hrs to home.    Code Status: Level 1 - Full Code      Subjective:   Patient seen and examined.  No new complaints.  Still having a lot of left-sided neck pains    Objective:   Vitals: Blood pressure (!) 89/52, pulse 65, temperature 98.4 °F (36.9 °C), resp. rate 18, height 6' 1\" (1.854 m), weight 104 kg (228 lb 2.8 oz), SpO2 94%.    Intake/Output Summary (Last 24 hours) at 6/22/2024 1230  Last data filed at 6/22/2024 1100  Gross per 24 hour   Intake 1040 ml   Output 1270 ml   Net -230 ml       Physical Exam  Vitals reviewed.   Constitutional:       General: He is not in acute distress.  HENT:      Head: Atraumatic.   Cardiovascular:      Rate and Rhythm: Regular rhythm.   Pulmonary:      Effort: Pulmonary effort is normal.      Breath sounds: No wheezing.   Abdominal:      General: Bowel sounds are normal.      Palpations: Abdomen is soft.      Tenderness: There is no abdominal tenderness. There is no rebound.   Musculoskeletal:         General: No swelling.   Skin:     General: Skin is warm and dry.   Neurological:      General: No focal deficit present.      Mental Status: He is alert.   Psychiatric:         Mood and Affect: Mood normal.       Additional Data:   Labs:  Results from last 7 days   Lab Units 06/22/24  0431 " 06/21/24  1734 06/21/24  1714   WBC Thousand/uL 9.01  --  12.88*   HEMOGLOBIN g/dL 13.8  --  16.1   PLATELETS Thousands/uL 192  --  299   MCV fL 84  --  83   INR   --  1.12  --      Results from last 7 days   Lab Units 06/22/24  0431 06/21/24  1714   SODIUM mmol/L 134* 134*   POTASSIUM mmol/L 3.7 4.3   CHLORIDE mmol/L 102 99   CO2 mmol/L 23 22   ANION GAP mmol/L 9 13   BUN mg/dL 18 21   CREATININE mg/dL 1.15 2.38*   CALCIUM mg/dL 9.1 10.5*   ALBUMIN g/dL  --  5.3*   TOTAL BILIRUBIN mg/dL  --  1.79*   ALK PHOS U/L  --  152*   ALT U/L  --  59*   AST U/L  --  38   EGFR ml/min/1.73sq m 73 30   GLUCOSE RANDOM mg/dL 211* 242*             Results from last 7 days   Lab Units 06/21/24  2128 06/21/24  1902 06/21/24  1714   HS TNI 0HR ng/L  --   --  14   HS TNI 2HR ng/L  --  10  --    HS TNI 4HR ng/L 8  --   --               Results from last 7 days   Lab Units 06/22/24  1225   POC GLUCOSE mg/dl 253*             * I Have Reviewed All Lab Data Listed Above.    Recent cultures:                   Lines/Drains:  Invasive Devices       Peripheral Intravenous Line  Duration             Peripheral IV 06/21/24 Left;Proximal;Ventral (anterior) Forearm <1 day              Drain  Duration             Ureteral Drain/Stent Right ureter 6 Fr. 990 days                          Telemetry:      Imaging:  Imaging Reports Reviewed Today Include:   US kidney and bladder    Result Date: 6/22/2024  Impression: No hydronephrosis. The bladder is not well evaluated due to underdistention. Workstation performed: [id number]     XR chest 1 view portable    Result Date: 6/21/2024  Impression: No acute cardiopulmonary disease. Workstation performed: [id number]     CT chest without contrast    Result Date: 6/21/2024  Impression: No acute findings in the chest. Unchanged 9 mm right middle lobe pulmonary nodule, not FDG avid on PET/CT 5/10/2024. Workstation performed: [id number]       Scheduled Meds:  Current Facility-Administered Medications   Medication Dose  Route Frequency Provider Last Rate    aluminum-magnesium hydroxide-simethicone  30 mL Oral Q4H PRN Tyrese Carolina PA-C      hydrALAZINE  5 mg Intravenous Q6H PRN Tyrese Carolina PA-C      lidocaine  1 patch Topical Daily Chris Montano DO      lithium carbonate  300 mg Oral BID Chris Montano DO      multi-electrolyte  75 mL/hr Intravenous Continuous Ariadne Gilnick Villafana, CRNP 75 mL/hr (06/22/24 1042)    ondansetron  4 mg Intravenous Q6H PRN Tyrese Carolina PA-C      prazosin  5 mg Oral Daily Tyrese Carolina PA-C      tiZANidine  4 mg Oral TID Chris Montano DO         Today, Patient Was Seen By: Chris Montano DO    ** Please Note: Dictation voice to text software may have been used in the creation of this document. **

## 2024-06-23 ENCOUNTER — APPOINTMENT (INPATIENT)
Dept: MRI IMAGING | Facility: HOSPITAL | Age: 49
DRG: 469 | End: 2024-06-23
Payer: COMMERCIAL

## 2024-06-23 ENCOUNTER — APPOINTMENT (INPATIENT)
Dept: RADIOLOGY | Facility: HOSPITAL | Age: 49
DRG: 469 | End: 2024-06-23
Payer: COMMERCIAL

## 2024-06-23 PROBLEM — M54.9 BACK PAIN: Status: ACTIVE | Noted: 2024-06-23

## 2024-06-23 PROBLEM — E83.39 HYPOPHOSPHATEMIA: Status: RESOLVED | Noted: 2024-06-22 | Resolved: 2024-06-23

## 2024-06-23 PROBLEM — I95.9 HYPOTENSION: Status: RESOLVED | Noted: 2024-06-22 | Resolved: 2024-06-23

## 2024-06-23 PROBLEM — E87.20 LACTIC ACIDOSIS: Status: RESOLVED | Noted: 2024-06-22 | Resolved: 2024-06-23

## 2024-06-23 PROBLEM — R50.9 FEVER: Status: ACTIVE | Noted: 2024-06-23

## 2024-06-23 PROBLEM — N17.9 AKI (ACUTE KIDNEY INJURY) (HCC): Status: RESOLVED | Noted: 2021-10-04 | Resolved: 2024-06-23

## 2024-06-23 PROBLEM — J96.00 ACUTE RESPIRATORY FAILURE (HCC): Status: RESOLVED | Noted: 2024-06-22 | Resolved: 2024-06-23

## 2024-06-23 LAB
ABO GROUP BLD: NORMAL
ALBUMIN SERPL BCG-MCNC: 3.5 G/DL (ref 3.5–5)
ALP SERPL-CCNC: 106 U/L (ref 34–104)
ALT SERPL W P-5'-P-CCNC: 49 U/L (ref 7–52)
ANION GAP SERPL CALCULATED.3IONS-SCNC: 8 MMOL/L (ref 4–13)
AST SERPL W P-5'-P-CCNC: 37 U/L (ref 13–39)
ATRIAL RATE: 106 BPM
BILIRUB SERPL-MCNC: 1.17 MG/DL (ref 0.2–1)
BLD GP AB SCN SERPL QL: NEGATIVE
BUN SERPL-MCNC: 13 MG/DL (ref 5–25)
CALCIUM SERPL-MCNC: 8.6 MG/DL (ref 8.4–10.2)
CHLORIDE SERPL-SCNC: 105 MMOL/L (ref 96–108)
CO2 SERPL-SCNC: 24 MMOL/L (ref 21–32)
CREAT SERPL-MCNC: 0.91 MG/DL (ref 0.6–1.3)
ERYTHROCYTE [DISTWIDTH] IN BLOOD BY AUTOMATED COUNT: 13.9 % (ref 11.6–15.1)
GFR SERPL CREATININE-BSD FRML MDRD: 97 ML/MIN/1.73SQ M
GLUCOSE SERPL-MCNC: 139 MG/DL (ref 65–140)
GLUCOSE SERPL-MCNC: 140 MG/DL (ref 65–140)
GLUCOSE SERPL-MCNC: 143 MG/DL (ref 65–140)
GLUCOSE SERPL-MCNC: 147 MG/DL (ref 65–140)
GLUCOSE SERPL-MCNC: 155 MG/DL (ref 65–140)
GLUCOSE SERPL-MCNC: 157 MG/DL (ref 65–140)
GLUCOSE SERPL-MCNC: 160 MG/DL (ref 65–140)
GLUCOSE SERPL-MCNC: 166 MG/DL (ref 65–140)
GLUCOSE SERPL-MCNC: 166 MG/DL (ref 65–140)
GLUCOSE SERPL-MCNC: 171 MG/DL (ref 65–140)
GLUCOSE SERPL-MCNC: 176 MG/DL (ref 65–140)
GLUCOSE SERPL-MCNC: 183 MG/DL (ref 65–140)
GLUCOSE SERPL-MCNC: 191 MG/DL (ref 65–140)
GLUCOSE SERPL-MCNC: 192 MG/DL (ref 65–140)
HCT VFR BLD AUTO: 33.8 % (ref 36.5–49.3)
HGB BLD-MCNC: 12 G/DL (ref 12–17)
LACTATE SERPL-SCNC: 1.1 MMOL/L (ref 0.5–2)
MAGNESIUM SERPL-MCNC: 2.1 MG/DL (ref 1.9–2.7)
MCH RBC QN AUTO: 29.7 PG (ref 26.8–34.3)
MCHC RBC AUTO-ENTMCNC: 35.5 G/DL (ref 31.4–37.4)
MCV RBC AUTO: 84 FL (ref 82–98)
P AXIS: 58 DEGREES
PHOSPHATE SERPL-MCNC: 2.9 MG/DL (ref 2.7–4.5)
PLATELET # BLD AUTO: 167 THOUSANDS/UL (ref 149–390)
PMV BLD AUTO: 9.8 FL (ref 8.9–12.7)
POTASSIUM SERPL-SCNC: 3.9 MMOL/L (ref 3.5–5.3)
PR INTERVAL: 166 MS
PROCALCITONIN SERPL-MCNC: 0.48 NG/ML
PROT SERPL-MCNC: 6.1 G/DL (ref 6.4–8.4)
QRS AXIS: -8 DEGREES
QRSD INTERVAL: 92 MS
QT INTERVAL: 394 MS
QTC INTERVAL: 523 MS
RBC # BLD AUTO: 4.04 MILLION/UL (ref 3.88–5.62)
RH BLD: POSITIVE
SODIUM SERPL-SCNC: 137 MMOL/L (ref 135–147)
SPECIMEN EXPIRATION DATE: NORMAL
T WAVE AXIS: 31 DEGREES
TSH SERPL DL<=0.05 MIU/L-ACNC: 3.04 UIU/ML (ref 0.45–4.5)
VENTRICULAR RATE: 106 BPM
WBC # BLD AUTO: 11.22 THOUSAND/UL (ref 4.31–10.16)

## 2024-06-23 PROCEDURE — 99291 CRITICAL CARE FIRST HOUR: CPT | Performed by: STUDENT IN AN ORGANIZED HEALTH CARE EDUCATION/TRAINING PROGRAM

## 2024-06-23 PROCEDURE — A9585 GADOBUTROL INJECTION: HCPCS | Performed by: STUDENT IN AN ORGANIZED HEALTH CARE EDUCATION/TRAINING PROGRAM

## 2024-06-23 PROCEDURE — 94760 N-INVAS EAR/PLS OXIMETRY 1: CPT

## 2024-06-23 PROCEDURE — 80053 COMPREHEN METABOLIC PANEL: CPT

## 2024-06-23 PROCEDURE — 84100 ASSAY OF PHOSPHORUS: CPT

## 2024-06-23 PROCEDURE — 83735 ASSAY OF MAGNESIUM: CPT

## 2024-06-23 PROCEDURE — 84145 PROCALCITONIN (PCT): CPT | Performed by: NURSE PRACTITIONER

## 2024-06-23 PROCEDURE — C9113 INJ PANTOPRAZOLE SODIUM, VIA: HCPCS | Performed by: NURSE PRACTITIONER

## 2024-06-23 PROCEDURE — 86850 RBC ANTIBODY SCREEN: CPT

## 2024-06-23 PROCEDURE — 86900 BLOOD TYPING SEROLOGIC ABO: CPT

## 2024-06-23 PROCEDURE — 94664 DEMO&/EVAL PT USE INHALER: CPT

## 2024-06-23 PROCEDURE — 82948 REAGENT STRIP/BLOOD GLUCOSE: CPT

## 2024-06-23 PROCEDURE — 93005 ELECTROCARDIOGRAM TRACING: CPT

## 2024-06-23 PROCEDURE — 84443 ASSAY THYROID STIM HORMONE: CPT | Performed by: NURSE PRACTITIONER

## 2024-06-23 PROCEDURE — 70553 MRI BRAIN STEM W/O & W/DYE: CPT

## 2024-06-23 PROCEDURE — 83605 ASSAY OF LACTIC ACID: CPT | Performed by: NURSE PRACTITIONER

## 2024-06-23 PROCEDURE — 71045 X-RAY EXAM CHEST 1 VIEW: CPT

## 2024-06-23 PROCEDURE — 85027 COMPLETE CBC AUTOMATED: CPT

## 2024-06-23 PROCEDURE — 93010 ELECTROCARDIOGRAM REPORT: CPT | Performed by: INTERNAL MEDICINE

## 2024-06-23 PROCEDURE — 99233 SBSQ HOSP IP/OBS HIGH 50: CPT | Performed by: INTERNAL MEDICINE

## 2024-06-23 PROCEDURE — 86901 BLOOD TYPING SEROLOGIC RH(D): CPT

## 2024-06-23 PROCEDURE — 87040 BLOOD CULTURE FOR BACTERIA: CPT | Performed by: NURSE PRACTITIONER

## 2024-06-23 PROCEDURE — 94003 VENT MGMT INPAT SUBQ DAY: CPT

## 2024-06-23 RX ORDER — GADOBUTROL 604.72 MG/ML
10 INJECTION INTRAVENOUS
Status: COMPLETED | OUTPATIENT
Start: 2024-06-23 | End: 2024-06-23

## 2024-06-23 RX ORDER — PROPOFOL 10 MG/ML
5-50 INJECTION, EMULSION INTRAVENOUS
Status: DISCONTINUED | OUTPATIENT
Start: 2024-06-23 | End: 2024-06-23

## 2024-06-23 RX ORDER — PRAZOSIN HYDROCHLORIDE 5 MG/1
5 CAPSULE ORAL
Status: DISCONTINUED | OUTPATIENT
Start: 2024-06-23 | End: 2024-06-27 | Stop reason: HOSPADM

## 2024-06-23 RX ORDER — SODIUM CHLORIDE, SODIUM GLUCONATE, SODIUM ACETATE, POTASSIUM CHLORIDE, MAGNESIUM CHLORIDE, SODIUM PHOSPHATE, DIBASIC, AND POTASSIUM PHOSPHATE .53; .5; .37; .037; .03; .012; .00082 G/100ML; G/100ML; G/100ML; G/100ML; G/100ML; G/100ML; G/100ML
1000 INJECTION, SOLUTION INTRAVENOUS ONCE
Status: COMPLETED | OUTPATIENT
Start: 2024-06-23 | End: 2024-06-23

## 2024-06-23 RX ORDER — HYDROXYZINE HYDROCHLORIDE 25 MG/1
25 TABLET, FILM COATED ORAL EVERY 6 HOURS PRN
Status: DISCONTINUED | OUTPATIENT
Start: 2024-06-23 | End: 2024-06-24

## 2024-06-23 RX ORDER — HYDROMORPHONE HCL IN WATER/PF 6 MG/30 ML
0.2 PATIENT CONTROLLED ANALGESIA SYRINGE INTRAVENOUS ONCE
Status: COMPLETED | OUTPATIENT
Start: 2024-06-23 | End: 2024-06-23

## 2024-06-23 RX ORDER — HYDRALAZINE HYDROCHLORIDE 20 MG/ML
10 INJECTION INTRAMUSCULAR; INTRAVENOUS ONCE
Status: COMPLETED | OUTPATIENT
Start: 2024-06-23 | End: 2024-06-23

## 2024-06-23 RX ORDER — LIDOCAINE 50 MG/G
2 PATCH TOPICAL DAILY
Status: DISCONTINUED | OUTPATIENT
Start: 2024-06-23 | End: 2024-06-27 | Stop reason: HOSPADM

## 2024-06-23 RX ORDER — HYDRALAZINE HYDROCHLORIDE 20 MG/ML
10 INJECTION INTRAMUSCULAR; INTRAVENOUS EVERY 4 HOURS PRN
Status: DISCONTINUED | OUTPATIENT
Start: 2024-06-23 | End: 2024-06-27 | Stop reason: HOSPADM

## 2024-06-23 RX ORDER — DEXMEDETOMIDINE HYDROCHLORIDE 4 UG/ML
.1-1.2 INJECTION, SOLUTION INTRAVENOUS
Status: DISCONTINUED | OUTPATIENT
Start: 2024-06-23 | End: 2024-06-23

## 2024-06-23 RX ORDER — ACETAMINOPHEN 325 MG/1
650 TABLET ORAL EVERY 6 HOURS PRN
Status: DISCONTINUED | OUTPATIENT
Start: 2024-06-23 | End: 2024-06-27 | Stop reason: HOSPADM

## 2024-06-23 RX ORDER — HYDRALAZINE HYDROCHLORIDE 20 MG/ML
5 INJECTION INTRAMUSCULAR; INTRAVENOUS EVERY 6 HOURS PRN
Status: DISCONTINUED | OUTPATIENT
Start: 2024-06-23 | End: 2024-06-23

## 2024-06-23 RX ORDER — PRAZOSIN HYDROCHLORIDE 5 MG/1
5 CAPSULE ORAL
Status: DISCONTINUED | OUTPATIENT
Start: 2024-06-23 | End: 2024-06-23

## 2024-06-23 RX ORDER — PANTOPRAZOLE SODIUM 40 MG/10ML
40 INJECTION, POWDER, LYOPHILIZED, FOR SOLUTION INTRAVENOUS
Status: DISCONTINUED | OUTPATIENT
Start: 2024-06-23 | End: 2024-06-24

## 2024-06-23 RX ORDER — INSULIN LISPRO 100 [IU]/ML
1-5 INJECTION, SOLUTION INTRAVENOUS; SUBCUTANEOUS EVERY 6 HOURS SCHEDULED
Status: DISCONTINUED | OUTPATIENT
Start: 2024-06-23 | End: 2024-06-24

## 2024-06-23 RX ORDER — FENTANYL CITRATE-0.9 % NACL/PF 10 MCG/ML
75 PLASTIC BAG, INJECTION (ML) INTRAVENOUS CONTINUOUS
Status: DISCONTINUED | OUTPATIENT
Start: 2024-06-23 | End: 2024-06-23

## 2024-06-23 RX ORDER — OXYCODONE HYDROCHLORIDE AND ACETAMINOPHEN 5; 325 MG/1; MG/1
1 TABLET ORAL EVERY 4 HOURS PRN
Status: DISCONTINUED | OUTPATIENT
Start: 2024-06-23 | End: 2024-06-23

## 2024-06-23 RX ORDER — BACLOFEN 10 MG/1
10 TABLET ORAL 2 TIMES DAILY PRN
Status: DISCONTINUED | OUTPATIENT
Start: 2024-06-23 | End: 2024-06-24

## 2024-06-23 RX ORDER — HYDROMORPHONE HCL/PF 1 MG/ML
0.5 SYRINGE (ML) INJECTION ONCE
Status: COMPLETED | OUTPATIENT
Start: 2024-06-23 | End: 2024-06-23

## 2024-06-23 RX ORDER — LIDOCAINE 50 MG/G
2 PATCH TOPICAL DAILY
Status: DISCONTINUED | OUTPATIENT
Start: 2024-06-23 | End: 2024-06-23

## 2024-06-23 RX ORDER — OXYCODONE HYDROCHLORIDE 5 MG/1
5 TABLET ORAL EVERY 4 HOURS PRN
Status: DISCONTINUED | OUTPATIENT
Start: 2024-06-23 | End: 2024-06-27 | Stop reason: HOSPADM

## 2024-06-23 RX ADMIN — Medication 75 MCG/HR: at 03:13

## 2024-06-23 RX ADMIN — CHLORHEXIDINE GLUCONATE 15 ML: 1.2 RINSE ORAL at 20:41

## 2024-06-23 RX ADMIN — OXYCODONE HYDROCHLORIDE 5 MG: 5 TABLET ORAL at 19:38

## 2024-06-23 RX ADMIN — DEXMEDETOMIDINE HYDROCHLORIDE 0.5 MCG/KG/HR: 400 INJECTION INTRAVENOUS at 01:10

## 2024-06-23 RX ADMIN — HYDROMORPHONE HYDROCHLORIDE 0.5 MG: 1 INJECTION, SOLUTION INTRAMUSCULAR; INTRAVENOUS; SUBCUTANEOUS at 16:29

## 2024-06-23 RX ADMIN — LIDOCAINE 2 PATCH: 700 PATCH TOPICAL at 19:38

## 2024-06-23 RX ADMIN — HEPARIN SODIUM 5000 UNITS: 5000 INJECTION, SOLUTION INTRAVENOUS; SUBCUTANEOUS at 06:02

## 2024-06-23 RX ADMIN — CHLORHEXIDINE GLUCONATE 15 ML: 1.2 RINSE ORAL at 01:47

## 2024-06-23 RX ADMIN — SODIUM CHLORIDE 4 UNITS/HR: 9 INJECTION, SOLUTION INTRAVENOUS at 11:25

## 2024-06-23 RX ADMIN — HYDRALAZINE HYDROCHLORIDE 5 MG: 20 INJECTION INTRAMUSCULAR; INTRAVENOUS at 14:39

## 2024-06-23 RX ADMIN — PANTOPRAZOLE SODIUM 40 MG: 40 INJECTION, POWDER, FOR SOLUTION INTRAVENOUS at 14:39

## 2024-06-23 RX ADMIN — HYDROXYZINE HYDROCHLORIDE 25 MG: 25 TABLET ORAL at 20:41

## 2024-06-23 RX ADMIN — NOREPINEPHRINE BITARTRATE 3 MCG/MIN: 1 INJECTION, SOLUTION, CONCENTRATE INTRAVENOUS at 03:12

## 2024-06-23 RX ADMIN — LITHIUM CARBONATE 300 MG: 300 TABLET, EXTENDED RELEASE ORAL at 10:00

## 2024-06-23 RX ADMIN — HYDRALAZINE HYDROCHLORIDE 10 MG: 20 INJECTION INTRAMUSCULAR; INTRAVENOUS at 15:08

## 2024-06-23 RX ADMIN — HEPARIN SODIUM 5000 UNITS: 5000 INJECTION, SOLUTION INTRAVENOUS; SUBCUTANEOUS at 14:39

## 2024-06-23 RX ADMIN — LITHIUM CARBONATE 300 MG: 300 TABLET, EXTENDED RELEASE ORAL at 18:05

## 2024-06-23 RX ADMIN — GADOBUTROL 10 ML: 604.72 INJECTION INTRAVENOUS at 13:01

## 2024-06-23 RX ADMIN — ONDANSETRON 4 MG: 2 INJECTION INTRAMUSCULAR; INTRAVENOUS at 15:13

## 2024-06-23 RX ADMIN — PRAZOSIN HYDROCHLORIDE 5 MG: 5 CAPSULE ORAL at 18:05

## 2024-06-23 RX ADMIN — PROPOFOL 40 MCG/KG/MIN: 10 INJECTION, EMULSION INTRAVENOUS at 10:25

## 2024-06-23 RX ADMIN — SODIUM CHLORIDE, SODIUM GLUCONATE, SODIUM ACETATE, POTASSIUM CHLORIDE, MAGNESIUM CHLORIDE, SODIUM PHOSPHATE, DIBASIC, AND POTASSIUM PHOSPHATE 1000 ML: .53; .5; .37; .037; .03; .012; .00082 INJECTION, SOLUTION INTRAVENOUS at 01:43

## 2024-06-23 RX ADMIN — HYDROMORPHONE HYDROCHLORIDE 0.2 MG: 0.2 INJECTION, SOLUTION INTRAMUSCULAR; INTRAVENOUS; SUBCUTANEOUS at 15:31

## 2024-06-23 RX ADMIN — CHLORHEXIDINE GLUCONATE 15 ML: 1.2 RINSE ORAL at 10:00

## 2024-06-23 RX ADMIN — BACLOFEN 10 MG: 10 TABLET ORAL at 18:05

## 2024-06-23 NOTE — RESPIRATORY THERAPY NOTE
06/23/24 1424   Respiratory Assessment   Resp Comments Patient extubated as per Critical care . Patient placed on 4 liter nasal cannula   Vent Information   Ventilator End Yes

## 2024-06-23 NOTE — UTILIZATION REVIEW
NOTIFICATION OF INPATIENT ADMISSION   AUTHORIZATION REQUEST   SERVICING FACILITY:   [place] [address]  Tax ID: [id number]  NPI: [id number]   ATTENDING PROVIDER:  Attending Name and NPI#: Adi Sloan Do [[id number]]  Address: [address] [place] [address]  Phone: [phone]     ADMISSION INFORMATION:  Place of Service: Inpatient [facility]  Place of Service Code: 21  Inpatient Admission Date/Time: 6/21/24  7:45 PM  Discharge Date/Time: No discharge date for patient encounter.  Admitting Diagnosis Code/Description:  Weakness [R53.1]  MEHDI (acute kidney injury) (HCC) [N17.9]     UTILIZATION REVIEW CONTACT:  Isidoro Prado, Supervisor, Utilization Review Assistants  Network Utilization Review Department  Phone: [phone]  Fax [phone]  Email: [email or web address]  Contact for approvals/pending authorizations, clinical reviews, and discharge.     PHYSICIAN ADVISORY SERVICES:  Medical Necessity Denial & Uvwm-fq-Khba Review  Phone: [phone]  Fax: [phone]  Email: [email or web address]     DISCHARGE SUPPORT TEAM:  For Patients Discharge Needs & Updates  Phone: [phone] opt. [phone] Fax: [phone]  Email: [email or web address]

## 2024-06-23 NOTE — ASSESSMENT & PLAN NOTE
In the setting of altered mental status  Unable to protect airway   Aspiration event during intubation  Continue mechanical ventilation ACVC 18/500/50/6  Wean vent as able for goal saturation > 90%  VAP bundle  Daily SBT/SAT

## 2024-06-23 NOTE — RESPIRATORY THERAPY NOTE
06/23/24 1325   Respiratory Assessment   Resp Comments 1200 patient transported to MRI with the RN on the MR1 transport vent with the same settings. Upon return patient placed back on the C 3  vent .   Vent Information   Vent type Casey C3   Casey Vent Mode (S)CMV   SpO2 100 %   (S)CMV Settings   Resp Rate (BPM) 18 BPM   VT (mL) 500 mL   FIO2 (%) 50 %   PEEP (cmH2O) 6 cmH2O   Insp Time (%) 1 %   Flow Trigger (LPM) 5   Humidification Heater   Heater Temperature (Set) 95 °F (35 °C)   (S)CMV Actuals   Resp Rate (BPM) 18 BPM   VT (mL) 514   MV 9.1   MAP (cmH2O) 9.3 cmH2O   Peak Pressure (cmH2O) 18 cmH2O   I:E Ratio (Obs) 1:2.3   Insp Resistance 9   Heater Temperature (Obs) 87.6 °F (30.9 °C)  (Heater just turn on)   Static Compliance (mL/cmH20) 61.1 mL/cmH2O   (S)CMV Alarms   High Peak Pressure (cmH2O) 40   Low Pressure (cmH2O) 5 cm H2O   High Resp Rate (BPM) 40 BPM   Low Resp Rate (BPM) 8 BPM   High MV (L/min) 22 L/min   Low MV (L/min) 4 L/min   High VT (mL) 1000 mL   Low VT (mL) 400 mL   Apnea Time (s) 20 S

## 2024-06-23 NOTE — ASSESSMENT & PLAN NOTE
6/22 RRT called when patient became unresponsive while using the toilet  Upon return to bed remained unresponsive  Intubated for airway protection  CT head negative for acute abnormality  Unclear etiology, consider possible seizure 2/2 hypophosphatemia?  Transition Propofol>>Precedex to obtain neuro assessment  Consider spot EEG  Optimize electrolytes  Monitor neuro exam closely  UDS only Positive for Fentanyl, which was used for induction of intubation

## 2024-06-23 NOTE — ASSESSMENT & PLAN NOTE
Unclear etiology  Differentials include seizure or severe dehydration??  Received IVF upon admission and during RRT  Continue boluses PRN  Monitor UO  Cleared overnight

## 2024-06-23 NOTE — PLAN OF CARE
Problem: PAIN - ADULT  Goal: Verbalizes/displays adequate comfort level or baseline comfort level  Description: Interventions:  - Encourage patient to monitor pain and request assistance  - Assess pain using appropriate pain scale  - Administer analgesics based on type and severity of pain and evaluate response  - Implement non-pharmacological measures as appropriate and evaluate response  - Consider cultural and social influences on pain and pain management  - Notify physician/advanced practitioner if interventions unsuccessful or patient reports new pain  Outcome: Progressing     Problem: INFECTION - ADULT  Goal: Absence or prevention of progression during hospitalization  Description: INTERVENTIONS:  - Assess and monitor for signs and symptoms of infection  - Monitor lab/diagnostic results  - Monitor all insertion sites, i.e. indwelling lines, tubes, and drains  - Monitor endotracheal if appropriate and nasal secretions for changes in amount and color  - [place] appropriate cooling/warming therapies per order  - Administer medications as ordered  - Instruct and encourage patient and family to use good hand hygiene technique  - Identify and instruct in appropriate isolation precautions for identified infection/condition  Outcome: Progressing     Problem: SAFETY ADULT  Goal: Patient will remain free of falls  Description: INTERVENTIONS:  - Educate patient/family on patient safety including physical limitations  - Instruct patient to call for assistance with activity   - Consult OT/PT to assist with strengthening/mobility   - Keep Call bell within reach  - Keep bed low and locked with side rails adjusted as appropriate  - Keep care items and personal belongings within reach  - Initiate and maintain comfort rounds  - Make Fall Risk Sign visible to staff  - Offer Toileting every 2 Hours, in advance of need  - Initiate/Maintain bed alarm  - Obtain necessary fall risk management equipment  - Apply yellow socks and  bracelet for high fall risk patients  - Consider moving patient to room near nurses station  Outcome: Progressing

## 2024-06-23 NOTE — RESPIRATORY THERAPY NOTE
RT Ventilator Management Note  Osei Trimble 49 y.o. male MRN: [id number]  Unit/Bed#: ICU 07-01 Encounter: [id number]      Daily Screen    No data found in the last 10 encounters.           Physical Exam:   Assessment Type: Assess only  General Appearance: Sedated  Respiratory Pattern: Assisted  Chest Assessment: Chest expansion symmetrical  Bilateral Breath Sounds: Coarse      Resp Comments: (P) pt remains stable on current settings, fi02 and peep decreased this shift, no other changes made

## 2024-06-23 NOTE — ASSESSMENT & PLAN NOTE
Likely in setting of suspected aspiration PNA vs Pneumonitis   Also could be attributed to precedex  Will wean precede off to assess possible drug fever  WBC normal on prior labs  Weaned off Pressors and not tachycardic   Continue to monitor off ABX   Continue PRN tylenol  Follow WBC's and check procalcitonin today

## 2024-06-23 NOTE — ASSESSMENT & PLAN NOTE
Presented 6/21 with radiating chest pain  Troponins initially negative, no EKG changes noted   Chest pain resolved  Repeat troponins  EKG w/o ischemia changes   Echo pending

## 2024-06-23 NOTE — ASSESSMENT & PLAN NOTE
Trending down since admission  Unclear etiology  Monitor for bleeding  Monitor platelets on daily labs

## 2024-06-23 NOTE — RESPIRATORY THERAPY NOTE
06/23/24 1411   Vent Information   Vent type Casey C3   Casey Vent Mode SPONT   SPONT Settings   FIO2 (%) 50 %   PEEP (cmH2O) 6 cmH2O   Pressure Support (cmH2O) 6 cmH20   Flow Trigger (LPM) 5 LPM   P-ramp (ms) 100 ms   ETS  (%) 25 %   Humidification Heater   Heater Temp 95 °F (35 °C)   SPONT Actuals   Resp Rate (BPM) 14 BPM   VT (mL) 660 mL   MV (Obs) 7.4   MAP (cmH2O) 9.2 cmH2O   Peak Pressure (cmH2O) 12 cmH2O   I:E Ratio (Obs) 1:1.3   Heater Temperature (Obs) 95 °F (35 °C)   SPONT Alarms   High Peak Pressure (cmH20) 40 cmH2O   High Resp Rate (BPM) 5 BPM   High MV (L/min) 22 L/min   Low MV (L/min) 4 L/min   High Spont VTE (mL) 1000 mL   Low Spont VTE (mL) 300 mL   Apnea Time (S) 20 S   SPONT Apnea Settings   Resp Rate (BPM) 18 BPM   FIO2 (%) 100 %   %TI (%) 0.67 %

## 2024-06-23 NOTE — PLAN OF CARE
Problem: PAIN - ADULT  Goal: Verbalizes/displays adequate comfort level or baseline comfort level  Description: Interventions:  - Encourage patient to monitor pain and request assistance  - Assess pain using appropriate pain scale  - Administer analgesics based on type and severity of pain and evaluate response  - Implement non-pharmacological measures as appropriate and evaluate response  - Consider cultural and social influences on pain and pain management  - Notify physician/advanced practitioner if interventions unsuccessful or patient reports new pain  Outcome: Progressing     Problem: INFECTION - ADULT  Goal: Absence or prevention of progression during hospitalization  Description: INTERVENTIONS:  - Assess and monitor for signs and symptoms of infection  - Monitor lab/diagnostic results  - Monitor all insertion sites, i.e. indwelling lines, tubes, and drains  - Monitor endotracheal if appropriate and nasal secretions for changes in amount and color  - [place] appropriate cooling/warming therapies per order  - Administer medications as ordered  - Instruct and encourage patient and family to use good hand hygiene technique  - Identify and instruct in appropriate isolation precautions for identified infection/condition  Outcome: Progressing  Goal: Absence of fever/infection during neutropenic period  Description: INTERVENTIONS:  - Monitor WBC    Outcome: Progressing     Problem: SAFETY ADULT  Goal: Patient will remain free of falls  Description: INTERVENTIONS:  - Educate patient/family on patient safety including physical limitations  - Instruct patient to call for assistance with activity   - Consult OT/PT to assist with strengthening/mobility   - Keep Call bell within reach  - Keep bed low and locked with side rails adjusted as appropriate  - Keep care items and personal belongings within reach  - Initiate and maintain comfort rounds  - Make Fall Risk Sign visible to staff  - Offer Toileting every 2 Hours,  in advance of need  - Initiate/Maintain bed alarm  - Obtain necessary fall risk management equipment  - Apply yellow socks and bracelet for high fall risk patients  - Consider moving patient to room near nurses station  Outcome: Progressing  Goal: Maintain or return to baseline ADL function  Description: INTERVENTIONS:  -  Assess patient's ability to carry out ADLs; assess patient's baseline for ADL function and identify physical deficits which impact ability to perform ADLs (bathing, care of mouth/teeth, toileting, grooming, dressing, etc.)  - Assess/evaluate cause of self-care deficits   - Assess range of motion  - Assess patient's mobility; develop plan if impaired  - Assess patient's need for assistive devices and provide as appropriate  - Encourage maximum independence but intervene and supervise when necessary  - Involve family in performance of ADLs  - Assess for home care needs following discharge   - Consider OT consult to assist with ADL evaluation and planning for discharge  - Provide patient education as appropriate  Outcome: Progressing  Goal: Maintains/Returns to pre admission functional level  Description: INTERVENTIONS:  - Perform AM-PAC 6 Click Basic Mobility/ Daily Activity assessment daily.  - Set and communicate daily mobility goal to care team and patient/family/caregiver.   - Collaborate with rehabilitation services on mobility goals if consulted  - Perform Range of Motion 3 times a day.  - Reposition patient every 2 hours.  - Dangle patient 3 times a day  - Stand patient 3 times a day  - Ambulate patient 3 times a day  - Out of bed to chair 3 times a day   - Out of bed for meals 3 times a day  - Out of bed for toileting  - Record patient progress and toleration of activity level   Outcome: Progressing     Problem: DISCHARGE PLANNING  Goal: Discharge to home or other facility with appropriate resources  Description: INTERVENTIONS:  - Identify barriers to discharge w/patient and caregiver  -  Arrange for needed discharge resources and transportation as appropriate  - Identify discharge learning needs (meds, wound care, etc.)  - Arrange for interpretive services to assist at discharge as needed  - Refer to Case Management Department for coordinating discharge planning if the patient needs post-hospital services based on physician/advanced practitioner order or complex needs related to functional status, cognitive ability, or social support system  Outcome: Progressing     Problem: Knowledge Deficit  Goal: Patient/family/caregiver demonstrates understanding of disease process, treatment plan, medications, and discharge instructions  Description: Complete learning assessment and assess knowledge base.  Interventions:  - Provide teaching at level of understanding  - Provide teaching via preferred learning methods  Outcome: Progressing     Problem: SAFETY,RESTRAINT: NV/NON-SELF DESTRUCTIVE BEHAVIOR  Goal: Remains free of harm/injury (restraint for non violent/non self-detsructive behavior)  Description: INTERVENTIONS:  - Instruct patient/family regarding restraint use   - Assess and monitor physiologic and psychological status   - Provide interventions and comfort measures to meet assessed patient needs   - Identify and implement measures to help patient regain control  - Assess readiness for release of restraint   Outcome: Progressing  Goal: Returns to optimal restraint-free functioning  Description: INTERVENTIONS:  - Assess the patient's behavior and symptoms that indicate continued need for restraint  - Identify and implement measures to help patient regain control  - Assess readiness for release of restraint   Outcome: Progressing     Problem: Nutrition/Hydration-ADULT  Goal: Nutrient/Hydration intake appropriate for improving, restoring or maintaining nutritional needs  Description: Monitor and assess patient's nutrition/hydration status for malnutrition. Collaborate with interdisciplinary team and  initiate plan and interventions as ordered.  Monitor patient's weight and dietary intake as ordered or per policy. Utilize nutrition screening tool and intervene as necessary. Determine patient's food preferences and provide high-protein, high-caloric foods as appropriate.     INTERVENTIONS:  - Monitor oral intake, urinary output, labs, and treatment plans  - Assess nutrition and hydration status and recommend course of action  - Evaluate amount of meals eaten  - Assist patient with eating if necessary   - Allow adequate time for meals  - Recommend/ encourage appropriate diets, oral nutritional supplements, and vitamin/mineral supplements  - Order, calculate, and assess calorie counts as needed  - Recommend, monitor, and adjust tube feedings and TPN/PPN based on assessed needs  - Assess need for intravenous fluids  - Provide specific nutrition/hydration education as appropriate  - Include patient/family/caregiver in decisions related to nutrition  Outcome: Progressing     Problem: NEUROSENSORY - ADULT  Goal: Achieves stable or improved neurological status  Description: INTERVENTIONS  - Monitor and report changes in neurological status  - Monitor vital signs such as temperature, blood pressure, glucose, and any other labs ordered   - Initiate measures to prevent increased intracranial pressure  - Monitor for seizure activity and implement precautions if appropriate      Outcome: Progressing  Goal: Remains free of injury related to seizures activity  Description: INTERVENTIONS  - Maintain airway, patient safety  and administer oxygen as ordered  - Monitor patient for seizure activity, document and report duration and description of seizure to physician/advanced practitioner  - If seizure occurs,  ensure patient safety during seizure  - Reorient patient post seizure  - Seizure pads on all 4 side rails  - Instruct patient/family to notify RN of any seizure activity including if an aura is experienced  - Instruct  patient/family to call for assistance with activity based on nursing assessment  - Administer anti-seizure medications if ordered    Outcome: Progressing  Goal: Achieves maximal functionality and self care  Description: INTERVENTIONS  - Monitor swallowing and airway patency with patient fatigue and changes in neurological status  - Encourage and assist patient to increase activity and self care.   - Encourage visually impaired, hearing impaired and aphasic patients to use assistive/communication devices  Outcome: Progressing     Problem: CARDIOVASCULAR - ADULT  Goal: Maintains optimal cardiac output and hemodynamic stability  Description: INTERVENTIONS:  - Monitor I/O, vital signs and rhythm  - Monitor for S/S and trends of decreased cardiac output  - Administer and titrate ordered vasoactive medications to optimize hemodynamic stability  - Assess quality of pulses, skin color and temperature  - Assess for signs of decreased coronary artery perfusion  - Instruct patient to report change in severity of symptoms  Outcome: Progressing  Goal: Absence of cardiac dysrhythmias or at baseline rhythm  Description: INTERVENTIONS:  - Continuous cardiac monitoring, vital signs, obtain 12 lead EKG if ordered  - Administer antiarrhythmic and heart rate control medications as ordered  - Monitor electrolytes and administer replacement therapy as ordered  Outcome: Progressing     Problem: RESPIRATORY - ADULT  Goal: Achieves optimal ventilation and oxygenation  Description: INTERVENTIONS:  - Assess for changes in respiratory status  - Assess for changes in mentation and behavior  - Position to facilitate oxygenation and minimize respiratory effort  - Oxygen administered by appropriate delivery if ordered  - Initiate smoking cessation education as indicated  - Encourage broncho-pulmonary hygiene including cough, deep breathe, Incentive Spirometry  - Assess the need for suctioning and aspirate as needed  - Assess and instruct to report  SOB or any respiratory difficulty  - Respiratory Therapy support as indicated  Outcome: Progressing     Problem: GASTROINTESTINAL - ADULT  Goal: Minimal or absence of nausea and/or vomiting  Description: INTERVENTIONS:  - Administer IV fluids if ordered to ensure adequate hydration  - Maintain NPO status until nausea and vomiting are resolved  - Nasogastric tube if ordered  - Administer ordered antiemetic medications as needed  - Provide nonpharmacologic comfort measures as appropriate  - Advance diet as tolerated, if ordered  - Consider nutrition services referral to assist patient with adequate nutrition and appropriate food choices  Outcome: Progressing  Goal: Maintains or returns to baseline bowel function  Description: INTERVENTIONS:  - Assess bowel function  - Encourage oral fluids to ensure adequate hydration  - Administer IV fluids if ordered to ensure adequate hydration  - Administer ordered medications as needed  - Encourage mobilization and activity  - Consider nutritional services referral to assist patient with adequate nutrition and appropriate food choices  Outcome: Progressing  Goal: Maintains adequate nutritional intake  Description: INTERVENTIONS:  - Monitor percentage of each meal consumed  - Identify factors contributing to decreased intake, treat as appropriate  - Assist with meals as needed  - Monitor I&O, weight, and lab values if indicated  - Obtain nutrition services referral as needed  Outcome: Progressing  Goal: Establish and maintain optimal ostomy function  Description: INTERVENTIONS:  - Assess bowel function  - Encourage oral fluids to ensure adequate hydration  - Administer IV fluids if ordered to ensure adequate hydration   - Administer ordered medications as needed  - Encourage mobilization and activity  - Nutrition services referral to assist patient with appropriate food choices  - Assess stoma site  - Consider wound care consult   Outcome: Progressing  Goal: Oral mucous membranes  remain intact  Description: INTERVENTIONS  - Assess oral mucosa and hygiene practices  - Implement preventative oral hygiene regimen  - Implement oral medicated treatments as ordered  - Initiate Nutrition services referral as needed  Outcome: Progressing     Problem: GENITOURINARY - ADULT  Goal: Maintains or returns to baseline urinary function  Description: INTERVENTIONS:  - Assess urinary function  - Encourage oral fluids to ensure adequate hydration if ordered  - Administer IV fluids as ordered to ensure adequate hydration  - Administer ordered medications as needed  - Offer frequent toileting  - Follow urinary retention protocol if ordered  Outcome: Progressing  Goal: Absence of urinary retention  Description: INTERVENTIONS:  - Assess patient's ability to void and empty bladder  - Monitor I/O  - Bladder scan as needed  - Discuss with physician/AP medications to alleviate retention as needed  - Discuss catheterization for long term situations as appropriate  Outcome: Progressing  Goal: Urinary catheter remains patent  Description: INTERVENTIONS:  - Assess patency of urinary catheter  - If patient has a chronic irene, consider changing catheter if non-functioning  - Follow guidelines for intermittent irrigation of non-functioning urinary catheter  Outcome: Progressing     Problem: METABOLIC, FLUID AND ELECTROLYTES - ADULT  Goal: Electrolytes maintained within normal limits  Description: INTERVENTIONS:  - Monitor labs and assess patient for signs and symptoms of electrolyte imbalances  - Administer electrolyte replacement as ordered  - Monitor response to electrolyte replacements, including repeat lab results as appropriate  - Instruct patient on fluid and nutrition as appropriate  Outcome: Progressing  Goal: Fluid balance maintained  Description: INTERVENTIONS:  - Monitor labs   - Monitor I/O and WT  - Instruct patient on fluid and nutrition as appropriate  - Assess for signs & symptoms of volume excess or  deficit  Outcome: Progressing  Goal: Glucose maintained within target range  Description: INTERVENTIONS:  - Monitor Blood Glucose as ordered  - Assess for signs and symptoms of hyperglycemia and hypoglycemia  - Administer ordered medications to maintain glucose within target range  - Assess nutritional intake and initiate nutrition service referral as needed  Outcome: Progressing     Problem: HEMATOLOGIC - ADULT  Goal: Maintains hematologic stability  Description: INTERVENTIONS  - Assess for signs and symptoms of bleeding or hemorrhage  - Monitor labs  - Administer supportive blood products/factors as ordered and appropriate  Outcome: Progressing     Problem: MUSCULOSKELETAL - ADULT  Goal: Maintain or return mobility to safest level of function  Description: INTERVENTIONS:  - Assess patient's ability to carry out ADLs; assess patient's baseline for ADL function and identify physical deficits which impact ability to perform ADLs (bathing, care of mouth/teeth, toileting, grooming, dressing, etc.)  - Assess/evaluate cause of self-care deficits   - Assess range of motion  - Assess patient's mobility  - Assess patient's need for assistive devices and provide as appropriate  - Encourage maximum independence but intervene and supervise when necessary  - Involve family in performance of ADLs  - Assess for home care needs following discharge   - Consider OT consult to assist with ADL evaluation and planning for discharge  - Provide patient education as appropriate  Outcome: Progressing  Goal: Maintain proper alignment of affected body part  Description: INTERVENTIONS:  - Support, maintain and protect limb and body alignment  - Provide patient/ family with appropriate education  Outcome: Progressing

## 2024-06-23 NOTE — RESPIRATORY THERAPY NOTE
06/23/24 1411   Respiratory Assessment   Resp Comments Patient placed on a pressure support wean as per critical care   Vent Information   Vent type Casey C3   Casey Vent Mode SPONT   $ Pulse Oximetry Spot Check Charge Completed   SpO2 100 %   SPONT Settings   FIO2 (%) 50 %   PEEP (cmH2O) 6 cmH2O   Pressure Support (cmH2O) 6 cmH20   Flow Trigger (LPM) 5 LPM   P-ramp (ms) 100 ms   ETS  (%) 25 %   Humidification Heater   Heater Temp 95 °F (35 °C)   SPONT Actuals   Resp Rate (BPM) 14 BPM   VT (mL) 660 mL   MV (Obs) 7.4   MAP (cmH2O) 9.2 cmH2O   Peak Pressure (cmH2O) 12 cmH2O   I:E Ratio (Obs) 1:1.3   Heater Temperature (Obs) 95 °F (35 °C)   SPONT Alarms   High Peak Pressure (cmH20) 40 cmH2O   High Resp Rate (BPM) 5 BPM   High MV (L/min) 22 L/min   Low MV (L/min) 4 L/min   High Spont VTE (mL) 1000 mL   Low Spont VTE (mL) 300 mL   Apnea Time (S) 20 S   SPONT Apnea Settings   Resp Rate (BPM) 18 BPM   FIO2 (%) 100 %   %TI (%) 0.67 %

## 2024-06-23 NOTE — RESPIRATORY THERAPY NOTE
06/23/24 1325   Respiratory Assessment   Resp Comments 1200 patient transported to Cat scan with the RN on the MR1 transport vent with the same settings. Upon return patient placed back on the C 3  vent .   Vent Information   Vent type Casey C3   Casey Vent Mode (S)CMV   (S)CMV Settings   Resp Rate (BPM) 18 BPM   VT (mL) 500 mL   FIO2 (%) 50 %   PEEP (cmH2O) 6 cmH2O   Insp Time (%) 1 %   Flow Trigger (LPM) 5   Humidification Heater   Heater Temperature (Set) 95 °F (35 °C)   (S)CMV Actuals   Resp Rate (BPM) 18 BPM   VT (mL) 514   MV 9.1   MAP (cmH2O) 9.3 cmH2O   Peak Pressure (cmH2O) 18 cmH2O   I:E Ratio (Obs) 1:2.3   Insp Resistance 9   Heater Temperature (Obs) 87.6 °F (30.9 °C)  (Heater just turn on)   Static [place]iance (mL/cmH20) 61.1 mL/cmH2O   (S)CMV Alarms   High Peak Pressure (cmH2O) 40   Low Pressure (cmH2O) 5 cm H2O   High Resp Rate (BPM) 40 BPM   Low Resp Rate (BPM) 8 BPM   High MV (L/min) 22 L/min   Low MV (L/min) 4 L/min   High VT (mL) 1000 mL   Low VT (mL) 400 mL   Apnea Time (s) 20 S

## 2024-06-23 NOTE — PROGRESS NOTES
[facility]  Progress Note  Name: Osei Trimble I  MRN: [id number]  Unit/Bed#: ICU 07-01 I Date of Admission: 6/21/2024   Date of Service: 6/23/2024 I Hospital Day: 2    Assessment & Plan   * Acute encephalopathy  Assessment & Plan  6/22 RRT called when patient became unresponsive while using the toilet  Upon return to bed remained unresponsive  Intubated for airway protection  CT head negative for acute abnormality  Unclear etiology, consider possible seizure 2/2 hypophosphatemia?  Transition Propofol>>Precedex to obtain neuro assessment  Consider spot EEG  Optimize electrolytes  Monitor neuro exam closely  UDS only Positive for Fentanyl, which was used for induction of intubation     Acute respiratory failure (HCC)  Assessment & Plan  In the setting of altered mental status  Unable to protect airway   Aspiration event during intubation  Continue mechanical ventilation ACVC 18/500/50/6  Wean vent as able for goal saturation > 90%  VAP bundle  Daily SBT/SAT    Hypotension  Assessment & Plan  Noted to be hypotensive during RRT when placed back in bed   Levo started, wean for MAP goal > 65  Unclear etiology at this point  Lactic 3.7, fluid resuscitate and trend  CT CAP Unremarkable for acute pathology  Monitor BP closely  Maintain Debo/TLC    Lactic acidosis  Assessment & Plan  Unclear etiology  Differentials include seizure or severe dehydration??  Received IVF upon admission and during RRT  Continue boluses PRN  Monitor UO  Cleared overnight    Fever  Assessment & Plan  Likely in setting of suspected aspiration PNA vs Pneumonitis   Also could be attributed to precedex  Will wean precede off to assess possible drug fever  WBC normal on prior labs  Weaned off Pressors and not tachycardic   Continue to monitor off ABX   Continue PRN tylenol  Follow WBC's and check procalcitonin today    Total bilirubin, elevated  Assessment & Plan  Possibly 2/2 Liver metastasis?   CT CAP unremarkable  Serial  abdominal exams  Trend on daily CMP    Hypophosphatemia  Assessment & Plan  Labs during RRT showed Phos level of 1.6  Possibly precipitating seizure?  Continue repletion PRN  Consider TF today if unable to extubate    Thrombocytopenia (HCC)  Assessment & Plan  Trending down since admission  Unclear etiology  Monitor for bleeding  Monitor platelets on daily labs    Hyperglycemia  Assessment & Plan  Hemoglobin A1C 7.2  Continue Insulin gtt  Q2 BG    Explosive personality disorder (HCC)  Assessment & Plan  Lithium continued      Other chest pain  Assessment & Plan  Presented 6/21 with radiating chest pain  Troponins initially negative, no EKG changes noted   Chest pain resolved  Repeat troponins  EKG w/o ischemia changes   Echo pending    Neuroendocrine carcinoma of small bowel (HCC)  Assessment & Plan  Follows with Dr. Green as outpatient   Incidentally found to have mesenteric masses in October 2021  PET CT 2022 demonstrated Dotatate avid clustered central mesenteric masses suspicious for underlying neuroendocrine neoplasm  Underwent small bowel resection 07/2022 in the area that was thought to be high risk for obstruction  Currently undergoing chemo treatment every 4 weeks with lanreotide    Essential hypertension  Assessment & Plan  Hold antihypertensives in the setting of hypotension  Monitor BP    MEHDI (acute kidney injury) (HCC)  Assessment & Plan  Baseline creatinine 0.8-1, 2.38 on admission  Received IV fluid resuscitation with improvement in MEHDI   Continue to trend renal indices   Place irene catheter for I & O monitoring             Disposition: Critical care    ICU Core Measures     Vented Patient  VAP Bundle  VAP bundle ordered     A: Assess, Prevent, and Manage Pain Has pain been assessed? Yes  Need for changes to pain regimen? No   B: Both Spontaneous Awakening Trials (SATs) and Spontaneous Breathing Trials (SBTs) Plan to perform spontaneous awakening trial today? Yes   Plan to perform spontaneous  breathing trial today? Yes   Obvious barriers to extubation? Yes   C: Choice of Sedation RASS Goal: -1 Drowsy  Need for changes to sedation or analgesia regimen? Yes   D: Delirium CAM-ICU: Positive   E: Early Mobility  Plan for early mobility? Yes   F: Family Engagement Plan for family engagement today? Yes       Review of Invasive Devices:    Schaefer Plan: Continue for accurate I/O monitoring for 48 hours  Central access plan: Medications requiring central line Hemodynamic monitoring  Debo Plan: Keep arterial line for hemodynamic monitoring and frequent labs    Prophylaxis:  VTE VTE covered by:  heparin (porcine), Subcutaneous, 5,000 Units at 06/22/24 2152       Stress Ulcer  not ordered         Significant 24hr Events     24hr events: yesterday was an RRT for AMS. Intubated for airway protection. Started on Levo for ongoing hypotension refractory to fluid administration. Overnight, lactic cleared after additional fluid bolus. precedex discontinued after becoming febrile to 101.5.  Levo titrated down      Subjective   Review of Systems: Review of Systems   Unable to perform ROS: Intubated        Objective                            Vitals I/O      Most Recent Min/Max in 24hrs   Temp (!) 100.8 °F (38.2 °C) Temp  Min: 95.9 °F (35.5 °C)  Max: 101.7 °F (38.7 °C)   Pulse 69 Pulse  Min: 47  Max: 111   Resp 18 Resp  Min: 16  Max: 31   /59 BP  Min: 80/50  Max: 155/72   O2 Sat 100 % SpO2  Min: 55 %  Max: 100 %      Intake/Output Summary (Last 24 hours) at 6/23/2024 0316  Last data filed at 6/23/2024 0200  Gross per 24 hour   Intake 4215.25 ml   Output 2495 ml   Net 1720.25 ml       Diet NPO    Invasive Monitoring   Arterial Line  Debo BP 92/50  Arterial Line BP  Min: 92/50  Max: 178/178   MAP (!) 64 mmHg  Arterial Line MAP (mmHg)  Min: 64 mmHg  Max: 178 mmHg           Physical Exam   Physical Exam  Vitals and nursing note reviewed.   Eyes:      Pupils: Pupils are equal, round, and reactive to light.   Skin:      General: Skin is warm and dry.      Capillary Refill: Capillary refill takes less than 2 seconds.      Coloration: Skin is pale.   HENT:      Head: Normocephalic.      Mouth/Throat:      Mouth: Mucous membranes are moist.   Neck:      Vascular: Central line present.   Cardiovascular:      Rate and Rhythm: Normal rate and regular rhythm.      Pulses: Normal pulses.      Heart sounds: Normal heart sounds.   Abdominal:      Palpations: Abdomen is soft.   Constitutional:       General: He is not in acute distress.     Interventions: He is sedated, intubated and restrained.   Pulmonary:      Effort: No respiratory distress. He is intubated.      Breath sounds: Normal breath sounds. No wheezing, rhonchi or rales.   Neurological:        Corneal reflex present, cough reflex and gag reflex intact.   Genitourinary/Anorectal:  Schaefer present.          Diagnostic Studies      EKG: NSR  Imaging:  I have personally reviewed pertinent reports.       Medications:  Scheduled PRN   chlorhexidine, 15 mL, Q12H BERENICE  heparin (porcine), 5,000 Units, Q8H BERENICE  lidocaine, 1 patch, Daily  lithium carbonate, 300 mg, BID      acetaminophen, 650 mg, Q4H PRN  midazolam, 2 mg, Q4H PRN  ondansetron, 4 mg, Q6H PRN       Continuous    fentaNYL, 75 mcg/hr, Last Rate: 75 mcg/hr (06/23/24 0314)  insulin regular (HumuLIN R,NovoLIN R) 1 Units/mL in sodium chloride 0.9 % 100 mL infusion, 0.3-21 Units/hr, Last Rate: 4 Units/hr (06/23/24 0152)  norepinephrine, 1-30 mcg/min, Last Rate: 3 mcg/min (06/23/24 0312)  propofol, 5-50 mcg/kg/min, Last Rate: Stopped (06/23/24 0313)         Labs:    CBC    Recent Labs     06/22/24  0431 06/22/24  1329   WBC 9.01 9.91   HGB 13.8 13.0   HCT 39.0 37.3    147*     BMP    Recent Labs     06/22/24  0431 06/22/24  1248   SODIUM 134* 132*   K 3.7 3.6    100   CO2 23 21   AGAP 9 11   BUN 18 17   CREATININE 1.15 1.23   CALCIUM 9.1 9.0       Coags    Recent Labs     06/21/24  1734 06/22/24  1248   INR 1.12 1.14   PTT  28 23        Additional Electrolytes  Recent Labs     06/22/24  1248   MG 2.1   PHOS 1.6*          Blood Gas    Recent Labs     06/22/24  1333   PHART 7.248*   INJ8UNH 48.1*   PO2ART 194.9*   PFY6TAL 20.5*   BEART -6.8   SOURCE Line, Arterial     Recent Labs     06/22/24  1333   SOURCE Line, Arterial    LFTs  Recent Labs     06/21/24  1714 06/22/24  1248   ALT 59* 42   AST 38 32   ALKPHOS 152* 115*   ALB 5.3* 4.0   TBILI 1.79* 1.79*       Infectious  Recent Labs     06/22/24  1248   PROCALCITONI 0.12     Glucose  Recent Labs     06/21/24  1714 06/22/24  0431 06/22/24  1248   GLUC 242* 211* 333*               PIERRE Sotelo

## 2024-06-23 NOTE — PROGRESS NOTES
NEPHROLOGY PROGRESS NOTE   Osei Trimble 49 y.o. male MRN: [id number]  Unit/Bed#: ICU 07-01 Encounter: [id number]    Assessment/Plan:    Acute kidney injury, POA  Creatinine 0.91 mg/dL, improved from 1.23 mg/dL yesterday.  S/p Iso-Lyte 3L bolus 6/22-6/23.  UA 6/22 with 3+ glucose, otherwise unremarkable.  UPCR 0.27, elevated, 6/21. CT CAP with no hydronephrosis or suspicious lesions.  Punctate bilateral nonobstructing calculi.  6/22.  Etiology likely prerenal due to devreased renal perfusion, hypovolemia and  decreased oral intake in addition to medications affecting renal perfusion autoregulation.  Volume status examines euvolemic  Continue to avoid NSAIDs and hypotension. Monitor urinary output.    Hyponatremia, mild, asymptomatic, chronic  Sodium 137 mg/dL, appropriate, today.  No changes at this time, will continue to monitor.    Hypotension  Blood pressure decreased, SBP  past 24 hours, currently on norepinephrine drip. Vasopressor management as per primary team.    Lactic acidosis  Resolved-most recent lactic acid 1.7 mmol/L, had been 3.7 mmol/L following RRT on 6/22.  Continue to monitor.    Acute encephalopathy  RRT called 6/22 when patient became unresponsive while using the toilet.  Remained unresponsive, intubated for airway protection and transferred to ICU.  CT head negative for acute abnormality.  Unclear etiology, possible seizure due to hypophosphatemia.  S/p potassium phosphate repletion.  Continue management as per critical care team.  ECHO ordered, pending completion.    Acute respiratory failure  In the setting of altered mental status as noted above.  Continued management as per critical care team.    Small bowel neuroendocrine tumor  CT A/P 4.2 cm centrally calcified mass in the mid mesentery with surrounding lymph nodes measuring up to 9 mm in short axis in keeping with known neuroendocrine tumor, 6/22.  Currently receiving treatment by hematology oncology with recent PET scan  indicating metastatic disease.    Chronic lithium use  Undetectable lithium level  <0.10 mmol/L, .  Suffers from intermittent explosive disorder and bipolar disorder.  Patient previously advised to contact provider managing lithium treatment regarding recent medication changes and excessive fatigue.    ROS    Review of Systems   Unable to perform ROS: Patient nonverbal (intubated, sedated)   Constitutional:         Sister Ariadne and spouse at bedside           Historical Information   Past Medical History:   Diagnosis Date    Allergic     Bipolar disorder in partial remission (HCC)     Erectile dysfunction     unspecified erectile dysfunction type    Hypertension     Kidney stone      Past Surgical History:   Procedure Laterality Date    COLONOSCOPY      EXPLORATORY LAPAROTOMY W/ BOWEL RESECTION N/A 2022    Procedure: LAPAROTOMY EXPLORATORY W/ SMALL BOWEL RESECTION, liver biopsy;  Surgeon: oRse Mary Lara MD;  Location:  MAIN OR;  Service: General    FL RETROGRADE PYELOGRAM  10/06/2021    HERNIA REPAIR      AL CYSTO BLADDER W/URETERAL CATHETERIZATION Right 10/06/2021    Procedure: CYSTOSCOPY RETROGRADE PYELOGRAM WITH INSERTION STENT URETERAL, RIGHT URETEROSCOPY, STONE EXTRACTION;  Surgeon: Bradly Rivera MD;  Location:  MAIN OR;  Service: Urology     Social History   Social History     Substance and Sexual Activity   Alcohol Use Not Currently    Comment: Drinks a quart of moonshine/night-As of 21 will quit drinking     Social History     Substance and Sexual Activity   Drug Use Yes    Types: Marijuana    Comment: Socially (1-2 Monthly)     Social History     Tobacco Use   Smoking Status Former    Current packs/day: 0.00    Types: Pipe, Cigarettes    Quit date: 2003    Years since quittin.5   Smokeless Tobacco Never       Family History:   Family History   Problem Relation Age of Onset    Diabetes Mother     Thyroid disease Mother     Cancer Father     Thyroid disease Sister     Depression  "Brother     Cancer Maternal Aunt     Cancer Paternal Uncle     Cancer Paternal Grandmother     No Known Problems Brother     Thyroid disease Sister        Medications:  Pertinent medications were reviewed  Current Facility-Administered Medications   Medication Dose Route Frequency Provider Last Rate    acetaminophen  650 mg Oral Q4H PRN PIERRE Sotelo      chlorhexidine  15 mL Mouth/Throat Q12H BERENICE PIERRE Toth      fentaNYL  75 mcg/hr Intravenous Continuous PIERRE Sotelo Stopped (06/23/24 0341)    heparin (porcine)  5,000 Units Subcutaneous Q8H BERENICE PIERRE Toth      insulin regular (HumuLIN R,NovoLIN R) 1 Units/mL in sodium chloride 0.9 % 100 mL infusion  0.3-21 Units/hr Intravenous Titrated PIERRE Toth 4 Units/hr (06/23/24 0609)    lidocaine  1 patch Topical Daily PIERRE Toth      lithium carbonate  300 mg Oral BID PIERRE Toth      norepinephrine  1-30 mcg/min Intravenous Titrated PIERRE Sotelo 3 mcg/min (06/23/24 0312)    ondansetron  4 mg Intravenous Q6H PRN PIERRE Toth      propofol  5-50 mcg/kg/min Intravenous Titrated PIERRE Toth Stopped (06/23/24 0313)         Allergies   Allergen Reactions    Tomato - Food Allergy Anaphylaxis     raw    Poison Ivy Extract Hives    Poison Sumac Extract Hives         Vitals:   /61 (BP Location: Right arm)   Pulse 67   Temp 100.4 °F (38 °C) (Bladder)   Resp 18   Ht 6' 1\" (1.854 m)   Wt 109 kg (239 lb 6.7 oz)   SpO2 100%   BMI 31.59 kg/m²   Body mass index is 31.59 kg/m².  SpO2: 100 %,   SpO2 Activity: At Rest,   O2 Device: Ventilator      Intake/Output Summary (Last 24 hours) at 6/23/2024 0646  Last data filed at 6/23/2024 0400  Gross per 24 hour   Intake 4215.25 ml   Output 2170 ml   Net 2045.25 ml     Invasive Devices       Central Venous Catheter Line  Duration             CVC Central Lines 06/22/24 Triple Right Internal jugular <1 day              Peripheral Intravenous Line  Duration "             Peripheral IV 06/21/24 Left;Proximal;Ventral (anterior) Forearm 1 day    Peripheral IV 06/22/24 Dorsal (posterior);Left Hand <1 day    Peripheral IV 06/22/24 Right;Ventral (anterior) Forearm <1 day              Arterial Line  Duration             Arterial Line 06/22/24 Left Radial <1 day              Drain  Duration             Ureteral Drain/Stent Right ureter 6 Fr. 990 days    NG/OG/Enteral Tube Orogastric 16 Fr Center mouth <1 day    Urethral Catheter 15 Fr. <1 day              Airway  Duration             ETT  Cuffed 7.5 mm <1 day                    Physical Exam    Physical Exam  Vitals and nursing note reviewed.   Constitutional:       General: He is not in acute distress.     Appearance: Normal appearance. He is normal weight. He is ill-appearing.   HENT:      Head: Normocephalic and atraumatic.      Nose: Nose normal.      Mouth/Throat:      Mouth: Mucous membranes are moist.      Pharynx: Oropharynx is clear.   Eyes:      Extraocular Movements: Extraocular movements intact.      Conjunctiva/sclera: Conjunctivae normal.      Pupils: Pupils are equal, round, and reactive to light.   Cardiovascular:      Rate and Rhythm: Normal rate and regular rhythm.      Pulses: Normal pulses.      Heart sounds: Normal heart sounds.   Pulmonary:      Effort: Pulmonary effort is normal.      Breath sounds: Normal breath sounds.   Abdominal:      General: Abdomen is flat. Bowel sounds are normal.      Palpations: Abdomen is soft.   Musculoskeletal:         General: Normal range of motion.      Cervical back: Normal range of motion and neck supple.   Skin:     General: Skin is warm and dry.   Neurological:      General: No focal deficit present.      Mental Status: He is oriented to person, place, and time.   Psychiatric:         Mood and Affect: Mood normal.         Behavior: Behavior normal.         Diagnostic Data:  Lab: I have personally reviewed pertinent lab results.,   CBC:  Results from last 7 days   Lab  "Units 06/23/24  0430   WBC Thousand/uL 11.22*   HEMOGLOBIN g/dL 12.0   HEMATOCRIT % 33.8*   PLATELETS Thousands/uL 167      CMP:   Lab Results   Component Value Date    SODIUM 137 06/23/2024    K 3.9 06/23/2024     06/23/2024    CO2 24 06/23/2024    BUN 13 06/23/2024    CREATININE 0.91 06/23/2024    CALCIUM 8.6 06/23/2024    AST 37 06/23/2024    ALT 49 06/23/2024    ALKPHOS 106 (H) 06/23/2024    EGFR 97 06/23/2024   ,   PT/INR:   Lab Results   Component Value Date    INR 1.14 06/22/2024   ,   Magnesium: No components found for: \"MAG\",  Phosphorous:   Lab Results   Component Value Date    PHOS 2.9 06/23/2024       Microbiology:  @[email or web address],(urinecx:7)@        PIERRE Hernandez    Portions of the record may have been created with voice recognition software. Occasional wrong word or \"sound a like\" substitutions may have occurred due to the inherent limitations of voice recognition software. Read the chart carefully and recognize, using context, where substitutions have occurred.   "

## 2024-06-23 NOTE — ASSESSMENT & PLAN NOTE
Noted to be hypotensive during RRT when placed back in bed   Levo started, wean for MAP goal > 65  Unclear etiology at this point  Lactic 3.7, fluid resuscitate and trend  CT CAP Unremarkable for acute pathology  Monitor BP closely  Maintain Debo/TLC

## 2024-06-23 NOTE — ASSESSMENT & PLAN NOTE
Labs during RRT showed Phos level of 1.6  Possibly precipitating seizure?  Continue repletion PRN  Consider TF today if unable to extubate

## 2024-06-24 ENCOUNTER — APPOINTMENT (INPATIENT)
Dept: RADIOLOGY | Facility: HOSPITAL | Age: 49
DRG: 469 | End: 2024-06-24
Payer: COMMERCIAL

## 2024-06-24 ENCOUNTER — APPOINTMENT (INPATIENT)
Dept: NON INVASIVE DIAGNOSTICS | Facility: HOSPITAL | Age: 49
DRG: 469 | End: 2024-06-24
Payer: COMMERCIAL

## 2024-06-24 PROBLEM — R50.9 FEVER: Status: RESOLVED | Noted: 2024-06-23 | Resolved: 2024-06-24

## 2024-06-24 PROBLEM — Z51.5 PALLIATIVE CARE BY SPECIALIST: Status: ACTIVE | Noted: 2024-06-24

## 2024-06-24 PROBLEM — Z71.89 GOALS OF CARE, COUNSELING/DISCUSSION: Status: ACTIVE | Noted: 2024-06-24

## 2024-06-24 LAB
ALBUMIN SERPL BCG-MCNC: 3.6 G/DL (ref 3.5–5)
ALP SERPL-CCNC: 98 U/L (ref 34–104)
ALT SERPL W P-5'-P-CCNC: 41 U/L (ref 7–52)
AMMONIA PLAS-SCNC: 44 UMOL/L (ref 18–72)
ANION GAP SERPL CALCULATED.3IONS-SCNC: 9 MMOL/L (ref 4–13)
AORTIC ROOT: 4 CM
APICAL FOUR CHAMBER EJECTION FRACTION: 46 %
ASCENDING AORTA: 3.7 CM
AST SERPL W P-5'-P-CCNC: 32 U/L (ref 13–39)
ATRIAL RATE: 87 BPM
AV LVOT MEAN GRADIENT: 3 MMHG
AV LVOT PEAK GRADIENT: 6 MMHG
BILIRUB DIRECT SERPL-MCNC: 0.84 MG/DL (ref 0–0.2)
BILIRUB SERPL-MCNC: 2.92 MG/DL (ref 0.2–1)
BSA FOR ECHO PROCEDURE: 2.28 M2
BUN SERPL-MCNC: 10 MG/DL (ref 5–25)
CALCIUM SERPL-MCNC: 8.6 MG/DL (ref 8.4–10.2)
CHLORIDE SERPL-SCNC: 98 MMOL/L (ref 96–108)
CO2 SERPL-SCNC: 25 MMOL/L (ref 21–32)
CORTIS AM PEAK SERPL-MCNC: 23.8 UG/DL (ref 6.7–22.6)
CREAT SERPL-MCNC: 0.79 MG/DL (ref 0.6–1.3)
DOP CALC LVOT PEAK VEL VTI: 22.06 CM
DOP CALC LVOT PEAK VEL: 1.27 M/S
E WAVE DECELERATION TIME: 256 MS
E/A RATIO: 1.05
ERYTHROCYTE [DISTWIDTH] IN BLOOD BY AUTOMATED COUNT: 14.2 % (ref 11.6–15.1)
FOLATE SERPL-MCNC: 4.6 NG/ML
FRACTIONAL SHORTENING: 40 (ref 28–44)
GFR SERPL CREATININE-BSD FRML MDRD: 104 ML/MIN/1.73SQ M
GLUCOSE SERPL-MCNC: 149 MG/DL (ref 65–140)
GLUCOSE SERPL-MCNC: 191 MG/DL (ref 65–140)
GLUCOSE SERPL-MCNC: 217 MG/DL (ref 65–140)
GLUCOSE SERPL-MCNC: 237 MG/DL (ref 65–140)
GLUCOSE SERPL-MCNC: 239 MG/DL (ref 65–140)
HCT VFR BLD AUTO: 34.9 % (ref 36.5–49.3)
HGB BLD-MCNC: 12 G/DL (ref 12–17)
INTERVENTRICULAR SEPTUM IN DIASTOLE (PARASTERNAL SHORT AXIS VIEW): 1.1 CM
INTERVENTRICULAR SEPTUM: 1.1 CM (ref 0.6–1.1)
LEFT ATRIUM SIZE: 4 CM
LEFT INTERNAL DIMENSION IN SYSTOLE: 3 CM (ref 2.1–4)
LEFT VENTRICULAR INTERNAL DIMENSION IN DIASTOLE: 5 CM (ref 3.5–6)
LEFT VENTRICULAR POSTERIOR WALL IN END DIASTOLE: 1 CM
LEFT VENTRICULAR STROKE VOLUME: 83 ML
LIPASE SERPL-CCNC: <6 U/L (ref 11–82)
LVSV (TEICH): 83 ML
MAGNESIUM SERPL-MCNC: 1.7 MG/DL (ref 1.9–2.7)
MCH RBC QN AUTO: 29.2 PG (ref 26.8–34.3)
MCHC RBC AUTO-ENTMCNC: 34.4 G/DL (ref 31.4–37.4)
MCV RBC AUTO: 85 FL (ref 82–98)
MV E'TISSUE VEL-SEP: 13 CM/S
MV PEAK A VEL: 0.94 M/S
MV PEAK E VEL: 99 CM/S
MV STENOSIS PRESSURE HALF TIME: 74 MS
MV VALVE AREA P 1/2 METHOD: 3
P AXIS: 63 DEGREES
PHOSPHATE SERPL-MCNC: 3.2 MG/DL (ref 2.7–4.5)
PLATELET # BLD AUTO: 113 THOUSANDS/UL (ref 149–390)
PMV BLD AUTO: 10.3 FL (ref 8.9–12.7)
POTASSIUM SERPL-SCNC: 4.5 MMOL/L (ref 3.5–5.3)
PR INTERVAL: 162 MS
PROCALCITONIN SERPL-MCNC: 0.27 NG/ML
PROT SERPL-MCNC: 6.8 G/DL (ref 6.4–8.4)
QRS AXIS: -31 DEGREES
QRSD INTERVAL: 92 MS
QT INTERVAL: 348 MS
QTC INTERVAL: 418 MS
RBC # BLD AUTO: 4.11 MILLION/UL (ref 3.88–5.62)
RIGHT ATRIAL 2D VOLUME: 67 ML
RIGHT ATRIUM AREA SYSTOLE A4C: 13.6 CM2
RIGHT VENTRICLE ID DIMENSION: 3 CM
SINOTUBULAR JUNCTION: 2.9 CM
SL CV LV EF: 70
SL CV PED ECHO LEFT VENTRICLE DIASTOLIC VOLUME (MOD BIPLANE) 2D: 117 ML
SL CV PED ECHO LEFT VENTRICLE SYSTOLIC VOLUME (MOD BIPLANE) 2D: 35 ML
SL CV SINUS OF VALSALVA 2D: 3.8 CM
SODIUM SERPL-SCNC: 132 MMOL/L (ref 135–147)
STJ: 2.9 CM
T WAVE AXIS: 32 DEGREES
TRICUSPID ANNULAR PLANE SYSTOLIC EXCURSION: 1.9 CM
VENTRICULAR RATE: 87 BPM
VIT B12 SERPL-MCNC: 217 PG/ML (ref 180–914)
WBC # BLD AUTO: 10.26 THOUSAND/UL (ref 4.31–10.16)

## 2024-06-24 PROCEDURE — 82746 ASSAY OF FOLIC ACID SERUM: CPT | Performed by: NURSE PRACTITIONER

## 2024-06-24 PROCEDURE — 99232 SBSQ HOSP IP/OBS MODERATE 35: CPT | Performed by: INTERNAL MEDICINE

## 2024-06-24 PROCEDURE — 99233 SBSQ HOSP IP/OBS HIGH 50: CPT | Performed by: INTERNAL MEDICINE

## 2024-06-24 PROCEDURE — 82140 ASSAY OF AMMONIA: CPT | Performed by: NURSE PRACTITIONER

## 2024-06-24 PROCEDURE — 82533 TOTAL CORTISOL: CPT | Performed by: NURSE PRACTITIONER

## 2024-06-24 PROCEDURE — 85027 COMPLETE CBC AUTOMATED: CPT

## 2024-06-24 PROCEDURE — C9113 INJ PANTOPRAZOLE SODIUM, VIA: HCPCS | Performed by: NURSE PRACTITIONER

## 2024-06-24 PROCEDURE — 83690 ASSAY OF LIPASE: CPT | Performed by: INTERNAL MEDICINE

## 2024-06-24 PROCEDURE — 82607 VITAMIN B-12: CPT | Performed by: NURSE PRACTITIONER

## 2024-06-24 PROCEDURE — 84100 ASSAY OF PHOSPHORUS: CPT

## 2024-06-24 PROCEDURE — 93010 ELECTROCARDIOGRAM REPORT: CPT | Performed by: INTERNAL MEDICINE

## 2024-06-24 PROCEDURE — 99255 IP/OBS CONSLTJ NEW/EST HI 80: CPT | Performed by: PHYSICIAN ASSISTANT

## 2024-06-24 PROCEDURE — 84425 ASSAY OF VITAMIN B-1: CPT | Performed by: NURSE PRACTITIONER

## 2024-06-24 PROCEDURE — 80053 COMPREHEN METABOLIC PANEL: CPT

## 2024-06-24 PROCEDURE — 82248 BILIRUBIN DIRECT: CPT | Performed by: NURSE PRACTITIONER

## 2024-06-24 PROCEDURE — 93306 TTE W/DOPPLER COMPLETE: CPT

## 2024-06-24 PROCEDURE — 93306 TTE W/DOPPLER COMPLETE: CPT | Performed by: INTERNAL MEDICINE

## 2024-06-24 PROCEDURE — 73030 X-RAY EXAM OF SHOULDER: CPT

## 2024-06-24 PROCEDURE — 84145 PROCALCITONIN (PCT): CPT | Performed by: NURSE PRACTITIONER

## 2024-06-24 PROCEDURE — 83735 ASSAY OF MAGNESIUM: CPT

## 2024-06-24 PROCEDURE — 82948 REAGENT STRIP/BLOOD GLUCOSE: CPT

## 2024-06-24 RX ORDER — INSULIN LISPRO 100 [IU]/ML
1-5 INJECTION, SOLUTION INTRAVENOUS; SUBCUTANEOUS
Status: DISCONTINUED | OUTPATIENT
Start: 2024-06-24 | End: 2024-06-25

## 2024-06-24 RX ORDER — SENNOSIDES 8.6 MG
1 TABLET ORAL
Status: DISCONTINUED | OUTPATIENT
Start: 2024-06-24 | End: 2024-06-25

## 2024-06-24 RX ORDER — POLYETHYLENE GLYCOL 3350 17 G/17G
17 POWDER, FOR SOLUTION ORAL DAILY
Status: DISCONTINUED | OUTPATIENT
Start: 2024-06-24 | End: 2024-06-25

## 2024-06-24 RX ORDER — MAGNESIUM SULFATE HEPTAHYDRATE 40 MG/ML
2 INJECTION, SOLUTION INTRAVENOUS ONCE
Status: COMPLETED | OUTPATIENT
Start: 2024-06-24 | End: 2024-06-25

## 2024-06-24 RX ORDER — LISINOPRIL 20 MG/1
20 TABLET ORAL DAILY
Status: DISCONTINUED | OUTPATIENT
Start: 2024-06-24 | End: 2024-06-27 | Stop reason: HOSPADM

## 2024-06-24 RX ORDER — LANOLIN ALCOHOL/MO/W.PET/CERES
100 CREAM (GRAM) TOPICAL DAILY
Status: DISCONTINUED | OUTPATIENT
Start: 2024-06-24 | End: 2024-06-24

## 2024-06-24 RX ADMIN — INSULIN LISPRO 2 UNITS: 100 INJECTION, SOLUTION INTRAVENOUS; SUBCUTANEOUS at 22:43

## 2024-06-24 RX ADMIN — LITHIUM CARBONATE 300 MG: 300 TABLET, EXTENDED RELEASE ORAL at 17:04

## 2024-06-24 RX ADMIN — POLYETHYLENE GLYCOL 3350 17 G: 17 POWDER, FOR SOLUTION ORAL at 12:16

## 2024-06-24 RX ADMIN — ACETAMINOPHEN 650 MG: 325 TABLET ORAL at 22:45

## 2024-06-24 RX ADMIN — INSULIN LISPRO 2 UNITS: 100 INJECTION, SOLUTION INTRAVENOUS; SUBCUTANEOUS at 12:16

## 2024-06-24 RX ADMIN — HEPARIN SODIUM 5000 UNITS: 5000 INJECTION, SOLUTION INTRAVENOUS; SUBCUTANEOUS at 22:22

## 2024-06-24 RX ADMIN — INSULIN LISPRO 1 UNITS: 100 INJECTION, SOLUTION INTRAVENOUS; SUBCUTANEOUS at 17:04

## 2024-06-24 RX ADMIN — MAGNESIUM SULFATE HEPTAHYDRATE 2 G: 40 INJECTION, SOLUTION INTRAVENOUS at 07:28

## 2024-06-24 RX ADMIN — LISINOPRIL 20 MG: 20 TABLET ORAL at 12:16

## 2024-06-24 RX ADMIN — CHLORHEXIDINE GLUCONATE 15 ML: 1.2 RINSE ORAL at 22:00

## 2024-06-24 RX ADMIN — ACETAMINOPHEN 650 MG: 325 TABLET ORAL at 15:28

## 2024-06-24 RX ADMIN — LITHIUM CARBONATE 300 MG: 300 TABLET, EXTENDED RELEASE ORAL at 08:07

## 2024-06-24 RX ADMIN — CHLORHEXIDINE GLUCONATE 15 ML: 1.2 RINSE ORAL at 08:07

## 2024-06-24 RX ADMIN — LIDOCAINE 2 PATCH: 700 PATCH TOPICAL at 08:07

## 2024-06-24 RX ADMIN — PRAZOSIN HYDROCHLORIDE 5 MG: 5 CAPSULE ORAL at 22:22

## 2024-06-24 RX ADMIN — SENNOSIDES 8.6 MG: 8.6 TABLET, FILM COATED ORAL at 22:22

## 2024-06-24 RX ADMIN — PANTOPRAZOLE SODIUM 40 MG: 40 INJECTION, POWDER, FOR SOLUTION INTRAVENOUS at 08:08

## 2024-06-24 NOTE — ASSESSMENT & PLAN NOTE
6/22 RRT called when patient became unresponsive while using the toilet  Upon return to bed remained unresponsive  Intubated for airway protection  CT head negative for acute abnormality  Unclear etiology, consider possible seizure 2/2 hypophosphatemia?  MRI completed to r/o brain metastasis or acute ischemia  6/23 extubated to RA  Optimize electrolytes  Monitor neuro exam closely  UDS only Positive for Fentanyl, which was used for induction of intubation   Cautious administration of pain medications

## 2024-06-24 NOTE — PROGRESS NOTES
[facility]  Progress Note  Name: Osei Trimble I  MRN: [id number]  Unit/Bed#: ICU 08-01 I Date of Admission: 6/21/2024   Date of Service: 6/24/2024 I Hospital Day: 3    Assessment & Plan   * Acute encephalopathy  Assessment & Plan  6/22 RRT called when patient became unresponsive while using the toilet  Upon return to bed remained unresponsive  Intubated for airway protection  CT head negative for acute abnormality  Unclear etiology, consider possible seizure 2/2 hypophosphatemia?  MRI completed to r/o brain metastasis or acute ischemia  6/23 extubated to   Optimize electrolytes  Monitor neuro exam closely  UDS only Positive for Fentanyl, which was used for induction of intubation   Cautious administration of pain medications    Neuroendocrine carcinoma of small bowel (HCC)  Assessment & Plan  Follows with Dr. Green as outpatient   Incidentally found to have mesenteric masses in October 2021  PET CT 2022 demonstrated Dotatate avid clustered central mesenteric masses suspicious for underlying neuroendocrine neoplasm  Underwent small bowel resection 07/2022 in the area that was thought to be high risk for obstruction  Currently undergoing chemo treatment every 4 weeks with lanreotide    Explosive personality disorder (HCC)  Assessment & Plan  Lithium continued        Essential hypertension  Assessment & Plan  Hold antihypertensives in the setting of hypotension  Monitor BP  Minipress restarted    Back pain  Assessment & Plan  Continue Baclofen  Continue PRN tylenol and oxycodone    Other chest pain  Assessment & Plan  Presented 6/21 with radiating chest pain  Troponins initially negative, no EKG changes noted   Chest pain resolved  Repeat troponins negative  EKG w/o ischemia changes   Echo pending  Likely referred pain from cancer     Hyperglycemia  Assessment & Plan  Hemoglobin A1C 7.2  SSI q6    Thrombocytopenia (HCC)  Assessment & Plan  Trending down since admission  Unclear  etiology  Monitor for bleeding  Monitor platelets on daily labs  Improved     Acute respiratory failure (HCC)-resolved as of 6/23/2024  Assessment & Plan  In the setting of altered mental status  Unable to protect airway   Aspiration event during intubation  Extubated to RA 6/23    Total bilirubin, elevated  Assessment & Plan  Possibly 2/2 Liver metastasis?   CT CAP unremarkable  improving  Trend on daily CMP    Hypotension-resolved as of 6/23/2024  Assessment & Plan  Noted to be hypotensive during RRT when placed back in bed   Weaned off pressors  Now hypertensive after extubation    Lactic acidosis-resolved as of 6/23/2024  Assessment & Plan  Unclear etiology  Differentials include seizure or severe dehydration??  Received IVF upon admission and during RRT  Continue boluses PRN  Monitor UO  Cleared overnight    Fever-resolved as of 6/24/2024  Assessment & Plan  Likely in setting of suspected aspiration PNA vs Pneumonitis   resolved    Hypophosphatemia-resolved as of 6/23/2024  Assessment & Plan  Labs during RRT showed Phos level of 1.6  Possibly precipitating seizure?  Continue repletion PRN  improved             Disposition: Med Surg    ICU Core Measures     A: Assess, Prevent, and Manage Pain Has pain been assessed? Yes  Need for changes to pain regimen? No   B: Both SAT/SAT  N/A   C: Choice of Sedation RASS Goal: N/A patient not on sedation  Need for changes to sedation or analgesia regimen? No   D: Delirium CAM-ICU: Negative   E: Early Mobility  Plan for early mobility? Yes   F: Family Engagement Plan for family engagement today? Yes       Review of Invasive Devices:      Central access plan: Will obtain peripheral access and discontinue prior to transfer      Prophylaxis:  VTE VTE covered by:  heparin (porcine), Subcutaneous, 5,000 Units at 06/23/24 1349       Stress Ulcer  covered bypantoprazole (PROTONIX) injection 40 mg [[id number]]         Significant 24hr Events     24hr events: Yesterday had an MRI and  then extubated. Received baclofen for back spasms and Atarax for itching. No acute events overnight.      Subjective   Review of Systems: Review of Systems   Cardiovascular:  Positive for chest pain.   Gastrointestinal:  Positive for abdominal pain. Negative for diarrhea, nausea and vomiting.   Genitourinary: Negative.    Musculoskeletal:  Positive for back pain and neck pain.   Neurological: Negative.    Psychiatric/Behavioral:  Positive for agitation and behavioral problems. The patient is nervous/anxious and is hyperactive.         Objective                            Vitals I/O      Most Recent Min/Max in 24hrs   Temp 98.8 °F (37.1 °C) Temp  Min: 98.8 °F (37.1 °C)  Max: 101.7 °F (38.7 °C)   Pulse 103 Pulse  Min: 66  Max: 121   Resp (!) 32 Resp  Min: 13  Max: 55   /93 BP  Min: 86/54  Max: 189/97   O2 Sat 90 % SpO2  Min: 90 %  Max: 100 %      Intake/Output Summary (Last 24 hours) at 6/24/2024 0114  Last data filed at 6/24/2024 0000  Gross per 24 hour   Intake 1627.67 ml   Output 1635 ml   Net -7.33 ml       Diet NPO    Invasive Monitoring           Physical Exam   Physical Exam  Vitals and nursing note reviewed.   Eyes:      Pupils: Pupils are equal, round, and reactive to light.   Skin:     General: Skin is warm and dry.      Capillary Refill: Capillary refill takes less than 2 seconds.      Coloration: Skin is pale.   HENT:      Head: Normocephalic.      Mouth/Throat:      Mouth: Mucous membranes are moist.   Neck:      Vascular: Central line present.   Cardiovascular:      Rate and Rhythm: Regular rhythm. Tachycardia present.      Pulses: Normal pulses.      Heart sounds: Normal heart sounds.   Musculoskeletal:         General: Normal range of motion.   Abdominal:      Palpations: Abdomen is soft.   Constitutional:       General: He is not in acute distress.     Appearance: He is well-developed and well-nourished. He is ill-appearing.   Pulmonary:      Effort: Tachypnea present.   Psychiatric:          Mood and Affect:  mood and affect abnormal.  Neurological:      Mental Status: He is alert and oriented to person, place and time. He is agitated.      Motor: Strength full and intact in all extremities.            Diagnostic Studies      EKG: sinus tachycardia/NSR  Imaging:  I have personally reviewed pertinent reports.       Medications:  Scheduled PRN   chlorhexidine, 15 mL, Q12H BERENICE  heparin (porcine), 5,000 Units, Q8H BERENICE  insulin lispro, 1-5 Units, Q6H BERENICE  lidocaine, 2 patch, Daily  lithium carbonate, 300 mg, BID  pantoprazole, 40 mg, Q24H BERENICE  prazosin, 5 mg, HS      acetaminophen, 650 mg, Q6H PRN  baclofen, 10 mg, BID PRN  hydrALAZINE, 10 mg, Q4H PRN  hydrOXYzine HCL, 25 mg, Q6H PRN  ondansetron, 4 mg, Q6H PRN  oxyCODONE, 5 mg, Q4H PRN       Continuous          Labs:    CBC    Recent Labs     06/22/24  1329 06/23/24  0430   WBC 9.91 11.22*   HGB 13.0 12.0   HCT 37.3 33.8*   * 167     BMP    Recent Labs     06/22/24  1248 06/23/24  0430   SODIUM 132* 137   K 3.6 3.9    105   CO2 21 24   AGAP 11 8   BUN 17 13   CREATININE 1.23 0.91   CALCIUM 9.0 8.6       Coags    Recent Labs     06/22/24  1248   INR 1.14   PTT 23        Additional Electrolytes  Recent Labs     06/22/24  1248 06/23/24  0430   MG 2.1 2.1   PHOS 1.6* 2.9          Blood Gas    Recent Labs     06/22/24  1333   PHART 7.248*   XXC8QAP 48.1*   PO2ART 194.9*   THO5PAU 20.5*   BEART -6.8   SOURCE Line, Arterial     Recent Labs     06/22/24  1333   SOURCE Line, Arterial    LFTs  Recent Labs     06/22/24  1248 06/23/24  0430   ALT 42 49   AST 32 37   ALKPHOS 115* 106*   ALB 4.0 3.5   TBILI 1.79* 1.17*       Infectious  Recent Labs     06/22/24  1248 06/23/24  0430   PROCALCITONI 0.12 0.48*     Glucose  Recent Labs     06/22/24  0431 06/22/24  1248 06/23/24  0430   GLUC 211* 333* 192*               PIERRE Sotelo

## 2024-06-24 NOTE — ASSESSMENT & PLAN NOTE
In the setting of altered mental status  Unable to protect airway   Aspiration event during intubation  Extubated to RA 6/23

## 2024-06-24 NOTE — PLAN OF CARE
Problem: SAFETY,RESTRAINT: NV/NON-SELF DESTRUCTIVE BEHAVIOR  Goal: Remains free of harm/injury (restraint for non violent/non self-detsructive behavior)  Description: INTERVENTIONS:  - Instruct patient/family regarding restraint use   - Assess and monitor physiologic and psychological status   - Provide interventions and comfort measures to meet assessed patient needs   - Identify and implement measures to help patient regain control  - Assess readiness for release of restraint   Outcome: Completed  Goal: Returns to optimal restraint-free functioning  Description: INTERVENTIONS:  - Assess the patient's behavior and symptoms that indicate continued need for restraint  - Identify and implement measures to help patient regain control  - Assess readiness for release of restraint   Outcome: Completed     Problem: NEUROSENSORY - ADULT  Goal: Achieves stable or improved neurological status  Description: INTERVENTIONS  - Monitor and report changes in neurological status  - Monitor vital signs such as temperature, blood pressure, glucose, and any other labs ordered   - Initiate measures to prevent increased intracranial pressure  - Monitor for seizure activity and implement precautions if appropriate      Outcome: Progressing     Problem: RESPIRATORY - ADULT  Goal: Achieves optimal ventilation and oxygenation  Description: INTERVENTIONS:  - Assess for changes in respiratory status  - Assess for changes in mentation and behavior  - Position to facilitate oxygenation and minimize respiratory effort  - Oxygen administered by appropriate delivery if ordered  - Initiate smoking cessation education as indicated  - Encourage broncho-pulmonary hygiene including cough, deep breathe, Incentive Spirometry  - Assess the need for suctioning and aspirate as needed  - Assess and instruct to report SOB or any respiratory difficulty  - Respiratory Therapy support as indicated  Outcome: Progressing

## 2024-06-24 NOTE — ASSESSMENT & PLAN NOTE
Decisional apparatus:  Patient is competent on exam today.  If competency is lost, patient's substitute decision maker would default to sister by [place].  Advance Directive / Living Will / POLST / POA Forms: No    Goals  Level 1 code status.   Full code.   Disease focused care.   Patient drowsy from morning medications and deferred discussions today.  Patient follows with [facility] team outpatient, will coordinate outpatient follow up.

## 2024-06-24 NOTE — ASSESSMENT & PLAN NOTE
Trending down since admission  Unclear etiology  Monitor for bleeding  Monitor platelets on daily labs  Improved

## 2024-06-24 NOTE — CASE MANAGEMENT
Case Management Assessment & Discharge Planning Note    Patient name Osei Trimble  Location ICU 08/ICU - MRN [id number]  : 1975 Date 2024       Current Admission Date: 2024  Current Admission Diagnosis:Acute encephalopathy   Patient Active Problem List    Diagnosis Date Noted Date Diagnosed    Palliative care by specialist 2024     Goals of care, counseling/discussion 2024     Back pain 2024     Hyperglycemia 2024     Acute encephalopathy 2024     Shock (HCC) 2024     Thrombocytopenia (HCC) 2024     Total bilirubin, elevated 2024     Other chest pain 2024     Explosive personality disorder (HCC) 2024     Abdominal pain 2023     Neuroendocrine carcinoma of small bowel (HCC) 2022     Neuroendocrine tumor 2022     Hydronephrosis with urinary obstruction due to ureteral calculus 10/04/2021     Calcified mesenteric mass 10/04/2021     Essential hypertension 10/04/2021     Diastasis recti 2019       LOS (days): 3  Geometric Mean LOS (GMLOS) (days):   Days to GMLOS:     OBJECTIVE:    Risk of Unplanned Readmission Score: 12.82         Current admission status: Inpatient  Referral Reason: Other (d/c plan)    Preferred Pharmacy:   [address] [address]  Phone: [phone] Fax: [phone]    Primary Care Provider: Jermaine Mayes DO    Primary Insurance: [employer]  Secondary Insurance:     ASSESSMENT:  Active Health Care Proxies       Ariadne Velazquez Health Care Representative - [facility] Phone: [phone] (Mobile)                 Advance Directives  Does patient have a Health Care POA?: No  Was patient offered paperwork?: Yes (paperwork was given)  Does patient have Advance Directives?: No  Was patient offered paperwork?: Yes (paperwork was given)         Readmission Root Cause  30 Day Readmission: No    Patient  Information  Admitted from:: Home  Mental Status: Other (Comment) (lethargic)  During Assessment patient was accompanied by: Sister  Primary Caregiver: Self  Support Systems: Family members (sister and her fiance)  County of Residence: [place]  What city do you live in?: [place]  Home entry access options. Select all that apply.: No steps to enter home  Type of Current Residence: 2 story home  Upon entering residence, is there a bedroom on the main floor (no further steps)?: No  A bedroom is located on the following floor levels of residence (select all that apply):: 2nd Floor  Upon entering residence, is there a bathroom on the main floor (no further steps)?: No  Indicate which floors of current residence have a bathroom (select all the apply):: 2nd Floor  Number of steps to 2nd floor from main floor: One Flight  Living Arrangements: Lives Alone  Is patient a ?: No    Activities of Daily Living Prior to Admission  Functional Status: Assistance (sister  cleans & meals - she lives next door to the pt)  Completes ADLs independently?: Yes  Ambulates independently?: Yes  Does patient use assisted devices?: No  Does patient currently own DME?: No  Does patient have a history of Outpatient Therapy (PT/OT)?: No  Does the patient have a history of Short-Term Rehab?: No  Does patient have a history of HHC?: No  Does patient currently have HHC?: No         Patient Information Continued  Income Source: Unemployed  Does patient have prescription coverage?: Yes ([facility])  Does patient receive dialysis treatments?: No  Does patient have a history of substance abuse?: No  Does patient have a history of Mental Health Diagnosis?: Yes (explosive personality)  Is patient receiving treatment for mental health?: Yes (meds from pcp & psychiatrist [address] [place] [address])  Has patient received inpatient treatment related to mental health in the last 2 years?: No         Means of Transportation  Means of Transport to hospitals::  Family transport      Social Determinants of Health (SDOH)      Flowsheet Row Most Recent Value   Housing Stability    In the last 12 months, was there a time when you were not able to pay the mortgage or rent on time? N   In the past 12 months, how many times have you moved where you were living? 1   At any time in the past 12 months, were you homeless or living in a shelter (including now)? N   Transportation Needs    In the past 12 months, has lack of transportation kept you from medical appointments or from getting medications? no   In the past 12 months, has lack of transportation kept you from meetings, work, or from getting things needed for daily living? No   Food Insecurity    Within the past 12 months, you worried that your food would run out before you got the money to buy more. Never true   Within the past 12 months, the food you bought just didn't last and you didn't have money to get more. Never true   Utilities    In the past 12 months has the electric, gas, oil, or water company threatened to shut off services in your home? No            DISCHARGE DETAILS:    Discharge planning discussed with:: patient & sister at the bedside  [place] of Choice: Yes     CM contacted family/caregiver?: Yes             Contacts  Patient Contacts: Ariadne Velazquez  Relationship to Patient:: Family (sister)  Contact Method: In Person  Reason/Outcome: Discharge Planning    Requested Home Health Care         Is the patient interested in HHC at discharge?: No    DME Referral Provided  Referral made for DME?: No    Other Referral/Resources/Interventions Provided:  Referral Comments: pt remains in ICU, acute encephalopathy, MEHDI, IV mag, nephro, palliative, pulm    Would you like to participate in our [facility] service program?  : No - Declined    Treatment Team Recommendation:  (d/c plan tbd- tbd)

## 2024-06-24 NOTE — NURSING NOTE
"1730 Dinner tray brought in to pt. Pt asked \"where's the moonshine?\" RN discussed pt's drinking and pt reported drinking \"a quart of moonshine.\" When asked by RN regarding frequency pt responded \"pretty much every night.\" PIERRE Ayala made aware of discussion.  "

## 2024-06-24 NOTE — ASSESSMENT & PLAN NOTE
Agree with current regimen  Tylenol 650mg q6h PRN mild pain  OxyIR 5mg q4h PRN mod-sev/BT pain      PRN use: DIL IV 0.7mg, Oxy 5mgx1  24hr OME: 21.5

## 2024-06-24 NOTE — PROGRESS NOTES
"Progress Note - Nephrology   Osei Trimble 49 y.o. male MRN: [id number]  Unit/Bed#: ICU 08-01 Encounter: [id number]    A/P:  1.  Acute kidney injury   Appears to be resolved with a creatinine around 0.8 mg/dL.  Continue to optimize care avoid nephrotoxins.  2.  Hyponatremia   Patient may benefit from a mild fluid restriction, can continue to monitor for now.  3.  Hypotension   Improved, continue to monitor, offer supplemental volume depending on he progresses from a clinical standpoint.  4.  Chronic lithium use   Patient at high risk for various electrolyte, water, and intrinsic kidney disorders.  Appears to be stable at this time, continue care according to psychiatrist recommendations.  5.  Acute respiratory failure, previously on mechanical ventilation   Patient now breathing on his own with no specific issues.  Continue care according to critical care colleagues.    Patient is stable from renal standpoint.  Will sign off at this time, please contact us regarding further care and evaluation if indicated during this hospital stay.    Follow up reason for today's visit: Acute kidney injury/electrolyte disorder/hypotension    Acute encephalopathy    Patient Active Problem List   Diagnosis    Diastasis recti    Hydronephrosis with urinary obstruction due to ureteral calculus    Calcified mesenteric mass    Essential hypertension    Neuroendocrine tumor    Neuroendocrine carcinoma of small bowel (HCC)    Abdominal pain    Other chest pain    Explosive personality disorder (HCC)    Hyperglycemia    Acute encephalopathy    Shock (HCC)    Thrombocytopenia (HCC)    Total bilirubin, elevated    Back pain         Subjective:   No acute complaints, patient feels fatigued    Objective:     Vitals: Blood pressure 150/76, pulse 80, temperature 97.6 °F (36.4 °C), temperature source Tympanic, resp. rate (!) 30, height 6' 1\" (1.854 m), weight 103 kg (228 lb), SpO2 97%.,Body mass index is 30.08 kg/m².    Weight (last 2 days)  " "     Date/Time Weight    06/24/24 0748 103 (228)    06/24/24 0538 104 (228.62)    06/23/24 0600 109 (239.42)              Intake/Output Summary (Last 24 hours) at 6/24/2024 0848  Last data filed at 6/24/2024 0801  Gross per 24 hour   Intake 1150.84 ml   Output 1695 ml   Net -544.16 ml     I/O last 3 completed shifts:  In: 3182.5 [P.O.:960; I.V.:2122.5; NG/GT:100]  Out: 2510 [Urine:2380; Emesis/NG output:130]         Physical Exam: /76   Pulse 80   Temp 97.6 °F (36.4 °C) (Tympanic)   Resp (!) 30   Ht 6' 1\" (1.854 m)   Wt 103 kg (228 lb)   SpO2 97%   BMI 30.08 kg/m²     General Appearance:    Alert, cooperative, no distress, appears stated age   Head:    Normocephalic, without obvious abnormality, atraumatic   Eyes:    Conjunctiva/corneas clear   Ears:    Normal external ears   Nose:   Nares normal, septum midline, mucosa normal, no drainage    or sinus tenderness   Throat:   Lips, mucosa, and tongue normal; teeth and gums normal   Neck:   Supple   Back:     Symmetric, no curvature, ROM normal, no CVA tenderness   Lungs:     Clear to auscultation bilaterally, respirations unlabored   Chest wall:    No tenderness or deformity   Heart:    Regular rate and rhythm, S1 and S2 normal, no murmur, rub   or gallop   Abdomen:     Soft, non-tender, bowel sounds active   Extremities:   Extremities normal, atraumatic, no cyanosis, mild bilateral lower extremity edema   Skin:   Skin color, texture, turgor normal, no rashes or lesions   Lymph nodes:   Cervical normal   Neurologic:   CNII-XII intact            Lab, Imaging and other studies: I have personally reviewed pertinent labs.  CBC:   Lab Results   Component Value Date    WBC 10.26 (H) 06/24/2024    HGB 12.0 06/24/2024    HCT 34.9 (L) 06/24/2024    MCV 85 06/24/2024     (L) 06/24/2024    RBC 4.11 06/24/2024    MCH 29.2 06/24/2024    MCHC 34.4 06/24/2024    RDW 14.2 06/24/2024    MPV 10.3 06/24/2024     CMP:   Lab Results   Component Value Date    K 4.5 " "06/24/2024    CL 98 06/24/2024    CO2 25 06/24/2024    BUN 10 06/24/2024    CREATININE 0.79 06/24/2024    CALCIUM 8.6 06/24/2024    AST 32 06/24/2024    ALT 41 06/24/2024    ALKPHOS 98 06/24/2024    EGFR 104 06/24/2024       .  Results from last 7 days   Lab Units 06/24/24  0513 06/23/24  0430 06/22/24  1248   POTASSIUM mmol/L 4.5 3.9 3.6   CHLORIDE mmol/L 98 105 100   CO2 mmol/L 25 24 21   BUN mg/dL 10 13 17   CREATININE mg/dL 0.79 0.91 1.23   CALCIUM mg/dL 8.6 8.6 9.0   ALK PHOS U/L 98 106* 115*   ALT U/L 41 49 42   AST U/L 32 37 32         Phosphorus:   Lab Results   Component Value Date    PHOS 3.2 06/24/2024     Magnesium:   Lab Results   Component Value Date    MG 1.7 (L) 06/24/2024     Urinalysis: No results found for: \"COLORU\", \"CLARITYU\", \"SPECGRAV\", \"PHUR\", \"LEUKOCYTESUR\", \"NITRITE\", \"PROTEINUA\", \"GLUCOSEU\", \"KETONESU\", \"BILIRUBINUR\", \"BLOODU\"  Ionized Calcium: No results found for: \"CAION\"  Coagulation: No results found for: \"PT\", \"INR\", \"APTT\"  Troponin: No results found for: \"TROPONINI\"  ABG: No results found for: \"PHART\", \"LZF7EYP\", \"PO2ART\", \"GMH1GHT\", \"G0QYZNGI\", \"BEART\", \"SOURCE\"  Radiology review:     IMAGING  Procedure: Echo complete w/ contrast if indicated    Result Date: 6/24/2024  Narrative:   Left Ventricle: Left ventricular cavity size is normal. Wall thickness is mildly increased. The left ventricular ejection fraction is 70%. Systolic function is vigorous. Wall motion is normal. Diastolic function is normal.   Aorta: The aortic root is mildly dilated. The ascending aorta is normal in size. The aortic root is 4.00 cm. The ascending aorta is 3.7 cm.     Procedure: MRI brain w wo contrast    Result Date: 6/23/2024  Narrative: MRI BRAIN WITH AND WITHOUT CONTRAST INDICATION: Altered mental status.. COMPARISON: CT of the head from the day before. TECHNIQUE: Multiplanar, multisequence imaging of the brain was performed before and after gadolinium administration. IV Contrast:  10 mL of Gadobutrol " injection (SINGLE-DOSE) IMAGE QUALITY:   Diagnostic. FINDINGS: BRAIN PARENCHYMA:  There is no discrete mass, mass effect or midline shift. There is no intracranial hemorrhage.  Normal posterior fossa.  Diffusion imaging is unremarkable. There is mild chronic microvascular ischemic change. Postcontrast imaging of the brain demonstrates no abnormal enhancement. VENTRICLES:  Normal for the patient's age. SELLA AND PITUITARY GLAND:  Normal. ORBITS:  Normal. PARANASAL SINUSES: There is mucosal thickening of the right sphenoid sinus VASCULATURE:  Evaluation of the major intracranial vasculature demonstrates appropriate flow voids. CALVARIUM AND SKULL BASE: There is a right mastoid air cell effusion. EXTRACRANIAL SOFT TISSUES:  Normal.     Impression: No mass effect, acute intracranial hemorrhage or evidence of recent infarction. No abnormal parenchymal or leptomeningeal enhancement identified. Mild nonspecific white matter FLAIR signal hyperintensity is likely on the basis of chronic microvascular ischemic change. Right mastoid air cell effusion. Workstation performed: [id number]     Procedure: XR chest portable ICU    Result Date: 6/23/2024  Narrative: XR CHEST PORTABLE ICU INDICATION: aspiration. COMPARISON: Chest CT 6/22/2024, CXR 6/21/2024. FINDINGS: ET tube 4 cm above the bianka. NG tube in stomach. Right jugular catheter at cavoatrial junction. Redemonstration of atelectasis in the right upper and left lower lobes. No pneumothorax or pleural effusion. Normal cardiomediastinal silhouette. Bones are unremarkable for age. Normal upper abdomen.     Impression: Redemonstration of right upper and left lower lobe atelectasis. Superimposed aspiration not excluded. Workstation performed: [id number]     Procedure: CT chest abdomen pelvis wo contrast    Result Date: 6/22/2024  Narrative: CT CHEST, ABDOMEN, AND PELVIS WITHOUT IV CONTRAST INDICATION:   ams. COMPARISON: CT chest 6/21/2024. CT abdomen/pelvis 6/1/2024. TECHNIQUE:  CT examination of the chest, abdomen, and pelvis was performed without intravenous or enteric contrast, limiting evaluation for certain processes. Axial, sagittal, and coronal 2D reformatted images were created from the source data and submitted for interpretation. Radiation dose length product (DLP) for this visit:  1799 mGy-cm .  This examination, like all CT scans performed in the [facility], was performed utilizing techniques to minimize radiation dose exposure, including the use of iterative reconstruction and automated exposure control. IV Contrast: None. Enteric Contrast: None. FINDINGS: CHEST: LARGE AIRWAYS: Endotracheal tube with tip just entering the takeoff of the right mainstem bronchus. LUNGS: Complete atelectasis of several basal segments of the left lower lobe. Atelectasis in the posterior basal right upper lobe and scattered in the dependent right lower lobe. No definite consolidation. Stable 9 mm nodule in the middle lobe (series 2/48), not hypermetabolic on prior PET. PLEURA: Trace left pleural effusion. No pneumothorax. HEART: Normal cardiac size and morphology. No coronary artery calcification. No pericardial effusion or thickening. VESSELS: Normal caliber thoracic aorta with no detectable atherosclerotic plaque. Normal caliber main pulmonary artery. MEDIASTINUM AND AREN: Moderate diffuse esophageal wall thickening.. No lymphadenopathy. CHEST WALL AND LOWER NECK:   Right IJ central venous catheter with tip at the cavoatrial junction. ABDOMEN: LIVER: Hepatomegaly measuring 19.5 cm in maximum craniocaudal dimension. Severe diffuse steatosis. Known hepatic metastasis not conspicuous on noncontrast exam. BILIARY: No intrahepatic biliary ductal dilatation. Normal caliber common bile duct. GALLBLADDER: No calcified gallstones. Normal wall thickness. No pericholecystic inflammatory changes. SPLEEN: Within normal limits. No gross mass on noncontrast study. Normal spleen size. PANCREAS:  Diffusely fatty replaced but otherwise grossly within normal limits. ADRENAL GLANDS: Within normal limits. KIDNEYS/URETERS: Normal kidney size and position. No suspicious lesions within the limitations of a noncontrast exam. Punctate punctate bilateral nonobstructing calculi. No hydronephrosis. STOMACH AND BOWEL: The stomach is within normal limits, moderately distended with air and debris with NG tube in place. Normal caliber small bowel. The colon is diffusely distended with gas, with mostly decompressed rectum, no evidence of obstruction. No  evidence of active small or large bowel inflammatory process. APPENDIX: Normal air-filled appendix. ABDOMINOPELVIC CAVITY: No change in a 4.2 cm centrally calcified mass in the mid mesentery with surrounding lymph nodes measuring up to 9 mm in short axis in keeping with known neuroendocrine tumor. No ascites. No intraperitoneal free air. No retroperitoneal hematoma. VESSELS: Normal caliber abdominal aorta with mild atherosclerotic plaque. PELVIS REPRODUCTIVE ORGANS: Normal prostate. Symmetric seminal vesicles. URINARY BLADDER: Decompressed with Schaefer catheter in place. ABDOMINAL WALL/INGUINAL REGIONS: Prior ventral hernia mesh repair. BONES: Vertebral body height is maintained. No acute fracture or destructive osseous lesion. Bone island in the L1 sacral segment.     Impression: 1.  Partial atelectasis of the left lower lobe, scattered atelectasis in the right lung. 2.  Endotracheal tube with tip just entering the takeoff of the right mainstem bronchus. 3.  No acute findings in the abdomen or pelvis. 4.  Air-distended colon without findings to suggest obstruction. I personally discussed this study with ALHAJI NESS on 6/22/2024 3:29 PM. Workstation performed: [id number]     Procedure: CT head wo contrast    Result Date: 6/22/2024  Narrative: CT BRAIN - WITHOUT CONTRAST INDICATION:   ams. COMPARISON: Head CT and CT angiography of the head and neck from June 1, 2024, nuclear  medicine PET/CT scan from May 10, 2024. TECHNIQUE:  CT examination of the brain was performed.  Multiplanar 2D reformatted images were created from the source data. Radiation dose length product (DLP) for this visit:  937 mGy-cm .  This examination, like all CT scans performed in the [facility], was performed utilizing techniques to minimize radiation dose exposure, including the use of iterative reconstruction and automated exposure control. IMAGE QUALITY: There is streak/spray artifact from the left parieto-occipital scalp transducer. Skull base region also demonstrates motion artifact. FINDINGS: The patient is intubated. PARENCHYMA:  No intracranial mass, mass effect or midline shift. No CT signs of acute infarction.  No acute parenchymal hemorrhage. VENTRICLES AND EXTRA-AXIAL SPACES:  Normal for the patient's age. VISUALIZED ORBITS: Normal visualized orbits. PARANASAL SINUSES: Normal visualized paranasal sinuses. CALVARIUM AND EXTRACRANIAL SOFT TISSUES:  Normal.     Impression: Limited by artifact as discussed above. No gross abnormality. No intracranial hemorrhage, edema or mass effect. Workstation performed: [id number]     Procedure: US kidney and bladder    Result Date: 6/22/2024  Narrative: RENAL ULTRASOUND INDICATION: Acute kidney injury with history of kidney stones. COMPARISON: CT chest from 6/21/2024; CT abdomen from 6/1/2024 TECHNIQUE: Ultrasound of the retroperitoneum was performed with a curvilinear transducer utilizing volumetric sweeps and still imaging techniques. FINDINGS: KIDNEYS: Symmetric and normal size. Right kidney: 13.4 x 5.1 x 5.6 cm. Volume 202.8 mL Left kidney: 11.7 x 5.9 x 5.1 cm. Volume 186.7 mL Right kidney Normal echogenicity and contour. No mass is identified. No hydronephrosis. No shadowing calculi. No perinephric fluid collections. Left kidney Normal echogenicity and contour. No mass is identified. No hydronephrosis. No shadowing calculi. No perinephric fluid  collections. URETERS: Nonvisualized. BLADDER: Bladder is not well distended. Bladder wall is not well evaluated given only minimal distention. Bilateral ureteral jets detected. The visualized liver parenchyma is echogenic suspicious for fatty infiltration and correlates to the chest CT     Impression: No hydronephrosis. The bladder is not well evaluated due to underdistention. Workstation performed: [id number]     Procedure:  VAS VENOUS DUPLEX - LOWER LIMB BILATERAL    Result Date: 6/22/2024  Narrative:  [facility] REPORT CLINICAL: Indications: Patient presents with SOB/chest pain, history of cancer. Operative History: No cardiovascular surgeries Risk Factors The patient has history of HTN and previous smoking (quit >10yrs ago).   CONCLUSION:  Impression: RIGHT LOWER LIMB: No evidence of acute or chronic deep vein thrombosis. No evidence of superficial thrombophlebitis noted. Doppler evaluation shows a normal response to augmentation maneuvers. Popliteal, posterior tibial and anterior tibial arterial Doppler waveforms are triphasic.  LEFT LOWER LIMB: No evidence of acute or chronic deep vein thrombosis. No evidence of superficial thrombophlebitis noted. Doppler evaluation shows a normal response to augmentation maneuvers. Popliteal, posterior tibial and anterior tibial arterial Doppler waveforms are triphasic.  Technical findings were given to Dr. Jerry at time of exam.  SIGNATURE: Electronically Signed by: FROILAN ESPARZA MD on 2024-06-22 10:02:19 AM    Procedure: XR chest 1 view portable    Result Date: 6/21/2024  Narrative: XR CHEST PORTABLE INDICATION: chest pain. COMPARISON: Chest radiograph 6/1/2024. FINDINGS: Clear lungs. No pneumothorax or pleural effusion. Normal cardiomediastinal silhouette. Bones are unremarkable for age. Normal upper abdomen.     Impression: No acute cardiopulmonary disease. Workstation performed: [id number]     Procedure: CT chest without contrast    Result Date:  6/21/2024  Narrative: CT CHEST WITHOUT IV CONTRAST INDICATION: Chest pain. COMPARISON: Chest CT 9/7/2023. TECHNIQUE: CT examination of the chest was performed without intravenous contrast. Multiplanar 2D reformatted images were created from the source data. This examination, like all CT scans performed in the [facility], was performed utilizing techniques to minimize radiation dose exposure, including the use of iterative reconstruction and automated exposure control. Radiation dose length product (DLP) for this visit: 454.3 mGy-cm FINDINGS: LUNGS: Lungs are clear. Unchanged 9 mm right middle lobe nodule (series 3, image 107). No tracheal or endobronchial lesion. PLEURA: No pleural effusion. No pneumothorax. HEART/GREAT VESSELS: Heart is unremarkable for patient's age. No thoracic aortic aneurysm. MEDIASTINUM AND AREN: Unremarkable. CHEST WALL AND LOWER NECK: Unremarkable. VISUALIZED STRUCTURES IN THE UPPER ABDOMEN: Hepatic steatosis. Punctate nonobstructing left interpolar calculus. Fatty atrophy of the pancreas. Ventral hernia repair. OSSEOUS STRUCTURES: No acute fracture or destructive osseous lesion.     Impression: No acute findings in the chest. Unchanged 9 mm right middle lobe pulmonary nodule, not FDG avid on PET/CT 5/10/2024. Workstation performed: [id number]       Current Facility-Administered Medications   Medication Dose Route Frequency    acetaminophen (TYLENOL) tablet 650 mg  650 mg Oral Q6H PRN    baclofen tablet 10 mg  10 mg Oral BID PRN    chlorhexidine (PERIDEX) 0.12 % oral rinse 15 mL  15 mL Mouth/Throat Q12H BERENICE    heparin (porcine) subcutaneous injection 5,000 Units  5,000 Units Subcutaneous Q8H BERENICE    hydrALAZINE (APRESOLINE) injection 10 mg  10 mg Intravenous Q4H PRN    hydrOXYzine HCL (ATARAX) tablet 25 mg  25 mg Oral Q6H PRN    insulin lispro (HumALOG/ADMELOG) 100 units/mL subcutaneous injection 1-5 Units  1-5 Units Subcutaneous Q6H [facility]    lidocaine (LIDODERM) 5 % patch 2  patch  2 patch Topical Daily    lithium carbonate (LITHOBID) CR tablet 300 mg  300 mg Oral BID    magnesium sulfate 2 g/50 mL IVPB (premix) 2 g  2 g Intravenous Once    ondansetron (ZOFRAN) injection 4 mg  4 mg Intravenous Q6H PRN    oxyCODONE (ROXICODONE) IR tablet 5 mg  5 mg Oral Q4H PRN    pantoprazole (PROTONIX) injection 40 mg  40 mg Intravenous Q24H BERENICE    prazosin (MINIPRESS) capsule 5 mg  5 mg Oral HS     Medications Discontinued During This Encounter   Medication Reason    ibuprofen (MOTRIN) 200 mg tablet Error    lisinopril (ZESTRIL) 10 mg tablet Error    tiZANidine (ZANAFLEX) tablet 4 mg     prazosin (MINIPRESS) capsule 5 mg     hydrALAZINE (APRESOLINE) injection 5 mg     aluminum-magnesium hydroxide-simethicone (MAALOX) oral suspension 30 mL     insulin lispro (HumALOG/ADMELOG) 100 units/mL subcutaneous injection 1-6 Units     insulin lispro (HumALOG/ADMELOG) 100 units/mL subcutaneous injection 1-6 Units     multi-electrolyte (PLASMALYTE-A/ISOLYTE-S PH 7.4) IV solution     dexmedeTOMIDine (Precedex) 400 mcg in sodium chloride 0.9% 100 mL     NOREPINEPHRINE 4 MG  ML NSS (CMPD ORDER) infusion     dexmedeTOMIDine (Precedex) 400 mcg in sodium chloride 0.9% 100 mL     midazolam (VERSED) injection 2 mg     propofol (DIPRIVAN) 1000 mg in 100 mL infusion (premix)     fentaNYL 1000 mcg in sodium chloride 0.9% 100mL infusion     NOREPINEPHRINE 4 MG  ML NSS (CMPD ORDER) infusion     propofol (DIPRIVAN) 1000 mg in 100 mL infusion (premix)     fentaNYL 1000 mcg in sodium chloride 0.9% 100mL infusion     acetaminophen (TYLENOL) oral suspension 650 mg     oxyCODONE-acetaminophen (PERCOCET) 5-325 mg per tablet 1 tablet     hydrALAZINE (APRESOLINE) injection 5 mg     prazosin (MINIPRESS) capsule 5 mg     insulin regular (HumuLIN R,NovoLIN R) 1 Units/mL in sodium chloride 0.9 % 100 mL infusion     lidocaine (LIDODERM) 5 % patch 1 patch     lidocaine (LIDODERM) 5 % patch 2 patch        Jose Valdez  DO      This progress note was produced in part using a dictation device which may document imprecise wording from author's original intent.

## 2024-06-24 NOTE — CONSULTS
[facility]  Consult  Name: Osei Trimble 49 y.o. male I MRN: [id number]  Unit/Bed#: ICU 08-01 I Date of Admission: 6/21/2024   Date of Service: 6/24/2024 I Hospital Day: 3    Inpatient consult to Palliative Care  Consult performed by: José Luis Soni PA-C  Consult ordered by: PIERRE Toth          Assessment & Plan   Back pain  Assessment & Plan  Agree with current regimen  Tylenol 650mg q6h PRN mild pain  OxyIR 5mg q4h PRN mod-sev/BT pain      PRN use: DIL IV 0.7mg, Oxy 5mgx1  24hr OME: 21.5      Goals of care, counseling/discussion  Assessment & Plan  Decisional apparatus:  Patient is competent on exam today.  If competency is lost, patient's substitute decision maker would default to sister [place].  Advance Directive / Living Will / POLST / POA Forms: No    Goals  Level 1 code status.   Full code.   Disease focused care.   Patient drowsy from morning medications and deferred discussions today.  Patient follows with [facility] team outpatient, will coordinate outpatient follow up.     Palliative care by specialist  Assessment & Plan    Follow up  Palliative Care will continue to follow for symptom management.  Please reach out to on-call provider via [facility] if questions or concerns arise.  Please do not hesitate to reach our on call provider through our clinic answering service at [phone] should you have acute symptom control concerns.      Explosive personality disorder (HCC)  Assessment & Plan  On lithium  Follows with outpatient psych    Other chest pain  Assessment & Plan  Presenting complaint on 06/21.  Troponin neg, EKG WNL  Symptoms resolved  ECHO completed today, EF 70%    Neuroendocrine carcinoma of small bowel (HCC)  Assessment & Plan  Follows with dr. Green outpatient  S/p small bowel resection 2022.  Currently on lanreotide      * Acute encephalopathy  Assessment & Plan  Resolved.  06/22 pt unresponsive. Extubated 06/23. Unclear etiology.   Mgmt per primary  team    MEHDI (acute kidney injury) (HCC)-resolved as of 6/23/2024  Assessment & Plan  Creatinine 2.38 on admission  Improved with fluid resuscitation  Nephrology consulted - see note  MEHDI resolved         I have reviewed the patient's controlled substance dispensing history in the Prescription Drug Monitoring Program in compliance with the [facility] regulations before prescribing any controlled substances.    IDENTIFICATION:  Reason for Consult / Principal Problem: neuroendocrine tumor / sx mgmt    HISTORY OF PRESENT ILLNESS:    Osei Trimble is a 49 y.o. male with hx of neuroendocrine carcinoma of the small bowel s/p resection on Lanreotide, hx of HTN, explosive personality disorder who presented to [facility] ED on 06/21 with complaints of chest pain. Cardiac work up negative, found to have MEHDI. Admitted for further work up. Had become unresponsive and had to be intubated 06/22, was extubated 06/23. Nephrology team consulted. MEHDI and encephalopathy resolved. Patient follows with palliative care outpatient, [facility] team consulted for symptom management.     Patient very drowsy this morning during our visit after he received some hydroxyzine and pain medication. Relevant past outpatient history includes poor social/home support and issue with medical noncompliance at time of original consult in may 2023. Since, his sister helps manage his appointments and patient has been consistent with follow up and taking his medications for the past year. He mainly manages his pain with MMJ and has not required opioid analgesia as an outpatient. He is diseased focused in his care and has never specified any limits.     Review of Systems   Unable to perform ROS: Other (Patient droswy and would like to rest)       Past Medical History:   Diagnosis Date    Allergic     Bipolar disorder in partial remission (HCC)     Erectile dysfunction     unspecified erectile dysfunction type    Hypertension     Kidney stone      Past Surgical History:    Procedure Laterality Date    COLONOSCOPY      EXPLORATORY LAPAROTOMY W/ BOWEL RESECTION N/A 2022    Procedure: LAPAROTOMY EXPLORATORY W/ SMALL BOWEL RESECTION, liver biopsy;  Surgeon: Rose Mary Lara MD;  Location:  MAIN OR;  Service: General    FL RETROGRADE PYELOGRAM  10/06/2021    HERNIA REPAIR      NJ CYSTO BLADDER W/URETERAL CATHETERIZATION Right 10/06/2021    Procedure: CYSTOSCOPY RETROGRADE PYELOGRAM WITH INSERTION STENT URETERAL, RIGHT URETEROSCOPY, STONE EXTRACTION;  Surgeon: Bradly Rivera MD;  Location:  MAIN OR;  Service: Urology     Social History     Socioeconomic History    Marital status: Single     Spouse name: Not on file    Number of children: Not on file    Years of education: Not on file    Highest education level: Not on file   Occupational History    Not on file   Tobacco Use    Smoking status: Former     Current packs/day: 0.00     Types: Pipe, Cigarettes     Quit date: 2003     Years since quittin.5    Smokeless tobacco: Never   Vaping Use    Vaping status: Some Days    Substances: THC   Substance and Sexual Activity    Alcohol use: Not Currently     Comment: Drinks a quart of moonshine/night-As of 21 will quit drinking    Drug use: Yes     Types: Marijuana     Comment: Socially (1-2 Monthly)    Sexual activity: Not on file   Other Topics Concern    Not on file   Social History Narrative    Not on file     Social Determinants of Health     Financial Resource Strain: Not on file   Food Insecurity: No Food Insecurity (3/22/2023)    Hunger Vital Sign     Worried About Running Out of Food in the Last Year: Never true     Ran Out of Food in the Last Year: Never true   Transportation Needs: No Transportation Needs (3/22/2023)    PRAPARE - Transportation     Lack of Transportation (Medical): No     Lack of Transportation (Non-Medical): No   Physical Activity: Not on file   Stress: Not on file   Social Connections: Not on file   Intimate Partner Violence: Not on file   Housing  Stability: Low Risk  (3/22/2023)    Housing Stability Vital Sign     Unable to Pay for Housing in the Last Year: No     Number of Places Lived in the Last Year: 1     Unstable Housing in the Last Year: No     Family History   Problem Relation Age of Onset    Diabetes Mother     Thyroid disease Mother     Cancer Father     Thyroid disease Sister     Depression Brother     Cancer Maternal Aunt     Cancer Paternal Uncle     Cancer Paternal Grandmother     No Known Problems Brother     Thyroid disease Sister        MEDICATIONS / ALLERGIES:  all current active meds have been reviewed, current meds:   Current Facility-Administered Medications   Medication Dose Route Frequency    acetaminophen (TYLENOL) tablet 650 mg  650 mg Oral Q6H PRN    chlorhexidine (PERIDEX) 0.12 % oral rinse 15 mL  15 mL Mouth/Throat Q12H BERENICE    heparin (porcine) subcutaneous injection 5,000 Units  5,000 Units Subcutaneous Q8H BERENICE    hydrALAZINE (APRESOLINE) injection 10 mg  10 mg Intravenous Q4H PRN    insulin lispro (HumALOG/ADMELOG) 100 units/mL subcutaneous injection 1-5 Units  1-5 Units Subcutaneous 4x Daily (AC & HS)    lidocaine (LIDODERM) 5 % patch 2 patch  2 patch Topical Daily    lisinopril (ZESTRIL) tablet 20 mg  20 mg Oral Daily    lithium carbonate (LITHOBID) CR tablet 300 mg  300 mg Oral BID    ondansetron (ZOFRAN) injection 4 mg  4 mg Intravenous Q6H PRN    oxyCODONE (ROXICODONE) IR tablet 5 mg  5 mg Oral Q4H PRN    polyethylene glycol (MIRALAX) packet 17 g  17 g Oral Daily    prazosin (MINIPRESS) capsule 5 mg  5 mg Oral HS    senna (SENOKOT) tablet 8.6 mg  1 tablet Oral HS   , and PTA meds:   Prior to Admission Medications   Prescriptions Last Dose Informant Patient Reported? Taking?   lisinopril (ZESTRIL) 20 mg tablet  Self Yes No   lithium carbonate (LITHOBID) 300 mg CR tablet  Self Yes No   Sig: Take 300 mg by mouth 2 (two) times a day   prazosin (MINIPRESS) 5 mg capsule  Self Yes No   Sig: Take 5 mg by mouth daily at bedtime  "  risperiDONE (RisperDAL) 1 mg tablet  Self Yes No   Sig: Take 1 mg by mouth 2 (two) times a day      Facility-Administered Medications: None       Allergies   Allergen Reactions    Tomato - Food Allergy Anaphylaxis     raw    Poison Ivy Extract Hives    Poison Sumac Extract Hives       OBJECTIVE:  /68   Pulse (!) 110   Temp 97.6 °F (36.4 °C) (Tympanic)   Resp (!) 35   Ht 6' 1\" (1.854 m)   Wt 103 kg (228 lb)   SpO2 93%   BMI 30.08 kg/m²   Nursing notes reviewed.  Physical Exam  Vitals and nursing note reviewed.   Constitutional:       General: He is sleeping.   HENT:      Head: Normocephalic and atraumatic.      Right Ear: External ear normal.      Left Ear: External ear normal.   Cardiovascular:      Rate and Rhythm: Tachycardia present.   Pulmonary:      Effort: Pulmonary effort is normal.   Skin:     General: Skin is warm and dry.       Lab Results: I have personally reviewed pertinent labs.    HEMATOLOGY PROFILE:  Results from last 7 days   Lab Units 06/24/24  0513 06/23/24  0430 06/22/24  1329 06/22/24  0431 06/21/24  1714   WBC Thousand/uL 10.26* 11.22* 9.91   < > 12.88*   HEMOGLOBIN g/dL 12.0 12.0 13.0   < > 16.1   HEMATOCRIT % 34.9* 33.8* 37.3   < > 46.3   PLATELETS Thousands/uL 113* 167 147*   < > 299   SEGS PCT %  --   --  75  --  68   MONO PCT %  --   --  4  --  8   EOS PCT %  --   --  1  --  2    < > = values in this interval not displayed.       CHEMISTRY PROFILE:  Results from last 7 days   Lab Units 06/24/24  0513 06/23/24  0430 06/22/24  1248   SODIUM mmol/L 132* 137 132*   POTASSIUM mmol/L 4.5 3.9 3.6   CHLORIDE mmol/L 98 105 100   CO2 mmol/L 25 24 21   BUN mg/dL 10 13 17   CREATININE mg/dL 0.79 0.91 1.23   ALBUMIN g/dL 3.6 3.5 4.0   CALCIUM mg/dL 8.6 8.6 9.0   ALK PHOS U/L 98 106* 115*   ALT U/L 41 49 42   AST U/L 32 37 32       Albumin:  0   Lab Value Date/Time    ALB 3.6 06/24/2024 0513     Imaging Studies: I have personally reviewed pertinent reports.  EKG, Pathology, and Other " "Studies: I have personally reviewed pertinent reports.    Counseling / Coordination of Care:  Total floor / unit time spent today 25 minutes. Greater than 50% of total time was spent with the patient and / or family counseling and / or coordination of care. A description of the counseling / coordination of care: symptom assessment and management, medication review, psychosocial support, chart review, imaging review, lab review, goals of care, supportive listening, and anticipatory guidance. Discussed with ICU team.    José Luis Soni PA-C  [facility]  [phone]    Portions of this document may have been created using dictation software and as such some \"sound alike\" terms may have been generated by the system. Do not hesitate to contact me with any questions or clarifications.       "

## 2024-06-24 NOTE — PROGRESS NOTES
[facility]  Progress Note  Name: Osei Trimble I  MRN: [id number]  Unit/Bed#: ICU 08-01 I Date of Admission: 6/21/2024   Date of Service: 6/24/2024 I Hospital Day: 3    Assessment & Plan   * Acute encephalopathy  Assessment & Plan  6/22 RRT called when patient became unresponsive while using the toilet  Upon return to bed remained unresponsive  Intubated for airway protection  CT head negative for acute abnormality  Unclear etiology, consider possible seizure 2/2 hypophosphatemia?  MRI completed to r/o brain metastasis or acute ischemia  6/23 extubated to   Optimize electrolytes  Monitor neuro exam closely  UDS only Positive for Fentanyl, which was used for induction of intubation   Cautious administration of pain medications    Neuroendocrine carcinoma of small bowel (HCC)  Assessment & Plan  Follows with Dr. Green as outpatient   Incidentally found to have mesenteric masses in October 2021  PET CT 2022 demonstrated Dotatate avid clustered central mesenteric masses suspicious for underlying neuroendocrine neoplasm  Underwent small bowel resection 07/2022 in the area that was thought to be high risk for obstruction  Currently undergoing chemo treatment every 4 weeks with lanreotide    Explosive personality disorder (HCC)  Assessment & Plan  Continue home Lithium        Essential hypertension  Assessment & Plan  Continuous cardiac monitoring  Vital signs per unit routine  Home Minipress restarted    Back pain  Assessment & Plan  Continue PRN tylenol and oxycodone    Other chest pain  Assessment & Plan  Presented 6/21 with radiating chest pain  Troponins initially negative, no EKG changes noted   Chest pain resolved  Repeat troponins negative  EKG w/o ischemia changes   (6/24) ECHO: EF 70%. No RWA  Likely referred pain from cancer     Hyperglycemia  Assessment & Plan  Hemoglobin A1C 7.2  Accuchecks and SSI Q6h    Thrombocytopenia (HCC)  Assessment & Plan  Trending down since  admission  Unclear etiology  Monitor for bleeding  Monitor platelets on daily labs  Improved     Total bilirubin, elevated  Assessment & Plan  Possibly 2/2 Liver metastasis?   CT CAP unremarkable  improving  Trend on daily CMP    Goals of care, counseling/discussion  Assessment & Plan  Palliative consulted; appreciate recommendations    Palliative care by specialist  Assessment & Plan  Palliative consulted; appreciate recommendations             Disposition: Stepdown Level 2    ICU Core Measures     A: Assess, Prevent, and Manage Pain Has pain been assessed? Yes  Need for changes to pain regimen? No   B: Both SAT/SAT  N/A   C: Choice of Sedation RASS Goal: 0 Alert and Calm or N/A patient not on sedation  Need for changes to sedation or analgesia regimen? NA   D: Delirium CAM-ICU: Negative   E: Early Mobility  Plan for early mobility? Yes   F: Family Engagement Plan for family engagement today? Yes       Review of Invasive Devices:            Prophylaxis:  VTE VTE covered by:  heparin (porcine), Subcutaneous, 5,000 Units at 06/23/24 1439       Stress Ulcer  not ordered         Significant 24hr Events     24hr events: No acute events overnight.     Subjective   Review of Systems: Review of Systems   HENT:  Negative for voice change.    Respiratory:  Negative for shortness of breath.    Cardiovascular:  Negative for chest pain.   Gastrointestinal:  Negative for abdominal pain.   Musculoskeletal:  Positive for back pain and neck pain.   Neurological:  Negative for dizziness and headaches.   Psychiatric/Behavioral:  Negative for confusion.         Objective                            Vitals I/O      Most Recent Min/Max in 24hrs   Temp 99.2 °F (37.3 °C) Temp  Min: 97.6 °F (36.4 °C)  Max: 99.9 °F (37.7 °C)   Pulse (!) 111 Pulse  Min: 80  Max: 116   Resp (!) 28 Resp  Min: 27  Max: 38   /71 BP  Min: 120/63  Max: 189/97   O2 Sat 91 % SpO2  Min: 89 %  Max: 98 %      Intake/Output Summary (Last 24 hours) at 6/24/2024  1900  Last data filed at 6/24/2024 1601  Gross per 24 hour   Intake 1010 ml   Output 1650 ml   Net -640 ml       Diet Regular; Regular House    Invasive Monitoring           Physical Exam   Physical Exam  Vitals and nursing note reviewed.   Eyes:      Pupils: Pupils are equal, round, and reactive to light.   Skin:     General: Skin is warm and dry.      Capillary Refill: Capillary refill takes less than 2 seconds.   HENT:      Head: Normocephalic.      Mouth/Throat:      Mouth: Mucous membranes are moist.   Cardiovascular:      Rate and Rhythm: Regular rhythm. Tachycardia present.      Pulses: Normal pulses.      Heart sounds: Normal heart sounds.   Musculoskeletal:         General: Normal range of motion.      Right lower leg: Trace Edema present.      Left lower leg: Trace Edema present.   Abdominal: General: Bowel sounds are normal.      Palpations: Abdomen is soft.   Constitutional:       Appearance: He is ill-appearing.   Pulmonary:      Breath sounds: Normal breath sounds.   Neurological:      Mental Status: He is oriented to person, place and time.      Motor: Strength full and intact in all extremities.            Diagnostic Studies      EKG: ST  Imaging:  I have personally reviewed pertinent reports.   and I have personally reviewed pertinent films in PACS     Medications:  Scheduled PRN   chlorhexidine, 15 mL, Q12H BERENICE  heparin (porcine), 5,000 Units, Q8H BERENICE  insulin lispro, 1-5 Units, 4x Daily (AC & HS)  lidocaine, 2 patch, Daily  lisinopril, 20 mg, Daily  lithium carbonate, 300 mg, BID  polyethylene glycol, 17 g, Daily  prazosin, 5 mg, HS  senna, 1 tablet, HS      acetaminophen, 650 mg, Q6H PRN  hydrALAZINE, 10 mg, Q4H PRN  ondansetron, 4 mg, Q6H PRN  oxyCODONE, 5 mg, Q4H PRN       Continuous          Labs:    CBC    Recent Labs     06/23/24  0430 06/24/24  0513   WBC 11.22* 10.26*   HGB 12.0 12.0   HCT 33.8* 34.9*    113*     BMP    Recent Labs     06/23/24  0430 06/24/24  0513   SODIUM 137 132*    K 3.9 4.5    98   CO2 24 25   AGAP 8 9   BUN 13 10   CREATININE 0.91 0.79   CALCIUM 8.6 8.6       Coags    No recent results     Additional Electrolytes  Recent Labs     06/23/24  0430 06/24/24  0513   MG 2.1 1.7*   PHOS 2.9 3.2          Blood Gas    No recent results  No recent results LFTs  Recent Labs     06/23/24  0430 06/24/24  0513   ALT 49 41   AST 37 32   ALKPHOS 106* 98   ALB 3.5 3.6   TBILI 1.17* 2.92*       Infectious  Recent Labs     06/23/24  0430 06/24/24  0513   PROCALCITONI 0.48* 0.27*     Glucose  Recent Labs     06/23/24  0430 06/24/24  0513   GLUC 192* 191*               PIERRE Edwrads

## 2024-06-24 NOTE — ASSESSMENT & PLAN NOTE
Noted to be hypotensive during RRT when placed back in bed   Weaned off pressors  Now hypertensive after extubation

## 2024-06-24 NOTE — PLAN OF CARE
Problem: PAIN - ADULT  Goal: Verbalizes/displays adequate comfort level or baseline comfort level  Description: Interventions:  - Encourage patient to monitor pain and request assistance  - Assess pain using appropriate pain scale  - Administer analgesics based on type and severity of pain and evaluate response  - Implement non-pharmacological measures as appropriate and evaluate response  - Consider cultural and social influences on pain and pain management  - Notify physician/advanced practitioner if interventions unsuccessful or patient reports new pain  Outcome: Not Progressing     Problem: INFECTION - ADULT  Goal: Absence or prevention of progression during hospitalization  Description: INTERVENTIONS:  - Assess and monitor for signs and symptoms of infection  - Monitor lab/diagnostic results  - Monitor all insertion sites, i.e. indwelling lines, tubes, and drains  - Monitor endotracheal if appropriate and nasal secretions for changes in amount and color  - [place] appropriate cooling/warming therapies per order  - Administer medications as ordered  - Instruct and encourage patient and family to use good hand hygiene technique  - Identify and instruct in appropriate isolation precautions for identified infection/condition  Outcome: Progressing  Goal: Absence of fever/infection during neutropenic period  Description: INTERVENTIONS:  - Monitor WBC    Outcome: Progressing     Problem: SAFETY ADULT  Goal: Patient will remain free of falls  Description: INTERVENTIONS:  - Educate patient/family on patient safety including physical limitations  - Instruct patient to call for assistance with activity   - Consult OT/PT to assist with strengthening/mobility   - Keep Call bell within reach  - Keep bed low and locked with side rails adjusted as appropriate  - Keep care items and personal belongings within reach  - Initiate and maintain comfort rounds  - Make Fall Risk Sign visible to staff  - Offer Toileting every 2  Hours, in advance of need  - Initiate/Maintain bed alarm  - Obtain necessary fall risk management equipment  - Apply yellow socks and bracelet for high fall risk patients  - Consider moving patient to room near nurses station  Outcome: Progressing  Goal: Maintain or return to baseline ADL function  Description: INTERVENTIONS:  -  Assess patient's ability to carry out ADLs; assess patient's baseline for ADL function and identify physical deficits which impact ability to perform ADLs (bathing, care of mouth/teeth, toileting, grooming, dressing, etc.)  - Assess/evaluate cause of self-care deficits   - Assess range of motion  - Assess patient's mobility; develop plan if impaired  - Assess patient's need for assistive devices and provide as appropriate  - Encourage maximum independence but intervene and supervise when necessary  - Involve family in performance of ADLs  - Assess for home care needs following discharge   - Consider OT consult to assist with ADL evaluation and planning for discharge  - Provide patient education as appropriate  Outcome: Progressing  Goal: Maintains/Returns to pre admission functional level  Description: INTERVENTIONS:  - Perform AM-PAC 6 Click Basic Mobility/ Daily Activity assessment daily.  - Set and communicate daily mobility goal to care team and patient/family/caregiver.   - Collaborate with rehabilitation services on mobility goals if consulted  - Perform Range of Motion 3 times a day.  - Reposition patient every 2 hours.  - Dangle patient 3 times a day  - Stand patient 3 times a day  - Ambulate patient 3 times a day  - Out of bed to chair 3 times a day   - Out of bed for meals 3 times a day  - Out of bed for toileting  - Record patient progress and toleration of activity level   Outcome: Progressing     Problem: DISCHARGE PLANNING  Goal: Discharge to home or other facility with appropriate resources  Description: INTERVENTIONS:  - Identify barriers to discharge w/patient and  caregiver  - Arrange for needed discharge resources and transportation as appropriate  - Identify discharge learning needs (meds, wound care, etc.)  - Arrange for interpretive services to assist at discharge as needed  - Refer to Case Management Department for coordinating discharge planning if the patient needs post-hospital services based on physician/advanced practitioner order or complex needs related to functional status, cognitive ability, or social support system  Outcome: Progressing     Problem: Knowledge Deficit  Goal: Patient/family/caregiver demonstrates understanding of disease process, treatment plan, medications, and discharge instructions  Description: Complete learning assessment and assess knowledge base.  Interventions:  - Provide teaching at level of understanding  - Provide teaching via preferred learning methods  Outcome: Progressing     Problem: SAFETY,RESTRAINT: NV/NON-SELF DESTRUCTIVE BEHAVIOR  Goal: Remains free of harm/injury (restraint for non violent/non self-detsructive behavior)  Description: INTERVENTIONS:  - Instruct patient/family regarding restraint use   - Assess and monitor physiologic and psychological status   - Provide interventions and comfort measures to meet assessed patient needs   - Identify and implement measures to help patient regain control  - Assess readiness for release of restraint   Outcome: Progressing  Goal: Returns to optimal restraint-free functioning  Description: INTERVENTIONS:  - Assess the patient's behavior and symptoms that indicate continued need for restraint  - Identify and implement measures to help patient regain control  - Assess readiness for release of restraint   Outcome: Progressing     Problem: Nutrition/Hydration-ADULT  Goal: Nutrient/Hydration intake appropriate for improving, restoring or maintaining nutritional needs  Description: Monitor and assess patient's nutrition/hydration status for malnutrition. Collaborate with interdisciplinary  team and initiate plan and interventions as ordered.  Monitor patient's weight and dietary intake as ordered or per policy. Utilize nutrition screening tool and intervene as necessary. Determine patient's food preferences and provide high-protein, high-caloric foods as appropriate.     INTERVENTIONS:  - Monitor oral intake, urinary output, labs, and treatment plans  - Assess nutrition and hydration status and recommend course of action  - Evaluate amount of meals eaten  - Assist patient with eating if necessary   - Allow adequate time for meals  - Recommend/ encourage appropriate diets, oral nutritional supplements, and vitamin/mineral supplements  - Order, calculate, and assess calorie counts as needed  - Recommend, monitor, and adjust tube feedings and TPN/PPN based on assessed needs  - Assess need for intravenous fluids  - Provide specific nutrition/hydration education as appropriate  - Include patient/family/caregiver in decisions related to nutrition  Outcome: Progressing     Problem: NEUROSENSORY - ADULT  Goal: Achieves stable or improved neurological status  Description: INTERVENTIONS  - Monitor and report changes in neurological status  - Monitor vital signs such as temperature, blood pressure, glucose, and any other labs ordered   - Initiate measures to prevent increased intracranial pressure  - Monitor for seizure activity and implement precautions if appropriate      Outcome: Progressing  Goal: Remains free of injury related to seizures activity  Description: INTERVENTIONS  - Maintain airway, patient safety  and administer oxygen as ordered  - Monitor patient for seizure activity, document and report duration and description of seizure to physician/advanced practitioner  - If seizure occurs,  ensure patient safety during seizure  - Reorient patient post seizure  - Seizure pads on all 4 side rails  - Instruct patient/family to notify RN of any seizure activity including if an aura is experienced  -  Instruct patient/family to call for assistance with activity based on nursing assessment  - Administer anti-seizure medications if ordered    Outcome: Progressing  Goal: Achieves maximal functionality and self care  Description: INTERVENTIONS  - Monitor swallowing and airway patency with patient fatigue and changes in neurological status  - Encourage and assist patient to increase activity and self care.   - Encourage visually impaired, hearing impaired and aphasic patients to use assistive/communication devices  Outcome: Progressing     Problem: CARDIOVASCULAR - ADULT  Goal: Maintains optimal cardiac output and hemodynamic stability  Description: INTERVENTIONS:  - Monitor I/O, vital signs and rhythm  - Monitor for S/S and trends of decreased cardiac output  - Administer and titrate ordered vasoactive medications to optimize hemodynamic stability  - Assess quality of pulses, skin color and temperature  - Assess for signs of decreased coronary artery perfusion  - Instruct patient to report change in severity of symptoms  Outcome: Progressing  Goal: Absence of cardiac dysrhythmias or at baseline rhythm  Description: INTERVENTIONS:  - Continuous cardiac monitoring, vital signs, obtain 12 lead EKG if ordered  - Administer antiarrhythmic and heart rate control medications as ordered  - Monitor electrolytes and administer replacement therapy as ordered  Outcome: Progressing     Problem: RESPIRATORY - ADULT  Goal: Achieves optimal ventilation and oxygenation  Description: INTERVENTIONS:  - Assess for changes in respiratory status  - Assess for changes in mentation and behavior  - Position to facilitate oxygenation and minimize respiratory effort  - Oxygen administered by appropriate delivery if ordered  - Initiate smoking cessation education as indicated  - Encourage broncho-pulmonary hygiene including cough, deep breathe, Incentive Spirometry  - Assess the need for suctioning and aspirate as needed  - Assess and instruct  to report SOB or any respiratory difficulty  - Respiratory Therapy support as indicated  Outcome: Progressing     Problem: GASTROINTESTINAL - ADULT  Goal: Minimal or absence of nausea and/or vomiting  Description: INTERVENTIONS:  - Administer IV fluids if ordered to ensure adequate hydration  - Maintain NPO status until nausea and vomiting are resolved  - Nasogastric tube if ordered  - Administer ordered antiemetic medications as needed  - Provide nonpharmacologic comfort measures as appropriate  - Advance diet as tolerated, if ordered  - Consider nutrition services referral to assist patient with adequate nutrition and appropriate food choices  Outcome: Progressing  Goal: Maintains or returns to baseline bowel function  Description: INTERVENTIONS:  - Assess bowel function  - Encourage oral fluids to ensure adequate hydration  - Administer IV fluids if ordered to ensure adequate hydration  - Administer ordered medications as needed  - Encourage mobilization and activity  - Consider nutritional services referral to assist patient with adequate nutrition and appropriate food choices  Outcome: Progressing  Goal: Maintains adequate nutritional intake  Description: INTERVENTIONS:  - Monitor percentage of each meal consumed  - Identify factors contributing to decreased intake, treat as appropriate  - Assist with meals as needed  - Monitor I&O, weight, and lab values if indicated  - Obtain nutrition services referral as needed  Outcome: Progressing  Goal: Establish and maintain optimal ostomy function  Description: INTERVENTIONS:  - Assess bowel function  - Encourage oral fluids to ensure adequate hydration  - Administer IV fluids if ordered to ensure adequate hydration   - Administer ordered medications as needed  - Encourage mobilization and activity  - Nutrition services referral to assist patient with appropriate food choices  - Assess stoma site  - Consider wound care consult   Outcome: Progressing  Goal: Oral mucous  membranes remain intact  Description: INTERVENTIONS  - Assess oral mucosa and hygiene practices  - Implement preventative oral hygiene regimen  - Implement oral medicated treatments as ordered  - Initiate Nutrition services referral as needed  Outcome: Progressing     Problem: METABOLIC, FLUID AND ELECTROLYTES - ADULT  Goal: Electrolytes maintained within normal limits  Description: INTERVENTIONS:  - Monitor labs and assess patient for signs and symptoms of electrolyte imbalances  - Administer electrolyte replacement as ordered  - Monitor response to electrolyte replacements, including repeat lab results as appropriate  - Instruct patient on fluid and nutrition as appropriate  Outcome: Progressing  Goal: Fluid balance maintained  Description: INTERVENTIONS:  - Monitor labs   - Monitor I/O and WT  - Instruct patient on fluid and nutrition as appropriate  - Assess for signs & symptoms of volume excess or deficit  Outcome: Progressing  Goal: Glucose maintained within target range  Description: INTERVENTIONS:  - Monitor Blood Glucose as ordered  - Assess for signs and symptoms of hyperglycemia and hypoglycemia  - Administer ordered medications to maintain glucose within target range  - Assess nutritional intake and initiate nutrition service referral as needed  Outcome: Progressing     Problem: GENITOURINARY - ADULT  Goal: Maintains or returns to baseline urinary function  Description: INTERVENTIONS:  - Assess urinary function  - Encourage oral fluids to ensure adequate hydration if ordered  - Administer IV fluids as ordered to ensure adequate hydration  - Administer ordered medications as needed  - Offer frequent toileting  - Follow urinary retention protocol if ordered  Outcome: Progressing  Goal: Absence of urinary retention  Description: INTERVENTIONS:  - Assess patient's ability to void and empty bladder  - Monitor I/O  - Bladder scan as needed  - Discuss with physician/AP medications to alleviate retention as  needed  - Discuss catheterization for long term situations as appropriate  Outcome: Progressing  Goal: Urinary catheter remains patent  Description: INTERVENTIONS:  - Assess patency of urinary catheter  - If patient has a chronic irene, consider changing catheter if non-functioning  - Follow guidelines for intermittent irrigation of non-functioning urinary catheter  Outcome: Progressing     Problem: HEMATOLOGIC - ADULT  Goal: Maintains hematologic stability  Description: INTERVENTIONS  - Assess for signs and symptoms of bleeding or hemorrhage  - Monitor labs  - Administer supportive blood products/factors as ordered and appropriate  Outcome: Progressing     Problem: MUSCULOSKELETAL - ADULT  Goal: Maintain or return mobility to safest level of function  Description: INTERVENTIONS:  - Assess patient's ability to carry out ADLs; assess patient's baseline for ADL function and identify physical deficits which impact ability to perform ADLs (bathing, care of mouth/teeth, toileting, grooming, dressing, etc.)  - Assess/evaluate cause of self-care deficits   - Assess range of motion  - Assess patient's mobility  - Assess patient's need for assistive devices and provide as appropriate  - Encourage maximum independence but intervene and supervise when necessary  - Involve family in performance of ADLs  - Assess for home care needs following discharge   - Consider OT consult to assist with ADL evaluation and planning for discharge  - Provide patient education as appropriate  Outcome: Progressing  Goal: Maintain proper alignment of affected body part  Description: INTERVENTIONS:  - Support, maintain and protect limb and body alignment  - Provide patient/ family with appropriate education  Outcome: Progressing

## 2024-06-24 NOTE — ASSESSMENT & PLAN NOTE
Creatinine 2.38 on admission  Improved with fluid resuscitation  Nephrology consulted - see note  MEHDI resolved

## 2024-06-24 NOTE — ASSESSMENT & PLAN NOTE
Presented 6/21 with radiating chest pain  Troponins initially negative, no EKG changes noted   Chest pain resolved  Repeat troponins negative  EKG w/o ischemia changes   (6/24) ECHO: EF 70%. No RWA  Likely referred pain from cancer

## 2024-06-24 NOTE — ASSESSMENT & PLAN NOTE
Presented 6/21 with radiating chest pain  Troponins initially negative, no EKG changes noted   Chest pain resolved  Repeat troponins negative  EKG w/o ischemia changes   Echo pending  Likely referred pain from cancer

## 2024-06-24 NOTE — ASSESSMENT & PLAN NOTE
Labs during RRT showed Phos level of 1.6  Possibly precipitating seizure?  Continue repletion PRN  improved

## 2024-06-24 NOTE — ASSESSMENT & PLAN NOTE
Follow up  Palliative Care will continue to follow for symptom management.  Please reach out to on-call provider via [facility] if questions or concerns arise.  Please do not hesitate to reach our on call provider through our clinic answering service at [phone] should you have acute symptom control concerns.

## 2024-06-25 ENCOUNTER — APPOINTMENT (INPATIENT)
Dept: NEUROLOGY | Facility: HOSPITAL | Age: 49
DRG: 469 | End: 2024-06-25
Payer: COMMERCIAL

## 2024-06-25 LAB
ALBUMIN SERPL BCG-MCNC: 3.5 G/DL (ref 3.5–5)
ALP SERPL-CCNC: 103 U/L (ref 34–104)
ALT SERPL W P-5'-P-CCNC: 38 U/L (ref 7–52)
ANION GAP SERPL CALCULATED.3IONS-SCNC: 7 MMOL/L (ref 4–13)
AST SERPL W P-5'-P-CCNC: 28 U/L (ref 13–39)
BILIRUB SERPL-MCNC: 2.33 MG/DL (ref 0.2–1)
BUN SERPL-MCNC: 13 MG/DL (ref 5–25)
CALCIUM SERPL-MCNC: 8.5 MG/DL (ref 8.4–10.2)
CHLORIDE SERPL-SCNC: 95 MMOL/L (ref 96–108)
CO2 SERPL-SCNC: 27 MMOL/L (ref 21–32)
CREAT SERPL-MCNC: 0.89 MG/DL (ref 0.6–1.3)
ERYTHROCYTE [DISTWIDTH] IN BLOOD BY AUTOMATED COUNT: 13.6 % (ref 11.6–15.1)
GFR SERPL CREATININE-BSD FRML MDRD: 99 ML/MIN/1.73SQ M
GLUCOSE SERPL-MCNC: 176 MG/DL (ref 65–140)
GLUCOSE SERPL-MCNC: 179 MG/DL (ref 65–140)
GLUCOSE SERPL-MCNC: 202 MG/DL (ref 65–140)
GLUCOSE SERPL-MCNC: 211 MG/DL (ref 65–140)
GLUCOSE SERPL-MCNC: 218 MG/DL (ref 65–140)
HCT VFR BLD AUTO: 31.9 % (ref 36.5–49.3)
HGB BLD-MCNC: 11.1 G/DL (ref 12–17)
MAGNESIUM SERPL-MCNC: 2.1 MG/DL (ref 1.9–2.7)
MCH RBC QN AUTO: 29.7 PG (ref 26.8–34.3)
MCHC RBC AUTO-ENTMCNC: 34.8 G/DL (ref 31.4–37.4)
MCV RBC AUTO: 85 FL (ref 82–98)
PHOSPHATE SERPL-MCNC: 2.5 MG/DL (ref 2.7–4.5)
PLATELET # BLD AUTO: 128 THOUSANDS/UL (ref 149–390)
PMV BLD AUTO: 10.2 FL (ref 8.9–12.7)
POTASSIUM SERPL-SCNC: 3.5 MMOL/L (ref 3.5–5.3)
PROT SERPL-MCNC: 6.9 G/DL (ref 6.4–8.4)
RBC # BLD AUTO: 3.74 MILLION/UL (ref 3.88–5.62)
SODIUM SERPL-SCNC: 129 MMOL/L (ref 135–147)
WBC # BLD AUTO: 9.67 THOUSAND/UL (ref 4.31–10.16)

## 2024-06-25 PROCEDURE — 83735 ASSAY OF MAGNESIUM: CPT

## 2024-06-25 PROCEDURE — 99233 SBSQ HOSP IP/OBS HIGH 50: CPT | Performed by: PHYSICIAN ASSISTANT

## 2024-06-25 PROCEDURE — 80053 COMPREHEN METABOLIC PANEL: CPT

## 2024-06-25 PROCEDURE — 99233 SBSQ HOSP IP/OBS HIGH 50: CPT | Performed by: INTERNAL MEDICINE

## 2024-06-25 PROCEDURE — 85027 COMPLETE CBC AUTOMATED: CPT

## 2024-06-25 PROCEDURE — 84100 ASSAY OF PHOSPHORUS: CPT

## 2024-06-25 PROCEDURE — 99255 IP/OBS CONSLTJ NEW/EST HI 80: CPT | Performed by: STUDENT IN AN ORGANIZED HEALTH CARE EDUCATION/TRAINING PROGRAM

## 2024-06-25 PROCEDURE — 82948 REAGENT STRIP/BLOOD GLUCOSE: CPT

## 2024-06-25 PROCEDURE — 95816 EEG AWAKE AND DROWSY: CPT | Performed by: PSYCHIATRY & NEUROLOGY

## 2024-06-25 PROCEDURE — 95816 EEG AWAKE AND DROWSY: CPT

## 2024-06-25 RX ORDER — INSULIN LISPRO 100 [IU]/ML
1-6 INJECTION, SOLUTION INTRAVENOUS; SUBCUTANEOUS
Status: DISCONTINUED | OUTPATIENT
Start: 2024-06-25 | End: 2024-06-27 | Stop reason: HOSPADM

## 2024-06-25 RX ORDER — QUETIAPINE FUMARATE 25 MG/1
50 TABLET, FILM COATED ORAL
Status: DISCONTINUED | OUTPATIENT
Start: 2024-06-25 | End: 2024-06-27 | Stop reason: HOSPADM

## 2024-06-25 RX ORDER — POLYETHYLENE GLYCOL 3350 17 G/17G
17 POWDER, FOR SOLUTION ORAL 2 TIMES DAILY
Status: DISCONTINUED | OUTPATIENT
Start: 2024-06-25 | End: 2024-06-27 | Stop reason: HOSPADM

## 2024-06-25 RX ORDER — FOLIC ACID 1 MG/1
1 TABLET ORAL DAILY
Status: DISCONTINUED | OUTPATIENT
Start: 2024-06-25 | End: 2024-06-27 | Stop reason: HOSPADM

## 2024-06-25 RX ORDER — SENNOSIDES 8.6 MG
2 TABLET ORAL
Status: DISCONTINUED | OUTPATIENT
Start: 2024-06-25 | End: 2024-06-27 | Stop reason: HOSPADM

## 2024-06-25 RX ORDER — HYDROXYZINE 50 MG/1
50 TABLET, FILM COATED ORAL 2 TIMES DAILY PRN
Status: DISCONTINUED | OUTPATIENT
Start: 2024-06-25 | End: 2024-06-27 | Stop reason: HOSPADM

## 2024-06-25 RX ADMIN — LISINOPRIL 20 MG: 20 TABLET ORAL at 08:02

## 2024-06-25 RX ADMIN — HEPARIN SODIUM 5000 UNITS: 5000 INJECTION, SOLUTION INTRAVENOUS; SUBCUTANEOUS at 21:04

## 2024-06-25 RX ADMIN — Medication 2 TABLET: at 13:39

## 2024-06-25 RX ADMIN — QUETIAPINE FUMARATE 50 MG: 25 TABLET ORAL at 21:04

## 2024-06-25 RX ADMIN — LITHIUM CARBONATE 300 MG: 300 TABLET, EXTENDED RELEASE ORAL at 08:02

## 2024-06-25 RX ADMIN — CHLORHEXIDINE GLUCONATE 15 ML: 1.2 RINSE ORAL at 08:02

## 2024-06-25 RX ADMIN — INSULIN LISPRO 1 UNITS: 100 INJECTION, SOLUTION INTRAVENOUS; SUBCUTANEOUS at 11:29

## 2024-06-25 RX ADMIN — SENNOSIDES 17.2 MG: 8.6 TABLET, FILM COATED ORAL at 21:05

## 2024-06-25 RX ADMIN — OXYCODONE HYDROCHLORIDE 5 MG: 5 TABLET ORAL at 13:39

## 2024-06-25 RX ADMIN — CHLORHEXIDINE GLUCONATE 15 ML: 1.2 RINSE ORAL at 21:04

## 2024-06-25 RX ADMIN — FOLIC ACID 1 MG: 5 INJECTION, SOLUTION INTRAMUSCULAR; INTRAVENOUS; SUBCUTANEOUS at 11:30

## 2024-06-25 RX ADMIN — INSULIN LISPRO 2 UNITS: 100 INJECTION, SOLUTION INTRAVENOUS; SUBCUTANEOUS at 21:05

## 2024-06-25 RX ADMIN — FOLIC ACID 1 MG: 1 TABLET ORAL at 11:30

## 2024-06-25 RX ADMIN — POLYETHYLENE GLYCOL 3350 17 G: 17 POWDER, FOR SOLUTION ORAL at 21:04

## 2024-06-25 RX ADMIN — POLYETHYLENE GLYCOL 3350 17 G: 17 POWDER, FOR SOLUTION ORAL at 08:01

## 2024-06-25 RX ADMIN — HEPARIN SODIUM 5000 UNITS: 5000 INJECTION, SOLUTION INTRAVENOUS; SUBCUTANEOUS at 13:39

## 2024-06-25 RX ADMIN — HYDROXYZINE HYDROCHLORIDE 50 MG: 50 TABLET, FILM COATED ORAL at 17:21

## 2024-06-25 RX ADMIN — LIDOCAINE 2 PATCH: 700 PATCH TOPICAL at 08:02

## 2024-06-25 RX ADMIN — HEPARIN SODIUM 5000 UNITS: 5000 INJECTION, SOLUTION INTRAVENOUS; SUBCUTANEOUS at 05:00

## 2024-06-25 RX ADMIN — INSULIN LISPRO 2 UNITS: 100 INJECTION, SOLUTION INTRAVENOUS; SUBCUTANEOUS at 08:02

## 2024-06-25 RX ADMIN — INSULIN LISPRO 1 UNITS: 100 INJECTION, SOLUTION INTRAVENOUS; SUBCUTANEOUS at 15:56

## 2024-06-25 RX ADMIN — PRAZOSIN HYDROCHLORIDE 5 MG: 5 CAPSULE ORAL at 21:05

## 2024-06-25 RX ADMIN — LITHIUM CARBONATE 300 MG: 300 TABLET, EXTENDED RELEASE ORAL at 17:21

## 2024-06-25 NOTE — ASSESSMENT & PLAN NOTE
Mostly complains of epigastric discomfort and bloating d/t constipation. Last BM >1 week.   Agree with current bowel regimen. Would recommend Dulcolax supp. or enema if no BM in 24-48 hr    Continue current regimen  Tylenol 650mg q6h PRN mild pain  OxyIR 5mg q4h PRN mod-sev/BT pain      PRN use:Oxy 5mgx1  24hr OME: 7.5

## 2024-06-25 NOTE — ASSESSMENT & PLAN NOTE
Presenting complaint on 06/21.  Troponin neg, EKG WNL  Symptoms resolved  ECHO completed today, EF 70%  Pain is epigastric and radiates to umbilicus.   Hx of abdominal wall hernia.

## 2024-06-25 NOTE — ASSESSMENT & PLAN NOTE
Resolved.  06/22 pt unresponsive. Extubated 06/23. Unclear etiology.   Had EEG performed 06/25 -pending results  Mgmt per primary team

## 2024-06-25 NOTE — PLAN OF CARE
Problem: NEUROSENSORY - ADULT  Goal: Achieves stable or improved neurological status  Description: INTERVENTIONS  - Monitor and report changes in neurological status  - Monitor vital signs such as temperature, blood pressure, glucose, and any other labs ordered   - Initiate measures to prevent increased intracranial pressure  - Monitor for seizure activity and implement precautions if appropriate      Outcome: Adequate for Discharge     Problem: CARDIOVASCULAR - ADULT  Goal: Maintains optimal cardiac output and hemodynamic stability  Description: INTERVENTIONS:  - Monitor I/O, vital signs and rhythm  - Monitor for S/S and trends of decreased cardiac output  - Administer and titrate ordered vasoactive medications to optimize hemodynamic stability  - Assess quality of pulses, skin color and temperature  - Assess for signs of decreased coronary artery perfusion  - Instruct patient to report change in severity of symptoms  Outcome: Adequate for Discharge  Goal: Absence of cardiac dysrhythmias or at baseline rhythm  Description: INTERVENTIONS:  - Continuous cardiac monitoring, vital signs, obtain 12 lead EKG if ordered  - Administer antiarrhythmic and heart rate control medications as ordered  - Monitor electrolytes and administer replacement therapy as ordered  Outcome: Adequate for Discharge     Problem: RESPIRATORY - ADULT  Goal: Achieves optimal ventilation and oxygenation  Description: INTERVENTIONS:  - Assess for changes in respiratory status  - Assess for changes in mentation and behavior  - Position to facilitate oxygenation and minimize respiratory effort  - Oxygen administered by appropriate delivery if ordered  - Initiate smoking cessation education as indicated  - Encourage broncho-pulmonary hygiene including cough, deep breathe, Incentive Spirometry  - Assess the need for suctioning and aspirate as needed  - Assess and instruct to report SOB or any respiratory difficulty  - Respiratory Therapy support as  indicated  Outcome: Adequate for Discharge     Problem: METABOLIC, FLUID AND ELECTROLYTES - ADULT  Goal: Fluid balance maintained  Description: INTERVENTIONS:  - Monitor labs   - Monitor I/O and WT  - Instruct patient on fluid and nutrition as appropriate  - Assess for signs & symptoms of volume excess or deficit  Outcome: Adequate for Discharge

## 2024-06-25 NOTE — CONSULTS
VIRTUAL CARE DOCUMENTATION:     1. This service was provided via Telemedicine using TV kit    2. Parties in the room with patient during teleconsult: patient and neighbor    3. Confidentiality My office door was closed    4. Participants No one else was in the room    5. Patient acknowledged consent and understanding of privacy and security of the  Telemedicine consult. I informed the patient that I have reviewed their record in Epic and presented the opportunity for them to ask any questions regarding the visit today.  The patient agreed to participate.    6. Time spent 30 minutes           Consultation - Behavioral Health     Identification Data: Osei Trimble 49 y.o. male MRN: [id number]  Unit/Bed#: ICU 08-01 Encounter: [id number]    06/25/24  12:51 PM    Inpatient consult to Psychiatry  Consult performed by: Odilia Rose PA-C  Consult ordered by: PIERRE Osorio        Physician Requesting Consult: Leo Huerta*  Principal Problem:Acute encephalopathy    Reason for Consult:  depressed mood    History of Present Illness     Osei Trimble is a 49 y.o. male with a history of neuroendocrine carcinoma of small bowel, explosive personality disorder, hyperglycemia, hypertension, thrombocytopenia, who was admitted to the medical service on 6/21/2024 due to MEHDI. Psychiatric consultation was requested due to depressed mood.    Per initial ED note: Is a 49-year-old male being treated for neuroendocrine tumor and developed chest pain today. He was simply sitting in the laundromat, developed pain across his chest that went to his neck. His neck pain was seem to be reproducible and down his arm. He is diabetic, hypertensive, and vapes from time to time. Recently getting the medical marijuana card. He denies fever chills or night sweats. Has had no cough or congestion. Remains on chemotherapy. Recent PET scan revealing some metastatic disease. He also suffers from intermittent explosive  disorder.     Per chart review: Was admitted for chest pain, MEHDI and transaminitis. He was given fluids with improvement however became unresponsive and was a rapid response and required subsequent intubation for airway protection. He was extubated on 6/23/24. During course developed worsening depression. Primary team requesting consult due to ongoing depression despite lithium therapy.     On initial psychiatric evaluation Osei is seen resting in hospital bed. Most of interview limited and obtained from collateral information from neighbor at bedside. Osei was agreeable for neighbor to be present during interview. Osei states that he has been having worsening depressed mood secondary to ongoing medical issues. Besides this he is also reporting increased anxiety. During interview he was having some difficulty breathing and often responded with hand gestures/nodding. His neighbor states that at home he was having issues with decreased energy, increased need for sleep, poor appetite and depressed mood. She is a reliable historian and reports taking care of Osei often. She believes that his previous dose of lithium was too high and making him more sedated in combination with the Risperdal. He reports previous medication trials with thorazine and paxil however were unhelpful. Besides his depression and anxiety symptoms he feels that his anger is under control without any incidences outside of the hospital. He denies psychosis and there is no evidence of delusional content. Despite depressed mood he is generally goal oriented and agreeable to participate in ongoing cancer treatment. He denies suicidal ideations.       Psychiatric Review Of Systems:    sleep changes: increased sleep   appetite changes: loss of appetite  weight changes: fluctuates  energy/anergy: decreased   interest/pleasure/anhedonia: yes  somatic symptoms: no   anxiety/panic: yes   kin: no history of manic behavior  guilty/hopeless: no   self  "injurious behavior/risky behavior: no  Suicidal ideation: no  Homicidal ideation: no  Auditory hallucinations: no  Visual hallucinations: no  Other hallucinations: no   Delusional thinking: no    Historical Information     Past Psychiatric History:     Past Inpatient Psychiatric Treatment:   Yes one prior at our [facility] years ago  Past Outpatient Psychiatric Treatment:    942 psychiatric services   Past Suicide Attempts: No  Past Violent Behavior: No   Past Psychiatric Medication Trials: Thorazine, Paxil, Lithium, Risperdal     Substance Abuse History:    Social History       Tobacco History       Smoking Status  Former Quit Date  12/16/2003 Smoking Tobacco Type  Cigarettes quit in 12/16/2003, Pipe   Pack Year History     Packs/Day From To Years    0 12/16/2003  20.5      Smokeless Tobacco Use  Never              Alcohol History       Alcohol Use Status  Not Currently Comment  Drinks a quart of moonshine/night-As of 7/4/22 will quit drinking              Drug Use       Drug Use Status  Yes Types  Marijuana Comment  Socially (1-2 Monthly)              Sexual Activity       Sexually Active  Not Asked              Activities of Daily Living    Not Asked                   I have assessed this patient for substance use within the past 12 months    Alcohol use: moonshine, socially   Recreational drug use:   Cocaine:  none  Heroin:  none  Marijuana:  none  Other drugs: none     Family Psychiatric History:     Psychiatric Illness:  Bipolar disorder  Substance Abuse:  No   Suicide Attempts:  No     Social History:    Education: 9th grade  Marital History: single  Children: \" a couple out there\"  Living Arrangement: The patient lives alone in a house  Occupational History: working on getting disability  Legal History: Yes previously      Past Medical History:   Diagnosis Date    Allergic     Bipolar disorder in partial remission (HCC)     Erectile dysfunction     unspecified erectile dysfunction type    Hypertension     " Kidney stone      Past Surgical History:   Procedure Laterality Date    COLONOSCOPY      EXPLORATORY LAPAROTOMY W/ BOWEL RESECTION N/A 7/21/2022    Procedure: LAPAROTOMY EXPLORATORY W/ SMALL BOWEL RESECTION, liver biopsy;  Surgeon: Rose Mary Lara MD;  Location:  MAIN OR;  Service: General    FL RETROGRADE PYELOGRAM  10/06/2021    HERNIA REPAIR      RI CYSTO BLADDER W/URETERAL CATHETERIZATION Right 10/06/2021    Procedure: CYSTOSCOPY RETROGRADE PYELOGRAM WITH INSERTION STENT URETERAL, RIGHT URETEROSCOPY, STONE EXTRACTION;  Surgeon: Bradly Rivera MD;  Location:  MAIN OR;  Service: Urology         Medical Review Of Systems:    Pertinent items are noted in HPI.    Allergies:    Allergies   Allergen Reactions    Tomato - Food Allergy Anaphylaxis     raw    Poison Ivy Extract Hives    Poison Sumac Extract Hives       Medications:   All current active medications have been reviewed.    Objective     Vital signs in last 24 hours:    Temp:  [98.9 °F (37.2 °C)-100.7 °F (38.2 °C)] 99.3 °F (37.4 °C)  HR:  [101-116] 110  Resp:  [24-34] 29  BP: (115-139)/(63-82) 126/79    Intake/Output Summary (Last 24 hours) at 6/25/2024 1251  Last data filed at 6/25/2024 1201  Gross per 24 hour   Intake 510 ml   Output 2250 ml   Net -1740 ml       Mental Status Evaluation:    Appearance:  age appropriate, dressed in hospital attire, bearded   Behavior:  pleasant, cooperative, ill appearing   Speech:  delayed, soft   Mood:  depressed   Affect:  blunted   Language: unable to assess   Thought Process:  organized, goal directed   Associations: intact associations   Thought Content:  normal, no overt delusions   Perceptual Disturbances: no auditory hallucinations, no visual hallucinations, does not appear responding to internal stimuli   Risk Potential: Suicidal ideation - None at present  Homicidal ideation - None at present  Potential for aggression - No   Sensorium:  oriented to person, place, and time/date   Memory:  recent and remote memory  grossly intact   Consciousness:  alert and awake    Attention/Concentration: attention span and concentration are age appropriate   Intellect: within normal limits   Fund of Knowledge: awareness of current events: yes   Insight:  fair   Judgment: fair   Muscle Strength Muscle Tone: normal  normal   Gait/Station: in bed   Motor Activity: no abnormal movements     Laboratory Results: I have personally reviewed all pertinent laboratory/tests results.    Imaging Studies: XR shoulder 2+ vw right    Result Date: 6/24/2024  Narrative: XR SHOULDER 2+ VW RIGHT INDICATION: right shoulder pain-r/o fx. COMPARISON: None FINDINGS: No acute fracture or dislocation. No significant degenerative changes. No lytic or blastic osseous lesion. Unremarkable soft tissues.     Impression: No acute osseous abnormality. Workstation performed: [id number]     Echo complete w/ contrast if indicated    Result Date: 6/24/2024  Narrative:   Left Ventricle: Left ventricular cavity size is normal. Wall thickness is mildly increased. The left ventricular ejection fraction is 70%. Systolic function is vigorous. Wall motion is normal. Diastolic function is normal.   Aorta: The aortic root is mildly dilated. The ascending aorta is normal in size. The aortic root is 4.00 cm. The ascending aorta is 3.7 cm.     MRI brain w wo contrast    Result Date: 6/23/2024  Narrative: MRI BRAIN WITH AND WITHOUT CONTRAST INDICATION: Altered mental status.. COMPARISON: CT of the head from the day before. TECHNIQUE: Multiplanar, multisequence imaging of the brain was performed before and after gadolinium administration. IV Contrast:  10 mL of Gadobutrol injection (SINGLE-DOSE) IMAGE QUALITY:   Diagnostic. FINDINGS: BRAIN PARENCHYMA:  There is no discrete mass, mass effect or midline shift. There is no intracranial hemorrhage.  Normal posterior fossa.  Diffusion imaging is unremarkable. There is mild chronic microvascular ischemic change. Postcontrast imaging of the brain  demonstrates no abnormal enhancement. VENTRICLES:  Normal for the patient's age. SELLA AND PITUITARY GLAND:  Normal. ORBITS:  Normal. PARANASAL SINUSES: There is mucosal thickening of the right sphenoid sinus VASCULATURE:  Evaluation of the major intracranial vasculature demonstrates appropriate flow voids. CALVARIUM AND SKULL BASE: There is a right mastoid air cell effusion. EXTRACRANIAL SOFT TISSUES:  Normal.     Impression: No mass effect, acute intracranial hemorrhage or evidence of recent infarction. No abnormal parenchymal or leptomeningeal enhancement identified. Mild nonspecific white matter FLAIR signal hyperintensity is likely on the basis of chronic microvascular ischemic change. Right mastoid air cell effusion. Workstation performed: [id number]     XR chest portable ICU    Result Date: 6/23/2024  Narrative: XR CHEST PORTABLE ICU INDICATION: aspiration. COMPARISON: Chest CT 6/22/2024, CXR 6/21/2024. FINDINGS: ET tube 4 cm above the bianka. NG tube in stomach. Right jugular catheter at cavoatrial junction. Redemonstration of atelectasis in the right upper and left lower lobes. No pneumothorax or pleural effusion. Normal cardiomediastinal silhouette. Bones are unremarkable for age. Normal upper abdomen.     Impression: Redemonstration of right upper and left lower lobe atelectasis. Superimposed aspiration not excluded. Workstation performed: [id number]     CT chest abdomen pelvis wo contrast    Result Date: 6/22/2024  Narrative: CT CHEST, ABDOMEN, AND PELVIS WITHOUT IV CONTRAST INDICATION:   ams. COMPARISON: CT chest 6/21/2024. CT abdomen/pelvis 6/1/2024. TECHNIQUE: CT examination of the chest, abdomen, and pelvis was performed without intravenous or enteric contrast, limiting evaluation for certain processes. Axial, sagittal, and coronal 2D reformatted images were created from the source data and submitted for interpretation. Radiation dose length product (DLP) for this visit:  1799 mGy-cm .  This  examination, like all CT scans performed in the [facility], was performed utilizing techniques to minimize radiation dose exposure, including the use of iterative reconstruction and automated exposure control. IV Contrast: None. Enteric Contrast: None. FINDINGS: CHEST: LARGE AIRWAYS: Endotracheal tube with tip just entering the takeoff of the right mainstem bronchus. LUNGS: Complete atelectasis of several basal segments of the left lower lobe. Atelectasis in the posterior basal right upper lobe and scattered in the dependent right lower lobe. No definite consolidation. Stable 9 mm nodule in the middle lobe (series 2/48), not hypermetabolic on prior PET. PLEURA: Trace left pleural effusion. No pneumothorax. HEART: Normal cardiac size and morphology. No coronary artery calcification. No pericardial effusion or thickening. VESSELS: Normal caliber thoracic aorta with no detectable atherosclerotic plaque. Normal caliber main pulmonary artery. MEDIASTINUM AND AREN: Moderate diffuse esophageal wall thickening.. No lymphadenopathy. CHEST WALL AND LOWER NECK:   Right IJ central venous catheter with tip at the cavoatrial junction. ABDOMEN: LIVER: Hepatomegaly measuring 19.5 cm in maximum craniocaudal dimension. Severe diffuse steatosis. Known hepatic metastasis not conspicuous on noncontrast exam. BILIARY: No intrahepatic biliary ductal dilatation. Normal caliber common bile duct. GALLBLADDER: No calcified gallstones. Normal wall thickness. No pericholecystic inflammatory changes. SPLEEN: Within normal limits. No gross mass on noncontrast study. Normal spleen size. PANCREAS: Diffusely fatty replaced but otherwise grossly within normal limits. ADRENAL GLANDS: Within normal limits. KIDNEYS/URETERS: Normal kidney size and position. No suspicious lesions within the limitations of a noncontrast exam. Punctate punctate bilateral nonobstructing calculi. No hydronephrosis. STOMACH AND BOWEL: The stomach is within  normal limits, moderately distended with air and debris with NG tube in place. Normal caliber small bowel. The colon is diffusely distended with gas, with mostly decompressed rectum, no evidence of obstruction. No  evidence of active small or large bowel inflammatory process. APPENDIX: Normal air-filled appendix. ABDOMINOPELVIC CAVITY: No change in a 4.2 cm centrally calcified mass in the mid mesentery with surrounding lymph nodes measuring up to 9 mm in short axis in keeping with known neuroendocrine tumor. No ascites. No intraperitoneal free air. No retroperitoneal hematoma. VESSELS: Normal caliber abdominal aorta with mild atherosclerotic plaque. PELVIS REPRODUCTIVE ORGANS: Normal prostate. Symmetric seminal vesicles. URINARY BLADDER: Decompressed with Schaefer catheter in place. ABDOMINAL WALL/INGUINAL REGIONS: Prior ventral hernia mesh repair. BONES: Vertebral body height is maintained. No acute fracture or destructive osseous lesion. Bone island in the L1 sacral segment.     Impression: 1.  Partial atelectasis of the left lower lobe, scattered atelectasis in the right lung. 2.  Endotracheal tube with tip just entering the takeoff of the right mainstem bronchus. 3.  No acute findings in the abdomen or pelvis. 4.  Air-distended colon without findings to suggest obstruction. I personally discussed this study with ALHAJI NESS on 6/22/2024 3:29 PM. Workstation performed: [id number]     CT head wo contrast    Result Date: 6/22/2024  Narrative: CT BRAIN - WITHOUT CONTRAST INDICATION:   ams. COMPARISON: Head CT and CT angiography of the head and neck from June 1, 2024, nuclear medicine PET/CT scan from May 10, 2024. TECHNIQUE:  CT examination of the brain was performed.  Multiplanar 2D reformatted images were created from the source data. Radiation dose length product (DLP) for this visit:  937 mGy-cm .  This examination, like all CT scans performed in the [facility] beds at the [facility], was performed utilizing techniques  to minimize radiation dose exposure, including the use of iterative reconstruction and automated exposure control. IMAGE QUALITY: There is streak/spray artifact from the left parieto-occipital scalp transducer. Skull base region also demonstrates motion artifact. FINDINGS: The patient is intubated. PARENCHYMA:  No intracranial mass, mass effect or midline shift. No CT signs of acute infarction.  No acute parenchymal hemorrhage. VENTRICLES AND EXTRA-AXIAL SPACES:  Normal for the patient's age. VISUALIZED ORBITS: Normal visualized orbits. PARANASAL SINUSES: Normal visualized paranasal sinuses. CALVARIUM AND EXTRACRANIAL SOFT TISSUES:  Normal.     Impression: Limited by artifact as discussed above. No gross abnormality. No intracranial hemorrhage, edema or mass effect. Workstation performed: [id number]      kidney and bladder    Result Date: 6/22/2024  Narrative: RENAL ULTRASOUND INDICATION: Acute kidney injury with history of kidney stones. COMPARISON: CT chest from 6/21/2024; CT abdomen from 6/1/2024 TECHNIQUE: Ultrasound of the retroperitoneum was performed with a curvilinear transducer utilizing volumetric sweeps and still imaging techniques. FINDINGS: KIDNEYS: Symmetric and normal size. Right kidney: 13.4 x 5.1 x 5.6 cm. Volume 202.8 mL Left kidney: 11.7 x 5.9 x 5.1 cm. Volume 186.7 mL Right kidney Normal echogenicity and contour. No mass is identified. No hydronephrosis. No shadowing calculi. No perinephric fluid collections. Left kidney Normal echogenicity and contour. No mass is identified. No hydronephrosis. No shadowing calculi. No perinephric fluid collections. URETERS: Nonvisualized. BLADDER: Bladder is not well distended. Bladder wall is not well evaluated given only minimal distention. Bilateral ureteral jets detected. The visualized liver parenchyma is echogenic suspicious for fatty infiltration and correlates to the chest CT     Impression: No hydronephrosis. The bladder is not well evaluated due to  underdistention. Workstation performed: [id number]      VAS VENOUS DUPLEX - LOWER LIMB BILATERAL    Result Date: 6/22/2024  Narrative:  [facility] REPORT CLINICAL: Indications: Patient presents with SOB/chest pain, history of cancer. Operative History: No cardiovascular surgeries Risk Factors The patient has history of HTN and previous smoking (quit >10yrs ago).   CONCLUSION:  Impression: RIGHT LOWER LIMB: No evidence of acute or chronic deep vein thrombosis. No evidence of superficial thrombophlebitis noted. Doppler evaluation shows a normal response to augmentation maneuvers. Popliteal, posterior tibial and anterior tibial arterial Doppler waveforms are triphasic.  LEFT LOWER LIMB: No evidence of acute or chronic deep vein thrombosis. No evidence of superficial thrombophlebitis noted. Doppler evaluation shows a normal response to augmentation maneuvers. Popliteal, posterior tibial and anterior tibial arterial Doppler waveforms are triphasic.  Technical findings were given to Dr. Jerry at time of exam.  SIGNATURE: Electronically Signed by: FROILAN ESPARZA MD on 2024-06-22 10:02:19 AM    XR chest 1 view portable    Result Date: 6/21/2024  Narrative: XR CHEST PORTABLE INDICATION: chest pain. COMPARISON: Chest radiograph 6/1/2024. FINDINGS: Clear lungs. No pneumothorax or pleural effusion. Normal cardiomediastinal silhouette. Bones are unremarkable for age. Normal upper abdomen.     Impression: No acute cardiopulmonary disease. Workstation performed: [id number]     CT chest without contrast    Result Date: 6/21/2024  Narrative: CT CHEST WITHOUT IV CONTRAST INDICATION: Chest pain. COMPARISON: Chest CT 9/7/2023. TECHNIQUE: CT examination of the chest was performed without intravenous contrast. Multiplanar 2D reformatted images were created from the source data. This examination, like all CT scans performed in the [facility], was performed utilizing techniques to minimize radiation dose exposure,  including the use of iterative reconstruction and automated exposure control. Radiation dose length product (DLP) for this visit: 454.3 mGy-cm FINDINGS: LUNGS: Lungs are clear. Unchanged 9 mm right middle lobe nodule (series 3, image 107). No tracheal or endobronchial lesion. PLEURA: No pleural effusion. No pneumothorax. HEART/GREAT VESSELS: Heart is unremarkable for patient's age. No thoracic aortic aneurysm. MEDIASTINUM AND AREN: Unremarkable. CHEST WALL AND LOWER NECK: Unremarkable. VISUALIZED STRUCTURES IN THE UPPER ABDOMEN: Hepatic steatosis. Punctate nonobstructing left interpolar calculus. Fatty atrophy of the pancreas. Ventral hernia repair. OSSEOUS STRUCTURES: No acute fracture or destructive osseous lesion.     Impression: No acute findings in the chest. Unchanged 9 mm right middle lobe pulmonary nodule, not FDG avid on PET/CT 5/10/2024. Workstation performed: [id number]     XR chest 1 view portable    Result Date: 6/1/2024  Narrative: XR CHEST PORTABLE INDICATION: abd pain. COMPARISON: CTA neck and abdomen CT 6/1/2024, CXR 3/21/2023., PET/CT 5/10/2024, FINDINGS: Clear lungs. No pneumothorax or pleural effusion. Normal cardiomediastinal silhouette. Bones are unremarkable for age. Normal upper abdomen.     Impression: No acute cardiopulmonary disease. Workstation performed: [id number]     CTA head and neck with and without contrast    Result Date: 6/1/2024  Narrative: CTA NECK AND BRAIN WITH AND WITHOUT CONTRAST INDICATION: dizziness and blurred vision COMPARISON:   None. TECHNIQUE:  Routine CT imaging of the Brain without contrast not performed due to technical error.  Post contrast imaging was performed after administration of iodinated contrast through the neck and brain. Post contrast axial 0.625 mm images timed to opacify the arterial system. 3D rendering was performed on an independent workstation.   MIP reconstructions performed. Coronal reconstructions were performed of the noncontrast portion of the  brain. Radiation dose length product (DLP) for this visit:  615 mGy-cm .  This examination, like all CT scans performed in the [facility], was performed utilizing techniques to minimize radiation dose exposure, including the use of iterative reconstruction and automated exposure control. IV Contrast:  100 mL of iohexol (OMNIPAQUE) IMAGE QUALITY:   Diagnostic FINDINGS: CERVICAL VASCULATURE somewhat limited secondary artifact from adjacent contrast bolus. Findings below within limitation. AORTIC ARCH AND GREAT VESSELS:  Normal aortic arch and great vessel origins. Normal visualized subclavian vessels. RIGHT VERTEBRAL ARTERY CERVICAL SEGMENT:  Normal origin. The vessel is normal in caliber throughout the neck. LEFT VERTEBRAL ARTERY CERVICAL SEGMENT:  Normal origin. The vessel is normal in caliber throughout the neck. RIGHT EXTRACRANIAL CAROTID SEGMENT:  Normal caliber common carotid artery.  Normal bifurcation and cervical internal carotid artery.  No stenosis or dissection. LEFT EXTRACRANIAL CAROTID SEGMENT:  Normal caliber common carotid artery.  Normal bifurcation and cervical internal carotid artery.  No stenosis or dissection. NASCET criteria was used to determine the degree of internal carotid artery diameter stenosis. INTRACRANIAL VASCULATURE INTERNAL CAROTID ARTERIES:  Normal enhancement of the intracranial portions of the internal carotid arteries.  Normal ophthalmic artery origins.  Normal ICA terminus. ANTERIOR CIRCULATION:  Symmetric A1 segments and anterior cerebral arteries with normal enhancement.  Normal anterior communicating artery. MIDDLE CEREBRAL ARTERY CIRCULATION:  M1 segment and middle cerebral artery branches demonstrate normal enhancement bilaterally. DISTAL VERTEBRAL ARTERIES:  Normal distal vertebral arteries.  Posterior inferior cerebellar artery origins are normal. Normal vertebral basilar junction. BASILAR ARTERY:  Basilar artery is normal in caliber.  Normal superior  cerebellar arteries. POSTERIOR CEREBRAL ARTERIES: Both posterior cerebral arteries arises from the basilar tip.  Both arteries demonstrate normal enhancement.   Hypoplastic posterior communicating arteries. VENOUS STRUCTURES: Patent. NON VASCULAR ANATOMY BONY STRUCTURES:  No acute osseous abnormality. SOFT TISSUES OF THE NECK:  Unremarkable. THORACIC INLET:  Normal.     Impression: Please note noncontrast CT head exam was not performed due to technical error. This portion of the exam can be repeated as clinically warranted. No evidence of hemodynamically significant stenosis or large vessel occlusive disease within the major vessels of the [place]. No aneurysm. No significant stenosis of the cervical vertebral or carotid arteries. Workstation performed: [id number]     CT abdomen pelvis with contrast    Result Date: 6/1/2024  Narrative: CT ABDOMEN AND PELVIS WITH IV CONTRAST INDICATION: epigastric abd pain hx of cancer. COMPARISON: CT of the chest abdomen and pelvis 9/7/2023. PET/CT 5/10/2024. TECHNIQUE: CT examination of the abdomen and pelvis was performed. Multiplanar 2D reformatted images were created from the source data. This examination, like all CT scans performed in the [facility], was performed utilizing techniques to minimize radiation dose exposure, including the use of iterative reconstruction and automated exposure control. Radiation dose length product (DLP) for this visit: 1095 mGy-cm IV Contrast: 100 mL of iohexol (OMNIPAQUE) Enteric Contrast: Not administered. FINDINGS: ABDOMEN LOWER CHEST: No acute abnormality in the visualized lower chest. LIVER/BILIARY TREE: Hypoattenuating lesion noted on 9/7/2023 CT, not FDG avid on prior PET CTs, suboptimally visualized due to streak artifacts and likely faintly seen on series 3, image 59.. GALLBLADDER: No calcified gallstones. No pericholecystic inflammatory change. SPLEEN: Unremarkable. PANCREAS: Unremarkable. ADRENAL GLANDS:  Unremarkable. KIDNEYS/URETERS: Unremarkable. No hydronephrosis. STOMACH AND BOWEL: No disproportionate dilation of the small or large bowel. Left lower quadrant anastomosis. APPENDIX: No findings to suggest appendicitis. ABDOMINOPELVIC CAVITY: No ascites. No pneumoperitoneum. No lymphadenopathy. Unchanged partially calcified mesenteric mass measures 2.9 x 5.6 cm. Unchanged adjacent mesenteric stranding and lymphadenopathy with lymph nodes measuring up to 9 mm short axis. VESSELS: Unremarkable for patient's age. PELVIS REPRODUCTIVE ORGANS: Unremarkable for patient's age. URINARY BLADDER: Unremarkable. ABDOMINAL WALL/INGUINAL REGIONS: Soft tissue nodularity in the bilateral gluteal regions, unchanged, likely injection sites. Ventral herniorrhaphy. BONES: No acute fracture or suspicious osseous lesion. Unchanged sclerotic lesions in the right iliac wing and right hemisacrum, not previously FDG avid.     Impression: No acute findings in the abdomen or pelvis. Unchanged calcified mesenteric mass with adjacent lymphadenopathy in keeping with known neuroendocrine tumor. Workstation performed: [id number]     XR WRIST 3+ VW RIGHT    Result Date: 5/28/2024  Narrative: History: Bilateral carpal tunnel syndrome Study: XR WRIST 3+ VW BILATERAL Comparison: None    Impression: Findings/impression: Bilateral wrist joint spaces are normal with no erosions. No acute fracture or malalignment. Healed fracture deformity of the right fifth metacarpal neck with apex posterior angulation noted. Workstation:[id number]      Code Status: Level 1 - Full Code  Advance Directive and Living Will:       Power of :      Assessment & Plan     Principal Problem:    Acute encephalopathy  Active Problems:    Essential hypertension    Neuroendocrine carcinoma of small bowel (HCC)    Other chest pain    Explosive personality disorder (HCC)    Hyperglycemia    Thrombocytopenia (HCC)    Total bilirubin, elevated    Back pain    Palliative care by  specialist    Goals of care, counseling/discussion      Assessment:    Dx: Bipolar disorder, currently depressed, anxiety, explosive disorder    Osei Trimble is a 49 y.o. male with a history of neuroendocrine carcinoma of small bowel, explosive personality disorder, hyperglycemia, hypertension, thrombocytopenia, who was admitted to the medical service on 6/21/2024 due to MEHDI. Psychiatric consultation was requested due to depressed mood.    Osei is seen laying in bed. He appears slightly uncomfortable, shifting at times to catch his breath and is ill appearing. He does have some difficulty speaking, but is agreeable to continue on with the assistance of his neighbor at bedside who was able to provide collateral information. He does admit to depressed mood and anxiety in the context of psychosocial stressors of cancer diagnosis and recent ICU stay. He has been following with psychiatry and has been on various antipsychotic medications which were unhelpful for him. Most recently, his Lithium was decreased after reporting increased sedation and he stopped Risperdal. However, since then superimposed with recent hospitalization his mood symptoms reoccurred. He was requesting a different medication other than Risperdal and Seroquel was offered to help with mood and anxiety symptoms which he agreed to. Otherwise is in agreement to follow-up with psychiatry on an outpatient basis.     Treatment Plan:     Planned Medication Changes:    All current active medications have been reviewed  Patient does not meet criteria for inpatient psychiatry at present.  Start Seroquel 50 mg QHS for bipolar depression and anxiety. If needed, this can be increased by 50 mg/day to a dose of 300 mg QHS as tolerated for symptom management  Start Atarax 50 mg BID PRN for breakthrough anxiety symptoms  Continue lithium, recommend repeat level for monitoring  Case discussed with primary care team  Thank you for allowing psychiatry to participate  in consult. Please reach out via [employer] Secure Chat for any further questions or concerns    Current Facility-Administered Medications   Medication Dose Route Frequency Provider Last Rate    acetaminophen  650 mg Oral Q6H PRN PIERRE Sotelo      chlorhexidine  15 mL Mouth/Throat Q12H [facility] PIERRE Toth      folic acid  1 mg Oral Daily Andreea A PIERRE Arroyo      folic acid 1 mg in sodium chloride 0.9 % 50 mL IVPB  1 mg Intravenous Daily AndreeaPIERRE Pradhan 1 mg (06/25/24 1130)    heparin (porcine)  5,000 Units Subcutaneous Q8H [facility] PIERRE Toth      hydrALAZINE  10 mg Intravenous Q4H PRN PIERRE Toth      insulin lispro  1-6 Units Subcutaneous 4x Daily (AC & HS) PIERRE Osorio      lidocaine  2 patch Topical Daily PIERRE Sotelo      lisinopril  20 mg Oral Daily PIERRE Osorio      lithium carbonate  300 mg Oral BID PIERRE Toth      ondansetron  4 mg Intravenous Q6H PRN PIERRE Toth      oxyCODONE  5 mg Oral Q4H PRN PIERRE Sotelo      polyethylene glycol  17 g Oral BID Andreea A PIERRE Arroyo      potassium-sodium phosphateS  2 tablet Oral Once Andreea A PIERRE Arroyo      prazosin  5 mg Oral HS PIERRE Toth      senna  2 tablet Oral HS Andreea A PIERRE Arroyo         Risks / Benefits of Treatment:    Risks, benefits, and possible side effects of medications explained to patient and patient verbalizes understanding and agreement for treatment.    Counseling / Coordination of Care:    Total floor / unit time spent today 60 minutes. Greater than 50% of total time was spent with the patient and / or family counseling and / or coordination of care. A description of the counseling / coordination of care:   Patient's presentation on admission and proposed treatment plan discussed with treatment team.  Diagnosis, medication changes and treatment plan reviewed with patient.    Odilia Rose PA-C 06/25/24

## 2024-06-25 NOTE — ASSESSMENT & PLAN NOTE
Follows with dr. Green outpatient  S/p small bowel resection 2022.  Currently on lanreotide  Would like to continue antineoplastic therapies

## 2024-06-25 NOTE — PLAN OF CARE
Problem: PAIN - ADULT  Goal: Verbalizes/displays adequate comfort level or baseline comfort level  Description: Interventions:  - Encourage patient to monitor pain and request assistance  - Assess pain using appropriate pain scale  - Administer analgesics based on type and severity of pain and evaluate response  - Implement non-pharmacological measures as appropriate and evaluate response  - Consider cultural and social influences on pain and pain management  - Notify physician/advanced practitioner if interventions unsuccessful or patient reports new pain  Outcome: Progressing     Problem: INFECTION - ADULT  Goal: Absence or prevention of progression during hospitalization  Description: INTERVENTIONS:  - Assess and monitor for signs and symptoms of infection  - Monitor lab/diagnostic results  - Monitor all insertion sites, i.e. indwelling lines, tubes, and drains  - Monitor endotracheal if appropriate and nasal secretions for changes in amount and color  - [place] appropriate cooling/warming therapies per order  - Administer medications as ordered  - Instruct and encourage patient and family to use good hand hygiene technique  - Identify and instruct in appropriate isolation precautions for identified infection/condition  Outcome: Progressing  Goal: Absence of fever/infection during neutropenic period  Description: INTERVENTIONS:  - Monitor WBC    Outcome: Progressing     Problem: SAFETY ADULT  Goal: Patient will remain free of falls  Description: INTERVENTIONS:  - Educate patient/family on patient safety including physical limitations  - Instruct patient to call for assistance with activity   - Consult OT/PT to assist with strengthening/mobility   - Keep Call bell within reach  - Keep bed low and locked with side rails adjusted as appropriate  - Keep care items and personal belongings within reach  - Initiate and maintain comfort rounds  - Make Fall Risk Sign visible to staff  - Offer Toileting every 2 Hours,  in advance of need  - Initiate/Maintain bed alarm  - Obtain necessary fall risk management equipment  - Apply yellow socks and bracelet for high fall risk patients  - Consider moving patient to room near nurses station  Outcome: Progressing  Goal: Maintain or return to baseline ADL function  Description: INTERVENTIONS:  -  Assess patient's ability to carry out ADLs; assess patient's baseline for ADL function and identify physical deficits which impact ability to perform ADLs (bathing, care of mouth/teeth, toileting, grooming, dressing, etc.)  - Assess/evaluate cause of self-care deficits   - Assess range of motion  - Assess patient's mobility; develop plan if impaired  - Assess patient's need for assistive devices and provide as appropriate  - Encourage maximum independence but intervene and supervise when necessary  - Involve family in performance of ADLs  - Assess for home care needs following discharge   - Consider OT consult to assist with ADL evaluation and planning for discharge  - Provide patient education as appropriate  Outcome: Progressing  Goal: Maintains/Returns to pre admission functional level  Description: INTERVENTIONS:  - Perform AM-PAC 6 Click Basic Mobility/ Daily Activity assessment daily.  - Set and communicate daily mobility goal to care team and patient/family/caregiver.   - Collaborate with rehabilitation services on mobility goals if consulted  - Perform Range of Motion 3 times a day.  - Reposition patient every 2 hours.  - Dangle patient 3 times a day  - Stand patient 3 times a day  - Ambulate patient 3 times a day  - Out of bed to chair 3 times a day   - Out of bed for meals 3 times a day  - Out of bed for toileting  - Record patient progress and toleration of activity level   Outcome: Progressing     Problem: Knowledge Deficit  Goal: Patient/family/caregiver demonstrates understanding of disease process, treatment plan, medications, and discharge instructions  Description: Complete  learning assessment and assess knowledge base.  Interventions:  - Provide teaching at level of understanding  - Provide teaching via preferred learning methods  Outcome: Progressing     Problem: Nutrition/Hydration-ADULT  Goal: Nutrient/Hydration intake appropriate for improving, restoring or maintaining nutritional needs  Description: Monitor and assess patient's nutrition/hydration status for malnutrition. Collaborate with interdisciplinary team and initiate plan and interventions as ordered.  Monitor patient's weight and dietary intake as ordered or per policy. Utilize nutrition screening tool and intervene as necessary. Determine patient's food preferences and provide high-protein, high-caloric foods as appropriate.     INTERVENTIONS:  - Monitor oral intake, urinary output, labs, and treatment plans  - Assess nutrition and hydration status and recommend course of action  - Evaluate amount of meals eaten  - Assist patient with eating if necessary   - Allow adequate time for meals  - Recommend/ encourage appropriate diets, oral nutritional supplements, and vitamin/mineral supplements  - Order, calculate, and assess calorie counts as needed  - Recommend, monitor, and adjust tube feedings and TPN/PPN based on assessed needs  - Assess need for intravenous fluids  - Provide specific nutrition/hydration education as appropriate  - Include patient/family/caregiver in decisions related to nutrition  Outcome: Progressing     Problem: NEUROSENSORY - ADULT  Goal: Achieves stable or improved neurological status  Description: INTERVENTIONS  - Monitor and report changes in neurological status  - Monitor vital signs such as temperature, blood pressure, glucose, and any other labs ordered   - Initiate measures to prevent increased intracranial pressure  - Monitor for seizure activity and implement precautions if appropriate      Outcome: Progressing  Goal: Remains free of injury related to seizures activity  Description:  INTERVENTIONS  - Maintain airway, patient safety  and administer oxygen as ordered  - Monitor patient for seizure activity, document and report duration and description of seizure to physician/advanced practitioner  - If seizure occurs,  ensure patient safety during seizure  - Reorient patient post seizure  - Seizure pads on all 4 side rails  - Instruct patient/family to notify RN of any seizure activity including if an aura is experienced  - Instruct patient/family to call for assistance with activity based on nursing assessment  - Administer anti-seizure medications if ordered    Outcome: Progressing  Goal: Achieves maximal functionality and self care  Description: INTERVENTIONS  - Monitor swallowing and airway patency with patient fatigue and changes in neurological status  - Encourage and assist patient to increase activity and self care.   - Encourage visually impaired, hearing impaired and aphasic patients to use assistive/communication devices  Outcome: Progressing

## 2024-06-25 NOTE — PROGRESS NOTES
[facility]  Progress Note  Name: Osei Trimble I  MRN: [id number]  Unit/Bed#: ICU 08-01 I Date of Admission: 6/21/2024   Date of Service: 6/25/2024 I Hospital Day: 4    Assessment & Plan   Back pain  Assessment & Plan  Mostly complains of epigastric discomfort and bloating d/t constipation. Last BM >1 week.   Agree with current bowel regimen. Would recommend Dulcolax supp. or enema if no BM in 24-48 hr    Continue current regimen  Tylenol 650mg q6h PRN mild pain  OxyIR 5mg q4h PRN mod-sev/BT pain      PRN use:Oxy 5mgx1  24hr OME: 7.5      Goals of care, counseling/discussion  Assessment & Plan  Decisional apparatus:  Patient is competent on exam today.  If competency is lost, patient's substitute decision maker would default to sister [place].  Advance Directive / Living Will / POLST / POA Forms: No    Goals  Level 1 code status.   Full code.   Disease focused care.   Would like all intervention to prolong life  Plans to return to sisters home upon discharge, they are moving his bed and setting up a room for him.   Will continue to follow with [facility] team outpatient.     Palliative care by specialist  Assessment & Plan    Follow up  Palliative Care will continue to follow for symptom management.  Please reach out to on-call provider via [employer] if questions or concerns arise.  Please do not hesitate to reach our on call provider through our clinic answering service at [phone] should you have acute symptom control concerns.      Explosive personality disorder (HCC)  Assessment & Plan  On lithium  Follows with outpatient psych  Psych consulted inpatient - see note      Other chest pain  Assessment & Plan  Presenting complaint on 06/21.  Troponin neg, EKG WNL  Symptoms resolved  ECHO completed today, EF 70%  Pain is epigastric and radiates to umbilicus.   Hx of abdominal wall hernia.     Neuroendocrine carcinoma of small bowel (HCC)  Assessment & Plan  Follows with dr. Green  outpatient  S/p small bowel resection 2022.  Currently on lanreotide  Would like to continue antineoplastic therapies    * Acute encephalopathy  Assessment & Plan  Resolved.  06/22 pt unresponsive. Extubated 06/23. Unclear etiology.   Had EEG performed 06/25 -pending results  Mgmt per primary team       Interval History  More awake today. Reports intermittent epigastric discomfort, abdominal bloating. Last BM >1 week. Passing flatus. Pain improves with tylenol and oxycodone.     Review of Systems  All other systems negative.     MEDICATIONS / ALLERGIES:  all current active meds have been reviewed and current meds:   Current Facility-Administered Medications   Medication Dose Route Frequency    acetaminophen (TYLENOL) tablet 650 mg  650 mg Oral Q6H PRN    chlorhexidine (PERIDEX) 0.12 % oral rinse 15 mL  15 mL Mouth/Throat Q12H BERENICE    folic acid (FOLVITE) tablet 1 mg  1 mg Oral Daily    folic acid 1 mg in sodium chloride 0.9 % 50 mL IVPB  1 mg Intravenous Daily    heparin (porcine) subcutaneous injection 5,000 Units  5,000 Units Subcutaneous Q8H BERENICE    hydrALAZINE (APRESOLINE) injection 10 mg  10 mg Intravenous Q4H PRN    hydrOXYzine HCL (ATARAX) tablet 50 mg  50 mg Oral BID PRN    insulin lispro (HumALOG/ADMELOG) 100 units/mL subcutaneous injection 1-6 Units  1-6 Units Subcutaneous 4x Daily (AC & HS)    lidocaine (LIDODERM) 5 % patch 2 patch  2 patch Topical Daily    lisinopril (ZESTRIL) tablet 20 mg  20 mg Oral Daily    lithium carbonate (LITHOBID) CR tablet 300 mg  300 mg Oral BID    ondansetron (ZOFRAN) injection 4 mg  4 mg Intravenous Q6H PRN    oxyCODONE (ROXICODONE) IR tablet 5 mg  5 mg Oral Q4H PRN    polyethylene glycol (MIRALAX) packet 17 g  17 g Oral BID    prazosin (MINIPRESS) capsule 5 mg  5 mg Oral HS    QUEtiapine (SEROquel) tablet 50 mg  50 mg Oral HS    senna (SENOKOT) tablet 17.2 mg  2 tablet Oral HS       Allergies   Allergen Reactions    Tomato - Food Allergy Anaphylaxis     raw    Poison Ivy  "Extract Hives    Poison Sumac Extract Hives       OBJECTIVE:  /79   Pulse (!) 106   Temp 99.4 °F (37.4 °C)   Resp (!) 31   Ht 6' 1\" (1.854 m)   Wt 101 kg (221 lb 12.5 oz)   SpO2 92%   BMI 29.26 kg/m²   Nursing notes reviewed.  Physical Exam  Vitals reviewed.   Constitutional:       General: He is not in acute distress.  HENT:      Head: Normocephalic and atraumatic.      Right Ear: External ear normal.      Left Ear: External ear normal.   Cardiovascular:      Rate and Rhythm: Normal rate.   Pulmonary:      Effort: Pulmonary effort is normal.   Abdominal:      General: There is distension.      Palpations: Abdomen is soft.      Tenderness: There is no guarding.   Skin:     General: Skin is warm and dry.   Neurological:      Mental Status: He is alert and oriented to person, place, and time.   Psychiatric:         Attention and Perception: Attention normal.         Mood and Affect: Affect is flat.         Speech: Speech is tangential.         Behavior: Behavior is cooperative.         Thought Content: Thought content normal.         Lab Results: I have personally reviewed pertinent labs.    HEMATOLOGY PROFILE:  Results from last 7 days   Lab Units 06/25/24 0444 06/24/24  0513 06/23/24  0430 06/22/24  1329 06/22/24  0431 06/21/24  1714   WBC Thousand/uL 9.67 10.26* 11.22* 9.91   < > 12.88*   HEMOGLOBIN g/dL 11.1* 12.0 12.0 13.0   < > 16.1   HEMATOCRIT % 31.9* 34.9* 33.8* 37.3   < > 46.3   PLATELETS Thousands/uL 128* 113* 167 147*   < > 299   SEGS PCT %  --   --   --  75  --  68   MONO PCT %  --   --   --  4  --  8   EOS PCT %  --   --   --  1  --  2    < > = values in this interval not displayed.       CHEMISTRY PROFILE:  Results from last 7 days   Lab Units 06/25/24 0444 06/24/24 0513 06/23/24  0430   SODIUM mmol/L 129* 132* 137   POTASSIUM mmol/L 3.5 4.5 3.9   CHLORIDE mmol/L 95* 98 105   CO2 mmol/L 27 25 24   BUN mg/dL 13 10 13   CREATININE mg/dL 0.89 0.79 0.91   ALBUMIN g/dL 3.5 3.6 3.5   CALCIUM " mg/dL 8.5 8.6 8.6   ALK PHOS U/L 103 98 106*   ALT U/L 38 41 49   AST U/L 28 32 37       Albumin:  0   Lab Value Date/Time    ALB 3.5 06/25/2024 0444     Imaging Studies: I have personally reviewed pertinent reports.  XR shoulder 2+ vw right    Result Date: 6/24/2024  Narrative: XR SHOULDER 2+ VW RIGHT INDICATION: right shoulder pain-r/o fx. COMPARISON: None FINDINGS: No acute fracture or dislocation. No significant degenerative changes. No lytic or blastic osseous lesion. Unremarkable soft tissues.     Impression: No acute osseous abnormality. Workstation performed: [id number]     Echo complete w/ contrast if indicated    Result Date: 6/24/2024  Narrative:   Left Ventricle: Left ventricular cavity size is normal. Wall thickness is mildly increased. The left ventricular ejection fraction is 70%. Systolic function is vigorous. Wall motion is normal. Diastolic function is normal.   Aorta: The aortic root is mildly dilated. The ascending aorta is normal in size. The aortic root is 4.00 cm. The ascending aorta is 3.7 cm.     MRI brain w wo contrast    Result Date: 6/23/2024  Narrative: MRI BRAIN WITH AND WITHOUT CONTRAST INDICATION: Altered mental status.. COMPARISON: CT of the head from the day before. TECHNIQUE: Multiplanar, multisequence imaging of the brain was performed before and after gadolinium administration. IV Contrast:  10 mL of Gadobutrol injection (SINGLE-DOSE) IMAGE QUALITY:   Diagnostic. FINDINGS: BRAIN PARENCHYMA:  There is no discrete mass, mass effect or midline shift. There is no intracranial hemorrhage.  Normal posterior fossa.  Diffusion imaging is unremarkable. There is mild chronic microvascular ischemic change. Postcontrast imaging of the brain demonstrates no abnormal enhancement. VENTRICLES:  Normal for the patient's age. SELLA AND PITUITARY GLAND:  Normal. ORBITS:  Normal. PARANASAL SINUSES: There is mucosal thickening of the right sphenoid sinus VASCULATURE:  Evaluation of the major  intracranial vasculature demonstrates appropriate flow voids. CALVARIUM AND SKULL BASE: There is a right mastoid air cell effusion. EXTRACRANIAL SOFT TISSUES:  Normal.     Impression: No mass effect, acute intracranial hemorrhage or evidence of recent infarction. No abnormal parenchymal or leptomeningeal enhancement identified. Mild nonspecific white matter FLAIR signal hyperintensity is likely on the basis of chronic microvascular ischemic change. Right mastoid air cell effusion. Workstation performed: [id number]     XR chest portable ICU    Result Date: 6/23/2024  Narrative: XR CHEST PORTABLE ICU INDICATION: aspiration. COMPARISON: Chest CT 6/22/2024, CXR 6/21/2024. FINDINGS: ET tube 4 cm above the bianka. NG tube in stomach. Right jugular catheter at cavoatrial junction. Redemonstration of atelectasis in the right upper and left lower lobes. No pneumothorax or pleural effusion. Normal cardiomediastinal silhouette. Bones are unremarkable for age. Normal upper abdomen.     Impression: Redemonstration of right upper and left lower lobe atelectasis. Superimposed aspiration not excluded. Workstation performed: [id number]     CT chest abdomen pelvis wo contrast    Result Date: 6/22/2024  Narrative: CT CHEST, ABDOMEN, AND PELVIS WITHOUT IV CONTRAST INDICATION:   ams. COMPARISON: CT chest 6/21/2024. CT abdomen/pelvis 6/1/2024. TECHNIQUE: CT examination of the chest, abdomen, and pelvis was performed without intravenous or enteric contrast, limiting evaluation for certain processes. Axial, sagittal, and coronal 2D reformatted images were created from the source data and submitted for interpretation. Radiation dose length product (DLP) for this visit:  1799 mGy-cm .  This examination, like all CT scans performed in the [facility], was performed utilizing techniques to minimize radiation dose exposure, including the use of iterative reconstruction and automated exposure control. IV Contrast: None. Enteric  Contrast: None. FINDINGS: CHEST: LARGE AIRWAYS: Endotracheal tube with tip just entering the takeoff of the right mainstem bronchus. LUNGS: Complete atelectasis of several basal segments of the left lower lobe. Atelectasis in the posterior basal right upper lobe and scattered in the dependent right lower lobe. No definite consolidation. Stable 9 mm nodule in the middle lobe (series 2/48), not hypermetabolic on prior PET. PLEURA: Trace left pleural effusion. No pneumothorax. HEART: Normal cardiac size and morphology. No coronary artery calcification. No pericardial effusion or thickening. VESSELS: Normal caliber thoracic aorta with no detectable atherosclerotic plaque. Normal caliber main pulmonary artery. MEDIASTINUM AND AREN: Moderate diffuse esophageal wall thickening.. No lymphadenopathy. CHEST WALL AND LOWER NECK:   Right IJ central venous catheter with tip at the cavoatrial junction. ABDOMEN: LIVER: Hepatomegaly measuring 19.5 cm in maximum craniocaudal dimension. Severe diffuse steatosis. Known hepatic metastasis not conspicuous on noncontrast exam. BILIARY: No intrahepatic biliary ductal dilatation. Normal caliber common bile duct. GALLBLADDER: No calcified gallstones. Normal wall thickness. No pericholecystic inflammatory changes. SPLEEN: Within normal limits. No gross mass on noncontrast study. Normal spleen size. PANCREAS: Diffusely fatty replaced but otherwise grossly within normal limits. ADRENAL GLANDS: Within normal limits. KIDNEYS/URETERS: Normal kidney size and position. No suspicious lesions within the limitations of a noncontrast exam. Punctate punctate bilateral nonobstructing calculi. No hydronephrosis. STOMACH AND BOWEL: The stomach is within normal limits, moderately distended with air and debris with NG tube in place. Normal caliber small bowel. The colon is diffusely distended with gas, with mostly decompressed rectum, no evidence of obstruction. No  evidence of active small or large bowel  inflammatory process. APPENDIX: Normal air-filled appendix. ABDOMINOPELVIC CAVITY: No change in a 4.2 cm centrally calcified mass in the mid mesentery with surrounding lymph nodes measuring up to 9 mm in short axis in keeping with known neuroendocrine tumor. No ascites. No intraperitoneal free air. No retroperitoneal hematoma. VESSELS: Normal caliber abdominal aorta with mild atherosclerotic plaque. PELVIS REPRODUCTIVE ORGANS: Normal prostate. Symmetric seminal vesicles. URINARY BLADDER: Decompressed with Schaefer catheter in place. ABDOMINAL WALL/INGUINAL REGIONS: Prior ventral hernia mesh repair. BONES: Vertebral body height is maintained. No acute fracture or destructive osseous lesion. Bone island in the L1 sacral segment.     Impression: 1.  Partial atelectasis of the left lower lobe, scattered atelectasis in the right lung. 2.  Endotracheal tube with tip just entering the takeoff of the right mainstem bronchus. 3.  No acute findings in the abdomen or pelvis. 4.  Air-distended colon without findings to suggest obstruction. I personally discussed this study with ALHAJI NESS on 6/22/2024 3:29 PM. Workstation performed: [id number]     CT head wo contrast    Result Date: 6/22/2024  Narrative: CT BRAIN - WITHOUT CONTRAST INDICATION:   ams. COMPARISON: Head CT and CT angiography of the head and neck from June 1, 2024, nuclear medicine PET/CT scan from May 10, 2024. TECHNIQUE:  CT examination of the brain was performed.  Multiplanar 2D reformatted images were created from the source data. Radiation dose length product (DLP) for this visit:  937 mGy-cm .  This examination, like all CT scans performed in the [facility], was performed utilizing techniques to minimize radiation dose exposure, including the use of iterative reconstruction and automated exposure control. IMAGE QUALITY: There is streak/spray artifact from the left parieto-occipital scalp transducer. Skull base region also demonstrates motion  artifact. FINDINGS: The patient is intubated. PARENCHYMA:  No intracranial mass, mass effect or midline shift. No CT signs of acute infarction.  No acute parenchymal hemorrhage. VENTRICLES AND EXTRA-AXIAL SPACES:  Normal for the patient's age. VISUALIZED ORBITS: Normal visualized orbits. PARANASAL SINUSES: Normal visualized paranasal sinuses. CALVARIUM AND EXTRACRANIAL SOFT TISSUES:  Normal.     Impression: Limited by artifact as discussed above. No gross abnormality. No intracranial hemorrhage, edema or mass effect. Workstation performed: [id number]      kidney and bladder    Result Date: 6/22/2024  Narrative: RENAL ULTRASOUND INDICATION: Acute kidney injury with history of kidney stones. COMPARISON: CT chest from 6/21/2024; CT abdomen from 6/1/2024 TECHNIQUE: Ultrasound of the retroperitoneum was performed with a curvilinear transducer utilizing volumetric sweeps and still imaging techniques. FINDINGS: KIDNEYS: Symmetric and normal size. Right kidney: 13.4 x 5.1 x 5.6 cm. Volume 202.8 mL Left kidney: 11.7 x 5.9 x 5.1 cm. Volume 186.7 mL Right kidney Normal echogenicity and contour. No mass is identified. No hydronephrosis. No shadowing calculi. No perinephric fluid collections. Left kidney Normal echogenicity and contour. No mass is identified. No hydronephrosis. No shadowing calculi. No perinephric fluid collections. URETERS: Nonvisualized. BLADDER: Bladder is not well distended. Bladder wall is not well evaluated given only minimal distention. Bilateral ureteral jets detected. The visualized liver parenchyma is echogenic suspicious for fatty infiltration and correlates to the chest CT     Impression: No hydronephrosis. The bladder is not well evaluated due to underdistention. Workstation performed: [id number]      VAS VENOUS DUPLEX - LOWER LIMB BILATERAL    Result Date: 6/22/2024  Narrative:  [facility] REPORT CLINICAL: Indications: Patient presents with SOB/chest pain, history of cancer. Operative  History: No cardiovascular surgeries Risk Factors The patient has history of HTN and previous smoking (quit >10yrs ago).   CONCLUSION:  Impression: RIGHT LOWER LIMB: No evidence of acute or chronic deep vein thrombosis. No evidence of superficial thrombophlebitis noted. Doppler evaluation shows a normal response to augmentation maneuvers. Popliteal, posterior tibial and anterior tibial arterial Doppler waveforms are triphasic.  LEFT LOWER LIMB: No evidence of acute or chronic deep vein thrombosis. No evidence of superficial thrombophlebitis noted. Doppler evaluation shows a normal response to augmentation maneuvers. Popliteal, posterior tibial and anterior tibial arterial Doppler waveforms are triphasic.  Technical findings were given to Dr. Jerry at time of exam.  SIGNATURE: Electronically Signed by: FROILAN ESPARZA MD on 2024-06-22 10:02:19 AM    XR chest 1 view portable    Result Date: 6/21/2024  Narrative: XR CHEST PORTABLE INDICATION: chest pain. COMPARISON: Chest radiograph 6/1/2024. FINDINGS: Clear lungs. No pneumothorax or pleural effusion. Normal cardiomediastinal silhouette. Bones are unremarkable for age. Normal upper abdomen.     Impression: No acute cardiopulmonary disease. Workstation performed: [id number]     CT chest without contrast    Result Date: 6/21/2024  Narrative: CT CHEST WITHOUT IV CONTRAST INDICATION: Chest pain. COMPARISON: Chest CT 9/7/2023. TECHNIQUE: CT examination of the chest was performed without intravenous contrast. Multiplanar 2D reformatted images were created from the source data. This examination, like all CT scans performed in the [facility], was performed utilizing techniques to minimize radiation dose exposure, including the use of iterative reconstruction and automated exposure control. Radiation dose length product (DLP) for this visit: 454.3 mGy-cm FINDINGS: LUNGS: Lungs are clear. Unchanged 9 mm right middle lobe nodule (series 3, image 107). No tracheal  or endobronchial lesion. PLEURA: No pleural effusion. No pneumothorax. HEART/GREAT VESSELS: Heart is unremarkable for patient's age. No thoracic aortic aneurysm. MEDIASTINUM AND AREN: Unremarkable. CHEST WALL AND LOWER NECK: Unremarkable. VISUALIZED STRUCTURES IN THE UPPER ABDOMEN: Hepatic steatosis. Punctate nonobstructing left interpolar calculus. Fatty atrophy of the pancreas. Ventral hernia repair. OSSEOUS STRUCTURES: No acute fracture or destructive osseous lesion.     Impression: No acute findings in the chest. Unchanged 9 mm right middle lobe pulmonary nodule, not FDG avid on PET/CT 5/10/2024. Workstation performed: [id number]     XR chest 1 view portable    Result Date: 6/1/2024  Narrative: XR CHEST PORTABLE INDICATION: abd pain. COMPARISON: CTA neck and abdomen CT 6/1/2024, CXR 3/21/2023., PET/CT 5/10/2024, FINDINGS: Clear lungs. No pneumothorax or pleural effusion. Normal cardiomediastinal silhouette. Bones are unremarkable for age. Normal upper abdomen.     Impression: No acute cardiopulmonary disease. Workstation performed: [id number]     CTA head and neck with and without contrast    Result Date: 6/1/2024  Narrative: CTA NECK AND BRAIN WITH AND WITHOUT CONTRAST INDICATION: dizziness and blurred vision COMPARISON:   None. TECHNIQUE:  Routine CT imaging of the Brain without contrast not performed due to technical error.  Post contrast imaging was performed after administration of iodinated contrast through the neck and brain. Post contrast axial 0.625 mm images timed to opacify the arterial system. 3D rendering was performed on an independent workstation.   MIP reconstructions performed. Coronal reconstructions were performed of the noncontrast portion of the brain. Radiation dose length product (DLP) for this visit:  615 mGy-cm .  This examination, like all CT scans performed in the [facility], was performed utilizing techniques to minimize radiation dose exposure, including the use of  iterative reconstruction and automated exposure control. IV Contrast:  100 mL of iohexol (OMNIPAQUE) IMAGE QUALITY:   Diagnostic FINDINGS: CERVICAL VASCULATURE somewhat limited secondary artifact from adjacent contrast bolus. Findings below within limitation. AORTIC ARCH AND GREAT VESSELS:  Normal aortic arch and great vessel origins. Normal visualized subclavian vessels. RIGHT VERTEBRAL ARTERY CERVICAL SEGMENT:  Normal origin. The vessel is normal in caliber throughout the neck. LEFT VERTEBRAL ARTERY CERVICAL SEGMENT:  Normal origin. The vessel is normal in caliber throughout the neck. RIGHT EXTRACRANIAL CAROTID SEGMENT:  Normal caliber common carotid artery.  Normal bifurcation and cervical internal carotid artery.  No stenosis or dissection. LEFT EXTRACRANIAL CAROTID SEGMENT:  Normal caliber common carotid artery.  Normal bifurcation and cervical internal carotid artery.  No stenosis or dissection. NASCET criteria was used to determine the degree of internal carotid artery diameter stenosis. INTRACRANIAL VASCULATURE INTERNAL CAROTID ARTERIES:  Normal enhancement of the intracranial portions of the internal carotid arteries.  Normal ophthalmic artery origins.  Normal ICA terminus. ANTERIOR CIRCULATION:  Symmetric A1 segments and anterior cerebral arteries with normal enhancement.  Normal anterior communicating artery. MIDDLE CEREBRAL ARTERY CIRCULATION:  M1 segment and middle cerebral artery branches demonstrate normal enhancement bilaterally. DISTAL VERTEBRAL ARTERIES:  Normal distal vertebral arteries.  Posterior inferior cerebellar artery origins are normal. Normal vertebral basilar junction. BASILAR ARTERY:  Basilar artery is normal in caliber.  Normal superior cerebellar arteries. POSTERIOR CEREBRAL ARTERIES: Both posterior cerebral arteries arises from the basilar tip.  Both arteries demonstrate normal enhancement.   Hypoplastic posterior communicating arteries. VENOUS STRUCTURES: Patent. NON VASCULAR  ANATOMY BONY STRUCTURES:  No acute osseous abnormality. SOFT TISSUES OF THE NECK:  Unremarkable. THORACIC INLET:  Normal.     Impression: Please note noncontrast CT head exam was not performed due to technical error. This portion of the exam can be repeated as clinically warranted. No evidence of hemodynamically significant stenosis or large vessel occlusive disease within the major vessels of the [place]. No aneurysm. No significant stenosis of the cervical vertebral or carotid arteries. Workstation performed: [id number]     CT abdomen pelvis with contrast    Result Date: 6/1/2024  Narrative: CT ABDOMEN AND PELVIS WITH IV CONTRAST INDICATION: epigastric abd pain hx of cancer. COMPARISON: CT of the chest abdomen and pelvis 9/7/2023. PET/CT 5/10/2024. TECHNIQUE: CT examination of the abdomen and pelvis was performed. Multiplanar 2D reformatted images were created from the source data. This examination, like all CT scans performed in the [facility], was performed utilizing techniques to minimize radiation dose exposure, including the use of iterative reconstruction and automated exposure control. Radiation dose length product (DLP) for this visit: 1095 mGy-cm IV Contrast: 100 mL of iohexol (OMNIPAQUE) Enteric Contrast: Not administered. FINDINGS: ABDOMEN LOWER CHEST: No acute abnormality in the visualized lower chest. LIVER/BILIARY TREE: Hypoattenuating lesion noted on 9/7/2023 CT, not FDG avid on prior PET CTs, suboptimally visualized due to streak artifacts and likely faintly seen on series 3, image 59.. GALLBLADDER: No calcified gallstones. No pericholecystic inflammatory change. SPLEEN: Unremarkable. PANCREAS: Unremarkable. ADRENAL GLANDS: Unremarkable. KIDNEYS/URETERS: Unremarkable. No hydronephrosis. STOMACH AND BOWEL: No disproportionate dilation of the small or large bowel. Left lower quadrant anastomosis. APPENDIX: No findings to suggest appendicitis. ABDOMINOPELVIC CAVITY: No  ascites. No pneumoperitoneum. No lymphadenopathy. Unchanged partially calcified mesenteric mass measures 2.9 x 5.6 cm. Unchanged adjacent mesenteric stranding and lymphadenopathy with lymph nodes measuring up to 9 mm short axis. VESSELS: Unremarkable for patient's age. PELVIS REPRODUCTIVE ORGANS: Unremarkable for patient's age. URINARY BLADDER: Unremarkable. ABDOMINAL WALL/INGUINAL REGIONS: Soft tissue nodularity in the bilateral gluteal regions, unchanged, likely injection sites. Ventral herniorrhaphy. BONES: No acute fracture or suspicious osseous lesion. Unchanged sclerotic lesions in the right iliac wing and right hemisacrum, not previously FDG avid.     Impression: No acute findings in the abdomen or pelvis. Unchanged calcified mesenteric mass with adjacent lymphadenopathy in keeping with known neuroendocrine tumor. Workstation performed: [id number]     XR WRIST 3+ VW RIGHT    Result Date: 5/28/2024  Narrative: History: Bilateral carpal tunnel syndrome Study: XR WRIST 3+ VW BILATERAL Comparison: None    Impression: Findings/impression: Bilateral wrist joint spaces are normal with no erosions. No acute fracture or malalignment. Healed fracture deformity of the right fifth metacarpal neck with apex posterior angulation noted. Workstation:[id number]     EKG, Pathology, and Other Studies: I have personally reviewed pertinent reports.    Counseling / Coordination of Care:  Total floor / unit time spent today 35 minutes. Greater than 50% of total time was spent with the patient and / or family counseling and / or coordination of care. A description of the counseling / coordination of care: symptom assessment and management, medication review, psychosocial support, chart review, imaging review, lab review, goals of care, supportive listening, and anticipatory guidance. Discussion with JAE RUEDA, WILDA.    José Luis Soni PA-C  [facility]  [phone]    Portions of this document may have  "been created using dictation software and as such some \"sound alike\" terms may have been generated by the system. Do not hesitate to contact me with any questions or clarifications.     "

## 2024-06-25 NOTE — UTILIZATION REVIEW
Ofelia Orellana, RN   Registered Nurse  Specialty: Utilization Review     Utilization Review     Addendum     Date of Service: 6/22/2024  1:52 PM     Expand All Collapse All    Initial Clinical Review     Admission: Date/Time/Statement:   Admission Orders (From admission, onward)          Ordered         06/21/24 1945   Inpatient Admission  Once                                     Orders Placed This Encounter   Procedures    Inpatient Admission       Standing Status:   Standing       Number of Occurrences:   1       Order Specific Question:   Level of Care       Answer:   Med Surg [16]       Order Specific Question:   Estimated length of stay       Answer:   More than 2 Midnights       Order Specific Question:   Certification       Answer:   I certify that inpatient services are medically necessary for this patient for a duration of greater than two midnights. See H&P and MD Progress Notes for additional information about the patient's course of treatment.      ED Arrival Information         Expected   -    Arrival   6/21/2024 16:54    Acuity   Urgent                 Means of arrival   Walk-In    Escorted by   Family Member    Service   Critical Care/ICU    Admission type   Emergency                 Arrival complaint   weakness fatigue                     Chief Complaint   Patient presents with    Chest Pain       According to the patient, he has had chest pain that started about 1 hour with left sided neck stiffness.         Initial Presentation: 49 y.o. male to ED presents for Chest pain x1 day. Pt c/o right-sided chest pain that began while he was sitting at the [employer]und[employer]. Pt states that this was accompanied with radiation to the left side of his neck he describes it as stiffness and has now become epigastric pain. No known cardiac history but has significant cardiac risk factors to include obesity, hypertension and family history. In ED, noted with MEHDI, on lisinopril at home. PMH HTN, Neuroendocrine  tumor, Heme-Onc following. Recent PET scan indicates metastatic disease. Chronic Lithium use for Intermittent explosive disorder and bipolar disorder.   Admit to Inpatient Dx; MEHDI, Other Chest pain. Explosive personality disorder. Hold home lisinopril. Urine protein creat ration. IVFs. Renal US. Nephrology consult. Repeat labs in am. Tele monitoring. Troponins have been flat at 14, 10 and 8.  Given Maalox prn, suspect CP is GI related. Lithium level. Order Lithobid 300 mg po daily. No longer takes Risperdal.      Anticipated Length of Stay/Certification Statement:  Patient will be admitted on an Inpatient basis with an anticipated length of stay of  > 2 midnights.   Justification for Hospital Stay: Acute kidney injury requiring IV fluid resuscitation and further nephrology evaluation     Date: 6/22   Day 2:   Nephrology cons; MEHDI, Chronic Hyponatremia. Creat 2.28 yesterday, improved to 1.15. S/p 1L NSS bolus 6/21 and Plasma-Lyte 125 ml/hr. Renal US.   Po intake improved, decrease IVFs to 75 ml/hr. Recheck UA and BMP in am. Lithium level <0.10 mmol/L, decreased, 6/21.  Patient states lithium was recently decreased from 3 times daily to twice daily.  States he has been experiencing episodes of increased fatigue and lethargy, most recent this week, where he sleeps 20-22 hours/day.      Progress notes; Hyperglycemia. Glucose in urine and does appear to have New diabetes.  Adding A1c to available labs.  MEHDI resolved with IVFs. Continue to hold Lisinopril. Continue other home meds.   Still having a lot of left-sided neck pains      S/p Rapid Response @ 1328 pm; Intubated - ICU level of care  Hypotensive Given IVFs with minimal improvement. Levo started, wean for MAP goal > 65.  Lactic 3.7, fluid resuscitate and trend. CT CAP pending. Monitor BP closely  Unable to protect airway. Aspiration event during Intubation/ Mechanical vent for goal sat >90%.     This AM RRT was called when patient became unresponsive while using  the bathroom. When he was returned to the bed he remained unresponsive, was noted to be hypotensive with SBP in the 80s. Given 1L IV fluid with minimal response. Noted to have slight roving eye movements. Intubated for airway protection. Levophed started for persistent hypotension. Transferred to ICU for further monitoring and management.                    ED Triage Vitals   Temperature Pulse Respirations Blood Pressure SpO2 Pain Score   06/21/24 1706 06/21/24 1701 06/21/24 1701 06/21/24 1701 06/21/24 1701 06/21/24 1701   97.5 °F (36.4 °C) 97 18 114/82 97 % 3      Weight (last 2 days)         Date/Time Weight     06/21/24 2034 104 (228.18)     06/21/24 1701 106 (234)                Vital Signs (last 3 days)         Date/Time Temp Pulse Resp BP MAP (mmHg) SpO2 FiO2 (%) O2 Device Patient Position - Orthostatic VS Pain     06/22/24 1328 -- -- -- -- -- -- 100 Ventilator -- --     06/22/24 12:53:11 -- 98 -- -- -- 99 % -- -- -- --     06/22/24 12:51:11 -- 91 -- -- -- 93 % -- -- -- --     06/22/24 12:48:11 -- 47 -- -- -- 55 % -- -- -- --     06/22/24 12:45:47 -- -- -- 93/62 -- -- -- -- -- --     06/22/24 12:45:11 -- 61 -- -- -- 64 % -- -- -- --     06/22/24 12:42:11 -- 89 -- -- -- 64 % -- -- -- --     06/22/24 12:38:11 -- 98 -- -- -- 58 % -- -- -- --     06/22/24 12:36:11 -- 80 -- -- -- 99 % -- -- -- --     06/22/24 12:33:58 -- 66 -- 98/57 71 99 % -- -- -- --     06/22/24 12:33:11 -- 65 -- -- -- 100 % -- -- -- --     06/22/24 12:31:30 -- -- -- 96/55 -- -- -- -- -- --     06/22/24 12:30:11 -- 64 -- -- -- 99 % -- -- -- --     06/22/24 12:28:13 -- 65 -- 89/52 64 94 % -- -- -- --     06/22/24 12:27:11 -- 60 -- -- -- 96 % -- -- -- --     06/22/24 12:26:08 -- 58 -- 80/50 60 97 % -- -- -- --     06/22/24 12:25:53 -- -- -- 80/50 -- -- -- -- -- --     06/22/24 12:24:11 -- 71 -- -- -- 98 % -- -- -- --     06/22/24 12:21:11 -- 63 -- -- -- 96 % -- -- -- --     06/22/24 12:18:11 -- 63 -- -- -- 97 % -- -- -- --     06/22/24 12:15:11 --  70 -- -- -- 97 % -- -- -- --     06/22/24 11:23:16 98.4 °F (36.9 °C) 90 18 130/95 107 96 % -- -- -- --     06/22/24 0953 -- -- -- -- -- -- -- -- -- No Pain     06/22/24 0700 97.9 °F (36.6 °C) -- 18 -- -- -- -- -- -- --     06/22/24 06:59:37 -- 72 -- 127/94 105 97 % -- -- -- --     06/21/24 2300 -- -- -- -- -- -- -- -- -- No Pain     06/21/24 22:16:55 98 °F (36.7 °C) 87 18 145/97 113 97 % -- None (Room air) Lying --     06/21/24 2034 98.1 °F (36.7 °C) 76 18 123/87 97 97 % -- None (Room air) Lying --     06/21/24 1950 -- 77 18 127/76 -- 98 % -- -- -- --     06/21/24 1900 -- 87 18 112/80 -- 97 % -- -- -- --     06/21/24 1800 -- 94 18 125/78 -- 96 % -- -- -- --     06/21/24 1706 97.5 °F (36.4 °C) -- -- -- -- -- -- -- -- --     06/21/24 1701 -- 97 18 114/82 95 97 % -- None (Room air) Lying 3                   Pertinent Labs/Diagnostic Test Results:   Radiology:  CT head wo contrast   Final Interpretation by Pallav N Shah, MD (06/22 1453)       Limited by artifact as discussed above.       No gross abnormality.       No intracranial hemorrhage, edema or mass effect.                       Workstation performed: [id number]           US kidney and bladder   Final Interpretation by Demetrius Lawrence MD (06/22 1053)       No hydronephrosis.       The bladder is not well evaluated due to underdistention.               Workstation performed: [id number]            VAS VENOUS DUPLEX - LOWER LIMB BILATERAL   Final Interpretation by Dieudonne Khanna MD (06/22 1002)       CT chest without contrast   Final Interpretation by Parker Torres MD (06/21 3124)       No acute findings in the chest.       Unchanged 9 mm right middle lobe pulmonary nodule, not FDG avid on PET/CT 5/10/2024.                   Workstation performed: [id number]           XR chest 1 view portable   ED Interpretation by Tawanda Jerry DO (06/21 1831)   There is no active disease in the chest       Final Interpretation by Parker Torres MD  (06/21 1847)       No acute cardiopulmonary disease.               Workstation performed: [id number]           CT chest abdomen pelvis wo contrast    (Results Pending)      Cardiology:  ECG 12 lead   Final Result by Dieudonne Shahid DO (06/22 0813)   Normal sinus rhythm   Nonspecific T wave abnormality   Abnormal ECG   When compared with ECG of 21-JUN-2024 17:05, (unconfirmed)   No significant change was found   Confirmed by Dieudonne Shahid ([id number]) on 6/22/2024 8:13:42 AM       ECG 12 lead   Final Result by Dieudonne Shahid DO (06/22 0813)   Normal sinus rhythm   Possible Left atrial enlargement   Left axis deviation   Pulmonary disease pattern   Nonspecific T wave abnormality   Abnormal ECG   When compared with ECG of 01-JUN-2024 19:15,   No significant change was found   Confirmed by Dieudonne Shahid ([id number]) on 6/22/2024 8:13:28 AM          GI:  No orders to display                    Results from last 7 days   Lab Units 06/22/24  1329 06/22/24  0431 06/21/24  1714   WBC Thousand/uL 9.91 9.01 12.88*   HEMOGLOBIN g/dL 13.0 13.8 16.1   HEMATOCRIT % 37.3 39.0 46.3   PLATELETS Thousands/uL 147* 192 299   TOTAL NEUT ABS Thousands/µL 7.39  --  8.75*                 Results from last 7 days   Lab Units 06/22/24  1248 06/22/24  0431 06/21/24  1714   SODIUM mmol/L 132* 134* 134*   POTASSIUM mmol/L 3.6 3.7 4.3   CHLORIDE mmol/L 100 102 99   CO2 mmol/L 21 23 22   ANION GAP mmol/L 11 9 13   BUN mg/dL 17 18 21   CREATININE mg/dL 1.23 1.15 2.38*   EGFR ml/min/1.73sq m 68 73 30   CALCIUM mg/dL 9.0 9.1 10.5*   MAGNESIUM mg/dL 2.1  --   --    PHOSPHORUS mg/dL 1.6*  --   --             Results from last 7 days   Lab Units 06/22/24  1248 06/21/24  1714   AST U/L 32 38   ALT U/L 42 59*   ALK PHOS U/L 115* 152*   TOTAL PROTEIN g/dL 6.8 9.2*   ALBUMIN g/dL 4.0 5.3*   TOTAL BILIRUBIN mg/dL 1.79* 1.79*   AMMONIA umol/L 51  --              Results from last 7 days   Lab Units 06/22/24  1452 06/22/24  1446 06/22/24  1225   POC GLUCOSE mg/dl  416* 412* 253*             Results from last 7 days   Lab Units 06/22/24  1248 06/22/24  0431 06/21/24  1714   GLUCOSE RANDOM mg/dL 333* 211* 242*                 Results from last 7 days   Lab Units 06/22/24  1329 06/21/24  2128 06/21/24  1902 06/21/24  1714   HS TNI 0HR ng/L 3  --   --  14   HS TNI 2HR ng/L  --   --  10  --    HSTNI D2 ng/L  --   --  -4  --    HS TNI 4HR ng/L  --  8  --   --    HSTNI D4 ng/L  --  -6  --   --                 Results from last 7 days   Lab Units 06/22/24  1248 06/21/24  1734   PROTIME seconds 14.7* 14.5   INR   1.14 1.12   PTT seconds 23 28               Results from last 7 days   Lab Units 06/22/24  1248   PROCALCITONIN ng/ml 0.12               Results from last 7 days   Lab Units 06/22/24  1102 06/21/24  2128   CLARITY UA   Clear  --    COLOR UA   Yellow  --    SPEC GRAV UA   1.015  --    PH UA   6.0  --    GLUCOSE UA mg/dl 3+*  --    KETONES UA mg/dl Negative  --    BLOOD UA   Negative  --    PROTEIN UA mg/dl Negative  --    NITRITE UA   Negative  --    BILIRUBIN UA   Negative  --    UROBILINOGEN UA E.U./dl 0.2  --    LEUKOCYTES UA   Negative  --    CREATININE UR mg/dL  --  186.7   PROTEIN UR mg/dL  --  51   PROT/CREAT RATIO UR    --  0.27*         ED Treatment-Medication Administration from 06/21/2024 1654 to 06/21/2024 2017           Date/Time Order Dose Route Action       06/21/2024 1858 sodium chloride 0.9 % bolus 1,000 mL 1,000 mL Intravenous New Bag       06/21/2024 1950 multi-electrolyte (PLASMALYTE-A/ISOLYTE-S PH 7.4) IV solution 125 mL/hr Intravenous New Bag                Medical History        Past Medical History:   Diagnosis Date    Allergic      Bipolar disorder in partial remission (HCC)      Erectile dysfunction       unspecified erectile dysfunction type    Hypertension      Kidney stone           Present on Admission:   MEHDI (acute kidney injury) (HCC)   Essential hypertension   Other chest pain   Explosive personality disorder (HCC)   Neuroendocrine carcinoma of  small bowel (HCC)        Admitting Diagnosis: Weakness [R53.1]  MEHDI (acute kidney injury) (HCC) [N17.9]  Age/Sex: 49 y.o. male     Admission Orders:  Scheduled Medications:    Scheduled Medications ONLY (does not pull in infusions nor PRN medications order   chlorhexidine, 15 mL, Mouth/Throat, Q12H BERENICE  fentaNYL, 50 mcg, Intravenous, Once  heparin (porcine), 5,000 Units, Subcutaneous, Q8H BERENICE  insulin lispro, 1-6 Units, Subcutaneous, Q6H BERENICE  lidocaine, 1 patch, Topical, Daily  lithium carbonate, 300 mg, Oral, BID  potassium phosphate, 30 mmol, Intravenous, Once         Continuous IV Infusions:    Infusions Meds - Displays dose, route, & frequency only   dexmedetomidine, 0.1-1.2 mcg/kg/hr, Intravenous, Titrated  multi-electrolyte, 75 mL/hr, Intravenous, Continuous  norepinephrine, 1-30 mcg/min, Intravenous, Titrated  propofol, 5-50 mcg/kg/min, Intravenous, Titrated         PRN Meds:    PRN Medications - displays dose, route, and frequency   ondansetron, 4 mg, Intravenous, Q6H PRN            IP CONSULT TO NEPHROLOGY  IP CONSULT TO CASE MANAGEMENT     Network Utilization Review Department  ATTENTION: Please call with any questions or concerns to [phone] and carefully listen to the prompts so that you are directed to the right person. All voicemails are confidential.   For Discharge needs, contact Care Management DC Support Team at [phone] opt. [phone]  Send all requests for admission clinical reviews, approved or denied determinations and any other requests to dedicated fax number below belonging to the campus where the patient is receiving treatment. List of dedicated fax numbers for the Facilities:  FACILITY NAME UR FAX NUMBER   ADMISSION DENIALS (Administrative/Medical Necessity) [phone]   DISCHARGE SUPPORT TEAM (NETWORK) [phone]   PARENT CHILD HEALTH (Maternity/NICU/Pediatrics) [phone]   [facility] [phone]   [facility] [phone]    [facility] [phone]   [facility] [phone]   [facility] [phone]   [facility] [phone]   [facility] [phone]   [facility] [phone]   [facility] [phone]   [facility] [phone]   [facility] [phone]   [facility] [phone]                  Revision History

## 2024-06-25 NOTE — ASSESSMENT & PLAN NOTE
Follow up  Palliative Care will continue to follow for symptom management.  Please reach out to on-call provider via [employer] if questions or concerns arise.  Please do not hesitate to reach our on call provider through our clinic answering service at [phone] should you have acute symptom control concerns.

## 2024-06-25 NOTE — ASSESSMENT & PLAN NOTE
Decisional apparatus:  Patient is competent on exam today.  If competency is lost, patient's substitute decision maker would default to sister by [place].  Advance Directive / Living Will / POLST / POA Forms: No    Goals  Level 1 code status.   Full code.   Disease focused care.   Would like all intervention to prolong life  Plans to return to sisters home upon discharge, they are moving his bed and setting up a room for him.   Will continue to follow with [facility] team outpatient.

## 2024-06-26 PROBLEM — E44.0 MODERATE PROTEIN-CALORIE MALNUTRITION (HCC): Status: ACTIVE | Noted: 2024-06-26

## 2024-06-26 PROBLEM — F10.10 ALCOHOL ABUSE: Status: ACTIVE | Noted: 2024-06-26

## 2024-06-26 LAB
ALBUMIN SERPL BCG-MCNC: 3.4 G/DL (ref 3.5–5)
ALP SERPL-CCNC: 102 U/L (ref 34–104)
ALT SERPL W P-5'-P-CCNC: 27 U/L (ref 7–52)
ANION GAP SERPL CALCULATED.3IONS-SCNC: 9 MMOL/L (ref 4–13)
AST SERPL W P-5'-P-CCNC: 18 U/L (ref 13–39)
BILIRUB SERPL-MCNC: 1.06 MG/DL (ref 0.2–1)
BUN SERPL-MCNC: 24 MG/DL (ref 5–25)
CALCIUM ALBUM COR SERPL-MCNC: 9 MG/DL (ref 8.3–10.1)
CALCIUM SERPL-MCNC: 8.5 MG/DL (ref 8.4–10.2)
CHLORIDE SERPL-SCNC: 96 MMOL/L (ref 96–108)
CO2 SERPL-SCNC: 26 MMOL/L (ref 21–32)
CREAT SERPL-MCNC: 1.24 MG/DL (ref 0.6–1.3)
DME PARACHUTE DELIVERY DATE REQUESTED: NORMAL
DME PARACHUTE DELIVERY NOTE: NORMAL
DME PARACHUTE ITEM DESCRIPTION: NORMAL
DME PARACHUTE ORDER STATUS: NORMAL
DME PARACHUTE SUPPLIER NAME: NORMAL
DME PARACHUTE SUPPLIER PHONE: NORMAL
ERYTHROCYTE [DISTWIDTH] IN BLOOD BY AUTOMATED COUNT: 13.9 % (ref 11.6–15.1)
GFR SERPL CREATININE-BSD FRML MDRD: 67 ML/MIN/1.73SQ M
GLUCOSE SERPL-MCNC: 159 MG/DL (ref 65–140)
GLUCOSE SERPL-MCNC: 179 MG/DL (ref 65–140)
GLUCOSE SERPL-MCNC: 187 MG/DL (ref 65–140)
GLUCOSE SERPL-MCNC: 187 MG/DL (ref 65–140)
GLUCOSE SERPL-MCNC: 200 MG/DL (ref 65–140)
HCT VFR BLD AUTO: 33.7 % (ref 36.5–49.3)
HGB BLD-MCNC: 11.4 G/DL (ref 12–17)
LITHIUM SERPL-SCNC: 0.34 MMOL/L (ref 0.6–1.2)
MAGNESIUM SERPL-MCNC: 2.3 MG/DL (ref 1.9–2.7)
MCH RBC QN AUTO: 29.5 PG (ref 26.8–34.3)
MCHC RBC AUTO-ENTMCNC: 33.8 G/DL (ref 31.4–37.4)
MCV RBC AUTO: 87 FL (ref 82–98)
PHOSPHATE SERPL-MCNC: 4 MG/DL (ref 2.7–4.5)
PLATELET # BLD AUTO: 159 THOUSANDS/UL (ref 149–390)
PMV BLD AUTO: 10.6 FL (ref 8.9–12.7)
POTASSIUM SERPL-SCNC: 3.2 MMOL/L (ref 3.5–5.3)
PROT SERPL-MCNC: 6.9 G/DL (ref 6.4–8.4)
RBC # BLD AUTO: 3.86 MILLION/UL (ref 3.88–5.62)
SODIUM SERPL-SCNC: 131 MMOL/L (ref 135–147)
WBC # BLD AUTO: 10.19 THOUSAND/UL (ref 4.31–10.16)

## 2024-06-26 PROCEDURE — 84100 ASSAY OF PHOSPHORUS: CPT | Performed by: NURSE PRACTITIONER

## 2024-06-26 PROCEDURE — 97166 OT EVAL MOD COMPLEX 45 MIN: CPT

## 2024-06-26 PROCEDURE — 83735 ASSAY OF MAGNESIUM: CPT | Performed by: NURSE PRACTITIONER

## 2024-06-26 PROCEDURE — 97163 PT EVAL HIGH COMPLEX 45 MIN: CPT

## 2024-06-26 PROCEDURE — 99232 SBSQ HOSP IP/OBS MODERATE 35: CPT | Performed by: NURSE PRACTITIONER

## 2024-06-26 PROCEDURE — 85027 COMPLETE CBC AUTOMATED: CPT | Performed by: NURSE PRACTITIONER

## 2024-06-26 PROCEDURE — 80053 COMPREHEN METABOLIC PANEL: CPT | Performed by: NURSE PRACTITIONER

## 2024-06-26 PROCEDURE — 80178 ASSAY OF LITHIUM: CPT | Performed by: PSYCHIATRY & NEUROLOGY

## 2024-06-26 PROCEDURE — 82948 REAGENT STRIP/BLOOD GLUCOSE: CPT

## 2024-06-26 PROCEDURE — NC001 PR NO CHARGE: Performed by: NURSE PRACTITIONER

## 2024-06-26 RX ORDER — POTASSIUM CHLORIDE 20 MEQ/1
40 TABLET, EXTENDED RELEASE ORAL ONCE
Status: COMPLETED | OUTPATIENT
Start: 2024-06-26 | End: 2024-06-26

## 2024-06-26 RX ADMIN — POLYETHYLENE GLYCOL 3350 17 G: 17 POWDER, FOR SOLUTION ORAL at 21:37

## 2024-06-26 RX ADMIN — INSULIN LISPRO 1 UNITS: 100 INJECTION, SOLUTION INTRAVENOUS; SUBCUTANEOUS at 07:19

## 2024-06-26 RX ADMIN — INSULIN LISPRO 1 UNITS: 100 INJECTION, SOLUTION INTRAVENOUS; SUBCUTANEOUS at 17:07

## 2024-06-26 RX ADMIN — QUETIAPINE FUMARATE 50 MG: 25 TABLET ORAL at 21:23

## 2024-06-26 RX ADMIN — INSULIN LISPRO 2 UNITS: 100 INJECTION, SOLUTION INTRAVENOUS; SUBCUTANEOUS at 11:41

## 2024-06-26 RX ADMIN — POLYETHYLENE GLYCOL 3350 17 G: 17 POWDER, FOR SOLUTION ORAL at 08:59

## 2024-06-26 RX ADMIN — PRAZOSIN HYDROCHLORIDE 5 MG: 5 CAPSULE ORAL at 21:21

## 2024-06-26 RX ADMIN — INSULIN LISPRO 1 UNITS: 100 INJECTION, SOLUTION INTRAVENOUS; SUBCUTANEOUS at 21:45

## 2024-06-26 RX ADMIN — FOLIC ACID 1 MG: 5 INJECTION, SOLUTION INTRAMUSCULAR; INTRAVENOUS; SUBCUTANEOUS at 09:00

## 2024-06-26 RX ADMIN — SENNOSIDES 17.2 MG: 8.6 TABLET, FILM COATED ORAL at 21:23

## 2024-06-26 RX ADMIN — FOLIC ACID 1 MG: 1 TABLET ORAL at 08:59

## 2024-06-26 RX ADMIN — CHLORHEXIDINE GLUCONATE 15 ML: 1.2 RINSE ORAL at 21:30

## 2024-06-26 RX ADMIN — HEPARIN SODIUM 5000 UNITS: 5000 INJECTION, SOLUTION INTRAVENOUS; SUBCUTANEOUS at 05:49

## 2024-06-26 RX ADMIN — LITHIUM CARBONATE 300 MG: 300 TABLET, EXTENDED RELEASE ORAL at 17:07

## 2024-06-26 RX ADMIN — LISINOPRIL 20 MG: 20 TABLET ORAL at 08:59

## 2024-06-26 RX ADMIN — HEPARIN SODIUM 5000 UNITS: 5000 INJECTION, SOLUTION INTRAVENOUS; SUBCUTANEOUS at 21:23

## 2024-06-26 RX ADMIN — CHLORHEXIDINE GLUCONATE 15 ML: 1.2 RINSE ORAL at 08:59

## 2024-06-26 RX ADMIN — POTASSIUM CHLORIDE 40 MEQ: 1500 TABLET, EXTENDED RELEASE ORAL at 10:50

## 2024-06-26 RX ADMIN — LIDOCAINE 2 PATCH: 700 PATCH TOPICAL at 09:00

## 2024-06-26 RX ADMIN — LITHIUM CARBONATE 300 MG: 300 TABLET, EXTENDED RELEASE ORAL at 08:59

## 2024-06-26 NOTE — CASE MANAGEMENT
Case Management Discharge Planning Note    Patient name Osei Trimble  Location ICU 08/ICU  MRN [id number]  : 1975 Date 2024       Current Admission Date: 2024  Current Admission Diagnosis:Acute encephalopathy   Patient Active Problem List    Diagnosis Date Noted Date Diagnosed    Alcohol abuse 2024     Moderate protein-calorie malnutrition (HCC) 2024     Palliative care by specialist 2024     Goals of care, counseling/discussion 2024     Back pain 2024     Hyperglycemia 2024     Acute encephalopathy 2024     Shock (HCC) 2024     Thrombocytopenia (HCC) 2024     Total bilirubin, elevated 2024     Other chest pain 2024     Explosive personality disorder (HCC) 2024     Abdominal pain 2023     Neuroendocrine carcinoma of small bowel (HCC) 2022     Neuroendocrine tumor 2022     Hydronephrosis with urinary obstruction due to ureteral calculus 10/04/2021     Calcified mesenteric mass 10/04/2021     MEHDI (acute kidney injury) (HCC) 10/04/2021     Essential hypertension 10/04/2021     Diastasis recti 2019       LOS (days): 5  Geometric Mean LOS (GMLOS) (days):   Days to GMLOS:     OBJECTIVE:  Risk of Unplanned Readmission Score: 14.99         Current admission status: Inpatient   Preferred Pharmacy:   [address] [address]  Phone: [phone] Fax: [phone]    Primary Care Provider: Jermaine Mayes DO    Primary Insurance: PayStand  Secondary Insurance:     DISCHARGE DETAILS:    Discharge planning discussed with:: yrn & sister  & her judy at the bedside  Freedom of Choice: Yes  Comments - Freedom of Choice: pt has declined [facility]- he is in agreement with outpt rehab at [address] [place] [address]-  pt's sister;ruby kim is a diabetic and he stated he can show the  pt how to use the glucometer- pt requested a commode - dme  companies were reviewed - pt's choice is [facility]  CM contacted family/caregiver?: Yes             Contacts  Patient Contacts: Ariadne Elizabethdeepak  Relationship to Patient:: Family (sister)  Contact Method: In Person  Reason/Outcome: Discharge Planning    Requested Home Health Care         Is the patient interested in HHC at discharge?: No    DME Referral Provided  Referral made for DME?: Yes  DME referral completed for the following items:: Bedside Commode  DME Supplier Name:: [employer]    Other Referral/Resources/Interventions Provided:  Interventions: DME, Outpatient OT, Outpatient PT  Referral Comments: pt declined an outpt rehab list - he stated he wants to go to [address] [place] [address]- pt has a walker & rollator at home- cm did order a commode via [employer]- permission was given to issue a commode from the [facility] closet- commode was delivered today as requested to [facility] room - family will take it home today- pt has declined [facility] - he does not want anyone in his home & he has dogs    Would you like to participate in our [facility] service program?  : No - Declined    Treatment Team Recommendation: Home (home with family support & outpt rehab & outpt follow up- family)

## 2024-06-26 NOTE — ASSESSMENT & PLAN NOTE
Lisinopril  held initially due to MEHDI- now resumed   Continue with lisinopril and prazosin   Continue to monitor BP and adjust medications as indicated

## 2024-06-26 NOTE — ASSESSMENT & PLAN NOTE
History of mood disorder and hypertension presented to the hospital with chest pain found to have kidney injury.  Resolved with IV fluids.  Nephrology input appreciated.   Renal ultrasound without acute pathology.  Avoid nephrotoxins and hypotension

## 2024-06-26 NOTE — ASSESSMENT & PLAN NOTE
Follows with Dr. Green as an outpatient  Incidentally found to have mesenteric masses in October 2021  PET CT 2022 demonstrated Dotatate avid clustered central mesenteric masses suspicious for underlying neuroendocrine neoplasm  Underwent small bowel resection 07/2022 in the area that was thought to be high risk for obstruction  Currently undergoing chemo treatment every 4 weeks with lanreotide

## 2024-06-26 NOTE — PHYSICAL THERAPY NOTE
PHYSICAL THERAPY EVALUATION  NAME:  Osei Trimble  DATE: 06/26/24    AGE:   49 y.o.  Mrn:   [id number]  ADMIT DX:  Weakness [R53.1]  MEHDI (acute kidney injury) (HCC) [N17.9]  Problem List:   Patient Active Problem List   Diagnosis    Diastasis recti    Hydronephrosis with urinary obstruction due to ureteral calculus    Calcified mesenteric mass    MEHDI (acute kidney injury) (HCC)    Essential hypertension    Neuroendocrine tumor    Neuroendocrine carcinoma of small bowel (HCC)    Abdominal pain    Other chest pain    Explosive personality disorder (HCC)    Hyperglycemia    Acute encephalopathy    Shock (HCC)    Thrombocytopenia (HCC)    Total bilirubin, elevated    Back pain    Palliative care by specialist    Goals of care, counseling/discussion    Alcohol abuse       Past Medical History  Past Medical History:   Diagnosis Date    Allergic     Bipolar disorder in partial remission (HCC)     Erectile dysfunction     unspecified erectile dysfunction type    Hypertension     Kidney stone        Past Surgical History  Past Surgical History:   Procedure Laterality Date    COLONOSCOPY      EXPLORATORY LAPAROTOMY W/ BOWEL RESECTION N/A 7/21/2022    Procedure: LAPAROTOMY EXPLORATORY W/ SMALL BOWEL RESECTION, liver biopsy;  Surgeon: Rose Mary Lara MD;  Location:  MAIN OR;  Service: General    FL RETROGRADE PYELOGRAM  10/06/2021    HERNIA REPAIR      OH CYSTO BLADDER W/URETERAL CATHETERIZATION Right 10/06/2021    Procedure: CYSTOSCOPY RETROGRADE PYELOGRAM WITH INSERTION STENT URETERAL, RIGHT URETEROSCOPY, STONE EXTRACTION;  Surgeon: Bradly Rivera MD;  Location:  MAIN OR;  Service: Urology       Length Of Stay: 5  Performed at least 2 patient identifiers during session: Name and ID bracelet       06/26/24 1128   PT Last Visit   PT Visit Date 06/26/24   Note Type   Note type Evaluation   Pain Assessment   Pain Assessment Tool 0-10   Pain Score 2   Pain Location/Orientation Location: Back   Hospital Pain Intervention(s)  Ambulation/increased activity   Restrictions/Precautions   Weight Bearing Precautions Per Order No   Other Precautions Multiple lines;Telemetry;O2;Fall Risk;Pain   Home Living   Type of Home House   Home Layout Two level;Bed/bath upstairs;Ramped entrance   Bathroom Shower/Tub Tub only  (sponge bath at baseline)   Bathroom Toilet Raised   Bathroom Equipment Commode   Home Equipment Walker;Cane  (no AD used at baseline)   Prior Function   Level of [place] Independent with ADLs;Independent with functional mobility;Needs assistance with IADLS   Lives With Alone  (Sister + sister's fiance next door; brother currently taking care of his mother who is bedbound and has dementia)   Receives Help From Family   IADLs Family/Friend/Other provides meals;Independent with medication management;Family/Friend/Other provides transportation   Falls in the last 6 months 0   Vocational On disability   General   Family/Caregiver Present No   Cognition   Overall Cognitive Status Impaired   Arousal/Participation Alert   Orientation Level Oriented X4   Memory Within functional limits   Following Commands Follows all commands and directions without difficulty   Comments flat affect with unfocused and inappropriate conversation   RLE Assessment   RLE Assessment WFL   LLE Assessment   LLE Assessment WFL   Vision-Basic Assessment   Current Vision Wears glasses only for reading   Coordination   Sensation WFL   Bed Mobility   Supine to Sit 6  Modified independent   Additional items HOB elevated   Sit to Supine 6  Modified independent   Additional items HOB elevated   Additional Comments pt denied dizziness with transitional movement   Transfers   Sit to Stand 5  Supervision   Additional items Assist x 1;Verbal cues   Stand to Sit 5  Supervision   Additional items Assist x 1;Verbal cues   Additional Comments cues for safety   Ambulation/Elevation   Gait pattern   (decreased accuracy of movement; uneven strdie length)   Gait Assistance 5   Supervision   Additional items Assist x 1   Assistive Device None   Distance 250 ft   Ambulation/Elevation Additional Comments generalized unsteadiness noted bree during coughing bouts   Balance   Static Sitting Good   Dynamic Sitting Fair +   Static Standing Fair   Dynamic Standing Fair   Ambulatory Fair   Endurance Deficit   Endurance Deficit Yes   Endurance Deficit Description SaO2 decreased to 86%: SOB and coughing noted   Activity Tolerance   Activity Tolerance Patient limited by fatigue   Assessment   Prognosis Fair   Assessment Pt is 49 y.o. male seen for PT evaluation s/p admit to [facility] on [facility]24 w/ Acute encephalopathy. PT consulted to assess pt's functional mobility and d/c needs. Order placed for PT eval and tx, w/ out of bed to chair order. Pt agreeable to PT  session upon arrival, pt found supine in bed.  PTA, pt was independent w/ all functional mobility w/ no AD, lives alone in 2 level home, and on disability.  Pt to benefit from continued PT tx to address deficits and maximize level of functional independent mobility and consistency. Upon conclusion pt  supine in bed. Complexity: Comorbidities affecting pt's physical performance at time of assessment include:  bipolar disorder, explosive personality disorder and back pain . Personal factors affecting pt at time of IE include: living alone, decreased social skills, lives in multistory home, depression, and anxiety. Please find objective findings from PT assessment regarding body systems outlined above with impairments and limitations including impaired balance, decreased endurance, gait deviations, pain, decreased activity tolerance, decreased functional mobility tolerance, and fall risk.  Pt's clinical presentation is currently unstable/unpredictable seen in pt's presentation of abnormal WBC count, abnormal sodium levels, abnormal potassium levels, abnormal blood sugar levels, low SaO2 levels, new onset of O2 use, pain, polypharmacy,  and recent intubation . The patient's AM-PAC Basic Mobility Inpatient Short Form Raw Score is 20.  Based on patient presentations and impairments, pt would most appropriately benefit from Level 3 resource intensity upon discharge. Please also refer to the recommendation of the Physical Therapist for safe discharge planning. RN verbalized pt appropriate for PT session. Pt seen as a co-eval with OT due to the patient's co-morbidities, clinically unstable presentation, and present impairments which are a regression from the patient's baseline.   Barriers to Discharge Decreased caregiver support   Goals   Patient Goals to relax   LTG Expiration Date 07/06/24   Long Term Goal #1 Pt will: Perform transfers to modified I to improve ease of transfers. Perform ambulation with MI  for 500 feet to increase Indep in home environment. Increase dynamic standing balance to F+ to decrease fall risk. Increase OOB activity tolerance to 10 minutes without s/s of exertion to decrease fall risk.   Plan   Treatment/Interventions Functional transfer training;Therapeutic exercise;Endurance training;Gait training;Bed mobility;Equipment eval/education   PT Frequency 2-3x/wk   Discharge Recommendation   Rehab Resource Intensity Level, PT III (Minimum Resource Intensity)   AM-PAC Basic Mobility Inpatient   Turning in Flat Bed Without Bedrails 4   Lying on Back to Sitting on Edge of Flat Bed Without Bedrails 4   Moving Bed to Chair 3   Standing Up From Chair Using Arms 3   Walk in Room 3   Climb 3-5 Stairs With Railing 3   Basic Mobility Inpatient Raw Score 20   Basic Mobility Standardized Score 43.99   [facility] Highest Level Of Mobility   JH-HLM Goal 6: Walk 10 steps or more   JH-HLM Achieved 8: Walk 250 feet ot more       Time In: 1021  Time Out: 1042  Total Evaluation Minutes: 21    Rhoda Menchaca, PT

## 2024-06-26 NOTE — PLAN OF CARE
Problem: PAIN - ADULT  Goal: Verbalizes/displays adequate comfort level or baseline comfort level  Description: Interventions:  - Encourage patient to monitor pain and request assistance  - Assess pain using appropriate pain scale  - Administer analgesics based on type and severity of pain and evaluate response  - Implement non-pharmacological measures as appropriate and evaluate response  - Consider cultural and social influences on pain and pain management  - Notify physician/advanced practitioner if interventions unsuccessful or patient reports new pain  Outcome: Adequate for Discharge     Problem: NEUROSENSORY - ADULT  Goal: Achieves stable or improved neurological status  Description: INTERVENTIONS  - Monitor and report changes in neurological status  - Monitor vital signs such as temperature, blood pressure, glucose, and any other labs ordered   - Initiate measures to prevent increased intracranial pressure  - Monitor for seizure activity and implement precautions if appropriate      Outcome: Adequate for Discharge  Goal: Remains free of injury related to seizures activity  Description: INTERVENTIONS  - Maintain airway, patient safety  and administer oxygen as ordered  - Monitor patient for seizure activity, document and report duration and description of seizure to physician/advanced practitioner  - If seizure occurs,  ensure patient safety during seizure  - Reorient patient post seizure  - Seizure pads on all 4 side rails  - Instruct patient/family to notify RN of any seizure activity including if an aura is experienced  - Instruct patient/family to call for assistance with activity based on nursing assessment  - Administer anti-seizure medications if ordered    Outcome: Adequate for Discharge  Goal: Achieves maximal functionality and self care  Description: INTERVENTIONS  - Monitor swallowing and airway patency with patient fatigue and changes in neurological status  - Encourage and assist patient to  increase activity and self care.   - Encourage visually impaired, hearing impaired and aphasic patients to use assistive/communication devices  Outcome: Adequate for Discharge     Problem: CARDIOVASCULAR - ADULT  Goal: Maintains optimal cardiac output and hemodynamic stability  Description: INTERVENTIONS:  - Monitor I/O, vital signs and rhythm  - Monitor for S/S and trends of decreased cardiac output  - Administer and titrate ordered vasoactive medications to optimize hemodynamic stability  - Assess quality of pulses, skin color and temperature  - Assess for signs of decreased coronary artery perfusion  - Instruct patient to report change in severity of symptoms  Outcome: Adequate for Discharge  Goal: Absence of cardiac dysrhythmias or at baseline rhythm  Description: INTERVENTIONS:  - Continuous cardiac monitoring, vital signs, obtain 12 lead EKG if ordered  - Administer antiarrhythmic and heart rate control medications as ordered  - Monitor electrolytes and administer replacement therapy as ordered  Outcome: Adequate for Discharge     Problem: RESPIRATORY - ADULT  Goal: Achieves optimal ventilation and oxygenation  Description: INTERVENTIONS:  - Assess for changes in respiratory status  - Assess for changes in mentation and behavior  - Position to facilitate oxygenation and minimize respiratory effort  - Oxygen administered by appropriate delivery if ordered  - Initiate smoking cessation education as indicated  - Encourage broncho-pulmonary hygiene including cough, deep breathe, Incentive Spirometry  - Assess the need for suctioning and aspirate as needed  - Assess and instruct to report SOB or any respiratory difficulty  - Respiratory Therapy support as indicated  Outcome: Adequate for Discharge     Problem: GASTROINTESTINAL - ADULT  Goal: Minimal or absence of nausea and/or vomiting  Description: INTERVENTIONS:  - Administer IV fluids if ordered to ensure adequate hydration  - Maintain NPO status until nausea  and vomiting are resolved  - Nasogastric tube if ordered  - Administer ordered antiemetic medications as needed  - Provide nonpharmacologic comfort measures as appropriate  - Advance diet as tolerated, if ordered  - Consider nutrition services referral to assist patient with adequate nutrition and appropriate food choices  Outcome: Adequate for Discharge  Goal: Maintains or returns to baseline bowel function  Description: INTERVENTIONS:  - Assess bowel function  - Encourage oral fluids to ensure adequate hydration  - Administer IV fluids if ordered to ensure adequate hydration  - Administer ordered medications as needed  - Encourage mobilization and activity  - Consider nutritional services referral to assist patient with adequate nutrition and appropriate food choices  Outcome: Adequate for Discharge  Goal: Maintains adequate nutritional intake  Description: INTERVENTIONS:  - Monitor percentage of each meal consumed  - Identify factors contributing to decreased intake, treat as appropriate  - Assist with meals as needed  - Monitor I&O, weight, and lab values if indicated  - Obtain nutrition services referral as needed  Outcome: Adequate for Discharge     Problem: METABOLIC, FLUID AND ELECTROLYTES - ADULT  Goal: Electrolytes maintained within normal limits  Description: INTERVENTIONS:  - Monitor labs and assess patient for signs and symptoms of electrolyte imbalances  - Administer electrolyte replacement as ordered  - Monitor response to electrolyte replacements, including repeat lab results as appropriate  - Instruct patient on fluid and nutrition as appropriate  Outcome: Adequate for Discharge  Goal: Fluid balance maintained  Description: INTERVENTIONS:  - Monitor labs   - Monitor I/O and WT  - Instruct patient on fluid and nutrition as appropriate  - Assess for signs & symptoms of volume excess or deficit  Outcome: Adequate for Discharge     Problem: HEMATOLOGIC - ADULT  Goal: Maintains hematologic  stability  Description: INTERVENTIONS  - Assess for signs and symptoms of bleeding or hemorrhage  - Monitor labs  - Administer supportive blood products/factors as ordered and appropriate  Outcome: Adequate for Discharge     Problem: MUSCULOSKELETAL - ADULT  Goal: Maintain or return mobility to safest level of function  Description: INTERVENTIONS:  - Assess patient's ability to carry out ADLs; assess patient's baseline for ADL function and identify physical deficits which impact ability to perform ADLs (bathing, care of mouth/teeth, toileting, grooming, dressing, etc.)  - Assess/evaluate cause of self-care deficits   - Assess range of motion  - Assess patient's mobility  - Assess patient's need for assistive devices and provide as appropriate  - Encourage maximum independence but intervene and supervise when necessary  - Involve family in performance of ADLs  - Assess for home care needs following discharge   - Consider OT consult to assist with ADL evaluation and planning for discharge  - Provide patient education as appropriate  Outcome: Adequate for Discharge  Goal: Maintain proper alignment of affected body part  Description: INTERVENTIONS:  - Support, maintain and protect limb and body alignment  - Provide patient/ family with appropriate education  Outcome: Adequate for Discharge

## 2024-06-26 NOTE — PLAN OF CARE
Problem: OCCUPATIONAL THERAPY ADULT  Goal: Performs self-care activities at highest level of function for planned discharge setting.  See evaluation for individualized goals.  Description: Treatment Interventions: ADL retraining, Functional transfer training, Endurance training, Patient/family training, Compensatory technique education, Activityengagement, Energy conservation          See flowsheet documentation for full assessment, interventions and recommendations.   Note: Limitation: Decreased ADL status, Decreased self-care trans, Decreased high-level ADLs, Decreased endurance  Prognosis: Fair  Assessment: Patient is a 49 y.o. male seen for OT evaluation s/p admit to  [facility] on 6/21/2024 w/Acute encephalopathy + commorbidities/PMHx (as listed in medical record) affecting patient's functional performance c ADL tasks at time of assessment. OT orders placed for evaluation and treatment to assess pt's ADLs, cognitive status + performance during functional tasks in order to formulate appropriate d/c recommendations.     Therapist performed at least two patient identifiers during session including name and wristband. Personal factors affecting patient at time of initial evaluation include: multi-level environment, limited home support, inability to perform ADLs, and inability to ambulate household distances.   Pt's clinical presentation is currently evolving given new onset deficits that effect pt's occupational performance and ability to safely return to [facility] including decrease activity tolerance, decrease standing balance, decrease performance during ADL tasks, decrease activity engagement, and decrease performance during functional transfers combined with medical complications of hypertension , hypotension, poor blood pressure control, abnormal CBC, abnormal blood sugars, abnormal sodium values, abnormal potassium values, low SpO2 values, and need for input for mobility technique/safety. This  evaluation required an extensive review of medical and/or therapy records and additional review of physical, cognitive and psychosocial history related to functional performance. Based upon functional performance deficits and assessments, this evaluation has been identified as a  moderate complexity evaluation.      Patient to benefit from continued Occupational Therapy treatment while in the hospital to address aforementioned deficits and maximize level of functional independence with ADLs and functional mobility. Pt currently requires A to facilitate appropriate d/c plan.     Rehab Resource Intensity Level, OT: III (Minimum Resource Intensity)           Accidental fall

## 2024-06-26 NOTE — PLAN OF CARE
Problem: PAIN - ADULT  Goal: Verbalizes/displays adequate comfort level or baseline comfort level  Description: Interventions:  - Encourage patient to monitor pain and request assistance  - Assess pain using appropriate pain scale  - Administer analgesics based on type and severity of pain and evaluate response  - Implement non-pharmacological measures as appropriate and evaluate response  - Consider cultural and social influences on pain and pain management  - Notify physician/advanced practitioner if interventions unsuccessful or patient reports new pain  Outcome: Progressing     Problem: INFECTION - ADULT  Goal: Absence or prevention of progression during hospitalization  Description: INTERVENTIONS:  - Assess and monitor for signs and symptoms of infection  - Monitor lab/diagnostic results  - Monitor all insertion sites, i.e. indwelling lines, tubes, and drains  - Monitor endotracheal if appropriate and nasal secretions for changes in amount and color  - [place] appropriate cooling/warming therapies per order  - Administer medications as ordered  - Instruct and encourage patient and family to use good hand hygiene technique  - Identify and instruct in appropriate isolation precautions for identified infection/condition  Outcome: Progressing  Goal: Absence of fever/infection during neutropenic period  Description: INTERVENTIONS:  - Monitor WBC    Outcome: Progressing     Problem: SAFETY ADULT  Goal: Patient will remain free of falls  Description: INTERVENTIONS:  - Educate patient/family on patient safety including physical limitations  - Instruct patient to call for assistance with activity   - Consult OT/PT to assist with strengthening/mobility   - Keep Call bell within reach  - Keep bed low and locked with side rails adjusted as appropriate  - Keep care items and personal belongings within reach  - Initiate and maintain comfort rounds  - Make Fall Risk Sign visible to staff  - Offer Toileting every 2 Hours,  in advance of need  - Initiate/Maintain bed alarm  - Obtain necessary fall risk management equipment  - Apply yellow socks and bracelet for high fall risk patients  - Consider moving patient to room near nurses station  Outcome: Progressing  Goal: Maintain or return to baseline ADL function  Description: INTERVENTIONS:  -  Assess patient's ability to carry out ADLs; assess patient's baseline for ADL function and identify physical deficits which impact ability to perform ADLs (bathing, care of mouth/teeth, toileting, grooming, dressing, etc.)  - Assess/evaluate cause of self-care deficits   - Assess range of motion  - Assess patient's mobility; develop plan if impaired  - Assess patient's need for assistive devices and provide as appropriate  - Encourage maximum independence but intervene and supervise when necessary  - Involve family in performance of ADLs  - Assess for home care needs following discharge   - Consider OT consult to assist with ADL evaluation and planning for discharge  - Provide patient education as appropriate  Outcome: Progressing  Goal: Maintains/Returns to pre admission functional level  Description: INTERVENTIONS:  - Perform AM-PAC 6 Click Basic Mobility/ Daily Activity assessment daily.  - Set and communicate daily mobility goal to care team and patient/family/caregiver.   - Collaborate with rehabilitation services on mobility goals if consulted  - Perform Range of Motion 3 times a day.  - Reposition patient every 2 hours.  - Dangle patient 3 times a day  - Stand patient 3 times a day  - Ambulate patient 3 times a day  - Out of bed to chair 3 times a day   - Out of bed for meals 3 times a day  - Out of bed for toileting  - Record patient progress and toleration of activity level   Outcome: Progressing     Problem: DISCHARGE PLANNING  Goal: Discharge to home or other facility with appropriate resources  Description: INTERVENTIONS:  - Identify barriers to discharge w/patient and caregiver  -  Arrange for needed discharge resources and transportation as appropriate  - Identify discharge learning needs (meds, wound care, etc.)  - Arrange for interpretive services to assist at discharge as needed  - Refer to Case Management Department for coordinating discharge planning if the patient needs post-hospital services based on physician/advanced practitioner order or complex needs related to functional status, cognitive ability, or social support system  Outcome: Progressing     Problem: Knowledge Deficit  Goal: Patient/family/caregiver demonstrates understanding of disease process, treatment plan, medications, and discharge instructions  Description: Complete learning assessment and assess knowledge base.  Interventions:  - Provide teaching at level of understanding  - Provide teaching via preferred learning methods  Outcome: Progressing     Problem: Nutrition/Hydration-ADULT  Goal: Nutrient/Hydration intake appropriate for improving, restoring or maintaining nutritional needs  Description: Monitor and assess patient's nutrition/hydration status for malnutrition. Collaborate with interdisciplinary team and initiate plan and interventions as ordered.  Monitor patient's weight and dietary intake as ordered or per policy. Utilize nutrition screening tool and intervene as necessary. Determine patient's food preferences and provide high-protein, high-caloric foods as appropriate.     INTERVENTIONS:  - Monitor oral intake, urinary output, labs, and treatment plans  - Assess nutrition and hydration status and recommend course of action  - Evaluate amount of meals eaten  - Assist patient with eating if necessary   - Allow adequate time for meals  - Recommend/ encourage appropriate diets, oral nutritional supplements, and vitamin/mineral supplements  - Order, calculate, and assess calorie counts as needed  - Recommend, monitor, and adjust tube feedings and TPN/PPN based on assessed needs  - Assess need for intravenous  fluids  - Provide specific nutrition/hydration education as appropriate  - Include patient/family/caregiver in decisions related to nutrition  Outcome: Progressing     Problem: CARDIOVASCULAR - ADULT  Goal: Maintains optimal cardiac output and hemodynamic stability  Description: INTERVENTIONS:  - Monitor I/O, vital signs and rhythm  - Monitor for S/S and trends of decreased cardiac output  - Administer and titrate ordered vasoactive medications to optimize hemodynamic stability  - Assess quality of pulses, skin color and temperature  - Assess for signs of decreased coronary artery perfusion  - Instruct patient to report change in severity of symptoms  Outcome: Progressing  Goal: Absence of cardiac dysrhythmias or at baseline rhythm  Description: INTERVENTIONS:  - Continuous cardiac monitoring, vital signs, obtain 12 lead EKG if ordered  - Administer antiarrhythmic and heart rate control medications as ordered  - Monitor electrolytes and administer replacement therapy as ordered  Outcome: Progressing     Problem: NEUROSENSORY - ADULT  Goal: Achieves stable or improved neurological status  Description: INTERVENTIONS  - Monitor and report changes in neurological status  - Monitor vital signs such as temperature, blood pressure, glucose, and any other labs ordered   - Initiate measures to prevent increased intracranial pressure  - Monitor for seizure activity and implement precautions if appropriate      Outcome: Progressing  Goal: Remains free of injury related to seizures activity  Description: INTERVENTIONS  - Maintain airway, patient safety  and administer oxygen as ordered  - Monitor patient for seizure activity, document and report duration and description of seizure to physician/advanced practitioner  - If seizure occurs,  ensure patient safety during seizure  - Reorient patient post seizure  - Seizure pads on all 4 side rails  - Instruct patient/family to notify RN of any seizure activity including if an aura is  experienced  - Instruct patient/family to call for assistance with activity based on nursing assessment  - Administer anti-seizure medications if ordered    Outcome: Progressing  Goal: Achieves maximal functionality and self care  Description: INTERVENTIONS  - Monitor swallowing and airway patency with patient fatigue and changes in neurological status  - Encourage and assist patient to increase activity and self care.   - Encourage visually impaired, hearing impaired and aphasic patients to use assistive/communication devices  Outcome: Progressing

## 2024-06-26 NOTE — ASSESSMENT & PLAN NOTE
6/22 RRT called when patient became unresponsive while using the toilet. SBP 80s. Upon return to bed remained unresponsive despite IV fluids. Intubated for airway protection and started on vasopressors.   6/23 extubated to RA. TTE- no WMA   CT head negative for acute abnormality  MRI completed to r/o brain metastasis or acute ischemia  UDS only Positive for Fentanyl, which was used for induction of intubation   Unclear etiology, possible seizure 2/2 hypophosphatemia   Resolved and back to baseline

## 2024-06-26 NOTE — OCCUPATIONAL THERAPY NOTE
Occupational Therapy Evaluation     Patient Name: Osei Trimble  Today's Date: 6/26/2024  Problem List  Principal Problem:    Acute encephalopathy  Active Problems:    MEHDI (acute kidney injury) (HCC)    Essential hypertension    Neuroendocrine carcinoma of small bowel (HCC)    Other chest pain    Explosive personality disorder (HCC)    Hyperglycemia    Total bilirubin, elevated    Palliative care by specialist    Goals of care, counseling/discussion    Alcohol abuse    Past Medical History  Past Medical History:   Diagnosis Date    Allergic     Bipolar disorder in partial remission (HCC)     Erectile dysfunction     unspecified erectile dysfunction type    Hypertension     Kidney stone      Past Surgical History  Past Surgical History:   Procedure Laterality Date    COLONOSCOPY      EXPLORATORY LAPAROTOMY W/ BOWEL RESECTION N/A 7/21/2022    Procedure: LAPAROTOMY EXPLORATORY W/ SMALL BOWEL RESECTION, liver biopsy;  Surgeon: Rose Mary Lara MD;  Location:  MAIN OR;  Service: General    FL RETROGRADE PYELOGRAM  10/06/2021    HERNIA REPAIR      SC CYSTO BLADDER W/URETERAL CATHETERIZATION Right 10/06/2021    Procedure: CYSTOSCOPY RETROGRADE PYELOGRAM WITH INSERTION STENT URETERAL, RIGHT URETEROSCOPY, STONE EXTRACTION;  Surgeon: Bradly Rivera MD;  Location:  MAIN OR;  Service: Urology         06/26/24 0840   OT Last Visit   OT Visit Date 06/26/24   Note Type   Note type Evaluation   Additional Comments Nsg staff verbally cleared pt for OT evaluation.  Pt received  supine in bed c HOB semi-elevated on this date reporting no pain + is agreeable to OT session @ this time. @ exit, pt remains in  supine in bed c HOB semi-elevated c all lines intact, all needs met, call bell in hand + nsg aware of location/disposition.   Pain Assessment   Pain Assessment Tool 0-10   Pain Score No Pain   Restrictions/Precautions   Weight Bearing Precautions Per Order No   Other Precautions O2;Fall Risk   Home Living   Type of Home House    Home Layout Two level   Bathroom Shower/Tub   (Claw foot tub)   Bathroom Toilet Raised   Bathroom Equipment Commode   Home Equipment Walker;Cane   Prior Function   Level of [place] Independent with ADLs;Independent with functional mobility;Needs assistance with IADLS   Lives With Alone  (Sister + sister's fiance next door)   Receives Help From Family   IADLs Family/Friend/Other provides meals;Independent with medication management   Falls in the last 6 months 0   Lifestyle   Autonomy Pt lives in  2 story home alone + @ baseline, performs ADLs  I'ly, IADLs with A + fxl mobility I'ly without AD. (-) .   Reciprocal Relationships Sister   ADL   Eating Assistance 7  Independent   Grooming Assistance 7  Independent   UB Bathing Assistance 7  Independent   LB Bathing Assistance 5  Supervision/Setup   UB Dressing Assistance 7  Independent   LB Dressing Assistance 5  Supervision/Setup   Toileting Assistance  5  Supervision/Setup   Bed Mobility   Supine to Sit 6  Modified independent   Additional items HOB elevated   Sit to Supine 6  Modified independent   Additional items HOB elevated   Transfers   Sit to Stand 5  Supervision   Additional items Verbal cues   Stand to Sit 5  Supervision   Additional items Verbal cues   Functional Mobility   Additional Comments Pt performed short distance ADL related fxl mobility in room/hallway c CGA simulating toileting distances.   No overt LOB demonstrated + pt denies pain /  moderate SOB/ denies dizziness during transitional movements. Reports feeling minimally below baseline c abilities related to ADL-focused fxl mobility. F safety awareness noted while navigating t/o room. Safely returns to bed for remainder of session.  (+ cough t/o fxl mobility; without supplemental O2, low SPO2 levels, improved c 2L O2)   Balance   Static Sitting Normal   Dynamic Sitting Good   Static Standing Fair +   Dynamic Standing Fair   Ambulatory Fair   Activity Tolerance   Activity Tolerance  Patient limited by fatigue   Medical Staff Made Aware PT/OT co-eval on this date d/t medical complexity, ambulatory dysfunction c high fall risk, various impeding lines + requirement for skilled interdisciplinary analysis of appropriate d/c recommendations. PT/OT POC/goals I'ly determined per respective discipline. (Rhoda)  (Chris)   Nurse Made Aware Medical staff made aware of current fxn, recommendations for d/c planning, fall risk + pt's location upon exit. (Samuel)   RUE Assessment   RUE Assessment WFL   LUE Assessment   LUE Assessment WFL   Hand Function   Gross Motor Coordination Functional   Fine Motor Coordination Functional   Vision-Basic Assessment   Current Vision Wears glasses only for reading   Psychosocial   Psychosocial (WDL) X   Patient Behaviors/Mood Flat affect;Withdrawn   Cognition   Overall Cognitive Status WFL   Arousal/Participation Alert;Responsive   Attention Within functional limits   Orientation Level Oriented X4   Memory Within functional limits   Following Commands Follows all commands and directions without difficulty   Comments Explosive personality disorder; responds c off-topic + inappropriate speech   Assessment   Limitation Decreased ADL status;Decreased self-care trans;Decreased high-level ADLs;Decreased endurance   Prognosis Fair   Assessment Patient is a 49 y.o. male seen for OT evaluation s/p admit to  [facility] on 6/21/2024 w/Acute encephalopathy + commorbidities/PMHx (as listed in medical record) affecting patient's functional performance c ADL tasks at time of assessment. OT orders placed for evaluation and treatment to assess pt's ADLs, cognitive status + performance during functional tasks in order to formulate appropriate d/c recommendations.     Therapist performed at least two patient identifiers during session including name and wristband. Personal factors affecting patient at time of initial evaluation include: multi-level environment, limited home support,  inability to perform ADLs, and inability to ambulate household distances.   Pt's clinical presentation is currently evolving given new onset deficits that effect pt's occupational performance and ability to safely return to OF including decrease activity tolerance, decrease standing balance, decrease performance during ADL tasks, decrease activity engagement, and decrease performance during functional transfers combined with medical complications of hypertension , hypotension, poor blood pressure control, abnormal CBC, abnormal blood sugars, abnormal sodium values, abnormal potassium values, low SpO2 values, and need for input for mobility technique/safety. This evaluation required an extensive review of medical and/or therapy records and additional review of physical, cognitive and psychosocial history related to functional performance. Based upon functional performance deficits and assessments, this evaluation has been identified as a  moderate complexity evaluation.      Patient to benefit from continued Occupational Therapy treatment while in the hospital to address aforementioned deficits and maximize level of functional independence with ADLs and functional mobility. Pt currently requires A to facilitate appropriate d/c plan.   Plan   Treatment Interventions ADL retraining;Functional transfer training;Endurance training;Patient/family training;Compensatory technique education;Activityengagement;Energy conservation   Goal Expiration Date 07/06/24   OT Treatment Day 0   OT Frequency 2-3x/wk   Discharge Recommendation   Rehab Resource Intensity Level, OT III (Minimum Resource Intensity)   AM-PAC Daily Activity Inpatient   Lower Body Dressing 3   Bathing 3   Toileting 3   Upper Body Dressing 4   Grooming 4   Eating 4   Daily Activity Raw Score 21   Daily Activity Standardized Score (Calc for Raw Score >=11) 44.27   AM-PAC Applied Cognition Inpatient   Following a Speech/Presentation 4   Understanding Ordinary  Conversation 4   Taking Medications 3   Remembering Where Things Are Placed or Put Away 3   Remembering List of 4-5 Errands 3   Taking Care of Complicated Tasks 3   Applied Cognition Raw Score 20   Applied Cognition Standardized Score 41.76   Barthel Index   Feeding 10   Bathing 0   Grooming Score 5   Dressing Score 5   Bladder Score 10   Bowels Score 10   Toilet Use Score 5   Transfers (Bed/Chair) Score 10   Mobility (Level Surface) Score 10   Stairs Score 0   Barthel Index Score 65   End of Consult   Patient Position at End of Consult Supine;All needs within reach   Nurse Communication Nurse aware of consult     The patient's raw score on the AM-PAC Daily Activity inpatient short form is 21, standardized score is 44.27, greater than 39.4. Please refer to the recommendation of the Occupational Therapist for safe DC planning.    Resource Intensity Recommendation: Level III (Minimum Resource Intensity)        GOALS:    *ADL transfers with (I) for inc'd independence with ADLs/purposeful tasks    *UB ADL with (I) for inc'd independence with self cares    *LB ADL with (I) using AE prn for inc'd independence with self cares    *Toileting with (I) for clothing management and hygiene for return to PLOF with personal care    *Increase stand tolerance x 10  m for inc'd tolerance with standing purposeful tasks    *Participate in 10m UE therex to increase overall stamina/activity tolerance for purposeful tasks    *Bed mobility- (I) for inc'd independence to manage own comfort and initiate EOB & OOB purposeful tasks    *Patient will verbalize 3 safety awareness/ principles to prevent falls in the home setting.     *Patient will verbalize and demonstrate use of energy conservation/deep breathing techniques and work simplification skills during functional activities with no verbal cues.     *Patient will increase OOB/sitting tolerance to 2-4 hours per day to increase participation in self-care and leisure tasks with  no s/s of exertion.     *Patient will engage in ongoing cognitive assessment to assist with safe discharge planning/recommendations.     *Pt will verbalize and demonstrate understanding of post-op movement precautions 100% (if applicable) in tx sessions for increased safety and functional mobility.      *Pt will demonstrate use of long handled AE (if appropriate) during 100% of tx sessions for increased ADL safety and independence following D/C     Selina Armstrong OTR/KENDALL

## 2024-06-26 NOTE — ASSESSMENT & PLAN NOTE
Cardiac enzymes negative.  No EKG changes noted . Patient actually has left-sided neck pain.    Chest pain resolved  (6/24) ECHO: EF 70%. No RWA  Likely referred pain from cancer    Continue supportive care, pain management

## 2024-06-26 NOTE — NUTRITION
06/26/24 1321   Current PO Intake   Current Diet Order CCD 2 diet, thin liquids   Current Meal Intake 0-25%;25-50%;50-75%;%   Estimated calorie intake compared to estimated need Nutrient needs are not met   PES Statement   Oral or Nutritional Support Intake (2) Inadequate oral intake NI-2.1   Related to Catabolic illness   As evidenced by: Per patient/family interview;Malnutrition;Intake < estimated needs   Recommendations/Interventions   360 Statement Malnutrition related to chronic illness as evidenced by 5% body weight loss in 1 month, 7.5% body weight loss in 3 months, <75% energy intake compared to estimated energy needs for greater than 1 month.  To treat with oral diet and nutrition supplement as tolerated.   Summary HCT referral.  Presents with chest pain.  Past medical history significant for MEHDI, hypertension, neuroendocrine carcinoma of small bowel on chemotherapy, alcohol abuse.  Last hemoglobin A1c 6/22/24 7.2.  Food allergy to tomato noted.  Weight history reviewed.  Noted significant 18# weight loss in 3 months (7.5% body weight), 12# weight loss in 1 month (5.2% body weight).  No edema.  No pressure areas.  Prescribed a CCD 2 diet, thin liquids.  Meal completion varies 0-100% this admission.  Nutrient needs are not met.  Met with patient at bedside.  He reports his appetite is so-so.  Usually has 2 meals daily.  Avoids certain foods that give him GI upset.  Denies difficulty chewing.  Endorses difficulty swallowing during periods of SOB.  His neighbor cooks and grocery shops.  Patient grocery shops.  Reports weight fluctuates with bowel movements.  Agrees altered taste (metallic) has negatively impacted PO intake recently.  Discussed nutrition supplement in setting of inadequate intake, agreeable to Glucerna strawberry once daily at dinner.  Discussed diet as prescribed.  Patient and his neighbor state patient has not received a formal diagnosis of diabetes.  Discussed recent hemoglobin A1c.  " Provided the following nutrition education to neighbor in context of blood sugar control. RD provided and discussed \"Carbohydrate Counting for People with Diabetes,\" \"Diabetes Label Reading Tips,\" \"Plate Method for Diabetes\" handouts. Discussed carbohydrate counting as related to current diet prescription; 1 carbohydrate serving = 15 g carbohydrates. Discussed what 15 g carbohydrate looks like. Discussed nutrition label reading; serving size and total carbohydrate. Discussed meal planning; ½ plate non-starchy carbohydrate foods, ¼ plate carbohydrate-rich foods, ¼ protein foods. Discouraged intake of sugar-sweetened beverages. Encouraged intake of water or non-sugar sweetened beverage options. Pt verbalizes understanding to all. RD to follow and address education needs PRN.   Interventions/Recommendations Continue current diet order;Monitor I & O's;Supplement initiate   Education Assessment   Education Education initiated/ completed   Patient Nutrition Goals   Goal Comprehend education;Improve to healthful diet;Meet PO needs       "

## 2024-06-26 NOTE — ASSESSMENT & PLAN NOTE
Appears in new onset diabetes.    Hemoglobin A1C 7.2  Per staff, reluctant to receive glucometer education. Spoke with his sister who lives next door. She feels comfortable keeping an eye on the patient. She will be checking his blood glucose at home. Will start on metformin 500 mg BID upon discharge.   Will need a close follow up with PCP upon discharge.

## 2024-06-26 NOTE — ASSESSMENT & PLAN NOTE
Psychiatry input appreciated   The patient does not meet criteria for inpatient psychiatric admission  Started Seroquel 50 mg at bedtime; this can be increased by 50 mg/day to max dose of 300 mg  Started Atarax 50 mg twice daily as needed  Continue with lithium and monitor level closely

## 2024-06-26 NOTE — DISCHARGE SUMMARY
[facility]  Discharge- Osei Trimble 1975, 49 y.o. male MRN: [id number]  Unit/Bed#: ICU 08-01 Encounter: [id number]  Primary Care Provider: Jermaine Mayes DO   Date and time admitted to hospital: 6/21/2024  5:05 PM    * Acute encephalopathy  Assessment & Plan  6/22 RRT called when patient became unresponsive while using the toilet. SBP 80s. Upon return to bed remained unresponsive despite IV fluids. Intubated for airway protection and started on vasopressors.   6/23 extubated to RA. TTE- no WMA   CT head negative for acute abnormality  MRI completed to r/o brain metastasis or acute ischemia  UDS only Positive for Fentanyl, which was used for induction of intubation   Unclear etiology, possible seizure 2/2 hypophosphatemia   Resolved and back to baseline        Moderate protein-calorie malnutrition (HCC)  Assessment & Plan  Malnutrition Findings:   Adult Malnutrition type: Chronic illness  Adult Degree of Malnutrition: Malnutrition of moderate degree  Malnutrition Characteristics: Weight loss, Inadequate energy                  360 Statement: Malnutrition related to chronic illness as evidenced by 5% body weight loss in 1 month, 7.5% body weight loss in 3 months, <75% energy intake compared to estimated energy needs for greater than 1 month.  To treat with oral diet and nutrition supplement as tolerated.    BMI Findings:           Body mass index is 28.91 kg/m².   Dietary input appreciated   Continue supplement as indicated     Alcohol abuse  Assessment & Plan  Drinks a quart of wine every day  Cessation discussed       Total bilirubin, elevated  Assessment & Plan  Possibly 2/2 Liver metastasis   CT CAP unremarkable  Improving         Hyperglycemia  Assessment & Plan  Appears in new onset diabetes.    Results from last 7 days   Lab Units 06/27/24  0608 06/26/24  0549 06/25/24  0444   GLUCOSE RANDOM mg/dL 174* 187* 211*   Hemoglobin A1C 7.2  Per staff, reluctant to receive glucometer  education. Spoke with his sister who lives next door. She feels comfortable keeping an eye on the patient. She will be checking his blood glucose at home. Will start on metformin 500 mg BID upon discharge.   Will need a close follow up with PCP upon discharge.      Explosive personality disorder (HCC)  Assessment & Plan  Psychiatry input appreciated   The patient does not meet criteria for inpatient psychiatric admission  Started Seroquel 50 mg at bedtime; this can be increased by 50 mg/day to max dose of 300 mg  Started Atarax 50 mg twice daily as needed  Continue with lithium and monitor level closely            Other chest pain  Assessment & Plan  Cardiac enzymes negative.  No EKG changes noted . Patient actually has left-sided neck pain.    Chest pain resolved  (6/24) ECHO: EF 70%. No RWA  Likely referred pain from cancer     Continue supportive care, pain management      Neuroendocrine carcinoma of small bowel (HCC)  Assessment & Plan  Follows with Dr. Green as an outpatient  Incidentally found to have mesenteric masses in October 2021  PET CT 2022 demonstrated Dotatate avid clustered central mesenteric masses suspicious for underlying neuroendocrine neoplasm  Underwent small bowel resection 07/2022 in the area that was thought to be high risk for obstruction  Currently undergoing chemo treatment every 4 weeks with lanreotide         Essential hypertension  Assessment & Plan  Lisinopril  held initially due to MEHDI- now resumed   Continue with lisinopril and prazosin   Continue to monitor BP and adjust medications as indicated outpatient         MEHDI (acute kidney injury) (HCC)  Assessment & Plan  History of mood disorder and hypertension presented to the hospital with chest pain found to have kidney injury.  Resolved with IV fluids.  Nephrology input appreciated.   Renal ultrasound without acute pathology.  Avoid nephrotoxins and hypotension   Follow up with PCP           Discharging Physician / Practitioner:  PIERRE Mukherjee  PCP: Jermaine Mayes DO  Admission Date:   Admission Orders (From admission, onward)       Ordered        06/21/24 1945  Inpatient Admission  Once                          Discharge Date: 06/27/24    Reason for Admission: chest pain and MEHDI     Discharge Diagnoses:     Principal Problem:    Acute encephalopathy  Active Problems:    MEHDI (acute kidney injury) (HCC)    Essential hypertension    Neuroendocrine carcinoma of small bowel (HCC)    Other chest pain    Explosive personality disorder (HCC)    Hyperglycemia    Total bilirubin, elevated    Palliative care by specialist    Goals of care, counseling/discussion    Alcohol abuse    Moderate protein-calorie malnutrition (HCC)  Resolved Problems:    * No resolved hospital problems. *      Consultations During Hospital Stay:  IP CONSULT TO NEPHROLOGY  IP CONSULT TO CASE MANAGEMENT  IP CONSULT TO PALLIATIVE CARE  IP CONSULT TO PSYCHIATRY    Procedures Performed:     None     Significant Findings / Test Results:     CT chest abdomen pelvis wo contrast  Result Date: 6/22/2024  1.  Partial atelectasis of the left lower lobe, scattered atelectasis in the right lung. 2.  Endotracheal tube with tip just entering the takeoff of the right mainstem bronchus. 3.  No acute findings in the abdomen or pelvis. 4.  Air-distended colon without findings to suggest obstruction.     CT head wo contrast  Result Date: 6/22/2024  Limited by artifact as discussed above. No gross abnormality. No intracranial hemorrhage, edema or mass effect.      US kidney and bladder  Result Date: 6/22/2024  No hydronephrosis. The bladder is not well evaluated due to underdistention.       VAS VENOUS DUPLEX - LOWER LIMB BILATERAL  Result Date: 6/22/2024  CONCLUSION:  Impression: RIGHT LOWER LIMB: No evidence of acute or chronic deep vein thrombosis. No evidence of superficial thrombophlebitis noted. Doppler evaluation shows a normal response to augmentation maneuvers. Popliteal,  posterior tibial and anterior tibial arterial Doppler waveforms are triphasic.  LEFT LOWER LIMB: No evidence of acute or chronic deep vein thrombosis. No evidence of superficial thrombophlebitis noted. Doppler evaluation shows a normal response to augmentation maneuvers. Popliteal, posterior tibial and anterior tibial arterial Doppler waveforms are triphasic.     XR chest 1 view portable  Result Date: 6/21/2024  No acute cardiopulmonary disease.     CT chest without contrast  Result Date: 6/21/2024  No acute findings in the chest. Unchanged 9 mm right middle lobe pulmonary nodule, not FDG avid on PET/CT 5/10/2024.     Incidental Findings:   None      Test Results Pending at Discharge (will require follow up):   Final blood cultures      Outpatient Tests Requested:  Follow up with PCP, hematology     Complications:  none     Hospital Course:     Osei Trimble is a 49 y.o. male patient who originally presented to the hospital on 6/21/2024 due to chest pain.  Patient presented with chest pain and was admitted.  At this time ACS has been ruled out.  He was found to have MEHDI.  The patient is evaluated by nephrology.  MEHDI now has resolved.  He found to have acute encephalopathy and RRT was called on 6/22/2024.  Patient was found with SBP in the low 80s given IV fluids without much improvement and subsequently was intubated due to low GCS with vomiting and possible aspiration.  He subsequently was transferred to ICU status.  He eventually was extubated on 6/23 and transitioned to internal medicine service on 6/25.  The cause of acute encephalopathy is possible related to absence seizure versus electrolyte abnormality.  Currently the patient is at baseline.  He was evaluated by psychiatry and deemed not to be a candidate for inpatient psychiatry admission.  He was evaluated by palliative care.     Condition at Discharge: good     Discharge Day Visit / Exam:     Subjective:  patient was seen and examined. Complains of some  "back pain. States that right ankle pain is better today. No chest pain or dyspnea.   Vitals: Blood Pressure: 115/64 (06/27/24 0733)  Pulse: 98 (06/26/24 1230)  Temperature: (!) 97.1 °F (36.2 °C) (06/27/24 0733)  Temp Source: Tympanic (06/27/24 0733)  Respirations: 16 (06/27/24 0733)  Height: 6' 1\" (185.4 cm) (06/24/24 0748)  Weight - Scale: 99.4 kg (219 lb 2.2 oz) (06/26/24 0542)  SpO2: 94 % (06/26/24 1230)  Exam:   Physical Exam  Vitals and nursing note reviewed.   Constitutional:       General: He is not in acute distress.     Appearance: Normal appearance.   HENT:      Head: Normocephalic and atraumatic.      Right Ear: External ear normal.      Left Ear: External ear normal.      Nose: Nose normal.      Mouth/Throat:      Mouth: Mucous membranes are moist.      Pharynx: Oropharynx is clear.   Eyes:      General:         Right eye: No discharge.         Left eye: No discharge.      Extraocular Movements: Extraocular movements intact.      Pupils: Pupils are equal, round, and reactive to light.   Cardiovascular:      Rate and Rhythm: Normal rate and regular rhythm.      Pulses: Normal pulses.      Heart sounds: Normal heart sounds. No murmur heard.  Pulmonary:      Effort: Pulmonary effort is normal. No respiratory distress.      Breath sounds: Normal breath sounds. No wheezing or rales.   Abdominal:      General: Bowel sounds are normal. There is no distension.      Palpations: Abdomen is soft. There is no mass.      Tenderness: There is no abdominal tenderness.   Musculoskeletal:         General: No swelling, tenderness or deformity. Normal range of motion.      Cervical back: Normal range of motion and neck supple. No rigidity.   Skin:     General: Skin is warm and dry.      Capillary Refill: Capillary refill takes less than 2 seconds.      Coloration: Skin is not pale.      Findings: No erythema.   Neurological:      General: No focal deficit present.      Mental Status: He is alert and oriented to person, " place, and time. Mental status is at baseline.   Psychiatric:         Mood and Affect: Mood normal.         Behavior: Behavior normal.         Thought Content: Thought content normal.         Judgment: Judgment normal.     Discussion with Family: sister     Discharge instructions/Information to patient and family:   See after visit summary for information provided to patient and family.      Provisions for Follow-Up Care:  See after visit summary for information related to follow-up care and any pertinent home health orders.      Disposition:     Home    Planned Readmission: no      Discharge Statement:  I spent 39 minutes discharging the patient. This time was spent on the day of discharge. I had direct contact with the patient on the day of discharge. Greater than 50% of the total time was spent examining patient, answering all patient questions, arranging and discussing plan of care with patient as well as directly providing post-discharge instructions.  Additional time then spent on discharge activities.    Discharge Medications:  See after visit summary for reconciled discharge medications provided to patient and family.      ** Please Note: This note has been constructed using a voice recognition system **

## 2024-06-26 NOTE — PROGRESS NOTES
[facility]  Progress Note  Name: Osei Trimble I  MRN: [id number]  Unit/Bed#: ICU 08-01 I Date of Admission: 6/21/2024   Date of Service: 6/26/2024 I Hospital Day: 5    Assessment & Plan   * Acute encephalopathy  Assessment & Plan  6/22 RRT called when patient became unresponsive while using the toilet. SBP 80s. Upon return to bed remained unresponsive despite IV fluids. Intubated for airway protection and started on vasopressors.   6/23 extubated to RA. TTE- no WMA   CT head negative for acute abnormality  MRI completed to r/o brain metastasis or acute ischemia  UDS only Positive for Fentanyl, which was used for induction of intubation   Unclear etiology, possible seizure 2/2 hypophosphatemia   Resolved and back to baseline      Alcohol abuse  Assessment & Plan  Drinks a quart of wine every day  Cessation discussed      Total bilirubin, elevated  Assessment & Plan  Possibly 2/2 Liver metastasis   CT CAP unremarkable  Improving        Hyperglycemia  Assessment & Plan  Appears in new onset diabetes.    Hemoglobin A1C 7.2  Per staff, reluctant to receive glucometer education. Spoke with his sister who lives next door. She feels comfortable keeping an eye on the patient. She will be checking his blood glucose at home. Will start on metformin 500 mg BID upon discharge.   Will need a close follow up with PCP upon discharge.     Explosive personality disorder (HCC)  Assessment & Plan  Psychiatry input appreciated   The patient does not meet criteria for inpatient psychiatric admission  Started Seroquel 50 mg at bedtime; this can be increased by 50 mg/day to max dose of 300 mg  Started Atarax 50 mg twice daily as needed  Continue with lithium and monitor level closely          Other chest pain  Assessment & Plan  Cardiac enzymes negative.  No EKG changes noted . Patient actually has left-sided neck pain.    Chest pain resolved  (6/24) ECHO: EF 70%. No RWA  Likely referred pain from cancer     Continue supportive care, pain management      Neuroendocrine carcinoma of small bowel (HCC)  Assessment & Plan  Follows with Dr. Green as an outpatient  Incidentally found to have mesenteric masses in October 2021  PET CT 2022 demonstrated Dotatate avid clustered central mesenteric masses suspicious for underlying neuroendocrine neoplasm  Underwent small bowel resection 07/2022 in the area that was thought to be high risk for obstruction  Currently undergoing chemo treatment every 4 weeks with lanreotide      Essential hypertension  Assessment & Plan  Lisinopril  held initially due to MEHDI- now resumed   Continue with lisinopril and prazosin   Continue to monitor BP and adjust medications as indicated       MEHDI (acute kidney injury) (HCC)  Assessment & Plan  History of mood disorder and hypertension presented to the hospital with chest pain found to have kidney injury.  Resolved with IV fluids.  Nephrology input appreciated.   Renal ultrasound without acute pathology.  Avoid nephrotoxins and hypotension                VTE Pharmacologic Prophylaxis: VTE Score: 2 Low Risk (Score 0-2) - Encourage Ambulation.with heparin SC.     Mobility:   Basic Mobility Inpatient Raw Score: 20  JH-HLM Goal: 6: Walk 10 steps or more  JH-HLM Achieved: 8: Walk 250 feet ot more  JH-HLM Goal achieved. Continue to encourage appropriate mobility.    Patient Centered Rounds: I performed bedside rounds with nursing staff today.   Discussions with Specialists or Other Care Team Provider: CM, PT/OT     Education and Discussions with Family / Patient: Updated  (sister) via phone.    Total Time Spent on Date of Encounter in care of patient: 38 mins. This time was spent on one or more of the following: performing physical exam; counseling and coordination of care; obtaining or reviewing history; documenting in the medical record; reviewing/ordering tests, medications or procedures; communicating with other healthcare professionals  and discussing with patient's family/caregivers.    Current Length of Stay: 5 day(s)  Current Patient Status: Inpatient   Certification Statement: The patient will continue to require additional inpatient hospital stay due to acute encephalopathy  Discharge Plan: Anticipate discharge in 24-48 hrs to home with home services.    Code Status: Level 1 - Full Code    Subjective:   Patient was seen and examined. He states feeling somewhat better. Complains of some right ankle pain. Some dizziness with ambulation but that has much improved. Denies any chest pain or dyspnea. Staff reports no acute event overnight. I was updated by nursing staff that the patient had a coughing spell with drop in O2 required brief episode of O2 supplement but since has been off.     Objective:     Vitals:   Temp (24hrs), Av.3 °F (36.8 °C), Min:97.8 °F (36.6 °C), Max:98.7 °F (37.1 °C)    Temp:  [97.8 °F (36.6 °C)-98.7 °F (37.1 °C)] 97.8 °F (36.6 °C)  HR:  [] 98  Resp:  [18-31] 18  BP: ()/(54-79) 109/71  SpO2:  [88 %-95 %] 94 %  Body mass index is 28.91 kg/m².     Input and Output Summary (last 24 hours):     Intake/Output Summary (Last 24 hours) at 2024 1454  Last data filed at 2024 1101  Gross per 24 hour   Intake 320 ml   Output 1050 ml   Net -730 ml       Physical Exam:   Physical Exam  Vitals and nursing note reviewed.   Constitutional:       General: He is not in acute distress.     Appearance: Normal appearance.   HENT:      Head: Normocephalic and atraumatic.      Right Ear: External ear normal.      Left Ear: External ear normal.      Nose: Nose normal.      Mouth/Throat:      Mouth: Mucous membranes are moist.      Pharynx: Oropharynx is clear.   Eyes:      General:         Right eye: No discharge.         Left eye: No discharge.      Extraocular Movements: Extraocular movements intact.      Pupils: Pupils are equal, round, and reactive to light.   Cardiovascular:      Rate and Rhythm: Normal rate and regular  rhythm.      Pulses: Normal pulses.      Heart sounds: Normal heart sounds. No murmur heard.  Pulmonary:      Effort: Pulmonary effort is normal. No respiratory distress.      Breath sounds: Normal breath sounds. No wheezing or rales.   Abdominal:      General: Bowel sounds are normal. There is no distension.      Palpations: Abdomen is soft. There is no mass.      Tenderness: There is no abdominal tenderness.   Musculoskeletal:         General: No swelling, tenderness or deformity. Normal range of motion.      Cervical back: Normal range of motion and neck supple. No rigidity.   Skin:     General: Skin is warm and dry.      Capillary Refill: Capillary refill takes less than 2 seconds.      Coloration: Skin is not pale.      Findings: No erythema.   Neurological:      General: No focal deficit present.      Mental Status: He is alert and oriented to person, place, and time. Mental status is at baseline.   Psychiatric:         Mood and Affect: Mood normal.         Behavior: Behavior normal.         Thought Content: Thought content normal.         Judgment: Judgment normal.        Additional Data:     Labs:  Results from last 7 days   Lab Units 06/26/24  0549 06/23/24  0430 06/22/24  1329   WBC Thousand/uL 10.19*   < > 9.91   HEMOGLOBIN g/dL 11.4*   < > 13.0   HEMATOCRIT % 33.7*   < > 37.3   PLATELETS Thousands/uL 159   < > 147*   SEGS PCT %  --   --  75   LYMPHO PCT %  --   --  19   MONO PCT %  --   --  4   EOS PCT %  --   --  1    < > = values in this interval not displayed.     Results from last 7 days   Lab Units 06/26/24  0549   SODIUM mmol/L 131*   POTASSIUM mmol/L 3.2*   CHLORIDE mmol/L 96   CO2 mmol/L 26   BUN mg/dL 24   CREATININE mg/dL 1.24   ANION GAP mmol/L 9   CALCIUM mg/dL 8.5   ALBUMIN g/dL 3.4*   TOTAL BILIRUBIN mg/dL 1.06*   ALK PHOS U/L 102   ALT U/L 27   AST U/L 18   GLUCOSE RANDOM mg/dL 187*     Results from last 7 days   Lab Units 06/22/24  1248   INR  1.14     Results from last 7 days   Lab Units  06/26/24  1141 06/26/24  0717 06/25/24  2029 06/25/24  1556 06/25/24  1129 06/25/24  0706 06/24/24  2239 06/24/24  1606 06/24/24  1216 06/24/24  0517 06/23/24  1942 06/23/24  1804   POC GLUCOSE mg/dl 200* 187* 202* 179* 176* 218* 237* 217* 239* 149* 183* 147*     Results from last 7 days   Lab Units 06/22/24  0431   HEMOGLOBIN A1C % 7.2*     Results from last 7 days   Lab Units 06/24/24  0513 06/23/24  1524 06/23/24  0430 06/22/24  2211 06/22/24  1556 06/22/24  1329 06/22/24  1248   LACTIC ACID mmol/L  --  1.1  --  1.7 3.0* 3.7*  --    PROCALCITONIN ng/ml 0.27*  --  0.48*  --   --   --  0.12       Lines/Drains:  Invasive Devices       Peripheral Intravenous Line  Duration             Peripheral IV 06/21/24 Left;Proximal;Ventral (anterior) Forearm 4 days    Peripheral IV 06/22/24 Dorsal (posterior);Left Hand 4 days    Peripheral IV 06/22/24 Right;Ventral (anterior) Forearm 4 days              Drain  Duration             Ureteral Drain/Stent Right ureter 6 Fr. 994 days                          Imaging: Reviewed radiology reports from this admission including: MRI brain and xray(s)    Recent Cultures (last 7 days):   Results from last 7 days   Lab Units 06/23/24  0826   BLOOD CULTURE  No Growth at 48 hrs.  No Growth at 48 hrs.       Last 24 Hours Medication List:   Current Facility-Administered Medications   Medication Dose Route Frequency Provider Last Rate    acetaminophen  650 mg Oral Q6H PRN Andreea A Sedora, CRNP      chlorhexidine  15 mL Mouth/Throat Q12H [facility] Andreea A Sedora, CRNP      folic acid  1 mg Oral Daily Andreea A Sedora, CRNP      folic acid 1 mg in sodium chloride 0.9 % 50 mL IVPB  1 mg Intravenous Daily Andreea A Sedora, CRNP 1 mg (06/26/24 0900)    heparin (porcine)  5,000 Units Subcutaneous Q8H [facility] Andreea A Sedora, CRNP      hydrALAZINE  10 mg Intravenous Q4H PRN Andreea Arroyo, PIERRE      hydrOXYzine HCL  50 mg Oral BID PRN Odilia Rose, AMADA      insulin lispro  1-6 Units  Subcutaneous 4x Daily (AC & HS) Andreea A Sedora, CRNP      lidocaine  2 patch Topical Daily Andreea A Sedora, CRNP      lisinopril  20 mg Oral Daily Andreea A Sedora, CRNP      lithium carbonate  300 mg Oral BID Andreea A Sedora, CRNP      ondansetron  4 mg Intravenous Q6H PRN Andreea A Sedora, CRNP      oxyCODONE  5 mg Oral Q4H PRN Andreea A Sedora, CRNP      polyethylene glycol  17 g Oral BID Andreea A Sedora, CRNP      prazosin  5 mg Oral HS Andreea A Sedora, CRNP      QUEtiapine  50 mg Oral HS Odilia Rose, AMADA      senna  2 tablet Oral HS Andreea A Cruz, PIERRE          Today, Patient Was Seen By: PIERRE Mukherjee    **Please Note: This note may have been constructed using a voice recognition system.**

## 2024-06-26 NOTE — MALNUTRITION/BMI
This medical record reflects one or more clinical indicators suggestive of malnutrition and/or morbid obesity.    Malnutrition Findings:   Adult Malnutrition type: Chronic illness  Adult Degree of Malnutrition: Malnutrition of moderate degree  Malnutrition Characteristics: Weight loss, Inadequate energy    360 Statement: Malnutrition related to chronic illness as evidenced by 5% body weight loss in 1 month, 7.5% body weight loss in 3 months, <75% energy intake compared to estimated energy needs for greater than 1 month.  To treat with oral diet and nutrition supplement as tolerated.    BMI Findings:           Body mass index is 28.91 kg/m².     See Nutrition note dated 6/26/2024 in flow sheets for additional details.  Completed nutrition assessment is viewable in the nutrition documentation.   Home

## 2024-06-27 VITALS
DIASTOLIC BLOOD PRESSURE: 64 MMHG | SYSTOLIC BLOOD PRESSURE: 115 MMHG | OXYGEN SATURATION: 95 % | HEART RATE: 84 BPM | TEMPERATURE: 97.1 F | HEIGHT: 73 IN | BODY MASS INDEX: 29.04 KG/M2 | RESPIRATION RATE: 16 BRPM | WEIGHT: 219.14 LBS

## 2024-06-27 LAB
ANION GAP SERPL CALCULATED.3IONS-SCNC: 8 MMOL/L (ref 4–13)
BUN SERPL-MCNC: 22 MG/DL (ref 5–25)
CALCIUM SERPL-MCNC: 8.9 MG/DL (ref 8.4–10.2)
CHLORIDE SERPL-SCNC: 97 MMOL/L (ref 96–108)
CO2 SERPL-SCNC: 27 MMOL/L (ref 21–32)
CREAT SERPL-MCNC: 0.9 MG/DL (ref 0.6–1.3)
ERYTHROCYTE [DISTWIDTH] IN BLOOD BY AUTOMATED COUNT: 13.5 % (ref 11.6–15.1)
GFR SERPL CREATININE-BSD FRML MDRD: 99 ML/MIN/1.73SQ M
GLUCOSE SERPL-MCNC: 174 MG/DL (ref 65–140)
GLUCOSE SERPL-MCNC: 179 MG/DL (ref 65–140)
GLUCOSE SERPL-MCNC: 199 MG/DL (ref 65–140)
GLUCOSE SERPL-MCNC: 224 MG/DL (ref 65–140)
HCT VFR BLD AUTO: 33.9 % (ref 36.5–49.3)
HGB BLD-MCNC: 11.6 G/DL (ref 12–17)
MCH RBC QN AUTO: 29.5 PG (ref 26.8–34.3)
MCHC RBC AUTO-ENTMCNC: 34.2 G/DL (ref 31.4–37.4)
MCV RBC AUTO: 86 FL (ref 82–98)
PLATELET # BLD AUTO: 187 THOUSANDS/UL (ref 149–390)
PMV BLD AUTO: 9.8 FL (ref 8.9–12.7)
POTASSIUM SERPL-SCNC: 3.3 MMOL/L (ref 3.5–5.3)
RBC # BLD AUTO: 3.93 MILLION/UL (ref 3.88–5.62)
SODIUM SERPL-SCNC: 132 MMOL/L (ref 135–147)
VIT B1 BLD-SCNC: 104.3 NMOL/L (ref 66.5–200)
WBC # BLD AUTO: 8.6 THOUSAND/UL (ref 4.31–10.16)

## 2024-06-27 PROCEDURE — 80048 BASIC METABOLIC PNL TOTAL CA: CPT | Performed by: NURSE PRACTITIONER

## 2024-06-27 PROCEDURE — 85027 COMPLETE CBC AUTOMATED: CPT | Performed by: NURSE PRACTITIONER

## 2024-06-27 PROCEDURE — 82948 REAGENT STRIP/BLOOD GLUCOSE: CPT

## 2024-06-27 PROCEDURE — 99233 SBSQ HOSP IP/OBS HIGH 50: CPT | Performed by: PHYSICIAN ASSISTANT

## 2024-06-27 RX ORDER — LANCETS 33 GAUGE
EACH MISCELLANEOUS
Qty: 100 EACH | Refills: 0 | Status: SHIPPED | OUTPATIENT
Start: 2024-06-27 | End: 2024-06-27

## 2024-06-27 RX ORDER — BLOOD SUGAR DIAGNOSTIC
STRIP MISCELLANEOUS
Qty: 100 EACH | Refills: 1 | Status: SHIPPED | OUTPATIENT
Start: 2024-06-27

## 2024-06-27 RX ORDER — FOLIC ACID 1 MG/1
1 TABLET ORAL DAILY
Qty: 30 TABLET | Refills: 0 | Status: SHIPPED | OUTPATIENT
Start: 2024-06-28 | End: 2024-07-28

## 2024-06-27 RX ORDER — HYDROXYZINE 50 MG/1
50 TABLET, FILM COATED ORAL 2 TIMES DAILY PRN
Qty: 30 TABLET | Refills: 0 | Status: SHIPPED | OUTPATIENT
Start: 2024-06-27

## 2024-06-27 RX ORDER — POTASSIUM CHLORIDE 20 MEQ/1
40 TABLET, EXTENDED RELEASE ORAL ONCE
Status: COMPLETED | OUTPATIENT
Start: 2024-06-27 | End: 2024-06-27

## 2024-06-27 RX ORDER — BLOOD-GLUCOSE METER
KIT MISCELLANEOUS
Qty: 1 KIT | Refills: 0 | Status: SHIPPED | OUTPATIENT
Start: 2024-06-27

## 2024-06-27 RX ORDER — GLUCOSAMINE HCL/CHONDROITIN SU 500-400 MG
CAPSULE ORAL
Qty: 100 EACH | Refills: 0 | Status: SHIPPED | OUTPATIENT
Start: 2024-06-27 | End: 2024-06-27

## 2024-06-27 RX ORDER — LANCETS 33 GAUGE
EACH MISCELLANEOUS
Qty: 100 EACH | Refills: 1 | Status: SHIPPED | OUTPATIENT
Start: 2024-06-27

## 2024-06-27 RX ORDER — BLOOD SUGAR DIAGNOSTIC
STRIP MISCELLANEOUS
Qty: 100 EACH | Refills: 0 | Status: SHIPPED | OUTPATIENT
Start: 2024-06-27 | End: 2024-06-27

## 2024-06-27 RX ORDER — GLUCOSAMINE HCL/CHONDROITIN SU 500-400 MG
CAPSULE ORAL
Qty: 100 EACH | Refills: 1 | Status: SHIPPED | OUTPATIENT
Start: 2024-06-27

## 2024-06-27 RX ORDER — BLOOD-GLUCOSE METER
KIT MISCELLANEOUS
Qty: 1 KIT | Refills: 0 | Status: SHIPPED | OUTPATIENT
Start: 2024-06-27 | End: 2024-06-27

## 2024-06-27 RX ORDER — QUETIAPINE FUMARATE 50 MG/1
50 TABLET, FILM COATED ORAL
Qty: 30 TABLET | Refills: 0 | Status: SHIPPED | OUTPATIENT
Start: 2024-06-27 | End: 2024-07-27

## 2024-06-27 RX ORDER — LIDOCAINE 50 MG/G
2 PATCH TOPICAL DAILY
Qty: 60 PATCH | Refills: 0 | Status: SHIPPED | OUTPATIENT
Start: 2024-06-28 | End: 2024-07-28

## 2024-06-27 RX ADMIN — POTASSIUM CHLORIDE 40 MEQ: 1500 TABLET, EXTENDED RELEASE ORAL at 08:06

## 2024-06-27 RX ADMIN — INSULIN LISPRO 2 UNITS: 100 INJECTION, SOLUTION INTRAVENOUS; SUBCUTANEOUS at 12:19

## 2024-06-27 RX ADMIN — LITHIUM CARBONATE 300 MG: 300 TABLET, EXTENDED RELEASE ORAL at 08:07

## 2024-06-27 RX ADMIN — FOLIC ACID 1 MG: 5 INJECTION, SOLUTION INTRAMUSCULAR; INTRAVENOUS; SUBCUTANEOUS at 09:28

## 2024-06-27 RX ADMIN — LISINOPRIL 20 MG: 20 TABLET ORAL at 08:06

## 2024-06-27 RX ADMIN — CHLORHEXIDINE GLUCONATE 15 ML: 1.2 RINSE ORAL at 08:06

## 2024-06-27 RX ADMIN — OXYCODONE HYDROCHLORIDE 5 MG: 5 TABLET ORAL at 08:06

## 2024-06-27 RX ADMIN — FOLIC ACID 1 MG: 1 TABLET ORAL at 08:06

## 2024-06-27 RX ADMIN — INSULIN LISPRO 1 UNITS: 100 INJECTION, SOLUTION INTRAVENOUS; SUBCUTANEOUS at 07:45

## 2024-06-27 RX ADMIN — HEPARIN SODIUM 5000 UNITS: 5000 INJECTION, SOLUTION INTRAVENOUS; SUBCUTANEOUS at 06:08

## 2024-06-27 RX ADMIN — LIDOCAINE 2 PATCH: 700 PATCH TOPICAL at 08:06

## 2024-06-27 RX ADMIN — POLYETHYLENE GLYCOL 3350 17 G: 17 POWDER, FOR SOLUTION ORAL at 08:07

## 2024-06-27 NOTE — ASSESSMENT & PLAN NOTE
Resolved.  06/22 pt unresponsive. Extubated 06/23. Unclear etiology.   Had EEG performed 06/25 -Interpretation: This is an abnormal 29 minutes awake and drowsy EEG due to slow posterior dominant rhythm.  This finding is etiologically nonspecific for mild diffuse cerebral dysfunction, which could be seen in the setting of multiple psychotropic medications.

## 2024-06-27 NOTE — PROGRESS NOTES
Formerly [facility], later [facility]  Progress Note  Name: Osei Trimble I  MRN: [id number]  Unit/Bed#: ICU 08-01 I Date of Admission: 6/21/2024   Date of Service: 6/27/2024 I Hospital Day: 6    Assessment & Plan   Other chest pain  Assessment & Plan  Presenting complaint on 06/21.  Troponin neg, EKG WNL  ECHO completed today, EF 70%  Pain is epigastric and radiates to umbilicus.   Also describes bloating 2/2 constipation  Hx of abdominal wall hernia.     Pain improved with bowel movement. Occurring less frequently.  Has rarely needed oxycodone for pain.   Controls his pain at home with Tylenol and MMJ.   Patient will call [facility] team with symptom concerns outpatient.   Opioids for discharge do not seem to be necessary for symptom control at this point and may be best to avoid doing so due to patient and family hx of polysubstance abuse/overdose.     Goals of care, counseling/discussion  Assessment & Plan  Decisional apparatus:  Patient is competent on exam today.  If competency is lost, patient's substitute decision maker would default to [facility] by [place] Act [place].  Advance Directive / Living Will / POLST / POA Forms: No    Goals  Level 1 code status.   Full code.   Disease focused care.   Would like all intervention to prolong life  [facility] to [facility] upon discharge, they are moving his bed and setting up a room for him.   Patient ready for discharge today, reviewed with CM.   Would like outpatient psychiatry and therapy referrals within network.   Will continue to follow with [facility] team outpatient. Patient advised to call for symptom concerns. Explained 24 on call provider line.     Palliative care by specialist  Assessment & Plan    Follow up  Palliative Care will continue to follow.  Please reach out to on-call provider via [employer] if questions or concerns arise.  Please do not hesitate to reach our on call provider through our clinic answering service at [phone] should you have acute symptom  control concerns.      Hyperglycemia  Assessment & Plan  New onset diabetes  A1C of 7.2  Started on Metformin  Follow up with PCP outpatient    Explosive personality disorder (HCC)  Assessment & Plan  On lithium  Follows with outpatient psych  Psych consulted inpatient - see note      Neuroendocrine carcinoma of small bowel (HCC)  Assessment & Plan  Follows with dr. Green outpatient  S/p small bowel resection 2022.  Currently on lanreotide  Would like to continue antineoplastic therapies    MEHDI (acute kidney injury) (HCC)  Assessment & Plan  Creatinine 2.38 on admission  Improved with fluid resuscitation  Nephrology consulted - see note  MEHDI resolved    * Acute encephalopathy  Assessment & Plan  Resolved.  06/22 pt unresponsive. Extubated 06/23. Unclear etiology.   Had EEG performed 06/25 -Interpretation: This is an abnormal 29 minutes awake and drowsy EEG due to slow posterior dominant rhythm.  This finding is etiologically nonspecific for mild diffuse cerebral dysfunction, which could be seen in the setting of multiple psychotropic medications.           Interval History  Feeling better today. Sleeping ok. Pain improved. Had BM with relief of symptoms. No new concerns. Patient interested in addressing his mental health outpatient and would like to see a new psychiatrist and therapist.     Review of Systems  All other systems negative.     MEDICATIONS / ALLERGIES:  all current active meds have been reviewed, current meds:   Current Facility-Administered Medications   Medication Dose Route Frequency    acetaminophen (TYLENOL) tablet 650 mg  650 mg Oral Q6H PRN    chlorhexidine (PERIDEX) 0.12 % oral rinse 15 mL  15 mL Mouth/Throat Q12H BERENICE    folic acid (FOLVITE) tablet 1 mg  1 mg Oral Daily    folic acid 1 mg in sodium chloride 0.9 % 50 mL IVPB  1 mg Intravenous Daily    heparin (porcine) subcutaneous injection 5,000 Units  5,000 Units Subcutaneous Q8H BERENICE    hydrALAZINE (APRESOLINE) injection 10 mg  10 mg  "Intravenous Q4H PRN    hydrOXYzine HCL (ATARAX) tablet 50 mg  50 mg Oral BID PRN    insulin lispro (HumALOG/ADMELOG) 100 units/mL subcutaneous injection 1-6 Units  1-6 Units Subcutaneous 4x Daily (AC & HS)    lidocaine (LIDODERM) 5 % patch 2 patch  2 patch Topical Daily    lisinopril (ZESTRIL) tablet 20 mg  20 mg Oral Daily    lithium carbonate (LITHOBID) CR tablet 300 mg  300 mg Oral BID    ondansetron (ZOFRAN) injection 4 mg  4 mg Intravenous Q6H PRN    oxyCODONE (ROXICODONE) IR tablet 5 mg  5 mg Oral Q4H PRN    polyethylene glycol (MIRALAX) packet 17 g  17 g Oral BID    prazosin (MINIPRESS) capsule 5 mg  5 mg Oral HS    QUEtiapine (SEROquel) tablet 50 mg  50 mg Oral HS    senna (SENOKOT) tablet 17.2 mg  2 tablet Oral HS   , and PTA meds:   Prior to Admission Medications   Prescriptions Last Dose Informant Patient Reported? Taking?   lisinopril (ZESTRIL) 20 mg tablet  Self Yes No   lithium carbonate (LITHOBID) 300 mg CR tablet  Self Yes No   Sig: Take 300 mg by mouth 2 (two) times a day   prazosin (MINIPRESS) 5 mg capsule  Self Yes No   Sig: Take 5 mg by mouth daily at bedtime   risperiDONE (RisperDAL) 1 mg tablet  Self Yes No   Sig: Take 1 mg by mouth 2 (two) times a day      Facility-Administered Medications: None       Allergies   Allergen Reactions    Tomato - Food Allergy Anaphylaxis     raw    Poison Ivy Extract Hives    Poison Sumac Extract Hives       OBJECTIVE:  /64 (BP Location: Left arm)   Pulse 98   Temp (!) 97.1 °F (36.2 °C) (Tympanic)   Resp 16   Ht 6' 1\" (1.854 m)   Wt 99.4 kg (219 lb 2.2 oz)   SpO2 94%   BMI 28.91 kg/m²   Nursing notes reviewed.  Physical Exam  Vitals and nursing note reviewed.   Constitutional:       General: He is not in acute distress.  HENT:      Head: Normocephalic and atraumatic.      Right Ear: External ear normal.      Left Ear: External ear normal.      Nose: Nose normal.      Mouth/Throat:      Pharynx: Oropharynx is clear.   Eyes:      Pupils: Pupils are " equal, round, and reactive to light.   Cardiovascular:      Rate and Rhythm: Normal rate.   Pulmonary:      Effort: Pulmonary effort is normal.   Abdominal:      Tenderness: There is no abdominal tenderness.   Skin:     Findings: No rash.   Neurological:      Mental Status: He is alert and oriented to person, place, and time.   Psychiatric:         Attention and Perception: Attention normal.         Mood and Affect: Affect is flat.         Speech: Speech is tangential.         Behavior: Behavior is cooperative.       Lab Results: I have personally reviewed pertinent labs.    HEMATOLOGY PROFILE:  Results from last 7 days   Lab Units 24  0608 24  0549 24  0444 24  0430 24  1329 24  0431 24  1714   WBC Thousand/uL 8.60 10.19* 9.67   < > 9.91   < > 12.88*   HEMOGLOBIN g/dL 11.6* 11.4* 11.1*   < > 13.0   < > 16.1   HEMATOCRIT % 33.9* 33.7* 31.9*   < > 37.3   < > 46.3   PLATELETS Thousands/uL 187 159 128*   < > 147*   < > 299   SEGS PCT %  --   --   --   --  75  --  68   MONO PCT %  --   --   --   --  4  --  8   EOS PCT %  --   --   --   --  1  --  2    < > = values in this interval not displayed.       CHEMISTRY PROFILE:  Results from last 7 days   Lab Units 24  0608 24  0549 24  0444 24  0513   SODIUM mmol/L 132* 131* 129* 132*   POTASSIUM mmol/L 3.3* 3.2* 3.5 4.5   CHLORIDE mmol/L 97 96 95* 98   CO2 mmol/L 27 26 27 25   BUN mg/dL 22 24 13 10   CREATININE mg/dL 0.90 1.24 0.89 0.79   ALBUMIN g/dL  --  3.4* 3.5 3.6   CALCIUM mg/dL 8.9 8.5 8.5 8.6   ALK PHOS U/L  --  102 103 98   ALT U/L  --  27 38 41   AST U/L  --  18 28 32       Albumin:  0   Lab Value Date/Time    ALB 3.4 (L) 2024 0549     Imaging Studies: I have personally reviewed pertinent reports.  EEG Routine and awake    Result Date: 2024  Narrative: Table formatting from the original result was not included. ELECTROENCEPHALOGRAM Patient Name:  Osei Trimble  MRN: [id number] :   1975 File #: CAIP  Date performed: 6/25/2024  Referring Provider:  PIERRE Velasquez      Report date: 6/25/2024      Study type: awake EEG ICD 10 diagnosis: Transient alteration of awareness R40.4 and Episodes of unresponsiveness R41.82 Patient History: EEG is requested to assess for seizures and/or classification of epilepsy. Patient is 49 y.o. male who had an episode of unresponsive behavior, low blood pressure, intubated.  He has explosive personality disorder, presented to hospital with chest pain. Current AEDs: Medications include: Facility-Administered Medications Ordered in Other Visits Medication Dose Route Frequency Provider Last Rate  acetaminophen  650 mg Oral Q6H PRN Andreea A Sedora, CRNP    chlorhexidine  15 mL Mouth/Throat Q12H [facility] Andreea A Sedora, CRNP    folic acid  1 mg Oral Daily Andreea A Sedora, CRNP    folic acid 1 mg in sodium chloride 0.9 % 50 mL IVPB  1 mg Intravenous Daily Andreea A Sedora, CRNP 1 mg (06/25/24 1130)  heparin (porcine)  5,000 Units Subcutaneous Q8H [facility] Andreea A Sedora, CRNP    hydrALAZINE  10 mg Intravenous Q4H PRN Andreea A Sedora, CRNP    hydrOXYzine HCL  50 mg Oral BID PRN Odilia Rose PA-C    insulin lispro  1-6 Units Subcutaneous 4x Daily (AC & HS) Andreea A Sedora, CRNP    lidocaine  2 patch Topical Daily Andreea A Sedora, CRNP    lisinopril  20 mg Oral Daily Andreea A Sedora, CRNP    lithium carbonate  300 mg Oral BID Andreea A Sedora, CRNP    ondansetron  4 mg Intravenous Q6H PRN Andreea A Sedora, CRNP    oxyCODONE  5 mg Oral Q4H PRN Andreea A Sedora, CRNP    polyethylene glycol  17 g Oral BID Andreea A Sedora, CRNP    prazosin  5 mg Oral HS Andreea A Sedora, CRNP    QUEtiapine  50 mg Oral HS Odilia Rose PA-C    senna  2 tablet Oral HS Andreea A Sedora, JOHNNP   Description of Procedure: The EEG was performed with electrodes applied using the International 10-20 System.  Additional electrodes used included EOG, ECG and  T1/T2 electrodes.  A single lead ECG channel is also present.  At least 16 channels are reviewed at a time and formatted into longitudinal bipolar, transverse bipolar, and referential (to common reference or calculated common reference) montages.   The EEG was recorded with the patient awake and drowsy.  The recording was technically satisfactory. EEG was recorded from 10:51 to 11:20. Findings: Background Activity: The background is grossly symmetric with respect to voltages and activities. During wakefulness, the background is well-organized with anterior very low amplitude beta activity and posterior low-medium amplitude theta-alpha activity.  There is a symmetric 7.5-8 Hz posterior dominant rhythm that attenuates with eye opening.  Drowsiness is characterized by roving eye movements, prominent anterior beta activity, and intermittent diffuse low-moderate to moderate amplitude 0.5-1.5 Hz delta activity. Activation Procedures: Hyperventilation was not performed. Stepped photic stimulation from 1 to 30 fps was performed and produced no abnormality. Other findings: The single lead ECG shows a sinus rhythm. Interpretation: This is an abnormal 29 minutes awake and drowsy EEG due to slow posterior dominant rhythm. This finding is etiologically nonspecific for mild diffuse cerebral dysfunction, which could be seen in the setting of multiple psychotropic medications. Heather Walsh MD PhD [facility]      XR shoulder 2+ vw right    Result Date: 6/24/2024  Narrative: XR SHOULDER 2+ VW RIGHT INDICATION: right shoulder pain-r/o fx. COMPARISON: None FINDINGS: No acute fracture or dislocation. No significant degenerative changes. No lytic or blastic osseous lesion. Unremarkable soft tissues.     Impression: No acute osseous abnormality. Workstation performed: [id number]     Echo complete w/ contrast if indicated    Result Date: 6/24/2024  Narrative:   Left Ventricle: Left  ventricular cavity size is normal. Wall thickness is mildly increased. The left ventricular ejection fraction is 70%. Systolic function is vigorous. Wall motion is normal. Diastolic function is normal.   Aorta: The aortic root is mildly dilated. The ascending aorta is normal in size. The aortic root is 4.00 cm. The ascending aorta is 3.7 cm.     MRI brain w wo contrast    Result Date: 6/23/2024  Narrative: MRI BRAIN WITH AND WITHOUT CONTRAST INDICATION: Altered mental status.. COMPARISON: CT of the head from the day before. TECHNIQUE: Multiplanar, multisequence imaging of the brain was performed before and after gadolinium administration. IV Contrast:  10 mL of Gadobutrol injection (SINGLE-DOSE) IMAGE QUALITY:   Diagnostic. FINDINGS: BRAIN PARENCHYMA:  There is no discrete mass, mass effect or midline shift. There is no intracranial hemorrhage.  Normal posterior fossa.  Diffusion imaging is unremarkable. There is mild chronic microvascular ischemic change. Postcontrast imaging of the brain demonstrates no abnormal enhancement. VENTRICLES:  Normal for the patient's age. SELLA AND PITUITARY GLAND:  Normal. ORBITS:  Normal. PARANASAL SINUSES: There is mucosal thickening of the right sphenoid sinus VASCULATURE:  Evaluation of the major intracranial vasculature demonstrates appropriate flow voids. CALVARIUM AND SKULL BASE: There is a right mastoid air cell effusion. EXTRACRANIAL SOFT TISSUES:  Normal.     Impression: No mass effect, acute intracranial hemorrhage or evidence of recent infarction. No abnormal parenchymal or leptomeningeal enhancement identified. Mild nonspecific white matter FLAIR signal hyperintensity is likely on the basis of chronic microvascular ischemic change. Right mastoid air cell effusion. Workstation performed: [id number]     XR chest portable ICU    Result Date: 6/23/2024  Narrative: XR CHEST PORTABLE ICU INDICATION: aspiration. COMPARISON: Chest CT 6/22/2024, CXR 6/21/2024. FINDINGS: ET tube 4  cm above the bianka. NG tube in stomach. Right jugular catheter at cavoatrial junction. Redemonstration of atelectasis in the right upper and left lower lobes. No pneumothorax or pleural effusion. Normal cardiomediastinal silhouette. Bones are unremarkable for age. Normal upper abdomen.     Impression: Redemonstration of right upper and left lower lobe atelectasis. Superimposed aspiration not excluded. Workstation performed: [id number]     CT chest abdomen pelvis wo contrast    Result Date: 6/22/2024  Narrative: CT CHEST, ABDOMEN, AND PELVIS WITHOUT IV CONTRAST INDICATION:   ams. COMPARISON: CT chest 6/21/2024. CT abdomen/pelvis 6/1/2024. TECHNIQUE: CT examination of the chest, abdomen, and pelvis was performed without intravenous or enteric contrast, limiting evaluation for certain processes. Axial, sagittal, and coronal 2D reformatted images were created from the source data and submitted for interpretation. Radiation dose length product (DLP) for this visit:  1799 mGy-cm .  This examination, like all CT scans performed in the [facility], was performed utilizing techniques to minimize radiation dose exposure, including the use of iterative reconstruction and automated exposure control. IV Contrast: None. Enteric Contrast: None. FINDINGS: CHEST: LARGE AIRWAYS: Endotracheal tube with tip just entering the takeoff of the right mainstem bronchus. LUNGS: Complete atelectasis of several basal segments of the left lower lobe. Atelectasis in the posterior basal right upper lobe and scattered in the dependent right lower lobe. No definite consolidation. Stable 9 mm nodule in the middle lobe (series 2/48), not hypermetabolic on prior PET. PLEURA: Trace left pleural effusion. No pneumothorax. HEART: Normal cardiac size and morphology. No coronary artery calcification. No pericardial effusion or thickening. VESSELS: Normal caliber thoracic aorta with no detectable atherosclerotic plaque. Normal caliber main  pulmonary artery. MEDIASTINUM AND AREN: Moderate diffuse esophageal wall thickening.. No lymphadenopathy. CHEST WALL AND LOWER NECK:   Right IJ central venous catheter with tip at the cavoatrial junction. ABDOMEN: LIVER: Hepatomegaly measuring 19.5 cm in maximum craniocaudal dimension. Severe diffuse steatosis. Known hepatic metastasis not conspicuous on noncontrast exam. BILIARY: No intrahepatic biliary ductal dilatation. Normal caliber common bile duct. GALLBLADDER: No calcified gallstones. Normal wall thickness. No pericholecystic inflammatory changes. SPLEEN: Within normal limits. No gross mass on noncontrast study. Normal spleen size. PANCREAS: Diffusely fatty replaced but otherwise grossly within normal limits. ADRENAL GLANDS: Within normal limits. KIDNEYS/URETERS: Normal kidney size and position. No suspicious lesions within the limitations of a noncontrast exam. Punctate punctate bilateral nonobstructing calculi. No hydronephrosis. STOMACH AND BOWEL: The stomach is within normal limits, moderately distended with air and debris with NG tube in place. Normal caliber small bowel. The colon is diffusely distended with gas, with mostly decompressed rectum, no evidence of obstruction. No  evidence of active small or large bowel inflammatory process. APPENDIX: Normal air-filled appendix. ABDOMINOPELVIC CAVITY: No change in a 4.2 cm centrally calcified mass in the mid mesentery with surrounding lymph nodes measuring up to 9 mm in short axis in keeping with known neuroendocrine tumor. No ascites. No intraperitoneal free air. No retroperitoneal hematoma. VESSELS: Normal caliber abdominal aorta with mild atherosclerotic plaque. PELVIS REPRODUCTIVE ORGANS: Normal prostate. Symmetric seminal vesicles. URINARY BLADDER: Decompressed with Schaefer catheter in place. ABDOMINAL WALL/INGUINAL REGIONS: Prior ventral hernia mesh repair. BONES: Vertebral body height is maintained. No acute fracture or destructive osseous lesion.  Bone island in the L1 sacral segment.     Impression: 1.  Partial atelectasis of the left lower lobe, scattered atelectasis in the right lung. 2.  Endotracheal tube with tip just entering the takeoff of the right mainstem bronchus. 3.  No acute findings in the abdomen or pelvis. 4.  Air-distended colon without findings to suggest obstruction. I personally discussed this study with ALHAJI NESS on 6/22/2024 3:29 PM. Workstation performed: [id number]     CT head wo contrast    Result Date: 6/22/2024  Narrative: CT BRAIN - WITHOUT CONTRAST INDICATION:   ams. COMPARISON: Head CT and CT angiography of the head and neck from June 1, 2024, nuclear medicine PET/CT scan from May 10, 2024. TECHNIQUE:  CT examination of the brain was performed.  Multiplanar 2D reformatted images were created from the source data. Radiation dose length product (DLP) for this visit:  937 mGy-cm .  This examination, like all CT scans performed [facility], was performed utilizing techniques to minimize radiation dose exposure, including the use of iterative reconstruction and automated exposure control. IMAGE QUALITY: There is streak/spray artifact from the left parieto-occipital scalp transducer. Skull base region also demonstrates motion artifact. FINDINGS: The patient is intubated. PARENCHYMA:  No intracranial mass, mass effect or midline shift. No CT signs of acute infarction.  No acute parenchymal hemorrhage. VENTRICLES AND EXTRA-AXIAL SPACES:  Normal for the patient's age. VISUALIZED ORBITS: Normal visualized orbits. PARANASAL SINUSES: Normal visualized paranasal sinuses. CALVARIUM AND EXTRACRANIAL SOFT TISSUES:  Normal.     Impression: Limited by artifact as discussed above. No gross abnormality. No intracranial hemorrhage, edema or mass effect. Workstation performed: [id number]      kidney and bladder    Result Date: 6/22/2024  Narrative: RENAL ULTRASOUND INDICATION: Acute kidney injury with history of kidney  stones. COMPARISON: CT chest from 6/21/2024; CT abdomen from 6/1/2024 TECHNIQUE: Ultrasound of the retroperitoneum was performed with a curvilinear transducer utilizing volumetric sweeps and still imaging techniques. FINDINGS: KIDNEYS: Symmetric and normal size. Right kidney: 13.4 x 5.1 x 5.6 cm. Volume 202.8 mL Left kidney: 11.7 x 5.9 x 5.1 cm. Volume 186.7 mL Right kidney Normal echogenicity and contour. No mass is identified. No hydronephrosis. No shadowing calculi. No perinephric fluid collections. Left kidney Normal echogenicity and contour. No mass is identified. No hydronephrosis. No shadowing calculi. No perinephric fluid collections. URETERS: Nonvisualized. BLADDER: Bladder is not well distended. Bladder wall is not well evaluated given only minimal distention. Bilateral ureteral jets detected. The visualized liver parenchyma is echogenic suspicious for fatty infiltration and correlates to the chest CT     Impression: No hydronephrosis. The bladder is not well evaluated due to underdistention. Workstation performed: [id number]      VAS VENOUS DUPLEX - LOWER LIMB BILATERAL    Result Date: 6/22/2024  Narrative:  [facility] REPORT CLINICAL: Indications: Patient presents with SOB/chest pain, history of cancer. Operative History: No cardiovascular surgeries Risk Factors The patient has history of HTN and previous smoking (quit >10yrs ago).   CONCLUSION:  Impression: RIGHT LOWER LIMB: No evidence of acute or chronic deep vein thrombosis. No evidence of superficial thrombophlebitis noted. Doppler evaluation shows a normal response to augmentation maneuvers. Popliteal, posterior tibial and anterior tibial arterial Doppler waveforms are triphasic.  LEFT LOWER LIMB: No evidence of acute or chronic deep vein thrombosis. No evidence of superficial thrombophlebitis noted. Doppler evaluation shows a normal response to augmentation maneuvers. Popliteal, posterior tibial and anterior tibial arterial Doppler  waveforms are triphasic.  Technical findings were given to Dr. Jerry at time of exam.  SIGNATURE: Electronically Signed by: FROILAN ESPARZA MD on 2024-06-22 10:02:19 AM    XR chest 1 view portable    Result Date: 6/21/2024  Narrative: XR CHEST PORTABLE INDICATION: chest pain. COMPARISON: Chest radiograph 6/1/2024. FINDINGS: Clear lungs. No pneumothorax or pleural effusion. Normal cardiomediastinal silhouette. Bones are unremarkable for age. Normal upper abdomen.     Impression: No acute cardiopulmonary disease. Workstation performed: [id number]     CT chest without contrast    Result Date: 6/21/2024  Narrative: CT CHEST WITHOUT IV CONTRAST INDICATION: Chest pain. COMPARISON: Chest CT 9/7/2023. TECHNIQUE: CT examination of the chest was performed without intravenous contrast. Multiplanar 2D reformatted images were created from the source data. This examination, like all CT scans performed in the [facility], was performed utilizing techniques to minimize radiation dose exposure, including the use of iterative reconstruction and automated exposure control. Radiation dose length product (DLP) for this visit: 454.3 mGy-cm FINDINGS: LUNGS: Lungs are clear. Unchanged 9 mm right middle lobe nodule (series 3, image 107). No tracheal or endobronchial lesion. PLEURA: No pleural effusion. No pneumothorax. HEART/GREAT VESSELS: Heart is unremarkable for patient's age. No thoracic aortic aneurysm. MEDIASTINUM AND AREN: Unremarkable. CHEST WALL AND LOWER NECK: Unremarkable. VISUALIZED STRUCTURES IN THE UPPER ABDOMEN: Hepatic steatosis. Punctate nonobstructing left interpolar calculus. Fatty atrophy of the pancreas. Ventral hernia repair. OSSEOUS STRUCTURES: No acute fracture or destructive osseous lesion.     Impression: No acute findings in the chest. Unchanged 9 mm right middle lobe pulmonary nodule, not FDG avid on PET/CT 5/10/2024. Workstation performed: [id number]     XR chest 1 view portable    Result Date:  6/1/2024  Narrative: XR CHEST PORTABLE INDICATION: abd pain. COMPARISON: CTA neck and abdomen CT 6/1/2024, CXR 3/21/2023., PET/CT 5/10/2024, FINDINGS: Clear lungs. No pneumothorax or pleural effusion. Normal cardiomediastinal silhouette. Bones are unremarkable for age. Normal upper abdomen.     Impression: No acute cardiopulmonary disease. Workstation performed: [id number]     CTA head and neck with and without contrast    Result Date: 6/1/2024  Narrative: CTA NECK AND BRAIN WITH AND WITHOUT CONTRAST INDICATION: dizziness and blurred vision COMPARISON:   None. TECHNIQUE:  Routine CT imaging of the Brain without contrast not performed due to technical error.  Post contrast imaging was performed after administration of iodinated contrast through the neck and brain. Post contrast axial 0.625 mm images timed to opacify the arterial system. 3D rendering was performed on an independent workstation.   MIP reconstructions performed. Coronal reconstructions were performed of the noncontrast portion of the brain. Radiation dose length product (DLP) for this visit:  615 mGy-cm .  This examination, like all CT scans performed in the [facility], was performed utilizing techniques to minimize radiation dose exposure, including the use of iterative reconstruction and automated exposure control. IV Contrast:  100 mL of iohexol (OMNIPAQUE) IMAGE QUALITY:   Diagnostic FINDINGS: CERVICAL VASCULATURE somewhat limited secondary artifact from adjacent contrast bolus. Findings below within limitation. AORTIC ARCH AND GREAT VESSELS:  Normal aortic arch and great vessel origins. Normal visualized subclavian vessels. RIGHT VERTEBRAL ARTERY CERVICAL SEGMENT:  Normal origin. The vessel is normal in caliber throughout the neck. LEFT VERTEBRAL ARTERY CERVICAL SEGMENT:  Normal origin. The vessel is normal in caliber throughout the neck. RIGHT EXTRACRANIAL CAROTID SEGMENT:  Normal caliber common carotid artery.  Normal bifurcation  and cervical internal carotid artery.  No stenosis or dissection. LEFT EXTRACRANIAL CAROTID SEGMENT:  Normal caliber common carotid artery.  Normal bifurcation and cervical internal carotid artery.  No stenosis or dissection. NASCET criteria was used to determine the degree of internal carotid artery diameter stenosis. INTRACRANIAL VASCULATURE INTERNAL CAROTID ARTERIES:  Normal enhancement of the intracranial portions of the internal carotid arteries.  Normal ophthalmic artery origins.  Normal ICA terminus. ANTERIOR CIRCULATION:  Symmetric A1 segments and anterior cerebral arteries with normal enhancement.  Normal anterior communicating artery. MIDDLE CEREBRAL ARTERY CIRCULATION:  M1 segment and middle cerebral artery branches demonstrate normal enhancement bilaterally. DISTAL VERTEBRAL ARTERIES:  Normal distal vertebral arteries.  Posterior inferior cerebellar artery origins are normal. Normal vertebral basilar junction. BASILAR ARTERY:  Basilar artery is normal in caliber.  Normal superior cerebellar arteries. POSTERIOR CEREBRAL ARTERIES: Both posterior cerebral arteries arises from the basilar tip.  Both arteries demonstrate normal enhancement.   Hypoplastic posterior communicating arteries. VENOUS STRUCTURES: Patent. NON VASCULAR ANATOMY BONY STRUCTURES:  No acute osseous abnormality. SOFT TISSUES OF THE NECK:  Unremarkable. THORACIC INLET:  Normal.     Impression: Please note noncontrast CT head exam was not performed due to technical error. This portion of the exam can be repeated as clinically warranted. No evidence of hemodynamically significant stenosis or large vessel occlusive disease within the major vessels of the [place]. No aneurysm. No significant stenosis of the cervical vertebral or carotid arteries. Workstation performed: [id number]     CT abdomen pelvis with contrast    Result Date: 6/1/2024  Narrative: CT ABDOMEN AND PELVIS WITH IV CONTRAST INDICATION: epigastric abd pain hx of cancer.  COMPARISON: CT of the chest abdomen and pelvis 9/7/2023. PET/CT 5/10/2024. TECHNIQUE: CT examination of the abdomen and pelvis was performed. Multiplanar 2D reformatted images were created from the source data. This examination, like all CT scans performed in the [facility], was performed utilizing techniques to minimize radiation dose exposure, including the use of iterative reconstruction and automated exposure control. Radiation dose length product (DLP) for this visit: 1095 mGy-cm IV Contrast: 100 mL of iohexol (OMNIPAQUE) Enteric Contrast: Not administered. FINDINGS: ABDOMEN LOWER CHEST: No acute abnormality in the visualized lower chest. LIVER/BILIARY TREE: Hypoattenuating lesion noted on 9/7/2023 CT, not FDG avid on prior PET CTs, suboptimally visualized due to streak artifacts and likely faintly seen on series 3, image 59.. GALLBLADDER: No calcified gallstones. No pericholecystic inflammatory change. SPLEEN: Unremarkable. PANCREAS: Unremarkable. ADRENAL GLANDS: Unremarkable. KIDNEYS/URETERS: Unremarkable. No hydronephrosis. STOMACH AND BOWEL: No disproportionate dilation of the small or large bowel. Left lower quadrant anastomosis. APPENDIX: No findings to suggest appendicitis. ABDOMINOPELVIC CAVITY: No ascites. No pneumoperitoneum. No lymphadenopathy. Unchanged partially calcified mesenteric mass measures 2.9 x 5.6 cm. Unchanged adjacent mesenteric stranding and lymphadenopathy with lymph nodes measuring up to 9 mm short axis. VESSELS: Unremarkable for patient's age. PELVIS REPRODUCTIVE ORGANS: Unremarkable for patient's age. URINARY BLADDER: Unremarkable. ABDOMINAL WALL/INGUINAL REGIONS: Soft tissue nodularity in the bilateral gluteal regions, unchanged, likely injection sites. Ventral herniorrhaphy. BONES: No acute fracture or suspicious osseous lesion. Unchanged sclerotic lesions in the right iliac wing and right hemisacrum, not previously FDG avid.     Impression: No acute findings in  "the abdomen or pelvis. Unchanged calcified mesenteric mass with adjacent lymphadenopathy in keeping with known neuroendocrine tumor. Workstation performed: [id number]     XR WRIST 3+ VW RIGHT    Result Date: 5/28/2024  Narrative: History: Bilateral carpal tunnel syndrome Study: XR WRIST 3+ VW BILATERAL Comparison: None    Impression: Findings/impression: Bilateral wrist joint spaces are normal with no erosions. No acute fracture or malalignment. Healed fracture deformity of the right fifth metacarpal neck with apex posterior angulation noted. Workstation:[id number]     EKG, Pathology, and Other Studies: I have personally reviewed pertinent reports.    Counseling / Coordination of Care:  Total floor / unit time spent today 60 minutes. Greater than 50% of total time was spent with the patient and / or family counseling and / or coordination of care. A description of the counseling / coordination of care: symptom assessment and management, medication review, psychosocial support, chart review, imaging review, lab review, goals of care, supportive listening, and anticipatory guidance. Discussion with MARYBETH and DARCY.     José Luis Soni PA-C  [facility]  [phone]    Portions of this document may have been created using dictation software and as such some \"sound alike\" terms may have been generated by the system. Do not hesitate to contact me with any questions or clarifications.     "

## 2024-06-27 NOTE — PLAN OF CARE
Problem: PAIN - ADULT  Goal: Verbalizes/displays adequate comfort level or baseline comfort level  Description: Interventions:  - Encourage patient to monitor pain and request assistance  - Assess pain using appropriate pain scale  - Administer analgesics based on type and severity of pain and evaluate response  - Implement non-pharmacological measures as appropriate and evaluate response  - Consider cultural and social influences on pain and pain management  - Notify physician/advanced practitioner if interventions unsuccessful or patient reports new pain  6/27/2024 0246 by Juliet Arvizu RN  Outcome: Progressing  6/27/2024 0246 by Juliet Arvizu RN  Outcome: Adequate for Discharge  6/27/2024 0245 by Juliet Arviuz RN  Outcome: Progressing     Problem: INFECTION - ADULT  Goal: Absence or prevention of progression during hospitalization  Description: INTERVENTIONS:  - Assess and monitor for signs and symptoms of infection  - Monitor lab/diagnostic results  - Monitor all insertion sites, i.e. indwelling lines, tubes, and drains  - Monitor endotracheal if appropriate and nasal secretions for changes in amount and color  - [place] appropriate cooling/warming therapies per order  - Administer medications as ordered  - Instruct and encourage patient and family to use good hand hygiene technique  - Identify and instruct in appropriate isolation precautions for identified infection/condition  6/27/2024 0246 by Juliet Arvizu RN  Outcome: Progressing  6/27/2024 0246 by Juliet Arvizu RN  Outcome: Adequate for Discharge  6/27/2024 0245 by Juliet Arvizu RN  Outcome: Progressing  Goal: Absence of fever/infection during neutropenic period  Description: INTERVENTIONS:  - Monitor WBC    6/27/2024 0246 by Juliet Arvizu RN  Outcome: Progressing  6/27/2024 0246 by Juliet Arvizu RN  Outcome: Adequate for Discharge  6/27/2024 0245 by Juliet Arvizu RN  Outcome: Progressing     Problem: SAFETY ADULT  Goal: Patient will remain free of  falls  Description: INTERVENTIONS:  - Educate patient/family on patient safety including physical limitations  - Instruct patient to call for assistance with activity   - Consult OT/PT to assist with strengthening/mobility   - Keep Call bell within reach  - Keep bed low and locked with side rails adjusted as appropriate  - Keep care items and personal belongings within reach  - Initiate and maintain comfort rounds  - Make Fall Risk Sign visible to staff  - Offer Toileting every 2 Hours, in advance of need  - Initiate/Maintain bed alarm  - Obtain necessary fall risk management equipment  - Apply yellow socks and bracelet for high fall risk patients  - Consider moving patient to room near nurses station  6/27/2024 0246 by Juliet Arvizu RN  Outcome: Progressing  6/27/2024 0246 by Juliet Arvizu RN  Outcome: Adequate for Discharge  6/27/2024 0245 by Juliet Arvizu RN  Outcome: Progressing  Goal: Maintain or return to baseline ADL function  Description: INTERVENTIONS:  -  Assess patient's ability to carry out ADLs; assess patient's baseline for ADL function and identify physical deficits which impact ability to perform ADLs (bathing, care of mouth/teeth, toileting, grooming, dressing, etc.)  - Assess/evaluate cause of self-care deficits   - Assess range of motion  - Assess patient's mobility; develop plan if impaired  - Assess patient's need for assistive devices and provide as appropriate  - Encourage maximum independence but intervene and supervise when necessary  - Involve family in performance of ADLs  - Assess for home care needs following discharge   - Consider OT consult to assist with ADL evaluation and planning for discharge  - Provide patient education as appropriate  6/27/2024 0246 by Juliet Arvizu RN  Outcome: Progressing  6/27/2024 0246 by Juliet Arvizu RN  Outcome: Adequate for Discharge  6/27/2024 0245 by Juliet Arvizu RN  Outcome: Progressing  Goal: Maintains/Returns to pre admission functional  level  Description: INTERVENTIONS:  - Perform AM-PAC 6 Click Basic Mobility/ Daily Activity assessment daily.  - Set and communicate daily mobility goal to care team and patient/family/caregiver.   - Collaborate with rehabilitation services on mobility goals if consulted  - Perform Range of Motion 3 times a day.  - Reposition patient every 2 hours.  - Dangle patient 3 times a day  - Stand patient 3 times a day  - Ambulate patient 3 times a day  - Out of bed to chair 3 times a day   - Out of bed for meals 3 times a day  - Out of bed for toileting  - Record patient progress and toleration of activity level   6/27/2024 0246 by Juliet Arvizu RN  Outcome: Progressing  6/27/2024 0246 by Juliet Arvizu, RN  Outcome: Adequate for Discharge  6/27/2024 0245 by Juliet Arvizu, RN  Outcome: Progressing

## 2024-06-27 NOTE — ASSESSMENT & PLAN NOTE
Malnutrition Findings:   Adult Malnutrition type: Chronic illness  Adult Degree of Malnutrition: Malnutrition of moderate degree  Malnutrition Characteristics: Weight loss, Inadequate energy                  360 Statement: Malnutrition related to chronic illness as evidenced by 5% body weight loss in 1 month, 7.5% body weight loss in 3 months, <75% energy intake compared to estimated energy needs for greater than 1 month.  To treat with oral diet and nutrition supplement as tolerated.    BMI Findings:           Body mass index is 28.91 kg/m².   Dietary input appreciated   Continue supplement as indicated

## 2024-06-27 NOTE — CASE MANAGEMENT
Pt & family are in agreeme   Case Management Discharge Planning Note    Patient name Osei Trimble  Location ICU 08/ICU  MRN [id number]  : 1975 Date 2024       Current Admission Date: 2024  Current Admission Diagnosis:Acute encephalopathy   Patient Active Problem List    Diagnosis Date Noted Date Diagnosed    Alcohol abuse 2024     Moderate protein-calorie malnutrition (HCC) 2024     Palliative care by specialist 2024     Goals of care, counseling/discussion 2024     Back pain 2024     Hyperglycemia 2024     Acute encephalopathy 2024     Shock (HCC) 2024     Thrombocytopenia (HCC) 2024     Total bilirubin, elevated 2024     Other chest pain 2024     Explosive personality disorder (HCC) 2024     Abdominal pain 2023     Neuroendocrine carcinoma of small bowel (HCC) 2022     Neuroendocrine tumor 2022     Hydronephrosis with urinary obstruction due to ureteral calculus 10/04/2021     Calcified mesenteric mass 10/04/2021     MEHDI (acute kidney injury) (HCC) 10/04/2021     Essential hypertension 10/04/2021     Diastasis recti 2019       LOS (days): 6  Geometric Mean LOS (GMLOS) (days):   Days to GMLOS:     OBJECTIVE:  Risk of Unplanned Readmission Score: 16.71         Current admission status: Inpatient   Preferred Pharmacy:   [address] [address]  Phone: [phone] Fax: [phone]    Primary Care Provider: Jermaine Mayes DO    Primary Insurance: [email or web address]  Secondary Insurance:     DISCHARGE DETAILS:    Discharge planning discussed with:: patient & cm called his sister at 9:49 am LM & 11:59 am  Freedom of Choice: Yes  Comments - Freedom of Choice: pt has declined c - pt would like outpt rehab and his family ia able to transport- his sister's fiance is able to show him how to use the glucometer- he stated he is  a diabetic and he does check his sugar level  CM contacted family/caregiver?: Yes  Were Treatment Team discharge recommendations reviewed with patient/caregiver?: Yes  Did patient/caregiver verbalize understanding of patient care needs?: Yes  Were patient/caregiver advised of the risks associated with not following Treatment Team discharge recommendations?: Yes    Contacts  Patient Contacts: Ariadne Velazquez  Relationship to Patient:: Family  Contact Method: Phone  Phone Number: [phone]  Reason/Outcome: Discharge Planning    Requested Home Health Care         Is the patient interested in HHC at discharge?: No    DME Referral Provided  Referral made for DME?: Yes  DME referral completed for the following items:: Bedside Commode  DME Supplier Name:: [employer]    Other Referral/Resources/Interventions Provided:  Interventions: DME, Outpatient PT, Outpatient OT  Referral Comments: pt's family did take home the commode that was issued yesterday-order for outpt rehab received- pt had a palliative consult prior to d/c - pt denied any additional d/c needs    Would you like to participate in our [facility] service program?  : No - Declined    Treatment Team Recommendation: Home (home wiht support from sister & outpt rehab & outpt follow up- family)  Discharge Destination Plan:: Home (home with support from sister & outpt rehab & outpt follow up- family)  Transport at Discharge : Automobile, Family         Pt & family are in agreement with the d/c & d/c plan              Accompanied by: Family member           Family notified:: sister was called

## 2024-06-27 NOTE — ASSESSMENT & PLAN NOTE
Appears in new onset diabetes.    Results from last 7 days   Lab Units 06/27/24  0608 06/26/24  0549 06/25/24  0444   GLUCOSE RANDOM mg/dL 174* 187* 211*   Hemoglobin A1C 7.2  Per staff, reluctant to receive glucometer education. Spoke with his sister who lives next door. She feels comfortable keeping an eye on the patient. She will be checking his blood glucose at home. Will start on metformin 500 mg BID upon discharge.   Will need a close follow up with PCP upon discharge.

## 2024-06-27 NOTE — DISCHARGE INSTR - AVS FIRST PAGE
Medication changes-  Folic acid 1 mg p.o. daily  Lidoderm patch that is for pain  Atarax 50 mg p.o. twice daily as needed  Seroquel 50 mg at bedtime  Metformin 500 mg twice daily- diabetes     Please follow-up with PCP and psychiatry

## 2024-06-27 NOTE — ASSESSMENT & PLAN NOTE
History of mood disorder and hypertension presented to the hospital with chest pain found to have kidney injury.  Resolved with IV fluids.  Nephrology input appreciated.   Renal ultrasound without acute pathology.  Avoid nephrotoxins and hypotension   Follow up with PCP

## 2024-06-27 NOTE — ASSESSMENT & PLAN NOTE
Decisional apparatus:  Patient is competent on exam today.  If competency is lost, patient's substitute decision maker would default to sister by [place].  Advance Directive / Living Will / POLST / POA Forms: No    Goals  Level 1 code status.   Full code.   Disease focused care.   Would like all intervention to prolong life  Plans to return to sisters home upon discharge, they are moving his bed and setting up a room for him.   Patient ready for discharge today, reviewed with CM.   Would like outpatient psychiatry and therapy referrals within network.   Will continue to follow with [facility] team outpatient. Patient advised to call for symptom concerns. Explained 24 on call provider line.

## 2024-06-27 NOTE — PLAN OF CARE
Problem: PAIN - ADULT  Goal: Verbalizes/displays adequate comfort level or baseline comfort level  Description: Interventions:  - Encourage patient to monitor pain and request assistance  - Assess pain using appropriate pain scale  - Administer analgesics based on type and severity of pain and evaluate response  - Implement non-pharmacological measures as appropriate and evaluate response  - Consider cultural and social influences on pain and pain management  - Notify physician/advanced practitioner if interventions unsuccessful or patient reports new pain  Outcome: Progressing     Problem: INFECTION - ADULT  Goal: Absence or prevention of progression during hospitalization  Description: INTERVENTIONS:  - Assess and monitor for signs and symptoms of infection  - Monitor lab/diagnostic results  - Monitor all insertion sites, i.e. indwelling lines, tubes, and drains  - Monitor endotracheal if appropriate and nasal secretions for changes in amount and color  - [place] appropriate cooling/warming therapies per order  - Administer medications as ordered  - Instruct and encourage patient and family to use good hand hygiene technique  - Identify and instruct in appropriate isolation precautions for identified infection/condition  Outcome: Progressing

## 2024-06-27 NOTE — ASSESSMENT & PLAN NOTE
Follow up  Palliative Care will continue to follow.  Please reach out to on-call provider via [employer] if questions or concerns arise.  Please do not hesitate to reach our on call provider through our clinic answering service at [phone] should you have acute symptom control concerns.

## 2024-06-27 NOTE — ASSESSMENT & PLAN NOTE
Presenting complaint on 06/21.  Troponin neg, EKG WNL  ECHO completed today, EF 70%  Pain is epigastric and radiates to umbilicus.   Also describes bloating 2/2 constipation  Hx of abdominal wall hernia.     Pain improved with bowel movement. Occurring less frequently.  Has rarely needed oxycodone for pain.   Controls his pain at home with Tylenol and MMJ.   Patient will call [facility] team with symptom concerns outpatient.   Opioids for discharge do not seem to be necessary for symptom control at this point and may be best to avoid doing so due to patient and family hx of polysubstance abuse/overdose.

## 2024-06-27 NOTE — ASSESSMENT & PLAN NOTE
Lisinopril  held initially due to MEHDI- now resumed   Continue with lisinopril and prazosin   Continue to monitor BP and adjust medications as indicated outpatient

## 2024-06-28 LAB
BACTERIA BLD CULT: NORMAL
BACTERIA BLD CULT: NORMAL

## 2024-07-01 ENCOUNTER — TELEPHONE (OUTPATIENT)
Age: 49
End: 2024-07-01

## 2024-07-01 LAB
DME PARACHUTE DELIVERY DATE ACTUAL: NORMAL
DME PARACHUTE DELIVERY DATE REQUESTED: NORMAL
DME PARACHUTE DELIVERY NOTE: NORMAL
DME PARACHUTE ITEM DESCRIPTION: NORMAL
DME PARACHUTE ORDER STATUS: NORMAL
DME PARACHUTE SUPPLIER NAME: NORMAL
DME PARACHUTE SUPPLIER PHONE: NORMAL

## 2024-07-01 NOTE — TELEPHONE ENCOUNTER
Patient and Sister, Ariadne, calling to request an order for a BP Cuff and  a cane from Dr. Green.  Ariadne [phone]

## 2024-07-01 NOTE — UTILIZATION REVIEW
NOTIFICATION OF ADMISSION DISCHARGE   This is a Notification of Discharge from [facility]. Please be advised that this patient has been discharge from our facility. Below you will find the admission and discharge date and time including the patient’s disposition.   UTILIZATION REVIEW CONTACT:  Isidoro Prado  Utilization   Network Utilization Review Department  Phone: [phone] x carefully listen to the prompts. All voicemails are confidential.  Email: [email or web address]     ADMISSION INFORMATION  PRESENTATION DATE: 6/21/2024  5:05 PM  OBERVATION ADMISSION DATE: N/A  INPATIENT ADMISSION DATE: 6/21/24  7:45 PM   DISCHARGE DATE: 6/27/2024  2:08 PM   DISPOSITION:Home/Self Care    Network Utilization Review Department  ATTENTION: Please call with any questions or concerns to [phone] and carefully listen to the prompts so that you are directed to the right person. All voicemails are confidential.   For Discharge needs, contact Care Management DC Support Team at [phone] opt. 2  Send all requests for admission clinical reviews, approved or denied determinations and any other requests to dedicated fax number below belonging to the campus where the patient is receiving treatment. List of dedicated fax numbers for the Facilities:  FACILITY NAME UR FAX NUMBER   ADMISSION DENIALS (Administrative/Medical Necessity) [phone]   DISCHARGE SUPPORT TEAM ([facility]) [phone]   PARENT CHILD HEALTH (Maternity/NICU/Pediatrics) [phone]   [facility] [phone]   [facility] [phone]   [facility] [phone]   [facility] [phone]   Critical access hospital [phone]   [facility] [phone]   [facility] [phone]   [facility] [phone]    [facility] [phone]   [facility] [phone]   [facility] [phone]   [facility] [phone]

## 2024-07-01 NOTE — TELEPHONE ENCOUNTER
Returned call to Ariadne.  Reviewed to reach out to PCP and PT for DME requests.  Reviewed with Ariadne to schedule follow up office visit with PCP ASAP to assist with home needs.

## 2024-07-09 ENCOUNTER — HOSPITAL ENCOUNTER (OUTPATIENT)
Dept: INFUSION CENTER | Facility: HOSPITAL | Age: 49
Discharge: HOME | End: 2024-07-09
Attending: INTERNAL MEDICINE
Payer: COMMERCIAL

## 2024-07-09 VITALS
HEART RATE: 80 BPM | SYSTOLIC BLOOD PRESSURE: 141 MMHG | RESPIRATION RATE: 17 BRPM | TEMPERATURE: 97.4 F | OXYGEN SATURATION: 99 % | DIASTOLIC BLOOD PRESSURE: 92 MMHG

## 2024-07-09 DIAGNOSIS — C7A.8 NEUROENDOCRINE CARCINOMA OF SMALL BOWEL (HCC): Primary | ICD-10-CM

## 2024-07-09 DIAGNOSIS — D3A.8 NEUROENDOCRINE TUMOR: ICD-10-CM

## 2024-07-09 PROCEDURE — 96372 THER/PROPH/DIAG INJ SC/IM: CPT

## 2024-07-09 RX ORDER — LANREOTIDE ACETATE 120 MG/.5ML
120 INJECTION SUBCUTANEOUS ONCE
Status: COMPLETED | OUTPATIENT
Start: 2024-07-09 | End: 2024-07-09

## 2024-07-09 RX ORDER — LANREOTIDE ACETATE 120 MG/.5ML
120 INJECTION SUBCUTANEOUS ONCE
OUTPATIENT
Start: 2024-08-06

## 2024-07-09 RX ADMIN — LANREOTIDE ACETATE 120 MG: 120 INJECTION SUBCUTANEOUS at 13:58

## 2024-07-09 NOTE — PROGRESS NOTES
Osei Trimble tolerated lanreotide injection well with no complications.      Osei Trimble is aware of future appt on 8/6 at 2:30PM.     AVS printed and given to Osei Trimble.

## 2024-07-22 ENCOUNTER — TELEPHONE (OUTPATIENT)
Age: 49
End: 2024-07-22

## 2024-07-22 NOTE — TELEPHONE ENCOUNTER
Contacted patient in regards to Routine Referral in attempts to verify patient's needs of services and add patient to proper wait list. spoke with patient whom stated is interested in being added to wait list for services.     [facility] mg[facility]   [facility]

## 2024-07-29 ENCOUNTER — TELEPHONE (OUTPATIENT)
Age: 49
End: 2024-07-29

## 2024-07-29 NOTE — TELEPHONE ENCOUNTER
Received phone call from patient's sister regarding setting up services for medication mgmt. Writer spoke with pt who gave verbal consent to speak with his sister Ariadne. Informed pt's sister or current wait list, pt's sister verbalized understanding. Patient was placed on wait list, writer also sent consent for pt to complete to have on file.

## 2024-08-06 ENCOUNTER — HOSPITAL ENCOUNTER (OUTPATIENT)
Dept: INFUSION CENTER | Facility: HOSPITAL | Age: 49
Discharge: HOME | End: 2024-08-06
Attending: INTERNAL MEDICINE
Payer: COMMERCIAL

## 2024-08-06 VITALS — TEMPERATURE: 96.9 F

## 2024-08-06 DIAGNOSIS — C7A.8 NEUROENDOCRINE CARCINOMA OF SMALL BOWEL (HCC): Primary | ICD-10-CM

## 2024-08-06 DIAGNOSIS — D3A.8 NEUROENDOCRINE TUMOR: ICD-10-CM

## 2024-08-06 PROCEDURE — 96372 THER/PROPH/DIAG INJ SC/IM: CPT

## 2024-08-06 RX ORDER — LANREOTIDE ACETATE 120 MG/.5ML
120 INJECTION SUBCUTANEOUS ONCE
Status: COMPLETED | OUTPATIENT
Start: 2024-08-06 | End: 2024-08-06

## 2024-08-06 RX ORDER — LANREOTIDE ACETATE 120 MG/.5ML
120 INJECTION SUBCUTANEOUS ONCE
OUTPATIENT
Start: 2024-09-03

## 2024-08-06 RX ADMIN — LANREOTIDE ACETATE 120 MG: 120 INJECTION SUBCUTANEOUS at 14:49

## 2024-08-06 NOTE — PROGRESS NOTES
Osei Trimble  tolerated treatment well with no complications.  Lanreotide to L glute.    Osei Trimble is aware of future appt on 9/3 at 2:30 pm.     AVS declined by Osei Trimble.  Appt card provided per pt request.

## 2024-08-08 DIAGNOSIS — G47.30 SLEEP APNEA, UNSPECIFIED TYPE: Primary | ICD-10-CM

## 2024-08-14 ENCOUNTER — TELEPHONE (OUTPATIENT)
Dept: SLEEP CENTER | Facility: CLINIC | Age: 49
End: 2024-08-14

## 2024-08-14 NOTE — TELEPHONE ENCOUNTER
Referral received from [facility] for the patient to see a sleep doctor.  Called the patient and spoke to him about scheduling.  He said his sister Tosha does the appts for him. He will have her call the central scheduling line to schedule new patient appt for sleep.

## 2024-08-30 ENCOUNTER — TRANSCRIBE ORDERS (OUTPATIENT)
Dept: SLEEP CENTER | Facility: CLINIC | Age: 49
End: 2024-08-30

## 2024-08-30 DIAGNOSIS — R06.83 SNORING: Primary | ICD-10-CM

## 2024-08-30 DIAGNOSIS — R40.0 DAYTIME SOMNOLENCE: ICD-10-CM

## 2024-09-03 ENCOUNTER — HOSPITAL ENCOUNTER (OUTPATIENT)
Dept: INFUSION CENTER | Facility: HOSPITAL | Age: 49
Discharge: HOME | End: 2024-09-03
Attending: INTERNAL MEDICINE
Payer: COMMERCIAL

## 2024-09-03 DIAGNOSIS — C7A.8 NEUROENDOCRINE CARCINOMA OF SMALL BOWEL (HCC): Primary | ICD-10-CM

## 2024-09-03 DIAGNOSIS — D3A.8 NEUROENDOCRINE TUMOR: ICD-10-CM

## 2024-09-03 PROCEDURE — 96372 THER/PROPH/DIAG INJ SC/IM: CPT

## 2024-09-03 RX ORDER — LANREOTIDE ACETATE 120 MG/.5ML
120 INJECTION SUBCUTANEOUS ONCE
Status: COMPLETED | OUTPATIENT
Start: 2024-09-03 | End: 2024-09-03

## 2024-09-03 RX ORDER — LANREOTIDE ACETATE 120 MG/.5ML
120 INJECTION SUBCUTANEOUS ONCE
OUTPATIENT
Start: 2024-10-01

## 2024-09-03 RX ADMIN — LANREOTIDE ACETATE 120 MG: 120 INJECTION SUBCUTANEOUS at 14:48

## 2024-09-03 NOTE — PROGRESS NOTES
Osei Trimble  tolerated Lanreotide injection to right buttock well with no complications.      Osei Trimble is aware of future appt on 10/1 at 2:30PM.     AVS printed and given to Osei Trimble: appointment card provided.

## 2024-09-10 ENCOUNTER — OFFICE VISIT (OUTPATIENT)
Dept: HEMATOLOGY ONCOLOGY | Facility: CLINIC | Age: 49
End: 2024-09-10
Payer: COMMERCIAL

## 2024-09-10 VITALS
DIASTOLIC BLOOD PRESSURE: 80 MMHG | TEMPERATURE: 98.2 F | SYSTOLIC BLOOD PRESSURE: 130 MMHG | RESPIRATION RATE: 18 BRPM | OXYGEN SATURATION: 99 % | BODY MASS INDEX: 30.35 KG/M2 | WEIGHT: 229 LBS | HEIGHT: 73 IN | HEART RATE: 70 BPM

## 2024-09-10 DIAGNOSIS — C7A.8 NEUROENDOCRINE CARCINOMA OF SMALL BOWEL (HCC): Primary | ICD-10-CM

## 2024-09-10 PROCEDURE — 99214 OFFICE O/P EST MOD 30 MIN: CPT | Performed by: INTERNAL MEDICINE

## 2024-09-10 NOTE — PROGRESS NOTES
Hematology/Oncology Outpatient Follow-up  Osei Trimble 49 y.o. male 1975 [id number]    Date:  9/10/2024        Assessment and Plan:  1. Neuroendocrine carcinoma of small bowel (HCC)  Small bowel resection on 7/21/2022 at the area where he was thought to be high risk for obstruction.  The pathology revealed well-differentiated neuroendocrine tumor, G1, multifocal, through muscularis propria into the pericolorectal tissue.  Ki-67 was 1.7%.  pT3(m)[id number] multifocal well differentiated small bowel neuroendocrine tumor with known extensive mesenteric galo involvement that was unresectable based on extension to the root of the SMA.     The patient has been on lanreotide injections on every 4-week basis.   I reviewed with him the result of the dotatate PET CT scan which was done on 5/10/2024.  This study showed fairly stable examined in comparison to the prior PET scan.  He continues to have persistent increased uptake in the left mesenteric mass.  The patient was told that we will continue the lanreotide injections on every 4-week basis.  He is entirely asymptomatic at this point.  CT scan prior to his next visit in about 4 to 6 months from now would be good enough for close monitoring.  - CBC and differential; Future  - Comprehensive metabolic panel; Future  - Magnesium; Future  - Chromogranin A; Future  - CT chest abdomen pelvis w contrast; Future        HPI:  Patient came today for a follow-up visit.  He stated that he is tolerating the lanreotide injections every 4 weeks relatively well.  He denied any diarrhea or flushing.  He had dotatate PET CT scan on 5/10/2024 which showed:  IMPRESSION:     1. Overall, fairly stable exam. No new findings suspicious for Dotatate avid metastasis.  2. Persistent increased uptake in the left mesenteric mass similar to prior PET exam. This is stable in size.  3. Previously noted small left para-aortic retroperitoneal focus is not seen on the current study.  4. Stable foci of  increased uptake in both scapula which may represent metastasis.  5. Stable non-specific diffuse prostate activity.     Recent blood work from 8/26/2024 showed creatinine of 0.9 with normal calcium and liver enzymes.    Oncology History Overview Note   Patient with history of bipolar disorder, schizophrenic personality, hypertension, etc.  The patient apparently was admitted the hospital around October of 2021 for symptomatic left ureteral calculus.  Incidentally he was noted to have mesenteric masses with calcification with cefepime on the CTA from 10/02/2021.  Another CT scan of the abdomen pelvis with contrast was done on 02/01/2022 which showed no significant change in the partially calcified mesenteric masses with mesenteric tethering concerning for neuroendocrine tumor.  He had a DOTATATE PET-CT scan on 05/04/2022 which showed:  IMPRESSION:  1.  Dotatate avid clustered central mesenteric masses suspicious for underlying neuroendocrine neoplasm.  2.  There does appear to be subjacent focal radiotracer uptake in a proximal small bowel loop for which underlying small bowel lesion should be excluded.  Consider further evaluation with CT enterography or small bowel pill study.  3.  A few low-level foci of radiotracer uptake in the bilateral scapula, non-specific.  Activity is lower than expected for neuroendocrine metastasis given the intensity of the mesenteric foci, however early metastasis is not entirely excluded.  Consider   further evaluation with MRI of these regions vs reassessment on follow up Dotatate PET.  4.  Diffuse prostatic activity, non-specific, question inflammatory.  Correlate with PSA levels and urologic evaluation as warranted.  5.   Stable 8 mm nodule in the right mid lung.  No suspicious radiotracer uptake here.  Given the stability since 10/2/2021 this can be followed up with chest CT in 12 months.    Chromogranin A serum level on 10/06/2021 was 134.5.  On 06/24/2022 was 164.    The patient  then underwent small bowel resection on 07/21/2022 at the area where he was thought to be high-risk for obstruction.  The pathology revealed well-differentiated neuroendocrine tumor, G1 come multifocal, the tumor invaded through the muscularis propria into the taye colorectal tissue.  He also had liver lesion biopsy at segment 5 which also was compatible with metastatic well-differentiated neuroendocrine tumor consistent with intestinal primary.  Ki 67 was 1.7%.  The final pathology was pT3(m)[id number] multifocal well differentiated small bowel neuroendocrine tumor with known extensive mesenteric galo involvement that was unresectable based on extension to the root of the SMA.      Neuroendocrine carcinoma of small bowel (HCC)   7/21/2022 -  Cancer Staged    Staging form: Small Intestine - Other Histologies, AJCC 8th Edition  - Pathologic stage from 7/21/2022: pTX, pM1 - Signed by Jordan Green MD on 5/2/2024  Stage prefix: Initial diagnosis  Histologic grade (G): G1  Histologic grading system: 4 grade system  Residual tumor (R): R2 - Macroscopic       8/5/2022 Initial Diagnosis    Neuroendocrine carcinoma of small bowel (HCC)         Interval history:    ROS: Review of Systems   Constitutional:  Positive for appetite change. Negative for chills and fever.   HENT:  Negative for ear pain and sore throat.    Eyes:  Negative for pain and visual disturbance.   Respiratory:  Negative for cough and shortness of breath.    Cardiovascular:  Negative for chest pain and palpitations.   Gastrointestinal:  Negative for abdominal pain and vomiting.   Genitourinary:  Negative for dysuria and hematuria.   Musculoskeletal:  Negative for arthralgias and back pain.   Skin:  Negative for color change and rash.   Neurological:  Negative for seizures and syncope.   All other systems reviewed and are negative.      Past Medical History:   Diagnosis Date    Allergic     Bipolar disorder in partial remission (HCC)     Erectile dysfunction      unspecified erectile dysfunction type    Hypertension     Kidney stone        Past Surgical History:   Procedure Laterality Date    COLONOSCOPY      EXPLORATORY LAPAROTOMY W/ BOWEL RESECTION N/A 2022    Procedure: LAPAROTOMY EXPLORATORY W/ SMALL BOWEL RESECTION, liver biopsy;  Surgeon: Rose Mary Lara MD;  Location:  MAIN OR;  Service: General    FL RETROGRADE PYELOGRAM  10/06/2021    HERNIA REPAIR      NY CYSTO BLADDER W/URETERAL CATHETERIZATION Right 10/06/2021    Procedure: CYSTOSCOPY RETROGRADE PYELOGRAM WITH INSERTION STENT URETERAL, RIGHT URETEROSCOPY, STONE EXTRACTION;  Surgeon: Bradly Rivera MD;  Location:  MAIN OR;  Service: Urology       Social History     Socioeconomic History    Marital status: Single     Spouse name: None    Number of children: None    Years of education: None    Highest education level: None   Occupational History    None   Tobacco Use    Smoking status: Former     Current packs/day: 0.00     Types: Pipe, Cigarettes     Quit date: 2003     Years since quittin.7    Smokeless tobacco: Never   Vaping Use    Vaping status: Some Days    Substances: THC   Substance and Sexual Activity    Alcohol use: Not Currently     Comment: Drinks a quart of moonshine/night-As of 21 will quit drinking    Drug use: Yes     Types: Marijuana     Comment: Socially (1-2 Monthly)    Sexual activity: None   Other Topics Concern    None   Social History Narrative    None     Social Determinants of Health     Financial Resource Strain: Not on file   Food Insecurity: No Food Insecurity (2024)    Hunger Vital Sign     Worried About Running Out of Food in the Last Year: Never true     Ran Out of Food in the Last Year: Never true   Transportation Needs: No Transportation Needs (2024)    PRAPARE - Transportation     Lack of Transportation (Medical): No     Lack of Transportation (Non-Medical): No   Physical Activity: Not on file   Stress: Not on file   Social Connections: Not on file    Intimate Partner Violence: Not on file   Housing Stability: Low Risk  (6/24/2024)    Housing Stability Vital Sign     Unable to Pay for Housing in the Last Year: No     Number of Times Moved in the Last Year: 1     Homeless in the Last Year: No       Family History   Problem Relation Age of Onset    Diabetes Mother     Thyroid disease Mother     Cancer Father     Thyroid disease Sister     Depression Brother     Cancer Maternal Aunt     Cancer Paternal Uncle     Cancer Paternal Grandmother     No Known Problems Brother     Thyroid disease Sister        Allergies   Allergen Reactions    Tomato - Food Allergy Anaphylaxis     raw    Poison Ivy Extract Hives    Poison Sumac Extract Hives         Current Outpatient Medications:     Alcohol Swabs 70 % PADS, May substitute brand based on insurance coverage. Check glucose BID., Disp: 100 each, Rfl: 1    Blood Glucose Monitoring Suppl (OneTouch Verio Reflect) w/Device KIT, May substitute brand based on insurance coverage. Check glucose BID., Disp: 1 kit, Rfl: 0    glucose blood (OneTouch Verio) test strip, May substitute brand based on insurance coverage. Check glucose BID., Disp: 100 each, Rfl: 1    hydrOXYzine HCL (ATARAX) 50 mg tablet, Take 1 tablet (50 mg total) by mouth 2 (two) times a day as needed for anxiety, Disp: 30 tablet, Rfl: 0    lisinopril (ZESTRIL) 20 mg tablet, , Disp: , Rfl:     lithium carbonate (LITHOBID) 300 mg CR tablet, Take 300 mg by mouth 2 (two) times a day, Disp: , Rfl:     OneTouch Delica Lancets 33G MISC, May substitute brand based on insurance coverage. Check glucose BID., Disp: 100 each, Rfl: 1    prazosin (MINIPRESS) 5 mg capsule, Take 5 mg by mouth daily at bedtime, Disp: , Rfl:     folic acid (FOLVITE) 1 mg tablet, Take 1 tablet (1 mg total) by mouth daily, Disp: 30 tablet, Rfl: 0    lidocaine (LIDODERM) 5 %, Apply 2 patches topically over 12 hours daily Remove & Discard patch within 12 hours or as directed by MD, Disp: 60 patch, Rfl: 0     "metFORMIN (GLUCOPHAGE) 500 mg tablet, Take 1 tablet (500 mg total) by mouth 2 (two) times a day with meals, Disp: 60 tablet, Rfl: 0    QUEtiapine (SEROquel) 50 mg tablet, Take 1 tablet (50 mg total) by mouth daily at bedtime, Disp: 30 tablet, Rfl: 0      Physical Exam:  /80 (BP Location: Right arm, Patient Position: Sitting, Cuff Size: Adult)   Pulse 70   Temp 98.2 °F (36.8 °C)   Resp 18   Ht 6' 1\" (1.854 m)   Wt 104 kg (229 lb)   SpO2 99%   BMI 30.21 kg/m²     Physical Exam  Constitutional:       Appearance: He is well-developed.   HENT:      Head: Normocephalic and atraumatic.      Nose: Nose normal.   Eyes:      General: No scleral icterus.        Right eye: No discharge.         Left eye: No discharge.      Conjunctiva/sclera: Conjunctivae normal.      Pupils: Pupils are equal, round, and reactive to light.   Neck:      Thyroid: No thyromegaly.      Trachea: No tracheal deviation.   Cardiovascular:      Rate and Rhythm: Normal rate and regular rhythm.      Heart sounds: Normal heart sounds. No murmur heard.     No friction rub.   Pulmonary:      Effort: Pulmonary effort is normal. No respiratory distress.      Breath sounds: Normal breath sounds. No wheezing or rales.   Chest:      Chest wall: No tenderness.   Abdominal:      General: There is no distension.      Palpations: Abdomen is soft. There is no hepatomegaly or splenomegaly.      Tenderness: There is no abdominal tenderness. There is no guarding or rebound.   Musculoskeletal:         General: No tenderness or deformity. Normal range of motion.      Cervical back: Normal range of motion and neck supple.   Lymphadenopathy:      Cervical: No cervical adenopathy.   Skin:     General: Skin is warm and dry.      Coloration: Skin is not pale.      Findings: No erythema or rash.   Neurological:      Mental Status: He is alert and oriented to person, place, and time.      Cranial Nerves: No cranial nerve deficit.      Coordination: Coordination " "normal.      Deep Tendon Reflexes: Reflexes are normal and symmetric.   Psychiatric:         Behavior: Behavior normal.         Thought Content: Thought content normal.         Judgment: Judgment normal.           Labs:  Lab Results   Component Value Date    WBC 8.60 06/27/2024    HGB 11.6 (L) 06/27/2024    HCT 33.9 (L) 06/27/2024    MCV 86 06/27/2024     06/27/2024     Lab Results   Component Value Date    K 4.1 08/26/2024     08/26/2024    CO2 24 08/26/2024    BUN 11 08/26/2024    CREATININE 0.95 08/26/2024    GLUF 150 (H) 01/10/2024    CALCIUM 9.5 08/26/2024    CORRECTEDCA 9.0 06/26/2024    AST 21 08/26/2024    ALT 21 08/26/2024    ALKPHOS 101 08/26/2024    EGFR 97 08/26/2024     No results found for: \"TSH\"    Patient voiced understanding and agreement in the above discussion. Aware to contact our office with questions/symptoms in the interim.   "

## 2024-09-19 ENCOUNTER — HOSPITAL ENCOUNTER (EMERGENCY)
Facility: HOSPITAL | Age: 49
Discharge: HOME | End: 2024-09-19
Attending: EMERGENCY MEDICINE
Payer: COMMERCIAL

## 2024-09-19 VITALS
RESPIRATION RATE: 16 BRPM | TEMPERATURE: 97.6 F | SYSTOLIC BLOOD PRESSURE: 139 MMHG | HEART RATE: 95 BPM | OXYGEN SATURATION: 98 % | DIASTOLIC BLOOD PRESSURE: 95 MMHG

## 2024-09-19 DIAGNOSIS — L23.7 POISON IVY DERMATITIS: Primary | ICD-10-CM

## 2024-09-19 PROCEDURE — 90471 IMMUNIZATION ADMIN: CPT

## 2024-09-19 PROCEDURE — 99282 EMERGENCY DEPT VISIT SF MDM: CPT

## 2024-09-19 PROCEDURE — 99284 EMERGENCY DEPT VISIT MOD MDM: CPT | Performed by: EMERGENCY MEDICINE

## 2024-09-19 PROCEDURE — 90715 TDAP VACCINE 7 YRS/> IM: CPT | Performed by: EMERGENCY MEDICINE

## 2024-09-19 RX ORDER — DIPHENHYDRAMINE HCL 25 MG
25 TABLET ORAL ONCE
Status: COMPLETED | OUTPATIENT
Start: 2024-09-19 | End: 2024-09-19

## 2024-09-19 RX ORDER — HYDROXYZINE HYDROCHLORIDE 25 MG/1
25 TABLET, FILM COATED ORAL ONCE
Status: COMPLETED | OUTPATIENT
Start: 2024-09-19 | End: 2024-09-19

## 2024-09-19 RX ADMIN — HYDROXYZINE HYDROCHLORIDE 25 MG: 25 TABLET ORAL at 11:47

## 2024-09-19 RX ADMIN — DIPHENHYDRAMINE HYDROCHLORIDE 25 MG: 25 TABLET ORAL at 11:47

## 2024-09-19 RX ADMIN — TETANUS TOXOID, REDUCED DIPHTHERIA TOXOID AND ACELLULAR PERTUSSIS VACCINE, ADSORBED 0.5 ML: 5; 2.5; 8; 8; 2.5 SUSPENSION INTRAMUSCULAR at 11:48

## 2024-09-19 NOTE — ED PROVIDER NOTES
"1. Poison ivy dermatitis      ED Disposition       ED Disposition   Discharge    Condition   Stable    Date/Time   u Sep 19, 2024 11:42 AM    Comment   Osei Trimble discharge to home/self care.                   Assessment & Plan       Medical Decision Making  Risk  OTC drugs.  Prescription drug management.        Contact dermatitis related to exposure to poison ivy, history of diabetes, history of bipolar disorder therefore steroids would be contraindicated or used with extreme caution, this was relayed to the patient and his wife at the bedside, patient will receive Atarax Benadryl for symptomatic control of the itching pruritus along with advising patient to use calamine lotion, does not appear to be secondarily infected, patient's tetanus will be updated.    Portions of the record may have been created with voice recognition software. Occasional wrong word or \"sound a like\" substitutions may have occurred due to the inherent limitations of voice recognition software. Read the chart carefully and recognize, using context, where substitutions have occurred.           ED Course as of 09/19/24 1153   Thu Sep 19, 2024   1141 Patient seen and evaluated.  1 day history of itching rash side of face sided, no ocular involvement, right wrist and right hand and left wrist after exposure to an individual who was cutting down poison sumac and poison oak poison ivy plants and shrubs came in contact with them after that individual came into their house and he brushed up against him.  Patient does have a documented history of severe allergies to this particular allergen.  No airway involvement.    Brief focused differential dx in this patient is as follows: Nonspecific contact dermatitis with exposure to poison ivy oak or sumac.       Medications   tetanus-diphtheria-acellular pertussis (BOOSTRIX) IM injection 0.5 mL (0.5 mL Intramuscular Given 9/19/24 1148)   diphenhydrAMINE (BENADRYL) tablet 25 mg (25 mg Oral Given " 9/19/24 1147)   hydrOXYzine HCL (ATARAX) tablet 25 mg (25 mg Oral Given 9/19/24 1147)       History of Present Illness       HPI    49-year-old white male presents emergency department with his wife with a chief complaint of rash on the right side of his face left side of his face and bilateral hands after possible exposure to poison ivy yesterday.  Patient friend was helping them down shrubs in their yard came into the house and brushed up against the patient, patient is highly allergic to poison oak poison ivy lesion sumac extract.  No medications were taken prior to coming to the emergency department.  Patient does have a relevant past medical history of diabetes which she just has blood sugar twice a day on metformin and bipolar disorder with a history of hypertension    Airway involvement, patient has a small rash between the second and third webspace on the right hand, right zygomatic area ocular involvement and the left wrist.  No night sweats, no chest pain no difficulty swallowing.  Remaining 12 point review of systems is unremarkable    Review of Systems   Constitutional: Negative.  Negative for chills, diaphoresis, fatigue and fever.   HENT: Negative.  Negative for drooling and sore throat.    Eyes: Negative.    Respiratory: Negative.  Negative for chest tightness and shortness of breath.    Cardiovascular:  Negative for chest pain, palpitations and leg swelling.   Gastrointestinal: Negative.  Negative for abdominal pain and nausea.   Endocrine: Negative.    Genitourinary: Negative.  Negative for dysuria.   Musculoskeletal: Negative.  Negative for back pain and neck pain.   Skin:  Positive for rash.   Allergic/Immunologic: Negative.    Neurological: Negative.    Hematological: Negative.    Psychiatric/Behavioral: Negative.             Objective     ED Triage Vitals [09/19/24 1134]   Temperature Pulse Blood Pressure Respirations SpO2 Patient Position - Orthostatic VS   97.6 °F (36.4 °C) 95 139/95 16 98 %  --      Temp src Heart Rate Source BP Location FiO2 (%) Pain Score    -- -- -- -- No Pain        Physical Exam  Vitals and nursing note reviewed.   Constitutional:       General: He is not in acute distress.     Appearance: Normal appearance. He is normal weight. He is not ill-appearing, toxic-appearing or diaphoretic.   HENT:      Head: Normocephalic and atraumatic.      Right Ear: External ear normal.      Left Ear: External ear normal.      Nose: Nose normal.      Mouth/Throat:      Mouth: Mucous membranes are moist.      Pharynx: Oropharynx is clear.   Eyes:      Extraocular Movements: Extraocular movements intact.      Conjunctiva/sclera: Conjunctivae normal.      Pupils: Pupils are equal, round, and reactive to light.   Cardiovascular:      Rate and Rhythm: Normal rate.      Pulses: Normal pulses.      Heart sounds: Normal heart sounds.   Pulmonary:      Effort: Pulmonary effort is normal.      Breath sounds: Normal breath sounds.   Abdominal:      General: Abdomen is flat. Bowel sounds are normal.   Musculoskeletal:         General: No swelling, tenderness, deformity or signs of injury. Normal range of motion.      Cervical back: Normal range of motion.      Right lower leg: No edema.      Left lower leg: No edema.   Skin:     General: Skin is warm.      Capillary Refill: Capillary refill takes less than 2 seconds.      Findings: Rash present. Rash is not crusting.          Neurological:      General: No focal deficit present.      Mental Status: He is alert and oriented to person, place, and time. Mental status is at baseline.   Psychiatric:         Mood and Affect: Mood normal.         Behavior: Behavior normal.         Thought Content: Thought content normal.         Judgment: Judgment normal.         Labs Reviewed - No data to display  No orders to display       Procedures    ED Medication and Procedure Management   Prior to Admission Medications   Prescriptions Last Dose Informant Patient Reported?  Taking?   Alcohol Swabs 70 % PADS  Self No No   Sig: May substitute brand based on insurance coverage. Check glucose BID.   Blood Glucose Monitoring Suppl (OneTouch Verio Reflect) w/Device KIT  Self No No   Sig: May substitute brand based on insurance coverage. Check glucose BID.   OneTouch Delica Lancets 33G MISC  Self No No   Sig: May substitute brand based on insurance coverage. Check glucose BID.   QUEtiapine (SEROquel) 50 mg tablet   No No   Sig: Take 1 tablet (50 mg total) by mouth daily at bedtime   folic acid (FOLVITE) 1 mg tablet   No No   Sig: Take 1 tablet (1 mg total) by mouth daily   glucose blood (OneTouch Verio) test strip  Self No No   Sig: May substitute brand based on insurance coverage. Check glucose BID.   hydrOXYzine HCL (ATARAX) 50 mg tablet  Self No No   Sig: Take 1 tablet (50 mg total) by mouth 2 (two) times a day as needed for anxiety   lidocaine (LIDODERM) 5 %   No No   Sig: Apply 2 patches topically over 12 hours daily Remove & Discard patch within 12 hours or as directed by MD   lisinopril (ZESTRIL) 20 mg tablet  Self Yes No   lithium carbonate (LITHOBID) 300 mg CR tablet  Self Yes No   Sig: Take 300 mg by mouth 2 (two) times a day   metFORMIN (GLUCOPHAGE) 500 mg tablet   No No   Sig: Take 1 tablet (500 mg total) by mouth 2 (two) times a day with meals   prazosin (MINIPRESS) 5 mg capsule  Self Yes No   Sig: Take 5 mg by mouth daily at bedtime      Facility-Administered Medications: None     Patient's Medications   Discharge Prescriptions    No medications on file     No discharge procedures on file.     Perry Carlisle III, DO  09/19/24 115

## 2024-09-20 ENCOUNTER — TELEPHONE (OUTPATIENT)
Dept: PALLIATIVE MEDICINE | Facility: CLINIC | Age: 49
End: 2024-09-20

## 2024-09-20 NOTE — TELEPHONE ENCOUNTER
I called patient voice message stated mailbox is that set up yet goodbye. I called sister and left a message to have patient call office to schedule  hfu with José Luis.

## 2024-09-27 ENCOUNTER — TELEPHONE (OUTPATIENT)
Age: 49
End: 2024-09-27

## 2024-09-27 NOTE — TELEPHONE ENCOUNTER
Contacted patient off of Medication Management  to verify needs of services in attempts to offer patient an appointment. Unable to LVM for patient to contact intake dept  in regards to wait list due vm not being set up

## 2024-10-01 ENCOUNTER — HOSPITAL ENCOUNTER (OUTPATIENT)
Dept: INFUSION CENTER | Facility: HOSPITAL | Age: 49
Discharge: HOME | End: 2024-10-01
Attending: INTERNAL MEDICINE
Payer: COMMERCIAL

## 2024-10-01 VITALS — TEMPERATURE: 97 F

## 2024-10-01 DIAGNOSIS — D3A.8 NEUROENDOCRINE TUMOR: ICD-10-CM

## 2024-10-01 DIAGNOSIS — C7A.8 NEUROENDOCRINE CARCINOMA OF SMALL BOWEL (HCC): Primary | ICD-10-CM

## 2024-10-01 PROCEDURE — 96372 THER/PROPH/DIAG INJ SC/IM: CPT

## 2024-10-01 RX ORDER — LANREOTIDE ACETATE 120 MG/.5ML
120 INJECTION SUBCUTANEOUS ONCE
Status: COMPLETED | OUTPATIENT
Start: 2024-10-01 | End: 2024-10-01

## 2024-10-01 RX ORDER — LANREOTIDE ACETATE 120 MG/.5ML
120 INJECTION SUBCUTANEOUS ONCE
OUTPATIENT
Start: 2024-10-29

## 2024-10-01 RX ADMIN — LANREOTIDE ACETATE 120 MG: 120 INJECTION SUBCUTANEOUS at 14:59

## 2024-10-01 NOTE — PROGRESS NOTES
Osei Trimble  tolerated Lanreotide injection to left buttock well with no complications.      Osei Trimble is aware of future appt on 10/29 at 1:30PM.     AVS printed and given to Osei Trimble: No (Declined by Osei Trimble).

## 2024-10-03 RX ORDER — QUETIAPINE FUMARATE 50 MG/1
TABLET, FILM COATED ORAL
COMMUNITY
Start: 2024-09-21

## 2024-10-03 RX ORDER — METOPROLOL SUCCINATE 50 MG/1
TABLET, EXTENDED RELEASE ORAL
COMMUNITY

## 2024-10-07 ENCOUNTER — OFFICE VISIT (OUTPATIENT)
Dept: PALLIATIVE MEDICINE | Facility: CLINIC | Age: 49
End: 2024-10-07
Payer: COMMERCIAL

## 2024-10-07 VITALS
TEMPERATURE: 97.8 F | HEART RATE: 86 BPM | OXYGEN SATURATION: 99 % | HEIGHT: 73 IN | SYSTOLIC BLOOD PRESSURE: 130 MMHG | WEIGHT: 222 LBS | BODY MASS INDEX: 29.42 KG/M2 | DIASTOLIC BLOOD PRESSURE: 88 MMHG

## 2024-10-07 DIAGNOSIS — C7A.8 NEUROENDOCRINE CARCINOMA OF SMALL BOWEL (HCC): Primary | ICD-10-CM

## 2024-10-07 DIAGNOSIS — C7B.8 METASTATIC MALIGNANT NEUROENDOCRINE TUMOR TO LIVER (HCC): ICD-10-CM

## 2024-10-07 DIAGNOSIS — Z51.5 PALLIATIVE CARE BY SPECIALIST: ICD-10-CM

## 2024-10-07 PROCEDURE — 99213 OFFICE O/P EST LOW 20 MIN: CPT | Performed by: PHYSICIAN ASSISTANT

## 2024-10-07 NOTE — Clinical Note
You had recently attempted to call this patient to schedule for behavioral health services but were unable to reach him or leave a voicemail. His sister Ariadne usually manages his health care appointments. Could you please call her phone - [phone].  Thank you,  José Luis

## 2024-10-07 NOTE — ASSESSMENT & PLAN NOTE
Denies uncontrolled symptoms  No medication concerns.   No home safety or transport concerns.   Psych consult pending - sent task to scheduling office to call patients's sister to schedule appointments.   Follow up in 6 months

## 2024-10-07 NOTE — PROGRESS NOTES
Ambulatory Visit  Name: Osei Trimble      : 1975      MRN: [id number]  Encounter Provider: José Luis Soni PA-C  Encounter Date: 10/7/2024   Encounter department: [facility] [place]    Assessment & Plan  Neuroendocrine carcinoma of small bowel (HCC)  Tolerating treatments  Follow with Dr. Green ([facility])    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 (HCC)  See above.  Palliative care by specialist  Denies uncontrolled symptoms  No medication concerns.   No home safety or transport concerns.   Psych consult pending - sent task to scheduling office to call patients's sister to schedule appointments.   Follow up in 6 months           History of Present Illness     Osei Trimble is a 49 y.o. male with history of neuroendocrine tumor of small bowel presents for palliative follow up for symptom management. Patient referred by Dr. Green (Oncology). Dr. Mayes (PCP).     Reports doing alright today and has no concerns. States he has a good QoL. He is still working on getting social security disability. He is tolerating his treatments. States things are going well at home. He was recently seen in the ED for rash and treated for poison ivy dermatitis which has cleared up. He had recently rescheduled his palliative appointments 4 times due to transport difficulties. States this should no longer be an issue.       Review of Systems   Constitutional:  Negative for activity change, appetite change, fatigue and unexpected weight change.   HENT:  Negative for mouth sores, trouble swallowing and voice change.    Eyes: Negative.    Respiratory:  Negative for shortness of breath.    Cardiovascular:  Negative for chest pain.   Gastrointestinal:  Negative for abdominal pain, constipation, diarrhea, nausea and vomiting.   Genitourinary: Negative.    Musculoskeletal:  Negative for arthralgias, back pain, gait problem and myalgias.   Skin:  Negative for color change, pallor and wound.   Neurological:  " Negative for dizziness, weakness, light-headedness and headaches.   Hematological: Negative.    Psychiatric/Behavioral:  Negative for confusion and sleep disturbance. The patient is not nervous/anxious.            Objective     /88   Pulse 86   Temp 97.8 °F (36.6 °C)   Ht 6' 1\" (1.854 m)   Wt 101 kg (222 lb)   SpO2 99%   BMI 29.29 kg/m²     Physical Exam  Nursing note reviewed.   Constitutional:       General: He is not in acute distress.  HENT:      Head: Normocephalic and atraumatic.      Right Ear: External ear normal.      Left Ear: External ear normal.      Nose: Nose normal.      Mouth/Throat:      Pharynx: Oropharynx is clear.   Eyes:      Extraocular Movements: Extraocular movements intact.   Cardiovascular:      Rate and Rhythm: Normal rate.   Pulmonary:      Effort: Pulmonary effort is normal.   Abdominal:      General: Abdomen is flat.   Musculoskeletal:         General: No deformity.   Skin:     Findings: No rash.   Neurological:      Mental Status: He is alert and oriented to person, place, and time.   Psychiatric:         Attention and Perception: Attention normal.         Mood and Affect: Affect is flat.         Speech: Speech is tangential.         Behavior: Behavior is cooperative.     Recent labs:  Lab Results   Component Value Date/Time    SODIUM 140 08/26/2024 12:09 PM    K 4.1 08/26/2024 12:09 PM    BUN 11 08/26/2024 12:09 PM    CREATININE 0.95 08/26/2024 12:09 PM    GLUC 198 (H) 08/26/2024 12:09 PM    CALCIUM 9.5 08/26/2024 12:09 PM    AST 21 08/26/2024 12:09 PM    ALT 21 08/26/2024 12:09 PM    ALB 4.2 08/26/2024 12:09 PM    TP 7.3 08/26/2024 12:09 PM    EGFR 97 08/26/2024 12:09 PM     Lab Results   Component Value Date/Time    HGB 11.6 (L) 06/27/2024 06:08 AM    WBC 8.60 06/27/2024 06:08 AM     06/27/2024 06:08 AM    INR 1.14 06/22/2024 12:48 PM    PTT 23 06/22/2024 12:48 PM     Lab Results   Component Value Date/Time    BXQ2PSVKDOPT 3.039 06/23/2024 04:30 AM       Recent " Imaging:  Procedure: EEG Routine and awake    Result Date: 2024  Narrative: Table formatting from the original result was not included. ELECTROENCEPHALOGRAM Patient Name:  Osei Trimble  MRN: [id number] :  1975 File #: [id number]  Date performed: 2024  Referring Provider:  PIERRE Velasquez      Report date: 2024      Study type: awake EEG ICD 10 diagnosis: Transient alteration of awareness R40.4 and Episodes of unresponsiveness R41.82 Patient History: EEG is requested to assess for seizures and/or classification of epilepsy. Patient is 49 y.o. male who had an episode of unresponsive behavior, low blood pressure, intubated.  He has explosive personality disorder, presented to hospital with chest pain. Current AEDs: Medications include: Facility-Administered Medications Ordered in Other Visits Medication Dose Route Frequency Provider Last Rate • acetaminophen  650 mg Oral Q6H PRN PIERRE Osorio   • chlorhexidine  15 mL Mouth/Throat Q12H [facility] PIERRE Osorio   • folic acid  1 mg Oral Daily PIERRE Osorio   • folic acid 1 mg in sodium chloride 0.9 % 50 mL IVPB  1 mg Intravenous Daily PIERRE Osorio 1 mg (24 1130) • heparin (porcine)  5,000 Units Subcutaneous Q8H [facility] PIERRE Osorio   • hydrALAZINE  10 mg Intravenous Q4H PRN PIERRE Osorio   • hydrOXYzine HCL  50 mg Oral BID PRN Odilia Rose PA-C   • insulin lispro  1-6 Units Subcutaneous 4x Daily (AC & HS) PIERRE Osorio   • lidocaine  2 patch Topical Daily PIERRE Osorio   • lisinopril  20 mg Oral Daily PIERRE Osorio   • lithium carbonate  300 mg Oral BID PIERRE Osorio   • ondansetron  4 mg Intravenous Q6H PRN PIERRE Osorio   • oxyCODONE  5 mg Oral Q4H PRN PIERRE Osorio   • polyethylene glycol  17 g Oral BID PIERRE Osorio   • prazosin  5 mg Oral HS PIERRE Osorio   • QUEtiapine  50 mg Oral HS Odilia  Zakia Rose PA-C   • senna  2 tablet Oral HS PIERRE Osorio   Description of Procedure: The EEG was performed with electrodes applied using the International 10-20 System.  Additional electrodes used included EOG, ECG and T1/T2 electrodes.  A single lead ECG channel is also present.  At least 16 channels are reviewed at a time and formatted into longitudinal bipolar, transverse bipolar, and referential (to common reference or calculated common reference) montages.   The EEG was recorded with the patient awake and drowsy.  The recording was technically satisfactory. EEG was recorded from 10:51 to 11:20. Findings: Background Activity: The background is grossly symmetric with respect to voltages and activities. During wakefulness, the background is well-organized with anterior very low amplitude beta activity and posterior low-medium amplitude theta-alpha activity.  There is a symmetric 7.5-8 Hz posterior dominant rhythm that attenuates with eye opening.  Drowsiness is characterized by roving eye movements, prominent anterior beta activity, and intermittent diffuse low-moderate to moderate amplitude 0.5-1.5 Hz delta activity. Activation Procedures: Hyperventilation was not performed. Stepped photic stimulation from 1 to 30 fps was performed and produced no abnormality. Other findings: The single lead ECG shows a sinus rhythm. Interpretation: This is an abnormal 29 minutes awake and drowsy EEG due to slow posterior dominant rhythm. This finding is etiologically nonspecific for mild diffuse cerebral dysfunction, which could be seen in the setting of multiple psychotropic medications. Heather Walsh MD PhD [facility]      Procedure: XR shoulder 2+ vw right    Result Date: 6/24/2024  Narrative: XR SHOULDER 2+ VW RIGHT INDICATION: right shoulder pain-r/o fx. COMPARISON: None FINDINGS: No acute fracture or dislocation. No significant degenerative changes. No lytic or  blastic osseous lesion. Unremarkable soft tissues.     Impression: No acute osseous abnormality. Workstation performed: [id number]     Procedure: Echo complete w/ contrast if indicated    Result Date: 6/24/2024  Narrative: •  Left Ventricle: Left ventricular cavity size is normal. Wall thickness is mildly increased. The left ventricular ejection fraction is 70%. Systolic function is vigorous. Wall motion is normal. Diastolic function is normal. •  Aorta: The aortic root is mildly dilated. The ascending aorta is normal in size. The aortic root is 4.00 cm. The ascending aorta is 3.7 cm.     Procedure: MRI brain w wo contrast    Result Date: 6/23/2024  Narrative: MRI BRAIN WITH AND WITHOUT CONTRAST INDICATION: Altered mental status.. COMPARISON: CT of the head from the day before. TECHNIQUE: Multiplanar, multisequence imaging of the brain was performed before and after gadolinium administration. IV Contrast:  10 mL of Gadobutrol injection (SINGLE-DOSE) IMAGE QUALITY:   Diagnostic. FINDINGS: BRAIN PARENCHYMA:  There is no discrete mass, mass effect or midline shift. There is no intracranial hemorrhage.  Normal posterior fossa.  Diffusion imaging is unremarkable. There is mild chronic microvascular ischemic change. Postcontrast imaging of the brain demonstrates no abnormal enhancement. VENTRICLES:  Normal for the patient's age. SELLA AND PITUITARY GLAND:  Normal. ORBITS:  Normal. PARANASAL SINUSES: There is mucosal thickening of the right sphenoid sinus VASCULATURE:  Evaluation of the major intracranial vasculature demonstrates appropriate flow voids. CALVARIUM AND SKULL BASE: There is a right mastoid air cell effusion. EXTRACRANIAL SOFT TISSUES:  Normal.     Impression: No mass effect, acute intracranial hemorrhage or evidence of recent infarction. No abnormal parenchymal or leptomeningeal enhancement identified. Mild nonspecific white matter FLAIR signal hyperintensity is likely on the basis of chronic microvascular  ischemic change. Right mastoid air cell effusion. Workstation performed: [id number]     Procedure: XR chest portable ICU    Result Date: 6/23/2024  Narrative: XR CHEST PORTABLE ICU INDICATION: aspiration. COMPARISON: Chest CT 6/22/2024, CXR 6/21/2024. FINDINGS: ET tube 4 cm above the bianka. NG tube in stomach. Right jugular catheter at cavoatrial junction. Redemonstration of atelectasis in the right upper and left lower lobes. No pneumothorax or pleural effusion. Normal cardiomediastinal silhouette. Bones are unremarkable for age. Normal upper abdomen.     Impression: Redemonstration of right upper and left lower lobe atelectasis. Superimposed aspiration not excluded. Workstation performed: [id number]     Procedure: CT chest abdomen pelvis wo contrast    Result Date: 6/22/2024  Narrative: CT CHEST, ABDOMEN, AND PELVIS WITHOUT IV CONTRAST INDICATION:   ams. COMPARISON: CT chest 6/21/2024. CT abdomen/pelvis 6/1/2024. TECHNIQUE: CT examination of the chest, abdomen, and pelvis was performed without intravenous or enteric contrast, limiting evaluation for certain processes. Axial, sagittal, and coronal 2D reformatted images were created from the source data and submitted for interpretation. Radiation dose length product (DLP) for this visit:  1799 mGy-cm .  This examination, like all CT scans performed in the [facility], was performed utilizing techniques to minimize radiation dose exposure, including the use of iterative reconstruction and automated exposure control. IV Contrast: None. Enteric Contrast: None. FINDINGS: CHEST: LARGE AIRWAYS: Endotracheal tube with tip just entering the takeoff of the right mainstem bronchus. LUNGS: Complete atelectasis of several basal segments of the left lower lobe. Atelectasis in the posterior basal right upper lobe and scattered in the dependent right lower lobe. No definite consolidation. Stable 9 mm nodule in the middle lobe (series 2/48), not hypermetabolic on  prior PET. PLEURA: Trace left pleural effusion. No pneumothorax. HEART: Normal cardiac size and morphology. No coronary artery calcification. No pericardial effusion or thickening. VESSELS: Normal caliber thoracic aorta with no detectable atherosclerotic plaque. Normal caliber main pulmonary artery. MEDIASTINUM AND AREN: Moderate diffuse esophageal wall thickening.. No lymphadenopathy. CHEST WALL AND LOWER NECK:   Right IJ central venous catheter with tip at the cavoatrial junction. ABDOMEN: LIVER: Hepatomegaly measuring 19.5 cm in maximum craniocaudal dimension. Severe diffuse steatosis. Known hepatic metastasis not conspicuous on noncontrast exam. BILIARY: No intrahepatic biliary ductal dilatation. Normal caliber common bile duct. GALLBLADDER: No calcified gallstones. Normal wall thickness. No pericholecystic inflammatory changes. SPLEEN: Within normal limits. No gross mass on noncontrast study. Normal spleen size. PANCREAS: Diffusely fatty replaced but otherwise grossly within normal limits. ADRENAL GLANDS: Within normal limits. KIDNEYS/URETERS: Normal kidney size and position. No suspicious lesions within the limitations of a noncontrast exam. Punctate punctate bilateral nonobstructing calculi. No hydronephrosis. STOMACH AND BOWEL: The stomach is within normal limits, moderately distended with air and debris with NG tube in place. Normal caliber small bowel. The colon is diffusely distended with gas, with mostly decompressed rectum, no evidence of obstruction. No  evidence of active small or large bowel inflammatory process. APPENDIX: Normal air-filled appendix. ABDOMINOPELVIC CAVITY: No change in a 4.2 cm centrally calcified mass in the mid mesentery with surrounding lymph nodes measuring up to 9 mm in short axis in keeping with known neuroendocrine tumor. No ascites. No intraperitoneal free air. No retroperitoneal hematoma. VESSELS: Normal caliber abdominal aorta with mild atherosclerotic plaque. PELVIS  REPRODUCTIVE ORGANS: Normal prostate. Symmetric seminal vesicles. URINARY BLADDER: Decompressed with Schaefer catheter in place. ABDOMINAL WALL/INGUINAL REGIONS: Prior ventral hernia mesh repair. BONES: Vertebral body height is maintained. No acute fracture or destructive osseous lesion. Bone island in the L1 sacral segment.     Impression: 1.  Partial atelectasis of the left lower lobe, scattered atelectasis in the right lung. 2.  Endotracheal tube with tip just entering the takeoff of the right mainstem bronchus. 3.  No acute findings in the abdomen or pelvis. 4.  Air-distended colon without findings to suggest obstruction. I personally discussed this study with ALHAJI NESS on 6/22/2024 3:29 PM. Workstation performed: [id number]     Procedure: CT head wo contrast    Result Date: 6/22/2024  Narrative: CT BRAIN - WITHOUT CONTRAST INDICATION:   ams. COMPARISON: Head CT and CT angiography of the head and neck from June 1, 2024, nuclear medicine PET/CT scan from May 10, 2024. TECHNIQUE:  CT examination of the brain was performed.  Multiplanar 2D reformatted images were created from the source data. Radiation dose length product (DLP) for this visit:  937 mGy-cm .  This examination, like all CT scans performed in the [facility], was performed utilizing techniques to minimize radiation dose exposure, including the use of iterative reconstruction and automated exposure control. IMAGE QUALITY: There is streak/spray artifact from the left parieto-occipital scalp transducer. Skull base region also demonstrates motion artifact. FINDINGS: The patient is intubated. PARENCHYMA:  No intracranial mass, mass effect or midline shift. No CT signs of acute infarction.  No acute parenchymal hemorrhage. VENTRICLES AND EXTRA-AXIAL SPACES:  Normal for the patient's age. VISUALIZED ORBITS: Normal visualized orbits. PARANASAL SINUSES: Normal visualized paranasal sinuses. CALVARIUM AND EXTRACRANIAL SOFT TISSUES:  Normal.      Impression: Limited by artifact as discussed above. No gross abnormality. No intracranial hemorrhage, edema or mass effect. Workstation performed: [id number]     Procedure: US kidney and bladder    Result Date: 6/22/2024  Narrative: RENAL ULTRASOUND INDICATION: Acute kidney injury with history of kidney stones. COMPARISON: CT chest from 6/21/2024; CT abdomen from 6/1/2024 TECHNIQUE: Ultrasound of the retroperitoneum was performed with a curvilinear transducer utilizing volumetric sweeps and still imaging techniques. FINDINGS: KIDNEYS: Symmetric and normal size. Right kidney: 13.4 x 5.1 x 5.6 cm. Volume 202.8 mL Left kidney: 11.7 x 5.9 x 5.1 cm. Volume 186.7 mL Right kidney Normal echogenicity and contour. No mass is identified. No hydronephrosis. No shadowing calculi. No perinephric fluid collections. Left kidney Normal echogenicity and contour. No mass is identified. No hydronephrosis. No shadowing calculi. No perinephric fluid collections. URETERS: Nonvisualized. BLADDER: Bladder is not well distended. Bladder wall is not well evaluated given only minimal distention. Bilateral ureteral jets detected. The visualized liver parenchyma is echogenic suspicious for fatty infiltration and correlates to the chest CT     Impression: No hydronephrosis. The bladder is not well evaluated due to underdistention. Workstation performed: [id number]     Procedure:  VAS VENOUS DUPLEX - LOWER LIMB BILATERAL    Result Date: 6/22/2024  Narrative:  [facility] REPORT CLINICAL: Indications: Patient presents with SOB/chest pain, history of cancer. Operative History: No cardiovascular surgeries Risk Factors The patient has history of HTN and previous smoking (quit >10yrs ago).   CONCLUSION:  Impression: RIGHT LOWER LIMB: No evidence of acute or chronic deep vein thrombosis. No evidence of superficial thrombophlebitis noted. Doppler evaluation shows a normal response to augmentation maneuvers. Popliteal, posterior tibial and  anterior tibial arterial Doppler waveforms are triphasic.  LEFT LOWER LIMB: No evidence of acute or chronic deep vein thrombosis. No evidence of superficial thrombophlebitis noted. Doppler evaluation shows a normal response to augmentation maneuvers. Popliteal, posterior tibial and anterior tibial arterial Doppler waveforms are triphasic.  Technical findings were given to Dr. Jerry at time of exam.  SIGNATURE: Electronically Signed by: FROILAN ESPARZA MD on 2024-06-22 10:02:19 AM    Procedure: XR chest 1 view portable    Result Date: 6/21/2024  Narrative: XR CHEST PORTABLE INDICATION: chest pain. COMPARISON: Chest radiograph 6/1/2024. FINDINGS: Clear lungs. No pneumothorax or pleural effusion. Normal cardiomediastinal silhouette. Bones are unremarkable for age. Normal upper abdomen.     Impression: No acute cardiopulmonary disease. Workstation performed: [id number]     Procedure: CT chest without contrast    Result Date: 6/21/2024  Narrative: CT CHEST WITHOUT IV CONTRAST INDICATION: Chest pain. COMPARISON: Chest CT 9/7/2023. TECHNIQUE: CT examination of the chest was performed without intravenous contrast. Multiplanar 2D reformatted images were created from the source data. This examination, like all CT scans performed in the [facility], was performed utilizing techniques to minimize radiation dose exposure, including the use of iterative reconstruction and automated exposure control. Radiation dose length product (DLP) for this visit: 454.3 mGy-cm FINDINGS: LUNGS: Lungs are clear. Unchanged 9 mm right middle lobe nodule (series 3, image 107). No tracheal or endobronchial lesion. PLEURA: No pleural effusion. No pneumothorax. HEART/GREAT VESSELS: Heart is unremarkable for patient's age. No thoracic aortic aneurysm. MEDIASTINUM AND AREN: Unremarkable. CHEST WALL AND LOWER NECK: Unremarkable. VISUALIZED STRUCTURES IN THE UPPER ABDOMEN: Hepatic steatosis. Punctate nonobstructing left interpolar  calculus. Fatty atrophy of the pancreas. Ventral hernia repair. OSSEOUS STRUCTURES: No acute fracture or destructive osseous lesion.     Impression: No acute findings in the chest. Unchanged 9 mm right middle lobe pulmonary nodule, not FDG avid on PET/CT 5/10/2024. Workstation performed: [id number]      Administrative Statements   I have spent a total time of 20 minutes in caring for this patient on the day of the visit/encounter including Instructions for management, Patient and family education, Importance of tx compliance, Risk factor reductions, Counseling / Coordination of care, Documenting in the medical record, Reviewing / ordering tests, medicine, procedures  , and Obtaining or reviewing history  . Topics discussed with the patient / family include symptom assessment and management, medication review, goals of care, supportive listening, and anticipatory guidance.

## 2024-10-08 NOTE — TELEPHONE ENCOUNTER
Per [employer] from Palliative Care provider writer contacted patient's sister in attempts to schedule an appointment for MM services. LVM for patient to contact office in regards to wait list

## 2024-10-09 NOTE — TELEPHONE ENCOUNTER
"Behavioral Health Outpatient Intake Questions    Referred By   : PCP Dr. Longo    Please advise interviewee that they need to answer all questions truthfully to allow for best care, and any misrepresentations of information may affect their ability to be seen at this clinic   => Was this discussed? Yes       Behavioral Health Outpatient Intake History -     Presenting Problem (in patient's own words):   IED Explosive Disorder, Bipolar    Are there any communication barriers for this patient?     No                                                 Are you taking any psychiatric medications? Yes   Lithium - PCP, Seroquel - PCP    Has the Patient previously received outpatient Talk Therapy or Medication Management from [facility]  No       Has the Patient abused alcohol or other substances in the last 6 months ? No        Legal History-     Is this treatment court ordered? No       Has the Patient been convicted of a felony?  NO      ACCEPTED as a patient Yes  If \"Yes\" Appointment Date: 10/16 at 900 with Dr. Crooks    Referred Elsewhere? No      Name of Insurance Co:[facility] [employer]  Insurance ID# [id number]  Insurance Phone #  If ins is primary or secondary? Primary  If patient is a minor, parents information such as Name, D.O.B of guarantor.  "

## 2024-11-05 DIAGNOSIS — C7A.8 NEUROENDOCRINE CARCINOMA OF SMALL BOWEL (HCC): ICD-10-CM

## 2024-11-05 DIAGNOSIS — D3A.8 NEUROENDOCRINE TUMOR: Primary | ICD-10-CM

## 2024-11-05 RX ORDER — LANREOTIDE ACETATE 120 MG/.5ML
120 INJECTION SUBCUTANEOUS ONCE
Status: CANCELLED | OUTPATIENT
Start: 2024-11-07

## 2024-11-07 ENCOUNTER — HOSPITAL ENCOUNTER (OUTPATIENT)
Dept: INFUSION CENTER | Facility: HOSPITAL | Age: 49
Discharge: HOME | End: 2024-11-07
Attending: INTERNAL MEDICINE
Payer: COMMERCIAL

## 2024-11-07 VITALS — TEMPERATURE: 97.2 F

## 2024-11-07 DIAGNOSIS — D3A.8 NEUROENDOCRINE TUMOR: Primary | ICD-10-CM

## 2024-11-07 DIAGNOSIS — C7A.8 NEUROENDOCRINE CARCINOMA OF SMALL BOWEL (HCC): ICD-10-CM

## 2024-11-07 PROCEDURE — 96372 THER/PROPH/DIAG INJ SC/IM: CPT

## 2024-11-07 RX ORDER — LANREOTIDE ACETATE 120 MG/.5ML
120 INJECTION SUBCUTANEOUS ONCE
OUTPATIENT
Start: 2024-12-05

## 2024-11-07 RX ORDER — LANREOTIDE ACETATE 120 MG/.5ML
120 INJECTION SUBCUTANEOUS ONCE
Status: DISCONTINUED | OUTPATIENT
Start: 2024-11-07 | End: 2024-11-11 | Stop reason: HOSPADM

## 2024-11-07 NOTE — PROGRESS NOTES
Osei Trimble  tolerated treatment well with no complications.    Pt was given Lanreotide injection and it was given in the Right glut today   no complications and pt was in a wheelchair today due to a fall he had a few weeks ago and hurt his ankle.   Osei Trimble is aware of future appt on 12/05/24 at 1500.     AVS  No (Declined by Osei Trimble)

## 2024-11-09 ENCOUNTER — HOSPITAL ENCOUNTER (EMERGENCY)
Facility: HOSPITAL | Age: 49
Discharge: HOME | End: 2024-11-09
Attending: EMERGENCY MEDICINE
Payer: COMMERCIAL

## 2024-11-09 ENCOUNTER — APPOINTMENT (EMERGENCY)
Dept: RADIOLOGY | Facility: HOSPITAL | Age: 49
End: 2024-11-09
Payer: COMMERCIAL

## 2024-11-09 VITALS
DIASTOLIC BLOOD PRESSURE: 81 MMHG | OXYGEN SATURATION: 96 % | SYSTOLIC BLOOD PRESSURE: 118 MMHG | RESPIRATION RATE: 18 BRPM | HEART RATE: 83 BPM | TEMPERATURE: 98 F

## 2024-11-09 DIAGNOSIS — S93.409A ANKLE SPRAIN: Primary | ICD-10-CM

## 2024-11-09 PROCEDURE — 96372 THER/PROPH/DIAG INJ SC/IM: CPT

## 2024-11-09 PROCEDURE — 73610 X-RAY EXAM OF ANKLE: CPT

## 2024-11-09 PROCEDURE — 99284 EMERGENCY DEPT VISIT MOD MDM: CPT | Performed by: EMERGENCY MEDICINE

## 2024-11-09 PROCEDURE — 99283 EMERGENCY DEPT VISIT LOW MDM: CPT

## 2024-11-09 RX ORDER — ACETAMINOPHEN 325 MG/1
975 TABLET ORAL ONCE
Status: COMPLETED | OUTPATIENT
Start: 2024-11-09 | End: 2024-11-09

## 2024-11-09 RX ORDER — KETOROLAC TROMETHAMINE 30 MG/ML
15 INJECTION, SOLUTION INTRAMUSCULAR; INTRAVENOUS ONCE
Status: COMPLETED | OUTPATIENT
Start: 2024-11-09 | End: 2024-11-09

## 2024-11-09 RX ADMIN — ACETAMINOPHEN 975 MG: 325 TABLET ORAL at 18:02

## 2024-11-09 RX ADMIN — KETOROLAC TROMETHAMINE 15 MG: 30 INJECTION, SOLUTION INTRAMUSCULAR at 18:02

## 2024-11-09 NOTE — ED PROVIDER NOTES
Time reflects when diagnosis was documented in both MDM as applicable and the Disposition within this note       Time User Action Codes Description Comment    11/9/2024  6:43 PM Elizabeth Acosta Add [S93.409A] Ankle sprain           ED Disposition       ED Disposition   Discharge    Condition   Stable    Date/Time   Sat Nov 9, 2024  6:43 PM    Comment   Osei Trimble discharge to home/self care.                   Assessment & Plan       Medical Decision Making  Amount and/or Complexity of Data Reviewed  Radiology: ordered and independent interpretation performed.    Risk  OTC drugs.  Prescription drug management.    ASSESSMENT: Patient is a 49 y.o. male who presents with right ankle pain and concern for blister/wound on right foot.   DDX includes but not limited to: fracture vs contusion vs sprain/strain. Doubt skin soft tissue infection.  PLAN: XR right ankle. Treated with Tylenol and Toradol.    Preliminary interpretation of x-ray shows no fracture. Suspect the area of erythema on foot is most likely due to friction or irritation from OTC ankle brace vs shoe. Multipodus boot provided. Stable for discharge. Strict return to ED precautions provided.  Referral for podiatry provided.  Advised to follow up with podiatry as soon as able for re-evaluation and further management. Patient and sister verbalized understanding and agree with the plan of care.            Medications   acetaminophen (TYLENOL) tablet 975 mg (975 mg Oral Given 11/9/24 1802)   ketorolac (TORADOL) injection 15 mg (15 mg Intramuscular Given 11/9/24 1802)       ED Risk Strat Scores                           SBIRT 20yo+      Flowsheet Row Most Recent Value   Initial Alcohol Screen: US AUDIT-C     1. How often do you have a drink containing alcohol? 0 Filed at: 11/09/2024 1733   2. How many drinks containing alcohol do you have on a typical day you are drinking?  0 Filed at: 11/09/2024 1733   3a. Male UNDER 65: How often do you have five or more drinks  on one occasion? 0 Filed at: 2024 1733   Audit-C Score 0 Filed at: 2024 1733   JURGEN: How many times in the past year have you...    Used an illegal drug or used a prescription medication for non-medical reasons? Never Filed at: 2024 1733                            History of Present Illness       Chief Complaint   Patient presents with    Ankle Injury     Pt rolled  right ankle while going down steps. Pt had a brace on it but now has a blister on it.        Past Medical History:   Diagnosis Date    Allergic     Bipolar disorder in partial remission (HCC)     Erectile dysfunction     unspecified erectile dysfunction type    Hypertension     Kidney stone       Past Surgical History:   Procedure Laterality Date    COLONOSCOPY      EXPLORATORY LAPAROTOMY W/ BOWEL RESECTION N/A 2022    Procedure: LAPAROTOMY EXPLORATORY W/ SMALL BOWEL RESECTION, liver biopsy;  Surgeon: Rose Mary Lara MD;  Location: [facility] OR;  Service: General    FL RETROGRADE PYELOGRAM  10/06/2021    HERNIA REPAIR      [place] CYSTO BLADDER W/URETERAL CATHETERIZATION Right 10/06/2021    Procedure: CYSTOSCOPY RETROGRADE PYELOGRAM WITH INSERTION STENT URETERAL, RIGHT URETEROSCOPY, STONE EXTRACTION;  Surgeon: Bradly Rivera MD;  Location: [facility] OR;  Service: Urology      Family History   Problem Relation Age of Onset    Diabetes Mother     Thyroid disease Mother     Cancer Father     Thyroid disease Sister     Depression Brother     Cancer Maternal Aunt     Cancer Paternal Uncle     Cancer Paternal Grandmother     No Known Problems Brother     Thyroid disease Sister       Social History     Tobacco Use    Smoking status: Former     Current packs/day: 0.00     Types: Pipe, Cigarettes     Quit date: 2003     Years since quittin.9    Smokeless tobacco: Never   Vaping Use    Vaping status: Some Days    Substances: THC   Substance Use Topics    Alcohol use: Not Currently     Comment: Drinks a quart of moonshine/night-As of 21  will quit drinking    Drug use: Yes     Types: Marijuana     Comment: Socially (1-2 Monthly)      E-Cigarette/Vaping    E-Cigarette Use Current Some Day User       E-Cigarette/Vaping Substances    Nicotine No     THC Yes     CBD No     Flavoring No       I have reviewed and agree with the history as documented.     HPI  Patient is a 49 y.o. male with PMHx HTN, bipolar disorder, DM who presents to the ED via private vehicle for evaluation of right ankle pain x 3 weeks.  Patient states he was walking when he rolled his right ankle 3 weeks ago.  Denies sustaining any other injury.  Denies head strike or loss of consciousness.  Patient states he did not get evaluated at that time.  Patient states he has been putting lidocaine patches over the ankle.  He has also been wrapping it and raise wraps and using an ankle brace.  Patient states the pain and swelling is significantly improved.  Denies any numbness or tingling in the foot or toes.  He noticed a lesion on the top of the foot near the ankle area and was concerned for an infected wound or blister.  Denies sustaining any other trauma.  Denies any fever, chills.    Review of Systems   Musculoskeletal:         Right ankle pain           Objective       ED Triage Vitals [11/09/24 1729]   Temperature Pulse Blood Pressure Respirations SpO2 Patient Position - Orthostatic VS   98 °F (36.7 °C) (!) 107 119/83 18 98 % Lying      Temp Source Heart Rate Source BP Location FiO2 (%) Pain Score    Temporal Monitor Right arm -- 6      Vitals      Date and Time Temp Pulse SpO2 Resp BP Pain Score FACES Pain Rating User   11/09/24 1830 -- 83 96 % -- 118/81 -- -- EZ   11/09/24 1800 -- 85 99 % -- 116/77 -- -- EZ   11/09/24 1757 -- 89 -- -- -- -- -- TTN   11/09/24 1733 -- -- 99 % -- -- -- --    11/09/24 1729 98 °F (36.7 °C) 107 98 % 18 119/83 6 --             Physical Exam  Vitals and nursing note reviewed.   Constitutional:       General: He is not in acute distress.     Appearance:  Normal appearance. He is well-developed. He is not ill-appearing, toxic-appearing or diaphoretic.   HENT:      Head: Normocephalic and atraumatic.   Eyes:      Conjunctiva/sclera: Conjunctivae normal.   Cardiovascular:      Rate and Rhythm: Normal rate.   Pulmonary:      Effort: Pulmonary effort is normal. No respiratory distress.   Musculoskeletal:         General: No swelling or deformity.      Cervical back: Normal range of motion and neck supple. No rigidity.      Right lower leg: No edema.      Left lower leg: No edema.      Right ankle: No swelling, deformity, ecchymosis or lacerations. Tenderness present over the lateral malleolus and ATF ligament. No medial malleolus, AITF ligament, CF ligament, posterior TF ligament, base of 5th metatarsal or proximal fibula tenderness. Normal range of motion. Anterior drawer test negative. Normal pulse.      Right Achilles Tendon: Normal.      Comments: SILT intact   Motor strength 5/5   2+ DP and PT pulses  Skin intact over ankle and foot   No blister, mass, induration, fluctuance noted   There is a small area of erythema that is well circumscribed to the anterior aspect of ankle   Skin:     General: Skin is warm and dry.      Capillary Refill: Capillary refill takes less than 2 seconds.   Neurological:      General: No focal deficit present.      Mental Status: He is alert and oriented to person, place, and time.   Psychiatric:         Mood and Affect: Mood normal.         Results Reviewed       None            XR ankle 3+ views RIGHT   ED Interpretation by Elizabeth Acosta MD (11/09 1841)   No acute osseous abnormality          Procedures    ED Medication and Procedure Management   Prior to Admission Medications   Prescriptions Last Dose Informant Patient Reported? Taking?   Alcohol Swabs 70 % PADS  Self No No   Sig: May substitute brand based on insurance coverage. Check glucose BID.   Blood Glucose Monitoring Suppl (OneTouch Verio Reflect) w/Device KIT  Self No No   Sig:  May substitute brand based on insurance coverage. Check glucose BID.   OneTouch Delica Lancets 33G MISC  Self No No   Sig: May substitute brand based on insurance coverage. Check glucose BID.   QUEtiapine (SEROquel) 50 mg tablet   No No   Sig: Take 1 tablet (50 mg total) by mouth daily at bedtime   QUEtiapine (SEROquel) 50 mg tablet   Yes No   folic acid (FOLVITE) 1 mg tablet   No No   Sig: Take 1 tablet (1 mg total) by mouth daily   glucose blood (OneTouch Verio) test strip  Self No No   Sig: May substitute brand based on insurance coverage. Check glucose BID.   hydrOXYzine HCL (ATARAX) 50 mg tablet  Self No No   Sig: Take 1 tablet (50 mg total) by mouth 2 (two) times a day as needed for anxiety   lidocaine (LIDODERM) 5 %   No No   Sig: Apply 2 patches topically over 12 hours daily Remove & Discard patch within 12 hours or as directed by MD   lisinopril (ZESTRIL) 20 mg tablet  Self Yes No   lithium carbonate (LITHOBID) 300 mg CR tablet  Self Yes No   Sig: Take 300 mg by mouth 2 (two) times a day   metFORMIN (GLUCOPHAGE) 500 mg tablet   No No   Sig: Take 1 tablet (500 mg total) by mouth 2 (two) times a day with meals   metoprolol succinate (TOPROL-XL) 50 mg 24 hr tablet   Yes No   Sig: Take 1 tablet every day by oral route.   prazosin (MINIPRESS) 5 mg capsule  Self Yes No   Sig: Take 5 mg by mouth daily at bedtime      Facility-Administered Medications: None     Discharge Medication List as of 11/9/2024  6:44 PM        CONTINUE these medications which have NOT CHANGED    Details   Alcohol Swabs 70 % PADS May substitute brand based on insurance coverage. Check glucose BID., Print      Blood Glucose Monitoring Suppl (OneTouch Verio Reflect) w/Device KIT May substitute brand based on insurance coverage. Check glucose BID., Print      folic acid (FOLVITE) 1 mg tablet Take 1 tablet (1 mg total) by mouth daily, Starting Fri 6/28/2024, Until Sun 7/28/2024, Normal      glucose blood (OneTouch Verio) test strip May  substitute brand based on insurance coverage. Check glucose BID., Print      hydrOXYzine HCL (ATARAX) 50 mg tablet Take 1 tablet (50 mg total) by mouth 2 (two) times a day as needed for anxiety, Starting Thu 6/27/2024, Normal      lidocaine (LIDODERM) 5 % Apply 2 patches topically over 12 hours daily Remove & Discard patch within 12 hours or as directed by MD, Starting Fri 6/28/2024, Until Sun 7/28/2024, Normal      lisinopril (ZESTRIL) 20 mg tablet Historical Med      lithium carbonate (LITHOBID) 300 mg CR tablet Take 300 mg by mouth 2 (two) times a day, Starting Thu 10/19/2023, Historical Med      metFORMIN (GLUCOPHAGE) 500 mg tablet Take 1 tablet (500 mg total) by mouth 2 (two) times a day with meals, Starting Thu 6/27/2024, Until Sat 7/27/2024, Normal      metoprolol succinate (TOPROL-XL) 50 mg 24 hr tablet Take 1 tablet every day by oral route., Historical Med      OneTouch Delica Lancets 33G MISC May substitute brand based on insurance coverage. Check glucose BID., Print      prazosin (MINIPRESS) 5 mg capsule Take 5 mg by mouth daily at bedtime, Starting Thu 10/19/2023, Historical Med      QUEtiapine (SEROquel) 50 mg tablet Historical Med             ED SEPSIS DOCUMENTATION   Time reflects when diagnosis was documented in both MDM as applicable and the Disposition within this note       Time User Action Codes Description Comment    11/9/2024  6:43 PM Elizabeth Acosta T Add [S93.409A] Ankle sprain                  Elizabeth Acosta MD  11/09/24 7926

## 2024-11-09 NOTE — DISCHARGE INSTRUCTIONS
You were seen in the Emergency Department today for right ankle pain.  No fracture seen on xray imaging.  Can take Tylenol/Motrin as needed for pain.  Referral to podiatry provided.     Please follow up with podiatry as soon as able for re-evaluation and further management.   Please return to the Emergency Department if you experience worsening of your current symptoms or any other concerning symptoms.

## 2024-11-10 NOTE — ED ATTENDING ATTESTATION
11/9/2024  I, Jose Brewer MD, saw and evaluated the patient. I have discussed the patient with the resident/non-physician practitioner and agree with the resident's/non-physician practitioner's findings, Plan of Care, and MDM as documented in the resident's/non-physician practitioner's note, except where noted. All available labs and Radiology studies were reviewed.  I was present for key portions of any procedure(s) performed by the resident/non-physician practitioner and I was immediately available to provide assistance.       At this point I agree with the current assessment done in the Emergency Department.  I have conducted an independent evaluation of this patient a history and physical is as follows:    Patient is a 49-year-old male presents for evaluation abrasion to the right ankle.  No evidence of infection or fracture/dislocation.        ED Course         Critical Care Time  Procedures

## 2024-11-14 ENCOUNTER — APPOINTMENT (EMERGENCY)
Dept: CT IMAGING | Facility: HOSPITAL | Age: 49
End: 2024-11-14
Payer: COMMERCIAL

## 2024-11-14 ENCOUNTER — OFFICE VISIT (OUTPATIENT)
Dept: PODIATRY | Facility: CLINIC | Age: 49
End: 2024-11-14
Payer: COMMERCIAL

## 2024-11-14 ENCOUNTER — TELEPHONE (OUTPATIENT)
Age: 49
End: 2024-11-14

## 2024-11-14 ENCOUNTER — APPOINTMENT (EMERGENCY)
Dept: RADIOLOGY | Facility: HOSPITAL | Age: 49
End: 2024-11-14
Payer: COMMERCIAL

## 2024-11-14 ENCOUNTER — HOSPITAL ENCOUNTER (EMERGENCY)
Facility: HOSPITAL | Age: 49
Discharge: HOME | End: 2024-11-14
Attending: STUDENT IN AN ORGANIZED HEALTH CARE EDUCATION/TRAINING PROGRAM
Payer: COMMERCIAL

## 2024-11-14 VITALS
OXYGEN SATURATION: 96 % | BODY MASS INDEX: 29.42 KG/M2 | HEART RATE: 85 BPM | WEIGHT: 222 LBS | SYSTOLIC BLOOD PRESSURE: 111 MMHG | TEMPERATURE: 98.2 F | RESPIRATION RATE: 16 BRPM | HEIGHT: 73 IN | DIASTOLIC BLOOD PRESSURE: 73 MMHG

## 2024-11-14 VITALS
DIASTOLIC BLOOD PRESSURE: 86 MMHG | HEART RATE: 112 BPM | SYSTOLIC BLOOD PRESSURE: 126 MMHG | BODY MASS INDEX: 29.29 KG/M2 | WEIGHT: 222 LBS

## 2024-11-14 DIAGNOSIS — R55 SYNCOPE: Primary | ICD-10-CM

## 2024-11-14 DIAGNOSIS — R91.1 PULMONARY NODULE: ICD-10-CM

## 2024-11-14 DIAGNOSIS — S93.409A ANKLE SPRAIN: ICD-10-CM

## 2024-11-14 DIAGNOSIS — D3A.8 NEUROENDOCRINE TUMOR: ICD-10-CM

## 2024-11-14 DIAGNOSIS — R16.0 HEPATOMEGALY: ICD-10-CM

## 2024-11-14 DIAGNOSIS — R07.9 CHEST PAIN, UNSPECIFIED: ICD-10-CM

## 2024-11-14 LAB
2HR DELTA HS TROPONIN: 1 NG/L
ALBUMIN SERPL BCG-MCNC: 4.2 G/DL (ref 3.5–5)
ALP SERPL-CCNC: 87 U/L (ref 34–104)
ALT SERPL W P-5'-P-CCNC: 20 U/L (ref 7–52)
ANION GAP SERPL CALCULATED.3IONS-SCNC: 9 MMOL/L (ref 4–13)
AST SERPL W P-5'-P-CCNC: 18 U/L (ref 13–39)
BASOPHILS # BLD AUTO: 0.04 THOUSANDS/ÂΜL (ref 0–0.1)
BASOPHILS NFR BLD AUTO: 0 % (ref 0–1)
BILIRUB SERPL-MCNC: 0.74 MG/DL (ref 0.2–1)
BNP SERPL-MCNC: 34 PG/ML (ref 0–100)
BUN SERPL-MCNC: 14 MG/DL (ref 5–25)
CALCIUM SERPL-MCNC: 9.3 MG/DL (ref 8.4–10.2)
CARDIAC TROPONIN I PNL SERPL HS: 3 NG/L (ref ?–50)
CARDIAC TROPONIN I PNL SERPL HS: 4 NG/L (ref ?–50)
CHLORIDE SERPL-SCNC: 102 MMOL/L (ref 96–108)
CO2 SERPL-SCNC: 25 MMOL/L (ref 21–32)
CREAT SERPL-MCNC: 1.07 MG/DL (ref 0.6–1.3)
D DIMER PPP FEU-MCNC: 1.22 UG/ML FEU
EOSINOPHIL # BLD AUTO: 0.34 THOUSAND/ÂΜL (ref 0–0.61)
EOSINOPHIL NFR BLD AUTO: 2 % (ref 0–6)
ERYTHROCYTE [DISTWIDTH] IN BLOOD BY AUTOMATED COUNT: 13.1 % (ref 11.6–15.1)
GFR SERPL CREATININE-BSD FRML MDRD: 80 ML/MIN/1.73SQ M
GLUCOSE SERPL-MCNC: 148 MG/DL (ref 65–140)
HCT VFR BLD AUTO: 44.1 % (ref 36.5–49.3)
HGB BLD-MCNC: 14.9 G/DL (ref 12–17)
IMM GRANULOCYTES # BLD AUTO: 0.07 THOUSAND/UL (ref 0–0.2)
IMM GRANULOCYTES NFR BLD AUTO: 1 % (ref 0–2)
LYMPHOCYTES # BLD AUTO: 2.33 THOUSANDS/ÂΜL (ref 0.6–4.47)
LYMPHOCYTES NFR BLD AUTO: 15 % (ref 14–44)
MCH RBC QN AUTO: 30 PG (ref 26.8–34.3)
MCHC RBC AUTO-ENTMCNC: 33.8 G/DL (ref 31.4–37.4)
MCV RBC AUTO: 89 FL (ref 82–98)
MONOCYTES # BLD AUTO: 1.12 THOUSAND/ÂΜL (ref 0.17–1.22)
MONOCYTES NFR BLD AUTO: 7 % (ref 4–12)
NEUTROPHILS # BLD AUTO: 11.61 THOUSANDS/ÂΜL (ref 1.85–7.62)
NEUTS SEG NFR BLD AUTO: 75 % (ref 43–75)
NRBC BLD AUTO-RTO: 0 /100 WBCS
PLATELET # BLD AUTO: 287 THOUSANDS/UL (ref 149–390)
PMV BLD AUTO: 9.9 FL (ref 8.9–12.7)
POTASSIUM SERPL-SCNC: 3.6 MMOL/L (ref 3.5–5.3)
PROT SERPL-MCNC: 7.5 G/DL (ref 6.4–8.4)
RBC # BLD AUTO: 4.96 MILLION/UL (ref 3.88–5.62)
SODIUM SERPL-SCNC: 136 MMOL/L (ref 135–147)
WBC # BLD AUTO: 15.51 THOUSAND/UL (ref 4.31–10.16)

## 2024-11-14 PROCEDURE — 93005 ELECTROCARDIOGRAM TRACING: CPT

## 2024-11-14 PROCEDURE — 99214 OFFICE O/P EST MOD 30 MIN: CPT | Performed by: PODIATRIST

## 2024-11-14 PROCEDURE — 99285 EMERGENCY DEPT VISIT HI MDM: CPT

## 2024-11-14 PROCEDURE — 71275 CT ANGIOGRAPHY CHEST: CPT

## 2024-11-14 PROCEDURE — 71045 X-RAY EXAM CHEST 1 VIEW: CPT

## 2024-11-14 PROCEDURE — 85025 COMPLETE CBC W/AUTO DIFF WBC: CPT

## 2024-11-14 PROCEDURE — 80053 COMPREHEN METABOLIC PANEL: CPT

## 2024-11-14 PROCEDURE — 84484 ASSAY OF TROPONIN QUANT: CPT

## 2024-11-14 PROCEDURE — 96361 HYDRATE IV INFUSION ADD-ON: CPT

## 2024-11-14 PROCEDURE — 83880 ASSAY OF NATRIURETIC PEPTIDE: CPT

## 2024-11-14 PROCEDURE — 85379 FIBRIN DEGRADATION QUANT: CPT

## 2024-11-14 PROCEDURE — 36415 COLL VENOUS BLD VENIPUNCTURE: CPT

## 2024-11-14 PROCEDURE — 96374 THER/PROPH/DIAG INJ IV PUSH: CPT

## 2024-11-14 RX ORDER — KETOROLAC TROMETHAMINE 30 MG/ML
15 INJECTION, SOLUTION INTRAMUSCULAR; INTRAVENOUS ONCE
Status: COMPLETED | OUTPATIENT
Start: 2024-11-14 | End: 2024-11-14

## 2024-11-14 RX ADMIN — SODIUM CHLORIDE 1000 ML: 0.9 INJECTION, SOLUTION INTRAVENOUS at 11:53

## 2024-11-14 RX ADMIN — KETOROLAC TROMETHAMINE 15 MG: 30 INJECTION, SOLUTION INTRAMUSCULAR at 11:51

## 2024-11-14 RX ADMIN — IOHEXOL 85 ML: 350 INJECTION, SOLUTION INTRAVENOUS at 12:33

## 2024-11-14 NOTE — ASSESSMENT & PLAN NOTE
Orders:    Ambulatory Referral to Podiatry    Ambulatory referral to Physical Therapy; Future    Brace    Diclofenac Sodium (VOLTAREN) 1 %; Apply 2 g topically 4 (four) times a day

## 2024-11-14 NOTE — PROGRESS NOTES
Name: Osei Trimble      : 1975      MRN: [id number]  Encounter Provider: Russell Valerio DPM  Encounter Date: 2024   Encounter department: [facility] [place]  :  Assessment & Plan  Ankle sprain    Orders:    Ambulatory Referral to Podiatry    Ambulatory referral to Physical Therapy; Future    Brace    Diclofenac Sodium (VOLTAREN) 1 %; Apply 2 g topically 4 (four) times a day    -Moderate grade ankle sprain  - Will give him a Aircast for stabilization  - Will send to physical therapy for rehabilitation  - He is to return in 8 weeks if he is having any continued issues or pain  - Reviewed through to having any issues in 8 weeks we will consider MRI for evaluation  - Rx in for diclofenac gel for management of the inflammation advised RICE  -ER note reviewed  -X-rays ankle were reviewed and do show no acute fracture or dislocation    History of Present Illness   HPI  Osei Trimble is a 49 y.o. male who presents presents for evaluation management of his right ankle, fell down some concrete stairs.  He had some difficulty bearing weight he presents ambulating in a boot.  He thinks the boot is too small for him denies any drainage from his toes and states that he scuff the top of his foot points to the outside of his ankle where he is having pain    Review of Systems   Constitutional:  Negative for chills and fever.   HENT:  Negative for ear pain and sore throat.    Eyes:  Negative for pain and visual disturbance.   Respiratory:  Negative for cough and shortness of breath.    Cardiovascular:  Negative for chest pain and palpitations.   Gastrointestinal:  Negative for abdominal pain and vomiting.   Genitourinary:  Negative for dysuria and hematuria.   Musculoskeletal:  Negative for arthralgias and back pain.   Skin:  Negative for color change and rash.   Neurological:  Negative for seizures and syncope.   All other systems reviewed and are negative.    Past Medical History   Past  Medical History:   Diagnosis Date    Allergic     Bipolar disorder in partial remission (HCC)     Erectile dysfunction     unspecified erectile dysfunction type    Hypertension     Kidney stone      Past Surgical History:   Procedure Laterality Date    COLONOSCOPY      EXPLORATORY LAPAROTOMY W/ BOWEL RESECTION N/A 7/21/2022    Procedure: LAPAROTOMY EXPLORATORY W/ SMALL BOWEL RESECTION, liver biopsy;  Surgeon: Rose Mary Lara MD;  Location:  MAIN OR;  Service: General    FL RETROGRADE PYELOGRAM  10/06/2021    HERNIA REPAIR      [place] CYSTO BLADDER W/URETERAL CATHETERIZATION Right 10/06/2021    Procedure: CYSTOSCOPY RETROGRADE PYELOGRAM WITH INSERTION STENT URETERAL, RIGHT URETEROSCOPY, STONE EXTRACTION;  Surgeon: rBadly Rivera MD;  Location:  MAIN OR;  Service: Urology     Family History   Problem Relation Age of Onset    Diabetes Mother     Thyroid disease Mother     Cancer Father     Thyroid disease Sister     Depression Brother     Cancer Maternal Aunt     Cancer Paternal Uncle     Cancer Paternal Grandmother     No Known Problems Brother     Thyroid disease Sister       reports that he quit smoking about 20 years ago. His smoking use included pipe and cigarettes. He has never used smokeless tobacco. He reports that he does not currently use alcohol. He reports current drug use. Drug: Marijuana.  Current Outpatient Medications on File Prior to Visit   Medication Sig Dispense Refill    Alcohol Swabs 70 % PADS May substitute brand based on insurance coverage. Check glucose BID. 100 each 1    Blood Glucose Monitoring Suppl (OneTouch Verio Reflect) w/Device KIT May substitute brand based on insurance coverage. Check glucose BID. 1 kit 0    glucose blood (OneTouch Verio) test strip May substitute brand based on insurance coverage. Check glucose BID. 100 each 1    hydrOXYzine HCL (ATARAX) 50 mg tablet Take 1 tablet (50 mg total) by mouth 2 (two) times a day as needed for anxiety 30 tablet 0    lisinopril (ZESTRIL) 20 mg  tablet       lithium carbonate (LITHOBID) 300 mg CR tablet Take 300 mg by mouth 2 (two) times a day      metoprolol succinate (TOPROL-XL) 50 mg 24 hr tablet Take 1 tablet every day by oral route.      OneTouch Delica Lancets 33G MISC May substitute brand based on insurance coverage. Check glucose BID. 100 each 1    prazosin (MINIPRESS) 5 mg capsule Take 5 mg by mouth daily at bedtime      QUEtiapine (SEROquel) 50 mg tablet       folic acid (FOLVITE) 1 mg tablet Take 1 tablet (1 mg total) by mouth daily 30 tablet 0    lidocaine (LIDODERM) 5 % Apply 2 patches topically over 12 hours daily Remove & Discard patch within 12 hours or as directed by MD 60 patch 0    metFORMIN (GLUCOPHAGE) 500 mg tablet Take 1 tablet (500 mg total) by mouth 2 (two) times a day with meals 60 tablet 0    QUEtiapine (SEROquel) 50 mg tablet Take 1 tablet (50 mg total) by mouth daily at bedtime 30 tablet 0     No current facility-administered medications on file prior to visit.     Allergies   Allergen Reactions    Tomato - Food Allergy Anaphylaxis     raw    Poison Ivy Extract Hives    Poison Sumac Extract Hives           Objective   /86 (BP Location: Right arm, Patient Position: Sitting, Cuff Size: Large)   Pulse (!) 112   Wt 101 kg (222 lb)   BMI 29.29 kg/m²      Physical Exam  Skin:     Comments: Tenderness outpatient along the ATFL, CFL and PTFL, range of motion is limited but guarded and intact.  Range of motion of toes up and down is also intact, no open lesions

## 2024-11-14 NOTE — DISCHARGE INSTRUCTIONS
Immediately return to the emergency room if you experience any new or worsening symptoms or if the symptoms are lasting longer than expected.     Please follow-up with your oncologist to discuss your ER visit today. Continue with your infusions and take ibuprofen and Tylenol as needed for pain. Dosing instructions are attached.    Please take ibuprofen (Motrin, Advil) or acetaminophen (Tylenol) as needed for pain. These are available over-the-counter. You can take ibuprofen 400-600 mg every 4-6 hours with food for pain. You can also take acetaminophen 500-650 mg every 4-6 hours for pain. Do not exceed 4000 mg of Tylenol a day as this can cause liver damage. Do not drink alcohol with either of these medications. Evidence-based research has shown taking these 2 drugs together work the same (or better in some cases) for pain than opioids or narcotic pain medication.

## 2024-11-14 NOTE — TELEPHONE ENCOUNTER
Caller: Osei     Doctor and/or Office:MONTY Valerio/Laura    #:[phone]    Escalation: Care Called in to let you know that Osei is doing much better he was released from the hospital they gave him IV and they think it was from not eating.. Please advise thank you

## 2024-11-14 NOTE — ED PROVIDER NOTES
Time reflects when diagnosis was documented in both MDM as applicable and the Disposition within this note       Time User Action Codes Description Comment    11/14/2024  2:04 PM Luis Kumar Add [R55] Syncope     11/14/2024  2:05 PM Luis Kumar Add [R07.9] Chest pain, unspecified     11/14/2024  2:07 PM Luis Kumar Add [R91.1] Pulmonary nodule     11/14/2024  2:07 PM Luis Kumar Add [R16.0] Hepatomegaly     11/14/2024  2:07 PM Luis Kumar Add [D3A.8] Neuroendocrine tumor           ED Disposition       ED Disposition   Discharge    Condition   Stable    Date/Time   u Nov 14, 2024  2:04 PM    Comment   Osei Trimble discharge to home/self care.                   Assessment & Plan       Medical Decision Making  Patient is a well-appearing 49-year-old male presenting status post syncopal episode. He was walking out to the waiting room after his podiatry appointment and felt dizzy/hot and grabbed the rail and had chest pain as he fell to the ground. Sister thinks he passed out. He was sweaty when EMS arrived. ECG appears unchanged from prior in June 2024. No head strike or blood thinners.  Will obtain cardiac workup including ECG, troponin, and chest x-ray since he complains of continued chest pain. Will obtain baseline labs including CBC and CMP to evaluate for leukocytosis, anemia, electrolyte abnormality, MEHDI, glucose abnormality, or transaminitis. Will obtain BNP since he reports being short of breath at times. Will obtain D-dimer since he complains of worsening chest pain with a deep breath and has active neuroendocrine cancer, although low suspicion for PE since he denies dyspnea and is without tachycardia, tachypnea, or hypoxia.  Later ordered CTA PE study due to elevated D-dimer.  See ED course for interpretation of labs, imaging, and further medical decision making.   Will treat his reproducible chest pain with Toradol. IV fluids for hydration. He was feeling better after the Toradol.  Suspect leukocytosis is reactive or from recent infusion. No evidence of PE on CT scan. Cardiac workup reassuring as well. Delta troponin 1 and 2-hour ECG unchanged from first ECG and past ECG. Patient would like to go home. Will discharge home with supportive care instructions. Also relayed findings to his oncologist Dr. Green so he can have appropriate follow-up.  Dispo: Patient is safe/stable for discharge home and was discharged home with strict return precautions. Provided verbal and written supportive care instructions for managing his illness. Advised patient to return to the nearest emergency room if he has new or worsening symptoms or if any questions arise. Advised patient to follow-up with his family doctor and oncologist. Patient is satisfied with care and agrees with management and plan.     Amount and/or Complexity of Data Reviewed  External Data Reviewed: labs, radiology and notes.  Labs: ordered. Decision-making details documented in ED Course.  Radiology: ordered and independent interpretation performed. Decision-making details documented in ED Course.  ECG/medicine tests: ordered and independent interpretation performed.    Risk  Prescription drug management.        ED Course as of 11/14/24 1439   Thu Nov 14, 2024   1111 Blood Pressure: 115/75  Vital signs reviewed and within normal limits.    1143 WBC(!): 15.51  Could be reactive from the syncopal episode or recent oncology infusion. No anemia or platelet abnormality.   1203 D-Dimer, Quant(!): 1.22  Will order CTA PE study.   1208 BNP: 34   1209 hs TnI 0hr: 3  Will repeat at 2 hours.   1241 Comprehensive metabolic panel(!)  Electrolytes WNL. No MEHID or transaminitis. Patient resting comfortably. His chest pain is better.    1338 CTA chest pe study  IMPRESSION:  No PE. No acute pulmonary findings.  Stable benign-appearing nodules as above.  Hepatomegaly with fatty infiltration.   1338 Went over results with patient and family. Awaiting on 2-hour  troponin.   1410 Delta 2hr hsTnI: 1  Delta troponin 1 and 2-hour unchanged. Discussed this with patient and family and they are comfortable with discharge home. He remains chest pain free. Relayed this to Dr. Green and will discharge home with strict return precautions.       Medications   ketorolac (TORADOL) injection 15 mg (15 mg Intravenous Given 11/14/24 1151)   sodium chloride 0.9 % bolus 1,000 mL (0 mL Intravenous Stopped 11/14/24 1359)   iohexol (OMNIPAQUE) 350 MG/ML injection (MULTI-DOSE) 100 mL (85 mL Intravenous Given 11/14/24 1233)       ED Risk Strat Scores   HEART Risk Score      Flowsheet Row Most Recent Value   Heart Score Risk Calculator    History 1 Filed at: 11/14/2024 1205   ECG 1 Filed at: 11/14/2024 1205   Age 1 Filed at: 11/14/2024 1205   Risk Factors 1 Filed at: 11/14/2024 1205   Troponin 0 Filed at: 11/14/2024 1205   HEART Score 4 Filed at: 11/14/2024 1205                               SBIRT 22yo+      Flowsheet Row Most Recent Value   Initial Alcohol Screen: US AUDIT-C     1. How often do you have a drink containing alcohol? 0 Filed at: 11/14/2024 1109   2. How many drinks containing alcohol do you have on a typical day you are drinking?  0 Filed at: 11/14/2024 1109   3a. Male UNDER 65: How often do you have five or more drinks on one occasion? 0 Filed at: 11/14/2024 1109   Audit-C Score 0 Filed at: 11/14/2024 1109   JURGEN: How many times in the past year have you...    Used an illegal drug or used a prescription medication for non-medical reasons? Never Filed at: 11/14/2024 1109            Wells' Criteria for PE      Flowsheet Row Most Recent Value   Wells' Criteria for PE    Clinical signs and symptoms of DVT 0 Filed at: 11/14/2024 1126   PE is primary diagnosis or equally likely 0 Filed at: 11/14/2024 1126   HR >100 0 Filed at: 11/14/2024 1126   Immobilization at least 3 days or Surgery in the previous 4 weeks 0 Filed at: 11/14/2024 1126   Previous, objectively diagnosed PE or DVT 0 Filed  at: 2024 1126   Hemoptysis 0 Filed at: 2024 1126   Malignancy with treatment within 6 months or palliative 1 Filed at: 2024 1126   Wells' Criteria Total 1 Filed at: 2024 1126                        History of Present Illness       Chief Complaint   Patient presents with    Syncope     According to the patient, he was at the orthopedic office and the patient passed out with chest pains.       Past Medical History:   Diagnosis Date    Allergic     Bipolar disorder in partial remission (HCC)     Erectile dysfunction     unspecified erectile dysfunction type    Hypertension     Kidney stone       Past Surgical History:   Procedure Laterality Date    COLONOSCOPY      EXPLORATORY LAPAROTOMY W/ BOWEL RESECTION N/A 2022    Procedure: LAPAROTOMY EXPLORATORY W/ SMALL BOWEL RESECTION, liver biopsy;  Surgeon: Rose Mary Lara MD;  Location:  MAIN OR;  Service: General    FL RETROGRADE PYELOGRAM  10/06/2021    HERNIA REPAIR      RI CYSTO BLADDER W/URETERAL CATHETERIZATION Right 10/06/2021    Procedure: CYSTOSCOPY RETROGRADE PYELOGRAM WITH INSERTION STENT URETERAL, RIGHT URETEROSCOPY, STONE EXTRACTION;  Surgeon: Bradly Rivera MD;  Location:  MAIN OR;  Service: Urology      Family History   Problem Relation Age of Onset    Diabetes Mother     Thyroid disease Mother     Cancer Father     Thyroid disease Sister     Depression Brother     Cancer Maternal Aunt     Cancer Paternal Uncle     Cancer Paternal Grandmother     No Known Problems Brother     Thyroid disease Sister       Social History     Tobacco Use    Smoking status: Former     Current packs/day: 0.00     Types: Pipe, Cigarettes     Quit date: 2003     Years since quittin.9    Smokeless tobacco: Never   Vaping Use    Vaping status: Some Days    Substances: THC   Substance Use Topics    Alcohol use: Not Currently     Comment: Drinks a quart of moonshine/night-As of 21 will quit drinking    Drug use: Yes     Types: Marijuana      Comment: Socially (1-2 Monthly)      E-Cigarette/Vaping    E-Cigarette Use Current Some Day User       E-Cigarette/Vaping Substances    Nicotine No     THC Yes     CBD No     Flavoring No       I have reviewed and agree with the history as documented.     Patient is a 49-year-old male with relevant past medical history of bipolar disorder, hypertension, kidney stone, and recent ankle sprain presenting status post syncopal episode at the podiatry office in [place]. He finished up his podiatry appointment and was walking out to the waiting room and felt dizzy/hot and grabbed the rail and had chest pain as he fell to the ground. Sister thinks he passed out. He was sweaty when EMS arrived. This was a follow-up appointment for an ankle sprain that happened on 11/9 and a blister on his foot. Dr. Valerio put a brace on his right ankle today. He presents the ER with continued chest pain. He reports nonradiating substernal chest pain that he rates a 4/10. The chest pain is worse with a deep breath. He denies dyspnea today but reports shortness of breath at times. He is currently being treated for neuroendocrine cancer. Last infusion 7 days ago. He did not strike his head according to his sister. He was feeling well prior to this event and reports chronic fatigue. He denies F/C, headache, dizziness, shortness of breath, abdominal pain, N/V, or urinary symptoms.      History provided by:  Patient and relative (Sister)   used: No    Syncope  Associated symptoms: chest pain    Associated symptoms: no confusion, no dizziness, no fever, no headaches, no nausea, no palpitations, no seizures, no shortness of breath, no vomiting and no weakness        Review of Systems   Constitutional:  Positive for fatigue. Negative for chills and fever.   HENT:  Negative for congestion, ear pain, rhinorrhea and sore throat.    Eyes:  Negative for pain and visual disturbance.   Respiratory:  Negative for cough and shortness of  breath.    Cardiovascular:  Positive for chest pain and syncope. Negative for palpitations.   Gastrointestinal:  Negative for abdominal pain, constipation, diarrhea, nausea and vomiting.   Genitourinary:  Negative for dysuria, frequency, hematuria and urgency.   Musculoskeletal:  Negative for arthralgias and back pain.   Skin:  Negative for color change and rash.   Neurological:  Negative for dizziness, seizures, weakness, light-headedness and headaches.   Psychiatric/Behavioral:  Negative for agitation and confusion.            Objective       ED Triage Vitals   Temperature Pulse Blood Pressure Respirations SpO2 Patient Position - Orthostatic VS   11/14/24 1108 11/14/24 1108 11/14/24 1108 11/14/24 1108 11/14/24 1108 11/14/24 1108   98.2 °F (36.8 °C) 91 115/75 18 97 % Lying      Temp Source Heart Rate Source BP Location FiO2 (%) Pain Score    11/14/24 1108 11/14/24 1200 11/14/24 1108 -- 11/14/24 1108    Temporal Monitor Right arm  4      Vitals      Date and Time Temp Pulse SpO2 Resp BP Pain Score FACES Pain Rating User   11/14/24 1415 -- 85 96 % 16 111/73 -- -- AS   11/14/24 1300 -- 83 98 % 20 110/77 -- -- AS   11/14/24 1215 -- 86 99 % 16 123/80 -- --    11/14/24 1200 -- 89 98 % 16 114/73 -- --    11/14/24 1151 -- -- -- -- -- 4 -- AS   11/14/24 1108 98.2 °F (36.8 °C) 91 97 % 18 115/75 4 -- [facility] [place] [facility] to the [facility]            Physical Exam  Vitals and nursing note reviewed.   Constitutional:       General: He is not in acute distress.     Appearance: He is well-developed. He is not ill-appearing.   HENT:      Head: Normocephalic and atraumatic. No raccoon eyes or Romano's sign.      Right Ear: No hemotympanum.      Left Ear: No hemotympanum.   Eyes:      Conjunctiva/sclera: Conjunctivae normal.   Cardiovascular:      Rate and Rhythm: Normal rate and regular rhythm.      Heart sounds: No murmur heard.  Pulmonary:      Effort: Pulmonary effort is normal. No respiratory distress.      Breath sounds: Normal breath sounds. No wheezing,  rhonchi or rales.   Chest:      Chest wall: Tenderness present.   Abdominal:      Palpations: Abdomen is soft.      Tenderness: There is no abdominal tenderness. There is no guarding or rebound.   Musculoskeletal:         General: No swelling.      Cervical back: Neck supple.   Skin:     General: Skin is warm and dry.      Capillary Refill: Capillary refill takes less than 2 seconds.   Neurological:      General: No focal deficit present.      Mental Status: He is alert and oriented to person, place, and time.      GCS: GCS eye subscore is 4. GCS verbal subscore is 5. GCS motor subscore is 6.      Cranial Nerves: Cranial nerves 2-12 are intact.      Sensory: Sensation is intact.      Motor: Motor function is intact.      Coordination: Coordination is intact.      Gait: Gait is intact.   Psychiatric:         Mood and Affect: Mood normal.         Results Reviewed       Procedure Component Value Units Date/Time    HS Troponin I 2hr [[id number]]  (Normal) Collected: 11/14/24 1331    Lab Status: Final result Specimen: Blood from Arm, Left Updated: 11/14/24 1408     hs TnI 2hr 4 ng/L      Delta 2hr hsTnI 1 ng/L     Comprehensive metabolic panel [[id number]]  (Abnormal) Collected: 11/14/24 1213    Lab Status: Final result Specimen: Blood from Hand, Right Updated: 11/14/24 1237     Sodium 136 mmol/L      Potassium 3.6 mmol/L      Chloride 102 mmol/L      CO2 25 mmol/L      ANION GAP 9 mmol/L      BUN 14 mg/dL      Creatinine 1.07 mg/dL      Glucose 148 mg/dL      Calcium 9.3 mg/dL      AST 18 U/L      ALT 20 U/L      Alkaline Phosphatase 87 U/L      Total Protein 7.5 g/dL      Albumin 4.2 g/dL      Total Bilirubin 0.74 mg/dL      eGFR 80 ml/min/1.73sq m     Narrative:      National Kidney Disease Foundation guidelines for Chronic Kidney Disease (CKD):     Stage 1 with normal or high GFR (GFR > 90 mL/min/1.73 square meters)    Stage 2 Mild CKD (GFR = 60-89 mL/min/1.73 square meters)    Stage 3A Moderate CKD (GFR = 45-59  mL/min/1.73 square meters)    Stage 3B Moderate CKD (GFR = 30-44 mL/min/1.73 square meters)    Stage 4 Severe CKD (GFR = 15-29 mL/min/1.73 square meters)    Stage 5 End Stage CKD (GFR <15 mL/min/1.73 square meters)  Note: GFR calculation is accurate only with a steady state creatinine    HS Troponin 0hr (reflex protocol) [[id number]]  (Normal) Collected: 11/14/24 1135    Lab Status: Final result Specimen: Blood from Arm, Left Updated: 11/14/24 1209     hs TnI 0hr 3 ng/L     B-Type Natriuretic Peptide(BNP) [[id number]]  (Normal) Collected: 11/14/24 1135    Lab Status: Final result Specimen: Blood from Arm, Left Updated: 11/14/24 1207     BNP 34 pg/mL     D-dimer, quantitative [[id number]]  (Abnormal) Collected: 11/14/24 1135    Lab Status: Final result Specimen: Blood from Arm, Left Updated: 11/14/24 1200     D-Dimer, Quant 1.22 ug/ml FEU     Narrative:      In the evaluation for possible pulmonary embolism, in the appropriate (Well's Score of 4 or less) patient, the age adjusted d-dimer cutoff for this patient can be calculated as:    Age x 0.01 (in ug/mL) for Age-adjusted D-dimer exclusion threshold for a patient over 50 years.    CBC and differential [[id number]]  (Abnormal) Collected: 11/14/24 1135    Lab Status: Final result Specimen: Blood from Arm, Left Updated: 11/14/24 1141     WBC 15.51 Thousand/uL      RBC 4.96 Million/uL      Hemoglobin 14.9 g/dL      Hematocrit 44.1 %      MCV 89 fL      MCH 30.0 pg      MCHC 33.8 g/dL      RDW 13.1 %      MPV 9.9 fL      Platelets 287 Thousands/uL      nRBC 0 /100 WBCs      Segmented % 75 %      Immature Grans % 1 %      Lymphocytes % 15 %      Monocytes % 7 %      Eosinophils Relative 2 %      Basophils Relative 0 %      Absolute Neutrophils 11.61 Thousands/µL      Absolute Immature Grans 0.07 Thousand/uL      Absolute Lymphocytes 2.33 Thousands/µL      Absolute Monocytes 1.12 Thousand/µL      Eosinophils Absolute 0.34 Thousand/µL      Basophils Absolute 0.04 Thousands/µL              CTA chest pe study   Final Interpretation by Bradley Barragan MD (11/14 [id number])   No PE. No acute pulmonary findings.   Stable benign-appearing nodules as above.   Hepatomegaly with fatty infiltration.               Workstation performed: [id number]         X-ray chest 1 view portable   Final Interpretation by Juliette Donaldson MD (11/14 5139)      No acute cardiopulmonary disease.            Workstation performed: [id number]             ECG 12 Lead Documentation Only    Date/Time: 11/14/2024 11:15 AM    Performed by: Luis Kumar PA-C  Authorized by: Luis Kumar PA-C    Indications / Diagnosis:  Chest pain  ECG reviewed by me, the ED Provider: yes    Patient location:  ED  Previous ECG:     Previous ECG:  Compared to current    Comparison ECG info:  Similar tracings    Similarity:  No change    Comparison to cardiac monitor: Yes    Interpretation:     Interpretation: abnormal    Rate:     ECG rate:  89    ECG rate assessment: normal    Rhythm:     Rhythm: sinus rhythm    Ectopy:     Ectopy: none    QRS:     QRS axis:  Normal    QRS intervals:  Normal  Conduction:     Conduction: abnormal      Abnormal conduction: LAFB and non-specific intraventricular conduction delay    ST segments:     ST segments:  Non-specific  T waves:     T waves: normal        ED Medication and Procedure Management   Prior to Admission Medications   Prescriptions Last Dose Informant Patient Reported? Taking?   Alcohol Swabs 70 % PADS  Self No No   Sig: May substitute brand based on insurance coverage. Check glucose BID.   Blood Glucose Monitoring Suppl (OneTouch Verio Reflect) w/Device KIT  Self No No   Sig: May substitute brand based on insurance coverage. Check glucose BID.   Diclofenac Sodium (VOLTAREN) 1 %   No No   Sig: Apply 2 g topically 4 (four) times a day   OneTouch Delica Lancets 33G MISC  Self No No   Sig: May substitute brand based on insurance coverage. Check glucose BID.   QUEtiapine (SEROquel) 50 mg tablet    No No   Sig: Take 1 tablet (50 mg total) by mouth daily at bedtime   QUEtiapine (SEROquel) 50 mg tablet  Self Yes No   folic acid (FOLVITE) 1 mg tablet   No No   Sig: Take 1 tablet (1 mg total) by mouth daily   glucose blood (OneTouch Verio) test strip  Self No No   Sig: May substitute brand based on insurance coverage. Check glucose BID.   hydrOXYzine HCL (ATARAX) 50 mg tablet  Self No No   Sig: Take 1 tablet (50 mg total) by mouth 2 (two) times a day as needed for anxiety   lidocaine (LIDODERM) 5 %   No No   Sig: Apply 2 patches topically over 12 hours daily Remove & Discard patch within 12 hours or as directed by MD   lisinopril (ZESTRIL) 20 mg tablet  Self Yes No   lithium carbonate (LITHOBID) 300 mg CR tablet  Self Yes No   Sig: Take 300 mg by mouth 2 (two) times a day   metFORMIN (GLUCOPHAGE) 500 mg tablet   No No   Sig: Take 1 tablet (500 mg total) by mouth 2 (two) times a day with meals   metoprolol succinate (TOPROL-XL) 50 mg 24 hr tablet  Self Yes No   Sig: Take 1 tablet every day by oral route.   prazosin (MINIPRESS) 5 mg capsule  Self Yes No   Sig: Take 5 mg by mouth daily at bedtime      Facility-Administered Medications: None     Discharge Medication List as of 11/14/2024  2:20 PM        CONTINUE these medications which have NOT CHANGED    Details   Alcohol Swabs 70 % PADS May substitute brand based on insurance coverage. Check glucose BID., Print      Blood Glucose Monitoring Suppl (OneTouch Verio Reflect) w/Device KIT May substitute brand based on insurance coverage. Check glucose BID., Print      Diclofenac Sodium (VOLTAREN) 1 % Apply 2 g topically 4 (four) times a day, Starting Thu 11/14/2024, Normal      folic acid (FOLVITE) 1 mg tablet Take 1 tablet (1 mg total) by mouth daily, Starting Fri 6/28/2024, Until Sun 7/28/2024, Normal      glucose blood (OneTouch Verio) test strip May substitute brand based on insurance coverage. Check glucose BID., Print      hydrOXYzine HCL (ATARAX) 50 mg tablet Take  1 tablet (50 mg total) by mouth 2 (two) times a day as needed for anxiety, Starting Thu 6/27/2024, Normal      lidocaine (LIDODERM) 5 % Apply 2 patches topically over 12 hours daily Remove & Discard patch within 12 hours or as directed by MD, Starting Fri 6/28/2024, Until Sun 7/28/2024, Normal      lisinopril (ZESTRIL) 20 mg tablet Historical Med      lithium carbonate (LITHOBID) 300 mg CR tablet Take 300 mg by mouth 2 (two) times a day, Starting u 10/19/2023, Historical Med      metFORMIN (GLUCOPHAGE) 500 mg tablet Take 1 tablet (500 mg total) by mouth 2 (two) times a day with meals, Starting Thu 6/27/2024, Until Sat 7/27/2024, Normal      metoprolol succinate (TOPROL-XL) 50 mg 24 hr tablet Take 1 tablet every day by oral route., Historical Med      OneTouch Delica Lancets 33G MISC May substitute brand based on insurance coverage. Check glucose BID., Print      prazosin (MINIPRESS) 5 mg capsule Take 5 mg by mouth daily at bedtime, Starting Thu 10/19/2023, Historical Med      QUEtiapine (SEROquel) 50 mg tablet Historical Med           No discharge procedures on file.  ED SEPSIS DOCUMENTATION   Time reflects when diagnosis was documented in both MDM as applicable and the Disposition within this note       Time User Action Codes Description Comment    11/14/2024  2:04 PM Luis Kumar [R55] Syncope     11/14/2024  2:05 PM Lius Kumar [R07.9] Chest pain, unspecified     11/14/2024  2:07 PM Luis Kumar [R91.1] Pulmonary nodule     11/14/2024  2:07 PM Luis Kumar [R16.0] Hepatomegaly     11/14/2024  2:07 PM Luis Kumar [D3A.8] Neuroendocrine tumor                  Luis Kumar PA-C  11/14/24 [id number]

## 2024-11-15 LAB
ATRIAL RATE: 88 BPM
ATRIAL RATE: 89 BPM
P AXIS: 48 DEGREES
P AXIS: 49 DEGREES
PR INTERVAL: 166 MS
PR INTERVAL: 166 MS
QRS AXIS: -57 DEGREES
QRS AXIS: -57 DEGREES
QRSD INTERVAL: 84 MS
QRSD INTERVAL: 84 MS
QT INTERVAL: 352 MS
QT INTERVAL: 372 MS
QTC INTERVAL: 428 MS
QTC INTERVAL: 450 MS
T WAVE AXIS: 33 DEGREES
T WAVE AXIS: 42 DEGREES
VENTRICULAR RATE: 88 BPM
VENTRICULAR RATE: 89 BPM

## 2024-11-15 PROCEDURE — 93010 ELECTROCARDIOGRAM REPORT: CPT | Performed by: INTERNAL MEDICINE

## 2024-11-20 NOTE — ADDENDUM NOTE
Encounter addended by: Lakesha Smith RN on: 11/20/2024 8:59 AM   Actions taken: Clinical Note Signed

## 2024-11-20 NOTE — PROGRESS NOTES
Pt tolerated Lanreotide injection to right gluteal, x1 injection at 1610, given by Michelle Dawn RN and witnessed by me.

## 2024-12-05 ENCOUNTER — HOSPITAL ENCOUNTER (OUTPATIENT)
Dept: INFUSION CENTER | Facility: HOSPITAL | Age: 49
End: 2024-12-05
Attending: INTERNAL MEDICINE
Payer: COMMERCIAL

## 2024-12-05 VITALS — TEMPERATURE: 98.1 F

## 2024-12-05 DIAGNOSIS — C7A.8 NEUROENDOCRINE CARCINOMA OF SMALL BOWEL (HCC): Primary | ICD-10-CM

## 2024-12-05 DIAGNOSIS — D3A.8 NEUROENDOCRINE TUMOR: ICD-10-CM

## 2024-12-05 PROCEDURE — 96372 THER/PROPH/DIAG INJ SC/IM: CPT

## 2024-12-05 RX ORDER — LANREOTIDE ACETATE 120 MG/.5ML
120 INJECTION SUBCUTANEOUS ONCE
Status: COMPLETED | OUTPATIENT
Start: 2024-12-05 | End: 2024-12-05

## 2024-12-05 RX ORDER — LANREOTIDE ACETATE 120 MG/.5ML
120 INJECTION SUBCUTANEOUS ONCE
OUTPATIENT
Start: 2025-01-02

## 2024-12-05 RX ADMIN — LANREOTIDE ACETATE 120 MG: 120 INJECTION SUBCUTANEOUS at 15:29

## 2024-12-05 NOTE — PROGRESS NOTES
Osei Sabillonr  tolerated lanreotide injection well with no complications. Pt with cardiac monitor on, asking if it appears to look correct. Monitor lifted off of skin (electrodes not making contact with skin) and blinking red. Encouraged pt to look at paperwork when he gets home for phone number to call and explain what monitor looks like - pt verbalized understanding.    Osei Trimble is aware of future appt on 1/2/25 at 3:30PM.     AVS printed and given to Osei Trimble.

## 2024-12-26 ENCOUNTER — TELEPHONE (OUTPATIENT)
Dept: PALLIATIVE MEDICINE | Facility: CLINIC | Age: 49
End: 2024-12-26

## 2024-12-26 NOTE — TELEPHONE ENCOUNTER
Attempted to reach pt to reschedule appt. Voicemail not set up yet.  Left message for pt sister (Ariadne) to reschedule appt on 04/07/25, at 11:30am. Provider will be out of the office.     Please schedule OVS with . x1

## 2024-12-27 NOTE — TELEPHONE ENCOUNTER
Attempted to reach pt to reschedule appt. Voicemail not set up yet.  Left message for pt sister (Ariadne) to reschedule appt on 04/07/25, at 11:30am. Provider will be out of the office. x2     Please schedule OVS with .

## 2024-12-30 NOTE — TELEPHONE ENCOUNTER
Attempted to reach pt to reschedule appt. Voicemail not set up yet.  Left message for pt sister (Ariadne) to reschedule appt on 04/07/25, at 11:30am. Provider will be out of the office. x3     Please schedule OVS with .

## 2025-01-06 DIAGNOSIS — C7A.8 NEUROENDOCRINE CARCINOMA OF SMALL BOWEL (HCC): ICD-10-CM

## 2025-01-06 DIAGNOSIS — D3A.8 NEUROENDOCRINE TUMOR: Primary | ICD-10-CM

## 2025-01-06 RX ORDER — LANREOTIDE ACETATE 120 MG/.5ML
120 INJECTION SUBCUTANEOUS ONCE
Status: CANCELLED | OUTPATIENT
Start: 2025-01-08

## 2025-01-08 ENCOUNTER — HOSPITAL ENCOUNTER (OUTPATIENT)
Dept: INFUSION CENTER | Facility: HOSPITAL | Age: 50
Discharge: HOME | End: 2025-01-08
Attending: INTERNAL MEDICINE
Payer: COMMERCIAL

## 2025-01-08 VITALS — TEMPERATURE: 98.5 F

## 2025-01-08 DIAGNOSIS — C7A.8 NEUROENDOCRINE CARCINOMA OF SMALL BOWEL (HCC): ICD-10-CM

## 2025-01-08 DIAGNOSIS — D3A.8 NEUROENDOCRINE TUMOR: Primary | ICD-10-CM

## 2025-01-08 PROCEDURE — 96372 THER/PROPH/DIAG INJ SC/IM: CPT

## 2025-01-08 RX ORDER — LANREOTIDE ACETATE 120 MG/.5ML
120 INJECTION SUBCUTANEOUS ONCE
Status: COMPLETED | OUTPATIENT
Start: 2025-01-08 | End: 2025-01-08

## 2025-01-08 RX ORDER — LANREOTIDE ACETATE 120 MG/.5ML
120 INJECTION SUBCUTANEOUS ONCE
OUTPATIENT
Start: 2025-02-05

## 2025-01-08 RX ADMIN — LANREOTIDE ACETATE 120 MG: 120 INJECTION SUBCUTANEOUS at 14:40

## 2025-01-08 NOTE — PROGRESS NOTES
Osei Trimble  tolerated lanreotide injection well with no complications.      Osei Trimble is aware of future appt on 2/5 at 2:30PM.     AVS printed and given to Osei Trimble.

## 2025-01-09 ENCOUNTER — TELEPHONE (OUTPATIENT)
Dept: HEMATOLOGY ONCOLOGY | Facility: CLINIC | Age: 50
End: 2025-01-09

## 2025-01-09 ENCOUNTER — OFFICE VISIT (OUTPATIENT)
Dept: HEMATOLOGY ONCOLOGY | Facility: CLINIC | Age: 50
End: 2025-01-09
Payer: COMMERCIAL

## 2025-01-09 VITALS
SYSTOLIC BLOOD PRESSURE: 110 MMHG | HEART RATE: 81 BPM | WEIGHT: 214 LBS | BODY MASS INDEX: 28.36 KG/M2 | RESPIRATION RATE: 18 BRPM | HEIGHT: 73 IN | DIASTOLIC BLOOD PRESSURE: 70 MMHG | TEMPERATURE: 97.8 F | OXYGEN SATURATION: 99 %

## 2025-01-09 DIAGNOSIS — C7B.8 METASTATIC MALIGNANT NEUROENDOCRINE TUMOR TO LIVER (HCC): ICD-10-CM

## 2025-01-09 DIAGNOSIS — C7A.8 NEUROENDOCRINE CARCINOMA OF SMALL BOWEL (HCC): Primary | ICD-10-CM

## 2025-01-09 PROCEDURE — 99214 OFFICE O/P EST MOD 30 MIN: CPT | Performed by: INTERNAL MEDICINE

## 2025-01-09 NOTE — PROGRESS NOTES
Name: Osei Trimble      : 1975      MRN: [id number]  Encounter Provider: Jordan Green MD  Encounter Date: 2025   Encounter department: [facility] [place]  :  Assessment & Plan  Neuroendocrine carcinoma of small bowel (HCC)  Small bowel resection on 2022 at the area where he was thought to be high risk for obstruction.  The pathology revealed well-differentiated neuroendocrine tumor, G1, multifocal, through muscularis propria into the pericolorectal tissue.  Ki-67 was 1.7%.  pT3(m)mPehN1j multifocal well differentiated small bowel neuroendocrine tumor with known extensive mesenteric galo involvement that was unresectable based on extension to the root of the SMA.     The patient has been on lanreotide on every 4-week basis.  PET CT scan on 5/10/2024 showed fairly stable disease.  The plan was to pursue a CT scan of the chest abdomen pelvis prior to his next visit in March of this year.  We will then discuss the results findings and act accordingly.  He will need to be continued on the lanreotide on every 4-week basis.  He is not complaining about any carcinoid related symptoms.       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  He apparently was evaluated at the emergency room on 2024 for pulmonary embolism.  He did have a CTA of the chest which was negative for PE.  Stable benign-appearing nodules of the lungs were noted.      Oncology History   Cancer Staging   Neuroendocrine carcinoma of small bowel (HCC)  Staging form: Small Intestine - Other Histologies, AJCC 8th Edition  - Pathologic stage from 2022: pTX, pM1 - Signed by Jordan Green MD on 2024  Stage prefix: Initial diagnosis  Histologic grade (G): G1  Histologic grading system: 4 grade system  Residual tumor (R): R2 -  Macroscopic  Oncology History Overview Note   Patient with history of bipolar disorder, schizophrenic personality, hypertension, etc.  The patient apparently was admitted the hospital around October of 2021 for symptomatic left ureteral calculus.  Incidentally he was noted to have mesenteric masses with calcification with cefepime on the CTA from 10/02/2021.  Another CT scan of the abdomen pelvis with contrast was done on 02/01/2022 which showed no significant change in the partially calcified mesenteric masses with mesenteric tethering concerning for neuroendocrine tumor.  He had a DOTATATE PET-CT scan on 05/04/2022 which showed:  IMPRESSION:  1.  Dotatate avid clustered central mesenteric masses suspicious for underlying neuroendocrine neoplasm.  2.  There does appear to be subjacent focal radiotracer uptake in a proximal small bowel loop for which underlying small bowel lesion should be excluded.  Consider further evaluation with CT enterography or small bowel pill study.  3.  A few low-level foci of radiotracer uptake in the bilateral scapula, non-specific.  Activity is lower than expected for neuroendocrine metastasis given the intensity of the mesenteric foci, however early metastasis is not entirely excluded.  Consider   further evaluation with MRI of these regions vs reassessment on follow up Dotatate PET.  4.  Diffuse prostatic activity, non-specific, question inflammatory.  Correlate with PSA levels and urologic evaluation as warranted.  5.   Stable 8 mm nodule in the right mid lung.  No suspicious radiotracer uptake here.  Given the stability since 10/2/2021 this can be followed up with chest CT in 12 months.    Chromogranin A serum level on 10/06/2021 was 134.5.  On 06/24/2022 was 164.    The patient then underwent small bowel resection on 07/21/2022 at the area where he was thought to be high-risk for obstruction.  The pathology revealed well-differentiated neuroendocrine tumor, G1 come multifocal, the  tumor invaded through the muscularis propria into the taye colorectal tissue.  He also had liver lesion biopsy at segment 5 which also was compatible with metastatic well-differentiated neuroendocrine tumor consistent with intestinal primary.  Ki 67 was 1.7%.  The final pathology was pT3(m)uAsrW7e multifocal well differentiated small bowel neuroendocrine tumor with known extensive mesenteric galo involvement that was unresectable based on extension to the root of the SMA.      Neuroendocrine carcinoma of small bowel (HCC)   7/21/2022 -  Cancer Staged    Staging form: Small Intestine - Other Histologies, AJCC 8th Edition  - Pathologic stage from 7/21/2022: pTX, pM1 - Signed by Jordan Green MD on 5/2/2024  Stage prefix: Initial diagnosis  Histologic grade (G): G1  Histologic grading system: 4 grade system  Residual tumor (R): R2 - Macroscopic       8/5/2022 Initial Diagnosis    Neuroendocrine carcinoma of small bowel (HCC)        Pertinent Medical History   01/09/25:      Review of Systems   Constitutional:  Positive for fatigue. Negative for chills and fever.   HENT:  Negative for ear pain and sore throat.    Eyes:  Negative for pain and visual disturbance.   Respiratory:  Negative for cough and shortness of breath.    Cardiovascular:  Negative for chest pain and palpitations.   Gastrointestinal:  Negative for abdominal pain and vomiting.   Genitourinary:  Negative for dysuria and hematuria.   Musculoskeletal:  Negative for arthralgias and back pain.   Skin:  Negative for color change and rash.   Neurological:  Negative for seizures and syncope.   All other systems reviewed and are negative.    Medical History Reviewed by provider this encounter:  Tobacco  Allergies  Meds  Problems  Med Hx  Surg Hx  Fam Hx     .  Current Outpatient Medications on File Prior to Visit   Medication Sig Dispense Refill    Alcohol Swabs 70 % PADS May substitute brand based on insurance coverage. Check glucose BID. 100 each 1     Blood Glucose Monitoring Suppl (OneTouch Verio Reflect) w/Device KIT May substitute brand based on insurance coverage. Check glucose BID. 1 kit 0    Diclofenac Sodium (VOLTAREN) 1 % Apply 2 g topically 4 (four) times a day 240 g 2    glucose blood (OneTouch Verio) test strip May substitute brand based on insurance coverage. Check glucose BID. 100 each 1    hydrOXYzine HCL (ATARAX) 50 mg tablet Take 1 tablet (50 mg total) by mouth 2 (two) times a day as needed for anxiety 30 tablet 0    lisinopril (ZESTRIL) 20 mg tablet       lithium carbonate (LITHOBID) 300 mg CR tablet Take 300 mg by mouth 2 (two) times a day      metoprolol succinate (TOPROL-XL) 50 mg 24 hr tablet Take 1 tablet every day by oral route.      OneTouch Delica Lancets 33G MISC May substitute brand based on insurance coverage. Check glucose BID. 100 each 1    prazosin (MINIPRESS) 5 mg capsule Take 5 mg by mouth daily at bedtime      QUEtiapine (SEROquel) 50 mg tablet       folic acid (FOLVITE) 1 mg tablet Take 1 tablet (1 mg total) by mouth daily 30 tablet 0    lidocaine (LIDODERM) 5 % Apply 2 patches topically over 12 hours daily Remove & Discard patch within 12 hours or as directed by MD 60 patch 0    metFORMIN (GLUCOPHAGE) 500 mg tablet Take 1 tablet (500 mg total) by mouth 2 (two) times a day with meals 60 tablet 0    QUEtiapine (SEROquel) 50 mg tablet Take 1 tablet (50 mg total) by mouth daily at bedtime 30 tablet 0     Current Facility-Administered Medications on File Prior to Visit   Medication Dose Route Frequency Provider Last Rate Last Admin    [COMPLETED] lanreotide (SOMATULINE) subcutaneous injection 120 mg  120 mg Subcutaneous Once Jordan Green MD   120 mg at 25 1440      Social History     Tobacco Use    Smoking status: Former     Current packs/day: 0.00     Types: Pipe, Cigarettes     Quit date: 2003     Years since quittin.0    Smokeless tobacco: Never   Vaping Use    Vaping status: Some Days    Substances: THC  "  Substance and Sexual Activity    Alcohol use: Not Currently     Comment: Drinks a quart of moonshine/night-As of 7/4/22 will quit drinking    Drug use: Yes     Types: Marijuana     Comment: Socially (1-2 Monthly)    Sexual activity: Not Currently         Objective   /70 (BP Location: Right arm, Patient Position: Sitting, Cuff Size: Adult)   Pulse 81   Temp 97.8 °F (36.6 °C)   Resp 18   Ht 6' 1\" (1.854 m)   Wt 97.1 kg (214 lb)   SpO2 99%   BMI 28.23 kg/m²     Pain Screening:  Pain Score:   2  ECOG   1  Physical Exam  Constitutional:       Appearance: He is well-developed.   HENT:      Head: Normocephalic and atraumatic.      Nose: Nose normal.   Eyes:      General: No scleral icterus.        Right eye: No discharge.         Left eye: No discharge.      Conjunctiva/sclera: Conjunctivae normal.      Pupils: Pupils are equal, round, and reactive to light.   Neck:      Thyroid: No thyromegaly.      Trachea: No tracheal deviation.   Cardiovascular:      Rate and Rhythm: Normal rate and regular rhythm.      Heart sounds: Normal heart sounds. No murmur heard.     No friction rub.   Pulmonary:      Effort: Pulmonary effort is normal. No respiratory distress.      Breath sounds: Normal breath sounds. No wheezing or rales.   Chest:      Chest wall: No tenderness.   Abdominal:      General: There is no distension.      Palpations: Abdomen is soft. There is no hepatomegaly or splenomegaly.      Tenderness: There is no abdominal tenderness. There is no guarding or rebound.   Musculoskeletal:         General: No tenderness or deformity. Normal range of motion.      Cervical back: Normal range of motion and neck supple.   Lymphadenopathy:      Cervical: No cervical adenopathy.   Skin:     General: Skin is warm and dry.      Coloration: Skin is not pale.      Findings: No erythema or rash.   Neurological:      Mental Status: He is alert and oriented to person, place, and time.      Cranial Nerves: No cranial nerve " deficit.      Coordination: Coordination normal.      Deep Tendon Reflexes: Reflexes are normal and symmetric.   Psychiatric:         Behavior: Behavior normal.         Thought Content: Thought content normal.         Judgment: Judgment normal.         Labs: I have reviewed the following labs:  Lab Results   Component Value Date/Time    WBC 15.51 (H) 11/14/2024 11:35 AM    RBC 4.96 11/14/2024 11:35 AM    Hemoglobin 14.9 11/14/2024 11:35 AM    Hematocrit 44.1 11/14/2024 11:35 AM    MCV 89 11/14/2024 11:35 AM    MCH 30.0 11/14/2024 11:35 AM    RDW 13.1 11/14/2024 11:35 AM    Platelets 287 11/14/2024 11:35 AM    Segmented % 75 11/14/2024 11:35 AM    Lymphocytes % 15 11/14/2024 11:35 AM    Monocytes % 7 11/14/2024 11:35 AM    Eosinophils Relative 2 11/14/2024 11:35 AM    Basophils Relative 0 11/14/2024 11:35 AM    Immature Grans % 1 11/14/2024 11:35 AM    Absolute Neutrophils 11.61 (H) 11/14/2024 11:35 AM     Lab Results   Component Value Date/Time    Potassium 3.6 11/14/2024 12:13 PM    Potassium 4.1 08/26/2024 12:09 PM    Chloride 102 11/14/2024 12:13 PM    Chloride 103 08/26/2024 12:09 PM    Carbon Dioxide 24 08/26/2024 12:09 PM    CO2 25 11/14/2024 12:13 PM    BUN 14 11/14/2024 12:13 PM    BUN 11 08/26/2024 12:09 PM    Creatinine 1.07 11/14/2024 12:13 PM    Creatinine 0.95 08/26/2024 12:09 PM    Calcium 9.3 11/14/2024 12:13 PM    Calcium 9.5 08/26/2024 12:09 PM    Corrected Calcium 9.0 06/26/2024 05:49 AM    AST 18 11/14/2024 12:13 PM    AST 21 08/26/2024 12:09 PM    ALT 20 11/14/2024 12:13 PM    ALT 21 08/26/2024 12:09 PM    Alkaline Phosphatase 87 11/14/2024 12:13 PM    Alkaline Phosphatase 101 08/26/2024 12:09 PM    Total Protein 7.5 11/14/2024 12:13 PM    Protein, Total 7.3 08/26/2024 12:09 PM    Albumin 4.2 11/14/2024 12:13 PM    ALBUMIN 4.2 08/26/2024 12:09 PM    Total Bilirubin 0.74 11/14/2024 12:13 PM    Total Bilirubin 0.9 08/26/2024 12:09 PM    eGFRcr 97 08/26/2024 12:09 PM    eGFR 80 11/14/2024 12:13 PM      Lab Results   Component Value Date/Time    WBC 15.51 (H) 11/14/2024 11:35 AM    RBC 4.96 11/14/2024 11:35 AM    Hemoglobin 14.9 11/14/2024 11:35 AM    Hematocrit 44.1 11/14/2024 11:35 AM    MCV 89 11/14/2024 11:35 AM    MCH 30.0 11/14/2024 11:35 AM    RDW 13.1 11/14/2024 11:35 AM    Platelets 287 11/14/2024 11:35 AM    Segmented % 75 11/14/2024 11:35 AM    Lymphocytes % 15 11/14/2024 11:35 AM    Monocytes % 7 11/14/2024 11:35 AM    Eosinophils Relative 2 11/14/2024 11:35 AM    Basophils Relative 0 11/14/2024 11:35 AM    Immature Grans % 1 11/14/2024 11:35 AM    Absolute Neutrophils 11.61 (H) 11/14/2024 11:35 AM      Lab Results   Component Value Date/Time    Sodium 136 11/14/2024 12:13 PM    Sodium 140 08/26/2024 12:09 PM    Potassium 3.6 11/14/2024 12:13 PM    Potassium 4.1 08/26/2024 12:09 PM    Chloride 102 11/14/2024 12:13 PM    Chloride 103 08/26/2024 12:09 PM    Carbon Dioxide 24 08/26/2024 12:09 PM    CO2 25 11/14/2024 12:13 PM    ANION GAP 9 11/14/2024 12:13 PM    ANION GAP 13 (H) 08/26/2024 12:09 PM    BUN 14 11/14/2024 12:13 PM    BUN 11 08/26/2024 12:09 PM    Creatinine 1.07 11/14/2024 12:13 PM    Creatinine 0.95 08/26/2024 12:09 PM    Glucose 148 (H) 11/14/2024 12:13 PM    Glucose 198 (H) 08/26/2024 12:09 PM    Calcium 9.3 11/14/2024 12:13 PM    Calcium 9.5 08/26/2024 12:09 PM    Corrected Calcium 9.0 06/26/2024 05:49 AM    AST 18 11/14/2024 12:13 PM    AST 21 08/26/2024 12:09 PM    ALT 20 11/14/2024 12:13 PM    ALT 21 08/26/2024 12:09 PM    Alkaline Phosphatase 87 11/14/2024 12:13 PM    Alkaline Phosphatase 101 08/26/2024 12:09 PM    Total Protein 7.5 11/14/2024 12:13 PM    Protein, Total 7.3 08/26/2024 12:09 PM    Albumin 4.2 11/14/2024 12:13 PM    ALBUMIN 4.2 08/26/2024 12:09 PM    Total Bilirubin 0.74 11/14/2024 12:13 PM    Total Bilirubin 0.9 08/26/2024 12:09 PM    eGFRcr 97 08/26/2024 12:09 PM    eGFR 80 11/14/2024 12:13 PM      Lab Results   Component Value Date/Time    TSH 3RD GENERATON 3.039  06/23/2024 04:30 AM        Radiology Results Review: I have reviewed radiology reports from 11/14/2024 including: CT chest.

## 2025-01-09 NOTE — ASSESSMENT & PLAN NOTE
Small bowel resection on 7/21/2022 at the area where he was thought to be high risk for obstruction.  The pathology revealed well-differentiated neuroendocrine tumor, G1, multifocal, through muscularis propria into the pericolorectal tissue.  Ki-67 was 1.7%.  pT3(m)lQkyF1p multifocal well differentiated small bowel neuroendocrine tumor with known extensive mesenteric galo involvement that was unresectable based on extension to the root of the SMA.     The patient has been on lanreotide on every 4-week basis.  PET CT scan on 5/10/2024 showed fairly stable disease.  The plan was to pursue a CT scan of the chest abdomen pelvis prior to his next visit in March of this year.  We will then discuss the results findings and act accordingly.  He will need to be continued on the lanreotide on every 4-week basis.  He is not complaining about any carcinoid related symptoms.

## 2025-01-10 ENCOUNTER — OFFICE VISIT (OUTPATIENT)
Dept: PODIATRY | Facility: CLINIC | Age: 50
End: 2025-01-10
Payer: COMMERCIAL

## 2025-01-10 VITALS — WEIGHT: 214 LBS | HEIGHT: 73 IN | BODY MASS INDEX: 28.36 KG/M2

## 2025-01-10 DIAGNOSIS — M25.571 ACUTE RIGHT ANKLE PAIN: Primary | ICD-10-CM

## 2025-01-10 DIAGNOSIS — D36.10 NEUROMA: ICD-10-CM

## 2025-01-10 DIAGNOSIS — S93.431A SPRAIN OF TIBIOFIBULAR LIGAMENT OF RIGHT ANKLE, INITIAL ENCOUNTER: ICD-10-CM

## 2025-01-10 PROCEDURE — 99213 OFFICE O/P EST LOW 20 MIN: CPT | Performed by: PODIATRIST

## 2025-01-10 NOTE — PROGRESS NOTES
Name: Osei Trimble      : 1975      MRN: [id number]  Encounter Provider: Russell Valerio DPM  Encounter Date: 1/10/2025   Encounter department: [facility] PODIATRY [place]  :  Assessment & Plan  Acute right ankle pain         Sprain of tibiofibular ligament of right ankle, initial encounter         Neuroma         -I encouraged him to go to physical therapy for ankle stabilization and rehabilitation  - he is to return in 8 weeks for continued care  - I think that he is having some neuroma type of pain due to the soft tissue swelling of the right foot I dispensed metatarsal pads and advised on continued supportive shoe use  - he is to work on discontinuing the use of the brace and work on stabilizing his ankle  - If he is not doing well in 8 weeks after physical therapy he will need an MRI to evaluate for presurgical planning    History of Present Illness   HPI  Osei Trimble is a 49 y.o. male who presents evaluation management of his right ankle.  They had a rough month with family issues over the holidays.  He notes new onset swelling to the right foot and also pain and stabbing interdigitally between his right third and fourth toe and fourth and fifth toe on the right.  Is wearing brace.  Ankle feels very weak when he is attempted to walk without the brace, was unable to go to physical therapy due to a lot of family issues.      Review of Systems   Constitutional:  Negative for chills and fever.   HENT:  Negative for ear pain and sore throat.    Eyes:  Negative for pain and visual disturbance.   Respiratory:  Negative for cough and shortness of breath.    Cardiovascular:  Negative for chest pain and palpitations.   Gastrointestinal:  Negative for abdominal pain and vomiting.   Genitourinary:  Negative for dysuria and hematuria.   Musculoskeletal:  Negative for arthralgias and back pain.   Skin:  Negative for color change and rash.   Neurological:  Negative for seizures and syncope.   All  "other systems reviewed and are negative.         Objective   Ht 6' 1\" (1.854 m)   Wt 97.1 kg (214 lb)   BMI 28.23 kg/m²      Physical Exam  Vitals reviewed.   Constitutional:       Appearance: Normal appearance.   HENT:      Head: Normocephalic and atraumatic.      Nose: Nose normal.      Mouth/Throat:      Mouth: Mucous membranes are moist.   Eyes:      Pupils: Pupils are equal, round, and reactive to light.   Pulmonary:      Effort: Pulmonary effort is normal.   Skin:     Capillary Refill: Capillary refill takes 2 to 3 seconds.      Comments: Pain palpation at the ATFL, minimal in the PTFL but there is pitting edema on the dorsal foot and also the distal leg.  There is pain with [place] squeeze with paresthesias interdigitally between right third interspace right fourth interspace.  Pain palpation on the lateral complex ligaments.   Neurological:      General: No focal deficit present.      Mental Status: He is alert and oriented to person, place, and time. Mental status is at baseline.           "

## 2025-01-16 ENCOUNTER — OFFICE VISIT (OUTPATIENT)
Dept: SURGERY | Facility: CLINIC | Age: 50
End: 2025-01-16
Payer: COMMERCIAL

## 2025-01-16 VITALS
WEIGHT: 218 LBS | HEART RATE: 91 BPM | HEIGHT: 73 IN | OXYGEN SATURATION: 99 % | BODY MASS INDEX: 28.89 KG/M2 | DIASTOLIC BLOOD PRESSURE: 80 MMHG | SYSTOLIC BLOOD PRESSURE: 128 MMHG | TEMPERATURE: 98.8 F

## 2025-01-16 DIAGNOSIS — C7A.8 NEUROENDOCRINE CARCINOMA OF SMALL BOWEL (HCC): ICD-10-CM

## 2025-01-16 DIAGNOSIS — Z12.11 COLON CANCER SCREENING: Primary | ICD-10-CM

## 2025-01-16 PROCEDURE — 99212 OFFICE O/P EST SF 10 MIN: CPT | Performed by: SURGERY

## 2025-01-17 PROBLEM — Z12.11 COLON CANCER SCREENING: Status: ACTIVE | Noted: 2025-01-17

## 2025-01-17 NOTE — PROGRESS NOTES
Progress Note - Surgical Oncology  Osei Trimble 49 y.o. male MRN: [id number]  Encounter: [id number]    Assessment & Plan     49M status post exploratory laparotomy, small bowel resection, liver biopsy 7/2022 for pT3(m)uGjzH1l multifocal well differentiated small bowel neuroendocrine tumor with known extensive mesenteric galo involvement that was unresectable based on extension to the root of the SMA. Grade 1 disease with Ki67 1.7%, biopsy proven liver involvement.     He is receiving lanreotide again on a regular basis under Dr. Green's care after a period of lapse in his therapy. He is overall doing well and tolerating the therapy. Prior imaging reviewed in detail by me from summer 2024. Overall disease is stable. He will continue his therapy, follow up with palliative care, and medical oncology. Next CT in March 2025 with heme/onc visit to follow. Of note, he was due back in 7/2023 for a colonoscopy but has not been seen. With his stable neuroendocrine tumor, would like to see him update his colon cancer screening nonurgently this year, he is in agreement, we will reach out to GI to schedule. I will see him back in 1 year for a clinical visit, sooner on an as needed basis.    Subjective       Chief Complaint   Patient presents with    Follow-up     1 year status post exploratory laparotomy, small bowel resection, liver biopsy 7/21/2022      Remains on lanreotide, had multiple hospital presentations for medical issues over the past year, records reviewed, CT imaging reviewed, overall disease stable with calcified mesenteric mass, no obstructive symptoms, having regular bowel movements. Overdue for colonoscopy, due 7/2023. Has been under stress lately with the passing of his mother. Next CT due March.       Review of Systems   Constitutional: Negative.    Gastrointestinal: Negative.    Hematological:  Positive for adenopathy. Does not bruise/bleed easily.   Psychiatric/Behavioral:  Positive for dysphoric mood.   "  All other systems reviewed and are negative.      The following portions of the patient's history were reviewed and updated as appropriate: allergies, current medications, past family history, past medical history, past social history, past surgical history, and problem list.    Objective      Blood pressure 128/80, pulse 91, temperature 98.8 °F (37.1 °C), temperature source Temporal, height 6' 1\" (1.854 m), weight 98.9 kg (218 lb), SpO2 99%.   Physical Exam  Vitals reviewed.   Constitutional:       General: He is not in acute distress.     Appearance: He is not toxic-appearing.   HENT:      Head: Normocephalic.      Nose: No congestion.      Mouth/Throat:      Mouth: Mucous membranes are moist.   Eyes:      Pupils: Pupils are equal, round, and reactive to light.   Cardiovascular:      Rate and Rhythm: Normal rate.   Pulmonary:      Effort: Pulmonary effort is normal. No respiratory distress.   Abdominal:      General: Abdomen is flat. There is no distension.      Palpations: Abdomen is soft.      Tenderness: There is no abdominal tenderness. There is no guarding or rebound.   Musculoskeletal:         General: Normal range of motion.      Cervical back: Normal range of motion.   Skin:     General: Skin is warm and dry.      Capillary Refill: Capillary refill takes less than 2 seconds.      Findings: No erythema.   Neurological:      General: No focal deficit present.      Mental Status: He is alert. Mental status is at baseline.   Psychiatric:         Mood and Affect: Mood normal.         Signature:  Rose Mary Lara MD  Date: 1/17/2025 Time: 1:18 PM   "

## 2025-01-19 DIAGNOSIS — S93.409A ANKLE SPRAIN: ICD-10-CM

## 2025-01-21 ENCOUNTER — TELEPHONE (OUTPATIENT)
Age: 50
End: 2025-01-21

## 2025-01-21 NOTE — TELEPHONE ENCOUNTER
----- Message from Malena Quinones DO sent at 1/20/2025 12:53 PM EST -----  Please schedule patient for colonoscopy for routine cancer screen, unless he does not qualify for open access, then first available office visit with any provider. Thanks!

## 2025-01-21 NOTE — TELEPHONE ENCOUNTER
Spoke with patient. He needs to speak with his sister, she provides transportation, and will call back to schedule. Pt will need to answer OA questions prior to scheduling.

## 2025-01-23 ENCOUNTER — TELEPHONE (OUTPATIENT)
Age: 50
End: 2025-01-23

## 2025-01-23 ENCOUNTER — PREP FOR PROCEDURE (OUTPATIENT)
Age: 50
End: 2025-01-23

## 2025-01-23 DIAGNOSIS — Z12.11 SCREENING FOR COLON CANCER: Primary | ICD-10-CM

## 2025-01-23 NOTE — LETTER
Attached are your prep instructions for your upcoming procedure on 3/6/2025. If you have any questions or concerns please contact us at [phone].                Thank you,      [facility]

## 2025-01-23 NOTE — TELEPHONE ENCOUNTER
01/23/25  Screened by: Estephanie Bruce    Referring Provider     Pre- Screening:     There is no height or weight on file to calculate BMI.218lbs 28.76  Has patient been referred for a routine screening Colonoscopy? yes  Is the patient between 45-75 years old? yes      Previous Colonoscopy yes   If yes:    Date:     Facility:     Reason:    Does the patient want to see a Gastroenterologist prior to their procedure OR are they having any GI symptoms? no    Has the patient been hospitalized or had abdominal surgery in the past 6 months? no    Does the patient use supplemental oxygen? no    Does the patient take Coumadin, Lovenox, Plavix, Elliquis, Xarelto, or other blood thinning medication? no    Has the patient had a stroke, cardiac event, or stent placed in the past year? no      If patient is between 45yrs - 49yrs, please advise patient that we will have to confirm benefits & coverage with their insurance company for a routine screening colonoscopy.

## 2025-01-23 NOTE — TELEPHONE ENCOUNTER
Scheduled date of colonoscopy (as of today):3/6/2025  Physician performing colonoscopy:Dr Quinones  Location of colonoscopy:[place]  Bowel prep reviewed with patient:Miralax/Dulcolax Diabetic  Instructions reviewed with patient by:sent via letter  Clearances: N/A

## 2025-02-03 DIAGNOSIS — D3A.8 NEUROENDOCRINE TUMOR: Primary | ICD-10-CM

## 2025-02-03 DIAGNOSIS — C7A.8 NEUROENDOCRINE CARCINOMA OF SMALL BOWEL (HCC): ICD-10-CM

## 2025-02-05 ENCOUNTER — HOSPITAL ENCOUNTER (OUTPATIENT)
Dept: INFUSION CENTER | Facility: HOSPITAL | Age: 50
Discharge: HOME | End: 2025-02-05
Attending: INTERNAL MEDICINE
Payer: COMMERCIAL

## 2025-02-05 DIAGNOSIS — D3A.8 NEUROENDOCRINE TUMOR: Primary | ICD-10-CM

## 2025-02-05 DIAGNOSIS — C7A.8 NEUROENDOCRINE CARCINOMA OF SMALL BOWEL (HCC): ICD-10-CM

## 2025-02-05 PROCEDURE — 96372 THER/PROPH/DIAG INJ SC/IM: CPT

## 2025-02-05 RX ORDER — LANREOTIDE ACETATE 120 MG/.5ML
120 INJECTION SUBCUTANEOUS ONCE
OUTPATIENT
Start: 2025-03-05

## 2025-02-05 RX ORDER — LANREOTIDE ACETATE 120 MG/.5ML
120 INJECTION SUBCUTANEOUS ONCE
Status: COMPLETED | OUTPATIENT
Start: 2025-02-05 | End: 2025-02-05

## 2025-02-05 RX ADMIN — LANREOTIDE ACETATE 120 MG: 120 INJECTION SUBCUTANEOUS at 14:50

## 2025-02-05 NOTE — PROGRESS NOTES
Osei Trimble  tolerated lanreotide injection to left buttock  well with no complications.      Osei Trimble is aware of future appt on march 5th     AVS printed and given to Osei Trimble:   No (Declined by Osei Trimble)  card given

## 2025-02-13 ENCOUNTER — APPOINTMENT (EMERGENCY)
Dept: CT IMAGING | Facility: HOSPITAL | Age: 50
DRG: 812 | End: 2025-02-13
Payer: COMMERCIAL

## 2025-02-13 ENCOUNTER — HOSPITAL ENCOUNTER (INPATIENT)
Facility: HOSPITAL | Age: 50
LOS: 1 days | Discharge: HOME | DRG: 812 | End: 2025-02-15
Attending: EMERGENCY MEDICINE | Admitting: ANESTHESIOLOGY
Payer: COMMERCIAL

## 2025-02-13 DIAGNOSIS — G93.40 ACUTE ENCEPHALOPATHY: Primary | ICD-10-CM

## 2025-02-13 DIAGNOSIS — D72.829 LEUKOCYTOSIS: ICD-10-CM

## 2025-02-13 DIAGNOSIS — R07.9 CHEST PAIN: ICD-10-CM

## 2025-02-13 LAB
ANION GAP SERPL CALCULATED.3IONS-SCNC: 12 MMOL/L (ref 4–13)
ATRIAL RATE: 106 BPM
BASOPHILS # BLD AUTO: 0.04 THOUSANDS/ΜL (ref 0–0.1)
BASOPHILS NFR BLD AUTO: 0 % (ref 0–1)
BUN SERPL-MCNC: 17 MG/DL (ref 5–25)
CALCIUM SERPL-MCNC: 9.4 MG/DL (ref 8.4–10.2)
CARDIAC TROPONIN I PNL SERPL HS: 4 NG/L (ref ?–50)
CHLORIDE SERPL-SCNC: 101 MMOL/L (ref 96–108)
CO2 SERPL-SCNC: 22 MMOL/L (ref 21–32)
CREAT SERPL-MCNC: 1.26 MG/DL (ref 0.6–1.3)
EOSINOPHIL # BLD AUTO: 0.19 THOUSAND/ΜL (ref 0–0.61)
EOSINOPHIL NFR BLD AUTO: 1 % (ref 0–6)
ERYTHROCYTE [DISTWIDTH] IN BLOOD BY AUTOMATED COUNT: 14.1 % (ref 11.6–15.1)
GFR SERPL CREATININE-BSD FRML MDRD: 66 ML/MIN/1.73SQ M
GLUCOSE SERPL-MCNC: 110 MG/DL (ref 65–140)
HCT VFR BLD AUTO: 39.7 % (ref 36.5–49.3)
HGB BLD-MCNC: 13.3 G/DL (ref 12–17)
IMM GRANULOCYTES # BLD AUTO: 0.07 THOUSAND/UL (ref 0–0.2)
IMM GRANULOCYTES NFR BLD AUTO: 0 % (ref 0–2)
LYMPHOCYTES # BLD AUTO: 1.78 THOUSANDS/ΜL (ref 0.6–4.47)
LYMPHOCYTES NFR BLD AUTO: 10 % (ref 14–44)
MCH RBC QN AUTO: 29.8 PG (ref 26.8–34.3)
MCHC RBC AUTO-ENTMCNC: 33.5 G/DL (ref 31.4–37.4)
MCV RBC AUTO: 89 FL (ref 82–98)
MONOCYTES # BLD AUTO: 1.43 THOUSAND/ΜL (ref 0.17–1.22)
MONOCYTES NFR BLD AUTO: 8 % (ref 4–12)
NEUTROPHILS # BLD AUTO: 14.7 THOUSANDS/ΜL (ref 1.85–7.62)
NEUTS SEG NFR BLD AUTO: 81 % (ref 43–75)
NRBC BLD AUTO-RTO: 0 /100 WBCS
P AXIS: 39 DEGREES
PLATELET # BLD AUTO: 289 THOUSANDS/UL (ref 149–390)
PMV BLD AUTO: 10.4 FL (ref 8.9–12.7)
POTASSIUM SERPL-SCNC: 3.8 MMOL/L (ref 3.5–5.3)
PR INTERVAL: 162 MS
QRS AXIS: -28 DEGREES
QRSD INTERVAL: 70 MS
QT INTERVAL: 350 MS
QTC INTERVAL: 464 MS
RBC # BLD AUTO: 4.47 MILLION/UL (ref 3.88–5.62)
SODIUM SERPL-SCNC: 135 MMOL/L (ref 135–147)
T WAVE AXIS: 19 DEGREES
VENTRICULAR RATE: 106 BPM
WBC # BLD AUTO: 18.21 THOUSAND/UL (ref 4.31–10.16)

## 2025-02-13 PROCEDURE — 96361 HYDRATE IV INFUSION ADD-ON: CPT

## 2025-02-13 PROCEDURE — 71275 CT ANGIOGRAPHY CHEST: CPT

## 2025-02-13 PROCEDURE — 70450 CT HEAD/BRAIN W/O DYE: CPT

## 2025-02-13 PROCEDURE — 84484 ASSAY OF TROPONIN QUANT: CPT | Performed by: EMERGENCY MEDICINE

## 2025-02-13 PROCEDURE — 36415 COLL VENOUS BLD VENIPUNCTURE: CPT | Performed by: EMERGENCY MEDICINE

## 2025-02-13 PROCEDURE — 84100 ASSAY OF PHOSPHORUS: CPT | Performed by: NURSE PRACTITIONER

## 2025-02-13 PROCEDURE — 99285 EMERGENCY DEPT VISIT HI MDM: CPT

## 2025-02-13 PROCEDURE — 83735 ASSAY OF MAGNESIUM: CPT | Performed by: NURSE PRACTITIONER

## 2025-02-13 PROCEDURE — 80048 BASIC METABOLIC PNL TOTAL CA: CPT | Performed by: EMERGENCY MEDICINE

## 2025-02-13 PROCEDURE — 96374 THER/PROPH/DIAG INJ IV PUSH: CPT

## 2025-02-13 PROCEDURE — 84145 PROCALCITONIN (PCT): CPT | Performed by: NURSE PRACTITIONER

## 2025-02-13 PROCEDURE — 82550 ASSAY OF CK (CPK): CPT | Performed by: NURSE PRACTITIONER

## 2025-02-13 PROCEDURE — 99291 CRITICAL CARE FIRST HOUR: CPT | Performed by: EMERGENCY MEDICINE

## 2025-02-13 PROCEDURE — 80076 HEPATIC FUNCTION PANEL: CPT | Performed by: NURSE PRACTITIONER

## 2025-02-13 PROCEDURE — 93005 ELECTROCARDIOGRAM TRACING: CPT

## 2025-02-13 PROCEDURE — 85025 COMPLETE CBC W/AUTO DIFF WBC: CPT | Performed by: EMERGENCY MEDICINE

## 2025-02-13 RX ORDER — FENTANYL CITRATE 50 UG/ML
75 INJECTION, SOLUTION INTRAMUSCULAR; INTRAVENOUS ONCE
Refills: 0 | Status: COMPLETED | OUTPATIENT
Start: 2025-02-13 | End: 2025-02-13

## 2025-02-13 RX ORDER — LORAZEPAM 2 MG/ML
INJECTION INTRAMUSCULAR
Status: COMPLETED
Start: 2025-02-13 | End: 2025-02-13

## 2025-02-13 RX ORDER — HYDROMORPHONE HCL/PF 1 MG/ML
0.5 SYRINGE (ML) INJECTION ONCE
Refills: 0 | Status: DISCONTINUED | OUTPATIENT
Start: 2025-02-13 | End: 2025-02-13

## 2025-02-13 RX ADMIN — LORAZEPAM 2 MG: 2 INJECTION INTRAMUSCULAR; INTRAVENOUS at 21:37

## 2025-02-13 RX ADMIN — IOHEXOL 100 ML: 350 INJECTION, SOLUTION INTRAVENOUS at 22:23

## 2025-02-13 RX ADMIN — FENTANYL CITRATE 75 MCG: 50 INJECTION INTRAMUSCULAR; INTRAVENOUS at 21:18

## 2025-02-13 RX ADMIN — SODIUM CHLORIDE 1000 ML: 0.9 INJECTION, SOLUTION INTRAVENOUS at 21:38

## 2025-02-14 ENCOUNTER — APPOINTMENT (INPATIENT)
Dept: RADIOLOGY | Facility: HOSPITAL | Age: 50
DRG: 812 | End: 2025-02-14
Payer: COMMERCIAL

## 2025-02-14 PROBLEM — R07.9 CHEST PAIN: Status: ACTIVE | Noted: 2024-06-21

## 2025-02-14 PROBLEM — R65.10 SIRS (SYSTEMIC INFLAMMATORY RESPONSE SYNDROME) (HCC): Status: ACTIVE | Noted: 2025-02-14

## 2025-02-14 PROBLEM — R74.8 ELEVATED CK: Status: ACTIVE | Noted: 2025-02-14

## 2025-02-14 PROBLEM — F19.10 DRUG ABUSE (HCC): Status: ACTIVE | Noted: 2025-02-14

## 2025-02-14 LAB
2HR DELTA HS TROPONIN: -1 NG/L
4HR DELTA HS TROPONIN: 0 NG/L
ALBUMIN SERPL BCG-MCNC: 4.7 G/DL (ref 3.5–5)
ALP SERPL-CCNC: 93 U/L (ref 34–104)
ALT SERPL W P-5'-P-CCNC: 25 U/L (ref 7–52)
AMMONIA PLAS-SCNC: 38 UMOL/L (ref 18–72)
AMPHETAMINES SERPL QL SCN: POSITIVE
ANION GAP SERPL CALCULATED.3IONS-SCNC: 4 MMOL/L (ref 4–13)
APAP SERPL-MCNC: <2 UG/ML (ref 10–20)
AST SERPL W P-5'-P-CCNC: 33 U/L (ref 13–39)
ATRIAL RATE: 89 BPM
BARBITURATES UR QL: NEGATIVE
BENZODIAZ UR QL: NEGATIVE
BILIRUB DIRECT SERPL-MCNC: 0.28 MG/DL (ref 0–0.2)
BILIRUB SERPL-MCNC: 1.48 MG/DL (ref 0.2–1)
BILIRUB UR QL STRIP: NEGATIVE
BUN SERPL-MCNC: 12 MG/DL (ref 5–25)
CALCIUM SERPL-MCNC: 8.7 MG/DL (ref 8.4–10.2)
CARDIAC TROPONIN I PNL SERPL HS: 3 NG/L (ref ?–50)
CARDIAC TROPONIN I PNL SERPL HS: 4 NG/L (ref ?–50)
CHLORIDE SERPL-SCNC: 105 MMOL/L (ref 96–108)
CK SERPL-CCNC: 175 U/L (ref 39–308)
CK SERPL-CCNC: 472 U/L (ref 39–308)
CLARITY UR: CLEAR
CO2 SERPL-SCNC: 27 MMOL/L (ref 21–32)
COCAINE UR QL: NEGATIVE
COLOR UR: NORMAL
CREAT SERPL-MCNC: 0.77 MG/DL (ref 0.6–1.3)
ERYTHROCYTE [DISTWIDTH] IN BLOOD BY AUTOMATED COUNT: 13.9 % (ref 11.6–15.1)
ETHANOL SERPL-MCNC: <10 MG/DL
FENTANYL UR QL SCN: POSITIVE
GFR SERPL CREATININE-BSD FRML MDRD: 105 ML/MIN/1.73SQ M
GLUCOSE SERPL-MCNC: 162 MG/DL (ref 65–140)
GLUCOSE UR STRIP-MCNC: NEGATIVE MG/DL
HCT VFR BLD AUTO: 32.9 % (ref 36.5–49.3)
HGB BLD-MCNC: 11 G/DL (ref 12–17)
HGB UR QL STRIP.AUTO: NEGATIVE
HYDROCODONE UR QL SCN: NEGATIVE
KETONES UR STRIP-MCNC: NEGATIVE MG/DL
LACTATE SERPL-SCNC: 0.9 MMOL/L (ref 0.5–2)
LACTATE SERPL-SCNC: 2.1 MMOL/L (ref 0.5–2)
LEUKOCYTE ESTERASE UR QL STRIP: NEGATIVE
LITHIUM SERPL-SCNC: 0.85 MMOL/L (ref 0.6–1.2)
MAGNESIUM SERPL-MCNC: 1.8 MG/DL (ref 1.9–2.7)
MCH RBC QN AUTO: 30.3 PG (ref 26.8–34.3)
MCHC RBC AUTO-ENTMCNC: 33.4 G/DL (ref 31.4–37.4)
MCV RBC AUTO: 91 FL (ref 82–98)
METHADONE UR QL: NEGATIVE
NITRITE UR QL STRIP: NEGATIVE
OPIATES UR QL SCN: NEGATIVE
OXYCODONE+OXYMORPHONE UR QL SCN: NEGATIVE
P AXIS: 45 DEGREES
PCP UR QL: NEGATIVE
PH UR STRIP.AUTO: 7 [PH]
PHOSPHATE SERPL-MCNC: 2.8 MG/DL (ref 2.7–4.5)
PLATELET # BLD AUTO: 185 THOUSANDS/UL (ref 149–390)
PMV BLD AUTO: 10.3 FL (ref 8.9–12.7)
POTASSIUM SERPL-SCNC: 3.9 MMOL/L (ref 3.5–5.3)
PR INTERVAL: 166 MS
PROCALCITONIN SERPL-MCNC: 0.13 NG/ML
PROT SERPL-MCNC: 7.8 G/DL (ref 6.4–8.4)
PROT UR STRIP-MCNC: NEGATIVE MG/DL
QRS AXIS: -30 DEGREES
QRSD INTERVAL: 72 MS
QT INTERVAL: 378 MS
QTC INTERVAL: 459 MS
RBC # BLD AUTO: 3.63 MILLION/UL (ref 3.88–5.62)
SALICYLATES SERPL-MCNC: <5 MG/DL (ref 3–20)
SODIUM SERPL-SCNC: 136 MMOL/L (ref 135–147)
SP GR UR STRIP.AUTO: 1.01
T WAVE AXIS: 18 DEGREES
THC UR QL: NEGATIVE
TSH SERPL DL<=0.05 MIU/L-ACNC: 4.18 UIU/ML (ref 0.45–4.5)
UROBILINOGEN UR QL STRIP.AUTO: 1 E.U./DL
VENTRICULAR RATE: 89 BPM
WBC # BLD AUTO: 8.9 THOUSAND/UL (ref 4.31–10.16)

## 2025-02-14 PROCEDURE — 84484 ASSAY OF TROPONIN QUANT: CPT | Performed by: EMERGENCY MEDICINE

## 2025-02-14 PROCEDURE — 80307 DRUG TEST PRSMV CHEM ANLYZR: CPT | Performed by: EMERGENCY MEDICINE

## 2025-02-14 PROCEDURE — 96375 TX/PRO/DX INJ NEW DRUG ADDON: CPT

## 2025-02-14 PROCEDURE — 85027 COMPLETE CBC AUTOMATED: CPT | Performed by: NURSE PRACTITIONER

## 2025-02-14 PROCEDURE — 36415 COLL VENOUS BLD VENIPUNCTURE: CPT | Performed by: EMERGENCY MEDICINE

## 2025-02-14 PROCEDURE — 83605 ASSAY OF LACTIC ACID: CPT | Performed by: NURSE PRACTITIONER

## 2025-02-14 PROCEDURE — 93010 ELECTROCARDIOGRAM REPORT: CPT | Performed by: INTERNAL MEDICINE

## 2025-02-14 PROCEDURE — 80048 BASIC METABOLIC PNL TOTAL CA: CPT | Performed by: NURSE PRACTITIONER

## 2025-02-14 PROCEDURE — NC001 PR NO CHARGE: Performed by: NURSE PRACTITIONER

## 2025-02-14 PROCEDURE — 80143 DRUG ASSAY ACETAMINOPHEN: CPT | Performed by: EMERGENCY MEDICINE

## 2025-02-14 PROCEDURE — 81003 URINALYSIS AUTO W/O SCOPE: CPT | Performed by: EMERGENCY MEDICINE

## 2025-02-14 PROCEDURE — 87040 BLOOD CULTURE FOR BACTERIA: CPT | Performed by: EMERGENCY MEDICINE

## 2025-02-14 PROCEDURE — 99223 1ST HOSP IP/OBS HIGH 75: CPT | Performed by: ANESTHESIOLOGY

## 2025-02-14 PROCEDURE — 80178 ASSAY OF LITHIUM: CPT | Performed by: NURSE PRACTITIONER

## 2025-02-14 PROCEDURE — 82077 ASSAY SPEC XCP UR&BREATH IA: CPT | Performed by: EMERGENCY MEDICINE

## 2025-02-14 PROCEDURE — 93005 ELECTROCARDIOGRAM TRACING: CPT

## 2025-02-14 PROCEDURE — 82140 ASSAY OF AMMONIA: CPT | Performed by: EMERGENCY MEDICINE

## 2025-02-14 PROCEDURE — 84443 ASSAY THYROID STIM HORMONE: CPT | Performed by: NURSE PRACTITIONER

## 2025-02-14 PROCEDURE — 80179 DRUG ASSAY SALICYLATE: CPT | Performed by: EMERGENCY MEDICINE

## 2025-02-14 PROCEDURE — 83605 ASSAY OF LACTIC ACID: CPT | Performed by: EMERGENCY MEDICINE

## 2025-02-14 PROCEDURE — 82550 ASSAY OF CK (CPK): CPT | Performed by: NURSE PRACTITIONER

## 2025-02-14 PROCEDURE — 74018 RADEX ABDOMEN 1 VIEW: CPT

## 2025-02-14 RX ORDER — QUETIAPINE FUMARATE 50 MG/1
50 TABLET, FILM COATED ORAL
Status: DISCONTINUED | OUTPATIENT
Start: 2025-02-14 | End: 2025-02-15 | Stop reason: HOSPADM

## 2025-02-14 RX ORDER — POLYETHYLENE GLYCOL 3350 17 G/17G
17 POWDER, FOR SOLUTION ORAL DAILY
Status: DISCONTINUED | OUTPATIENT
Start: 2025-02-15 | End: 2025-02-15 | Stop reason: HOSPADM

## 2025-02-14 RX ORDER — NALOXONE HYDROCHLORIDE 1 MG/ML
2 INJECTION INTRAMUSCULAR; INTRAVENOUS; SUBCUTANEOUS ONCE
Status: COMPLETED | OUTPATIENT
Start: 2025-02-14 | End: 2025-02-14

## 2025-02-14 RX ORDER — SODIUM CHLORIDE, SODIUM GLUCONATE, SODIUM ACETATE, POTASSIUM CHLORIDE, MAGNESIUM CHLORIDE, SODIUM PHOSPHATE, DIBASIC, AND POTASSIUM PHOSPHATE .53; .5; .37; .037; .03; .012; .00082 G/100ML; G/100ML; G/100ML; G/100ML; G/100ML; G/100ML; G/100ML
125 INJECTION, SOLUTION INTRAVENOUS CONTINUOUS
Status: DISCONTINUED | OUTPATIENT
Start: 2025-02-14 | End: 2025-02-14

## 2025-02-14 RX ORDER — INSULIN LISPRO 100 [IU]/ML
1-5 INJECTION, SOLUTION INTRAVENOUS; SUBCUTANEOUS EVERY 6 HOURS SCHEDULED
Status: CANCELLED | OUTPATIENT
Start: 2025-02-14

## 2025-02-14 RX ORDER — LIDOCAINE 50 MG/G
2 PATCH TOPICAL DAILY
Status: DISCONTINUED | OUTPATIENT
Start: 2025-02-14 | End: 2025-02-15 | Stop reason: HOSPADM

## 2025-02-14 RX ORDER — DEXTROSE, SODIUM CHLORIDE, SODIUM LACTATE, POTASSIUM CHLORIDE, AND CALCIUM CHLORIDE 5; .6; .31; .03; .02 G/100ML; G/100ML; G/100ML; G/100ML; G/100ML
50 INJECTION, SOLUTION INTRAVENOUS CONTINUOUS
Status: DISCONTINUED | OUTPATIENT
Start: 2025-02-14 | End: 2025-02-14

## 2025-02-14 RX ORDER — AMOXICILLIN 250 MG
1 CAPSULE ORAL
Status: DISCONTINUED | OUTPATIENT
Start: 2025-02-14 | End: 2025-02-15 | Stop reason: HOSPADM

## 2025-02-14 RX ORDER — CHLORHEXIDINE GLUCONATE ORAL RINSE 1.2 MG/ML
15 SOLUTION DENTAL EVERY 12 HOURS SCHEDULED
Status: DISCONTINUED | OUTPATIENT
Start: 2025-02-14 | End: 2025-02-14

## 2025-02-14 RX ORDER — ENOXAPARIN SODIUM 100 MG/ML
40 INJECTION SUBCUTANEOUS DAILY
Status: DISCONTINUED | OUTPATIENT
Start: 2025-02-14 | End: 2025-02-15 | Stop reason: HOSPADM

## 2025-02-14 RX ORDER — LITHIUM CARBONATE 300 MG/1
300 TABLET, FILM COATED, EXTENDED RELEASE ORAL 2 TIMES DAILY
Status: DISCONTINUED | OUTPATIENT
Start: 2025-02-14 | End: 2025-02-15 | Stop reason: HOSPADM

## 2025-02-14 RX ORDER — MAGNESIUM SULFATE HEPTAHYDRATE 40 MG/ML
2 INJECTION, SOLUTION INTRAVENOUS ONCE
Status: COMPLETED | OUTPATIENT
Start: 2025-02-14 | End: 2025-02-14

## 2025-02-14 RX ADMIN — DEXTROSE, SODIUM CHLORIDE, SODIUM LACTATE, POTASSIUM CHLORIDE, AND CALCIUM CHLORIDE 50 ML/HR: 5; .6; .31; .03; .02 INJECTION, SOLUTION INTRAVENOUS at 14:46

## 2025-02-14 RX ADMIN — LIDOCAINE 2 PATCH: 700 PATCH TOPICAL at 14:46

## 2025-02-14 RX ADMIN — MAGNESIUM SULFATE HEPTAHYDRATE 2 G: 40 INJECTION, SOLUTION INTRAVENOUS at 06:09

## 2025-02-14 RX ADMIN — CHLORHEXIDINE GLUCONATE 15 ML: 1.2 SOLUTION ORAL at 08:47

## 2025-02-14 RX ADMIN — LITHIUM CARBONATE 300 MG: 300 TABLET, EXTENDED RELEASE ORAL at 18:20

## 2025-02-14 RX ADMIN — SENNOSIDES AND DOCUSATE SODIUM 1 TABLET: 8.6; 5 TABLET ORAL at 22:11

## 2025-02-14 RX ADMIN — ENOXAPARIN SODIUM 40 MG: 40 INJECTION SUBCUTANEOUS at 08:48

## 2025-02-14 RX ADMIN — QUETIAPINE FUMARATE 50 MG: 50 TABLET ORAL at 22:11

## 2025-02-14 RX ADMIN — NALOXONE HYDROCHLORIDE 2 MG: 1 INJECTION PARENTERAL at 00:15

## 2025-02-14 RX ADMIN — SODIUM CHLORIDE, SODIUM GLUCONATE, SODIUM ACETATE, POTASSIUM CHLORIDE, MAGNESIUM CHLORIDE, SODIUM PHOSPHATE, DIBASIC, AND POTASSIUM PHOSPHATE 125 ML/HR: .53; .5; .37; .037; .03; .012; .00082 INJECTION, SOLUTION INTRAVENOUS at 02:11

## 2025-02-14 NOTE — ED PROVIDER NOTES
Time reflects when diagnosis was documented in both MDM as applicable and the Disposition within this note       Time User Action Codes Description Comment    2/14/2025 12:29 AM Christopher Monsalve [G93.40] Acute encephalopathy     2/14/2025 12:29 AM Christopher Monsalve [R07.9] Chest pain     2/14/2025 12:29 AM Christopher Monsalve [D72.829] Leukocytosis           ED Disposition       ED Disposition   Admit    Condition   Stable    Date/Time   Fri Feb 14, 2025 12:29 AM    Comment   Case was discussed with Critical Care and the patient's admission status was agreed to be Admission Status: inpatient status to the service of Dr. Liu.               Assessment & Plan       Medical Decision Making  49-year-old male presenting to the ED for reports of severe sudden onset sharp chest pain with some shortness of breath while on a walk.  Patient not answering questions as he is in so much pain.  Initially given fentanyl and then patient while breathing fast and with eyes closed.  Both sister and myself trying to talk to patient.  Patient with normal vitals and tachycardia but not answering questions.  Given 2 mg of Ativan at this time with resolution of the symptoms and patient moving all extremities.  However still not talking.  Initially PE chest ordered however CT head added on.    All testing showing no acute findings however patient still minimally responsive. Narcan administered without improvement. Unclear etiology of encephalopathy. Admitted to ICU at this time.     Critical Care Time Statement: Upon my evaluation, this patient had a high probability of imminent or life-threatening deterioration due to acute encephalopathy, which required my direct attention, intervention, and personal management.  I spent a total of 75 minutes directly providing critical care services, including interpretation of complex medical databases, evaluating for the presence of life-threatening injuries or illnesses, complex medical  decision making (to support/prevent further life-threatening deterioration)., and interpretation of hemodynamic data. This time is exclusive of procedures, teaching, treating other patients, family meetings, and any prior time recorded by providers other than myself.        Amount and/or Complexity of Data Reviewed  Labs: ordered.  Radiology: ordered.    Risk  Prescription drug management.  Decision regarding hospitalization.        ED Course as of 02/14/25 0603   Thu Feb 13, 2025   2344 Patient continuing to not open eyes/or speak.    Fri Feb 14, 2025   0029 Trial of narcan as unclear if patient may have taken illicit substances prior to arrival. No response or changes in presentation. ICU evaluated and admitting patient at this time SD1.        Medications   chlorhexidine (PERIDEX) 0.12 % oral rinse 15 mL (has no administration in time range)   multi-electrolyte (PLASMALYTE-A/ISOLYTE-S PH 7.4) IV solution (has no administration in time range)   enoxaparin (LOVENOX) subcutaneous injection 40 mg (has no administration in time range)   fentaNYL injection 75 mcg (75 mcg Intravenous Given 2/13/25 2118)   sodium chloride 0.9 % bolus 1,000 mL (0 mL Intravenous Stopped 2/13/25 2353)   LORazepam (ATIVAN) 2 mg/mL injection **ADS Override Pull** (2 mg  Given 2/13/25 2137)   iohexol (OMNIPAQUE) 350 MG/ML injection (MULTI-DOSE) 100 mL (100 mL Intravenous Given 2/13/25 2223)   naloxone (NARCAN) injection 2 mg (2 mg Intravenous Given 2/14/25 0015)       ED Risk Strat Scores   HEART Risk Score      Flowsheet Row Most Recent Value   Heart Score Risk Calculator    History 0 Filed at: 02/14/2025 0050   ECG 0 Filed at: 02/14/2025 0050   Age 1 Filed at: 02/14/2025 0050   Risk Factors 2 Filed at: 02/14/2025 0050   Troponin 0 Filed at: 02/14/2025 0050   HEART Score 3 Filed at: 02/14/2025 0050          HEART Risk Score      Flowsheet Row Most Recent Value   Heart Score Risk Calculator    History 0 Filed at: 02/14/2025 0050   ECG 0 Filed  "at: 02/14/2025 0050   Age 1 Filed at: 02/14/2025 0050   Risk Factors 2 Filed at: 02/14/2025 0050   Troponin 0 Filed at: 02/14/2025 0050   HEART Score 3 Filed at: 02/14/2025 0050                            SBIRT 20yo+      Flowsheet Row Most Recent Value   Initial Alcohol Screen: US AUDIT-C     1. How often do you have a drink containing alcohol? 0 Filed at: 02/13/2025 2108   2. How many drinks containing alcohol do you have on a typical day you are drinking?  0 Filed at: 02/13/2025 2108   3a. Male UNDER 65: How often do you have five or more drinks on one occasion? 0 Filed at: 02/13/2025 2108   Audit-C Score 0 Filed at: 02/13/2025 2108            Wells' Criteria for PE      Flowsheet Row Most Recent Value   Wells' Criteria for PE    Clinical signs and symptoms of DVT 0 Filed at: 02/14/2025 0602   PE is primary diagnosis or equally likely 3 Filed at: 02/14/2025 0602   HR >100 1.5 Filed at: 02/14/2025 0602   Immobilization at least 3 days or Surgery in the previous 4 weeks 0 Filed at: 02/14/2025 0602   Previous, objectively diagnosed PE or DVT 0 Filed at: 02/14/2025 0602   Hemoptysis 0 Filed at: 02/14/2025 0602   Malignancy with treatment within 6 months or palliative 1 Filed at: 02/14/2025 0602   Wells' Criteria Total 5.5 Filed at: 02/14/2025 0602                        History of Present Illness       Chief Complaint   Patient presents with    Chest Pain     Pt reports left sided chest pain \"over an hour ago\"        Past Medical History:   Diagnosis Date    Allergic     Bipolar disorder in partial remission (HCC)     Erectile dysfunction     unspecified erectile dysfunction type    Hypertension     Kidney stone       Past Surgical History:   Procedure Laterality Date    COLONOSCOPY      EXPLORATORY LAPAROTOMY W/ BOWEL RESECTION N/A 7/21/2022    Procedure: LAPAROTOMY EXPLORATORY W/ SMALL BOWEL RESECTION, liver biopsy;  Surgeon: Rose Mary Lara MD;  Location: [facility];  Service: General    FL RETROGRADE PYELOGRAM  " 10/06/2021    HERNIA REPAIR      AZ CYSTOURETHROSCOPY W/URETERAL CATHETERIZATION Right 10/06/2021    Procedure: CYSTOSCOPY RETROGRADE PYELOGRAM WITH INSERTION STENT URETERAL, RIGHT URETEROSCOPY, STONE EXTRACTION;  Surgeon: Bradly Rivera MD;  Location:  MAIN OR;  Service: Urology      Family History   Problem Relation Age of Onset    Diabetes Mother     Thyroid disease Mother     Cancer Father     Thyroid disease Sister     Depression Brother     Cancer Maternal Aunt     Cancer Paternal Uncle     Cancer Paternal Grandmother     No Known Problems Brother     Thyroid disease Sister       Social History     Tobacco Use    Smoking status: Former     Current packs/day: 0.00     Types: Pipe, Cigarettes     Quit date: 2003     Years since quittin.1    Smokeless tobacco: Never   Vaping Use    Vaping status: Some Days    Substances: THC   Substance Use Topics    Alcohol use: Not Currently     Comment: Drinks a quart of moonshine/night-As of 21 will quit drinking    Drug use: Yes     Types: Marijuana     Comment: Socially (1-2 Monthly)      E-Cigarette/Vaping    E-Cigarette Use Current Some Day User       E-Cigarette/Vaping Substances    Nicotine No     THC Yes     CBD No     Flavoring No       I have reviewed and agree with the history as documented.     50 yo male presenting to the ed for reports of sudden onset chest pain starting about 1 hour prior to arrival, sister states he was on a walk. She reports he was short of breath. Patient not answering any questions.       Chest Pain  Associated symptoms: shortness of breath        Review of Systems   Respiratory:  Positive for shortness of breath.    Cardiovascular:  Positive for chest pain.   All other systems reviewed and are negative.          Objective       ED Triage Vitals   Temperature Pulse Blood Pressure Respirations SpO2 Patient Position - Orthostatic VS   25 0000 25 2105 25   97.8 °F  (36.6 °C) (!) 113 114/74 12 99 % Lying      Temp Source Heart Rate Source BP Location FiO2 (%) Pain Score    02/14/25 0000 02/13/25 2105 02/13/25 2105 -- 02/13/25 2105    Temporal Monitor Right arm  6      Vitals      Date and Time Temp Pulse SpO2 Resp BP Pain Score FACES Pain Rating User   02/14/25 0400 97.1 °F (36.2 °C) 78 100 % 17 105/58 -- --    02/14/25 0330 -- 76 100 % 14 98/70 -- --    02/14/25 0320 -- 78 100 % 17 94/60 -- --    02/14/25 0300 -- 77 100 % 17 94/59 -- --    02/14/25 0250 -- 78 100 % 18 91/59 -- --    02/14/25 0230 -- 81 100 % 17 99/62 -- --    02/14/25 0215 -- 82 100 % 14 108/66 -- --    02/14/25 0210 -- 82 100 % 18 105/65 -- --    02/14/25 0145 98 °F (36.7 °C) 90 100 % 14 111/70 -- --    02/14/25 0000 97.8 °F (36.6 °C) 91 100 % 30 118/74 -- -- EZ   02/13/25 2106 -- 112 -- 18 -- -- --    02/13/25 2105 -- 113 99 % 12 114/74 6 --             Physical Exam  Vitals and nursing note reviewed.   Constitutional:       General: He is in acute distress.      Appearance: He is well-developed. He is ill-appearing, toxic-appearing and diaphoretic.   HENT:      Head: Normocephalic and atraumatic.      Right Ear: External ear normal.      Left Ear: External ear normal.      Nose: Nose normal.   Eyes:      General: No scleral icterus.        Right eye: No discharge.         Left eye: No discharge.      Conjunctiva/sclera: Conjunctivae normal.   Cardiovascular:      Rate and Rhythm: Regular rhythm. Tachycardia present.      Heart sounds: Normal heart sounds. No murmur heard.     No friction rub. No gallop.   Pulmonary:      Effort: Pulmonary effort is normal. Tachypnea present. No respiratory distress.      Breath sounds: Normal breath sounds. No decreased breath sounds, wheezing, rhonchi or rales.   Abdominal:      General: Bowel sounds are normal. There is no distension.      Palpations: Abdomen is soft. There is no mass.      Tenderness: There is no abdominal tenderness. There is no  guarding.   Musculoskeletal:         General: No tenderness or deformity. Normal range of motion.      Cervical back: Normal range of motion and neck supple.   Skin:     General: Skin is warm.      Coloration: Skin is not pale.      Findings: No erythema or rash.   Neurological:      Mental Status: He is alert.      GCS: GCS eye subscore is 1. GCS verbal subscore is 2. GCS motor subscore is 5.   Psychiatric:         Thought Content: Thought content normal.         Judgment: Judgment normal.         Results Reviewed       Procedure Component Value Units Date/Time    HS Troponin I 4hr [[id number]]  (Normal) Collected: 02/14/25 0157    Lab Status: Final result Specimen: Blood from Hand, Right Updated: 02/14/25 0249     hs TnI 4hr 4 ng/L      Delta 4hr hsTnI 0 ng/L     Ammonia [[id number]]  (Normal) Collected: 02/14/25 0157    Lab Status: Final result Specimen: Blood from Hand, Right Updated: 02/14/25 0229     Ammonia 38 umol/L     Lithium level [[id number]]  (Normal) Collected: 02/14/25 0157    Lab Status: Final result Specimen: Blood from Hand, Right Updated: 02/14/25 0228     Lithium Lvl 0.85 mmol/L     Blood culture #1 [[id number]] Collected: 02/14/25 0203    Lab Status: In process Specimen: Blood from Hand, Left Updated: 02/14/25 0210    Blood culture #2 [[id number]] Collected: 02/14/25 0157    Lab Status: In process Specimen: Blood from Hand, Right Updated: 02/14/25 0206    Lactic acid, plasma (w/reflex if result > 2.0) [[id number]]  (Abnormal) Collected: 02/14/25 0110    Lab Status: Final result Specimen: Blood from Arm, Left Updated: 02/14/25 0155     LACTIC ACID 2.1 mmol/L     Narrative:      Slightly Hemolyzed:Results may be affected.  Result may be elevated if tourniquet was used during collection.    HS Troponin I 2hr [[id number]]  (Normal) Collected: 02/14/25 0110    Lab Status: Final result Specimen: Blood from Arm, Left Updated: 02/14/25 0144     hs TnI 2hr 3 ng/L      Delta 2hr hsTnI -1 ng/L     Ethanol  [[id number]]  (Normal) Collected: 02/14/25 0110    Lab Status: Final result Specimen: Blood from Arm, Left Updated: 02/14/25 0135     Ethanol Lvl <10 mg/dL     Salicylate level [[id number]]  (Normal) Collected: 02/14/25 0110    Lab Status: Final result Specimen: Blood from Arm, Left Updated: 02/14/25 0135     Salicylate Lvl <5 mg/dL     Acetaminophen level-If concentration is detectable, please discuss with medical  on call. [[id number]]  (Abnormal) Collected: 02/14/25 0110    Lab Status: Final result Specimen: Blood from Arm, Left Updated: 02/14/25 0135     Acetaminophen Level <2 ug/mL     Procalcitonin [[id number]]  (Normal) Collected: 02/13/25 2120    Lab Status: Final result Specimen: Blood from Arm, Right Updated: 02/14/25 0130     Procalcitonin 0.13 ng/ml     Hepatic function panel [[id number]]  (Abnormal) Collected: 02/13/25 2120    Lab Status: Final result Specimen: Blood from Arm, Right Updated: 02/14/25 0107     Total Bilirubin 1.48 mg/dL      Bilirubin, Direct 0.28 mg/dL      Alkaline Phosphatase 93 U/L      AST 33 U/L      ALT 25 U/L      Total Protein 7.8 g/dL      Albumin 4.7 g/dL     Magnesium [[id number]]  (Abnormal) Collected: 02/13/25 2120    Lab Status: Final result Specimen: Blood from Arm, Right Updated: 02/14/25 0107     Magnesium 1.8 mg/dL     Phosphorus [[id number]]  (Normal) Collected: 02/13/25 2120    Lab Status: Final result Specimen: Blood from Arm, Right Updated: 02/14/25 0107     Phosphorus 2.8 mg/dL     CK [[id number]]  (Abnormal) Collected: 02/13/25 2120    Lab Status: Final result Specimen: Blood from Arm, Right Updated: 02/14/25 0107     Total  U/L     UA w Reflex to Microscopic w Reflex to Culture [[id number]]     Lab Status: No result Specimen: Urine     HS Troponin 0hr (reflex protocol) [[id number]]  (Normal) Collected: 02/13/25 2120    Lab Status: Final result Specimen: Blood from Arm, Right Updated: 02/13/25 6328     hs TnI 0hr 4 ng/L     Rapid drug screen, urine  [[id number]]     Lab Status: No result Specimen: Urine     Basic metabolic panel [[id number]] Collected: 02/13/25 2120    Lab Status: Final result Specimen: Blood from Arm, Right Updated: 02/13/25 2139     Sodium 135 mmol/L      Potassium 3.8 mmol/L      Chloride 101 mmol/L      CO2 22 mmol/L      ANION GAP 12 mmol/L      BUN 17 mg/dL      Creatinine 1.26 mg/dL      Glucose 110 mg/dL      Calcium 9.4 mg/dL      eGFR 66 ml/min/1.73sq m     Narrative:      National Kidney Disease Foundation guidelines for Chronic Kidney Disease (CKD):     Stage 1 with normal or high GFR (GFR > 90 mL/min/1.73 square meters)    Stage 2 Mild CKD (GFR = 60-89 mL/min/1.73 square meters)    Stage 3A Moderate CKD (GFR = 45-59 mL/min/1.73 square meters)    Stage 3B Moderate CKD (GFR = 30-44 mL/min/1.73 square meters)    Stage 4 Severe CKD (GFR = 15-29 mL/min/1.73 square meters)    Stage 5 End Stage CKD (GFR <15 mL/min/1.73 square meters)  Note: GFR calculation is accurate only with a steady state creatinine    CBC and differential [[id number]]  (Abnormal) Collected: 02/13/25 2120    Lab Status: Final result Specimen: Blood from Arm, Right Updated: 02/13/25 2124     WBC 18.21 Thousand/uL      RBC 4.47 Million/uL      Hemoglobin 13.3 g/dL      Hematocrit 39.7 %      MCV 89 fL      MCH 29.8 pg      MCHC 33.5 g/dL      RDW 14.1 %      MPV 10.4 fL      Platelets 289 Thousands/uL      nRBC 0 /100 WBCs      Segmented % 81 %      Immature Grans % 0 %      Lymphocytes % 10 %      Monocytes % 8 %      Eosinophils Relative 1 %      Basophils Relative 0 %      Absolute Neutrophils 14.70 Thousands/µL      Absolute Immature Grans 0.07 Thousand/uL      Absolute Lymphocytes 1.78 Thousands/µL      Absolute Monocytes 1.43 Thousand/µL      Eosinophils Absolute 0.19 Thousand/µL      Basophils Absolute 0.04 Thousands/µL             CTA chest pe study   Final Interpretation by Krishna Diamond DO (02/13 2326)      No pulmonary embolism or evidence of acute findings.                   Workstation performed: [id number]         CT head without contrast   Final Interpretation by Krishna Diamond DO (02/13 2306)      No acute intracranial abnormality.                  Workstation performed: [id number]             ECG 12 Lead Documentation Only    Date/Time: 2/13/2025 10:20 PM    Performed by: Christopher Monsalve DO  Authorized by: Christopher Monsalve DO    ECG reviewed by me, the ED Provider: yes    Patient location:  ED  Previous ECG:     Comparison to cardiac monitor: Yes    Interpretation:     Interpretation: normal    Rate:     ECG rate:  106    ECG rate assessment: tachycardic    Rhythm:     Rhythm: sinus tachycardia    Ectopy:     Ectopy: none    QRS:     QRS axis:  Normal    QRS intervals:  Normal  Conduction:     Conduction: normal    ST segments:     ST segments:  Normal  T waves:     T waves: normal        ED Medication and Procedure Management   Prior to Admission Medications   Prescriptions Last Dose Informant Patient Reported? Taking?   Alcohol Swabs 70 % PADS Unknown Self No No   Sig: May substitute brand based on insurance coverage. Check glucose BID.   Blood Glucose Monitoring Suppl (OneTouch Verio Reflect) w/Device KIT Unknown Self No No   Sig: May substitute brand based on insurance coverage. Check glucose BID.   Diclofenac Sodium (VOLTAREN) 1 % Unknown  No No   Sig: APPLY 2 G TOPICALLY 4 (FOUR) TIMES A DAY   OneTouch Delica Lancets 33G MISC Unknown Self No No   Sig: May substitute brand based on insurance coverage. Check glucose BID.   QUEtiapine (SEROquel) 50 mg tablet   No No   Sig: Take 1 tablet (50 mg total) by mouth daily at bedtime   QUEtiapine (SEROquel) 50 mg tablet Unknown Self Yes No   folic acid (FOLVITE) 1 mg tablet   No No   Sig: Take 1 tablet (1 mg total) by mouth daily   glucose blood (OneTouch Verio) test strip Unknown Self No No   Sig: May substitute brand based on insurance coverage. Check glucose BID.   hydrOXYzine HCL (ATARAX) 50 mg tablet Unknown Self  No No   Sig: Take 1 tablet (50 mg total) by mouth 2 (two) times a day as needed for anxiety   lidocaine (LIDODERM) 5 %   No No   Sig: Apply 2 patches topically over 12 hours daily Remove & Discard patch within 12 hours or as directed by MD   lisinopril (ZESTRIL) 20 mg tablet Unknown Self Yes No   lithium carbonate (LITHOBID) 300 mg CR tablet Unknown Self Yes No   Sig: Take 300 mg by mouth 2 (two) times a day   metFORMIN (GLUCOPHAGE) 500 mg tablet   No No   Sig: Take 1 tablet (500 mg total) by mouth 2 (two) times a day with meals   metoprolol succinate (TOPROL-XL) 50 mg 24 hr tablet Unknown Self Yes No   Sig: Take 1 tablet every day by oral route.   prazosin (MINIPRESS) 5 mg capsule Unknown Self Yes No   Sig: Take 5 mg by mouth daily at bedtime      Facility-Administered Medications: None     Current Discharge Medication List        CONTINUE these medications which have NOT CHANGED    Details   Alcohol Swabs 70 % PADS May substitute brand based on insurance coverage. Check glucose BID.  Qty: 100 each, Refills: 1    Associated Diagnoses: Hyperglycemia      Blood Glucose Monitoring Suppl (OneTouch Verio Reflect) w/Device KIT May substitute brand based on insurance coverage. Check glucose BID.  Qty: 1 kit, Refills: 0    Associated Diagnoses: Hyperglycemia      Diclofenac Sodium (VOLTAREN) 1 % APPLY 2 G TOPICALLY 4 (FOUR) TIMES A DAY  Qty: 200 g, Refills: 2    Associated Diagnoses: Ankle sprain      folic acid (FOLVITE) 1 mg tablet Take 1 tablet (1 mg total) by mouth daily  Qty: 30 tablet, Refills: 0    Associated Diagnoses: Alcohol abuse      glucose blood (OneTouch Verio) test strip May substitute brand based on insurance coverage. Check glucose BID.  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      OneTouch Delica Lancets 33G MISC May substitute brand based on insurance coverage. Check glucose BID.  Qty: 100 each, Refills: 1    Associated Diagnoses: Hyperglycemia      prazosin (MINIPRESS) 5 mg capsule Take 5 mg by mouth daily at bedtime      QUEtiapine (SEROquel) 50 mg tablet            No discharge procedures on file.  ED SEPSIS DOCUMENTATION   Time reflects when diagnosis was documented in both MDM as applicable and the Disposition within this note       Time User Action Codes Description Comment    2/14/2025 12:29 AM Christopher Monsalve [G93.40] Acute encephalopathy     2/14/2025 12:29 AM Christopher Monsalve [R07.9] Chest pain     2/14/2025 12:29 AM Christopher Monsalve [D72.829] Leukocytosis                  Christopher Monsalve, DO  02/14/25 0052       Christopher Monsalve, DO  02/14/25 0603

## 2025-02-14 NOTE — UTILIZATION REVIEW
NOTIFICATION OF INPATIENT ADMISSION   AUTHORIZATION REQUEST   SERVICING FACILITY:   [place] [address]  Tax ID: [id number]  NPI: [id number]   ATTENDING PROVIDER:  Attending Name and NPI#: Mabel Jeffers Md [[id number]]  Address: [address] [place] [address]  Phone: [phone]     ADMISSION INFORMATION:  Place of Service: Inpatient [facility]  Place of Service Code: 21  Inpatient Admission Date/Time: 2/14/25 12:29 AM  Discharge Date/Time: No discharge date for patient encounter.  Admitting Diagnosis Code/Description:  Leukocytosis [D72.829]  Chest pain [R07.9]  Acute encephalopathy [G93.40]     UTILIZATION REVIEW CONTACT:  Clari Og Utilization   Network Utilization Review Department  Phone: [phone]  Fax: [phone]  Email: [email or web address]  Contact for approvals/pending authorizations, clinical reviews, and discharge.     PHYSICIAN ADVISORY SERVICES:  Medical Necessity Denial & Gaxl-je-Axxv Review  Phone: [phone]  Fax: [phone]  Email: [email or web address]     DISCHARGE SUPPORT TEAM:  For Patients Discharge Needs & Updates  Phone: [phone] opt. 2 Fax: [phone]  Email: [email or web address]

## 2025-02-14 NOTE — UTILIZATION REVIEW
"Initial Clinical Review    Admission: Date/Time/Statement:   Admission Orders (From admission, onward)       Ordered        02/14/25 0029  INPATIENT ADMISSION  Once                          Orders Placed This Encounter   Procedures    INPATIENT ADMISSION     Standing Status:   Standing     Number of Occurrences:   1     Level of Care:   Level 1 Stepdown [13]     Estimated length of stay:   More than 2 Midnights     Certification:   I certify that inpatient services are medically necessary for this patient for a duration of greater than two midnights. See H&P and MD Progress Notes for additional information about the patient's course of treatment.     ED Arrival Information       Expected   -    Arrival   2/13/2025 20:53    Acuity   Urgent              Means of arrival   Walk-In    Escorted by   Family Member    Service   Critical Care/ICU    Admission type   Emergency              Arrival complaint   Chest pain             Chief Complaint   Patient presents with    Chest Pain     Pt reports left sided chest pain \"over an hour ago\"        Initial Presentation: 49 y.o. 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 Sister noted confusion and acting strangely after going for a walk. She reports that patient does not drink alcohol and/or use tobacco, but does sometimes use methamphetamines and THC. In ED, patient remained altered, however developed what appeared to be chest pain. He was given IV Fentanyl to which he later received Narcan to reverse. Patient remained altered. Labs revealed WBC 18.21, mag 1.8, , lactic acid 2.1.     Plan:  Admit Inpatient status Dx Acute encephalopathy :   ICU, fu on TSH, obtain UDS, send UA, start IVF, hold home Seroquel, start freq neuro checks, monitor airway closely, continuous cardiac monitoring. Trend " fever and WBC, trend CK. Hold home BB, lisinopril and prazosin while NPO.     Date: 2/14   Day 2:  UDS was positive for methamphetamines. Troponins negative, CK and WBC normalized, suspect elevated 2/2 amphetamine use. Pt protecting his airway, localizes to pain, intermittently follows commands, but is not speaking. Unclear if this is a psychiatric issue or if he is choosing not to engage with us on exam. Psych eval not undertaken at this time, as he will not participate. Continue to monitor neuro status, VS and labs, CM following.      ED Treatment-Medication Administration from 02/13/2025 2053 to 02/14/2025 0140         Date/Time Order Dose Route Action     02/13/2025 2118 fentaNYL injection 75 mcg 75 mcg Intravenous Given     02/13/2025 2138 sodium chloride 0.9 % bolus 1,000 mL 1,000 mL Intravenous New Bag     02/13/2025 2137 LORazepam (ATIVAN) 2 mg/mL injection **ADS Override Pull** 2 mg  Given     02/13/2025 2223 iohexol (OMNIPAQUE) 350 MG/ML injection (MULTI-DOSE) 100 mL 100 mL Intravenous Given     02/14/2025 0015 naloxone (NARCAN) injection 2 mg 2 mg Intravenous Given            Scheduled Medications:  chlorhexidine, 15 mL, Mouth/Throat, Q12H BERENICE  enoxaparin, 40 mg, Subcutaneous, Daily      Continuous IV Infusions:  multi-electrolyte, 125 mL/hr, Intravenous, Continuous      PRN Meds: none     ED Triage Vitals   Temperature Pulse Respirations Blood Pressure SpO2 Pain Score   02/14/25 0000 02/13/25 2105 02/13/25 2105 02/13/25 2105 02/13/25 2105 02/13/25 2105   97.8 °F (36.6 °C) (!) 113 12 114/74 99 % 6     Weight (last 2 days)       Date/Time Weight    02/14/25 0539 95 (209.44)    02/14/25 0145 95 (209.44)            Vital Signs (last 3 days)       Date/Time Temp Pulse Resp BP MAP (mmHg) SpO2 Calculated FIO2 (%) - Nasal Cannula Nasal Cannula O2 Flow Rate (L/min) O2 Device Patient Position - Orthostatic VS Vivian Coma Scale Score Pain    02/14/25 0848 -- -- -- -- -- -- -- -- -- -- 8 --    02/14/25 0800 -- 77  14 101/63 78 99 % -- -- -- -- -- --    02/14/25 0700 98.3 °F (36.8 °C) 74 17 108/56 76 99 % 32 3 L/min Nasal cannula Lying -- --    02/14/25 0600 -- 80 16 98/59 75 100 % -- -- -- -- -- --    02/14/25 0500 -- 74 17 103/62 77 99 % -- -- -- -- -- --    02/14/25 0400 97.1 °F (36.2 °C) 78 17 105/58 76 100 % -- -- -- -- 8 --    02/14/25 0330 -- 76 14 98/70 80 100 % -- -- -- -- -- --    02/14/25 0320 -- 78 17 94/60 73 100 % -- -- -- -- -- --    02/14/25 0300 -- 77 17 94/59 72 100 % -- -- -- -- -- --    02/14/25 0250 -- 78 18 91/59 71 100 % -- -- -- -- -- --    02/14/25 0230 -- 81 17 99/62 76 100 % -- -- -- -- -- --    02/14/25 0215 -- 82 14 108/66 83 100 % -- -- -- -- -- --    02/14/25 0210 -- 82 18 105/65 80 100 % -- -- -- -- -- --    02/14/25 0200 -- -- -- -- -- -- -- -- -- -- 8 --    02/14/25 0145 98 °F (36.7 °C) 90 14 111/70 83 100 % 32 3 L/min Nasal cannula Lying -- --    02/14/25 0000 97.8 °F (36.6 °C) 91 30 118/74 -- 100 % 32 3 L/min Nasal cannula Lying -- --    02/13/25 2200 -- -- -- -- -- -- -- -- -- -- 10 --    02/13/25 2106 -- 112 18 -- -- -- -- -- -- -- -- --    02/13/25 2105 -- 113 12 114/74 91 99 % -- -- None (Room air) Lying -- 6              Pertinent Labs/Diagnostic Test Results:   Radiology:  CTA chest pe study   Final Interpretation by Krishna Diamond DO (02/13 5796)      No pulmonary embolism or evidence of acute findings.                  Workstation performed: [id number]         CT head without contrast   Final Interpretation by Krishna Diamond DO (02/13 4626)      No acute intracranial abnormality.                  Workstation performed: [id number]           Cardiology:  ECG 12 lead   Final Result by Dieudonne Shahid DO (02/14 0519)   Normal sinus rhythm   Left axis deviation   Inferior infarct (cited on or before 14-Nov-2024)   Abnormal ECG   When compared with ECG of 13-Feb-2025 21:02, (unconfirmed)   No significant change was found   Confirmed by Dieudonne Shahid ([id number]) on 2/14/2025 5:19:09 AM      ECG  12 lead    by Interface, Ris Results In (02/13 2103)        GI:  No orders to display           Results from last 7 days   Lab Units 02/14/25  0833 02/13/25 2120   WBC Thousand/uL 8.90 18.21*   HEMOGLOBIN g/dL 11.0* 13.3   HEMATOCRIT % 32.9* 39.7   PLATELETS Thousands/uL 185 289   TOTAL NEUT ABS Thousands/µL  --  14.70*         Results from last 7 days   Lab Units 02/14/25  0833 02/13/25 2120   SODIUM mmol/L 136 135   POTASSIUM mmol/L 3.9 3.8   CHLORIDE mmol/L 105 101   CO2 mmol/L 27 22   ANION GAP mmol/L 4 12   BUN mg/dL 12 17   CREATININE mg/dL 0.77 1.26   EGFR ml/min/1.73sq m 105 66   CALCIUM mg/dL 8.7 9.4   MAGNESIUM mg/dL  --  1.8*   PHOSPHORUS mg/dL  --  2.8     Results from last 7 days   Lab Units 02/14/25 0157 02/13/25 2120   AST U/L  --  33   ALT U/L  --  25   ALK PHOS U/L  --  93   TOTAL PROTEIN g/dL  --  7.8   ALBUMIN g/dL  --  4.7   TOTAL BILIRUBIN mg/dL  --  1.48*   BILIRUBIN DIRECT mg/dL  --  0.28*   AMMONIA umol/L 38  --          Results from last 7 days   Lab Units 02/14/25  0833 02/13/25 2120   GLUCOSE RANDOM mg/dL 162* 110       Results from last 7 days   Lab Units 02/14/25  0833 02/13/25 2120   CK TOTAL U/L 175 472*     Results from last 7 days   Lab Units 02/14/25 0157 02/14/25  0110 02/13/25 2120   HS TNI 0HR ng/L  --   --  4   HS TNI 2HR ng/L  --  3  --    HSTNI D2 ng/L  --  -1  --    HS TNI 4HR ng/L 4  --   --    HSTNI D4 ng/L 0  --   --      Results from last 7 days   Lab Units 02/14/25 0157   TSH 3RD GENERATON uIU/mL 4.177     Results from last 7 days   Lab Units 02/13/25 2120   PROCALCITONIN ng/ml 0.13     Results from last 7 days   Lab Units 02/14/25  0833 02/14/25  0110   LACTIC ACID mmol/L 0.9 2.1*       Results from last 7 days   Lab Units 02/14/25  0612   CLARITY UA  Clear   COLOR UA  Straw   SPEC GRAV UA  1.010   PH UA  7.0   GLUCOSE UA mg/dl Negative   KETONES UA mg/dl Negative   BLOOD UA  Negative   PROTEIN UA mg/dl Negative   NITRITE UA  Negative   BILIRUBIN UA  Negative    UROBILINOGEN UA E.U./dl 1.0   LEUKOCYTES UA  Negative     Results from last 7 days   Lab Units 02/14/25  0612   AMPH/METH  Positive*   BARBITURATE UR  Negative   BENZODIAZEPINE UR  Negative   COCAINE UR  Negative   METHADONE URINE  Negative   OPIATE UR  Negative   PCP UR  Negative   THC UR  Negative     Results from last 7 days   Lab Units 02/14/25  0110   ETHANOL LVL mg/dL <10   ACETAMINOPHEN LVL ug/mL <2*   SALICYLATE LVL mg/dL <5       Results from last 7 days   Lab Units 02/14/25  0203 02/14/25  0157   BLOOD CULTURE  Received in Microbiology Lab. Culture in Progress. Received in Microbiology Lab. Culture in Progress.     Past Medical History:   Diagnosis Date    Allergic     Bipolar disorder in partial remission (HCC)     Erectile dysfunction     unspecified erectile dysfunction type    Hypertension     Kidney stone      Present on Admission:   Acute encephalopathy   SIRS (systemic inflammatory response syndrome) (HCC)   Elevated CK   Chest pain   Essential hypertension   Neuroendocrine carcinoma of small bowel (HCC)   Explosive personality disorder (HCC)   Drug abuse (HCC)      Admitting Diagnosis: Leukocytosis [D72.829]  Chest pain [R07.9]  Acute encephalopathy [G93.40]  Age/Sex: 49 y.o. male    Network Utilization Review Department  ATTENTION: Please call with any questions or concerns to [phone] and carefully listen to the prompts so that you are directed to the right person. All voicemails are confidential.   For Discharge needs, contact Care Management DC Support Team at [phone] opt. [phone]  Send all requests for admission clinical reviews, approved or denied determinations and any other requests to dedicated fax number below belonging to the campus where the patient is receiving treatment. List of dedicated fax numbers for the Facilities:  FACILITY NAME UR FAX NUMBER   ADMISSION DENIALS (Administrative/Medical Necessity) [phone]   DISCHARGE SUPPORT TEAM (NETWORK) [phone]   PARENT CHILD  [facility] (Maternity/NICU/Pediatrics) [phone]   [facility] [phone]   [facility] [phone]   [facility] [phone]   [facility] [phone]   [facility] [phone]   [facility] [phone]   [facility] [phone]   [facility] [phone]   [facility] [phone]   Critical access hospital [phone]   [facility] [phone]   [facility] [phone]

## 2025-02-14 NOTE — PLAN OF CARE
Problem: PAIN - ADULT  Goal: Verbalizes/displays adequate comfort level or baseline comfort level  Description: Interventions:  - Encourage patient to monitor pain and request assistance  - Assess pain using appropriate pain scale  - Administer analgesics based on type and severity of pain and evaluate response  - Implement non-pharmacological measures as appropriate and evaluate response  - Consider cultural and social influences on pain and pain management  - Notify physician/advanced practitioner if interventions unsuccessful or patient reports new pain  Outcome: Progressing     Problem: INFECTION - ADULT  Goal: Absence or prevention of progression during hospitalization  Description: INTERVENTIONS:  - Assess and monitor for signs and symptoms of infection  - Monitor lab/diagnostic results  - Monitor all insertion sites, i.e. indwelling lines, tubes, and drains  - Monitor endotracheal if appropriate and nasal secretions for changes in amount and color  - [place] appropriate cooling/warming therapies per order  - Administer medications as ordered  - Instruct and encourage patient and family to use good hand hygiene technique  - Identify and instruct in appropriate isolation precautions for identified infection/condition  Outcome: Progressing     Problem: Knowledge Deficit  Goal: Patient/family/caregiver demonstrates understanding of disease process, treatment plan, medications, and discharge instructions  Description: Complete learning assessment and assess knowledge base.  Interventions:  - Provide teaching at level of understanding  - Provide teaching via preferred learning methods  Outcome: Progressing     Problem: Nutrition/Hydration-ADULT  Goal: Nutrient/Hydration intake appropriate for improving, restoring or maintaining nutritional needs  Description: Monitor and assess patient's nutrition/hydration status for malnutrition. Collaborate with interdisciplinary team and initiate plan and interventions as  ordered.  Monitor patient's weight and dietary intake as ordered or per policy. Utilize nutrition screening tool and intervene as necessary. Determine patient's food preferences and provide high-protein, high-caloric foods as appropriate.     INTERVENTIONS:  - Monitor oral intake, urinary output, labs, and treatment plans  - Assess nutrition and hydration status and recommend course of action  - Evaluate amount of meals eaten  - Assist patient with eating if necessary   - Allow adequate time for meals  - Recommend/ encourage appropriate diets, oral nutritional supplements, and vitamin/mineral supplements  - Order, calculate, and assess calorie counts as needed  - Recommend, monitor, and adjust tube feedings and TPN/PPN based on assessed needs  - Assess need for intravenous fluids  - Provide specific nutrition/hydration education as appropriate  - Include patient/family/caregiver in decisions related to nutrition  Outcome: Progressing

## 2025-02-14 NOTE — ASSESSMENT & PLAN NOTE
Mildly elevated - likely in setting of drug abuse  Received 1L NS in ED - will start IVF hydration as noted above  Continue to trend

## 2025-02-14 NOTE — ASSESSMENT & PLAN NOTE
Complained of CP while in ED  Suspect this is 2/2 drug abuse (likely Methamphetamine)  ECG w/out ST elevations  Troponins flat  Continue cardiac monitoring

## 2025-02-14 NOTE — ASSESSMENT & PLAN NOTE
Presented to ED w/altered mental status after going for walk outside per sister  Suspect this is 2/2 drug abuse given history of methamphetamine abuse vs psychosis given psychiatric illness history  CTH w/out acute intracranial abnormality  COMA panel negative  Ammonia WNL  TSH pending  Obtain UDS  Send UA  Will start IVF hydration  Avoid sedating medications - holding home Seroquel for now  Maintain normothermia and normoglycemia  Frequent neuro checks  Monitor airway closely

## 2025-02-14 NOTE — H&P
H&P - Critical Care/ICU   Name: Osei Trimble 49 y.o. male I MRN: [id number]  Unit/Bed#: ICU 11-01 I Date of Admission: 2/13/2025   Date of Service: 2/14/2025 I Hospital Day: 0       Assessment & Plan  Acute encephalopathy  Presented to ED w/altered mental status after going for walk outside per sister  Suspect this is 2/2 drug abuse given history of methamphetamine abuse vs psychosis given psychiatric illness history  CTH w/out acute intracranial abnormality  COMA panel negative  Ammonia WNL  TSH pending  Obtain UDS  Send UA  Will start IVF hydration  Avoid sedating medications - holding home Seroquel for now  Maintain normothermia and normoglycemia  Frequent neuro checks  Monitor airway closely  Chest pain  Complained of CP while in ED  Suspect this is 2/2 drug abuse (likely Methamphetamine)  ECG w/out ST elevations  Troponins flat  Continue cardiac monitoring  SIRS (systemic inflammatory response syndrome) (HCC)  POA AEB tachycardia and leukocytosis  Suspect this is reactive in setting of drug abuse rather than active infection  Imaging w/out evidence of infectious etiology  Blood cultures pending  Obtain UA  LA 2.1  Procalcitonin 0.13  Received 1L NS in ED - will start continuous IVF hydration now  No indication for antibiotics at this point  Trend fever and WBC curve  Elevated CK  Mildly elevated - likely in setting of drug abuse  Received 1L NS in ED - will start IVF hydration as noted above  Continue to trend  Essential hypertension  Holding home BB, Lisinopril, and prazosin while NPO  Neuroendocrine carcinoma of small bowel (HCC)  Diagnosed in 07/2022  Follows w/HemOnc as OP  Receives Lanreotide every 4 weeks - last infusion on 02/05    Explosive personality disorder (HCC)  Holding home Lithium, Seroquel, and PRN Atarax while NPO  Will check Lithium level  Drug abuse (HCC)  Has history of methamphetamine abuse  UDS pending    Disposition: Stepdown Level 1    History of Present Illness   Osei Trimble is a  "49 y.o. 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 Patient's sister states that patient \"went for a walk\" and when he returned, was confused and acting strangely. Patient's sister states that she lives next to him and takes care of him. She reports that patient does not drink alcohol and/or use tobacco, but does sometimes use methamphetamines and THC.     In ED, patient remained altered, however developed what appeared to be chest pain. He was given IV Fentanyl to which he later received Narcan to reverse. Patient remained altered and critical care was asked to evaluate patient. Given decreased mentation and need for airway monitoring, patient was admitted as SD1 under CC.     History obtained from sibling and chart review.  Review of Systems: Review of Systems not obtainable due to Altered mental status    Historical Information   Past Medical History:  No date: Allergic  No date: Bipolar disorder in partial remission (HCC)  No date: Erectile dysfunction      Comment:  unspecified erectile dysfunction type  No date: Hypertension  No date: Kidney stone Past Surgical History:  No date: COLONOSCOPY  7/21/2022: EXPLORATORY LAPAROTOMY W/ BOWEL RESECTION; N/A      Comment:  Procedure: LAPAROTOMY EXPLORATORY W/ SMALL BOWEL                RESECTION, liver biopsy;  Surgeon: Rose Mary Lara MD;                 Location:  MAIN OR;  Service: General  10/06/2021: FL RETROGRADE PYELOGRAM  No date: HERNIA REPAIR  10/06/2021: MD CYSTOURETHROSCOPY W/URETERAL CATHETERIZATION; Right      Comment:  Procedure: CYSTOSCOPY RETROGRADE PYELOGRAM WITH                INSERTION STENT URETERAL, RIGHT URETEROSCOPY, STONE                EXTRACTION;  Surgeon: Bradly Rivera MD;  Location:  MAIN               OR;  Service: Urology   Current Outpatient Medications   Medication " Instructions    Alcohol Swabs 70 % PADS May substitute brand based on insurance coverage. Check glucose BID.    Blood Glucose Monitoring Suppl (OneTouch Verio Reflect) w/Device KIT May substitute brand based on insurance coverage. Check glucose BID.    Diclofenac Sodium (VOLTAREN) 2 g, Topical, 4 times daily    folic acid (FOLVITE) 1 mg, Oral, Daily    glucose blood (OneTouch Verio) test strip May substitute brand based on insurance coverage. Check glucose BID.    hydrOXYzine HCL (ATARAX) 50 mg, Oral, 2 times daily PRN    lidocaine (LIDODERM) 5 % 2 patches, Topical, Daily, Remove & Discard patch within 12 hours or as directed by MD    lisinopril (ZESTRIL) 20 mg tablet     lithium carbonate (LITHOBID) 300 mg, 2 times daily    metFORMIN (GLUCOPHAGE) 500 mg, Oral, 2 times daily with meals    metoprolol succinate (TOPROL-XL) 50 mg 24 hr tablet Take 1 tablet every day by oral route.    OneTouch Delica Lancets 33G MISC May substitute brand based on insurance coverage. Check glucose BID.    prazosin (MINIPRESS) 5 mg, Daily at bedtime    QUEtiapine (SEROquel) 50 mg tablet     QUEtiapine (SEROQUEL) 50 mg, Oral, Daily at bedtime    Allergies   Allergen Reactions    Tomato - Food Allergy Anaphylaxis     raw    Poison Ivy Extract Hives    Poison Sumac Extract Hives      Social History     Tobacco Use    Smoking status: Former     Current packs/day: 0.00     Types: Pipe, Cigarettes     Quit date: 2003     Years since quittin.1    Smokeless tobacco: Never   Vaping Use    Vaping status: Some Days    Substances: THC   Substance Use Topics    Alcohol use: Not Currently     Comment: Drinks a quart of moonshine/night-As of 21 will quit drinking    Drug use: Yes     Types: Marijuana     Comment: Socially (1-2 Monthly)    Family History   Problem Relation Age of Onset    Diabetes Mother     Thyroid disease Mother     Cancer Father     Thyroid disease Sister     Depression Brother     Cancer Maternal Aunt     Cancer  Paternal Uncle     Cancer Paternal Grandmother     No Known Problems Brother     Thyroid disease Sister           Objective :                   Vitals I/O      Most Recent Min/Max in 24hrs   Temp (!) 97.1 °F (36.2 °C) Temp  Min: 97.1 °F (36.2 °C)  Max: 98 °F (36.7 °C)   Pulse 78 Pulse  Min: 76  Max: 113   Resp 17 Resp  Min: 12  Max: 30   /58 BP  Min: 91/59  Max: 118/74   O2 Sat 100 % SpO2  Min: 99 %  Max: 100 %      Intake/Output Summary (Last 24 hours) at 2/14/2025 0444  Last data filed at 2/14/2025 0400  Gross per 24 hour   Intake 1227.08 ml   Output --   Net 1227.08 ml       Diet NPO    Invasive Monitoring           Physical Exam   Physical Exam  Vitals and nursing note reviewed.   Eyes:      Extraocular Movements: Extraocular movements intact.      Pupils: Pupils are equal, round, and reactive to light.   Skin:     General: Skin is warm and dry.      Capillary Refill: Capillary refill takes less than 2 seconds.   HENT:      Mouth/Throat:      Mouth: Mucous membranes are dry.   Cardiovascular:      Rate and Rhythm: Normal rate and regular rhythm.      Pulses: Normal pulses.      Heart sounds: Normal heart sounds.   Musculoskeletal:      Right lower leg: No edema.      Left lower leg: No edema.   Abdominal: General: Bowel sounds are normal. There is no distension.      Palpations: Abdomen is soft.   Constitutional:       Appearance: He is ill-appearing.      Interventions: Nasal cannula in place.   Pulmonary:      Effort: Pulmonary effort is normal.      Breath sounds: Decreased breath sounds present.   Neurological:      GCS: GCS eye subscore is 2. GCS verbal subscore is 2. GCS motor subscore is 5.      Comments: Currently protecting airway          Diagnostic Studies        Lab Results: I have reviewed the following results:     Medications:  Scheduled PRN   chlorhexidine, 15 mL, Q12H BERENICE  enoxaparin, 40 mg, Daily          Continuous    multi-electrolyte, 125 mL/hr, Last Rate: 125 mL/hr (02/14/25 0211)          Labs:   CBC    Recent Labs     02/13/25 2120   WBC 18.21*   HGB 13.3   HCT 39.7        BMP    Recent Labs     02/13/25 2120   SODIUM 135   K 3.8      CO2 22   AGAP 12   BUN 17   CREATININE 1.26   CALCIUM 9.4       Coags    No recent results     Additional Electrolytes  Recent Labs     02/13/25 2120   MG 1.8*   PHOS 2.8          Blood Gas    No recent results  No recent results LFTs  Recent Labs     02/13/25 2120   ALT 25   AST 33   ALKPHOS 93   ALB 4.7   TBILI 1.48*       Infectious  Recent Labs     02/13/25 2120   PROCALCITONI 0.13     Glucose  Recent Labs     02/13/25 2120   GLUC 110        Administrative Statements   I have spent a total time of 32 minutes in caring for this patient on the day of the visit/encounter including Diagnostic results, Patient and family education, Impressions, Documenting in the medical record, Reviewing / ordering tests, medicine, procedures  , and Obtaining or reviewing history  .

## 2025-02-14 NOTE — ASSESSMENT & PLAN NOTE
POA AEB tachycardia and leukocytosis  Suspect this is reactive in setting of drug abuse rather than active infection  Imaging w/out evidence of infectious etiology  Blood cultures pending  Obtain UA  LA 2.1  Procalcitonin 0.13  Received 1L NS in ED - will start continuous IVF hydration now  No indication for antibiotics at this point  Trend fever and WBC curve

## 2025-02-14 NOTE — PLAN OF CARE
Problem: PAIN - ADULT  Goal: Verbalizes/displays adequate comfort level or baseline comfort level  Description: Interventions:  - Encourage patient to monitor pain and request assistance  - Assess pain using appropriate pain scale  - Administer analgesics based on type and severity of pain and evaluate response  - Implement non-pharmacological measures as appropriate and evaluate response  - Consider cultural and social influences on pain and pain management  - Notify physician/advanced practitioner if interventions unsuccessful or patient reports new pain  Outcome: Progressing     Problem: INFECTION - ADULT  Goal: Absence or prevention of progression during hospitalization  Description: INTERVENTIONS:  - Assess and monitor for signs and symptoms of infection  - Monitor lab/diagnostic results  - Monitor all insertion sites, i.e. indwelling lines, tubes, and drains  - Monitor endotracheal if appropriate and nasal secretions for changes in amount and color  - [place] appropriate cooling/warming therapies per order  - Administer medications as ordered  - Instruct and encourage patient and family to use good hand hygiene technique  - Identify and instruct in appropriate isolation precautions for identified infection/condition  Outcome: Progressing     Problem: SAFETY ADULT  Goal: Patient will remain free of falls  Description: INTERVENTIONS:  - Educate patient/family on patient safety including physical limitations  - Instruct patient to call for assistance with activity   - Consult OT/PT to assist with strengthening/mobility   - Keep Call bell within reach  - Keep bed low and locked with side rails adjusted as appropriate  - Keep care items and personal belongings within reach  - Initiate and maintain comfort rounds  - Make Fall Risk Sign visible to staff  - Apply yellow socks and bracelet for high fall risk patients  - Consider moving patient to room near nurses station  Outcome: Progressing     Problem: DISCHARGE  PLANNING  Goal: Discharge to home or other facility with appropriate resources  Description: INTERVENTIONS:  - Identify barriers to discharge w/patient and caregiver  - Arrange for needed discharge resources and transportation as appropriate  - Identify discharge learning needs (meds, wound care, etc.)  - Arrange for interpretive services to assist at discharge as needed  - Refer to Case Management Department for coordinating discharge planning if the patient needs post-hospital services based on physician/advanced practitioner order or complex needs related to functional status, cognitive ability, or social support system  Outcome: Progressing     Problem: Knowledge Deficit  Goal: Patient/family/caregiver demonstrates understanding of disease process, treatment plan, medications, and discharge instructions  Description: Complete learning assessment and assess knowledge base.  Interventions:  - Provide teaching at level of understanding  - Provide teaching via preferred learning methods  Outcome: Progressing

## 2025-02-14 NOTE — CASE MANAGEMENT
Case Management Progress Note    Patient name Osei Trimble  Location ICU 11/ICU  MRN [id number]  : 1975 Date 2025       LOS (days): 0  Geometric Mean LOS (GMLOS) (days):   Days to GMLOS:        OBJECTIVE:        Current admission status: Inpatient  Preferred Pharmacy:   [address] [address]  Phone: [phone] Fax: [phone]    Primary Care Provider: Jermaine Mayes DO    Primary Insurance: [employer]  Secondary Insurance:     PROGRESS NOTE:  Attempted to evaluate the pt, however the pt is very lethargic and unable to evaluate the pt at this time.  Will evaluate when appropriate.

## 2025-02-14 NOTE — ASSESSMENT & PLAN NOTE
Diagnosed in 07/2022  Follows w/HemOnc as OP  Receives Lanreotide every 4 weeks - last infusion on 02/05

## 2025-02-14 NOTE — PROGRESS NOTES
"Progress Note - Critical Care/ICU   Name: Osei Trimble 49 y.o. male I MRN: [id number]  Unit/Bed#: ICU 11-01 I Date of Admission: 2/13/2025   Date of Service: 2/14/2025 I Hospital Day: 0       Critical Care Interval Transfer Note:    Brief Hospital Summary: Hospital Course:  Osei Trimble is a 49 y.o. 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 Patient's sister states that patient \"went for a walk\" and when he returned, was confused and acting strangely. Patient's sister states that she lives next to him and takes care of him. She reports that patient does not drink alcohol and/or use tobacco, but does sometimes use methamphetamines and THC.      In ED, patient remained altered, however developed what appeared to be chest pain. He was given IV Fentanyl to which he later received Narcan to reverse. Patient remained altered and critical care was asked to evaluate patient. Given decreased mentation and need for airway monitoring, patient was admitted as SD1 under CC.     UDS was positive for methamphetamines. Troponins negative, CK and WBC normalized, suspect elevated 2/2 amphetamine use. Pt protecting his airway, localizes to pain, intermittently follows commands, but is not speaking. Unclear if this is a psychiatric issue or if he is choosing not to engage with us on exam. Psych eval not undertaken at this time, as he will not participate.     Barriers to discharge:   Mental status-workup negative besides +UDS     Consults: None    Recommended to review admission imaging for incidental findings and document in discharge navigator: Chart reviewed, no known incidental findings noted at this time.      Discharge Plan: Anticipate discharge in 24-48 hrs to home.      Case management consulted, following.   Patient seen and evaluated by Critical Care today and " deemed to be appropriate for transfer to Med Surg. Spoke to Dr. Jeffers from [facility] to accept transfer. Critical care can be contacted via SecureChat with any questions or concerns. Please use the Critical Care AP Role in Secure Chat for any provider inquires until the patient is transferred out of the ICU or until tomorrow at 0600.

## 2025-02-15 VITALS
DIASTOLIC BLOOD PRESSURE: 69 MMHG | HEIGHT: 73 IN | RESPIRATION RATE: 17 BRPM | SYSTOLIC BLOOD PRESSURE: 101 MMHG | WEIGHT: 209.44 LBS | HEART RATE: 80 BPM | BODY MASS INDEX: 27.76 KG/M2 | TEMPERATURE: 98.2 F | OXYGEN SATURATION: 99 %

## 2025-02-15 PROCEDURE — 99238 HOSP IP/OBS DSCHRG MGMT 30/<: CPT | Performed by: HOSPITALIST

## 2025-02-15 RX ORDER — OLANZAPINE 10 MG/2ML
5 INJECTION, POWDER, FOR SOLUTION INTRAMUSCULAR ONCE
Status: COMPLETED | OUTPATIENT
Start: 2025-02-15 | End: 2025-02-15

## 2025-02-15 RX ADMIN — LITHIUM CARBONATE 300 MG: 300 TABLET, EXTENDED RELEASE ORAL at 10:20

## 2025-02-15 RX ADMIN — ENOXAPARIN SODIUM 40 MG: 40 INJECTION SUBCUTANEOUS at 10:20

## 2025-02-15 RX ADMIN — LIDOCAINE 2 PATCH: 700 PATCH TOPICAL at 10:21

## 2025-02-15 RX ADMIN — OLANZAPINE 5 MG: 10 INJECTION, POWDER, FOR SOLUTION INTRAMUSCULAR at 00:37

## 2025-02-15 NOTE — ASSESSMENT & PLAN NOTE
POA AEB tachycardia and leukocytosis  Suspect this is reactive in setting of drug abuse rather than active infection  Imaging w/out evidence of infectious etiology  Blood cultures negative at 24 hours  UA negarive  LA 2.1  Procalcitonin 0.13  Received 1L NS in ED - then started continuous IVF hydration on admission  Antibiotics held on admission  SIRS resolved  Outpatient follow-up with PCP

## 2025-02-15 NOTE — NURSING NOTE
Patient discharged to home, no needs. Reviewed discharge instructions with patient and sister. All questions/concerns addressed prior to d/c.

## 2025-02-15 NOTE — ASSESSMENT & PLAN NOTE
Mildly elevated - likely in setting of drug abuse  Received 1L NS in ED - then started on IVF hydration on admission  Now resolved

## 2025-02-15 NOTE — DISCHARGE SUMMARY
Discharge Summary - Hospitalist   Name: Osei Trimble 49 y.o. male I MRN: [id number]  Unit/Bed#: -01 I Date of Admission: 2/13/2025   Date of Service: 2/15/2025 I Hospital Day: 1     Assessment & Plan  Acute encephalopathy  Presented to ED w/altered mental status after going for walk outside per sister  Suspect this is 2/2 drug abuse given history of methamphetamine abuse vs psychosis given psychiatric illness history  CTH w/out acute intracranial abnormality  COMA panel negative  Ammonia and TSH WNL  UDS + methamphetamine  UA negative  The patient is now back to his baseline per the patient's sister at the bedside.   Outpatient follow-up with PCP  Essential hypertension  Home BB, Lisinopril, and prazosin held as patient was NPO- restart on discharge  Neuroendocrine carcinoma of small bowel (HCC)  Diagnosed in 07/2022  Follows w/HemOnc as OP  Receives Lanreotide every 4 weeks - last infusion on 02/05  Chest pain  Complained of CP while in ED  Suspect this is 2/2 drug abuse (likely Methamphetamine)  ECG w/out ST elevations  Troponins flat  Outpatient follow-up with PCP  Explosive personality disorder (HCC)  Home Lithium, Seroquel, and PRN Atarax held while NPO  Resume on discharge  SIRS (systemic inflammatory response syndrome) (HCC)  POA AEB tachycardia and leukocytosis  Suspect this is reactive in setting of drug abuse rather than active infection  Imaging w/out evidence of infectious etiology  Blood cultures negative at 24 hours  UA negarive  LA 2.1  Procalcitonin 0.13  Received 1L NS in ED - then started continuous IVF hydration on admission  Antibiotics held on admission  SIRS resolved  Outpatient follow-up with PCP    Elevated CK  Mildly elevated - likely in setting of drug abuse  Received 1L NS in ED - then started on IVF hydration on admission  Now resolved  Drug abuse (HCC)  Has history of methamphetamine abuse  UDS + methamphetamine     Medical Problems       Resolved Problems  Date Reviewed:  1/17/2025   None       Discharging Physician / Practitioner: Kai Alford PA-C  PCP: Jermaine Mayes DO  Admission Date:   Admission Orders (From admission, onward)       Ordered        02/14/25 0029  INPATIENT ADMISSION  Once                          Discharge Date: 02/15/25    Consultations During Hospital Stay:  none    Procedures Performed:   none    Significant Findings / Test Results:   XR abdomen 1 vw portable  Result Date: 2/15/2025  Impression: Unremarkable abdomen. Workstation performed: [id number]     CTA chest pe study  Result Date: 2/13/2025  Impression: No pulmonary embolism or evidence of acute findings. Workstation performed: [id number]     CT head without contrast  Result Date: 2/13/2025  Impression: No acute intracranial abnormality. Workstation performed: [id number]         Incidental Findings:   none     Test Results Pending at Discharge (will require follow up):   none     Outpatient Tests Requested:  none    Complications:  none    Reason for Admission: toxic metabolic encephalopathy     Hospital Course:   Osei Trimble is a 49 y.o. male patient who originally presented to the hospital on 2/13/2025 due to confusion. Please see H&P for complete details of presentation.  In the ER the patient remained altered however he developed chest pain.  Patient was then given IV fentanyl and then later received Narcan to reverse.  The patient continued to remain altered and was admitted to stepdown level 1 under critical care service due to decreased mentation and need for airway monitoring.  The patient was made n.p.o. and all p.o. meds were held.  The patient was meeting SIRS criteria with leukocytosis and tachycardia however imaging was negative for any acute infection.  It was felt SIRS criteria was due to drug use.  The patient's UDS was positive for methamphetamines.  UA negative . The patient was started on IV fluids and antibiotics were held.  The patient's mental status slowly improved and the  "patient was transferred to [facility].  On the day of discharge the patient was back to his baseline mental status per the patient's sister at the bedside.  The patient was discharged home with instructions to follow-up with his family doctor.  This is a brief discharge summary please see notes from throughout the patient's hospital stay for complete details of his hospitalization.      Please see above list of diagnoses and related plan for additional information.     Condition at Discharge: good    Discharge Day Visit / Exam:   Subjective:    Vitals: Blood Pressure: 101/69 (02/15/25 0703)  Pulse: 80 (02/15/25 0703)  Temperature: 98.2 °F (36.8 °C) (02/15/25 0703)  Temp Source: Oral (02/15/25 0703)  Respirations: 17 (02/15/25 0703)  Height: 6' 1\" (185.4 cm) (02/14/25 0145)  Weight - Scale: 95 kg (209 lb 7 oz) (02/14/25 0580)  SpO2: 99 % (02/15/25 0703)  Physical Exam  Vitals and nursing note reviewed.   Constitutional:       Appearance: Normal appearance.   HENT:      Head: Normocephalic and atraumatic.      Nose: Nose normal.      Mouth/Throat:      Mouth: Mucous membranes are dry.   Cardiovascular:      Rate and Rhythm: Normal rate and regular rhythm.   Pulmonary:      Effort: Pulmonary effort is normal.      Breath sounds: Normal breath sounds.   Abdominal:      General: Bowel sounds are normal. There is no distension.      Palpations: Abdomen is soft.      Tenderness: There is no abdominal tenderness.   Skin:     General: Skin is warm and dry.   Neurological:      General: No focal deficit present.      Mental Status: He is alert and oriented to person, place, and time.   Psychiatric:         Mood and Affect: Mood normal.         Behavior: Behavior normal.          Discussion with Family: Updated  (sister) at bedside.    Discharge instructions/Information to patient and family:   See after visit summary for information provided to patient and family.      Provisions for Follow-Up Care:  See " after visit summary for information related to follow-up care and any pertinent home health orders.      Mobility at time of Discharge:   Basic Mobility Inpatient Raw Score: 6  JH-HLM Goal: 2: Bed activities/Dependent transfer  JH-HLM Achieved: 2: Bed activities/Dependent transfer  HLM Goal achieved. Continue to encourage appropriate mobility.     Disposition:   Home    Planned Readmission: no    Discharge Medications:  See after visit summary for reconciled discharge medications provided to patient and/or family.      Administrative Statements   Discharge Statement:  I have spent a total time of 25 minutes in caring for this patient on the day of the visit/encounter. >30 minutes of time was spent on: Patient and family education, Documenting in the medical record, and Reviewing / ordering tests, medicine, procedures  .    **Please Note: This note may have been constructed using a voice recognition system**

## 2025-02-15 NOTE — ASSESSMENT & PLAN NOTE
Presented to ED w/altered mental status after going for walk outside per sister  Suspect this is 2/2 drug abuse given history of methamphetamine abuse vs psychosis given psychiatric illness history  CTH w/out acute intracranial abnormality  COMA panel negative  Ammonia and TSH WNL  UDS + methamphetamine  UA negative  The patient is now back to his baseline per the patient's sister at the bedside.   Outpatient follow-up with PCP

## 2025-02-15 NOTE — ASSESSMENT & PLAN NOTE
Complained of CP while in ED  Suspect this is 2/2 drug abuse (likely Methamphetamine)  ECG w/out ST elevations  Troponins flat  Outpatient follow-up with PCP

## 2025-02-16 PROBLEM — Z12.11 COLON CANCER SCREENING: Status: RESOLVED | Noted: 2025-01-17 | Resolved: 2025-02-16

## 2025-02-17 ENCOUNTER — HOSPITAL ENCOUNTER (EMERGENCY)
Facility: HOSPITAL | Age: 50
End: 2025-02-18
Attending: FAMILY MEDICINE | Admitting: EMERGENCY MEDICINE
Payer: COMMERCIAL

## 2025-02-17 DIAGNOSIS — F29 PSYCHOSIS (HCC): ICD-10-CM

## 2025-02-17 DIAGNOSIS — Z00.8 ENCOUNTER FOR PSYCHOLOGICAL EVALUATION: Primary | ICD-10-CM

## 2025-02-17 LAB
ALBUMIN SERPL BCG-MCNC: 4.8 G/DL (ref 3.5–5)
ALP SERPL-CCNC: 94 U/L (ref 34–104)
ALT SERPL W P-5'-P-CCNC: 24 U/L (ref 7–52)
ANION GAP SERPL CALCULATED.3IONS-SCNC: 9 MMOL/L (ref 4–13)
AST SERPL W P-5'-P-CCNC: 28 U/L (ref 13–39)
BASOPHILS # BLD AUTO: 0.04 THOUSANDS/ΜL (ref 0–0.1)
BASOPHILS NFR BLD AUTO: 0 % (ref 0–1)
BILIRUB SERPL-MCNC: 1.15 MG/DL (ref 0.2–1)
BUN SERPL-MCNC: 18 MG/DL (ref 5–25)
CALCIUM SERPL-MCNC: 9.8 MG/DL (ref 8.4–10.2)
CHLORIDE SERPL-SCNC: 100 MMOL/L (ref 96–108)
CO2 SERPL-SCNC: 26 MMOL/L (ref 21–32)
CREAT SERPL-MCNC: 1.61 MG/DL (ref 0.6–1.3)
EOSINOPHIL # BLD AUTO: 0.24 THOUSAND/ΜL (ref 0–0.61)
EOSINOPHIL NFR BLD AUTO: 2 % (ref 0–6)
ERYTHROCYTE [DISTWIDTH] IN BLOOD BY AUTOMATED COUNT: 13.5 % (ref 11.6–15.1)
ETHANOL EXG-MCNC: 0 MG/DL
ETHANOL SERPL-MCNC: <10 MG/DL
GFR SERPL CREATININE-BSD FRML MDRD: 49 ML/MIN/1.73SQ M
GLUCOSE SERPL-MCNC: 90 MG/DL (ref 65–140)
HCT VFR BLD AUTO: 38.6 % (ref 36.5–49.3)
HGB BLD-MCNC: 13.1 G/DL (ref 12–17)
IMM GRANULOCYTES # BLD AUTO: 0.05 THOUSAND/UL (ref 0–0.2)
IMM GRANULOCYTES NFR BLD AUTO: 0 % (ref 0–2)
LYMPHOCYTES # BLD AUTO: 1.69 THOUSANDS/ΜL (ref 0.6–4.47)
LYMPHOCYTES NFR BLD AUTO: 11 % (ref 14–44)
MCH RBC QN AUTO: 30.3 PG (ref 26.8–34.3)
MCHC RBC AUTO-ENTMCNC: 33.9 G/DL (ref 31.4–37.4)
MCV RBC AUTO: 89 FL (ref 82–98)
MONOCYTES # BLD AUTO: 1.03 THOUSAND/ΜL (ref 0.17–1.22)
MONOCYTES NFR BLD AUTO: 7 % (ref 4–12)
NEUTROPHILS # BLD AUTO: 11.96 THOUSANDS/ΜL (ref 1.85–7.62)
NEUTS SEG NFR BLD AUTO: 80 % (ref 43–75)
NRBC BLD AUTO-RTO: 0 /100 WBCS
PLATELET # BLD AUTO: 372 THOUSANDS/UL (ref 149–390)
PMV BLD AUTO: 10 FL (ref 8.9–12.7)
POTASSIUM SERPL-SCNC: 4 MMOL/L (ref 3.5–5.3)
PROT SERPL-MCNC: 8.1 G/DL (ref 6.4–8.4)
RBC # BLD AUTO: 4.32 MILLION/UL (ref 3.88–5.62)
SODIUM SERPL-SCNC: 135 MMOL/L (ref 135–147)
TSH SERPL DL<=0.05 MIU/L-ACNC: 11.2 UIU/ML (ref 0.45–4.5)
WBC # BLD AUTO: 15.01 THOUSAND/UL (ref 4.31–10.16)

## 2025-02-17 PROCEDURE — 99284 EMERGENCY DEPT VISIT MOD MDM: CPT

## 2025-02-17 PROCEDURE — 84443 ASSAY THYROID STIM HORMONE: CPT | Performed by: FAMILY MEDICINE

## 2025-02-17 PROCEDURE — 84439 ASSAY OF FREE THYROXINE: CPT | Performed by: EMERGENCY MEDICINE

## 2025-02-17 PROCEDURE — 36415 COLL VENOUS BLD VENIPUNCTURE: CPT | Performed by: FAMILY MEDICINE

## 2025-02-17 PROCEDURE — 85025 COMPLETE CBC W/AUTO DIFF WBC: CPT | Performed by: FAMILY MEDICINE

## 2025-02-17 PROCEDURE — 96372 THER/PROPH/DIAG INJ SC/IM: CPT

## 2025-02-17 PROCEDURE — 80053 COMPREHEN METABOLIC PANEL: CPT | Performed by: FAMILY MEDICINE

## 2025-02-17 PROCEDURE — 82077 ASSAY SPEC XCP UR&BREATH IA: CPT | Performed by: FAMILY MEDICINE

## 2025-02-17 PROCEDURE — 99285 EMERGENCY DEPT VISIT HI MDM: CPT | Performed by: FAMILY MEDICINE

## 2025-02-17 PROCEDURE — 93005 ELECTROCARDIOGRAM TRACING: CPT

## 2025-02-17 PROCEDURE — 84480 ASSAY TRIIODOTHYRONINE (T3): CPT | Performed by: EMERGENCY MEDICINE

## 2025-02-17 RX ORDER — HYDROXYZINE HYDROCHLORIDE 50 MG/1
50 TABLET, FILM COATED ORAL
Status: CANCELLED | OUTPATIENT
Start: 2025-02-17

## 2025-02-17 RX ORDER — ACETAMINOPHEN 325 MG/1
650 TABLET ORAL EVERY 4 HOURS PRN
Status: CANCELLED | OUTPATIENT
Start: 2025-02-17

## 2025-02-17 RX ORDER — HALOPERIDOL 5 MG/ML
5 INJECTION INTRAMUSCULAR
Status: CANCELLED | OUTPATIENT
Start: 2025-02-17

## 2025-02-17 RX ORDER — RISPERIDONE 0.25 MG/1
0.25 TABLET ORAL
Status: CANCELLED | OUTPATIENT
Start: 2025-02-17

## 2025-02-17 RX ORDER — TRAZODONE HYDROCHLORIDE 50 MG/1
50 TABLET, FILM COATED ORAL
Status: CANCELLED | OUTPATIENT
Start: 2025-02-17

## 2025-02-17 RX ORDER — ZIPRASIDONE MESYLATE 20 MG/ML
10 INJECTION, POWDER, LYOPHILIZED, FOR SOLUTION INTRAMUSCULAR ONCE
Status: COMPLETED | OUTPATIENT
Start: 2025-02-17 | End: 2025-02-17

## 2025-02-17 RX ORDER — ACETAMINOPHEN 325 MG/1
975 TABLET ORAL EVERY 6 HOURS PRN
Status: CANCELLED | OUTPATIENT
Start: 2025-02-17

## 2025-02-17 RX ORDER — HYDROXYZINE HYDROCHLORIDE 50 MG/1
25 TABLET, FILM COATED ORAL
Status: CANCELLED | OUTPATIENT
Start: 2025-02-17

## 2025-02-17 RX ORDER — RISPERIDONE 1 MG/1
1 TABLET ORAL
Status: CANCELLED | OUTPATIENT
Start: 2025-02-17

## 2025-02-17 RX ORDER — MAGNESIUM HYDROXIDE/ALUMINUM HYDROXICE/SIMETHICONE 120; 1200; 1200 MG/30ML; MG/30ML; MG/30ML
30 SUSPENSION ORAL EVERY 4 HOURS PRN
Status: CANCELLED | OUTPATIENT
Start: 2025-02-17

## 2025-02-17 RX ORDER — PROPRANOLOL HYDROCHLORIDE 10 MG/1
5 TABLET ORAL EVERY 8 HOURS PRN
Status: CANCELLED | OUTPATIENT
Start: 2025-02-17

## 2025-02-17 RX ORDER — RISPERIDONE 0.25 MG/1
0.5 TABLET ORAL
Status: CANCELLED | OUTPATIENT
Start: 2025-02-17

## 2025-02-17 RX ORDER — OLANZAPINE 10 MG/2ML
10 INJECTION, POWDER, FOR SOLUTION INTRAMUSCULAR ONCE
Status: COMPLETED | OUTPATIENT
Start: 2025-02-17 | End: 2025-02-17

## 2025-02-17 RX ADMIN — ZIPRASIDONE MESYLATE 10 MG: 20 INJECTION, POWDER, LYOPHILIZED, FOR SOLUTION INTRAMUSCULAR at 16:54

## 2025-02-17 RX ADMIN — OLANZAPINE 10 MG: 10 INJECTION, POWDER, FOR SOLUTION INTRAMUSCULAR at 15:45

## 2025-02-17 NOTE — UTILIZATION REVIEW
NOTIFICATION OF ADMISSION DISCHARGE   This is a Notification of Discharge from [facility]. Please be advised that this patient has been discharge from our facility. Below you will find the admission and discharge date and time including the patient’s disposition.   UTILIZATION REVIEW CONTACT:  Susan Alan  Utilization   Network Utilization Review Department  Phone: [phone] x carefully listen to the prompts. All voicemails are confidential.  Email: [email or web address]     ADMISSION INFORMATION  PRESENTATION DATE: 2/13/2025  8:53 PM  OBERVATION ADMISSION DATE: N/A  INPATIENT ADMISSION DATE: 2/14/25 12:29 AM   DISCHARGE DATE: 2/15/2025  1:28 PM   DISPOSITION:Home/Self Care    Network Utilization Review Department  ATTENTION: Please call with any questions or concerns to [phone] and carefully listen to the prompts so that you are directed to the right person. All voicemails are confidential.   For Discharge needs, contact Care Management DC Support Team at [phone] opt. 2  Send all requests for admission clinical reviews, approved or denied determinations and any other requests to dedicated fax number below belonging to the campus where the patient is receiving treatment. List of dedicated fax numbers for the Facilities:  FACILITY NAME UR FAX NUMBER   ADMISSION DENIALS (Administrative/Medical Necessity) [phone]   DISCHARGE SUPPORT TEAM ([facility]) [phone]   PARENT CHILD HEALTH (Maternity/NICU/Pediatrics) [phone]   [facility] [phone]   [facility] [phone]   [facility] [phone]   [facility] [phone]   Critical access hospital [phone]   [facility] [phone]   [facility] [phone]   [facility]  [phone]   [facility] [phone]   formerly [facility] [phone]   [facility] [phone]   [facility] [phone]        Discharge Summary - Hospitalist   Name: Osei Trimble 49 y.o. male I MRN: [id number]  Unit/Bed#: -01 I Date of Admission: 2/13/2025   Date of Service: 2/15/2025 I Hospital Day: 1      Assessment & Plan  Acute encephalopathy  Presented to ED w/altered mental status after going for walk outside per sister  Suspect this is 2/2 drug abuse given history of methamphetamine abuse vs psychosis given psychiatric illness history  CTH w/out acute intracranial abnormality  COMA panel negative  Ammonia and TSH WNL  UDS + methamphetamine  UA negative  The patient is now back to his baseline per the patient's sister at the bedside.   Outpatient follow-up with PCP  Essential hypertension  Home BB, Lisinopril, and prazosin held as patient was NPO- restart on discharge  Neuroendocrine carcinoma of small bowel (HCC)  Diagnosed in 07/2022  Follows w/HemOnc as OP  Receives Lanreotide every 4 weeks - last infusion on 02/05  Chest pain  Complained of CP while in ED  Suspect this is 2/2 drug abuse (likely Methamphetamine)  ECG w/out ST elevations  Troponins flat  Outpatient follow-up with PCP  Explosive personality disorder (HCC)  Home Lithium, Seroquel, and PRN Atarax held while NPO  Resume on discharge  SIRS (systemic inflammatory response syndrome) (HCC)  POA AEB tachycardia and leukocytosis  Suspect this is reactive in setting of drug abuse rather than active infection  Imaging w/out evidence of infectious etiology  Blood cultures negative at 24 hours  UA negarive  LA 2.1  Procalcitonin 0.13  Received 1L NS in ED - then started continuous IVF hydration on admission  Antibiotics held on admission  SIRS resolved  Outpatient follow-up with PCP     Elevated CK  Mildly elevated - likely in  setting of drug abuse  Received 1L NS in ED - then started on IVF hydration on admission  Now resolved  Drug abuse (HCC)  Has history of methamphetamine abuse  UDS + methamphetamine     Medical Problems         Resolved Problems  Date Reviewed: 1/17/2025   None         Discharging Physician / Practitioner: Kai Alford PA-C  PCP: Jermaine Mayes DO  Admission Date:   Admission Orders (From admission, onward)          Ordered         02/14/25 0029   INPATIENT ADMISSION  Once                               Discharge Date: 02/15/25     Consultations During Hospital Stay:  none     Procedures Performed:   none     Significant Findings / Test Results:   XR abdomen 1 vw portable  Result Date: 2/15/2025  Impression: Unremarkable abdomen. Workstation performed: [id number]      CTA chest pe study  Result Date: 2/13/2025  Impression: No pulmonary embolism or evidence of acute findings. Workstation performed: [id number]      CT head without contrast  Result Date: 2/13/2025  Impression: No acute intracranial abnormality. Workstation performed: [id number]            Incidental Findings:   none      Test Results Pending at Discharge (will require follow up):   none     Outpatient Tests Requested:  none     Complications:  none     Reason for Admission: toxic metabolic encephalopathy      Hospital Course:   Osei Trimble is a 49 y.o. male patient who originally presented to the hospital on 2/13/2025 due to confusion. Please see H&P for complete details of presentation.  In the ER the patient remained altered however he developed chest pain.  Patient was then given IV fentanyl and then later received Narcan to reverse.  The patient continued to remain altered and was admitted to stepdown level 1 under critical care service due to decreased mentation and need for airway monitoring.  The patient was made n.p.o. and all p.o. meds were held.  The patient was meeting SIRS criteria with leukocytosis and tachycardia however imaging was  "negative for any acute infection.  It was felt SIRS criteria was due to drug use.  The patient's UDS was positive for methamphetamines.  UA negative . The patient was started on IV fluids and antibiotics were held.  The patient's mental status slowly improved and the patient was transferred to [facility].  On the day of discharge the patient was back to his baseline mental status per the patient's sister at the bedside.  The patient was discharged home with instructions to follow-up with his family doctor.  This is a brief discharge summary please see notes from throughout the patient's hospital stay for complete details of his hospitalization.        Please see above list of diagnoses and related plan for additional information.      Condition at Discharge: good     Discharge Day Visit / Exam:   Subjective:    Vitals: Blood Pressure: 101/69 (02/15/25 0703)  Pulse: 80 (02/15/25 0703)  Temperature: 98.2 °F (36.8 °C) (02/15/25 0703)  Temp Source: Oral (02/15/25 0703)  Respirations: 17 (02/15/25 0703)  Height: 6' 1\" (185.4 cm) (02/14/25 0145)  Weight - Scale: 95 kg (209 lb 7 oz) (02/14/25 0539)  SpO2: 99 % (02/15/25 0703)  Physical Exam  Vitals and nursing note reviewed.   Constitutional:       Appearance: Normal appearance.   HENT:      Head: Normocephalic and atraumatic.      Nose: Nose normal.      Mouth/Throat:      Mouth: Mucous membranes are dry.   Cardiovascular:      Rate and Rhythm: Normal rate and regular rhythm.   Pulmonary:      Effort: Pulmonary effort is normal.      Breath sounds: Normal breath sounds.   Abdominal:      General: Bowel sounds are normal. There is no distension.      Palpations: Abdomen is soft.      Tenderness: There is no abdominal tenderness.   Skin:     General: Skin is warm and dry.   Neurological:      General: No focal deficit present.      Mental Status: He is alert and oriented to person, place, and time.   Psychiatric:         Mood and Affect: Mood normal.         Behavior: " Behavior normal.            Discussion with Family: Updated  (sister) at bedside.     Discharge instructions/Information to patient and family:   See after visit summary for information provided to patient and family.       Provisions for Follow-Up Care:  See after visit summary for information related to follow-up care and any pertinent home health orders.       Mobility at time of Discharge:   Basic Mobility Inpatient Raw Score: 6  JH-HLM Goal: 2: Bed activities/Dependent transfer  JH-HLM Achieved: 2: Bed activities/Dependent transfer  HLM Goal achieved. Continue to encourage appropriate mobility.     Disposition:   Home     Planned Readmission: no     Discharge Medications:  See after visit summary for reconciled discharge medications provided to patient and/or family.

## 2025-02-17 NOTE — ED NOTES
"Pt comes in with AH, that causes SI/HI. Pt states that he is feeling \"about a 6-7\" and says that the voices are \"good and bad\". Pt says he can't find the voices but know they are there. When asked if he would act on what the voices tell him to do he said it depends on if they like the person or not. Pt reported having poor sleep and did not sleep in the last 24hrs. Reported showering every other day, but is malodorous. Pt has gargled speech and delayed responses.     Pts sister said that she had to call the  due to him screaming and responding to the voices. Sister states that he was running up around chasing the voices and that he would act on the voices and hurt the people they are talking about. Also says that he would benefit from a medication change and states that he is on 300mg of Lithium and would benefit from going to 900mg. Pt stated that he wants 1000mg-1200mg instead. Sister feels that pt is a harm to himself and others and wants to petition a 302.  "

## 2025-02-17 NOTE — ED PROVIDER NOTES
"Time reflects when diagnosis was documented in both MDM as applicable and the Disposition within this note       Time User Action Codes Description Comment    2/17/2025  2:45 PM Kalyan Johnson Add [Z00.8] Encounter for psychological evaluation     2/17/2025  2:45 PM Kalyan Johnson Add [F29] Psychosis (HCC)           ED Disposition       ED Disposition   Transfer to Behavioral Health Condition   --    Date/Time   Mon Feb 17, 2025  2:45 PM    Comment   Osei Trimble should be transferred out to  and has been medically cleared.               Assessment & Plan       Medical Decision Making  Amount and/or Complexity of Data Reviewed  Labs: ordered.    Risk  Prescription drug management.  Decision regarding hospitalization.      -year-old male recently discharged from the hospital presented to ED with hallucination auditory and visual.  Patient does admit to using methamphetamine yesterday.  States that he started using since he left the hospital.  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  Patient denies any suicidal homicidal ideation.  States voices are telling him to leave has  Patient is medically clear for anticoagulation admission  Pt care transfer to Dr. Infante   ED Course as of 02/17/25 1553   Mon Feb 17, 2025   1552 Pt signed 201    1552 Escalating in the ED crisis is requesting medication we will try Zyprexa       Medications   OLANZapine (ZyPREXA) IM injection 10 mg (10 mg Intramuscular Given 2/17/25 1545)       ED Risk Strat Scores                                                History of Present Illness       Chief Complaint   Patient presents with    Psychiatric Evaluation     Pt reports hearing voices that are telling him to \"get the fuck out of my house\" denies SI + homicidal        Past Medical History:   Diagnosis Date    Allergic     Bipolar disorder in partial remission (HCC)     Erectile dysfunction     unspecified erectile dysfunction type    Hypertension  "    Kidney stone       Past Surgical History:   Procedure Laterality Date    COLONOSCOPY      EXPLORATORY LAPAROTOMY W/ BOWEL RESECTION N/A 2022    Procedure: LAPAROTOMY EXPLORATORY W/ SMALL BOWEL RESECTION, liver biopsy;  Surgeon: Rose Mary Lara MD;  Location:  MAIN OR;  Service: General    FL RETROGRADE PYELOGRAM  10/06/2021    HERNIA REPAIR      NH CYSTOURETHROSCOPY W/URETERAL CATHETERIZATION Right 10/06/2021    Procedure: CYSTOSCOPY RETROGRADE PYELOGRAM WITH INSERTION STENT URETERAL, RIGHT URETEROSCOPY, STONE EXTRACTION;  Surgeon: Bradly Rivera MD;  Location:  MAIN OR;  Service: Urology      Family History   Problem Relation Age of Onset    Diabetes Mother     Thyroid disease Mother     Cancer Father     Thyroid disease Sister     Depression Brother     Cancer Maternal Aunt     Cancer Paternal Uncle     Cancer Paternal Grandmother     No Known Problems Brother     Thyroid disease Sister       Social History     Tobacco Use    Smoking status: Former     Current packs/day: 0.00     Types: Pipe, Cigarettes     Quit date: 2003     Years since quittin.1    Smokeless tobacco: Never   Vaping Use    Vaping status: Some Days    Substances: THC   Substance Use Topics    Alcohol use: Not Currently     Comment: Drinks a quart of moonshine/night-As of 21 will quit drinking    Drug use: Yes     Types: Marijuana     Comment: Socially (1-2 Monthly)      E-Cigarette/Vaping    E-Cigarette Use Current Some Day User       E-Cigarette/Vaping Substances    Nicotine No     THC Yes     CBD No     Flavoring No       I have reviewed and agree with the history as documented.     HPI    Review of Systems   Constitutional:  Negative for chills and fever.   HENT:  Negative for rhinorrhea and sore throat.    Eyes:  Negative for visual disturbance.   Respiratory:  Negative for cough and shortness of breath.    Cardiovascular:  Negative for chest pain and leg swelling.   Gastrointestinal:  Negative for abdominal pain,  diarrhea, nausea and vomiting.   Genitourinary:  Negative for dysuria.   Musculoskeletal:  Negative for back pain and myalgias.   Skin:  Negative for rash.   Neurological:  Negative for dizziness and headaches.   Psychiatric/Behavioral:  Positive for hallucinations. Negative for confusion. The patient is nervous/anxious.    All other systems reviewed and are negative.          Objective       ED Triage Vitals [02/17/25 1319]   Temperature Pulse Blood Pressure Respirations SpO2 Patient Position - Orthostatic VS   97.8 °F (36.6 °C) 99 107/69 18 98 % Sitting      Temp src Heart Rate Source BP Location FiO2 (%) Pain Score    -- Monitor Left arm -- --      Vitals      Date and Time Temp Pulse SpO2 Resp BP Pain Score FACES Pain Rating User   02/17/25 1319 97.8 °F (36.6 °C) 99 98 % 18 107/69 -- -- TAB            Physical Exam  Vitals and nursing note reviewed.   Constitutional:       General: He is not in acute distress.     Appearance: He is well-developed. He is not diaphoretic.   HENT:      Head: Normocephalic and atraumatic.      Right Ear: External ear normal.      Left Ear: External ear normal.      Nose: Nose normal.      Mouth/Throat:      Mouth: Mucous membranes are moist.      Pharynx: Oropharynx is clear. No posterior oropharyngeal erythema.   Eyes:      Conjunctiva/sclera: Conjunctivae normal.      Pupils: Pupils are equal, round, and reactive to light.   Cardiovascular:      Rate and Rhythm: Normal rate and regular rhythm.      Pulses: Normal pulses.      Heart sounds: Normal heart sounds.   Pulmonary:      Effort: Pulmonary effort is normal. No respiratory distress.      Breath sounds: Normal breath sounds. No wheezing.   Abdominal:      General: Bowel sounds are normal. There is no distension.      Palpations: Abdomen is soft.      Tenderness: There is no abdominal tenderness.   Musculoskeletal:         General: Normal range of motion.      Cervical back: Normal range of motion and neck supple.    Lymphadenopathy:      Cervical: No cervical adenopathy.   Skin:     General: Skin is warm and dry.      Capillary Refill: Capillary refill takes less than 2 seconds.   Neurological:      Mental Status: He is alert and oriented to person, place, and time.   Psychiatric:         Attention and Perception: He perceives auditory and visual hallucinations.         Mood and Affect: Mood normal.         Behavior: Behavior normal.         Thought Content: Thought content is paranoid. Thought content does not include homicidal or suicidal ideation. Thought content does not include homicidal or suicidal plan.         Results Reviewed       Procedure Component Value Units Date/Time    TSH [[id number]]  (Abnormal) Collected: 02/17/25 1416    Lab Status: Final result Specimen: Blood from Arm, Left Updated: 02/17/25 1524     TSH 3RD GENERATON 11.204 uIU/mL     Ethanol [[id number]]  (Normal) Collected: 02/17/25 1416    Lab Status: Final result Specimen: Blood from Arm, Left Updated: 02/17/25 1508     Ethanol Lvl <10 mg/dL     Comprehensive metabolic panel [[id number]]  (Abnormal) Collected: 02/17/25 1416    Lab Status: Final result Specimen: Blood from Arm, Left Updated: 02/17/25 1508     Sodium 135 mmol/L      Potassium 4.0 mmol/L      Chloride 100 mmol/L      CO2 26 mmol/L      ANION GAP 9 mmol/L      BUN 18 mg/dL      Creatinine 1.61 mg/dL      Glucose 90 mg/dL      Calcium 9.8 mg/dL      AST 28 U/L      ALT 24 U/L      Alkaline Phosphatase 94 U/L      Total Protein 8.1 g/dL      Albumin 4.8 g/dL      Total Bilirubin 1.15 mg/dL      eGFR 49 ml/min/1.73sq m     Narrative:      National Kidney Disease Foundation guidelines for Chronic Kidney Disease (CKD):     Stage 1 with normal or high GFR (GFR > 90 mL/min/1.73 square meters)    Stage 2 Mild CKD (GFR = 60-89 mL/min/1.73 square meters)    Stage 3A Moderate CKD (GFR = 45-59 mL/min/1.73 square meters)    Stage 3B Moderate CKD (GFR = 30-44 mL/min/1.73 square meters)    Stage 4 Severe  CKD (GFR = 15-29 mL/min/1.73 square meters)    Stage 5 End Stage CKD (GFR <15 mL/min/1.73 square meters)  Note: GFR calculation is accurate only with a steady state creatinine    CBC and differential [[id number]]  (Abnormal) Collected: 02/17/25 1416    Lab Status: Final result Specimen: Blood from Arm, Left Updated: 02/17/25 1454     WBC 15.01 Thousand/uL      RBC 4.32 Million/uL      Hemoglobin 13.1 g/dL      Hematocrit 38.6 %      MCV 89 fL      MCH 30.3 pg      MCHC 33.9 g/dL      RDW 13.5 %      MPV 10.0 fL      Platelets 372 Thousands/uL      nRBC 0 /100 WBCs      Segmented % 80 %      Immature Grans % 0 %      Lymphocytes % 11 %      Monocytes % 7 %      Eosinophils Relative 2 %      Basophils Relative 0 %      Absolute Neutrophils 11.96 Thousands/µL      Absolute Immature Grans 0.05 Thousand/uL      Absolute Lymphocytes 1.69 Thousands/µL      Absolute Monocytes 1.03 Thousand/µL      Eosinophils Absolute 0.24 Thousand/µL      Basophils Absolute 0.04 Thousands/µL     POCT alcohol breath test [[id number]]  (Normal) Collected: 02/17/25 1404    Lab Status: In process Updated: 02/17/25 1405     EXTBreath Alcohol 0.000    Rapid drug screen, urine [[id number]]     Lab Status: No result Specimen: Urine     UA w Reflex to Microscopic w Reflex to Culture [[id number]]     Lab Status: No result Specimen: Urine             No orders to display       Procedures    ED Medication and Procedure Management   Prior to Admission Medications   Prescriptions Last Dose Informant Patient Reported? Taking?   Alcohol Swabs 70 % PADS  Self No No   Sig: May substitute brand based on insurance coverage. Check glucose BID.   Blood Glucose Monitoring Suppl (OneTouch Verio Reflect) w/Device KIT  Self No No   Sig: May substitute brand based on insurance coverage. Check glucose BID.   Diclofenac Sodium (VOLTAREN) 1 %   No No   Sig: APPLY 2 G TOPICALLY 4 (FOUR) TIMES A DAY   OneTouch Delica Lancets 33G MISC  Self No No   Sig: May substitute brand  based on insurance coverage. Check glucose BID.   QUEtiapine (SEROquel) 50 mg tablet  Self Yes No   folic acid (FOLVITE) 1 mg tablet   No No   Sig: Take 1 tablet (1 mg total) by mouth daily   glucose blood (OneTouch Verio) test strip  Self No No   Sig: May substitute brand based on insurance coverage. Check glucose BID.   hydrOXYzine HCL (ATARAX) 50 mg tablet  Self No No   Sig: Take 1 tablet (50 mg total) by mouth 2 (two) times a day as needed for anxiety   lidocaine (LIDODERM) 5 %   No No   Sig: Apply 2 patches topically over 12 hours daily Remove & Discard patch within 12 hours or as directed by MD   lisinopril (ZESTRIL) 20 mg tablet  Self Yes No   lithium carbonate (LITHOBID) 300 mg CR tablet  Self Yes No   Sig: Take 300 mg by mouth 2 (two) times a day   metFORMIN (GLUCOPHAGE) 500 mg tablet   No No   Sig: Take 1 tablet (500 mg total) by mouth 2 (two) times a day with meals   metoprolol succinate (TOPROL-XL) 50 mg 24 hr tablet  Self Yes No   Sig: Take 1 tablet every day by oral route.   prazosin (MINIPRESS) 5 mg capsule  Self Yes No   Sig: Take 5 mg by mouth daily at bedtime      Facility-Administered Medications: None     Patient's Medications   Discharge Prescriptions    No medications on file     No discharge procedures on file.  ED SEPSIS DOCUMENTATION   Time reflects when diagnosis was documented in both MDM as applicable and the Disposition within this note       Time User Action Codes Description Comment    2/17/2025  2:45 PM Kalyan Johnson Add [Z00.8] Encounter for psychological evaluation     2/17/2025  2:45 PM Kalyan Johnson [F29] Psychosis (HCC)                  Kalyan Johnson MD  02/17/25 5674

## 2025-02-17 NOTE — LETTER
[facility] EMERGENCY DEPARTMENT  [address] [place] [address]  Dept: [phone]      EMTALA TRANSFER CONSENT    NAME Osei Trimble                                         1975                              MRN [id number]    I have been informed of my rights regarding examination, treatment, and transfer   by Dr. Jacobo Lyle,     Benefits: Specialized equipment and/or services available at the receiving facility (Include comment)________________________    Risks: Potential for delay in receiving treatment      { ED EMTALA TRANSFER CHOICES:[id number]}    I authorize the performance of emergency medical procedures and treatments upon me in both transit and upon arrival at the receiving facility.  Additionally, I authorize the release of any and all medical records to the receiving facility and request they be transported with me, if possible.  I understand that the safest mode of transportation during a medical emergency is an ambulance and that the Hospital advocates the use of this mode of transport. Risks of traveling to the receiving facility by car, including absence of medical control, life sustaining equipment, such as oxygen, and medical personnel has been explained to me and I fully understand them.    (KIMBERLY CORRECT BOX BELOW)  [  ]  I consent to the stated transfer and to be transported by ambulance/helicopter.  [  ]  I consent to the stated transfer, but refuse transportation by ambulance and accept full responsibility for my transportation by car.  I understand the risks of non-ambulance transfers and I exonerate the Hospital and its staff from any deterioration in my condition that results from this refusal.    X___________________________________________    DATE  25  TIME________  Signature of patient or legally responsible individual signing on patient behalf           RELATIONSHIP TO PATIENT_________________________          Provider  Certification    NAME Osei Trimble                                         1975                              MRN [id number]    A medical screening exam was performed on the above named patient.  Based on the examination:    Condition Necessitating Transfer The primary encounter diagnosis was Encounter for psychological evaluation. A diagnosis of Psychosis (HCC) was also pertinent to this visit.    Patient Condition: The patient has been stabilized such that within reasonable medical probability, no material deterioration of the patient condition or the condition of the unborn child(tegan) is likely to result from the transfer    Reason for Transfer: Level of Care needed not available at this facility    Transfer Requirements: Facility [facility]   Space available and qualified personnel available for treatment as acknowledged by Marai Victoria Gallagher MA  Agreed to accept transfer and to provide appropriate medical treatment as acknowledged by       Dr. Paige  Appropriate medical records of the examination and treatment of the patient are provided at the time of transfer   STAFF INITIAL WHEN COMPLETED _______  Transfer will be performed by qualified personnel from [facility]  and appropriate transfer equipment as required, including the use of necessary and appropriate life support measures.    Provider Certification: I have examined the patient and explained the following risks and benefits of being transferred/refusing transfer to the patient/family:  General risk, such as traffic hazards, adverse weather conditions, rough terrain or turbulence, possible failure of equipment (including vehicle or aircraft), or consequences of actions of persons outside the control of the transport personnel      Based on these reasonable risks and benefits to the patient and/or the unborn child(tegan), and based upon the information available at the time of the patient’s examination, I certify that the medical benefits reasonably  to be expected from the provision of appropriate medical treatments at another medical facility outweigh the increasing risks, if any, to the individual’s medical condition, and in the case of labor to the unborn child, from effecting the transfer.    X____________________________________________ DATE 02/18/25        TIME_______      ORIGINAL - SEND TO MEDICAL RECORDS   COPY - SEND WITH PATIENT DURING TRANSFER

## 2025-02-18 ENCOUNTER — HOSPITAL ENCOUNTER (INPATIENT)
Facility: HOSPITAL | Age: 50
LOS: 7 days | Discharge: HOME | DRG: 776 | End: 2025-02-25
Attending: PSYCHIATRY & NEUROLOGY | Admitting: PSYCHIATRY & NEUROLOGY
Payer: COMMERCIAL

## 2025-02-18 VITALS
RESPIRATION RATE: 18 BRPM | HEART RATE: 96 BPM | OXYGEN SATURATION: 98 % | BODY MASS INDEX: 27.83 KG/M2 | HEIGHT: 73 IN | WEIGHT: 210 LBS | SYSTOLIC BLOOD PRESSURE: 100 MMHG | DIASTOLIC BLOOD PRESSURE: 74 MMHG | TEMPERATURE: 97.9 F

## 2025-02-18 DIAGNOSIS — F10.10 ALCOHOL ABUSE: ICD-10-CM

## 2025-02-18 DIAGNOSIS — F15.959 METHAMPHETAMINE-INDUCED PSYCHOTIC DISORDER (HCC): ICD-10-CM

## 2025-02-18 DIAGNOSIS — F17.200 NICOTINE ADDICTION: ICD-10-CM

## 2025-02-18 DIAGNOSIS — I10 ESSENTIAL HYPERTENSION: Primary | Chronic | ICD-10-CM

## 2025-02-18 DIAGNOSIS — M54.50 LUMBAGO: ICD-10-CM

## 2025-02-18 DIAGNOSIS — E55.9 VITAMIN D DEFICIENCY: ICD-10-CM

## 2025-02-18 DIAGNOSIS — E53.8 VITAMIN B12 DEFICIENCY: ICD-10-CM

## 2025-02-18 DIAGNOSIS — E11.9 DIABETES MELLITUS (HCC): ICD-10-CM

## 2025-02-18 DIAGNOSIS — Z00.8 ENCOUNTER FOR PSYCHOLOGICAL EVALUATION: ICD-10-CM

## 2025-02-18 DIAGNOSIS — F60.3 EXPLOSIVE PERSONALITY DISORDER (HCC): ICD-10-CM

## 2025-02-18 LAB
ALBUMIN SERPL BCG-MCNC: 4.7 G/DL (ref 3.5–5)
ALP SERPL-CCNC: 95 U/L (ref 34–104)
ALT SERPL W P-5'-P-CCNC: 23 U/L (ref 7–52)
AMPHETAMINES SERPL QL SCN: POSITIVE
ANION GAP SERPL CALCULATED.3IONS-SCNC: 8 MMOL/L (ref 4–13)
AST SERPL W P-5'-P-CCNC: 29 U/L (ref 13–39)
ATRIAL RATE: 73 BPM
BARBITURATES UR QL: NEGATIVE
BASOPHILS # BLD AUTO: 0.03 THOUSANDS/ΜL (ref 0–0.1)
BASOPHILS NFR BLD AUTO: 0 % (ref 0–1)
BENZODIAZ UR QL: NEGATIVE
BILIRUB SERPL-MCNC: 1.19 MG/DL (ref 0.2–1)
BILIRUB UR QL STRIP: NEGATIVE
BUN SERPL-MCNC: 18 MG/DL (ref 5–25)
CALCIUM SERPL-MCNC: 9.9 MG/DL (ref 8.4–10.2)
CHLORIDE SERPL-SCNC: 101 MMOL/L (ref 96–108)
CHOLEST SERPL-MCNC: 189 MG/DL (ref ?–200)
CLARITY UR: CLEAR
CO2 SERPL-SCNC: 26 MMOL/L (ref 21–32)
COCAINE UR QL: NEGATIVE
COLOR UR: ABNORMAL
CREAT SERPL-MCNC: 1.1 MG/DL (ref 0.6–1.3)
EOSINOPHIL # BLD AUTO: 0.33 THOUSAND/ΜL (ref 0–0.61)
EOSINOPHIL NFR BLD AUTO: 3 % (ref 0–6)
ERYTHROCYTE [DISTWIDTH] IN BLOOD BY AUTOMATED COUNT: 13.5 % (ref 11.6–15.1)
FENTANYL UR QL SCN: NEGATIVE
GFR SERPL CREATININE-BSD FRML MDRD: 77 ML/MIN/1.73SQ M
GLUCOSE SERPL-MCNC: 100 MG/DL (ref 65–140)
GLUCOSE SERPL-MCNC: 85 MG/DL (ref 65–140)
GLUCOSE UR STRIP-MCNC: NEGATIVE MG/DL
HCT VFR BLD AUTO: 41.9 % (ref 36.5–49.3)
HDLC SERPL-MCNC: 36 MG/DL
HGB BLD-MCNC: 14 G/DL (ref 12–17)
HGB UR QL STRIP.AUTO: NEGATIVE
HYDROCODONE UR QL SCN: NEGATIVE
IMM GRANULOCYTES # BLD AUTO: 0.03 THOUSAND/UL (ref 0–0.2)
IMM GRANULOCYTES NFR BLD AUTO: 0 % (ref 0–2)
KETONES UR STRIP-MCNC: ABNORMAL MG/DL
LDLC SERPL CALC-MCNC: 127 MG/DL (ref 0–100)
LEUKOCYTE ESTERASE UR QL STRIP: NEGATIVE
LYMPHOCYTES # BLD AUTO: 1.53 THOUSANDS/ΜL (ref 0.6–4.47)
LYMPHOCYTES NFR BLD AUTO: 12 % (ref 14–44)
MCH RBC QN AUTO: 30 PG (ref 26.8–34.3)
MCHC RBC AUTO-ENTMCNC: 33.4 G/DL (ref 31.4–37.4)
MCV RBC AUTO: 90 FL (ref 82–98)
METHADONE UR QL: NEGATIVE
MONOCYTES # BLD AUTO: 0.55 THOUSAND/ΜL (ref 0.17–1.22)
MONOCYTES NFR BLD AUTO: 4 % (ref 4–12)
NEUTROPHILS # BLD AUTO: 10.07 THOUSANDS/ΜL (ref 1.85–7.62)
NEUTS SEG NFR BLD AUTO: 81 % (ref 43–75)
NITRITE UR QL STRIP: NEGATIVE
NONHDLC SERPL-MCNC: 153 MG/DL
NRBC BLD AUTO-RTO: 0 /100 WBCS
OPIATES UR QL SCN: NEGATIVE
OXYCODONE+OXYMORPHONE UR QL SCN: NEGATIVE
P AXIS: 54 DEGREES
PCP UR QL: NEGATIVE
PH UR STRIP.AUTO: 6 [PH]
PLATELET # BLD AUTO: 358 THOUSANDS/UL (ref 149–390)
PMV BLD AUTO: 9.9 FL (ref 8.9–12.7)
POTASSIUM SERPL-SCNC: 4.4 MMOL/L (ref 3.5–5.3)
PR INTERVAL: 168 MS
PROT SERPL-MCNC: 7.5 G/DL (ref 6.4–8.4)
PROT UR STRIP-MCNC: NEGATIVE MG/DL
QRS AXIS: -28 DEGREES
QRSD INTERVAL: 88 MS
QT INTERVAL: 412 MS
QTC INTERVAL: 454 MS
RBC # BLD AUTO: 4.66 MILLION/UL (ref 3.88–5.62)
SODIUM SERPL-SCNC: 135 MMOL/L (ref 135–147)
SP GR UR STRIP.AUTO: 1.02
T WAVE AXIS: -1 DEGREES
T3 SERPL-MCNC: 1.3 NG/ML (ref 0.9–1.8)
T4 FREE SERPL-MCNC: 0.85 NG/DL (ref 0.61–1.12)
THC UR QL: NEGATIVE
TRIGL SERPL-MCNC: 129 MG/DL (ref ?–150)
UROBILINOGEN UR QL STRIP.AUTO: 0.2 E.U./DL
VENTRICULAR RATE: 73 BPM
WBC # BLD AUTO: 12.54 THOUSAND/UL (ref 4.31–10.16)

## 2025-02-18 PROCEDURE — 93010 ELECTROCARDIOGRAM REPORT: CPT | Performed by: INTERNAL MEDICINE

## 2025-02-18 PROCEDURE — 82948 REAGENT STRIP/BLOOD GLUCOSE: CPT

## 2025-02-18 PROCEDURE — 86780 TREPONEMA PALLIDUM: CPT | Performed by: PSYCHIATRY & NEUROLOGY

## 2025-02-18 PROCEDURE — 81003 URINALYSIS AUTO W/O SCOPE: CPT | Performed by: FAMILY MEDICINE

## 2025-02-18 PROCEDURE — 85025 COMPLETE CBC W/AUTO DIFF WBC: CPT | Performed by: PSYCHIATRY & NEUROLOGY

## 2025-02-18 PROCEDURE — 80061 LIPID PANEL: CPT | Performed by: PSYCHIATRY & NEUROLOGY

## 2025-02-18 PROCEDURE — 80307 DRUG TEST PRSMV CHEM ANLYZR: CPT | Performed by: FAMILY MEDICINE

## 2025-02-18 PROCEDURE — 82306 VITAMIN D 25 HYDROXY: CPT | Performed by: PSYCHIATRY & NEUROLOGY

## 2025-02-18 PROCEDURE — 80053 COMPREHEN METABOLIC PANEL: CPT | Performed by: PSYCHIATRY & NEUROLOGY

## 2025-02-18 PROCEDURE — 82607 VITAMIN B-12: CPT | Performed by: PSYCHIATRY & NEUROLOGY

## 2025-02-18 PROCEDURE — 83036 HEMOGLOBIN GLYCOSYLATED A1C: CPT | Performed by: FAMILY MEDICINE

## 2025-02-18 PROCEDURE — 82746 ASSAY OF FOLIC ACID SERUM: CPT | Performed by: PSYCHIATRY & NEUROLOGY

## 2025-02-18 RX ORDER — LORAZEPAM 1 MG/1
1 TABLET ORAL ONCE
Status: COMPLETED | OUTPATIENT
Start: 2025-02-18 | End: 2025-02-18

## 2025-02-18 RX ORDER — LORAZEPAM 1 MG/1
1 TABLET ORAL EVERY 8 HOURS PRN
Status: DISCONTINUED | OUTPATIENT
Start: 2025-02-18 | End: 2025-02-25 | Stop reason: HOSPADM

## 2025-02-18 RX ORDER — RISPERIDONE 1 MG/1
1 TABLET ORAL
Status: DISCONTINUED | OUTPATIENT
Start: 2025-02-18 | End: 2025-02-25 | Stop reason: HOSPADM

## 2025-02-18 RX ORDER — TRAZODONE HYDROCHLORIDE 100 MG/1
100 TABLET ORAL
Status: DISCONTINUED | OUTPATIENT
Start: 2025-02-18 | End: 2025-02-25 | Stop reason: HOSPADM

## 2025-02-18 RX ORDER — LISINOPRIL 10 MG/1
10 TABLET ORAL DAILY
Status: DISCONTINUED | OUTPATIENT
Start: 2025-02-19 | End: 2025-02-20

## 2025-02-18 RX ORDER — PROPRANOLOL HYDROCHLORIDE 10 MG/1
5 TABLET ORAL EVERY 8 HOURS PRN
Status: DISCONTINUED | OUTPATIENT
Start: 2025-02-18 | End: 2025-02-25 | Stop reason: HOSPADM

## 2025-02-18 RX ORDER — TRAZODONE HYDROCHLORIDE 50 MG/1
50 TABLET ORAL
Status: DISCONTINUED | OUTPATIENT
Start: 2025-02-18 | End: 2025-02-18

## 2025-02-18 RX ORDER — HALOPERIDOL 5 MG/ML
5 INJECTION INTRAMUSCULAR
Status: DISCONTINUED | OUTPATIENT
Start: 2025-02-18 | End: 2025-02-25 | Stop reason: HOSPADM

## 2025-02-18 RX ORDER — ACETAMINOPHEN 325 MG/1
975 TABLET ORAL EVERY 6 HOURS PRN
Status: DISCONTINUED | OUTPATIENT
Start: 2025-02-18 | End: 2025-02-25 | Stop reason: HOSPADM

## 2025-02-18 RX ORDER — RISPERIDONE 0.25 MG/1
0.25 TABLET ORAL
Status: DISCONTINUED | OUTPATIENT
Start: 2025-02-18 | End: 2025-02-18

## 2025-02-18 RX ORDER — MAGNESIUM HYDROXIDE/ALUMINUM HYDROXICE/SIMETHICONE 120; 1200; 1200 MG/30ML; MG/30ML; MG/30ML
30 SUSPENSION ORAL EVERY 4 HOURS PRN
Status: DISCONTINUED | OUTPATIENT
Start: 2025-02-18 | End: 2025-02-25 | Stop reason: HOSPADM

## 2025-02-18 RX ORDER — HYDROXYZINE HYDROCHLORIDE 25 MG/1
25 TABLET, FILM COATED ORAL
Status: DISCONTINUED | OUTPATIENT
Start: 2025-02-18 | End: 2025-02-18

## 2025-02-18 RX ORDER — RISPERIDONE 0.5 MG/1
0.5 TABLET ORAL
Status: DISCONTINUED | OUTPATIENT
Start: 2025-02-18 | End: 2025-02-25 | Stop reason: HOSPADM

## 2025-02-18 RX ORDER — ACETAMINOPHEN 325 MG/1
650 TABLET ORAL EVERY 4 HOURS PRN
Status: DISCONTINUED | OUTPATIENT
Start: 2025-02-18 | End: 2025-02-25 | Stop reason: HOSPADM

## 2025-02-18 RX ORDER — LISINOPRIL 20 MG/1
20 TABLET ORAL DAILY
Status: DISCONTINUED | OUTPATIENT
Start: 2025-02-18 | End: 2025-02-18

## 2025-02-18 RX ORDER — RISPERIDONE 0.5 MG/1
0.5 TABLET ORAL
Status: DISCONTINUED | OUTPATIENT
Start: 2025-02-18 | End: 2025-02-18

## 2025-02-18 RX ORDER — RISPERIDONE 1 MG/1
1 TABLET ORAL
Status: DISCONTINUED | OUTPATIENT
Start: 2025-02-18 | End: 2025-02-18

## 2025-02-18 RX ORDER — HYDROXYZINE HYDROCHLORIDE 50 MG/1
50 TABLET, FILM COATED ORAL
Status: DISCONTINUED | OUTPATIENT
Start: 2025-02-18 | End: 2025-02-25 | Stop reason: HOSPADM

## 2025-02-18 RX ORDER — HYDROXYZINE HYDROCHLORIDE 50 MG/1
50 TABLET, FILM COATED ORAL
Status: DISCONTINUED | OUTPATIENT
Start: 2025-02-18 | End: 2025-02-18

## 2025-02-18 RX ADMIN — SITAGLIPTIN 50 MG: 50 TABLET, FILM COATED ORAL at 17:24

## 2025-02-18 RX ADMIN — LORAZEPAM 1 MG: 1 TABLET ORAL at 12:05

## 2025-02-18 NOTE — PLAN OF CARE
Problem: CHIN  Goal: Will exhibit normal sleep and speech and no impulsivity  Description: INTERVENTIONS:  - Administer medication as ordered  - Set limits on impulsive behavior  - Make attempts to decrease external stimuli as possible  Outcome: Progressing     Problem: PSYCHOSIS  Goal: Will report no hallucinations or delusions  Description: Interventions:  - Administer medication as  ordered  - Every waking shifts and PRN assess for the presence of hallucinations and or delusions  - Assist with reality testing to support increasing orientation  - Assess if patient's hallucinations or delusions are encouraging self-harm or harm to others and intervene as appropriate  Outcome: Progressing     Problem: ANXIETY  Goal: Will report anxiety at manageable levels  Description: INTERVENTIONS:  - Administer medication as ordered  - Teach and encourage coping skills  - Provide emotional support  - Assess patient/family for anxiety and ability to cope  Outcome: Progressing  Goal: By discharge: Patient will verbalize 2 strategies to deal with anxiety  Description: Interventions:  - Identify any obvious source/trigger to anxiety  - Staff will assist patient in applying identified coping technique/skills  - Encourage attendance of scheduled groups and activities  Outcome: Progressing     Problem: SUBSTANCE USE/ABUSE  Goal: Will have no detox symptoms and will verbalize plan for changing substance-related behavior  Description: INTERVENTIONS:  - Monitor physical status and assess for symptoms of withdrawal  - Administer medication as ordered  - Provide emotional support with 1 on 1 interaction with staff  - Encourage recovery focused program/ addiction education  - Assess for verbalization of changing behaviors related to substance abuse  - Initiate consults and referrals as appropriate (Case Management, Spiritual Care, etc.)  Outcome: Progressing  Goal: By discharge, will develop insight into their chemical dependency and  sustain motivation to continue in recovery  Description: INTERVENTIONS:  - Attends all daily group sessions and scheduled AA groups  - Actively practices coping skills through participation in the therapeutic community and adherence to program rules  - Reviews and completes assignments from individual treatment plan  - Assist patient development of understanding of their personal cycle of addiction and relapse triggers  Outcome: Progressing  Goal: By discharge, patient will have ongoing treatment plan addressing chemical dependency  Description: INTERVENTIONS:  - Assist patient with resources and/or appointments for ongoing recovery based living  Outcome: Progressing     Problem: SLEEP DISTURBANCE  Goal: Will exhibit normal sleeping pattern  Description: Interventions:  -  Assess the patients sleep pattern, noting recent changes  - Administer medication as ordered  - Decrease environmental stimuli, including noise, as appropriate during the night  - Encourage the patient to actively participate in unit groups and or exercise during the day to enhance ability to achieve adequate sleep at night  - Assess the patient, in the morning, encouraging a description of sleep experience  Outcome: Progressing     Patient admitted this afternoon. Care plan initiated.

## 2025-02-18 NOTE — NURSING NOTE
"Patient admitted to [facility] this afternoon at 1315 from [facility] ED under a 201. Patient had been using meth and states he was \"freaking out\" due to hearing voices and being unable to sleep after using meth.  Upon arrival, patient does endorse AH of voices that he states are \"running their mouths.\" He endorses moderate depressed and when asked about anxiety, replied, \"I don't know.\" He appears flat. His responses are delayed and his speech is slow. He denies SI/HI. He does report impulsive behaviors at home and a diagnosis of IED as well as history of violence. He seems to be calm at the moment and agrees he has no intentions of violence or harming anyone on the unit.   He lives alone but lives next door to his sister, who helps to manage his medications and ensures he gets to doctor's appointments. He does admit to using meth occasionally and states when he is using meth he does not sleep or eat well. He states his hallucinations increase and his memory gets worse.  He uses a cane at times at home but appears to walk well without it. He was provided a walker upon arrival to the unit. He also wears a brace on his right ankle due to a recent sprain. The brace has metal inside. He was informed his sister may bring these items but they would need to be approved by the treatment team and he verbalized understanding.  He has no complaints of pain at  this time. He showered independently and hygiene is improved. His skin is intact with scattered scratches on bilateral arms and torso. He was oriented to the unit and provided time for questions. He is able to make needs known.  "

## 2025-02-18 NOTE — ED NOTES
Transfer paperwork completed with patient and ER MD.    Packet prepared for transfer, completed placed on patients chart.

## 2025-02-18 NOTE — CASE MANAGEMENT
Insurance Authorization for admission:   Phone call placed to [facility]  Phone number: [phone]  Spoke to Akila  4 days approved: 2/18/24-2/21/24  Level of care: Inpatient Older Adult  Review on 2/21/25  Authorization # [id number]

## 2025-02-18 NOTE — TREATMENT TEAM
02/18/25 1515 02/18/25 1517   Provider Notification   Reason for Communication Admission  (PTA) Admission  (PTA/CSSRS)   Provider Name Dr. Rosas Paige   Provider Role Attending physician Attending physician   Method of Communication Face to face Other (Comment)  (EpicChat)   Response See orders No new orders   Notification Time 1515 1517     PTA medication list confirmed with patient's sister, Ariadne, who manages his medications. She was able to confirm patient has been compliant with medications and took all medications as scheduled yesterday morning before going to the ER. Medical and psychiatric providers aware of completion.

## 2025-02-18 NOTE — ED NOTES
"Pt seen still pacing in the room and alcove with rambling speech. Pt reports that, \" if I don't stop moving they can't rape me.\" Pt appears to be paranoid and preoccupied with the camera in the room. Pt agreeable to taking medication.     Dieudonne Petit RN  02/17/25 2018       Dieudonne Petit RN  02/17/25 2112    "

## 2025-02-18 NOTE — NURSING NOTE
MARY signed for sister, Ariadne ([phone]). She was able to confirm all of patient's home medications as she helps to manage them. She believes patient needs a medication adjustment and requests a new psychiatrist upon discharge. She would like for patient to follow up with [facility]'s outpatient psychiatry.

## 2025-02-18 NOTE — ED NOTES
Patient is accepted at [facility]  Patient is accepted by Dr. Fine per José Luis/Intake.     Transportation is arranged with [facility].     Transportation is scheduled for pending.   Patient may go to the floor at pending.          Nurse report is to be called to [phone] prior to patient transfer.

## 2025-02-18 NOTE — ED NOTES
Patient screened upon arrival using [facility] Suicide Risk Assessment with result of low   Re-screening not required unless change in behavior or suicidal ideation.  Patient's environment appears to be free of unnecessary equipment/cords, and other objects commonly identified for self harm or harm to others.  Behavioral Health Assessment deferred as patient is sleeping and would benefit from additional rest.  Vital signs deferred until patient awake, no signs or symptoms of respiratory distress at this time.    Once patient is awake and able to participate, will complete assessments.  Will continue to monitor patient until Crisis and the Care team can make appropriate disposition and/or transfer/admission accommodations.        Juan Jose Lozoya RN  02/18/25 6249

## 2025-02-18 NOTE — ED NOTES
Patient screened upon arrival using [place] Suicide Risk Assessment with result of low    Re-screening not required unless change in behavior or suicidal ideation.  Patient's environment appears to be free of unnecessary equipment/cords, and other objects commonly identified for self harm or harm to others.  Behavioral Health Assessment deferred as patient is sleeping and would benefit from additional rest.  Vital signs deferred until patient awake, no signs or symptoms of respiratory distress at this time.    Once patient is awake and able to participate, will complete assessments.  Will continue to monitor patient until Crisis and the Care team can make appropriate disposition and/or transfer/admission accommodations.        Lauren Chávez RN  02/18/25 0122       Lauren Chávez RN  02/18/25 0123

## 2025-02-18 NOTE — ED NOTES
Pt seen this morning pt appears to be more cognizant of surroundings and calm. Pt was able to provide a urine sample and is now currently eating. This nurse provided pt with warm blankets and water. Pt denies any si/hi at this time.      Dieudonne Petit RN  02/18/25 1174

## 2025-02-18 NOTE — H&P
Vanda Trimble#  :1975 M  MRN:[id number]    CSN:[id number]  Adm Date: 2025 131  1:15 PM   ATT PHY: Jef Paige Md  [facility]         Chief Complaint:   Worsening depression and anxiety    History of Presenting Illness: Osei Trimble is a(n) 49 y.o. year old male who was admitted through ED as patient was experiencing worsening depression and anxiety symptoms along with auditory hallucinations.    Patient examined at bedside.  Patient denied any active suicidal homicidal ideations.    Allergies   Allergen Reactions    Tomato - Food Allergy Anaphylaxis     raw    Poison Ivy Extract Hives    Poison Sumac Extract Hives       Current Facility-Administered Medications on File Prior to Encounter   Medication Dose Route Frequency Provider Last Rate Last Admin    [COMPLETED] LORazepam (ATIVAN) tablet 1 mg  1 mg Oral Once Jacobo Lyle DO   1 mg at 25 1205    [COMPLETED] OLANZapine (ZyPREXA) IM injection 10 mg  10 mg Intramuscular Once Kalyan Johnson MD   10 mg at 25 1545    [COMPLETED] ziprasidone (GEODON) IM injection 10 mg  10 mg Intramuscular Once Kalyan Johnson MD   10 mg at 25 1654     Current Outpatient Medications on File Prior to Encounter   Medication Sig Dispense Refill    Alcohol Swabs 70 % PADS May substitute brand based on insurance coverage. Check glucose BID. 100 each 1    Blood Glucose Monitoring Suppl (OneTouch Verio Reflect) w/Device KIT May substitute brand based on insurance coverage. Check glucose BID. 1 kit 0    Diclofenac Sodium (VOLTAREN) 1 % APPLY 2 G TOPICALLY 4 (FOUR) TIMES A  g 2    folic acid (FOLVITE) 1 mg tablet Take 1 tablet (1 mg total) by mouth daily 30 tablet 0    glucose blood (OneTouch Verio) test strip May substitute brand based on insurance coverage. Check glucose BID. 100 each 1    hydrOXYzine HCL (ATARAX) 50 mg tablet Take 1 tablet (50 mg total) by mouth 2 (two) times a day as needed  for anxiety 30 tablet 0    lidocaine (LIDODERM) 5 % Apply 2 patches topically over 12 hours daily Remove & Discard patch within 12 hours or as directed by MD 60 patch 0    lisinopril (ZESTRIL) 20 mg tablet       lithium carbonate (LITHOBID) 300 mg CR tablet Take 300 mg by mouth 2 (two) times a day      metFORMIN (GLUCOPHAGE) 500 mg tablet Take 1 tablet (500 mg total) by mouth 2 (two) times a day with meals 60 tablet 0    metoprolol succinate (TOPROL-XL) 50 mg 24 hr tablet Take 1 tablet every day by oral route.      OneTouch Delica Lancets 33G MISC May substitute brand based on insurance coverage. Check glucose BID. 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    Abdominal pain 03/27/2023    Chest pain 06/21/2024    Explosive personality disorder (HCC) 06/21/2024    Hyperglycemia 06/22/2024    Acute encephalopathy 06/22/2024    Shock ([facility]) 06/22/2024    Thrombocytopenia (HCC) 06/22/2024    Total bilirubin, elevated 06/22/2024    Back pain 06/23/2024    Palliative care by specialist 06/24/2024    Goals of care, counseling/discussion 06/24/2024    Alcohol abuse 06/26/2024    Moderate protein-calorie malnutrition (HCC) 06/26/2024    Ankle sprain 11/14/2024    SIRS (systemic inflammatory response syndrome) (HCC) 02/14/2025    Elevated CK 02/14/2025    Drug abuse ([facility]) 02/14/2025     Resolved Ambulatory Problems     Diagnosis Date Noted    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 (HCC) 08/05/2022    Acute respiratory failure (HCC) 06/22/2024     Hypotension 2024    Lactic acidosis 2024    Hypophosphatemia 2024    Fever 2024    Colon cancer screening 2025     Past Medical History:   Diagnosis Date    Allergic     Bipolar disorder in partial remission (HCC)     Diabetes (HCC)     Erectile dysfunction     Hypertension     Kidney stone        Past Surgical History:   Procedure Laterality Date    COLONOSCOPY      EXPLORATORY LAPAROTOMY W/ BOWEL RESECTION N/A 2022    Procedure: LAPAROTOMY EXPLORATORY W/ SMALL BOWEL RESECTION, liver biopsy;  Surgeon: Rose Mary Lara MD;  Location:  MAIN OR;  Service: General    FL RETROGRADE PYELOGRAM  10/06/2021    HERNIA REPAIR      AR CYSTOURETHROSCOPY W/URETERAL CATHETERIZATION Right 10/06/2021    Procedure: CYSTOSCOPY RETROGRADE PYELOGRAM WITH INSERTION STENT URETERAL, RIGHT URETEROSCOPY, STONE EXTRACTION;  Surgeon: Bradly Rivera MD;  Location:  MAIN OR;  Service: Urology       Social History:   Social History     Socioeconomic History    Marital status: Single     Spouse name: None    Number of children: None    Years of education: None    Highest education level: None   Occupational History    None   Tobacco Use    Smoking status: Former     Current packs/day: 0.00     Types: Pipe, Cigarettes     Quit date: 2003     Years since quittin.1    Smokeless tobacco: Never   Vaping Use    Vaping status: Some Days    Substances: THC   Substance and Sexual Activity    Alcohol use: Not Currently     Comment: Drinks a quart of moonshine/night-As of 21 will quit drinking    Drug use: Yes     Types: Marijuana     Comment: Socially (1-2 Monthly)    Sexual activity: Not Currently   Other Topics Concern    None   Social History Narrative    None     Social Drivers of Health     Financial Resource Strain: Not on file   Food Insecurity: No Food Insecurity (2025)    Nursing - Inadequate Food Risk Classification     Worried About Running Out of Food in the Last Year: Never true     Ran Out  "of Food in the Last Year: Never true     Ran Out of Food in the Last Year: Never true   Transportation Needs: Unmet Transportation Needs (2025)    Nursing - Transportation Risk Classification     Lack of Transportation: Not on file     Lack of Transportation: Yes   Physical Activity: Not on file   Stress: Not on file   Social Connections: Not on file   Intimate Partner Violence: Unknown (2025)    Nursing IPS     Feels Physically and Emotionally Safe: Not on file     Physically Hurt by Someone: Not on file     Humiliated or Emotionally Abused by Someone: Not on file     Physically Hurt by Someone: No     Hurt or Threatened by Someone: No   Housing Stability: Unknown (2025)    Nursing: Inadequate Housing Risk Classification     Has Housing: Not on file     Worried About Losing Housing: Not on file     Unable to Get Utilities: Not on file     Unable to Pay for Housing in the Last Year: No     Has Housin       Family History:   Family History   Problem Relation Age of Onset    Diabetes Mother     Thyroid disease Mother     Cancer Father     Thyroid disease Sister     Depression Brother     Cancer Maternal Aunt     Cancer Paternal Uncle     Cancer Paternal Grandmother     No Known Problems Brother     Thyroid disease Sister        Review of Systems   Musculoskeletal:  Positive for arthralgias.   Neurological:  Positive for headaches.   Psychiatric/Behavioral:  Positive for sleep disturbance.    All other systems reviewed and are negative.      Physical Exam   Vitals: Blood pressure 134/81, pulse 87, temperature 97.6 °F (36.4 °C), temperature source Temporal, resp. rate 16, height 6' 1\" (1.854 m), weight 91.6 kg (202 lb), SpO2 99%.,Body mass index is 26.65 kg/m².  Constitutional: Awake and Alert. Well-developed and well-nourished. No distress.   HENT: PERR,EOMI, conjunctiva normal  Head: Normocephalic and atraumatic.   Mouth/Throat: Oropharynx is clear and moist.    Eyes: Conjunctivae and EOM are " normal. Pupils are equal, round, and reactive to light. Right and left eye exhibits no discharge.  Neck: Neck supple. No tracheal deviation present. No thyromegaly present.   Cardiovascular: Normal rate, regular rhythm and normal heart sounds.  Exam reveals no friction rub. No murmur heard.  Pulmonary/Chest: Effort normal and breath sounds normal. No respiratory distress. She has no wheezes.   Abdominal: Soft. Bowel sounds are normal. She exhibits no distension. There is no tenderness. There is no rebound and no guarding.   Neurological: Cranial Nerves grossly intact. No sensory deficit. Coordination normal.   Musculoskeletal:   Nontender spine  Skin: Skin is warm and dry. No rash noted. No diaphoresis. No erythema. No edema. No cyanosis.    Assessment     Osei Trimble is a(n) 49 y.o. year old male with MDD    Cardiac with history of hypertension.  Patient will be restarted on the lower dose lisinopril 10 mg daily as his creatinine level is elevated.  Patient was taking lisinopril 20 mg at home.  I will repeat CMP on Thursday, 2/20/2025.  Type 2 diabetes mellitus.  Patient's creatinine level is elevated 1.61.  Patient was on metformin 500 mg twice daily at home.  I will put the patient on Januvia 50 mg daily.  Patient has been put on carb controlled diet.  I will get Accu-Cheks twice daily.  Patient's most recent hemoglobin A1c on record was 6.6 on 8/26/2024.  I will repeat hemoglobin A1c.  Arthralgia/headache.  Patient may get Tylenol as needed.  Patient may also get Voltaren gel 4 times daily over the affected area.  Insomnia.  Patient is on melatonin 3 mg at bedtime.  Psych with MDD.  Patient is being managed by psych.    Prognosis: Fair.    Discharge Plan: In progress.    Advanced Directives: I have discussed in detail the patient the advanced directives.  Patient has not appointed anyone as his POA and has no living will with advanced healthcare directives.  Patient's first contact is his sister Ariadne  Caroline and her phone number is [phone].  When discussing cardiac and pulmonary resuscitation efforts with the patient, the patient wishes to be FULL CODE.    I have spent more than 50 minutes gathering data, doing physical examination, and discussing the advanced directives, which was witnessed by caring staff.

## 2025-02-19 PROBLEM — F15.959 METHAMPHETAMINE-INDUCED PSYCHOTIC DISORDER (HCC): Status: ACTIVE | Noted: 2025-02-19

## 2025-02-19 LAB
25(OH)D3 SERPL-MCNC: <7 NG/ML (ref 30–100)
BACTERIA BLD CULT: NORMAL
BACTERIA BLD CULT: NORMAL
EST. AVERAGE GLUCOSE BLD GHB EST-MCNC: 114 MG/DL
FOLATE SERPL-MCNC: >22.3 NG/ML
GLUCOSE SERPL-MCNC: 131 MG/DL (ref 65–140)
GLUCOSE SERPL-MCNC: 138 MG/DL (ref 65–140)
HBA1C MFR BLD: 5.6 %
TREPONEMA PALLIDUM IGG+IGM AB [PRESENCE] IN SERUM OR PLASMA BY IMMUNOASSAY: NORMAL
VIT B12 SERPL-MCNC: 220 PG/ML (ref 180–914)

## 2025-02-19 PROCEDURE — 99222 1ST HOSP IP/OBS MODERATE 55: CPT | Performed by: PSYCHIATRY & NEUROLOGY

## 2025-02-19 PROCEDURE — 82948 REAGENT STRIP/BLOOD GLUCOSE: CPT

## 2025-02-19 RX ORDER — ERGOCALCIFEROL 1.25 MG/1
50000 CAPSULE, LIQUID FILLED ORAL WEEKLY
Status: DISCONTINUED | OUTPATIENT
Start: 2025-02-20 | End: 2025-02-25 | Stop reason: HOSPADM

## 2025-02-19 RX ORDER — OLANZAPINE 5 MG/1
5 TABLET ORAL 2 TIMES DAILY
Status: DISCONTINUED | OUTPATIENT
Start: 2025-02-19 | End: 2025-02-21

## 2025-02-19 RX ORDER — FOLIC ACID 1 MG/1
1 TABLET ORAL DAILY
Status: DISCONTINUED | OUTPATIENT
Start: 2025-02-20 | End: 2025-02-25 | Stop reason: HOSPADM

## 2025-02-19 RX ORDER — LIDOCAINE 50 MG/G
2 PATCH TOPICAL DAILY PRN
Status: DISCONTINUED | OUTPATIENT
Start: 2025-02-19 | End: 2025-02-25 | Stop reason: HOSPADM

## 2025-02-19 RX ORDER — LITHIUM CARBONATE 300 MG/1
300 TABLET, FILM COATED, EXTENDED RELEASE ORAL EVERY 12 HOURS SCHEDULED
Status: DISCONTINUED | OUTPATIENT
Start: 2025-02-19 | End: 2025-02-25 | Stop reason: HOSPADM

## 2025-02-19 RX ADMIN — Medication 3 MG: at 21:08

## 2025-02-19 RX ADMIN — TRAZODONE HYDROCHLORIDE 100 MG: 100 TABLET ORAL at 21:16

## 2025-02-19 RX ADMIN — OLANZAPINE 5 MG: 5 TABLET, FILM COATED ORAL at 17:35

## 2025-02-19 RX ADMIN — LITHIUM CARBONATE 300 MG: 300 TABLET, EXTENDED RELEASE ORAL at 13:40

## 2025-02-19 RX ADMIN — LITHIUM CARBONATE 300 MG: 300 TABLET, EXTENDED RELEASE ORAL at 21:08

## 2025-02-19 RX ADMIN — SITAGLIPTIN 50 MG: 50 TABLET, FILM COATED ORAL at 08:42

## 2025-02-19 RX ADMIN — OLANZAPINE 5 MG: 5 TABLET, FILM COATED ORAL at 13:40

## 2025-02-19 NOTE — PLAN OF CARE
Problem: Ineffective Coping  Goal: Participates in unit activities  Description: Interventions:  - Provide therapeutic environment   - Provide required programming   - Redirect inappropriate behaviors   Outcome: Not Progressing   Patient new to the unit; will encourage groups daily.

## 2025-02-19 NOTE — TREATMENT PLAN
TREATMENT PLAN REVIEW - Behavioral Health Osei Trimble 49 y.o. 1975 male MRN: [id number]    [facility] Room / Bed: [facility] 205/[facility] 205-02 Encounter: [id number]        Admit Date/Time:  2/18/2025  1:15 PM    Treatment Team:   MD Jesneia Moore LPC Paul F Klose, RN Nicole L Saas Cailie Niebell Celeste Pawling Dakota Woodard, RN Isabelle Kenna Jewel Priester Sarah Matika, RD    Diagnosis: Active Problems:    Explosive personality disorder (HCC)    Methamphetamine-induced psychotic disorder (HCC)      Patient Strengths/Assets: capable of independent living, good past treatment response, negotiates basic needs, patient is on a voluntary commitment, stable housing      Patient Barriers/Limitations: lack of stable employment, substance abuse    Short Term Goals: decrease in paranoid thoughts, decrease in psychotic symptoms    Long Term Goals: stabilization of mood, resolution of psychotic symptoms    Progress Towards Goals: starting psychiatric medications as prescribed    Recommended Treatment: medication management, patient medication education, group therapy, milieu therapy, continued Behavioral Health psychiatric evaluation/assessment process     Treatment Frequency: daily medication monitoring, group and milieu therapy daily, monitoring through interdisciplinary rounds, monitoring through weekly patient care conferences    Expected Discharge Date: 7 days - 2/26/2025    Discharge Plan: referrals as indicated    Treatment Plan Created/Updated By: Odilia Gill MD

## 2025-02-19 NOTE — NUTRITION
"   02/19/25 1437   Biochemical Data,Medical Tests, and Procedures   Biochemical Data/Medical Tests/Procedures Lab values reviewed;Meds reviewed   Labs (Comment) 2/18 rosales:1.19, HDL:36, LDL:127, Vit D:<7.0, WBC:12.54, positive amph/meth, A1c:5.6% 2/17 TSH:11.204.   Meds (Comment) Vit B12, Vit D, folvite, atarax, haldol, ativan, melatonin, zyprexa, inderal, januvia, desyrel   Nutrition-Focused Physical Exam   Nutrition-Focused Physical Exam Findings RN skin assessment reviewed;No skin issues documented   Nutrition-Focused Physical Exam Findings Trace RLE edema. Missing teeth.   Medical-Related Concerns Allergic     Bipolar disorder in partial remission (HCC)     Diabetes (HCC)     Erectile dysfunction  unspecified erectile dysfunction type   Hypertension     Kidney stone   Adequacy of Intake   Nutrition Modality PO   Feeding Route   PO Independent   Current PO Intake   Current Diet Order CCD 2 diet thin liquids   Current Meal Intake %   Estimated calorie intake compared to estimated need Nutrient needs met.   PES Statement   Problem Clinical   Weight (3) Unintended weight loss NC-3.2   Related to Decreased appetite;Depression   As evidenced by: Weight loss;Per patient/family interview   Recommendations/Interventions   Malnutrition/BMI Present Yes   Adult Malnutrition type Chronic illness   Adult Degree of Malnutrition Malnutrition of moderate degree   Malnutrition Characteristics Inadequate energy;Weight loss   360 Statement Malnutrition related to inadequate energy intake as evidenced by 16#(7%) weight loss from 1/16/# to 2/18/# and <75% energy intake compared to estimated needs > 1 month.   Summary Weight change, 24#-33#, poor PO; MST-4. CCD 2 diet thin liquids. Meal completions mostly 100%. Reports no diet plan. Appetite is \"so-so\". Consumes at least one meal per day. Lives alone. Reports he does not consume many vegetables- eats only peas and corn. Reports his sister checks his BG in the morning " [facility] [address] [place] [address]  Cardiology Office Note    Artemio Barahona 71 y o  male MRN: [id number]    [birth year]          Assessment/Plan:  1  TBS s/p MDT DC PPM  • Patient follows with device clinic  2   PAT versus flutter  • Patient noted to have 1 episode of elevated heart rate lasting for 19 seconds on most recent device interrogation  • Continue sotalol 80 mg twice daily  • [id number] 1, continue aspirin 81 mg daily  • QTc 475 on ECG today    3  Hyperlipidemia  • Continue atorvastatin 20 mg daily      Follow up: 6 months or sooner as needed    Recommend aggressive risk factor modification and therapeutic lifestyle changes  Low-salt, low-calorie, low-fat, low-cholesterol diet with regular exercise and to optimize weight  Discussed concepts of atherosclerosis, including signs and symptoms of cardiac disease  Previous studies were reviewed  Safety measures were reviewed  All questions and concerns addressed  Patient was advised to report any problems requiring medical attention  1  Atrial flutter, unspecified type ([address] )  POCT ECG      2  Tachy-juwan syndrome (HCC)            HPI: Artemio Barahona is a 71y o  year old male with PMH of hyperlipidemia, tachybradycardia syndrome s/p MDT PPM, atrial flutter who presents for routine follow-up  Patient is known to Dr Jin Jaeger    Patient doing well from a cardiac standpoint  Patient denies any chest pain or shortness of breath  He denies any palpitations  He follows with the device clinic regularly  He is on aspirin 81 mg and denies any bleeding issues  Patient notes that he is minimally active and wants to start exercising  Patient works from home  The patient denies chest pain, dyspnea on exertion or rest, lower extremity edema, or palpitations and was instructed to call  the office or seek medical attention if any such symptoms develop   All medications reviewed and and occasionally at night. Tomato allergy; patient states he can eat things such as ketchup or tomato sauce but can not eat tomatoes plain. 2/18/#; 1/16/#, 16#(7%) loss in 1 month, significant; 9/10/#, 27#(12%) loss; 2/29/#, 33#(14%) loss. Weight loss unintentional. Trace RLE edema. Missing teeth. Skin intact. Discussed supplements, patient not agreeable as he is discharge focused. Current diet explained.   Interventions/Recommendations Continue current diet order   Education Assessment   Education Patient/caregiver not appropriate for education at this time;Patient declined nutrition education   Patient Nutrition Goals   Goal Avoid weight loss;Meet PO needs   Goal Status Initiated   Timeframe to complete goal by next f/u   Nutrition Complexity Risk   Nutrition complexity level Moderate risk   Follow up date 02/28/25        patient is tolerating medications without side effects  Social history:   Patient denies tobacco, rare alcohol use  EKG- Atrial fibrillation, RBBB, left anterior fascicular block- further review appears to be sinus rhythm with PACs           Patient Active Problem List   Diagnosis   • History of skin cancer   • Acid reflux disease   • Allergic rhinitis   • Anxiety, generalized   • Asthma   • Benign prostatic hyperplasia   • Hyperlipidemia   • Health maintenance examination   • Elevated PSA   • Vitamin D deficiency   • Sick sinus syndrome (HCC)   • Symptomatic sinus bradycardia   • Bifascicular block   • Tachy-juwan syndrome (HCC)   • Pacemaker       Allergies   Allergen Reactions   • Dust Mite Extract    • Molds & Smuts    • Pollen Extract    • Short Ragweed Pollen Ext    • Tree Extract          Current Outpatient Medications:   •  aspirin 81 MG tablet, Take by mouth, Disp: , Rfl:   •  atorvastatin (LIPITOR) 20 mg tablet, TAKE 1 TABLET BY MOUTH EVERY DAY, Disp: 90 tablet, Rfl: 1  •  budesonide (Pulmicort Flexhaler) 180 MCG/ACT inhaler, Inhale 2 puffs 2 (two) times a day Rinse mouth after use , Disp: 3 each, Rfl: 3  •  Calcium Carb-Cholecalciferol 1000-800 MG-UNIT TABS, Take by mouth, Disp: , Rfl:   •  citalopram (CeleXA) 20 mg tablet, TAKE 1 TABLET BY MOUTH EVERY DAY, Disp: 90 tablet, Rfl: 5  •  fluticasone (FLONASE) 50 mcg/act nasal spray, 1 spray into each nostril daily, Disp: , Rfl:   •  ipratropium (ATROVENT) 0 06 % nasal spray, 1 SPRAY INTO EACH NOSTRIL IN THE MORNING AND 1 SPRAY BEFORE BEDTIME , Disp: 15 mL, Rfl: 5  •  LORazepam (ATIVAN) 0 5 mg tablet, Take 1 tablet (0 5 mg total) by mouth every 6 (six) hours as needed for anxiety, Disp: 90 tablet, Rfl: 3  •  montelukast (SINGULAIR) 10 mg tablet, TAKE 1 TABLET BY MOUTH EVERY DAY, Disp: 90 tablet, Rfl: 1  •  omeprazole (PriLOSEC) 40 MG capsule, Take 1 capsule (40 mg total) by mouth daily, Disp: 90 capsule, Rfl: 0  •  sotalol (BETAPACE) 80 mg tablet, TAKE 1 TABLET BY MOUTH EVERY 12 HOURS , Disp: 180 tablet, Rfl: 3  •  tamsulosin (FLOMAX) 0 4 mg, Take 0 4 mg by mouth 2 (two) times a day, Disp: , Rfl:   •  amoxicillin (AMOXIL) 500 mg capsule, Before dental work  (Patient not taking: Reported on 8/8/2022), Disp: , Rfl:     Past Medical History:   Diagnosis Date   • Asthma     allergy induced   • Basal cell carcinoma    • Colon polyp    • Fatty liver    • H/O nonmelanoma skin cancer     last assessed-8/22/2017   • Hyperlipidemia    • Inflamed seborrheic keratosis    • Malignant neoplasm of skin     last assessed-1/21/2014   • Neoplasm of uncertain behavior of skin    • Nocturia    • Pneumothorax, left    • Seasonal allergies    • Sebaceous cyst    • Squamous cell carcinoma of scalp 02/01/2022    SCCIS- mid scalp   • Squamous cell carcinoma of skin of neck    • Testicular hypofunction    • Viral warts        Family History   Problem Relation Age of Onset   • Colon cancer Father        Past Surgical History:   Procedure Laterality Date   • CARDIAC CATHETERIZATION     • CARDIAC PACEMAKER PLACEMENT  05/05/2021   • CARDIAC PACEMAKER PLACEMENT     • CARDIOVASCULAR STRESS TEST  08/27/2010   • COLONOSCOPY  10/08/2008   • IR OTHER  04/15/2021   • LUNG SURGERY      for pneumothorax   • MOHS SURGERY  02/15/2022    SCCIS mid scalp- Dr Bassem Page   • PARTIAL HIP ARTHROPLASTY     • RHINOPLASTY         Social History     Socioeconomic History   • Marital status: Single     Spouse name: Not on file   • Number of children: Not on file   • Years of education: Not on file   • Highest education level: Not on file   Occupational History   • Not on file   Tobacco Use   • Smoking status: Never   • Smokeless tobacco: Never   Vaping Use   • Vaping Use: Never used   Substance and Sexual Activity   • Alcohol use: No     Comment: quit march 2020   • Drug use: No   • Sexual activity: Not on file   Other Topics Concern   • Not on file   Social History Narrative   • Not on file     Social Determinants of Health     Financial Resource Strain: Not on file   Food Insecurity: Not on file   Transportation Needs: Not on file   Physical Activity: Not on file   Stress: Not on file   Social Connections: Not on file   Intimate Partner Violence: Not on file   Housing Stability: Not on file       Review of symptoms:   Review of Systems   Constitutional: Negative for chills, diaphoresis and fever  Respiratory: Negative for cough, chest tightness and shortness of breath  Cardiovascular: Negative for chest pain, palpitations and leg swelling  Gastrointestinal: Negative for abdominal distention, blood in stool, nausea and vomiting  Genitourinary: Negative for difficulty urinating  Musculoskeletal: Negative for arthralgias and back pain  Neurological: Negative for dizziness, syncope, light-headedness and headaches  Psychiatric/Behavioral: Negative for agitation and confusion  The patient is not nervous/anxious  Vitals: /78 (BP Location: Left arm, Patient Position: Sitting, Cuff Size: Standard)   Pulse 70   Resp 16   Ht 5' 8" (1 727 m)   Wt 87 5 kg (193 lb)   SpO2 97%   BMI 29 35 kg/m²         Physical Exam:     Physical Exam  Vitals and nursing note reviewed  Constitutional:       General: He is not in acute distress  Appearance: He is well-developed  HENT:      Head: Normocephalic and atraumatic  Eyes:      Conjunctiva/sclera: Conjunctivae normal    Neck:      Vascular: No carotid bruit  Cardiovascular:      Rate and Rhythm: Normal rate and regular rhythm  Heart sounds: Normal heart sounds  No murmur heard  Pulmonary:      Effort: Pulmonary effort is normal  No respiratory distress  Breath sounds: Normal breath sounds  Abdominal:      Palpations: Abdomen is soft  Tenderness: There is no abdominal tenderness  Musculoskeletal:         General: No swelling  Cervical back: Neck supple  Right lower leg: No edema  Left lower leg: No edema     Skin: General: Skin is warm and dry  Capillary Refill: Capillary refill takes less than 2 seconds  Neurological:      Mental Status: He is alert and oriented to person, place, and time  Psychiatric:         Mood and Affect: Mood normal             Thank you for allowing me to participate in the care and evaluation of your patient  Should you have any questions, please feel free to contact me

## 2025-02-19 NOTE — PLAN OF CARE
Problem: SLEEP DISTURBANCE  Goal: Will exhibit normal sleeping pattern  Description: Interventions:  -  Assess the patients sleep pattern, noting recent changes  - Administer medication as ordered  - Decrease environmental stimuli, including noise, as appropriate during the night  - Encourage the patient to actively participate in unit groups and or exercise during the day to enhance ability to achieve adequate sleep at night  - Assess the patient, in the morning, encouraging a description of sleep experience  Outcome: Progressing

## 2025-02-19 NOTE — ASSESSMENT & PLAN NOTE
Osei is a 49 y.o. male with past psychiatric history of bipolar disorder, substance abuse, and intermittent explosive disorder who presents with auditory hallucinations and paranoia after methamphetamines use two days ago. Today, he endorses mood swings, uncontrolled anger, poor sleep and appetite, and auditory hallucinations. Upon interview, he shows tangential thought process with labile affect and paranoid thought content. Will restart home lithium 300 mg BID for mood stability and begin Zyprexa 5 mg BID for psychotic symptoms.

## 2025-02-19 NOTE — SOCIAL WORK
02/19/25    Team Meeting   Meeting Type Daily Rounds   Team Members Present   Team Members Present Physician;Nurse;;;Occupational Therapist   Physician Team Member Grecia HOGUE, Bridget Lau MD   Nursing Team Member Richardson ASTUDILLO   Social Work Team Member Leticia RASHID, Dustin RASHID, Otoniel METZ    OT Team Member Nasir CTRS   Patient/Family Present   Patient Present No   Patient's Family Present No   201, New PT   slept ,auditory hallucinations, UDS+ walks with cane, flat , appears delayed moderate Depression states unsure about anxiety intermittent outburst.

## 2025-02-19 NOTE — NURSING NOTE
Patient noted to be mostly withdrawn to self in his room majority of the evening r/t feeling fatigue. Denied SI/HI/AVH. Endorsed moderate depression. Affect flat. Q 15 min safety checks continuous and maintained.

## 2025-02-19 NOTE — MALNUTRITION/BMI
This medical record reflects one or more clinical indicators suggestive of malnutrition and/or morbid obesity.    Malnutrition Findings:   Adult Malnutrition type: Chronic illness  Adult Degree of Malnutrition: Malnutrition of moderate degree  Malnutrition Characteristics: Inadequate energy, Weight loss              360 Statement: Malnutrition related to inadequate energy intake as evidenced by 16#(7%) weight loss from 1/16/# to 2/18/# and <75% energy intake compared to estimated needs > 1 month.    Provide CCD 2 diet thin liquids as prescribed. Patient declining nutrition supplement at this time.     BMI Findings:           Body mass index is 26.65 kg/m².     See Nutrition note dated 2/19/2025 for additional details.  Completed nutrition assessment is viewable in the nutrition documentation.

## 2025-02-19 NOTE — NURSING NOTE
Patient appears to have slept throughout the night without interruption, non labored breathing noted while asleep. Q 15 min safety checks maintained.
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
## 2025-02-19 NOTE — PLAN OF CARE
Problem: DISCHARGE PLANNING - CARE MANAGEMENT  Goal: Discharge to post-acute care or home with appropriate resources  Description: INTERVENTIONS:  - Conduct assessment to determine patient/family and health care team treatment goals, and need for post-acute services based on payer coverage, community resources, and patient preferences, and barriers to discharge  - Address psychosocial, clinical, and financial barriers to discharge as identified in assessment in conjunction with the patient/family and health care team  - Arrange appropriate level of post-acute services according to patient’s   needs and preference and payer coverage in collaboration with the physician and health care team  - Communicate with and update the patient/family, physician, and health care team regarding progress on the discharge plan  - Arrange appropriate transportation to post-acute venues  Outcome: Progressing    New PT Goal initiated.

## 2025-02-19 NOTE — PROGRESS NOTES
"Progress Note - Osei Trimble 49 y.o. male MRN: [id number]    Unit/Bed#: [facility] 205-02 Encounter: [id number]        Subjective:   Patient seen and examined at bedside after reviewing the chart and discussing the case with the caring staff.      Patient examined at bedside.  Patient has no acute symptoms.    /56 this morning.  Asymptomatic.  Lisinopril held.   Labs 2/19:  vit b12 220, vit D <7.0, WBC 12.54, Tbili 1.19  Repeat CBC, CMP on 2/21/25    Physical Exam   Vitals: Blood pressure 102/56, pulse 82, temperature 97.9 °F (36.6 °C), temperature source Temporal, resp. rate 18, height 6' 1\" (1.854 m), weight 91.6 kg (202 lb), SpO2 97%.,Body mass index is 26.65 kg/m².  Constitutional: Patient in no acute distress.  HEENT: PERR, EOMI, MMM.  Cardiovascular: Normal rate and regular rhythm.    Pulmonary/Chest: Effort normal and breath sounds normal.   Abdomen: Soft, + BS, NT.    Assessment/Plan:  Osei Trimble is a(n) 49 y.o. year old male with MDD     Hypertension.  Home: lisinopril 20 mg daily and Toprol-XL 50 mg daily.  Here: lisinopril 10 mg daily.   T2DM.  Hgb A1c 6.6% on 8/26/24.  Home: metformin 500 mg twice daily.  Here: Januvia 50 mg daily (metformin held due to MEHDI).  Carb controlled diet.  Accu-Cheks twice daily.    Neuroendocrine carcinoma of small bowel.  Diagnosed 7/2022.  Pt is following with hem/onc Dr. Green on outpt basis.  He is on lanreotide on every 4-week basis (last infusion 2/5/25).  Arthralgias.  Tylenol, lidocaine patches, Voltaren gel as needed.  Drug abuse.  UDS on 2/18 +amph/meth.   Insomnia.  Patient is on melatonin 3 mg at bedtime.  MEHDI.  Now resolved.  Cr 1.61 on 2/17 -> 1.10 on 2/18.   Folate deficiency.  Continue home folic acid 1 mg daily.   Vitamin B12 deficiency.  Pt started on vitamin B12 500 mcg daily.  Vitamin D deficiency.  Pt started on vitamin D2 72878 units weekly.     The patient was discussed with Dr. Pillai and he is in agreement with the above note.  "

## 2025-02-20 LAB
ATRIAL RATE: 106 BPM
BASOPHILS # BLD AUTO: 0.02 THOUSANDS/ΜL (ref 0–0.1)
BASOPHILS NFR BLD AUTO: 0 % (ref 0–1)
EOSINOPHIL # BLD AUTO: 0.2 THOUSAND/ΜL (ref 0–0.61)
EOSINOPHIL NFR BLD AUTO: 3 % (ref 0–6)
ERYTHROCYTE [DISTWIDTH] IN BLOOD BY AUTOMATED COUNT: 13.2 % (ref 11.6–15.1)
GLUCOSE SERPL-MCNC: 129 MG/DL (ref 65–140)
GLUCOSE SERPL-MCNC: 129 MG/DL (ref 65–140)
HCT VFR BLD AUTO: 34.1 % (ref 36.5–49.3)
HGB BLD-MCNC: 11.8 G/DL (ref 12–17)
IMM GRANULOCYTES # BLD AUTO: 0.05 THOUSAND/UL (ref 0–0.2)
IMM GRANULOCYTES NFR BLD AUTO: 1 % (ref 0–2)
LYMPHOCYTES # BLD AUTO: 1.74 THOUSANDS/ΜL (ref 0.6–4.47)
LYMPHOCYTES NFR BLD AUTO: 24 % (ref 14–44)
MCH RBC QN AUTO: 30.4 PG (ref 26.8–34.3)
MCHC RBC AUTO-ENTMCNC: 34.6 G/DL (ref 31.4–37.4)
MCV RBC AUTO: 88 FL (ref 82–98)
MONOCYTES # BLD AUTO: 0.47 THOUSAND/ΜL (ref 0.17–1.22)
MONOCYTES NFR BLD AUTO: 6 % (ref 4–12)
NEUTROPHILS # BLD AUTO: 4.84 THOUSANDS/ΜL (ref 1.85–7.62)
NEUTS SEG NFR BLD AUTO: 66 % (ref 43–75)
NRBC BLD AUTO-RTO: 0 /100 WBCS
P AXIS: 39 DEGREES
PLATELET # BLD AUTO: 257 THOUSANDS/UL (ref 149–390)
PMV BLD AUTO: 9.5 FL (ref 8.9–12.7)
PR INTERVAL: 162 MS
QRS AXIS: -28 DEGREES
QRSD INTERVAL: 70 MS
QT INTERVAL: 350 MS
QTC INTERVAL: 464 MS
RBC # BLD AUTO: 3.88 MILLION/UL (ref 3.88–5.62)
T WAVE AXIS: 19 DEGREES
VENTRICULAR RATE: 106 BPM
WBC # BLD AUTO: 7.32 THOUSAND/UL (ref 4.31–10.16)

## 2025-02-20 PROCEDURE — 85025 COMPLETE CBC W/AUTO DIFF WBC: CPT

## 2025-02-20 PROCEDURE — 90673 RIV3 VACCINE NO PRESERV IM: CPT

## 2025-02-20 PROCEDURE — 99232 SBSQ HOSP IP/OBS MODERATE 35: CPT | Performed by: PSYCHIATRY & NEUROLOGY

## 2025-02-20 PROCEDURE — 90471 IMMUNIZATION ADMIN: CPT

## 2025-02-20 PROCEDURE — 93010 ELECTROCARDIOGRAM REPORT: CPT | Performed by: INTERNAL MEDICINE

## 2025-02-20 PROCEDURE — 82948 REAGENT STRIP/BLOOD GLUCOSE: CPT

## 2025-02-20 RX ADMIN — CYANOCOBALAMIN TAB 500 MCG 500 MCG: 500 TAB at 09:17

## 2025-02-20 RX ADMIN — INFLUENZA A VIRUS A/WEST VIRGINIA/30/2022 (H1N1) RECOMBINANT HEMAGGLUTININ ANTIGEN, INFLUENZA A VIRUS A/MASSACHUSETTS/18/2022 (H3N2) RECOMBINANT HEMAGGLUTININ ANTIGEN, AND INFLUENZA B VIRUS B/AUSTRIA/1359417/2021 RECOMBINANT HEMAGGLUTININ ANTIGEN 0.5 ML: 45; 45; 45 INJECTION INTRAMUSCULAR at 09:18

## 2025-02-20 RX ADMIN — LITHIUM CARBONATE 300 MG: 300 TABLET, EXTENDED RELEASE ORAL at 09:16

## 2025-02-20 RX ADMIN — FOLIC ACID 1 MG: 1 TABLET ORAL at 09:16

## 2025-02-20 RX ADMIN — OLANZAPINE 5 MG: 5 TABLET, FILM COATED ORAL at 17:13

## 2025-02-20 RX ADMIN — OLANZAPINE 5 MG: 5 TABLET, FILM COATED ORAL at 09:17

## 2025-02-20 RX ADMIN — TRAZODONE HYDROCHLORIDE 100 MG: 100 TABLET ORAL at 20:26

## 2025-02-20 RX ADMIN — LITHIUM CARBONATE 300 MG: 300 TABLET, EXTENDED RELEASE ORAL at 20:27

## 2025-02-20 RX ADMIN — SITAGLIPTIN 50 MG: 50 TABLET, FILM COATED ORAL at 09:16

## 2025-02-20 RX ADMIN — LORAZEPAM 1 MG: 1 TABLET ORAL at 20:26

## 2025-02-20 RX ADMIN — Medication 3 MG: at 20:26

## 2025-02-20 RX ADMIN — ERGOCALCIFEROL 50000 UNITS: 1.25 CAPSULE, LIQUID FILLED ORAL at 09:16

## 2025-02-20 NOTE — ASSESSMENT & PLAN NOTE
Continue lithium 300 mg BID; can obtain level on 2/24/25   Continue Zyprexa 5 mg BID  All current active medications have been reviewed  Medical management per SLIM as indicated  Continue treatment with group therapy, milieu therapy and occupational therapy  Continue frequent safety checks and vitals per unit protocol

## 2025-02-20 NOTE — NURSING NOTE
Patient compliant with meds and meals. Patient has irritable edge, minimal and guarded. Patient appears paranoid and suspicious. Patient denies all. Q 15 min behavioral and safety checks in place.

## 2025-02-20 NOTE — PROGRESS NOTES
"Progress Note - Osei Trimble 49 y.o. male MRN: [id number]    Unit/Bed#: [facility] 205-02 Encounter: [id number]        Subjective:   Patient seen and examined at bedside after reviewing the chart and discussing the case with the caring staff.      Patient examined at bedside.  Patient reports feeling tired today.  He refused to answer questions including if he had any cough, congestion, sore throat, or shortness of breath.     BP remains low this morning.  108/58.  Asymptomatic.  Lisinopril held again.   Labs 2/20:  WBC 7.32, hgb 11.8  Repeat CMP on 2/21/25    Physical Exam   Vitals: Blood pressure 108/58, pulse 71, temperature 98.3 °F (36.8 °C), temperature source Temporal, resp. rate 18, height 6' 1\" (1.854 m), weight 91.6 kg (202 lb), SpO2 95%.,Body mass index is 26.65 kg/m².  Constitutional: Patient in no acute distress.  HEENT: PERR, EOMI, MMM.  Cardiovascular: Normal rate and regular rhythm.    Pulmonary/Chest: Effort normal and breath sounds normal.   Abdomen: Soft, + BS, NT.    Assessment/Plan:  Osei Trimble is a(n) 49 y.o. year old male with MDD.     Hypertension.  Home: lisinopril 20 mg daily and Toprol-XL 50 mg daily.  Here: lisinopril d/c on 2/20 due to low BP.    T2DM.  Hgb A1c 6.6% on 8/26/24.  Home: metformin 500 mg twice daily.  Here: Januvia 50 mg daily (metformin held due to MEHDI).  Carb controlled diet.  Accu-Cheks twice daily.    Neuroendocrine carcinoma of small bowel.  Diagnosed 7/2022.  Pt is following with hem/onc Dr. Green on outpt basis.  He is on lanreotide on every 4-week basis (last infusion 2/5/25).  Arthralgias.  Tylenol, lidocaine patches, Voltaren gel as needed.  Drug abuse.  UDS on 2/18 +amph/meth.   Insomnia.  Patient is on melatonin 3 mg at bedtime.  MEHDI.  Now resolved.  Cr 1.61 on 2/17 -> 1.10 on 2/18.   Folate deficiency.  Continue home folic acid 1 mg daily.   Vitamin B12 deficiency.  Pt started on vitamin B12 500 mcg daily.  Vitamin D deficiency.  Pt started on vitamin D2 04061 " units weekly.  Malnutrition.  Dietician following.  Adult Malnutrition type: Chronic illness.  Adult Degree of Malnutrition: Malnutrition of moderate degree.  Malnutrition Characteristics: Inadequate energy, Weight loss.    The patient was discussed with Dr. Pillai and he is in agreement with the above note.

## 2025-02-20 NOTE — NURSING NOTE
"Patient visible in milieu for meals, then returns to room to sleep in bed. Guarded in conversation. Offers minimal conversation with staff, does not interact with peers. Disheveled with irritable edge. Patient appeared annoyed with nurse entering room to provide medication. Patient stated to nurse, \"I want a do not disturb sign for my door\". Delayed. Patient denied anxiety/depression, SI/HI, hallucinations. Remains medication compliant and on 15\" checks for safety and behaviors.  "

## 2025-02-20 NOTE — PROGRESS NOTES
02/20/25 1300   Activity/Group Checklist   Group Admission/Discharge   Attendance Refused     Patient was approached by CTRS 2 times 2/19/25 and 2/20/25 and he refused to respond when writer asked patient questions.

## 2025-02-20 NOTE — PLAN OF CARE
Problem: CHIN  Goal: Will exhibit normal sleep and speech and no impulsivity  Description: INTERVENTIONS:  - Administer medication as ordered  - Set limits on impulsive behavior  - Make attempts to decrease external stimuli as possible  Outcome: Progressing     Problem: PSYCHOSIS  Goal: Will report no hallucinations or delusions  Description: Interventions:  - Administer medication as  ordered  - Every waking shifts and PRN assess for the presence of hallucinations and or delusions  - Assist with reality testing to support increasing orientation  - Assess if patient's hallucinations or delusions are encouraging self-harm or harm to others and intervene as appropriate  Outcome: Progressing     Problem: ANXIETY  Goal: Will report anxiety at manageable levels  Description: INTERVENTIONS:  - Administer medication as ordered  - Teach and encourage coping skills  - Provide emotional support  - Assess patient/family for anxiety and ability to cope  Outcome: Progressing  Goal: By discharge: Patient will verbalize 2 strategies to deal with anxiety  Description: Interventions:  - Identify any obvious source/trigger to anxiety  - Staff will assist patient in applying identified coping technique/skills  - Encourage attendance of scheduled groups and activities  Outcome: Progressing     Problem: SUBSTANCE USE/ABUSE  Goal: Will have no detox symptoms and will verbalize plan for changing substance-related behavior  Description: INTERVENTIONS:  - Monitor physical status and assess for symptoms of withdrawal  - Administer medication as ordered  - Provide emotional support with 1 on 1 interaction with staff  - Encourage recovery focused program/ addiction education  - Assess for verbalization of changing behaviors related to substance abuse  - Initiate consults and referrals as appropriate (Case Management, Spiritual Care, etc.)  Outcome: Progressing  Goal: By discharge, will develop insight into their chemical dependency and  sustain motivation to continue in recovery  Description: INTERVENTIONS:  - Attends all daily group sessions and scheduled AA groups  - Actively practices coping skills through participation in the therapeutic community and adherence to program rules  - Reviews and completes assignments from individual treatment plan  - Assist patient development of understanding of their personal cycle of addiction and relapse triggers  Outcome: Progressing  Goal: By discharge, patient will have ongoing treatment plan addressing chemical dependency  Description: INTERVENTIONS:  - Assist patient with resources and/or appointments for ongoing recovery based living  Outcome: Progressing     Problem: SLEEP DISTURBANCE  Goal: Will exhibit normal sleeping pattern  Description: Interventions:  -  Assess the patients sleep pattern, noting recent changes  - Administer medication as ordered  - Decrease environmental stimuli, including noise, as appropriate during the night  - Encourage the patient to actively participate in unit groups and or exercise during the day to enhance ability to achieve adequate sleep at night  - Assess the patient, in the morning, encouraging a description of sleep experience  Outcome: Progressing     Problem: DISCHARGE PLANNING - CARE MANAGEMENT  Goal: Discharge to post-acute care or home with appropriate resources  Description: INTERVENTIONS:  - Conduct assessment to determine patient/family and health care team treatment goals, and need for post-acute services based on payer coverage, community resources, and patient preferences, and barriers to discharge  - Address psychosocial, clinical, and financial barriers to discharge as identified in assessment in conjunction with the patient/family and health care team  - Arrange appropriate level of post-acute services according to patient’s   needs and preference and payer coverage in collaboration with the physician and health care team  - Communicate with and update  the patient/family, physician, and health care team regarding progress on the discharge plan  - Arrange appropriate transportation to post-acute venues  Outcome: Progressing     Problem: Nutrition/Hydration-ADULT  Goal: Nutrient/Hydration intake appropriate for improving, restoring or maintaining nutritional needs  Description: Monitor and assess patient's nutrition/hydration status for malnutrition. Collaborate with interdisciplinary team and initiate plan and interventions as ordered.  Monitor patient's weight and dietary intake as ordered or per policy. Utilize nutrition screening tool and intervene as necessary. Determine patient's food preferences and provide high-protein, high-caloric foods as appropriate.     INTERVENTIONS:  - Monitor oral intake, urinary output, labs, and treatment plans  - Assess nutrition and hydration status and recommend course of action  - Evaluate amount of meals eaten  - Assist patient with eating if necessary   - Allow adequate time for meals  - Recommend/ encourage appropriate diets, oral nutritional supplements, and vitamin/mineral supplements  - Order, calculate, and assess calorie counts as needed  - Recommend, monitor, and adjust tube feedings and TPN/PPN based on assessed needs  - Assess need for intravenous fluids  - Provide specific nutrition/hydration education as appropriate  - Include patient/family/caregiver in decisions related to nutrition  Outcome: Progressing

## 2025-02-20 NOTE — NURSING NOTE
"Patient out in community, minimally social.  Guarded during assessment.  No current acute behaviors noted.  Denies current suicidal ideations, anxiety and depression. Complained of \"not feeling well\", and cold symptoms.  Maintained on q 15 minute safety checks.  Continues on fall risk protocols.  Will continue to monitor.   "

## 2025-02-20 NOTE — SOCIAL WORK
Sw attempted to meet with PT with Providers , Provider attempted to speak with PT about discharge PT stated that he was going home tomorrow . Provider attempted to explain PT continued to repeat that he was going home. Sw introduced herself PT did not respond. PT did tell providers that he was tired . Team will attempt to meet with PT again to discuss treatment and discharge .

## 2025-02-20 NOTE — NURSING NOTE
No aggression, signs of withdrawal or complaint of hallucinations overnight. Patient observed sleeping during most Q 15 minute safety checks. Safety maintained via clutter-free environment, ID band, and given non-skid socks.  No suicidal ideations, acting out or homicidal behaviors.  No change in medical condition or complaints voiced.  Fluids maintained at bedside to promote hydration.       Adequate: hears normal conversation without difficulty

## 2025-02-20 NOTE — SOCIAL WORK
Sw attempted to meet with PT . PT was laying in bed Sw called out PT did not response . Sw walked over to PT and PT closed his eyes . PT will attempt to engage PT again .

## 2025-02-20 NOTE — SOCIAL WORK
Sw reached out to ariadne  PT's sister at [phone] to discuss PT. Sw stated that PT has been withdrawn to room minimal in conversation and not attending groups but does come out for meals. Ariadne stated that it was explained to PT at the ED that he would be released in 72 hrs but then stated that PT was told that he may be held until Tuesday. RIVERA explained the 72 hour notice to Ariadne and also explained that PT just came yesterday and that at this time the team did not have a Discharge date for PT . Ariadne stated that she understood . Rivera asked Ariadne if there was any information that she could provide about PT . Ariadne stated that  PT lays around his home and is pretty much the same way minimal . Ariadne stated that PT was seeing Dennys Morin for Psychiatry but that she did not feel like it was helping PT . Rivera stated that PT was stating that he wanted to leave tomorrow and that it would benefit to stay but we can not force him . Ariadne stated that she understood . Sw provided contact information for future communication and call ended mutually.

## 2025-02-20 NOTE — PROGRESS NOTES
"Progress Note - Behavioral Health   Name: Osei Trimble 49 y.o. male I MRN: [id number]  Unit/Bed#: [facility] 205-02 I Date of Admission: 2/18/2025   Date of Service: 2/20/2025 I Hospital Day: 2     Assessment & Plan  Methamphetamine-induced psychotic disorder (HCC)  Continue lithium 300 mg BID; can obtain level on 2/24/25   Continue Zyprexa 5 mg BID  All current active medications have been reviewed  Medical management per SLIM as indicated  Continue treatment with group therapy, milieu therapy and occupational therapy  Continue frequent safety checks and vitals per unit protocol  Explosive personality disorder (HCC)        Legal Status: 201  Disposition: To be determined, coordinating with case management    ------------------------------------------------------------      Subjective: Patient's chart was reviewed and patient's progress and plan was discussed with treatment team. Per nursing report, Osei has been minimal and guarded with irritable edge; appears paranoid and suspicious. Denies current suicidal ideations, anxiety and depression. Complained of \"not feeling well\", and cold symptoms. Appears to have slept well overnight.     Osei was evaluated this morning for continuity of care. Upon evaluation, Osei is found lying in bed and engages minimally with assessment. He states his mood is \"tired\" with flat affect. Speech is scant and delayed with low volume. Questions need to be repeated multiple times before Osei responds. He admits to continued auditory hallucinations but reports a decrease in intensity and frequency of the voices. He states that he will be leaving today but seems to agree when continued stay is suggested by provider due to ongoing psychotic symptoms. Denies SI/HI/AVH. Remains medication compliant.           Psychiatric Review of Systems:  Behavior over the last 24 hours: unchanged  Sleep: hypersomnia  Appetite: adequate  Medication side effects: none verbalized  Medical ROS: Complete " "review of systems is negative except as noted above.    Vital signs in last 24 hours:  Temp:  [98.3 °F (36.8 °C)-98.5 °F (36.9 °C)] 98.3 °F (36.8 °C)  HR:  [69-75] 71  BP: ()/(58-60) 108/58  Resp:  [18] 18  SpO2:  [95 %-100 %] 95 %  O2 Device: None (Room air)    Mental Status Evaluation:    Appearance:  disheveled, dressed in hospital attire, looks older than stated age   Behavior:  slow responses   Speech:  scant, delayed, decreased volume    Mood:  \"tired\"   Affect:  blunted   Thought Process:  slowing of thoughts   Associations: thought blocking   Thought Content:  mild paranoia    Perceptual Disturbances: auditory hallucinations of multiple voices, no visual hallucinations    Risk Potential: Suicidal ideation - None at present  Homicidal ideation - None at present   Sensorium:  oriented to person, place, and time/date         Memory:  recent and remote memory grossly intact      Consciousness:  alert and awake      Attention: poor concentration and poor attention span      Insight:  poor      Judgment: poor      Gait/Station: normal gait/station      Motor Activity: no abnormal movements       Laboratory results: I have personally reviewed all pertinent laboratory/tests results.  Recent Results (from the past 48 hours)   Rapid drug screen, urine    Collection Time: 02/18/25 11:07 AM   Result Value Ref Range    Amph/Meth UR Positive (A) Negative    Barbiturate Ur Negative Negative    Benzodiazepine Urine Negative Negative    Cocaine Urine Negative Negative    Methadone Urine Negative Negative    Opiate Urine Negative Negative    PCP Ur Negative Negative    THC Urine Negative Negative    Oxycodone Urine Negative Negative    Fentanyl Urine Negative Negative    HYDROCODONE URINE Negative Negative   UA w Reflex to Microscopic w Reflex to Culture    Collection Time: 02/18/25 11:07 AM    Specimen: Urine, Clean Catch   Result Value Ref Range    Color, UA Straw Yellow, Straw    Clarity, UA Clear Hazy, Clear    " Specific Gravity, UA 1.020 >1.005 - <1.030    pH, UA 6.0 5.0, 5.5, 6.0, 6.5, 7.0, 7.5    Leukocytes, UA Negative Negative    Nitrite, UA Negative Negative    Protein, UA Negative Negative, Interference- unable to analyze mg/dl    Glucose, UA Negative Negative mg/dl    Ketones, UA Trace (A) Negative mg/dl    Urobilinogen, UA 0.2 0.2, 1.0 E.U./dl E.U./dl    Bilirubin, UA Negative Negative    Occult Blood, UA Negative Negative   Comprehensive metabolic panel    Collection Time: 02/18/25  5:15 PM   Result Value Ref Range    Sodium 135 135 - 147 mmol/L    Potassium 4.4 3.5 - 5.3 mmol/L    Chloride 101 96 - 108 mmol/L    CO2 26 21 - 32 mmol/L    ANION GAP 8 4 - 13 mmol/L    BUN 18 5 - 25 mg/dL    Creatinine 1.10 0.60 - 1.30 mg/dL    Glucose 100 65 - 140 mg/dL    Calcium 9.9 8.4 - 10.2 mg/dL    AST 29 13 - 39 U/L    ALT 23 7 - 52 U/L    Alkaline Phosphatase 95 34 - 104 U/L    Total Protein 7.5 6.4 - 8.4 g/dL    Albumin 4.7 3.5 - 5.0 g/dL    Total Bilirubin 1.19 (H) 0.20 - 1.00 mg/dL    eGFR 77 ml/min/1.73sq m   CBC and differential    Collection Time: 02/18/25  5:15 PM   Result Value Ref Range    WBC 12.54 (H) 4.31 - 10.16 Thousand/uL    RBC 4.66 3.88 - 5.62 Million/uL    Hemoglobin 14.0 12.0 - 17.0 g/dL    Hematocrit 41.9 36.5 - 49.3 %    MCV 90 82 - 98 fL    MCH 30.0 26.8 - 34.3 pg    MCHC 33.4 31.4 - 37.4 g/dL    RDW 13.5 11.6 - 15.1 %    MPV 9.9 8.9 - 12.7 fL    Platelets 358 149 - 390 Thousands/uL    nRBC 0 /100 WBCs    Segmented % 81 (H) 43 - 75 %    Immature Grans % 0 0 - 2 %    Lymphocytes % 12 (L) 14 - 44 %    Monocytes % 4 4 - 12 %    Eosinophils Relative 3 0 - 6 %    Basophils Relative 0 0 - 1 %    Absolute Neutrophils 10.07 (H) 1.85 - 7.62 Thousands/µL    Absolute Immature Grans 0.03 0.00 - 0.20 Thousand/uL    Absolute Lymphocytes 1.53 0.60 - 4.47 Thousands/µL    Absolute Monocytes 0.55 0.17 - 1.22 Thousand/µL    Eosinophils Absolute 0.33 0.00 - 0.61 Thousand/µL    Basophils Absolute 0.03 0.00 - 0.10  Thousands/µL   Vitamin D 25 hydroxy    Collection Time: 02/18/25  5:15 PM   Result Value Ref Range    Vit D, 25-Hydroxy <7.0 (L) 30.0 - 100.0 ng/mL   Lipid panel    Collection Time: 02/18/25  5:15 PM   Result Value Ref Range    Cholesterol 189 See Comment mg/dL    Triglycerides 129 See Comment mg/dL    HDL, Direct 36 (L) >=40 mg/dL    LDL Calculated 127 (H) 0 - 100 mg/dL    Non-HDL-Chol (CHOL-HDL) 153 mg/dl   Total Syphilis (IgG/IgM) Screening    Collection Time: 02/18/25  5:15 PM   Result Value Ref Range    Treponema Pallidium Total Antibodies Non-reactive Non-reactive   Hemoglobin A1C    Collection Time: 02/18/25  5:15 PM   Result Value Ref Range    Hemoglobin A1C 5.6 Normal 4.0-5.6%; PreDiabetic 5.7-6.4%; Diabetic >=6.5%; Glycemic control for adults with diabetes <7.0% %     mg/dl   Fingerstick Glucose (POCT)    Collection Time: 02/18/25  5:19 PM   Result Value Ref Range    POC Glucose 85 65 - 140 mg/dl   Vitamin B12    Collection Time: 02/18/25  8:40 PM   Result Value Ref Range    Vitamin B-12 220 180 - 914 pg/mL   Folate    Collection Time: 02/18/25  8:40 PM   Result Value Ref Range    Folate >22.3 >5.9 ng/mL   Fingerstick Glucose (POCT)    Collection Time: 02/19/25  7:19 AM   Result Value Ref Range    POC Glucose 131 65 - 140 mg/dl   Fingerstick Glucose (POCT)    Collection Time: 02/19/25  4:18 PM   Result Value Ref Range    POC Glucose 138 65 - 140 mg/dl   CBC and differential    Collection Time: 02/20/25  5:35 AM   Result Value Ref Range    WBC 7.32 4.31 - 10.16 Thousand/uL    RBC 3.88 3.88 - 5.62 Million/uL    Hemoglobin 11.8 (L) 12.0 - 17.0 g/dL    Hematocrit 34.1 (L) 36.5 - 49.3 %    MCV 88 82 - 98 fL    MCH 30.4 26.8 - 34.3 pg    MCHC 34.6 31.4 - 37.4 g/dL    RDW 13.2 11.6 - 15.1 %    MPV 9.5 8.9 - 12.7 fL    Platelets 257 149 - 390 Thousands/uL    nRBC 0 /100 WBCs    Segmented % 66 43 - 75 %    Immature Grans % 1 0 - 2 %    Lymphocytes % 24 14 - 44 %    Monocytes % 6 4 - 12 %    Eosinophils  Relative 3 0 - 6 %    Basophils Relative 0 0 - 1 %    Absolute Neutrophils 4.84 1.85 - 7.62 Thousands/µL    Absolute Immature Grans 0.05 0.00 - 0.20 Thousand/uL    Absolute Lymphocytes 1.74 0.60 - 4.47 Thousands/µL    Absolute Monocytes 0.47 0.17 - 1.22 Thousand/µL    Eosinophils Absolute 0.20 0.00 - 0.61 Thousand/µL    Basophils Absolute 0.02 0.00 - 0.10 Thousands/µL   Fingerstick Glucose (POCT)    Collection Time: 02/20/25  7:35 AM   Result Value Ref Range    POC Glucose 129 65 - 140 mg/dl        Current Medications:  Current Facility-Administered Medications   Medication Dose Route Frequency Provider Last Rate    acetaminophen  650 mg Oral Q4H PRN Makayla Fine MD      acetaminophen  650 mg Oral Q4H PRN Makayla Fine MD      acetaminophen  975 mg Oral Q6H PRN Makayla iFne MD      aluminum-magnesium hydroxide-simethicone  30 mL Oral Q4H PRN Makayla Fine MD      cyanocobalamin  500 mcg Oral Daily Lakesha L Dagnall, PA-C      Diclofenac Sodium  2 g Topical 4x Daily PRN Lakesha L Dagnall, PA-C      ergocalciferol  50,000 Units Oral Weekly Lakesha L Dagnall, PA-C      folic acid  1 mg Oral Daily Lakesha L Dagnall, PA-C      haloperidol lactate  5 mg Intramuscular Q4H PRN Max 4/day Makayla Fine MD      hydrOXYzine HCL  50 mg Oral Q6H PRN Max 4/day Jef Paige MD      hydrOXYzine HCL  75 mg Oral Q6H PRN Max 4/day Jef Paige MD      influenza vaccine  0.5 mL Intramuscular Once Lakesha L Dagnall, PA-C      lidocaine  2 patch Topical Daily PRN Lakesha L Dagnall, PA-C      lisinopril  10 mg Oral Daily Jordan Pillai MD      lithium carbonate  300 mg Oral Q12H [facility] Odilia Gill MD      LORazepam  1 mg Oral Q8H PRN Jef Paige MD      melatonin  3 mg Oral HS Makayla Fine MD      nicotine polacrilex  4 mg Oral Q2H PRN Makayla Fine MD      OLANZapine  5 mg Oral BID Odilia Gill MD      propranolol  5 mg Oral Q8H PRN Makayla Fine MD      risperiDONE  0.5 mg Oral Q4H PRN Max 6/day  Jef Paige MD      risperiDONE  1 mg Oral Q4H PRN Max 3/day Jef Paige MD      risperiDONE  1.5 mg Oral Q2H PRN Max 3/day Jef Paige MD      sitaGLIPtin  50 mg Oral Daily Jordan Pillai MD      traZODone  100 mg Oral HS PRN Jef Paige MD         Risks, benefits and possible side effects of Medications: Risks, benefits, and possible side effects of medications have been explained to the patient, who verbalizes understanding    Counseling / Coordination of Care:  Total floor / unit time spent today 25 minutes. Greater than 50% of total time was spent with the patient and / or family counseling and / or coordination of care. A description of counseling / coordination of care:  Patient's progress discussed with staff in treatment team meeting.  Medications, treatment progress and treatment plan reviewed with patient.        Odilia Gill MD 02/20/25  Psychiatry Resident, PGY-2

## 2025-02-20 NOTE — SOCIAL WORK
02/20/25    Team Meeting   Meeting Type Daily Rounds   Team Members Present   Team Members Present Physician;Nurse;;Occupational Therapist   Physician Team Member Grecia HOGUE, Bridget HOGUE   Nursing Team Member Mireille ASTUDILLO   Social Work Team Member Leticia RASHID , GAY Vila.   OT Team Member Nasir CTRS   Patient/Family Present   Patient Present No   Patient's Family Present No   201,withdrawn guarded minimal irritable edge. No D/C date Med management.

## 2025-02-21 LAB
ALBUMIN SERPL BCG-MCNC: 4.1 G/DL (ref 3.5–5)
ALP SERPL-CCNC: 71 U/L (ref 34–104)
ALT SERPL W P-5'-P-CCNC: 16 U/L (ref 7–52)
ANION GAP SERPL CALCULATED.3IONS-SCNC: 7 MMOL/L (ref 4–13)
AST SERPL W P-5'-P-CCNC: 18 U/L (ref 13–39)
BILIRUB SERPL-MCNC: 0.61 MG/DL (ref 0.2–1)
BUN SERPL-MCNC: 14 MG/DL (ref 5–25)
CALCIUM SERPL-MCNC: 9.3 MG/DL (ref 8.4–10.2)
CHLORIDE SERPL-SCNC: 103 MMOL/L (ref 96–108)
CO2 SERPL-SCNC: 25 MMOL/L (ref 21–32)
CREAT SERPL-MCNC: 0.9 MG/DL (ref 0.6–1.3)
GFR SERPL CREATININE-BSD FRML MDRD: 99 ML/MIN/1.73SQ M
GLUCOSE P FAST SERPL-MCNC: 141 MG/DL (ref 65–99)
GLUCOSE SERPL-MCNC: 127 MG/DL (ref 65–140)
GLUCOSE SERPL-MCNC: 141 MG/DL (ref 65–140)
GLUCOSE SERPL-MCNC: 143 MG/DL (ref 65–140)
POTASSIUM SERPL-SCNC: 3.9 MMOL/L (ref 3.5–5.3)
PROT SERPL-MCNC: 6.7 G/DL (ref 6.4–8.4)
SODIUM SERPL-SCNC: 135 MMOL/L (ref 135–147)

## 2025-02-21 PROCEDURE — 99232 SBSQ HOSP IP/OBS MODERATE 35: CPT | Performed by: PSYCHIATRY & NEUROLOGY

## 2025-02-21 PROCEDURE — 82948 REAGENT STRIP/BLOOD GLUCOSE: CPT

## 2025-02-21 PROCEDURE — 80053 COMPREHEN METABOLIC PANEL: CPT

## 2025-02-21 RX ORDER — OLANZAPINE 10 MG/1
10 TABLET ORAL
Status: DISCONTINUED | OUTPATIENT
Start: 2025-02-21 | End: 2025-02-25 | Stop reason: HOSPADM

## 2025-02-21 RX ORDER — OLANZAPINE 5 MG/1
5 TABLET ORAL DAILY
Status: DISCONTINUED | OUTPATIENT
Start: 2025-02-22 | End: 2025-02-25 | Stop reason: HOSPADM

## 2025-02-21 RX ADMIN — OLANZAPINE 5 MG: 5 TABLET, FILM COATED ORAL at 08:42

## 2025-02-21 RX ADMIN — TRAZODONE HYDROCHLORIDE 100 MG: 100 TABLET ORAL at 21:49

## 2025-02-21 RX ADMIN — FOLIC ACID 1 MG: 1 TABLET ORAL at 08:42

## 2025-02-21 RX ADMIN — LITHIUM CARBONATE 300 MG: 300 TABLET, EXTENDED RELEASE ORAL at 08:42

## 2025-02-21 RX ADMIN — Medication 3 MG: at 21:49

## 2025-02-21 RX ADMIN — CYANOCOBALAMIN TAB 500 MCG 500 MCG: 500 TAB at 08:42

## 2025-02-21 RX ADMIN — SITAGLIPTIN 50 MG: 50 TABLET, FILM COATED ORAL at 08:42

## 2025-02-21 RX ADMIN — LITHIUM CARBONATE 300 MG: 300 TABLET, EXTENDED RELEASE ORAL at 21:49

## 2025-02-21 RX ADMIN — OLANZAPINE 10 MG: 10 TABLET, FILM COATED ORAL at 21:49

## 2025-02-21 NOTE — ASSESSMENT & PLAN NOTE
Continue lithium 300 mg BID; can obtain level on 2/24/25   Continue Zyprexa 5 mg in the morning and increase to Zyprexa 10 mg at night  All current active medications have been reviewed  Medical management per SLIM as indicated  Continue treatment with group therapy, milieu therapy and occupational therapy  Continue frequent safety checks and vitals per unit protocol

## 2025-02-21 NOTE — PROGRESS NOTES
"Progress Note - Osei Trimble 49 y.o. male MRN: [id number]    Unit/Bed#: [facility] 205-02 Encounter: [id number]        Subjective:   Patient seen and examined at bedside after reviewing the chart and discussing the case with the caring staff.      Patient examined at bedside.  Patient refused to answer any questions.  No reported acute symptoms.     Labs 2/21:  Cr 0.90    Physical Exam   Vitals: Blood pressure 129/62, pulse 72, temperature 98.3 °F (36.8 °C), temperature source Temporal, resp. rate 16, height 6' 1\" (1.854 m), weight 91.6 kg (202 lb), SpO2 97%.,Body mass index is 26.65 kg/m².  Constitutional: Patient in no acute distress.  HEENT: PERR, EOMI, MMM.  Cardiovascular: Normal rate and regular rhythm.    Pulmonary/Chest: Effort normal and breath sounds normal.   Abdomen: Soft, + BS, NT.    Assessment/Plan:  Osei Trimble is a(n) 49 y.o. year old male with MDD.     Hypertension.  Home: lisinopril 20 mg daily and Toprol-XL 50 mg daily.  Here: lisinopril d/c on 2/20 due to low BP.    T2DM.  Hgb A1c 6.6% on 8/26/24.  Home: metformin 500 mg twice daily.  Here: Januvia 50 mg daily (metformin held due to MEHDI).  Carb controlled diet.  Accu-Cheks twice daily.    Neuroendocrine carcinoma of small bowel.  Diagnosed 7/2022.  Pt is following with hem/onc Dr. Green on outpt basis.  He is on lanreotide on every 4-week basis (last infusion 2/5/25).  Arthralgias.  Tylenol, lidocaine patches, Voltaren gel as needed.  Drug abuse.  UDS on 2/18 +amph/meth.   Insomnia.  Patient is on melatonin 3 mg at bedtime.  Folate deficiency.  Continue home folic acid 1 mg daily.   Vitamin B12 deficiency.  Pt started on vitamin B12 500 mcg daily.  Vitamin D deficiency.  Pt started on vitamin D2 32820 units weekly.  Malnutrition.  Dietician following.  Adult Malnutrition type: Chronic illness.  Adult Degree of Malnutrition: Malnutrition of moderate degree.  Malnutrition Characteristics: Inadequate energy, Weight loss.  MEHDI.  Now resolved.  Cr " 1.61 -> 1.10 -> 0.90 on 2/21.  Avoid nephrotoxins.      The patient was discussed with Dr. Pillai and he is in agreement with the above note.

## 2025-02-21 NOTE — NURSING NOTE
"Patient visible in milieu for breakfast and lunch then withdraws to room to rest in bed. Mood irritable, affect depressed/flat. Patient offers minimal verbalization with staff, does not interact with peers. Verbal responses delayed, guarded. Patient denies anxiety/depression, SI/HI, visual hallucinations. Patient does endorse auditory hallucinations and did state he \"can deal with them now\". Sister, Ariadne, called and updated on patient's condition. Attended group x 1. Remains medication compliant and on 15\" checks for safety and behaviors.  "

## 2025-02-21 NOTE — NURSING NOTE
No further anxiety or agitation this shift. Patient observed sleeping during most Q 15 minute safety checks. Safety maintained via clutter-free environment, ID band, and given non-skid socks.  No suicidal ideations, acting out or homicidal behaviors.  No change in medical condition or complaints voiced.  Fluids maintained at bedside to promote hydration.

## 2025-02-21 NOTE — NURSING NOTE
Patient was compliant with medication regime.  Attended and participated in unit activities. Pt. refrained from self-harm, maintained appropriate behaviors, and had no suicidal ideations.  Anxiety level was stated as high . Depression level was stated as mild . Denied current homicidal ideations and/or audio or visual hallucinations. Hostile and angry at beginning of shift. (See previous notes). Calm and more social the second half of shift. Safety maintained via clutter-free environment, ID band, and given non-skid socks. Fluids maintained at beside to promote hydrations. Medication education given. Care Plan to be reviewed and amended. Maintained on q 15 minute safety checks.  Will continue to monitor.

## 2025-02-21 NOTE — SOCIAL WORK
02/21/25    Team Meeting   Meeting Type Daily Rounds   Team Members Present   Team Members Present Physician;Nurse;;Occupational Therapist   Physician Team Member Grecia HOGUE, Corby JAY   Nursing Team Member Mireille   Social Work Team Member Leticia RASHID, Julito RASHID, Otoniel METZ   OT Team Member Nasir SMITH   Patient/Family Present   Patient Present No   Patient's Family Present No   201, hostile, angry , about being lied to , ativan 1 mg HS trazodone , out for breakfast , minimal guarded , Auditory hallucinations but are not command . No D/C med adjustments.

## 2025-02-21 NOTE — PROGRESS NOTES
Patient out in community room, socializing well. No further hostility or anxiety noted. Casey Scale 6   02/20/25 2126   Casey Anxiety Scale   Anxious Mood 1   Tension 1   Fears 0   Insomnia 0   Intellectual 1   Depressed Mood 1   Somatic Complaints: Muscular 0   Somatic Complaints: Sensory 0   Cardiovascular Symptoms 0   Respiratory Symptoms 0   Gastrointestinal Symptoms 0   Genitourinary Symptoms 0   Autonomic Symptoms 1   Behavior at Interview 1   Casey Anxiety Score 6

## 2025-02-21 NOTE — PROGRESS NOTES
"Progress Note - Behavioral Health   Name: Osei Trimble 49 y.o. male I MRN: [id number]  Unit/Bed#: [facility] 205-02 I Date of Admission: 2/18/2025   Date of Service: 2/21/2025 I Hospital Day: 3     Assessment & Plan  Methamphetamine-induced psychotic disorder (HCC)  Continue lithium 300 mg BID; can obtain level on 2/24/25   Continue Zyprexa 5 mg in the morning and increase to Zyprexa 10 mg at night  All current active medications have been reviewed  Medical management per SLIM as indicated  Continue treatment with group therapy, milieu therapy and occupational therapy  Continue frequent safety checks and vitals per unit protocol  Explosive personality disorder (HCC)        Legal Status: 201  Disposition: To be determined, coordinating with case management    ------------------------------------------------------------      Subjective: Patient's chart was reviewed and patient's progress and plan was discussed with treatment team. Per nursing report, Osei has been visible in milieu for meals, then returns to room to sleep in bed. Guarded in conversation. Offers minimal conversation with staff, does not interact with peers. Disheveled with irritable edge. He became restless and upset about \"not getting out after 72 hours\", hostile and guarded with severe anxiety. Ativan 1 mg PRN was given around 8 pm. He attended and participated in unit activities in the evening; anxiety level was stated as high. Slept well overnight.      Osei was evaluated this morning for continuity of care. Upon evaluation, Osei is irritable and uncooperative, lying in bed with his eyes closed. He does not answer when asked about his mood and appears hostile when he does acknowledge provider. Admits to continued auditory hallucinations that are now more manageable but does not elaborate on the content of his hallucinations. Does not respond when asked about suicidal or homicidal ideation. Remains medication compliant.           Psychiatric Review " of Systems:  Behavior over the last 24 hours: unchanged  Sleep: hypersomnia  Appetite: adequate  Medication side effects: none verbalized  Medical ROS: Complete review of systems is negative except as noted above.    Vital signs in last 24 hours:  Temp:  [98.3 °F (36.8 °C)-98.4 °F (36.9 °C)] 98.3 °F (36.8 °C)  HR:  [67-80] 72  BP: (108-129)/(56-65) 129/62  Resp:  [16-18] 16  SpO2:  [97 %] 97 %  O2 Device: None (Room air)    Mental Status Evaluation:    Appearance:  disheveled, marginal hygiene, looks stated age   Behavior:  angry, guarded   Speech:  scant, increased latency of response    Mood:  Unable to obtain   Affect:  reactive   Thought Process:  unable to assess   Associations: unable to assess - does not answer   Thought Content:  paranoid ideation    Perceptual Disturbances: auditory hallucinations     Risk Potential: Suicidal ideation - does not answer  Homicidal ideation - does not answer   Sensorium:  oriented to person, place, and time/date         Memory:  recent and remote memory grossly intact      Consciousness:  awake      Attention: poor concentration and poor attention span      Insight:  poor      Judgment: poor      Gait/Station: normal gait/station      Motor Activity: no abnormal movements       Laboratory results: I have personally reviewed all pertinent laboratory/tests results.  Recent Results (from the past 48 hours)   Fingerstick Glucose (POCT)    Collection Time: 02/19/25  4:18 PM   Result Value Ref Range    POC Glucose 138 65 - 140 mg/dl   CBC and differential    Collection Time: 02/20/25  5:35 AM   Result Value Ref Range    WBC 7.32 4.31 - 10.16 Thousand/uL    RBC 3.88 3.88 - 5.62 Million/uL    Hemoglobin 11.8 (L) 12.0 - 17.0 g/dL    Hematocrit 34.1 (L) 36.5 - 49.3 %    MCV 88 82 - 98 fL    MCH 30.4 26.8 - 34.3 pg    MCHC 34.6 31.4 - 37.4 g/dL    RDW 13.2 11.6 - 15.1 %    MPV 9.5 8.9 - 12.7 fL    Platelets 257 149 - 390 Thousands/uL    nRBC 0 /100 WBCs    Segmented % 66 43 - 75 %     Immature Grans % 1 0 - 2 %    Lymphocytes % 24 14 - 44 %    Monocytes % 6 4 - 12 %    Eosinophils Relative 3 0 - 6 %    Basophils Relative 0 0 - 1 %    Absolute Neutrophils 4.84 1.85 - 7.62 Thousands/µL    Absolute Immature Grans 0.05 0.00 - 0.20 Thousand/uL    Absolute Lymphocytes 1.74 0.60 - 4.47 Thousands/µL    Absolute Monocytes 0.47 0.17 - 1.22 Thousand/µL    Eosinophils Absolute 0.20 0.00 - 0.61 Thousand/µL    Basophils Absolute 0.02 0.00 - 0.10 Thousands/µL   Fingerstick Glucose (POCT)    Collection Time: 02/20/25  7:35 AM   Result Value Ref Range    POC Glucose 129 65 - 140 mg/dl   Fingerstick Glucose (POCT)    Collection Time: 02/20/25  4:40 PM   Result Value Ref Range    POC Glucose 129 65 - 140 mg/dl   Fingerstick Glucose (POCT)    Collection Time: 02/21/25  7:24 AM   Result Value Ref Range    POC Glucose 127 65 - 140 mg/dl        Current Medications:  Current Facility-Administered Medications   Medication Dose Route Frequency Provider Last Rate    acetaminophen  650 mg Oral Q4H PRN Makayla Fine MD      acetaminophen  650 mg Oral Q4H PRN Makayla Fine MD      acetaminophen  975 mg Oral Q6H PRN Makayla Fine MD      aluminum-magnesium hydroxide-simethicone  30 mL Oral Q4H PRN Makayla Fine MD      cyanocobalamin  500 mcg Oral Daily Lakesha L Dagnall, PA-C      Diclofenac Sodium  2 g Topical 4x Daily PRN Lakesha L Dagnall, PA-C      ergocalciferol  50,000 Units Oral Weekly Lakesha L Dagnall, PA-C      folic acid  1 mg Oral Daily Lakesha L Dagnall, PA-C      haloperidol lactate  5 mg Intramuscular Q4H PRN Max 4/day Makayla Fine MD      hydrOXYzine HCL  50 mg Oral Q6H PRN Max 4/day Jef Paige MD      hydrOXYzine HCL  75 mg Oral Q6H PRN Max 4/day Jef Paige MD      lidocaine  2 patch Topical Daily PRN Lakesha L Dagnall, PA-C      lithium carbonate  300 mg Oral Q12H [facility] Odilia Gill MD      LORazepam  1 mg Oral Q8H PRN Jef Paige MD      melatonin  3 mg Oral HS Makayla Fine,  MD      nicotine polacrilex  4 mg Oral Q2H PRN Makayla Fine MD      OLANZapine  5 mg Oral BID Odilia Gill MD      propranolol  5 mg Oral Q8H PRN Makayla Fine MD      risperiDONE  0.5 mg Oral Q4H PRN Max 6/day Jef Paige MD      risperiDONE  1 mg Oral Q4H PRN Max 3/day Jef Paige MD      risperiDONE  1.5 mg Oral Q2H PRN Max 3/day Jef Paige MD      sitaGLIPtin  50 mg Oral Daily Jordan Pillai MD      traZODone  100 mg Oral HS PRN Jef Paige MD         Risks, benefits and possible side effects of Medications: Risks, benefits, and possible side effects of medications could not be explained to the patient due to unwillingness to cooperate with interview    Counseling / Coordination of Care:  Total floor / unit time spent today 25 minutes. Greater than 50% of total time was spent with the patient and / or family counseling and / or coordination of care. A description of counseling / coordination of care:  Patient's progress discussed with staff in treatment team meeting.  Medications, treatment progress and treatment plan reviewed with patient.        Odilia Gill MD 02/21/25  Psychiatry Resident, PGY-2

## 2025-02-21 NOTE — PROGRESS NOTES
"Patient restless, upset about \"not getting out after 72 hours\", hostile guarded. Admitted to severe anxiety. Stating that, \"the doctor's are lying to him\". Patient agreed to take Ativan 1 mg at 2026 PO. Casey Scale 25.    02/20/25 2026   Casey Anxiety Scale   Anxious Mood 4   Tension 3   Fears 1   Insomnia 0   Intellectual 3   Depressed Mood 2   Somatic Complaints: Muscular 1   Somatic Complaints: Sensory 1   Cardiovascular Symptoms 2   Respiratory Symptoms 2   Gastrointestinal Symptoms 0   Genitourinary Symptoms 0   Autonomic Symptoms 3   Behavior at Interview 3   Casey Anxiety Score 25       "

## 2025-02-22 LAB
GLUCOSE SERPL-MCNC: 115 MG/DL (ref 65–140)
GLUCOSE SERPL-MCNC: 143 MG/DL (ref 65–140)

## 2025-02-22 PROCEDURE — 82948 REAGENT STRIP/BLOOD GLUCOSE: CPT

## 2025-02-22 PROCEDURE — 99232 SBSQ HOSP IP/OBS MODERATE 35: CPT

## 2025-02-22 RX ADMIN — LITHIUM CARBONATE 300 MG: 300 TABLET, EXTENDED RELEASE ORAL at 21:40

## 2025-02-22 RX ADMIN — SITAGLIPTIN 50 MG: 50 TABLET, FILM COATED ORAL at 08:42

## 2025-02-22 RX ADMIN — Medication 3 MG: at 21:40

## 2025-02-22 RX ADMIN — CYANOCOBALAMIN TAB 500 MCG 500 MCG: 500 TAB at 08:42

## 2025-02-22 RX ADMIN — TRAZODONE HYDROCHLORIDE 100 MG: 100 TABLET ORAL at 21:40

## 2025-02-22 RX ADMIN — LITHIUM CARBONATE 300 MG: 300 TABLET, EXTENDED RELEASE ORAL at 08:42

## 2025-02-22 RX ADMIN — OLANZAPINE 5 MG: 5 TABLET, FILM COATED ORAL at 08:42

## 2025-02-22 RX ADMIN — FOLIC ACID 1 MG: 1 TABLET ORAL at 08:42

## 2025-02-22 RX ADMIN — OLANZAPINE 10 MG: 10 TABLET, FILM COATED ORAL at 21:40

## 2025-02-22 NOTE — PROGRESS NOTES
"Progress Note - Osei Trimble 49 y.o. male MRN: [id number]    Unit/Bed#: [facility] 205-02 Encounter: [id number]        Subjective:   Patient seen and examined at bedside after reviewing the chart and discussing the case with the caring staff.      Patient examined at bedside.  Patient reports no acute symptoms.     Physical Exam   Vitals: Blood pressure 100/58, pulse 68, temperature 97.9 °F (36.6 °C), temperature source Temporal, resp. rate 18, height 6' 1\" (1.854 m), weight 91.6 kg (202 lb), SpO2 96%.,Body mass index is 26.65 kg/m².  Constitutional: Patient in no acute distress.  HEENT: PERR, EOMI, MMM.  Cardiovascular: Normal rate and regular rhythm.    Pulmonary/Chest: Effort normal and breath sounds normal.   Abdomen: Soft, + BS, NT.    Assessment/Plan:  Osei Trimble is a(n) 49 y.o. year old male with MDD.     Hypertension.  Home: lisinopril 20 mg daily and Toprol-XL 50 mg daily.  Here: lisinopril d/c on 2/20 due to low BP.    T2DM.  Hgb A1c 6.6% on 8/26/24.  Home: metformin 500 mg twice daily.  Here: Januvia 50 mg daily (metformin held due to MEHDI).  Carb controlled diet.  Accu-Cheks twice daily.    Neuroendocrine carcinoma of small bowel.  Diagnosed 7/2022.  Pt is following with hem/onc Dr. Green on outpt basis.  He is on lanreotide on every 4-week basis (last infusion 2/5/25).  Arthralgias.  Tylenol, lidocaine patches, Voltaren gel as needed.  Drug abuse.  UDS on 2/18 +amph/meth.   Insomnia.  Patient is on melatonin 3 mg at bedtime.  Folate deficiency.  Continue home folic acid 1 mg daily.   Vitamin B12 deficiency.  Pt started on vitamin B12 500 mcg daily.  Vitamin D deficiency.  Pt started on vitamin D2 43332 units weekly.  Malnutrition.  Dietician following.  Adult Malnutrition type: Chronic illness.  Adult Degree of Malnutrition: Malnutrition of moderate degree.  Malnutrition Characteristics: Inadequate energy, Weight loss.  MEHDI.  Now resolved.  Cr 1.61 -> 1.10 -> 0.90 on 2/21.  Avoid nephrotoxins.    "

## 2025-02-22 NOTE — NURSING NOTE
Pt was visible on the milieu, social w/ select peers. Pt denied all psych symptoms, minimal during assessment. Pt was cooperative and med compliant. Will CTM. Q15 minute pt safety checks ongoing.

## 2025-02-22 NOTE — NURSING NOTE
Patient medication compliant this morning but minimal in conversation. Pt presents as flat,depressed and guarded. He has poor eye contact and appeared irritable. Pt is withdrawn to self and does not interact with staff or peers. Pt went to his room after breakfast. No acute behaviors noted. Pt encouraged to attend group. Continuous safety monitoring in place.

## 2025-02-22 NOTE — PROGRESS NOTES
Progress Note - Behavioral Health   Name: Osei Trimble 49 y.o. male I MRN: [id number]  Unit/Bed#: [facility]-02 I Date of Admission: 2/18/2025   Date of Service: 2/22/2025 I Hospital Day: 4    Assessment & Plan  Methamphetamine-induced psychotic disorder (HCC)  Continue lithium 300 mg BID; can obtain level on 2/24/25   Continue Zyprexa 5 mg in the morning and increase to Zyprexa 10 mg at night  All current active medications have been reviewed  Medical management per SLIM as indicated  Continue treatment with group therapy, milieu therapy and occupational therapy  Continue frequent safety checks and vitals per unit protocol  Explosive personality disorder (HCC)      Current medications:  Current Facility-Administered Medications   Medication Dose Route Frequency Provider Last Rate    acetaminophen  650 mg Oral Q4H PRN Makayla Fine MD      acetaminophen  650 mg Oral Q4H PRN Makyala Fine MD      acetaminophen  975 mg Oral Q6H PRN Makayla Fine MD      aluminum-magnesium hydroxide-simethicone  30 mL Oral Q4H PRN Makayla Fine MD      cyanocobalamin  500 mcg Oral Daily Lakesha L Dagnall, PA-C      Diclofenac Sodium  2 g Topical 4x Daily PRN Lakesha L Dagnall, PA-C      ergocalciferol  50,000 Units Oral Weekly Lakesha L Dagnall, PA-C      folic acid  1 mg Oral Daily Lakesha L Dagnall, PA-C      haloperidol lactate  5 mg Intramuscular Q4H PRN Max 4/day Makayla Fine MD      hydrOXYzine HCL  50 mg Oral Q6H PRN Max 4/day Jef Paige MD      hydrOXYzine HCL  75 mg Oral Q6H PRN Max 4/day Jef Paige MD      lidocaine  2 patch Topical Daily PRN Lakesha L Dagnall, PA-C      lithium carbonate  300 mg Oral Q12H [facility] Odilia Gill MD      LORazepam  1 mg Oral Q8H PRN Jef Paige MD      melatonin  3 mg Oral HS Makayla Fine MD      nicotine polacrilex  4 mg Oral Q2H PRN Makayla Fine MD      OLANZapine  10 mg Oral HS Odilia Gill MD      OLANZapine  5 mg Oral Daily Odilia Gill MD       propranolol  5 mg Oral Q8H PRN Makayla Fine MD      risperiDONE  0.5 mg Oral Q4H PRN Max 6/day Jef Paige MD      risperiDONE  1 mg Oral Q4H PRN Max 3/day Jef Paige MD      risperiDONE  1.5 mg Oral Q2H PRN Max 3/day Jef Paige MD      sitaGLIPtin  50 mg Oral Daily Jordan Pillai MD      traZODone  100 mg Oral HS PRN Jef Paige MD          Risks/Benefits of Treatment:     Risks, benefits, and possible side effects of medications explained to patient and patient verbalizes understanding and agreement for treatment.    Progress Toward Goals:  Unchanged    Treatment Planning:      - Encourage early mobility and having a structured day  - Provide frequent re-orientation, and cognitive stimulation  - Ensure assistive devices are in proper working order (eye-glasses, hearing aids)  - Encourage adequate hydration, nutrition and monitor bowel movements  - Maintain sleep-wake cycle: Uninterrupted sleep time; low-level lighting at night  - Fall precaution  - f/u SLIM recs regarding the medical problems   - Continue medication titration and treatment plan; adjust medication to optimize treatment response and as clinically indicated.   - Observation: N/A  - Legal Status: 201  - VS: as per unit protocol  - Encourage group attendance and milieu therapy  - Dispo: To be determined  - Long Stay Certification : Not Applicable  - Estimated Discharge Day:  10 days (3/4/2025)    Carmela Franz was seen today for psychiatric follow-up.  Patient appears to have an irritable edge, he is perseverative on being discharged because he signed a 72-hour notice.  He is guarded in conversation.  He is intermittently visible on the unit for meals.  Patient is mostly withdrawn to his room laying in bed isolated to self.  Reports ongoing anxiety due to continued stay on the unit and disturbances on the unit.  He denied any SI/HI/AVH.      Sleep: normal  Appetite: normal  Medication side effects: No  ROS: review of systems as noted  above in HPI/Subjective report, all other systems are negative    Objective :  Temp:  [97.5 °F (36.4 °C)-98 °F (36.7 °C)] 97.9 °F (36.6 °C)  HR:  [68-88] 68  BP: (100-147)/(58-96) 100/58  Resp:  [18] 18  SpO2:  [96 %-100 %] 96 %    Temp:  [97.5 °F (36.4 °C)-98 °F (36.7 °C)] 97.9 °F (36.6 °C)  HR:  [68-88] 68  BP: (100-147)/(58-96) 100/58  Resp:  [18] 18  SpO2:  [96 %-100 %] 96 %    Mental Status Evaluation:    Appearance:  disheveled   Behavior:  guarded   Speech:  normal rate and volume   Mood:  anxious, irritable   Affect:  reactive   Thought Process:  concrete   Thought Content:  some paranoia   Perceptual Disturbances: Denied AVH when asked   Risk Potential: Suicidal Ideation -  None at present  Homicidal Ideation -  None at present  Potential for Aggression - No   Sensorium:  oriented to person, place, and time/date   Memory:  recent and remote memory grossly intact   Consciousness:  alert and awake   Attention/Concentration: attention span and concentration appear shorter than expected for age   Insight:  limited   Judgment: limited   Gait/Station: Seated in a chair   Motor Activity: no abnormal movements     Cognitive Assessment : N/A  Pain : denied  Pain Scale : 0      Lab Results: I have reviewed the following results:  Most Recent Labs:   Lab Results   Component Value Date    WBC 7.32 02/20/2025    RBC 3.88 02/20/2025    HGB 11.8 (L) 02/20/2025    HCT 34.1 (L) 02/20/2025     02/20/2025    RDW 13.2 02/20/2025    NEUTROABS 4.84 02/20/2025    SODIUM 135 02/21/2025    K 3.9 02/21/2025     02/21/2025    CO2 25 02/21/2025    BUN 14 02/21/2025    CREATININE 0.90 02/21/2025    GLUC 141 (H) 02/21/2025    CALCIUM 9.3 02/21/2025    AST 18 02/21/2025    ALT 16 02/21/2025    ALKPHOS 71 02/21/2025    TP 6.7 02/21/2025    ALB 4.1 02/21/2025    TBILI 0.61 02/21/2025    CHOLESTEROL 189 02/18/2025    HDL 36 (L) 02/18/2025    TRIG 129 02/18/2025    LDLCALC 127 (H) 02/18/2025    NONHDLC 153 02/18/2025    LITHIUM  0.85 02/14/2025    AMMONIA 38 02/14/2025    [id number] 11.204 (H) 02/17/2025    FREET4 0.85 02/17/2025    HGBA1C 5.6 02/18/2025     02/18/2025       Administrative Statements     Counseling / Coordination of Care:   Patient's progress discussed with staff in treatment team meeting.  Medication changes reviewed with staff in treatment team meeting..    PIERRE Garcia 02/22/25

## 2025-02-23 LAB
GLUCOSE SERPL-MCNC: 118 MG/DL (ref 65–140)
GLUCOSE SERPL-MCNC: 138 MG/DL (ref 65–140)

## 2025-02-23 PROCEDURE — 82948 REAGENT STRIP/BLOOD GLUCOSE: CPT

## 2025-02-23 PROCEDURE — 99232 SBSQ HOSP IP/OBS MODERATE 35: CPT

## 2025-02-23 RX ADMIN — OLANZAPINE 10 MG: 10 TABLET, FILM COATED ORAL at 21:30

## 2025-02-23 RX ADMIN — Medication 3 MG: at 21:30

## 2025-02-23 RX ADMIN — LITHIUM CARBONATE 300 MG: 300 TABLET, EXTENDED RELEASE ORAL at 21:30

## 2025-02-23 RX ADMIN — OLANZAPINE 5 MG: 5 TABLET, FILM COATED ORAL at 08:47

## 2025-02-23 RX ADMIN — LITHIUM CARBONATE 300 MG: 300 TABLET, EXTENDED RELEASE ORAL at 08:47

## 2025-02-23 RX ADMIN — FOLIC ACID 1 MG: 1 TABLET ORAL at 08:47

## 2025-02-23 RX ADMIN — SITAGLIPTIN 50 MG: 50 TABLET, FILM COATED ORAL at 08:46

## 2025-02-23 RX ADMIN — CYANOCOBALAMIN TAB 500 MCG 500 MCG: 500 TAB at 08:47

## 2025-02-23 NOTE — PROGRESS NOTES
Progress Note - Behavioral Health   Name: Osei Trimble 49 y.o. male I MRN: [id number]  Unit/Bed#: [facility] 205-02 I Date of Admission: 2/18/2025   Date of Service: 2/23/2025 I Hospital Day: 5    Assessment & Plan  Methamphetamine-induced psychotic disorder (HCC)  Continue lithium 300 mg BID; can obtain level on 2/24/25   Continue Zyprexa 5 mg in the morning and increase to Zyprexa 10 mg at night  All current active medications have been reviewed  Medical management per SLIM as indicated  Continue treatment with group therapy, milieu therapy and occupational therapy  Continue frequent safety checks and vitals per unit protocol  Explosive personality disorder (HCC)      Current medications:  Current Facility-Administered Medications   Medication Dose Route Frequency Provider Last Rate    acetaminophen  650 mg Oral Q4H PRN Makayla Fine MD      acetaminophen  650 mg Oral Q4H PRN Makayla Fine MD      acetaminophen  975 mg Oral Q6H PRN Makayla Fine MD      aluminum-magnesium hydroxide-simethicone  30 mL Oral Q4H PRN Makayla Fine MD      cyanocobalamin  500 mcg Oral Daily Lakesha L Dagnall, PA-C      Diclofenac Sodium  2 g Topical 4x Daily PRN Lakesha L Dagnall, PA-C      ergocalciferol  50,000 Units Oral Weekly Lakesha L Dagnall, PA-C      folic acid  1 mg Oral Daily Lakesha L Dagnall, PA-C      haloperidol lactate  5 mg Intramuscular Q4H PRN Max 4/day Makayla Fine MD      hydrOXYzine HCL  50 mg Oral Q6H PRN Max 4/day Jef Paige MD      hydrOXYzine HCL  75 mg Oral Q6H PRN Max 4/day Jef Paige MD      lidocaine  2 patch Topical Daily PRN Lakesha L Dagnall, PA-C      lithium carbonate  300 mg Oral Q12H [facility] Odilia Gill MD      LORazepam  1 mg Oral Q8H PRN Jef Paige MD      melatonin  3 mg Oral HS Makayla Fine MD      nicotine polacrilex  4 mg Oral Q2H PRN Makayla Fine MD      OLANZapine  10 mg Oral HS Odilia Gill MD      OLANZapine  5 mg Oral Daily Odilia Gill MD       "propranolol  5 mg Oral Q8H PRN Makayla Fine MD      risperiDONE  0.5 mg Oral Q4H PRN Max 6/day Jef Paige MD      risperiDONE  1 mg Oral Q4H PRN Max 3/day Jef Paige MD      risperiDONE  1.5 mg Oral Q2H PRN Max 3/day Jef Paige MD      sitaGLIPtin  50 mg Oral Daily Jordan Pillai MD      traZODone  100 mg Oral HS PRN Jef Paige MD          Risks/Benefits of Treatment:     Risks, benefits, and possible side effects of medications explained to patient and patient verbalizes understanding and agreement for treatment.    Progress Toward Goals:  Unchanged    Treatment Planning:      - Encourage early mobility and having a structured day  - Provide frequent re-orientation, and cognitive stimulation  - Ensure assistive devices are in proper working order (eye-glasses, hearing aids)  - Encourage adequate hydration, nutrition and monitor bowel movements  - Maintain sleep-wake cycle: Uninterrupted sleep time; low-level lighting at night  - Fall precaution  - f/u SLIM recs regarding the medical problems   - Continue medication titration and treatment plan; adjust medication to optimize treatment response and as clinically indicated.   - Observation: N/A  - Legal Status: 201  - VS: as per unit protocol  - Encourage group attendance and milieu therapy  - Dispo: To be determined  - Long Stay Certification : Not Applicable  - Estimated Discharge Day:  7 days (3/2/2025)    Carmela Franz was seen today for psychiatric follow-up.  Patient remains intermittently visible on the unit for meals.  He is mostly withdrawn to his room, isolated to self laying in bed.  He minimally interacts in the milieu.  Patient appears to have an irritable edge during interview, his eye contact is poor and he appears depressed.  Per patient \"I signed a 72-hour notice at the other facility and I should be discharged \".  Patient made aware that he needs to sign a 72-hour notice on the unit for it to be effective.  He denied any " SI/HI/AVH when asked    Sleep: normal  Appetite: normal  Medication side effects: No  ROS: review of systems as noted above in HPI/Subjective report, all other systems are negative    Objective :  Temp:  [98.1 °F (36.7 °C)-98.7 °F (37.1 °C)] 98.3 °F (36.8 °C)  HR:  [57-92] 57  BP: (102-161)/(56-81) 161/81  Resp:  [17-18] 17  SpO2:  [95 %-98 %] 98 %  O2 Device: None (Room air)    Temp:  [98.1 °F (36.7 °C)-98.7 °F (37.1 °C)] 98.3 °F (36.8 °C)  HR:  [57-92] 57  BP: (102-161)/(56-81) 161/81  Resp:  [17-18] 17  SpO2:  [95 %-98 %] 98 %  O2 Device: None (Room air)    Mental Status Evaluation:    Appearance:  marginal hygiene   Behavior:  Irritable, guarded   Speech:  normal rate and volume   Mood:  depressed   Affect:  reactive   Thought Process:  concrete   Thought Content:  paranoid ideation   Perceptual Disturbances: Denied AVH, did not appear internally preoccupied   Risk Potential: Suicidal Ideation -  None at present  Homicidal Ideation -  None at present  Potential for Aggression - No   Sensorium:  oriented to person, place, and time/date   Memory:  recent and remote memory grossly intact   Consciousness:  alert and awake   Attention/Concentration: attention span and concentration appear shorter than expected for age   Insight:  limited   Judgment: limited   Gait/Station: in bed   Motor Activity: no abnormal movements     Cognitive Assessment : N/A  Pain : denied  Pain Scale : 0      Lab Results: I have reviewed the following results:  Most Recent Labs:   Lab Results   Component Value Date    WBC 7.32 02/20/2025    RBC 3.88 02/20/2025    HGB 11.8 (L) 02/20/2025    HCT 34.1 (L) 02/20/2025     02/20/2025    RDW 13.2 02/20/2025    NEUTROABS 4.84 02/20/2025    SODIUM 135 02/21/2025    K 3.9 02/21/2025     02/21/2025    CO2 25 02/21/2025    BUN 14 02/21/2025    CREATININE 0.90 02/21/2025    GLUC 141 (H) 02/21/2025    CALCIUM 9.3 02/21/2025    AST 18 02/21/2025    ALT 16 02/21/2025    ALKPHOS 71 02/21/2025     TP 6.7 02/21/2025    ALB 4.1 02/21/2025    TBILI 0.61 02/21/2025    CHOLESTEROL 189 02/18/2025    HDL 36 (L) 02/18/2025    TRIG 129 02/18/2025    LDLCALC 127 (H) 02/18/2025    NONHDLC 153 02/18/2025    LITHIUM 0.85 02/14/2025    AMMONIA 38 02/14/2025    [id number] 11.204 (H) 02/17/2025    FREET4 0.85 02/17/2025    HGBA1C 5.6 02/18/2025     02/18/2025       Administrative Statements     Counseling / Coordination of Care:   Patient's progress discussed with staff in treatment team meeting.  Medication changes reviewed with staff in treatment team meeting..    PIERRE Garcia 02/23/25

## 2025-02-23 NOTE — PROGRESS NOTES
"Progress Note - Osei Trimble 49 y.o. male MRN: [id number]    Unit/Bed#: [facility] 205-02 Encounter: [id number]        Subjective:   Patient seen and examined at bedside after reviewing the chart and discussing the case with the caring staff.      Patient examined at bedside.  Patient reports no acute symptoms.     Physical Exam   Vitals: Blood pressure 161/81, pulse 57, temperature 98.3 °F (36.8 °C), temperature source Temporal, resp. rate 17, height 6' 1\" (1.854 m), weight 91.6 kg (202 lb), SpO2 98%.,Body mass index is 26.65 kg/m².  Constitutional: Patient in no acute distress.  HEENT: PERR, EOMI, MMM.  Cardiovascular: Normal rate and regular rhythm.    Pulmonary/Chest: Effort normal and breath sounds normal.   Abdomen: Soft, + BS, NT.    Assessment/Plan:  Osei Trimble is a(n) 49 y.o. year old male with MDD.     Hypertension.  Home: lisinopril 20 mg daily and Toprol-XL 50 mg daily.  Here: lisinopril d/c on 2/20 due to low BP.    T2DM.  Hgb A1c 6.6% on 8/26/24.  Home: metformin 500 mg twice daily.  Here: Januvia 50 mg daily (metformin held due to MEHDI).  Carb controlled diet.  Accu-Cheks twice daily.    Neuroendocrine carcinoma of small bowel.  Diagnosed 7/2022.  Pt is following with hem/onc Dr. Green on outpt basis.  He is on lanreotide on every 4-week basis (last infusion 2/5/25).  Arthralgias.  Tylenol, lidocaine patches, Voltaren gel as needed.  Drug abuse.  UDS on 2/18 +amph/meth.   Insomnia.  Patient is on melatonin 3 mg at bedtime.  Folate deficiency.  Continue home folic acid 1 mg daily.   Vitamin B12 deficiency.  Pt started on vitamin B12 500 mcg daily.  Vitamin D deficiency.  Pt started on vitamin D2 16199 units weekly.  Malnutrition.  Dietician following.  Adult Malnutrition type: Chronic illness.  Adult Degree of Malnutrition: Malnutrition of moderate degree.  Malnutrition Characteristics: Inadequate energy, Weight loss.  MEHDI.  Now resolved.  Cr 1.61 -> 1.10 -> 0.90 on 2/21.  Avoid nephrotoxins.    "

## 2025-02-23 NOTE — NURSING NOTE
"Pt has been minimal in conversation, flat/depressed and guarded on the unit. He does not interact with staff or peers and is withdrawn to himself and his room. Pt BLEP assessment was within normal limits. Pt has no complaints or needs at this time. He also denied having any pain. Pt did not answer psychiatric questions but just stated he feels \"ok' today. He did not appear internally preoccupied at the time. Continuous safety monitoring in place.   "

## 2025-02-23 NOTE — NURSING NOTE
Pt was visible on the milieu this evening. Pt denies all psych symptoms. Pt is minimal and can have irritable edge. Will CTM. Q15 minute pt safety checks ongoing.

## 2025-02-24 LAB
GLUCOSE SERPL-MCNC: 111 MG/DL (ref 65–140)
GLUCOSE SERPL-MCNC: 122 MG/DL (ref 65–140)
LITHIUM SERPL-SCNC: 0.73 MMOL/L (ref 0.6–1.2)

## 2025-02-24 PROCEDURE — 80178 ASSAY OF LITHIUM: CPT

## 2025-02-24 PROCEDURE — 99232 SBSQ HOSP IP/OBS MODERATE 35: CPT | Performed by: PSYCHIATRY & NEUROLOGY

## 2025-02-24 PROCEDURE — 82948 REAGENT STRIP/BLOOD GLUCOSE: CPT

## 2025-02-24 RX ORDER — LIDOCAINE 50 MG/G
2 PATCH TOPICAL DAILY PRN
Qty: 60 PATCH | Refills: 0 | Status: SHIPPED | OUTPATIENT
Start: 2025-02-24

## 2025-02-24 RX ORDER — OLANZAPINE 15 MG/1
15 TABLET ORAL
Qty: 30 TABLET | Refills: 0 | Status: SHIPPED | OUTPATIENT
Start: 2025-02-24 | End: 2025-03-14

## 2025-02-24 RX ORDER — LISINOPRIL 2.5 MG/1
2.5 TABLET ORAL DAILY
Qty: 30 TABLET | Refills: 0 | Status: SHIPPED | OUTPATIENT
Start: 2025-02-24 | End: 2025-03-14

## 2025-02-24 RX ORDER — FOLIC ACID 1 MG/1
1 TABLET ORAL DAILY
Qty: 30 TABLET | Refills: 0 | Status: ON HOLD | OUTPATIENT
Start: 2025-02-24 | End: 2025-03-13

## 2025-02-24 RX ORDER — ERGOCALCIFEROL 1.25 MG/1
50000 CAPSULE, LIQUID FILLED ORAL WEEKLY
Qty: 4 CAPSULE | Refills: 0 | Status: ON HOLD | OUTPATIENT
Start: 2025-02-27 | End: 2025-03-13

## 2025-02-24 RX ADMIN — OLANZAPINE 10 MG: 10 TABLET, FILM COATED ORAL at 20:23

## 2025-02-24 RX ADMIN — SITAGLIPTIN 50 MG: 50 TABLET, FILM COATED ORAL at 08:22

## 2025-02-24 RX ADMIN — LITHIUM CARBONATE 300 MG: 300 TABLET, EXTENDED RELEASE ORAL at 20:23

## 2025-02-24 RX ADMIN — OLANZAPINE 5 MG: 5 TABLET, FILM COATED ORAL at 08:22

## 2025-02-24 RX ADMIN — Medication 3 MG: at 20:22

## 2025-02-24 RX ADMIN — LITHIUM CARBONATE 300 MG: 300 TABLET, EXTENDED RELEASE ORAL at 08:22

## 2025-02-24 RX ADMIN — FOLIC ACID 1 MG: 1 TABLET ORAL at 08:22

## 2025-02-24 RX ADMIN — CYANOCOBALAMIN TAB 500 MCG 500 MCG: 500 TAB at 08:22

## 2025-02-24 NOTE — PROGRESS NOTES
02/24/25 1155   Activity/Group Checklist   Group Admission/Discharge   Attendance Attended   Attendance Duration (min) 0-15   Interactions Interacted appropriately   Affect/Mood Appropriate;Blunted/flat   Goals Achieved Identified feelings;Identified triggers;Identified relapse prevention strategies;Discussed safety plan;Discussed coping strategies;Discussed discharge plans;Identified resources and support systems;Able to listen to others;Able to engage in interactions;Able to reflect/comment on own behavior;Able to manage/cope with feelings;Able to self-disclose;Able to recieve feedback;Able to experience relief/decrease in symptoms     Patient agreeable to meet and complete safety/relapse prevention plan with CTRS.  Patient information from forms can be found in media.

## 2025-02-24 NOTE — NURSING NOTE
Patient withdrawn to room, out for meals, medication compliant, minimal, flat affect and cooperative. Patient has moderate anxiety/depression, denies SI/HI and hallucinations. Continued care and safety rounds in progress.

## 2025-02-24 NOTE — PLAN OF CARE
Problem: Ineffective Coping  Goal: Participates in unit activities  Description: Interventions:  - Provide therapeutic environment   - Provide required programming   - Redirect inappropriate behaviors   Outcome: Not Progressing   Will continue to encourage patient to join groups but patient remains withdrawn to room.

## 2025-02-24 NOTE — SOCIAL WORK
Patient Intake: Sw attempted to Speak with PT at Bedside however PT was not responding to quest information obtained from Sister Ariadne via phone     Legal status: 201    Current SI:  None  Current HI: None  AVH:  None  Depression:  Yes  Anxiety:  anxiety      Strengths: none identified   Stressors/Limitations: PT did not identify current stressors   Coping skills:none identified.      SA/SI in last 12 months: None  HI/violence towards others in last 12 months: None  Access to Firearms: No  Hx abuse/trauma: unknown       Treatment History:     Current Treatment:                 Psychiatrist: Dr. Castellano                Therapist: None    Legal Issues: None  Substance Abuse:  UDS positive for meth/fentanyl     Marial Status: denies   Children: Denies   Pets: Denies   Can patient return home?: Sister   Family:   Parents:    Siblings: yes  Family hx (MH/SI/HI/substance use): denies      Type of Work:does not work   Income/Financial Supports: Disability   Education: high school   : denies   Transportation: sister Ariadne   Christian/Cultural Needs: N/A  Assistive devices/phsyical barriers in home: no   POA/guardianship/advanced directives: no     Pharm: ***  Transport home: Family transport     MARY signed: PT signed consent for Sister Ariadne .

## 2025-02-24 NOTE — TREATMENT TEAM
02/23/25 2055   Status of Admission   Status of Admission 201   72 Hour Notice Initiated   Date patient signed 72h Notice 02/23/25   Time patient signed 72h Notice 2055     Pt signed 72 hr notice on 2/23/25 at 2055.

## 2025-02-24 NOTE — PROGRESS NOTES
"Progress Note - Osei Trimble 49 y.o. male MRN: [id number]    Unit/Bed#: [facility] 205-02 Encounter: [id number]        Subjective:   Patient seen and examined at bedside after reviewing the chart and discussing the case with the caring staff.      Patient examined at bedside.  Patient reports no acute symptoms.    Patient is being discharged tomorrow 2/25/2025.  Patient is requesting prescriptions.  I reviewed and reconciled patient's problem list and medications.    Physical Exam   Vitals: Blood pressure 116/54, pulse 66, temperature 98 °F (36.7 °C), temperature source Temporal, resp. rate 18, height 6' 1\" (1.854 m), weight 91.6 kg (202 lb), SpO2 99%.,Body mass index is 26.65 kg/m².  Constitutional: Patient in no acute distress.  HEENT: PERR, EOMI, MMM.  Cardiovascular: Normal rate and regular rhythm.    Pulmonary/Chest: Effort normal and breath sounds normal.   Abdomen: Soft, + BS, NT.    Assessment/Plan:  Osei Trimble is a(n) 49 y.o. year old male with MDD.     Medical clearance.  Patient is medically cleared for discharge.  All scripts will be sent out for the patient.    Hypertension.  Home: lisinopril 20 mg daily and Toprol-XL 50 mg daily.  Here: lisinopril d/c on 2/20 due to low BP.    T2DM.  Hgb A1c 6.6% on 8/26/24.  Home: metformin 500 mg twice daily.  Here: Januvia 50 mg daily (metformin held due to MEHDI).  Carb controlled diet.  Accu-Cheks twice daily.    Neuroendocrine carcinoma of small bowel.  Diagnosed 7/2022.  Pt is following with hem/onc Dr. Green on outpt basis.  He is on lanreotide on every 4-week basis (last infusion 2/5/25).  Arthralgias.  Tylenol, lidocaine patches, Voltaren gel as needed.  Drug abuse.  UDS on 2/18 +amph/meth.   Insomnia.  Patient is on melatonin 3 mg at bedtime.  Folate deficiency.  Continue home folic acid 1 mg daily.   Vitamin B12 deficiency.  Pt started on vitamin B12 500 mcg daily.  Vitamin D deficiency.  Pt started on vitamin D2 56954 units weekly.  Malnutrition.  " Dietician following.  Adult Malnutrition type: Chronic illness.  Adult Degree of Malnutrition: Malnutrition of moderate degree.  Malnutrition Characteristics: Inadequate energy, Weight loss.  MEHDI.  Now resolved.  Cr 1.61 -> 1.10 -> 0.90 on 2/21.  Avoid nephrotoxins.

## 2025-02-24 NOTE — NURSING NOTE
Patient cooperative and med compliant, participated in a snack tonight. He denied all psychiatric symptoms but did state he has depression at times. Safety precautions maintained q15 mins.

## 2025-02-24 NOTE — NURSING NOTE
Patient appears to have slept the majority of the overnight hours. No concerns voiced, no signs of distress. Continuous 15 min safety checks in place.

## 2025-02-24 NOTE — PROGRESS NOTES
"Progress Note - Behavioral Health   Name: Osei Trimble 49 y.o. male I MRN: [id number]  Unit/Bed#: [facility] 205-02 I Date of Admission: 2/18/2025   Date of Service: 2/24/2025 I Hospital Day: 6     Assessment & Plan  Methamphetamine-induced psychotic disorder (HCC)  Continue lithium 300 mg BID; will obtain level this afternoon  Change Zyprexa 5 mg QD and Zyprexa 10 mg HS to Zyprexa 15 mg HS to promote compliance  All current active medications have been reviewed  Medical management per SLIM as indicated  Continue treatment with group therapy, milieu therapy and occupational therapy  Continue frequent safety checks and vitals per unit protocol  Explosive personality disorder (HCC)          Legal Status: 201; 72-hour notice signed 2/23/25 at 2055  Disposition: coordinating with case management, scheduled for 2/25/25    ------------------------------------------------------------      Subjective: Patient's chart was reviewed and patient's progress and plan was discussed with treatment team. Per nursing report, Osei has been minimal in conversation and guarded on the unit. Stated he felt \"ok\" yesterday and did not appear internally preoccupied. He denied all psychiatric symptoms in the evening. Observed to have slept the majority of the night.      Osei was evaluated this morning for continuity of care. Upon evaluation, Osei is found lying in bed and uninterested in assessment. He states his mood is \"fine\" with constricted affect. He is agreeable to discharge tomorrow and is aware labs will be collected later this afternoon to obtain lithium level. No reported side effects to medication. Denies SI/HI/AVH. He does not feel overly sedated and reports that he often sleeps until late in the day. Remains medication compliant.           Psychiatric Review of Systems:  Behavior over the last 24 hours: unchanged  Sleep: normal  Appetite: adequate  Medication side effects: none verbalized  Medical ROS: Complete review of " "systems is negative except as noted above.    Vital signs in last 24 hours:  Temp:  [97.7 °F (36.5 °C)-98.6 °F (37 °C)] 98 °F (36.7 °C)  HR:  [66-82] 66  BP: (105-125)/(54-77) 116/54  Resp:  [18] 18  SpO2:  [96 %-99 %] 99 %  O2 Device: None (Room air)    Mental Status Evaluation:    Appearance:  disheveled, dressed in hospital attire, looks stated age   Behavior:  does not want to talk   Speech:  increased latency of response, soft    Mood:  \"fine\"   Affect:  constricted   Thought Process:  goal directed   Associations: concrete associations   Thought Content:  no overt delusions    Perceptual Disturbances: no reported hallucinations and does not appear to be responding to internal stimuli at this time   Risk Potential: Suicidal ideation - None at present  Homicidal ideation - None at present   Sensorium:  oriented to person, place, and time/date         Memory:  recent and remote memory grossly intact      Consciousness:  awake      Attention: attention span and concentration appear shorter than expected for age      Insight:  limited      Judgment: limited      Gait/Station: normal gait/station      Motor Activity: no abnormal movements       Laboratory results: I have personally reviewed all pertinent laboratory/tests results.  Recent Results (from the past 48 hours)   Fingerstick Glucose (POCT)    Collection Time: 02/22/25  4:33 PM   Result Value Ref Range    POC Glucose 115 65 - 140 mg/dl   Fingerstick Glucose (POCT)    Collection Time: 02/23/25  7:33 AM   Result Value Ref Range    POC Glucose 138 65 - 140 mg/dl   Fingerstick Glucose (POCT)    Collection Time: 02/23/25  3:48 PM   Result Value Ref Range    POC Glucose 118 65 - 140 mg/dl   Fingerstick Glucose (POCT)    Collection Time: 02/24/25  7:21 AM   Result Value Ref Range    POC Glucose 122 65 - 140 mg/dl        Current Medications:  Current Facility-Administered Medications   Medication Dose Route Frequency Provider Last Rate    acetaminophen  650 mg Oral " Q4H PRN Makayla Fine MD      acetaminophen  650 mg Oral Q4H PRN Makayla Fine MD      acetaminophen  975 mg Oral Q6H PRN Makayla Fine MD      aluminum-magnesium hydroxide-simethicone  30 mL Oral Q4H PRN Makayla Fine MD      cyanocobalamin  500 mcg Oral Daily Lakesha L Dagnall, PA-C      Diclofenac Sodium  2 g Topical 4x Daily PRN Lakesha L Dagnall, PA-C      ergocalciferol  50,000 Units Oral Weekly Lakesha L Dagnall, PA-C      folic acid  1 mg Oral Daily Lakesha L Dagnalcarmelita, PA-C      haloperidol lactate  5 mg Intramuscular Q4H PRN Max 4/day Makayla Fine MD      hydrOXYzine HCL  50 mg Oral Q6H PRN Max 4/day Jef Paige MD      hydrOXYzine HCL  75 mg Oral Q6H PRN Max 4/day Jef Paige MD      lidocaine  2 patch Topical Daily PRN Lakesha L EARLENE HernandezC      lithium carbonate  300 mg Oral Q12H [facility] Odilia Gill MD      LORazepam  1 mg Oral Q8H PRN Jef Paige MD      melatonin  3 mg Oral HS Makayla Fine MD      nicotine polacrilex  4 mg Oral Q2H PRN Makayla Fine MD      OLANZapine  10 mg Oral HS Odilia Gill MD      OLANZapine  5 mg Oral Daily Odilia Gill MD      propranolol  5 mg Oral Q8H PRN Makayla Fine MD      risperiDONE  0.5 mg Oral Q4H PRN Max 6/day Jef Paige MD      risperiDONE  1 mg Oral Q4H PRN Max 3/day Jef Paige MD      risperiDONE  1.5 mg Oral Q2H PRN Max 3/day Jef Paige MD      sitaGLIPtin  50 mg Oral Daily Jordan Pillai MD      traZODone  100 mg Oral HS PRN Jef Paige MD         Risks, benefits and possible side effects of Medications: Risks, benefits, and possible side effects of medications have been explained to the patient, who verbalizes understanding    Counseling / Coordination of Care:  Total floor / unit time spent today 25 minutes. Greater than 50% of total time was spent with the patient and / or family counseling and / or coordination of care. A description of counseling / coordination of care:  Patient's progress discussed with  staff in treatment team meeting.  Medications, treatment progress and treatment plan reviewed with patient.    This note has been constructed using a voice recognition system. There may be translation, syntax, or grammatical errors. If you have any questions, please contact the dictating author.    Odilia Gill MD 02/24/25  Psychiatry Resident, PGY-2

## 2025-02-24 NOTE — SOCIAL WORK
02/24/25    Team Meeting   Meeting Type Daily Rounds   Team Members Present   Team Members Present Physician;Nurse;;Occupational Therapist   Physician Team Member Grecia HOGUE, Corby JAY   Nursing Team Member Richardson ASTUDILLO   Social Work Team Member Leticia RASHID, Julito RASHID , Otoniel METZ   OT Team Member Nasir SMITH   Patient/Family Present   Patient Present No   Patient's Family Present No   201, slept , moderate anxiety/depression , flat withdrawn to room , irritable , disheveled  D/C Tuesday 2/25/25. 72 hr signed on 2/23/25 at 2055.

## 2025-02-24 NOTE — DISCHARGE INSTR - OTHER ORDERS
You are being discharged to your home at  [address] [place] [address]  Phone : [phone]     Triggers you have identified during your hospitalization that led to your admission of a regressed mood include ineffective coping skills and medication management. Coping skills you have identified during your hospitalization include relaxing  If you are unable to deal with your distressed mood alone please contact  your sister Ariadne at  [phone] and ask for assistance. If that is not effective and you continue to have (ex: suicidal ideation, homicidal ideation, distressed mood, overwhelmed, in crisis) please contact Crisis by dialing [facility] 1 [phone], dial [phone] or go to the nearest emergency center.      *[facility] Crisis Hotline: 1 806.544.5573  *National Suicide Prevention Lifeline:  1-245.536.9009  *Alcohol Anonymous: 733.374.9382  *[place] [employer] Commission: (632) 312-3957  *National [place] [employer] [place] (DELMI) HELPLINE: 775.622.1124/Website: www.delmi.org  *Substance Abuse and Mental Health Services Administration([facility]) National Helpline, which is a confidential, free, 24-hour-a-day, 365-day-a-year, information service for individuals and family members facing mental health and/or substance use disorders. This service provides referrals to local treatment facilities, support groups, and community-based organizations. Callers can also order free publications and other information.  Call 1-893.329.5063/Website: www.[facility].gov  *[facility] 2-1-1: This is a toll free, confidential, 24-hour-a-day service which connects you to a community  in your area who can help you find services and resources that are available to you locally and provide critical services that can improve and save lives.  Call: 211  /Website: http://www.[employer]/       Mariela, or Michelle, our Behavioral Health Nurse Navigators, will be calling you after your discharge, on the  phone number that you provided.  They will be available as an additional support, if needed.   If you wish to speak with one of them, you may contact Mariela at [phone] or Michelle at [phone].

## 2025-02-24 NOTE — ASSESSMENT & PLAN NOTE
Continue lithium 300 mg BID; will obtain level this afternoon  Change Zyprexa 5 mg QD and Zyprexa 10 mg HS to Zyprexa 15 mg HS to promote compliance  All current active medications have been reviewed  Medical management per SLIM as indicated  Continue treatment with group therapy, milieu therapy and occupational therapy  Continue frequent safety checks and vitals per unit protocol

## 2025-02-24 NOTE — PLAN OF CARE
Problem: CHIN  Goal: Will exhibit normal sleep and speech and no impulsivity  Description: INTERVENTIONS:  - Administer medication as ordered  - Set limits on impulsive behavior  - Make attempts to decrease external stimuli as possible  Outcome: Progressing     Problem: ANXIETY  Goal: Will report anxiety at manageable levels  Description: INTERVENTIONS:  - Administer medication as ordered  - Teach and encourage coping skills  - Provide emotional support  - Assess patient/family for anxiety and ability to cope  Outcome: Progressing

## 2025-02-25 VITALS
HEIGHT: 73 IN | RESPIRATION RATE: 18 BRPM | WEIGHT: 202 LBS | DIASTOLIC BLOOD PRESSURE: 69 MMHG | TEMPERATURE: 98.6 F | OXYGEN SATURATION: 96 % | HEART RATE: 65 BPM | SYSTOLIC BLOOD PRESSURE: 119 MMHG | BODY MASS INDEX: 26.77 KG/M2

## 2025-02-25 LAB — GLUCOSE SERPL-MCNC: 129 MG/DL (ref 65–140)

## 2025-02-25 PROCEDURE — 82948 REAGENT STRIP/BLOOD GLUCOSE: CPT

## 2025-02-25 PROCEDURE — 99238 HOSP IP/OBS DSCHRG MGMT 30/<: CPT | Performed by: PSYCHIATRY & NEUROLOGY

## 2025-02-25 RX ADMIN — SITAGLIPTIN 50 MG: 50 TABLET, FILM COATED ORAL at 08:33

## 2025-02-25 RX ADMIN — OLANZAPINE 5 MG: 5 TABLET, FILM COATED ORAL at 08:33

## 2025-02-25 RX ADMIN — CYANOCOBALAMIN TAB 500 MCG 500 MCG: 500 TAB at 08:35

## 2025-02-25 RX ADMIN — FOLIC ACID 1 MG: 1 TABLET ORAL at 08:33

## 2025-02-25 RX ADMIN — LITHIUM CARBONATE 300 MG: 300 TABLET, EXTENDED RELEASE ORAL at 08:33

## 2025-02-25 NOTE — PLAN OF CARE
Problem: CHIN  Goal: Will exhibit normal sleep and speech and no impulsivity  Description: INTERVENTIONS:  - Administer medication as ordered  - Set limits on impulsive behavior  - Make attempts to decrease external stimuli as possible  Outcome: Adequate for Discharge     Problem: PSYCHOSIS  Goal: Will report no hallucinations or delusions  Description: Interventions:  - Administer medication as  ordered  - Every waking shifts and PRN assess for the presence of hallucinations and or delusions  - Assist with reality testing to support increasing orientation  - Assess if patient's hallucinations or delusions are encouraging self-harm or harm to others and intervene as appropriate  Outcome: Adequate for Discharge     Problem: ANXIETY  Goal: Will report anxiety at manageable levels  Description: INTERVENTIONS:  - Administer medication as ordered  - Teach and encourage coping skills  - Provide emotional support  - Assess patient/family for anxiety and ability to cope  Outcome: Adequate for Discharge  Goal: By discharge: Patient will verbalize 2 strategies to deal with anxiety  Description: Interventions:  - Identify any obvious source/trigger to anxiety  - Staff will assist patient in applying identified coping technique/skills  - Encourage attendance of scheduled groups and activities  Outcome: Adequate for Discharge     Problem: SUBSTANCE USE/ABUSE  Goal: Will have no detox symptoms and will verbalize plan for changing substance-related behavior  Description: INTERVENTIONS:  - Monitor physical status and assess for symptoms of withdrawal  - Administer medication as ordered  - Provide emotional support with 1 on 1 interaction with staff  - Encourage recovery focused program/ addiction education  - Assess for verbalization of changing behaviors related to substance abuse  - Initiate consults and referrals as appropriate (Case Management, Spiritual Care, etc.)  Outcome: Adequate for Discharge  Goal: By discharge, will  develop insight into their chemical dependency and sustain motivation to continue in recovery  Description: INTERVENTIONS:  - Attends all daily group sessions and scheduled AA groups  - Actively practices coping skills through participation in the therapeutic community and adherence to program rules  - Reviews and completes assignments from individual treatment plan  - Assist patient development of understanding of their personal cycle of addiction and relapse triggers  Outcome: Adequate for Discharge  Goal: By discharge, patient will have ongoing treatment plan addressing chemical dependency  Description: INTERVENTIONS:  - Assist patient with resources and/or appointments for ongoing recovery based living  Outcome: Adequate for Discharge     Problem: SLEEP DISTURBANCE  Goal: Will exhibit normal sleeping pattern  Description: Interventions:  -  Assess the patients sleep pattern, noting recent changes  - Administer medication as ordered  - Decrease environmental stimuli, including noise, as appropriate during the night  - Encourage the patient to actively participate in unit groups and or exercise during the day to enhance ability to achieve adequate sleep at night  - Assess the patient, in the morning, encouraging a description of sleep experience  Outcome: Adequate for Discharge     Problem: Ineffective Coping  Goal: Participates in unit activities  Description: Interventions:  - Provide therapeutic environment   - Provide required programming   - Redirect inappropriate behaviors   Outcome: Adequate for Discharge     Problem: DISCHARGE PLANNING - CARE MANAGEMENT  Goal: Discharge to post-acute care or home with appropriate resources  Description: INTERVENTIONS:  - Conduct assessment to determine patient/family and health care team treatment goals, and need for post-acute services based on payer coverage, community resources, and patient preferences, and barriers to discharge  - Address psychosocial, clinical, and  financial barriers to discharge as identified in assessment in conjunction with the patient/family and health care team  - Arrange appropriate level of post-acute services according to patient’s   needs and preference and payer coverage in collaboration with the physician and health care team  - Communicate with and update the patient/family, physician, and health care team regarding progress on the discharge plan  - Arrange appropriate transportation to post-acute venues  Outcome: Adequate for Discharge     Problem: Nutrition/Hydration-ADULT  Goal: Nutrient/Hydration intake appropriate for improving, restoring or maintaining nutritional needs  Description: Monitor and assess patient's nutrition/hydration status for malnutrition. Collaborate with interdisciplinary team and initiate plan and interventions as ordered.  Monitor patient's weight and dietary intake as ordered or per policy. Utilize nutrition screening tool and intervene as necessary. Determine patient's food preferences and provide high-protein, high-caloric foods as appropriate.     INTERVENTIONS:  - Monitor oral intake, urinary output, labs, and treatment plans  - Assess nutrition and hydration status and recommend course of action  - Evaluate amount of meals eaten  - Assist patient with eating if necessary   - Allow adequate time for meals  - Recommend/ encourage appropriate diets, oral nutritional supplements, and vitamin/mineral supplements  - Order, calculate, and assess calorie counts as needed  - Recommend, monitor, and adjust tube feedings and TPN/PPN based on assessed needs  - Assess need for intravenous fluids  - Provide specific nutrition/hydration education as appropriate  - Include patient/family/caregiver in decisions related to nutrition  Outcome: Adequate for Discharge

## 2025-02-25 NOTE — DISCHARGE SUMMARY
Discharge Summary - Behavioral Health   Name: Osei Trimble 49 y.o. male I MRN: [id number]  Unit/Bed#: [facility] 205-02 I Date of Admission: 2/18/2025   Date of Service: 2/25/2025 I Hospital Day: 7    Discharge Summary - Behavioral [facility]   Osei Trimble 49 y.o. male MRN: [id number]  Unit/Bed#: [facility] 205-02 Encounter: [id number]     Assessment & Plan  Methamphetamine-induced psychotic disorder (HCC)  Continue lithium 300 mg BID; lithium level 0.73 on 2/24/25  Continue Zyprexa 15 mg HS to promote compliance  Follow-up with Dr. Morin on March 3, 2025 at 2 pm for medication management    Explosive personality disorder (HCC)      Admission Date: 2/18/2025         Discharge Date: 2/25/25    Attending Psychiatrist: Jef Paige MD    Reason for Admission/HPI: Major depressive disorder [F32.9]    Patient is a 49 y.o. male with past psychiatric history of bipolar disorder, substance abuse, and intermittent explosive disorder who presented with auditory hallucinations and paranoia after methamphetamine use.     Hospital Course: On 2/17/25, the patient presented to [facility] ED due to erratic behavior, auditory and visual hallucinations, and increased agitation. Patient reportedly was running down the street screaming and responding to internal stimuli before his sister called police. Pt endorsed 6-7 distinct voices directing him to harm others. After evaluation in the ED, the patient was admitted on a voluntary 201 commitment to the [facility] inpatient psychiatric unit. Patient was started on every 7 minutes precautions. During the hospitalization, the patient was attending individual, group, milieu, and occupational therapy.    To address psychotic symptoms, paranoid ideation, and auditory hallucinations the patient was started on antipsychotic medication Zyprexa and titrated to Zyprexa 15 mg HS. To address mood instability the patient was continued on PTA mood stabilizer Lithium 300 mg BID. Lithium  "level on 2/24/25 was 0.73 and at therapeutic level. Prior to beginning of treatment, medications risks and benefits and possible side effects including risk of kidney impairment related to treatment with Lithium and risk of parkinsonian symptoms, Tardive Dyskinesia and metabolic syndrome related to treatment with antipsychotic medications were reviewed with the patient. The patient verbalized understanding and agreement for treatment.     Patient's symptoms gradually improved over the hospital course with resolution of auditory hallucinations. He continued to show mild fluctuations in mood and frequently demanded his discharge despite repeated explanation that he would have to sign a 72-hour notice.  A 72-hour notice was eventually initiated by the patient on 2/22/25 requesting withdrawal from inpatient psychiatric treatment. Since that time, Osei has demonstrated the ability to contract for safety and repeatedly denied suicidal/homicidal ideation and thoughts of self-harm. The treatment team found that the patient had no grounds for involuntary commitment and appropriate outpatient follow-up was established. The patient was tolerating medications and not reporting any significant side effects at the time of discharge.    Since Osei was doing well at the end of the hospitalization, treatment team felt that he could be safely discharged to outpatient care.    The outpatient follow up with  Dr. Morin on 3/5/25 at 2 pm  was arranged by the unit  upon discharge.      Mental Status at time of Discharge:     Appearance:  casually dressed and disheveled   Behavior:  cooperative   Speech:  normal pitch and normal volume   Mood:  \"Fine\"   Affect:  constricted   Thought Process:  Goal directed   Thought Content:  No overt delusions   Perceptual Disturbances: None   Risk Potential: Suicidal Ideations none, Homicidal Ideations none, and Potential for Aggression No   Sensorium:  person, place, time/date, and " situation   Cognition:  recent and remote memory grossly intact   Consciousness:  alert and awake    Attention: attention span and concentration were age appropriate   Insight:  fair   Judgment: fair   Gait/Station: normal gait/station   Motor Activity: no abnormal movements     Suicide/Homicide Risk Assessment:    Risk of Harm to Self:  The following ratings are based on assessment at the time of discharge, review of the hospital stay progress, and assessment at the time of the interview  Demographic risk factors include: , never , male  Historical Risk Factors include: chronic psychiatric problems, substance use  Current Specific Risk Factors include: recent inpatient psychiatric admission - being discharged today, mental illness diagnosis, health problems, substance use  Protective Factors: no current suicidal ideation, stable mood, no current psychotic symptoms, improved impulse control, no current suicidal plan or intent, outpatient psychiatric follow up established, stable housing, restricted access to lethal means, safe and stable living environment, sense of determination  Weapons/Firearms: none. The following steps have been taken to ensure weapons are properly secured: not applicable  Based on today's assessment, Osei presents the following risk of harm to self: minimal    Risk of Harm to Others:  The following ratings are based on assessment at the time of discharge, review of the hospital stay progress, and assessment at the time of the interview  Demographic Risk Factors include: male.  Historical Risk Factors include: history of violence, drug abuse, prior arrest.  Current Specific Risk Factors include: recent episode of mood instability  Protective Factors: no current homicidal ideation, improved impulse control, stable mood, no current psychotic symptoms, willing to continue psychiatric treatment, outpatient follow up established, stable living environment, restricted access to  lethal means, access to mental health treatment  Weapons/Firearms: none. The following steps have been taken to ensure weapons are properly secured: not applicable  Based on today's assessment, Osei presents the following risk of harm to others: low    The following interventions are recommended: continue medication management, contracts for safety at present - agrees to go to ED if feeling unsafe. At the current moment, Osei is future-oriented, forward-thinking, and demonstrates ability to act in a self-preserving manner as evidenced by maintaining safety on the unit, seeking treatment.To mitigate future risk, patient should adhere to the recommendations of this writer, avoid alcohol/illicit substance use, utilize community-based resources and familiar support and prioritize mental health treatment.     Presently, patient denies suicidal/homicidal ideation in addition to thoughts of self-injury; contracts for safety. Patient is amenable to calling/contacting crisis and/or attending to the nearest emergency department if their clinical condition deteriorates to assure their safety and stability, stating that they are able to appropriately confide in their sister regarding their psychiatric state.      Admission Diagnosis:Major depressive disorder [F32.9]    Discharge Diagnosis:   Principal Problem:    Methamphetamine-induced psychotic disorder (HCC)  Active Problems:    Explosive personality disorder (HCC)  Resolved Problems:    * No resolved hospital problems. *        Lab results:  Admission on 02/18/2025   Component Date Value    Sodium 02/18/2025 135     Potassium 02/18/2025 4.4     Chloride 02/18/2025 101     CO2 02/18/2025 26     ANION GAP 02/18/2025 8     BUN 02/18/2025 18     Creatinine 02/18/2025 1.10     Glucose 02/18/2025 100     Calcium 02/18/2025 9.9     AST 02/18/2025 29     ALT 02/18/2025 23     Alkaline Phosphatase 02/18/2025 95     Total Protein 02/18/2025 7.5     Albumin 02/18/2025 4.7     Total  Bilirubin 02/18/2025 1.19 (H)     eGFR 02/18/2025 77     WBC 02/18/2025 12.54 (H)     RBC 02/18/2025 4.66     Hemoglobin 02/18/2025 14.0     Hematocrit 02/18/2025 41.9     MCV 02/18/2025 90     MCH 02/18/2025 30.0     MCHC 02/18/2025 33.4     RDW 02/18/2025 13.5     MPV 02/18/2025 9.9     Platelets 02/18/2025 358     nRBC 02/18/2025 0     Segmented % 02/18/2025 81 (H)     Immature Grans % 02/18/2025 0     Lymphocytes % 02/18/2025 12 (L)     Monocytes % 02/18/2025 4     Eosinophils Relative 02/18/2025 3     Basophils Relative 02/18/2025 0     Absolute Neutrophils 02/18/2025 10.07 (H)     Absolute Immature Grans 02/18/2025 0.03     Absolute Lymphocytes 02/18/2025 1.53     Absolute Monocytes 02/18/2025 0.55     Eosinophils Absolute 02/18/2025 0.33     Basophils Absolute 02/18/2025 0.03     Vitamin B-12 02/18/2025 220     Folate 02/18/2025 >22.3     Vit D, 25-Hydroxy 02/18/2025 <7.0 (L)     Cholesterol 02/18/2025 189     Triglycerides 02/18/2025 129     HDL, Direct 02/18/2025 36 (L)     LDL Calculated 02/18/2025 127 (H)     Non-HDL-Chol (CHOL-HDL) 02/18/2025 153     Treponema Pallidium Tota* 02/18/2025 Non-reactive     Hemoglobin A1C 02/18/2025 5.6     EAG 02/18/2025 114     POC Glucose 02/18/2025 85     POC Glucose 02/19/2025 131     POC Glucose 02/19/2025 138     WBC 02/20/2025 7.32     RBC 02/20/2025 3.88     Hemoglobin 02/20/2025 11.8 (L)     Hematocrit 02/20/2025 34.1 (L)     MCV 02/20/2025 88     MCH 02/20/2025 30.4     MCHC 02/20/2025 34.6     RDW 02/20/2025 13.2     MPV 02/20/2025 9.5     Platelets 02/20/2025 257     nRBC 02/20/2025 0     Segmented % 02/20/2025 66     Immature Grans % 02/20/2025 1     Lymphocytes % 02/20/2025 24     Monocytes % 02/20/2025 6     Eosinophils Relative 02/20/2025 3     Basophils Relative 02/20/2025 0     Absolute Neutrophils 02/20/2025 4.84     Absolute Immature Grans 02/20/2025 0.05     Absolute Lymphocytes 02/20/2025 1.74     Absolute Monocytes 02/20/2025 0.47     Eosinophils  Absolute 02/20/2025 0.20     Basophils Absolute 02/20/2025 0.02     POC Glucose 02/20/2025 129     POC Glucose 02/20/2025 129     Sodium 02/21/2025 135     Potassium 02/21/2025 3.9     Chloride 02/21/2025 103     CO2 02/21/2025 25     ANION GAP 02/21/2025 7     BUN 02/21/2025 14     Creatinine 02/21/2025 0.90     Glucose 02/21/2025 141 (H)     Glucose, Fasting 02/21/2025 141 (H)     Calcium 02/21/2025 9.3     AST 02/21/2025 18     ALT 02/21/2025 16     Alkaline Phosphatase 02/21/2025 71     Total Protein 02/21/2025 6.7     Albumin 02/21/2025 4.1     Total Bilirubin 02/21/2025 0.61     eGFR 02/21/2025 99     POC Glucose 02/21/2025 127     POC Glucose 02/21/2025 143 (H)     POC Glucose 02/22/2025 143 (H)     POC Glucose 02/22/2025 115     POC Glucose 02/23/2025 138     POC Glucose 02/23/2025 118     POC Glucose 02/24/2025 122     [place] Lvl 02/24/2025 0.73     POC Glucose 02/24/2025 111     POC Glucose 02/25/2025 129        Discharge Medications:  Current Discharge Medication List        START taking these medications    Details   cyanocobalamin (VITAMIN B-12) 500 MCG tablet Take 1 tablet (500 mcg total) by mouth daily  Qty: 30 tablet, Refills: 0    Associated Diagnoses: Vitamin B12 deficiency      ergocalciferol (VITAMIN D2) 50,000 units Take 1 capsule (50,000 Units total) by mouth once a week for 9 doses  Qty: 4 capsule, Refills: 0    Associated Diagnoses: Vitamin D deficiency      melatonin 3 mg Take 1 tablet (3 mg total) by mouth daily at bedtime  Qty: 30 tablet, Refills: 0    Associated Diagnoses: Methamphetamine-induced psychotic disorder (HCC)      nicotine polacrilex (NICORETTE) 4 mg gum Chew 1 each (4 mg total) every 2 (two) hours as needed for smoking cessation  Qty: 100 each, Refills: 0    Associated Diagnoses: Nicotine addiction      OLANZapine (ZyPREXA) 15 mg tablet Take 1 tablet (15 mg total) by mouth daily at bedtime  Qty: 30 tablet, Refills: 0    Associated Diagnoses: Methamphetamine-induced psychotic  disorder (HCC); Explosive personality disorder (HCC)      sitaGLIPtin (JANUVIA) 100 mg tablet Take 1 tablet (100 mg total) by mouth daily  Qty: 30 tablet, Refills: 0    Associated Diagnoses: Diabetes mellitus (HCC)              Current Discharge Medication List        STOP taking these medications       metFORMIN (GLUCOPHAGE) 500 mg tablet Comments:   Reason for Stopping:         metoprolol succinate (TOPROL-XL) 50 mg 24 hr tablet Comments:   Reason for Stopping:         QUEtiapine (SEROquel) 50 mg tablet Comments:   Reason for Stopping:         prazosin (MINIPRESS) 5 mg capsule Comments:   Reason for Stopping:                Current Discharge Medication List        CONTINUE these medications which have CHANGED    Details   Diclofenac Sodium (VOLTAREN) 1 % Apply 2 g topically 4 (four) times a day as needed (joint pain)  Qty: 350 g, Refills: 0    Associated Diagnoses: Lumbago      folic acid (FOLVITE) 1 mg tablet Take 1 tablet (1 mg total) by mouth daily  Qty: 30 tablet, Refills: 0    Associated Diagnoses: Alcohol abuse      lidocaine (LIDODERM) 5 % Apply 2 patches topically over 12 hours daily as needed (pain) Remove & Discard patch within 12 hours or as directed by MD  Qty: 60 patch, Refills: 0    Associated Diagnoses: Lumbago      lisinopril (ZESTRIL) 2.5 mg tablet Take 1 tablet (2.5 mg total) by mouth daily  Qty: 30 tablet, Refills: 0    Associated Diagnoses: Essential hypertension              Current Discharge Medication List        CONTINUE these medications which have NOT CHANGED    Details   Blood Glucose Monitoring Suppl (OneTouch Verio Reflect) w/Device KIT May substitute brand based on insurance coverage. Check glucose BID.  Qty: 1 kit, Refills: 0    Associated Diagnoses: Hyperglycemia      glucose blood (OneTouch Verio) test strip May substitute brand based on insurance coverage. Check glucose BID.  Qty: 100 each, Refills: 1    Associated Diagnoses: Hyperglycemia      hydrOXYzine HCL (ATARAX) 50 mg  tablet Take 1 tablet (50 mg total) by mouth 2 (two) times a day as needed for anxiety  Qty: 30 tablet, Refills: 0    Associated Diagnoses: Bipolar 1 disorder (HCC)      lithium carbonate (LITHOBID) 300 mg CR tablet Take 300 mg by mouth 2 (two) times a day      OneTouch Delica Lancets 33G MISC May substitute brand based on insurance coverage. Check glucose BID.  Qty: 100 each, Refills: 1    Associated Diagnoses: Hyperglycemia      Alcohol Swabs 70 % PADS May substitute brand based on insurance coverage. Check glucose BID.  Qty: 100 each, Refills: 1    Associated Diagnoses: Hyperglycemia              Discharge instructions/Information to patient and family:   See after visit summary for information provided to patient and family.      Provisions for Follow-Up Care:  See after visit summary for information related to follow-up care and any pertinent home health orders.      Discharge Statement   I spent 30 minutes discharging the patient. This time was spent on the day of discharge. I had direct contact with the patient on the day of discharge. Additional documentation is required if more than 30 minutes were spent on discharge.

## 2025-02-25 NOTE — BH TRANSITION RECORD
Contact Information: If you have any questions, concerns, pended studies, tests and/or procedures, or emergencies regarding your inpatient behavioral health visit. Please contact [facility] older adult behavioral health unit [phone] and ask to speak to a , nurse or physician. A contact is available 24 hours/ 7 days a week at this number.     Summary of Procedures Performed During your Stay:  Below is a list of major procedures performed during your hospital stay and a summary of results:  - No major procedures performed.    If studies are pending at discharge, follow up with your PCP and/or referring provider.

## 2025-02-25 NOTE — PROGRESS NOTES
"Progress Note - Osei Trimble 49 y.o. male MRN: [id number]    Unit/Bed#: [facility] 205-02 Encounter: [id number]        Subjective:   Patient seen and examined at bedside after reviewing the chart and discussing the case with the caring staff.      Patient examined at bedside.  Patient reports no acute symptoms.    Patient is being discharged today.  Patient is requesting prescriptions.  I reviewed and reconciled patient's problem list and medications.    Physical Exam   Vitals: Blood pressure 119/69, pulse 65, temperature 98.6 °F (37 °C), temperature source Temporal, resp. rate 18, height 6' 1\" (1.854 m), weight 91.6 kg (202 lb), SpO2 96%.,Body mass index is 26.65 kg/m².  Constitutional: Patient in no acute distress.  HEENT: PERR, EOMI, MMM.  Cardiovascular: Normal rate and regular rhythm.    Pulmonary/Chest: Effort normal and breath sounds normal.   Abdomen: Soft, + BS, NT.    Assessment/Plan:  sOei Trimble is a(n) 49 y.o. year old male with MDD.     Medical clearance.  Patient is medically cleared for discharge.  All scripts will be sent out for the patient.    Hypertension.  Home: lisinopril 20 mg daily and Toprol-XL 50 mg daily.  Here: lisinopril d/c on 2/20 due to low BP.    T2DM.  Hgb A1c 6.6% on 8/26/24.  Home: metformin 500 mg twice daily.  Here: Januvia 50 mg daily (metformin held due to MEHDI).  Carb controlled diet.  Accu-Cheks twice daily.    Neuroendocrine carcinoma of small bowel.  Diagnosed 7/2022.  Pt is following with hem/onc Dr. Green on outpt basis.  He is on lanreotide on every 4-week basis (last infusion 2/5/25).  Arthralgias.  Tylenol, lidocaine patches, Voltaren gel as needed.  Drug abuse.  UDS on 2/18 +amph/meth.   Insomnia.  Patient is on melatonin 3 mg at bedtime.  Folate deficiency.  Continue home folic acid 1 mg daily.   Vitamin B12 deficiency.  Pt started on vitamin B12 500 mcg daily.  Vitamin D deficiency.  Pt started on vitamin D2 88573 units weekly.  Malnutrition.  Dietician following.  " Adult Malnutrition type: Chronic illness.  Adult Degree of Malnutrition: Malnutrition of moderate degree.  Malnutrition Characteristics: Inadequate energy, Weight loss.  MEHDI.  Now resolved.  Cr 1.61 -> 1.10 -> 0.90 on 2/21.  Avoid nephrotoxins.

## 2025-02-25 NOTE — SOCIAL WORK
02/25/25 0858   Team Meeting   Meeting Type Daily Rounds   Team Members Present   Team Members Present Physician;Nurse;;Occupational Therapist   Physician Team Member Grecia HOGUE, Bridget HOGUE , Corby JAY   Nursing Team Member Richardson ASTUDILLO   Social Work Team Member Leticia RASHID   OT Team Member Nasir SMITH   Patient/Family Present   Patient Present No   Patient's Family Present No

## 2025-02-25 NOTE — NURSING NOTE
Patient visible intermittently for needs and evening snack. Patient is guarded during interaction but pleasant and cooperative. Denies SI/HI and hallucinations. Endorsing anxiety and depression. Compliant with scheduled medications. Continuous q15 minute rounding and safety checks ongoing.

## 2025-02-25 NOTE — PLAN OF CARE
Problem: ANXIETY  Goal: Will report anxiety at manageable levels  Description: INTERVENTIONS:  - Administer medication as ordered  - Teach and encourage coping skills  - Provide emotional support  - Assess patient/family for anxiety and ability to cope  Outcome: Progressing     Problem: SLEEP DISTURBANCE  Goal: Will exhibit normal sleeping pattern  Description: Interventions:  -  Assess the patients sleep pattern, noting recent changes  - Administer medication as ordered  - Decrease environmental stimuli, including noise, as appropriate during the night  - Encourage the patient to actively participate in unit groups and or exercise during the day to enhance ability to achieve adequate sleep at night  - Assess the patient, in the morning, encouraging a description of sleep experience  Outcome: Progressing

## 2025-02-25 NOTE — ASSESSMENT & PLAN NOTE
Continue lithium 300 mg BID; lithium level 0.73 on 2/24/25  Continue Zyprexa 15 mg HS to promote compliance  Follow-up with Dr. Morin on March 3, 2025 at 2 pm for medication management

## 2025-03-03 ENCOUNTER — HOSPITAL ENCOUNTER (OUTPATIENT)
Dept: CT IMAGING | Facility: HOSPITAL | Age: 50
Discharge: HOME | End: 2025-03-03
Attending: INTERNAL MEDICINE
Payer: COMMERCIAL

## 2025-03-03 ENCOUNTER — TELEPHONE (OUTPATIENT)
Age: 50
End: 2025-03-03

## 2025-03-03 DIAGNOSIS — C7A.8 NEUROENDOCRINE CARCINOMA OF SMALL BOWEL (HCC): ICD-10-CM

## 2025-03-03 PROCEDURE — 74177 CT ABD & PELVIS W/CONTRAST: CPT

## 2025-03-03 PROCEDURE — 71260 CT THORAX DX C+: CPT

## 2025-03-03 RX ADMIN — IOHEXOL 100 ML: 350 INJECTION, SOLUTION INTRAVENOUS at 14:27

## 2025-03-03 RX ADMIN — IOHEXOL 50 ML: 240 INJECTION, SOLUTION INTRATHECAL; INTRAVASCULAR; INTRAVENOUS; ORAL at 12:45

## 2025-03-03 NOTE — TELEPHONE ENCOUNTER
Patients GI provider:  Dr. Quinones     Number to return call: [phone]     Reason for call: Pt sister Ariadne calling stating the prep has not been sent over to the pharmacy, verified this prep is usually over the counter.  Requesting we send a prescription for prep to pharmacy on file, [facility].     Scheduled procedure/appointment date if applicable: procedure 3/6/25

## 2025-03-03 NOTE — TELEPHONE ENCOUNTER
Patient's sister called because they need the solution for patient to drink prior to colonoscopy. Transferred to GI for further assistance.

## 2025-03-03 NOTE — TELEPHONE ENCOUNTER
Looked at the appt desk, CS scheduled for 03/06/2025 and prep is Miralax/Dulcolax. No script needed. Will send directions via My Chart.

## 2025-03-04 NOTE — TELEPHONE ENCOUNTER
Spoke with patient reviewed prep instructions with patient and that I sen them via my chart as well.  Patient to Purchase: One (1) 238-gram container of Miralax (polyethylene glycol 3350), four (4) 5 mg Dulcolax (bisacodyl) tablets, and 64-ounces of Gatorade (sports drink) - no red, orange, or purple. These may be purchased at any pharmacy without a prescription. Generic products are permissible.     Also let patient aware to hold Januvia the morning of the procedure and that he could only take it the evening before the procedure then he could take after the procedure is done and pt verbalized understanding.

## 2025-03-05 ENCOUNTER — HOSPITAL ENCOUNTER (INPATIENT)
Facility: HOSPITAL | Age: 50
LOS: 9 days | Discharge: HOME | End: 2025-03-14
Attending: PSYCHIATRY & NEUROLOGY | Admitting: PSYCHIATRY & NEUROLOGY
Payer: COMMERCIAL

## 2025-03-05 ENCOUNTER — HOSPITAL ENCOUNTER (OUTPATIENT)
Dept: INFUSION CENTER | Facility: HOSPITAL | Age: 50
Discharge: HOME | End: 2025-03-05
Attending: INTERNAL MEDICINE

## 2025-03-05 ENCOUNTER — HOSPITAL ENCOUNTER (EMERGENCY)
Facility: HOSPITAL | Age: 50
End: 2025-03-05
Attending: EMERGENCY MEDICINE
Payer: COMMERCIAL

## 2025-03-05 ENCOUNTER — TELEPHONE (OUTPATIENT)
Age: 50
End: 2025-03-05

## 2025-03-05 VITALS
OXYGEN SATURATION: 99 % | HEART RATE: 88 BPM | TEMPERATURE: 98.2 F | SYSTOLIC BLOOD PRESSURE: 121 MMHG | DIASTOLIC BLOOD PRESSURE: 82 MMHG | RESPIRATION RATE: 18 BRPM

## 2025-03-05 DIAGNOSIS — I10 ESSENTIAL HYPERTENSION: ICD-10-CM

## 2025-03-05 DIAGNOSIS — M54.50 LUMBAGO: ICD-10-CM

## 2025-03-05 DIAGNOSIS — E53.8 VITAMIN B12 DEFICIENCY: ICD-10-CM

## 2025-03-05 DIAGNOSIS — N17.9 AKI (ACUTE KIDNEY INJURY) (HCC): ICD-10-CM

## 2025-03-05 DIAGNOSIS — E11.9 DIABETES MELLITUS (HCC): ICD-10-CM

## 2025-03-05 DIAGNOSIS — E44.0 MODERATE PROTEIN-CALORIE MALNUTRITION (HCC): Primary | ICD-10-CM

## 2025-03-05 DIAGNOSIS — R45.851 SUICIDAL IDEATIONS: Primary | ICD-10-CM

## 2025-03-05 DIAGNOSIS — E55.9 VITAMIN D DEFICIENCY: ICD-10-CM

## 2025-03-05 DIAGNOSIS — F10.10 ALCOHOL ABUSE: ICD-10-CM

## 2025-03-05 DIAGNOSIS — F29 PSYCHOSES (HCC): ICD-10-CM

## 2025-03-05 DIAGNOSIS — R73.9 HYPERGLYCEMIA: ICD-10-CM

## 2025-03-05 DIAGNOSIS — F31.9 BIPOLAR DISORDER WITH PSYCHOTIC FEATURES (HCC): ICD-10-CM

## 2025-03-05 DIAGNOSIS — F15.10 METHAMPHETAMINE ABUSE (HCC): ICD-10-CM

## 2025-03-05 DIAGNOSIS — F15.959 METHAMPHETAMINE-INDUCED PSYCHOTIC DISORDER (HCC): ICD-10-CM

## 2025-03-05 LAB
ALBUMIN SERPL BCG-MCNC: 4.3 G/DL (ref 3.5–5)
ALP SERPL-CCNC: 99 U/L (ref 34–104)
ALT SERPL W P-5'-P-CCNC: 46 U/L (ref 7–52)
AMPHETAMINES SERPL QL SCN: POSITIVE
ANION GAP SERPL CALCULATED.3IONS-SCNC: 11 MMOL/L (ref 4–13)
AST SERPL W P-5'-P-CCNC: 41 U/L (ref 13–39)
ATRIAL RATE: 102 BPM
BARBITURATES UR QL: NEGATIVE
BASOPHILS # BLD AUTO: 0.02 THOUSANDS/ÂΜL (ref 0–0.1)
BASOPHILS NFR BLD AUTO: 0 % (ref 0–1)
BENZODIAZ UR QL: NEGATIVE
BILIRUB SERPL-MCNC: 0.87 MG/DL (ref 0.2–1)
BUN SERPL-MCNC: 18 MG/DL (ref 5–25)
CALCIUM SERPL-MCNC: 9.3 MG/DL (ref 8.4–10.2)
CHLORIDE SERPL-SCNC: 101 MMOL/L (ref 96–108)
CK SERPL-CCNC: 181 U/L (ref 39–308)
CO2 SERPL-SCNC: 20 MMOL/L (ref 21–32)
COCAINE UR QL: NEGATIVE
CREAT SERPL-MCNC: 0.98 MG/DL (ref 0.6–1.3)
EOSINOPHIL # BLD AUTO: 0.34 THOUSAND/ÂΜL (ref 0–0.61)
EOSINOPHIL NFR BLD AUTO: 4 % (ref 0–6)
ERYTHROCYTE [DISTWIDTH] IN BLOOD BY AUTOMATED COUNT: 13.8 % (ref 11.6–15.1)
ETHANOL SERPL-MCNC: <10 MG/DL
FENTANYL UR QL SCN: NEGATIVE
FLUAV AG UPPER RESP QL IA.RAPID: NEGATIVE
FLUBV AG UPPER RESP QL IA.RAPID: NEGATIVE
GFR SERPL CREATININE-BSD FRML MDRD: 88 ML/MIN/1.73SQ M
GLUCOSE SERPL-MCNC: 178 MG/DL (ref 65–140)
HCT VFR BLD AUTO: 35.8 % (ref 36.5–49.3)
HGB BLD-MCNC: 12.1 G/DL (ref 12–17)
HYDROCODONE UR QL SCN: NEGATIVE
IMM GRANULOCYTES # BLD AUTO: 0.03 THOUSAND/UL (ref 0–0.2)
IMM GRANULOCYTES NFR BLD AUTO: 0 % (ref 0–2)
LYMPHOCYTES # BLD AUTO: 0.94 THOUSANDS/ÂΜL (ref 0.6–4.47)
LYMPHOCYTES NFR BLD AUTO: 10 % (ref 14–44)
MCH RBC QN AUTO: 30 PG (ref 26.8–34.3)
MCHC RBC AUTO-ENTMCNC: 33.8 G/DL (ref 31.4–37.4)
MCV RBC AUTO: 89 FL (ref 82–98)
METHADONE UR QL: NEGATIVE
MONOCYTES # BLD AUTO: 0.6 THOUSAND/ÂΜL (ref 0.17–1.22)
MONOCYTES NFR BLD AUTO: 6 % (ref 4–12)
NEUTROPHILS # BLD AUTO: 7.91 THOUSANDS/ÂΜL (ref 1.85–7.62)
NEUTS SEG NFR BLD AUTO: 80 % (ref 43–75)
NRBC BLD AUTO-RTO: 0 /100 WBCS
OPIATES UR QL SCN: NEGATIVE
OXYCODONE+OXYMORPHONE UR QL SCN: NEGATIVE
P AXIS: 58 DEGREES
PCP UR QL: NEGATIVE
PLATELET # BLD AUTO: 238 THOUSANDS/UL (ref 149–390)
PMV BLD AUTO: 9.4 FL (ref 8.9–12.7)
POTASSIUM SERPL-SCNC: 3.7 MMOL/L (ref 3.5–5.3)
PR INTERVAL: 168 MS
PROT SERPL-MCNC: 7.8 G/DL (ref 6.4–8.4)
QRS AXIS: -22 DEGREES
QRSD INTERVAL: 74 MS
QT INTERVAL: 354 MS
QTC INTERVAL: 461 MS
RBC # BLD AUTO: 4.04 MILLION/UL (ref 3.88–5.62)
SARS-COV+SARS-COV-2 AG RESP QL IA.RAPID: NEGATIVE
SODIUM SERPL-SCNC: 132 MMOL/L (ref 135–147)
T WAVE AXIS: 30 DEGREES
THC UR QL: NEGATIVE
TSH SERPL DL<=0.05 MIU/L-ACNC: 10.47 UIU/ML (ref 0.45–4.5)
VENTRICULAR RATE: 102 BPM
WBC # BLD AUTO: 9.84 THOUSAND/UL (ref 4.31–10.16)

## 2025-03-05 PROCEDURE — 80307 DRUG TEST PRSMV CHEM ANLYZR: CPT | Performed by: EMERGENCY MEDICINE

## 2025-03-05 PROCEDURE — 82550 ASSAY OF CK (CPK): CPT | Performed by: EMERGENCY MEDICINE

## 2025-03-05 PROCEDURE — 87804 INFLUENZA ASSAY W/OPTIC: CPT | Performed by: EMERGENCY MEDICINE

## 2025-03-05 PROCEDURE — 36415 COLL VENOUS BLD VENIPUNCTURE: CPT | Performed by: EMERGENCY MEDICINE

## 2025-03-05 PROCEDURE — 84443 ASSAY THYROID STIM HORMONE: CPT | Performed by: EMERGENCY MEDICINE

## 2025-03-05 PROCEDURE — 85025 COMPLETE CBC W/AUTO DIFF WBC: CPT | Performed by: EMERGENCY MEDICINE

## 2025-03-05 PROCEDURE — 87811 SARS-COV-2 COVID19 W/OPTIC: CPT | Performed by: EMERGENCY MEDICINE

## 2025-03-05 PROCEDURE — 99285 EMERGENCY DEPT VISIT HI MDM: CPT | Performed by: EMERGENCY MEDICINE

## 2025-03-05 PROCEDURE — 93010 ELECTROCARDIOGRAM REPORT: CPT | Performed by: INTERNAL MEDICINE

## 2025-03-05 PROCEDURE — 82077 ASSAY SPEC XCP UR&BREATH IA: CPT | Performed by: EMERGENCY MEDICINE

## 2025-03-05 PROCEDURE — 80053 COMPREHEN METABOLIC PANEL: CPT | Performed by: EMERGENCY MEDICINE

## 2025-03-05 PROCEDURE — 99285 EMERGENCY DEPT VISIT HI MDM: CPT

## 2025-03-05 PROCEDURE — 93005 ELECTROCARDIOGRAM TRACING: CPT

## 2025-03-05 RX ORDER — BENZTROPINE MESYLATE 0.5 MG/1
0.5 TABLET ORAL
Status: CANCELLED | OUTPATIENT
Start: 2025-03-05

## 2025-03-05 RX ORDER — ACETAMINOPHEN 325 MG/1
650 TABLET ORAL EVERY 4 HOURS PRN
Status: DISCONTINUED | OUTPATIENT
Start: 2025-03-05 | End: 2025-03-14 | Stop reason: HOSPADM

## 2025-03-05 RX ORDER — LITHIUM CARBONATE 300 MG/1
300 TABLET, FILM COATED, EXTENDED RELEASE ORAL EVERY 12 HOURS SCHEDULED
Status: DISCONTINUED | OUTPATIENT
Start: 2025-03-05 | End: 2025-03-14 | Stop reason: HOSPADM

## 2025-03-05 RX ORDER — OLANZAPINE 2.5 MG/1
2.5 TABLET, FILM COATED ORAL
Status: DISCONTINUED | OUTPATIENT
Start: 2025-03-05 | End: 2025-03-14 | Stop reason: HOSPADM

## 2025-03-05 RX ORDER — OLANZAPINE 2.5 MG/1
5 TABLET, FILM COATED ORAL
Status: CANCELLED | OUTPATIENT
Start: 2025-03-05

## 2025-03-05 RX ORDER — LORAZEPAM 2 MG/ML
1 INJECTION INTRAMUSCULAR
Status: DISCONTINUED | OUTPATIENT
Start: 2025-03-05 | End: 2025-03-14 | Stop reason: HOSPADM

## 2025-03-05 RX ORDER — LORAZEPAM 0.5 MG/1
0.5 TABLET ORAL
Status: DISCONTINUED | OUTPATIENT
Start: 2025-03-05 | End: 2025-03-14 | Stop reason: HOSPADM

## 2025-03-05 RX ORDER — ACETAMINOPHEN 325 MG/1
650 TABLET ORAL EVERY 4 HOURS PRN
Status: CANCELLED | OUTPATIENT
Start: 2025-03-05

## 2025-03-05 RX ORDER — LORAZEPAM 2 MG/ML
1 INJECTION INTRAMUSCULAR
Status: CANCELLED | OUTPATIENT
Start: 2025-03-05

## 2025-03-05 RX ORDER — TRAZODONE HYDROCHLORIDE 50 MG/1
50 TABLET ORAL
Status: CANCELLED | OUTPATIENT
Start: 2025-03-05

## 2025-03-05 RX ORDER — BENZTROPINE MESYLATE 1 MG/ML
1 INJECTION, SOLUTION INTRAMUSCULAR; INTRAVENOUS
Status: DISCONTINUED | OUTPATIENT
Start: 2025-03-05 | End: 2025-03-14 | Stop reason: HOSPADM

## 2025-03-05 RX ORDER — ACETAMINOPHEN 325 MG/1
975 TABLET ORAL EVERY 6 HOURS PRN
Status: DISCONTINUED | OUTPATIENT
Start: 2025-03-05 | End: 2025-03-14 | Stop reason: HOSPADM

## 2025-03-05 RX ORDER — BENZTROPINE MESYLATE 0.5 MG/1
0.5 TABLET ORAL
Status: DISCONTINUED | OUTPATIENT
Start: 2025-03-05 | End: 2025-03-14 | Stop reason: HOSPADM

## 2025-03-05 RX ORDER — OLANZAPINE 10 MG/2ML
5 INJECTION, POWDER, FOR SOLUTION INTRAMUSCULAR
Status: CANCELLED | OUTPATIENT
Start: 2025-03-05

## 2025-03-05 RX ORDER — BENZTROPINE MESYLATE 1 MG/ML
1 INJECTION, SOLUTION INTRAMUSCULAR; INTRAVENOUS
Status: CANCELLED | OUTPATIENT
Start: 2025-03-05

## 2025-03-05 RX ORDER — LITHIUM CARBONATE 300 MG/1
300 TABLET, FILM COATED, EXTENDED RELEASE ORAL EVERY 12 HOURS SCHEDULED
Status: CANCELLED | OUTPATIENT
Start: 2025-03-05

## 2025-03-05 RX ORDER — ACETAMINOPHEN 325 MG/1
975 TABLET ORAL EVERY 6 HOURS PRN
Status: CANCELLED | OUTPATIENT
Start: 2025-03-05

## 2025-03-05 RX ORDER — TRAZODONE HYDROCHLORIDE 50 MG/1
50 TABLET ORAL
Status: DISCONTINUED | OUTPATIENT
Start: 2025-03-05 | End: 2025-03-14 | Stop reason: HOSPADM

## 2025-03-05 RX ORDER — OLANZAPINE 2.5 MG/1
15 TABLET, FILM COATED ORAL
Status: CANCELLED | OUTPATIENT
Start: 2025-03-05

## 2025-03-05 RX ORDER — OLANZAPINE 5 MG/1
5 TABLET ORAL
Status: DISCONTINUED | OUTPATIENT
Start: 2025-03-05 | End: 2025-03-14 | Stop reason: HOSPADM

## 2025-03-05 RX ORDER — OLANZAPINE 2.5 MG/1
2.5 TABLET, FILM COATED ORAL
Status: CANCELLED | OUTPATIENT
Start: 2025-03-05

## 2025-03-05 RX ORDER — OLANZAPINE 10 MG/2ML
5 INJECTION, POWDER, FOR SOLUTION INTRAMUSCULAR
Status: DISCONTINUED | OUTPATIENT
Start: 2025-03-05 | End: 2025-03-14 | Stop reason: HOSPADM

## 2025-03-05 RX ORDER — SODIUM CHLORIDE 1 G/1
TABLET ORAL
Status: DISPENSED
Start: 2025-03-05 | End: 2025-03-05

## 2025-03-05 RX ORDER — LORAZEPAM 1 MG/1
1 TABLET ORAL
Status: DISCONTINUED | OUTPATIENT
Start: 2025-03-05 | End: 2025-03-14 | Stop reason: HOSPADM

## 2025-03-05 RX ORDER — OLANZAPINE 15 MG/1
15 TABLET ORAL
Status: DISCONTINUED | OUTPATIENT
Start: 2025-03-05 | End: 2025-03-12

## 2025-03-05 RX ORDER — OLANZAPINE 5 MG/1
5 TABLET, ORALLY DISINTEGRATING ORAL ONCE
Status: COMPLETED | OUTPATIENT
Start: 2025-03-05 | End: 2025-03-05

## 2025-03-05 RX ORDER — LORAZEPAM 1 MG/1
1 TABLET ORAL
Status: CANCELLED | OUTPATIENT
Start: 2025-03-05

## 2025-03-05 RX ORDER — HYDROXYZINE HYDROCHLORIDE 25 MG/1
25 TABLET, FILM COATED ORAL
Status: DISCONTINUED | OUTPATIENT
Start: 2025-03-05 | End: 2025-03-14 | Stop reason: HOSPADM

## 2025-03-05 RX ORDER — HYDROXYZINE HYDROCHLORIDE 50 MG/1
25 TABLET, FILM COATED ORAL
Status: CANCELLED | OUTPATIENT
Start: 2025-03-05

## 2025-03-05 RX ORDER — LORAZEPAM 1 MG/1
0.5 TABLET ORAL
Status: CANCELLED | OUTPATIENT
Start: 2025-03-05

## 2025-03-05 RX ORDER — SODIUM CHLORIDE 1 G/1
1 TABLET ORAL ONCE
Status: COMPLETED | OUTPATIENT
Start: 2025-03-05 | End: 2025-03-05

## 2025-03-05 RX ADMIN — OLANZAPINE 5 MG: 5 TABLET, ORALLY DISINTEGRATING ORAL at 04:43

## 2025-03-05 RX ADMIN — Medication 3 MG: at 21:17

## 2025-03-05 RX ADMIN — LITHIUM CARBONATE 300 MG: 300 TABLET, EXTENDED RELEASE ORAL at 21:17

## 2025-03-05 RX ADMIN — OLANZAPINE 15 MG: 15 TABLET, FILM COATED ORAL at 21:17

## 2025-03-05 RX ADMIN — SODIUM CHLORIDE 1 G: 1 TABLET ORAL at 05:48

## 2025-03-05 NOTE — TELEPHONE ENCOUNTER
Call received from Radiology    Immediate findings on CT chest abdomen pelvis    Ordered by:  Dr Green

## 2025-03-05 NOTE — ED NOTES
Call placed to patient insurance, [facility] [phone]. Spoke with Tierra, logged prior authorization, awaiting call back to provide clinical.

## 2025-03-05 NOTE — ED NOTES
Patient screened upon arrival using [facility] Suicide Risk Assessment with result of high   Re-screening not required unless change in behavior or suicidal ideation.  Patient's environment appears to be free of unnecessary equipment/cords, and other objects commonly identified for self harm or harm to others.  Behavioral Health Assessment deferred as patient is sleeping and would benefit from additional rest.  Vital signs deferred until patient awake, no signs or symptoms of respiratory distress at this time.    Once patient is awake and able to participate, will complete assessments.  Will continue to monitor patient until Crisis and the Care team can make appropriate disposition and/or transfer/admission accommodations.        Mariia Clatyon RN  03/05/25 6327

## 2025-03-05 NOTE — ED NOTES
Met with patient (sister at bedside) and completed the crisis intake assessment as well as the safety risk assessment.    Patient arrived to the ER via private vehicle. Patient presents as manic and irritable. Patient speech is garbled and hard to understand. Majority of information obtained from patients sister. Patient has not slept in 3 days, patient has also not taken his psychiatric medications in 3 days. Patient is talking to people whom are not present, getting angry and fighting with them. Patient has started to back up his belonging and stating he needs to move because people are after him. He loaded majority of his belongings in his sisters car.    Patient is in agreement with inpatient treatment. Voluntary rights and 72 hour notice explained to patient. Patient verbalized and understanding.     Original placed on patients chart.     Bed search and insurance in progress.

## 2025-03-05 NOTE — ED NOTES
Transfer packet completed and prepared for transfer      Completed transfer packet placed on patients chart.

## 2025-03-05 NOTE — ED NOTES
"Pt states that he snorted meth yesterday along with alcohol consumption for his birthday; Family states \"During a recent visit they found his meth was laced with fentanyl\"     Caroline Kuhn RN  03/05/25 4381    "

## 2025-03-05 NOTE — ED PROVIDER NOTES
Time reflects when diagnosis was documented in both MDM as applicable and the Disposition within this note       Time User Action Codes Description Comment    3/5/2025  6:13 AM Dov Graham [R45.851] Suicidal ideations     3/5/2025  6:13 AM Dov Graham [F29] Psychoses (HCC)     3/5/2025  6:13 AM Dov Graham [F15.10] Methamphetamine abuse (HCC)           ED Disposition       ED Disposition   Transfer to Behavioral Health Condition   --    Date/Time   Wed Mar 5, 2025  6:13 AM    Comment   Osei Trimble should be transferred out to  and has been medically cleared.               Assessment & Plan       Medical Decision Making  Problems Addressed:  Methamphetamine abuse (HCC): chronic illness or injury with exacerbation, progression, or side effects of treatment  Psychoses (HCC): chronic illness or injury with exacerbation, progression, or side effects of treatment  Suicidal ideations: acute illness or injury    Amount and/or Complexity of Data Reviewed  Labs: ordered. Decision-making details documented in ED Course.    Risk  OTC drugs.  Prescription drug management.  Decision regarding hospitalization.        ED Course as of 03/05/25 0613   Wed Mar 05, 2025   0445 WBC: 9.84   0445 Hemoglobin: 12.1   0448 EKG is sinus rhythm tachycardia at 102.  Left axis deviation no ST elevations or depressions no active ischemia changes.   0612 This patient presents with symptoms consistent with an underlying psychiatric disorder, most likely psychosis with SI.   Presentation not consistent with acute organic causes to include delirium, dementia or drug induced disorders (acute ingestions or withdrawal; no evidence of toxidrome). Given the H&P, I suspect this patient is psychosis / drug abuse / suicidal ideations and will require psychiatric care.   Will consult crisis worker to evaluate the patient for potential hold.   Will also obtain labs for medical clearance.    Patient has been medically evaluated by myself  and is determined to be stable and cleared for further mental health evaluation and treatment.         Medications   OLANZapine (ZyPREXA ZYDIS) dispersible tablet 5 mg (5 mg Oral Given 3/5/25 6268)   sodium chloride tablet 1 g (1 g Oral Given 3/5/25 6874)       ED Risk Strat Scores                                                History of Present Illness       Chief Complaint   Patient presents with    Psychiatric Evaluation     Was recently in [facility] he feels like he wasn't there long enough; Was sent there for hallucinations which he is still currently having       Past Medical History:   Diagnosis Date    Allergic     Bipolar disorder in partial remission (HCC)     Diabetes (HCC)     Erectile dysfunction     unspecified erectile dysfunction type    Hypertension     Kidney stone       Past Surgical History:   Procedure Laterality Date    COLONOSCOPY      EXPLORATORY LAPAROTOMY W/ BOWEL RESECTION N/A 2022    Procedure: LAPAROTOMY EXPLORATORY W/ SMALL BOWEL RESECTION, liver biopsy;  Surgeon: Rose Mary Lara MD;  Location: [facility] OR;  Service: General    FL RETROGRADE PYELOGRAM  10/06/2021    HERNIA REPAIR      AL CYSTOURETHROSCOPY W/URETERAL CATHETERIZATION Right 10/06/2021    Procedure: CYSTOSCOPY RETROGRADE PYELOGRAM WITH INSERTION STENT URETERAL, RIGHT URETEROSCOPY, STONE EXTRACTION;  Surgeon: Bradly Rivera MD;  Location:  MAIN OR;  Service: Urology      Family History   Problem Relation Age of Onset    Diabetes Mother     Thyroid disease Mother     Cancer Father     Thyroid disease Sister     Depression Brother     Cancer Maternal Aunt     Cancer Paternal Uncle     Cancer Paternal Grandmother     No Known Problems Brother     Thyroid disease Sister       Social History     Tobacco Use    Smoking status: Former     Current packs/day: 0.00     Types: Pipe, Cigarettes     Quit date: 2003     Years since quittin.2    Smokeless tobacco: Never   Vaping Use    Vaping status: Some Days     Substances: THC   Substance Use Topics    Alcohol use: Not Currently     Comment: Drinks a quart of moonshine/night-As of 7/4/22 will quit drinking    Drug use: Yes     Types: Marijuana     Comment: Socially (1-2 Monthly)      E-Cigarette/Vaping    E-Cigarette Use Current Some Day User       E-Cigarette/Vaping Substances    Nicotine No     THC Yes     CBD No     Flavoring No       I have reviewed and agree with the history as documented.     Patient presents emergency department with his significant other due to mental health evaluation.  Patient admits to using meth and marijuana, he is having auditory command hallucinations telling him to hurt himself.  He has a prior psychiatric history of prior admission due to the same less than a month ago.  He stated in the psychiatric [facility] for about 7 days.    Currently cooperative and willing to seek help.  He actually asked to come here      History provided by:  Patient and relative   used: No    Psychiatric Evaluation  Presenting symptoms: disorganized thought process, hallucinations and suicidal thoughts    Presenting symptoms: no aggressive behavior, no agitation, no homicidal ideas and no self-mutilation    Associated symptoms: anxiety    Associated symptoms: no abdominal pain and no chest pain        Review of Systems   Constitutional:  Negative for chills and fever.   HENT:  Negative for ear pain and sore throat.    Eyes:  Negative for pain and visual disturbance.   Respiratory:  Negative for cough and shortness of breath.    Cardiovascular:  Negative for chest pain and palpitations.   Gastrointestinal:  Negative for abdominal pain and vomiting.   Genitourinary:  Negative for dysuria and hematuria.   Musculoskeletal:  Negative for arthralgias and back pain.   Skin:  Negative for color change and rash.   Neurological:  Negative for seizures and syncope.   Psychiatric/Behavioral:  Positive for hallucinations and suicidal ideas. Negative for  agitation, behavioral problems, homicidal ideas, self-injury and sleep disturbance. The patient is nervous/anxious.    All other systems reviewed and are negative.          Objective       ED Triage Vitals [03/05/25 0426]   Temperature Pulse Blood Pressure Respirations SpO2 Patient Position - Orthostatic VS   98.2 °F (36.8 °C) (!) 107 135/95 22 98 % --      Temp src Heart Rate Source BP Location FiO2 (%) Pain Score    -- Monitor -- -- No Pain      Vitals      Date and Time Temp Pulse SpO2 Resp BP Pain Score FACES Pain Rating User   03/05/25 0600 -- 88 99 % 18 121/82 -- -- AF   03/05/25 0554 -- 89 100 % 19 -- -- -- AF   03/05/25 0530 -- 94 97 % 19 124/84 -- -- AF   03/05/25 0513 -- 94 97 % 19 -- -- -- AF   03/05/25 0500 -- 100 96 % 20 115/75 -- -- AF   03/05/25 0450 -- 100 96 % 20 -- -- -- AF   03/05/25 0426 98.2 °F (36.8 °C) 107 98 % 22 135/95 No Pain -- AF            Physical Exam  Vitals and nursing note reviewed.   Constitutional:       General: He is not in acute distress.     Appearance: He is well-developed.   HENT:      Head: Normocephalic and atraumatic.   Eyes:      Conjunctiva/sclera: Conjunctivae normal.   Cardiovascular:      Rate and Rhythm: Regular rhythm. Tachycardia present.      Heart sounds: No murmur heard.  Pulmonary:      Effort: Pulmonary effort is normal. No respiratory distress.      Breath sounds: Normal breath sounds.   Abdominal:      General: Abdomen is flat.      Palpations: Abdomen is soft.      Tenderness: There is no abdominal tenderness.   Musculoskeletal:         General: No swelling.      Cervical back: Neck supple.   Skin:     General: Skin is warm and dry.      Capillary Refill: Capillary refill takes less than 2 seconds.   Neurological:      General: No focal deficit present.      Mental Status: He is alert and oriented to person, place, and time.      Cranial Nerves: No cranial nerve deficit.      Motor: No weakness.   Psychiatric:      Comments: At this time patient is  cooperative.  He wants to be here is following commands.  Patient definitely has a active hallucinations had suicidal ideations.         Results Reviewed       Procedure Component Value Units Date/Time    Rapid drug screen, urine [[id number]] Collected: 03/05/25 0551    Lab Status: In process Specimen: Urine, Other Updated: 03/05/25 0559    TSH [[id number]]  (Abnormal) Collected: 03/05/25 0436    Lab Status: Final result Specimen: Blood from Arm, Right Updated: 03/05/25 0514     TSH 3RD GENERATON 10.470 uIU/mL     Ethanol [[id number]]  (Normal) Collected: 03/05/25 0436    Lab Status: Final result Specimen: Blood from Arm, Right Updated: 03/05/25 0511     Ethanol Lvl <10 mg/dL     Comprehensive metabolic panel [[id number]]  (Abnormal) Collected: 03/05/25 0436    Lab Status: Final result Specimen: Blood from Arm, Right Updated: 03/05/25 0511     Sodium 132 mmol/L      Potassium 3.7 mmol/L      Chloride 101 mmol/L      CO2 20 mmol/L      ANION GAP 11 mmol/L      BUN 18 mg/dL      Creatinine 0.98 mg/dL      Glucose 178 mg/dL      Calcium 9.3 mg/dL      AST 41 U/L      ALT 46 U/L      Alkaline Phosphatase 99 U/L      Total Protein 7.8 g/dL      Albumin 4.3 g/dL      Total Bilirubin 0.87 mg/dL      eGFR 88 ml/min/1.73sq m     Narrative:      National Kidney Disease Foundation guidelines for Chronic Kidney Disease (CKD):     Stage 1 with normal or high GFR (GFR > 90 mL/min/1.73 square meters)    Stage 2 Mild CKD (GFR = 60-89 mL/min/1.73 square meters)    Stage 3A Moderate CKD (GFR = 45-59 mL/min/1.73 square meters)    Stage 3B Moderate CKD (GFR = 30-44 mL/min/1.73 square meters)    Stage 4 Severe CKD (GFR = 15-29 mL/min/1.73 square meters)    Stage 5 End Stage CKD (GFR <15 mL/min/1.73 square meters)  Note: GFR calculation is accurate only with a steady state creatinine    CK [[id number]]  (Normal) Collected: 03/05/25 0436    Lab Status: Final result Specimen: Blood from Arm, Right Updated: 03/05/25 0511     Total  U/L      FLU/COVID Rapid Antigen (30 min. TAT) - Preferred screening test in ED [[id number]]  (Normal) Collected: 03/05/25 0446    Lab Status: Final result Specimen: Nares from Nose Updated: 03/05/25 0506     SARS COV Rapid Antigen Negative     Influenza A Rapid Antigen Negative     Influenza B Rapid Antigen Negative    Narrative:      This test has been performed using the Quidel Megan 2 FLU+SARS Antigen test under the Emergency Use Authorization (EUA). This test has been validated by the  and verified by the performing laboratory. The Megan uses lateral flow immunofluorescent sandwich assay to detect SARS-COV, Influenza A and Influenza B Antigen.     The Quidel Megan 2 SARS Antigen test does not differentiate between SARS-CoV and SARS-CoV-2.     Negative results are presumptive and may be confirmed with a molecular assay, if necessary, for patient management. Negative results do not rule out SARS-CoV-2 or influenza infection and should not be used as the sole basis for treatment or patient management decisions. A negative test result may occur if the level of antigen in a sample is below the limit of detection of this test.     Positive results are indicative of the presence of viral antigens, but do not rule out bacterial infection or co-infection with other viruses.     All test results should be used as an adjunct to clinical observations and other information available to the provider.    FOR PEDIATRIC PATIENTS - copy/paste COVID Guidelines URL to browser: [email or web address]    CBC and differential [[id number]]  (Abnormal) Collected: 03/05/25 0436    Lab Status: Final result Specimen: Blood from Arm, Right Updated: 03/05/25 0441     WBC 9.84 Thousand/uL      RBC 4.04 Million/uL      Hemoglobin 12.1 g/dL      Hematocrit 35.8 %      MCV 89 fL      MCH 30.0 pg      MCHC 33.8 g/dL      RDW 13.8 %      MPV 9.4 fL      Platelets 238 Thousands/uL      nRBC 0  /100 WBCs      Segmented % 80 %      Immature Grans % 0 %      Lymphocytes % 10 %      Monocytes % 6 %      Eosinophils Relative 4 %      Basophils Relative 0 %      Absolute Neutrophils 7.91 Thousands/µL      Absolute Immature Grans 0.03 Thousand/uL      Absolute Lymphocytes 0.94 Thousands/µL      Absolute Monocytes 0.60 Thousand/µL      Eosinophils Absolute 0.34 Thousand/µL      Basophils Absolute 0.02 Thousands/µL             No orders to display       Procedures    ED Medication and Procedure Management   Prior to Admission Medications   Prescriptions Last Dose Informant Patient Reported? Taking?   Alcohol Swabs 70 % PADS  Family Member No No   Sig: May substitute brand based on insurance coverage. Check glucose BID.   Blood Glucose Monitoring Suppl (OneTouch Verio Reflect) w/Device KIT  Family Member No No   Sig: May substitute brand based on insurance coverage. Check glucose BID.   Diclofenac Sodium (VOLTAREN) 1 %   No No   Sig: Apply 2 g topically 4 (four) times a day as needed (joint pain)   OLANZapine (ZyPREXA) 15 mg tablet   No No   Sig: Take 1 tablet (15 mg total) by mouth daily at bedtime   OneTouch Delica Lancets 33G MISC  Family Member No No   Sig: May substitute brand based on insurance coverage. Check glucose BID.   cyanocobalamin (VITAMIN B-12) 500 MCG tablet   No No   Sig: Take 1 tablet (500 mcg total) by mouth daily   ergocalciferol (VITAMIN D2) 50,000 units   No No   Sig: Take 1 capsule (50,000 Units total) by mouth once a week for 9 doses   folic acid (FOLVITE) 1 mg tablet   No No   Sig: Take 1 tablet (1 mg total) by mouth daily   glucose blood (OneTouch Verio) test strip  Family Member No No   Sig: May substitute brand based on insurance coverage. Check glucose BID.   hydrOXYzine HCL (ATARAX) 50 mg tablet  Family Member No No   Sig: Take 1 tablet (50 mg total) by mouth 2 (two) times a day as needed for anxiety   lidocaine (LIDODERM) 5 %   No No   Sig: Apply 2 patches topically over 12 hours  daily as needed (pain) Remove & Discard patch within 12 hours or as directed by MD   lisinopril (ZESTRIL) 2.5 mg tablet   No No   Sig: Take 1 tablet (2.5 mg total) by mouth daily   lithium carbonate (LITHOBID) 300 mg CR tablet  Family Member Yes No   Sig: Take 300 mg by mouth 2 (two) times a day   melatonin 3 mg   No No   Sig: Take 1 tablet (3 mg total) by mouth daily at bedtime   nicotine polacrilex (NICORETTE) 4 mg gum   No No   Sig: Chew 1 each (4 mg total) every 2 (two) hours as needed for smoking cessation   Patient not taking: Reported on 2/27/2025   sitaGLIPtin (JANUVIA) 100 mg tablet   No No   Sig: Take 1 tablet (100 mg total) by mouth daily      Facility-Administered Medications: None     Patient's Medications   Discharge Prescriptions    No medications on file     No discharge procedures on file.  ED SEPSIS DOCUMENTATION   Time reflects when diagnosis was documented in both MDM as applicable and the Disposition within this note       Time User Action Codes Description Comment    3/5/2025  6:13 AM Dov Graham [R45.851] Suicidal ideations     3/5/2025  6:13 AM Dov Graham [F29] Psychoses (HCC)     3/5/2025  6:13 AM Dov Graham [F15.10] Methamphetamine abuse (HCC)                  Dov Graham MD  03/05/25 0613

## 2025-03-05 NOTE — ED NOTES
Insurance Authorization for admission:   Phone call placed to [facility]  Phone number: [phone]  Spoke to [facility]   6 days approved.  Level of care: 201 inpatient mental health treatment  Review on 3/10/25  Authorization # [id number]

## 2025-03-05 NOTE — ED NOTES
Patient is accepted at [facility]  [facility]        Nurse report is to be called to [phone] prior to patient transfer.

## 2025-03-06 PROBLEM — F31.9 BIPOLAR DISORDER WITH PSYCHOTIC FEATURES (HCC): Status: ACTIVE | Noted: 2025-03-06

## 2025-03-06 LAB — GLUCOSE SERPL-MCNC: 174 MG/DL (ref 65–140)

## 2025-03-06 PROCEDURE — 82948 REAGENT STRIP/BLOOD GLUCOSE: CPT

## 2025-03-06 PROCEDURE — 99222 1ST HOSP IP/OBS MODERATE 55: CPT | Performed by: PSYCHIATRY & NEUROLOGY

## 2025-03-06 RX ORDER — GUAIFENESIN 600 MG/1
600 TABLET, EXTENDED RELEASE ORAL EVERY 12 HOURS SCHEDULED
Status: DISCONTINUED | OUTPATIENT
Start: 2025-03-06 | End: 2025-03-14 | Stop reason: HOSPADM

## 2025-03-06 RX ORDER — FOLIC ACID 1 MG/1
1 TABLET ORAL DAILY
Status: DISCONTINUED | OUTPATIENT
Start: 2025-03-06 | End: 2025-03-14 | Stop reason: HOSPADM

## 2025-03-06 RX ORDER — ERGOCALCIFEROL 1.25 MG/1
50000 CAPSULE, LIQUID FILLED ORAL WEEKLY
Status: DISCONTINUED | OUTPATIENT
Start: 2025-03-11 | End: 2025-03-14 | Stop reason: HOSPADM

## 2025-03-06 RX ORDER — LIDOCAINE 50 MG/G
2 PATCH TOPICAL DAILY PRN
Status: DISCONTINUED | OUTPATIENT
Start: 2025-03-06 | End: 2025-03-14 | Stop reason: HOSPADM

## 2025-03-06 RX ADMIN — Medication 3 MG: at 21:11

## 2025-03-06 RX ADMIN — OLANZAPINE 15 MG: 15 TABLET, FILM COATED ORAL at 21:11

## 2025-03-06 RX ADMIN — CYANOCOBALAMIN TAB 500 MCG 500 MCG: 500 TAB at 11:53

## 2025-03-06 RX ADMIN — GUAIFENESIN 600 MG: 600 TABLET ORAL at 11:53

## 2025-03-06 RX ADMIN — GUAIFENESIN 600 MG: 600 TABLET ORAL at 21:11

## 2025-03-06 RX ADMIN — LITHIUM CARBONATE 300 MG: 300 TABLET, EXTENDED RELEASE ORAL at 08:23

## 2025-03-06 RX ADMIN — FOLIC ACID 1 MG: 1 TABLET ORAL at 11:53

## 2025-03-06 RX ADMIN — SITAGLIPTIN 100 MG: 100 TABLET, FILM COATED ORAL at 11:53

## 2025-03-06 RX ADMIN — LITHIUM CARBONATE 300 MG: 300 TABLET, EXTENDED RELEASE ORAL at 21:11

## 2025-03-06 RX ADMIN — LIDOCAINE 2 PATCH: 700 PATCH TOPICAL at 21:12

## 2025-03-06 NOTE — PROGRESS NOTES
03/06/25    Team Meeting   Meeting Type Tx Team Meeting   Team Members Present   Team Members Present Physician;Nurse;   Physician Team Member Grecia HOGUE,   Nursing Team Member Fernando ASTUDILLO   Social Work Team Member Leticia RASHID   Patient/Family Present   Patient Present Yes   Patient's Family Present No   Tx team reviewed pt strengths,limitations,tx goals and plan.  All in agreement and signed.

## 2025-03-06 NOTE — TREATMENT PLAN
TREATMENT PLAN REVIEW - Behavioral Health Osei Trimble 50 y.o. 1975 male MRN: [id number]    [facility] Room / Bed: [facility] 200/[facility] 200-02 Encounter: [id number]        Admit Date/Time:  3/5/2025  5:28 PM    Treatment Team:   MD Jordan Moore MD Jacqueline F Wagner Loree Smith Pope, LPC Nicole L Saas Isabelle Kenna Celeste Pawling Paul F Klose, RN Valerie Rhyder, WILDA Kelly    Diagnosis: Principal Problem:    Bipolar disorder with psychotic features (HCC) vs Substance induced psychosis  Active Problems:    Explosive personality disorder (HCC)      Patient Strengths/Assets: capable of independent living, family ties, good past treatment response, negotiates basic needs, patient is on a voluntary commitment, self reliant, stable housing      Patient Barriers/Limitations: chronic mental illness, limited insight, medical problems, substance abuse    Short Term Goals: decrease in depressive symptoms, decrease in manic symptoms, decrease in paranoid thoughts, decrease in psychotic symptoms    Long Term Goals: stabilization of mood, free of suicidal thoughts, free of homicidal thoughts, resolution of psychotic symptoms    Progress Towards Goals: starting psychiatric medications as prescribed    Recommended Treatment: medication management, patient medication education, group therapy, milieu therapy, continued Behavioral Health psychiatric evaluation/assessment process     Treatment Frequency: daily medication monitoring, group and milieu therapy daily, monitoring through interdisciplinary rounds, monitoring through weekly patient care conferences    Expected Discharge Date: 10 days - 3/16/2025    Discharge Plan: referrals as indicated    Treatment Plan Created/Updated By: Odilia Gill MD

## 2025-03-06 NOTE — CASE MANAGEMENT
Joanie reached out to Ariadne PT's sister at  [phone] o provide update on the patient ariadne stated that she would reach out to nurses station to speak to PT at around 6 pm.

## 2025-03-06 NOTE — PLAN OF CARE
Problem: Ineffective Coping  Goal: Cooperates with admission process  Description: Interventions:   - Complete admission process  Outcome: Not Progressing  New admit as of 3/5/2025; care plan initiated.

## 2025-03-06 NOTE — NURSING NOTE
Osei appears to have slept through the overnight hours without issue.  No complaints voiced.  No acute behaviors observed. Patient remains in bed sleeping at this time.  Continuous safety rounding in progress.

## 2025-03-06 NOTE — PROGRESS NOTES
03/06/25 1256   Activity/Group Checklist   Group Admission/Discharge   Attendance Attended   Attendance Duration (min) 0-15   Interactions Interacted appropriately   Affect/Mood Appropriate   Goals Achieved Identified feelings;Identified triggers;Discussed coping strategies;Identified resources and support systems;Able to listen to others;Able to engage in interactions;Able to reflect/comment on own behavior;Able to manage/cope with feelings;Able to self-disclose;Able to recieve feedback     Patient agreeable to meet and complete self assessment with [facility]S.  Patient information can be found in media.

## 2025-03-06 NOTE — SOCIAL WORK
03/06/25   Team Meeting   Meeting Type Daily Rounds   Team Members Present   Team Members Present Physician;Nurse;;Occupational Therapist   Physician Team Member Grecia HOGUE, Bridget HOGUE   Nursing Team Member Fernando ASTUDILLO   Social Work Team Member Leticia RASHID , Julito RASHID   OT Team Member Nasir SMITH   Patient/Family Present   Patient Present No   Patient's Family Present No    New PT readmission paranoid hyper verbal endorses mild anxiety/depression denies SI/HI rambling.    This patient is a 30 day readmission and was most recently discharged on: 2/25/25    The previous discharge plan was: return home with out patient medication management with [facility] [address] Morin .     The  Readmission Risk score is:26    The identified triggers/events leading up to this admission include: PT was Discharged on a 72 hr notice prior to this admission , PT returned stating that he had not stayed long enough , PT was having auditory hallucinations after multiple days of Methamphetamine use.      Initial Plans for this admission (and who will be involved in treatment and discharge planning) include: treatment team .medication management, group therapy, milieu therapy, case management.

## 2025-03-06 NOTE — NURSING NOTE
Patient mostly withdrawn to room, out for meals, medication compliant and cooperative. Patient moderate anxiety and depression, denies SI/HI and hallucinations. Continued care and safety rounds in progress.

## 2025-03-06 NOTE — ASSESSMENT & PLAN NOTE
Osei is a 50 year old male with past psychiatric history of intermittent explosive disorder, methamphetamine use disorder, and bipolar disorder who presents with auditory hallucinations, paranoia, insomnia, and mood lability after methamphetamine use three days ago. Today, he shows tangential and disorganized thought process with labile affect and paranoid thought content. Will restart home lithium 300 BID for mood stability and Zyprexa 15 mg HS for psychotic symptoms.

## 2025-03-06 NOTE — PLAN OF CARE
Problem: DISCHARGE PLANNING  Goal: Discharge to home or other facility with appropriate resources  Description: INTERVENTIONS:  - Identify barriers to discharge w/patient and caregiver  - Arrange for needed discharge resources and transportation as appropriate  - Identify discharge learning needs (meds, wound care, etc.)  - Arrange for interpretive services to assist at discharge as needed  - Refer to Case Management Department for coordinating discharge planning if the patient needs post-hospital services based on physician/advanced practitioner order or complex needs related to functional status, cognitive ability, or social support system  Outcome: Progressing   New goal initiated, 201

## 2025-03-06 NOTE — PROGRESS NOTES
03/06/25 1613   Patient Intake   Living Arrangement House   Can patient return home? Yes   Address to be Discharge to: [address] [place] [address]   Patient's Telephone Number [phone]   Access to Firearms No   Work History Disabled   School Grade/Year College freshman   Admission Status   Status of Admission 201   Patient History   Presenting Problems See ED notes   Treatment History See HX of IP Stay   Current Psychiatrist/Therapist Dr. Morin   Legal Issues none   Substance Abuse Yes (See BH History section for detail)   Crisis Info   Release of Information Signed Yes

## 2025-03-06 NOTE — CASE MANAGEMENT
Patient Intake:  50 year old male presented to the ED with reporting AH. Sw met with PT at bed side . PT continues to be withdrawn to his room. PT is minimal in conversation , however was discussing family stressors having had a sibling who took POA of the mother and spent the money due to that PT does not have a relationship with anyone but his sister Ariadne who supports him.  PT Denies SI/HI/AVH however endorses anxiety and depression .  PT was positive for meth and THC . PT denies access to fire arms and does not express having a HX of trauma or abuse .  Mother and father  and has multiple siblings .       Legal status: 201     Current SI:  Denies   Current HI: Denies   AVH: auditory commands  but not while on the unit   Depression:  Yes  Anxiety:  Denies         Strengths: none identified   Stressors/Limitations: PT did not identify current stressors   Coping skills:none identified.       SA/SI in last 12 months: None  HI/violence towards others in last 12 months: None  Access to Firearms: No  Hx abuse/trauma: unknown         Treatment History:      Current Treatment:                 Psychiatrist: Dr. Morin [facility] .                 Therapist: None     Legal Issues: None  Substance Abuse:  UDS positive for meth/THC     Marial Status: denies   Children: Denies   Pets: Denies   Can patient return home?:yes   Family:              Parents: mother                Siblings: yes  Family hx (MH/SI/HI/substance use): denies        Type of Work:Unemployed   Income/Financial Supports: Disability   Education: high school   : denies   Transportation: sister Ariadne   Episcopal/Cultural Needs: N/A  Assistive devices/phsyical barriers in home: no   POA/guardianship/advanced directives: no      Pharm:   Transport home: Family transport      MARY signed: PT signed consent for Sister Ariadne  [phone] .

## 2025-03-06 NOTE — H&P
"Psychiatric Evaluation - Behavioral Health   Osei Trimble 50 y.o. male MRN: [id number]  Unit/Bed#: [facility] 200-02 Encounter: [id number]      Assessment & Plan  Bipolar disorder with psychotic features (HCC) vs Substance induced psychosis  Osei is a 50 year old male with past psychiatric history of intermittent explosive disorder, methamphetamine use disorder, and bipolar disorder who presents with auditory hallucinations, paranoia, insomnia, and mood lability after methamphetamine use three days ago. Today, he shows tangential and disorganized thought process with labile affect and paranoid thought content. Will restart home lithium 300 BID for mood stability and Zyprexa 15 mg HS for psychotic symptoms.  Explosive personality disorder (HCC)      Plan:   Patient is currently admitted voluntarily as a 201  Plan for the following psychopharmacologic changes:  Lithium 300 mg BID  Zyprexa 15 mg HS  Encourage group, occupational, and milieu therapy.  Collaborate with case management for disposition planning  Discuss with family for baseline assessment and disposition planning.  Admission labs reviewed  Medicine consulted for medical clearance and further medical management as indicated    Treatment options and alternatives were reviewed with the patient, who concurs with the above plan.  Risks, benefits, and possible side effects of medications were explained to the patient, who is only partially able to verbalize understanding due to psychotic symptoms.        -----------------------------------    Chief Complaint: \"I thought I was losing my mind\"    History of Present Illness     Osei Trimble is a 50 y.o. male with a past psychiatric history of Intermittent Explosive Disorder and bipolar disorder with psychotic features vs methamphetamine induced psychosis and past medical history of diabetes mellitus type 2, HTN, and neuroendocrine tumor who was admitted to the [facility] older adult psychiatric " "unit on a voluntary 201 commitment basis due to auditory hallucinations, visual hallucinations, paranoid ideation, and increased agitation.    On 3/5/25, patient was brought by EMS to [facility] ED with reports of manic symptoms, insomnia, auditory hallucinations, and increased agitation. Reportedly patient used methamphetamines and drank alcohol for his birthday. Pt was difficult to understand but accompanied by his sister, who stated that he had not slept or taken his psychiatric medications in three days. After evaluation in the ED, patient was deemed necessary for inpatient psychiatric hospitalization. Patient signed a 201 for voluntary inpatient admission.    On initial evaluation, Osei states that \"the voices won\". He endorses auditory and visual hallucinations, insomnia, mood swings, poor oral intake, and hypervigilance. Osei appears paranoid and looks over provider's shoulder as if responding to internal stimuli. He reports that others have been following him and that he recently came across a garbage can with arms and legs that verbally threatened him. When asked if he felt safe on the unit, he stated, \"I never feel safe\" and discussed identification of make shift weapons in every room of the unit. He reports that he is always ready to defend himself, and that everyone he encounters must be treated as his enemy. His thought process is tangential and abruptly jumps from one topic to the next with hyperverbal speech and labile affect. He denies suicidal ideation, stating that he is not afraid of death, but quickly becomes tearful at the thought of his sister and friend dying. Osei admits to methamphetamine use on his birthday but does not associate his substance use with presenting symptoms. He states that he is willing to hurt others if threatened.     Osei has a history of intermittent explosive disorder and repetitively states that he'll \"fight back\" with unprompted interludes in conversation. " He nonsensically switches topics to the death of his mother, the loss of his inheritance, a vague and unclear vendetta against his brother. He was recently hospitalized for substance induced psychosis from 25-25. At time of discharge, he showed organized and coherent thought process with resolution of auditory and visual hallucinations but remained somewhat labile with irritable edge. Today, he shows poor attention and concentration and is unable to follow assessment questions at times. He is agreeable to medication management and verbally contracts for safety while on the unit.     Medical Review Of Systems:  Complete review of systems is negative except as noted above.    Psychiatric Review Of Systems:  Sleep: Yes, decreased  Interest/Anhedonia: yes  Guilt/hopeless: Denies  Low energy/anergy: yes  Poor Concentration: yes  Appetite changes: Yes, decreased  Weight changes: Denies  Somatic symptoms: no  Anxiety/panic: Yes, increased  Ines: history of mood swings  Self injurious behavior/risky behavior: yes, ongoing substance use  Hallucinations: auditory hallucinations telling him to harm others  Suicidal Ideation: denies  Homicidal Ideation: denies    Historical Information     Psychiatric History:   Past Inpatient Psychiatric Treatment:   Two past admissions; most recently [facility] in 2025  Past Outpatient Psychiatric Treatment:    Currently in outpatient psychiatric treatment with a psychiatrist  Past Suicide Attempts: no  Past Violent Behavior: yes, multiple arrests for assault  Past Psychiatric Medication Trials:  Prozac, Paxil, Depakote, Lithium, Risperdal, Seroquel, Thorazine, Atarax, Zyprexa  Eating Disorder:Denies  OCD:Denies    Substance Abuse History:  Social History     Tobacco Use    Smoking status: Former     Current packs/day: 0.00     Types: Pipe, Cigarettes     Quit date: 2003     Years since quittin.2    Smokeless tobacco: Never   Vaping Use    Vaping status: Some Days     "Substances: THC   Substance Use Topics    Alcohol use: Not Currently     Comment: Drinks a quart of moonshine/night-As of 7/4/22 will quit drinking    Drug use: Yes     Types: Marijuana     Comment: Socially (1-2 Monthly)      Patient reports regular methamphetamine use; occasional alcohol use with recent binge drinking a few days prior to admission  I have assessed this patient for substance use within the past 12 months.    Family Psychiatric History:   Sister with depression    Social History:  Education: 9th grade  Marital history: single  Children: \"I don't know\"  Living arrangement: Lives alone  Occupational History: unemployed  Functioning Relationships: sister is good support; otherwise limited.  Access to Firearms: denies  Other Pertinent History: Legal: past incarcerations: 7 years in long-term for assault of       Traumatic History:   Abuse: none reported  Other Traumatic Events: none reported    Past Medical History:   Seizure history: no confirmed history per chart review  Head injury: hx of concussions   Past Medical History:   Diagnosis Date    Allergic     Bipolar disorder in partial remission (HCC)     Diabetes ([facility])     Erectile dysfunction     unspecified erectile dysfunction type    Hypertension     Kidney stone         -----------------------------------  Objective    Temp:  [98 °F (36.7 °C)-98.2 °F (36.8 °C)] 98 °F (36.7 °C)  HR:  [78-93] 78  BP: (114-142)/(70-88) 114/70  Resp:  [18-20] 18  SpO2:  [97 %-98 %] 98 %  O2 Device: None (Room air)    Mental Status Evaluation:    Appearance:  disheveled, marginal hygiene, dressed in hospital attire   Behavior:  bizarre   Speech:  hypertalkative, decreased volume, slurred   Mood:  anxious   Affect:  tearful, labile   Language: unable to assess   Thought Process:  disorganized, illogical, tangential   Associations: tangential associations   Thought Content:  paranoid ideation, ruminating thoughts   Perceptual Disturbances: auditory " hallucinations, visual hallucinations   Risk Potential: Suicidal ideation - None at present  Homicidal ideation - angry, hostile feelings with no homicidal plan  Potential for aggression - Yes, due to history of violence   Sensorium:  disoriented to place and time/date   Memory:  recent and remote memory: unable to assess due to lack of cooperation   Consciousness:  alert and awake   Attention/Concentration: poor concentration and poor attention span   Intellect: unable to assess   Fund of Knowledge: awareness of current events: unable to assess due to lack of cooperation   Insight:  poor   Judgment: poor   Muscle Strength:   Muscle Tone: normal  normal   Gait/Station: Lying in bed   Motor Activity: no abnormal movements     Patient strengths/assets: capable of independent living, negotiates basic needs, stable housing, on a voluntary commitment    Patient limitations/barriers: chronic mental illness, substance abuse    Meds/Allergies   Allergies   Allergen Reactions    Tomato (Diagnostic) - Food Allergy Anaphylaxis    Tomato - Food Allergy Anaphylaxis     raw    Poison Ivy Extract Hives    Poison Sumac Extract Hives     all current active meds have been reviewed and current meds:   Current Facility-Administered Medications:     acetaminophen (TYLENOL) tablet 650 mg, Q4H PRN    acetaminophen (TYLENOL) tablet 650 mg, Q4H PRN    acetaminophen (TYLENOL) tablet 975 mg, Q6H PRN    benztropine (COGENTIN) injection 1 mg, Q4H PRN Max 6/day    benztropine (COGENTIN) tablet 0.5 mg, Q4H PRN Max 6/day    cyanocobalamin (VITAMIN B-12) tablet 500 mcg, Daily    Diclofenac Sodium (VOLTAREN) 1 % topical gel 2 g, 4x Daily PRN    [START ON 3/11/2025] ergocalciferol (VITAMIN D2) capsule 50,000 Units, Weekly    folic acid (FOLVITE) tablet 1 mg, Daily    guaiFENesin (MUCINEX) 12 hr tablet 600 mg, Q12H BERENICE    hydrOXYzine HCL (ATARAX) tablet 25 mg, Q6H PRN Max 4/day    lidocaine (LIDODERM) 5 % patch 2 patch, Daily PRN    lithium carbonate  (LITHOBID) CR tablet 300 mg, Q12H BERENICE    LORazepam (ATIVAN) injection 1 mg, Q6H PRN Max 3/day    LORazepam (ATIVAN) tablet 0.5 mg, Q6H PRN Max 4/day    LORazepam (ATIVAN) tablet 1 mg, Q6H PRN Max 3/day    melatonin tablet 3 mg, HS    OLANZapine (ZyPREXA) IM injection 5 mg, Q3H PRN Max 3/day    OLANZapine (ZyPREXA) tablet 15 mg, HS    OLANZapine (ZyPREXA) tablet 2.5 mg, Q4H PRN Max 6/day    OLANZapine (ZyPREXA) tablet 5 mg, Q4H PRN Max 3/day    OLANZapine (ZyPREXA) tablet 5 mg, Q3H PRN Max 3/day    sitaGLIPtin (JANUVIA) tablet 100 mg, Daily    traZODone (DESYREL) tablet 50 mg, HS PRN    Behavioral Health Medications: all current active meds have been reviewed. Changes as above.    Laboratory results:  I have personally reviewed all pertinent laboratory/tests results.  Recent Results (from the past 48 hours)   CBC and differential    Collection Time: 03/05/25  4:36 AM   Result Value Ref Range    WBC 9.84 4.31 - 10.16 Thousand/uL    RBC 4.04 3.88 - 5.62 Million/uL    Hemoglobin 12.1 12.0 - 17.0 g/dL    Hematocrit 35.8 (L) 36.5 - 49.3 %    MCV 89 82 - 98 fL    MCH 30.0 26.8 - 34.3 pg    MCHC 33.8 31.4 - 37.4 g/dL    RDW 13.8 11.6 - 15.1 %    MPV 9.4 8.9 - 12.7 fL    Platelets 238 149 - 390 Thousands/uL    nRBC 0 /100 WBCs    Segmented % 80 (H) 43 - 75 %    Immature Grans % 0 0 - 2 %    Lymphocytes % 10 (L) 14 - 44 %    Monocytes % 6 4 - 12 %    Eosinophils Relative 4 0 - 6 %    Basophils Relative 0 0 - 1 %    Absolute Neutrophils 7.91 (H) 1.85 - 7.62 Thousands/µL    Absolute Immature Grans 0.03 0.00 - 0.20 Thousand/uL    Absolute Lymphocytes 0.94 0.60 - 4.47 Thousands/µL    Absolute Monocytes 0.60 0.17 - 1.22 Thousand/µL    Eosinophils Absolute 0.34 0.00 - 0.61 Thousand/µL    Basophils Absolute 0.02 0.00 - 0.10 Thousands/µL   Comprehensive metabolic panel    Collection Time: 03/05/25  4:36 AM   Result Value Ref Range    Sodium 132 (L) 135 - 147 mmol/L    Potassium 3.7 3.5 - 5.3 mmol/L    Chloride 101 96 - 108 mmol/L     CO2 20 (L) 21 - 32 mmol/L    ANION GAP 11 4 - 13 mmol/L    BUN 18 5 - 25 mg/dL    Creatinine 0.98 0.60 - 1.30 mg/dL    Glucose 178 (H) 65 - 140 mg/dL    Calcium 9.3 8.4 - 10.2 mg/dL    AST 41 (H) 13 - 39 U/L    ALT 46 7 - 52 U/L    Alkaline Phosphatase 99 34 - 104 U/L    Total Protein 7.8 6.4 - 8.4 g/dL    Albumin 4.3 3.5 - 5.0 g/dL    Total Bilirubin 0.87 0.20 - 1.00 mg/dL    eGFR 88 ml/min/1.73sq m   TSH    Collection Time: 03/05/25  4:36 AM   Result Value Ref Range    TSH 3RD GENERATON 10.470 (H) 0.450 - 4.500 uIU/mL   Ethanol    Collection Time: 03/05/25  4:36 AM   Result Value Ref Range    Ethanol Lvl <10 <10 mg/dL   CK    Collection Time: 03/05/25  4:36 AM   Result Value Ref Range    Total  39 - 308 U/L   FLU/COVID Rapid Antigen (30 min. TAT) - Preferred screening test in ED    Collection Time: 03/05/25  4:46 AM    Specimen: Nose; Nares   Result Value Ref Range    SARS COV Rapid Antigen Negative Negative    Influenza A Rapid Antigen Negative Negative    Influenza B Rapid Antigen Negative Negative   ECG 12 lead    Collection Time: 03/05/25  4:48 AM   Result Value Ref Range    Ventricular Rate 102 BPM    Atrial Rate 102 BPM    WA Interval 168 ms    QRSD Interval 74 ms    QT Interval 354 ms    QTC Interval 461 ms    P Axis 58 degrees    QRS Axis -22 degrees    T Wave Axis 30 degrees   Rapid drug screen, urine    Collection Time: 03/05/25  5:51 AM   Result Value Ref Range    Amph/Meth UR Positive (A) Negative    Barbiturate Ur Negative Negative    Benzodiazepine Urine Negative Negative    Cocaine Urine Negative Negative    Methadone Urine Negative Negative    Opiate Urine Negative Negative    PCP Ur Negative Negative    THC Urine Negative Negative    Oxycodone Urine Negative Negative    Fentanyl Urine Negative Negative    HYDROCODONE URINE Negative Negative        Imaging Studies: N/A  No orders to display        Code Status: Prior  Advance Directive and Living Will: <no information>    Suicide/Homicide Risk  Assessment:    Risk of Harm to Self:   The following ratings are based on assessment at the time of the interview and review of records  Nursing Suicide Risk Assessment Last 24 hours: C-SSRS Risk (Since Last Contact)  Calculated C-SSRS Risk Score (Since Last Contact): No Risk Indicated  Demographic risk factors include: , male, age: over 50 or older  Historical Risk Factors include: chronic psychiatric problems, history of substance use, history of violence  Current Specific Risk Factors include: diagnosis of mood disorder, current psychotic symptoms, presence of hallucinations, poor self care, recent inpatient psychiatric admission  Protective Factors: no current suicidal plan or intent, ability to communicate with staff on the unit, able to contract for safety on the unit, taking medications as ordered on the unit, stable housing, restricted access to lethal means, safe and stable living environment  Weapons/Firearms: none. The following steps have been taken to ensure weapons are properly secured: not applicable  Based on today's assessment, Osei presents the following risk of harm to self: high    Risk of Harm to Others:  The following ratings are based on assessment at the time of the interview and review of records  Nursing Homicide Risk Assessment: Violence Risk to Others: Yes- Greater than 6 months ago  Demographic Risk Factors include: male, unemployed.  Historical Risk Factors include: history of aggressive behavior, history of previous acts of violence, drug abuse.  Current Specific Risk Factors include: unstable mood disorder, recent substance use, undergoing substance withdrawal, poor insight  Protective Factors: able to communicate with staff on the unit, compliant with medications on the unit as ordered, stable living environment, supportive family, restricted access to lethal means, access to mental health treatment  Based on today's assessment, Osei presents the following risk of harm to  others: moderate    The following interventions are recommended: Behavioral Health checks for safety monitoring, continued hospitalization on locked unit     -----------------------------------    Risks / Benefits of Treatment:     Risks, benefits, and possible side effects of medications explained to patient and patient verbalizes understanding.       Counseling / Coordination of Care:     Patient's presentation on admission and proposed treatment plan were discussed with the treatment team.  Diagnosis, medication changes and treatment plan were reviewed with the patient.  Recent stressors and events leading to admission were discussed with the patient.  Importance of medication and treatment compliance was reviewed with the patient.          Inpatient Psychiatric Certification:     Certification: Based upon physical, mental and social evaluations, I certify that inpatient psychiatric services are medically necessary for this patient for a duration of 7 midnights for the treatment of Bipolar disorder with psychotic features (HCC)    This note has been constructed using a voice recognition system. There may be translation, syntax, or grammatical errors. If you have any questions, please contact the dictating provider.    Odilia Gill MD  PGY-2

## 2025-03-06 NOTE — NURSING NOTE
Pt admitted to [facility] from [facility] ED on a valid voluntary commitment. Pt denies SI/HI/AVH.He is oriented to place, time, and self. Pt currently presents as disorganized, paranoid, hyperverbal, and tangential. Pt mood and affect is labile. Physical assessment completed, non pitting edema noted on b/l LE. Pt was unable to answer some of the assessment questions, will attempt once pt is able.  Pt denies depression, anxiety, SI/HI. Pt endorses AVH while at his home. Admission medications and diet ordered. Pt offered snack and fluids. Oriented to unit. Q15 minute safety checks initiated. CSSRS current is low. Provider notified. Both providers notified the PTA med list is complete.

## 2025-03-06 NOTE — PROGRESS NOTES
03/06/25 1609   Readmission Root Cause   30 Day Readmission Yes   During your hospital stay, did someone (provider, nurse, ) explain your care to you in a way you could understand? Yes   Did you feel medically stable to leave the hospital? Yes   Patient Information   Mental Status Alert   Primary Caregiver Self   Support System Immediate family   Legal Information   Tx Plan Signed Yes   Current Status: 201   Activities of Daily Living Prior to Admission   Functional Status Independent   Assistive Device No device needed   Living Arrangement House   Ambulation Independent   Access to Firearms   Access to Firearms No   Income Information   Income Source SSI/SSD   Means of Transportation   Means of Transport to [facility]: Drives Self

## 2025-03-06 NOTE — H&P
"                              Vanda Trimble#  :1975 M  MRN:[id number]    CSN:[id number]  Adm Date: 3/5/2025 1728  5:28 PM   ATT PHY: Jef Paige Md  [facility]         Chief Complaint: hallucinations      History of Presenting Illness: Osei Trimble is a(n) 50 y.o. year old male who is admitted to [facility] on voluntary 201 commitment basis.  Patient originally presented to [facility] ED on 3/5/25 for hallucinations.     Patient examined at bedside.  Reviewed home medications with patient.  He reports having a occasional dry cough and chest congestion \"for months.\"   No chest pain, shortness of breath, fevers, chills, sore throat, diarrhea, nausea, vomiting.     Allergies   Allergen Reactions    Tomato (Diagnostic) - Food Allergy Anaphylaxis    Tomato - Food Allergy Anaphylaxis     raw    Poison Ivy Extract Hives    Poison Sumac Extract Hives     Current Facility-Administered Medications on File Prior to Encounter   Medication Dose Route Frequency Provider Last Rate Last Admin    [] sodium chloride 1 g tablet **ADS Override Pull**              Current Outpatient Medications on File Prior to Encounter   Medication Sig Dispense Refill    Alcohol Swabs 70 % PADS May substitute brand based on insurance coverage. Check glucose BID. 100 each 1    Blood Glucose Monitoring Suppl (OneTouch Verio Reflect) w/Device KIT May substitute brand based on insurance coverage. Check glucose BID. 1 kit 0    cyanocobalamin (VITAMIN B-12) 500 MCG tablet Take 1 tablet (500 mcg total) by mouth daily 30 tablet 0    Diclofenac Sodium (VOLTAREN) 1 % Apply 2 g topically 4 (four) times a day as needed (joint pain) 350 g 0    ergocalciferol (VITAMIN D2) 50,000 units Take 1 capsule (50,000 Units total) by mouth once a week for 9 doses 4 capsule 0    folic acid (FOLVITE) 1 mg tablet Take 1 tablet (1 mg total) by mouth daily 30 tablet 0    glucose blood (OneTouch Verio) " test strip May substitute brand based on insurance coverage. Check glucose BID. 100 each 1    hydrOXYzine HCL (ATARAX) 50 mg tablet Take 1 tablet (50 mg total) by mouth 2 (two) times a day as needed for anxiety 30 tablet 0    lidocaine (LIDODERM) 5 % Apply 2 patches topically over 12 hours daily as needed (pain) Remove & Discard patch within 12 hours or as directed by MD 60 patch 0    lisinopril (ZESTRIL) 2.5 mg tablet Take 1 tablet (2.5 mg total) by mouth daily 30 tablet 0    lithium carbonate (LITHOBID) 300 mg CR tablet Take 300 mg by mouth 2 (two) times a day      melatonin 3 mg Take 1 tablet (3 mg total) by mouth daily at bedtime 30 tablet 0    nicotine polacrilex (NICORETTE) 4 mg gum Chew 1 each (4 mg total) every 2 (two) hours as needed for smoking cessation 100 each 0    OLANZapine (ZyPREXA) 15 mg tablet Take 1 tablet (15 mg total) by mouth daily at bedtime 30 tablet 0    OneTouch Delica Lancets 33G MISC May substitute brand based on insurance coverage. Check glucose BID. 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    Abdominal pain 03/27/2023    Chest pain 06/21/2024    Explosive personality disorder (HCC) 06/21/2024    Hyperglycemia 06/22/2024    Acute encephalopathy 06/22/2024    Shock (HCC) 06/22/2024    Thrombocytopenia ([facility]) 06/22/2024    Total bilirubin, elevated 06/22/2024    Back pain 06/23/2024    Palliative care by specialist 06/24/2024    Goals of care, counseling/discussion 06/24/2024    Alcohol abuse 06/26/2024    Moderate protein-calorie malnutrition (HCC)  2024    Ankle sprain 2024    SIRS (systemic inflammatory response syndrome) (HCC) 2025    Elevated CK 2025    Drug abuse (HCC) 2025    Methamphetamine-induced psychotic disorder (HCC) 2025     Resolved Ambulatory Problems     Diagnosis Date Noted    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 (HCC) 2022    Acute respiratory failure (HCC) 2024    Hypotension 2024    Lactic acidosis 2024    Hypophosphatemia 2024    Fever 2024    Colon cancer screening 2025     Past Medical History:   Diagnosis Date    Allergic     Bipolar disorder in partial remission (HCC)     Diabetes (HCC)     Erectile dysfunction     Hypertension     Kidney stone        Past Surgical History:   Procedure Laterality Date    COLONOSCOPY      EXPLORATORY LAPAROTOMY W/ BOWEL RESECTION N/A 2022    Procedure: LAPAROTOMY EXPLORATORY W/ SMALL BOWEL RESECTION, liver biopsy;  Surgeon: Rose Mary Lara MD;  Location:  MAIN OR;  Service: General    FL RETROGRADE PYELOGRAM  10/06/2021    HERNIA REPAIR      OH CYSTOURETHROSCOPY W/URETERAL CATHETERIZATION Right 10/06/2021    Procedure: CYSTOSCOPY RETROGRADE PYELOGRAM WITH INSERTION STENT URETERAL, RIGHT URETEROSCOPY, STONE EXTRACTION;  Surgeon: Bradly Rivera MD;  Location:  MAIN OR;  Service: Urology     Social History:   Social History     Socioeconomic History    Marital status: Single     Spouse name: Not on file    Number of children: Not on file    Years of education: Not on file    Highest education level: Not on file   Occupational History    Not on file   Tobacco Use    Smoking status: Former     Current packs/day: 0.00     Types: Pipe, Cigarettes     Quit date: 2003     Years since quittin.2    Smokeless tobacco: Never   Vaping Use    Vaping status: Some Days    Substances: THC   Substance and Sexual Activity    Alcohol use: Not Currently     Comment: Drinks a quart of moonshine/night-As of 21 will  quit drinking    Drug use: Yes     Types: Marijuana     Comment: Socially (1-2 Monthly)    Sexual activity: Not Currently   Other Topics Concern    Not on file   Social History Narrative    Not on file     Social Drivers of Health     Financial Resource Strain: Not on file   Food Insecurity: No Food Insecurity (3/5/2025)    Nursing - Inadequate Food Risk Classification     Worried About Running Out of Food in the Last Year: Never true     Ran Out of Food in the Last Year: Never true     Ran Out of Food in the Last Year: Never true   Transportation Needs: No Transportation Needs (3/5/2025)    Nursing - Transportation Risk Classification     Lack of Transportation: Not on file     Lack of Transportation: No   Recent Concern: Transportation Needs - Unmet Transportation Needs (2025)    Nursing - Transportation Risk Classification     Lack of Transportation: Not on file     Lack of Transportation: Yes   Physical Activity: Not on file   Stress: Not on file   Social Connections: Not on file   Intimate Partner Violence: Unknown (3/5/2025)    Nursing IPS     Feels Physically and Emotionally Safe: Not on file     Physically Hurt by Someone: Not on file     Humiliated or Emotionally Abused by Someone: Not on file     Physically Hurt by Someone: No     Hurt or Threatened by Someone: No   Housing Stability: Unknown (3/5/2025)    Nursing: Inadequate Housing Risk Classification     Has Housing: Not on file     Worried About Losing Housing: Not on file     Unable to Get Utilities: Not on file     Unable to Pay for Housing in the Last Year: No     Has Housin     Family History:   Family History   Problem Relation Age of Onset    Diabetes Mother     Thyroid disease Mother     Cancer Father     Thyroid disease Sister     Depression Brother     Cancer Maternal Aunt     Cancer Paternal Uncle     Cancer Paternal Grandmother     No Known Problems Brother     Thyroid disease Sister      Review of Systems   Constitutional:   "Negative for chills and fever.   HENT:  Positive for congestion.    Eyes: Negative.    Respiratory:  Positive for cough. Negative for shortness of breath.    Cardiovascular:  Negative for chest pain and palpitations.   Gastrointestinal:  Negative for abdominal pain, constipation, diarrhea, nausea and vomiting.   Genitourinary:  Negative for difficulty urinating and dysuria.   Musculoskeletal: Negative.    Skin: Negative.    Neurological:  Negative for dizziness and headaches.   Psychiatric/Behavioral:  Positive for hallucinations.    All other systems reviewed and are negative.    Physical Exam   Vitals: Blood pressure 114/70, pulse 78, temperature 98 °F (36.7 °C), temperature source Temporal, resp. rate 18, height 6' 1\" (1.854 m), weight 94.3 kg (208 lb), SpO2 98%.,Body mass index is 27.44 kg/m².  Constitutional: Awake, alert, in no acute distress.  Head: Normocephalic and atraumatic.   Mouth/Throat: Oropharynx is clear and moist.    Eyes: Conjunctivae and EOM are normal.   Neck: Neck supple.   Cardiovascular: Normal rate, regular rhythm and normal heart sounds.    Pulmonary/Chest: Effort normal and breath sounds normal.   Abdominal: Soft. Bowel sounds are normal. There is no tenderness. There is no rebound and no guarding.   Neurological: No focal deficits.   Skin: Skin is warm and dry. No leg edema. No calf tenderness.    Assessment     Osei Trimble is a(n) 50 y.o. male with mood disorder.     T2DM.  Hgb A1c 5.6% on 2/18/25.  Continue Januvia 100 mg daily.  Carb controlled diet.  Accu-cheks twice daily.    Neuroendocrine carcinoma of small bowel.  Diagnosed 7/2022.  Pt is following with hem/onc Dr. Green on outpt basis.  He is on lanreotide on every 4-week basis (last infusion 2/5/25, missed dose on 3/5/25).  Hypertension.  Lisinopril was discontinued last admission due to low BP.  Cont to monitor vitals.  Of note, pt on lithium.   Drug abuse.  UDS on 3/5 +amph/meth.   Folate deficiency.  Continue folic acid 1 " mg daily.   Vitamin B12 deficiency.  Continue vitamin B12 500 mcg daily.  Vitamin D deficiency.  Continue vitamin D2 98744 units weekly.  Malnutrition.  Nutrition consulted.    Cough/congestion.  Start Mucinex BID 3/6.  Cont to monitor.  Hyponatremia.  Level 132 on 3/5.  Recheck CMP in AM.   Mood disorder.  This is being managed by the psych team.       Prognosis: Fair.    Discharge Plan: In progress.    Advanced Directives: I have discussed in detail with the patient the advanced directives. The patient reports he does not have an appointed POA and does not have a living will.  Patient's emergency contact is his sister, Ariadne Velazquez, whose number is [phone].  When discussing cardiac and pulmonary resuscitation efforts with the patient, the patient wishes to be full code.    I have spent more than 50 minutes gathering data, doing physical examination, and discussing the advanced directives, which was witnessed by caring staff.    The patient was discussed with Dr. Pillai and he is in agreement with the above note.

## 2025-03-06 NOTE — NURSING NOTE
"Patient was observed to be visible in the community this evening; attending snack time.  He is withdrawn to self; speech is mumbled.  He endorses \"a little\" anxiety, denies feeling depressed, denies SI, HI and hallucinations. Denies any pain.  Osei was medication compliant at .  He states his LBM was yesterday.  Continuous safety rounding in progress.   "

## 2025-03-07 LAB
ALBUMIN SERPL BCG-MCNC: 3.9 G/DL (ref 3.5–5)
ALP SERPL-CCNC: 90 U/L (ref 34–104)
ALT SERPL W P-5'-P-CCNC: 34 U/L (ref 7–52)
ANION GAP SERPL CALCULATED.3IONS-SCNC: 8 MMOL/L (ref 4–13)
AST SERPL W P-5'-P-CCNC: 29 U/L (ref 13–39)
BASOPHILS # BLD AUTO: 0.02 THOUSANDS/ÂΜL (ref 0–0.1)
BASOPHILS NFR BLD AUTO: 0 % (ref 0–1)
BILIRUB SERPL-MCNC: 0.86 MG/DL (ref 0.2–1)
BUN SERPL-MCNC: 16 MG/DL (ref 5–25)
CALCIUM SERPL-MCNC: 9.2 MG/DL (ref 8.4–10.2)
CHLORIDE SERPL-SCNC: 105 MMOL/L (ref 96–108)
CO2 SERPL-SCNC: 25 MMOL/L (ref 21–32)
CREAT SERPL-MCNC: 0.92 MG/DL (ref 0.6–1.3)
EOSINOPHIL # BLD AUTO: 0.28 THOUSAND/ÂΜL (ref 0–0.61)
EOSINOPHIL NFR BLD AUTO: 4 % (ref 0–6)
ERYTHROCYTE [DISTWIDTH] IN BLOOD BY AUTOMATED COUNT: 13.7 % (ref 11.6–15.1)
GFR SERPL CREATININE-BSD FRML MDRD: 95 ML/MIN/1.73SQ M
GLUCOSE P FAST SERPL-MCNC: 127 MG/DL (ref 65–99)
GLUCOSE SERPL-MCNC: 112 MG/DL (ref 65–140)
GLUCOSE SERPL-MCNC: 118 MG/DL (ref 65–140)
GLUCOSE SERPL-MCNC: 127 MG/DL (ref 65–140)
HCT VFR BLD AUTO: 34.6 % (ref 36.5–49.3)
HGB BLD-MCNC: 11.3 G/DL (ref 12–17)
IMM GRANULOCYTES # BLD AUTO: 0.03 THOUSAND/UL (ref 0–0.2)
IMM GRANULOCYTES NFR BLD AUTO: 1 % (ref 0–2)
LYMPHOCYTES # BLD AUTO: 1.45 THOUSANDS/ÂΜL (ref 0.6–4.47)
LYMPHOCYTES NFR BLD AUTO: 22 % (ref 14–44)
MCH RBC QN AUTO: 29.4 PG (ref 26.8–34.3)
MCHC RBC AUTO-ENTMCNC: 32.7 G/DL (ref 31.4–37.4)
MCV RBC AUTO: 90 FL (ref 82–98)
MONOCYTES # BLD AUTO: 0.32 THOUSAND/ÂΜL (ref 0.17–1.22)
MONOCYTES NFR BLD AUTO: 5 % (ref 4–12)
NEUTROPHILS # BLD AUTO: 4.41 THOUSANDS/ÂΜL (ref 1.85–7.62)
NEUTS SEG NFR BLD AUTO: 68 % (ref 43–75)
NRBC BLD AUTO-RTO: 0 /100 WBCS
PLATELET # BLD AUTO: 228 THOUSANDS/UL (ref 149–390)
PMV BLD AUTO: 9.8 FL (ref 8.9–12.7)
POTASSIUM SERPL-SCNC: 4 MMOL/L (ref 3.5–5.3)
PROT SERPL-MCNC: 6.8 G/DL (ref 6.4–8.4)
RBC # BLD AUTO: 3.84 MILLION/UL (ref 3.88–5.62)
SODIUM SERPL-SCNC: 138 MMOL/L (ref 135–147)
TSH SERPL DL<=0.05 MIU/L-ACNC: 3.93 UIU/ML (ref 0.45–4.5)
WBC # BLD AUTO: 6.51 THOUSAND/UL (ref 4.31–10.16)

## 2025-03-07 PROCEDURE — 85025 COMPLETE CBC W/AUTO DIFF WBC: CPT

## 2025-03-07 PROCEDURE — 82948 REAGENT STRIP/BLOOD GLUCOSE: CPT

## 2025-03-07 PROCEDURE — 99232 SBSQ HOSP IP/OBS MODERATE 35: CPT | Performed by: PSYCHIATRY & NEUROLOGY

## 2025-03-07 PROCEDURE — 80053 COMPREHEN METABOLIC PANEL: CPT

## 2025-03-07 PROCEDURE — 84443 ASSAY THYROID STIM HORMONE: CPT

## 2025-03-07 RX ADMIN — FOLIC ACID 1 MG: 1 TABLET ORAL at 09:25

## 2025-03-07 RX ADMIN — OLANZAPINE 15 MG: 15 TABLET, FILM COATED ORAL at 21:21

## 2025-03-07 RX ADMIN — LITHIUM CARBONATE 300 MG: 300 TABLET, EXTENDED RELEASE ORAL at 21:21

## 2025-03-07 RX ADMIN — LITHIUM CARBONATE 300 MG: 300 TABLET, EXTENDED RELEASE ORAL at 09:25

## 2025-03-07 RX ADMIN — CYANOCOBALAMIN TAB 500 MCG 500 MCG: 500 TAB at 09:25

## 2025-03-07 RX ADMIN — GUAIFENESIN 600 MG: 600 TABLET ORAL at 21:21

## 2025-03-07 RX ADMIN — SITAGLIPTIN 100 MG: 100 TABLET, FILM COATED ORAL at 09:25

## 2025-03-07 RX ADMIN — GUAIFENESIN 600 MG: 600 TABLET ORAL at 09:25

## 2025-03-07 RX ADMIN — Medication 3 MG: at 21:21

## 2025-03-07 NOTE — NURSING NOTE
Patient cooperative and med compliant, participated in a snack tonight. He endorses mild anxiety and depression, denies SI, HI, AVH. He is visible in the milieu and selectively social. Safety precautions maintained q15 mins.

## 2025-03-07 NOTE — PLAN OF CARE
Problem: Alteration in Thoughts and Perception  Goal: Agree to be compliant with medication regime, as prescribed and report medication side effects  Description: Interventions:  - Offer appropriate PRN medication and supervise ingestion; conduct AIMS, as needed   Outcome: Progressing     Problem: Alteration in Orientation  Goal: Interact with staff daily  Description: Interventions:  - Assess and re-assess patient's level of orientation  - Engage patient in 1 on 1 interactions, daily, for a minimum of 15 minutes   - Establish rapport/trust with patient   Outcome: Progressing     Problem: Ineffective Coping  Goal: Cooperates with admission process  Description: Interventions:   - Complete admission process  Outcome: Completed

## 2025-03-07 NOTE — ASSESSMENT & PLAN NOTE
Continue Zyprexa 15 mg HS for symptoms of psychosis  Continue lithium 300 mg BID for mood stabilization; can obtain repeat level on 3/11/25

## 2025-03-07 NOTE — PROGRESS NOTES
"Progress Note - Behavioral Health   Name: Osei Trimble 50 y.o. male I MRN: [id number]  Unit/Bed#: [facility] 200-02 I Date of Admission: 3/5/2025   Date of Service: 3/7/2025 I Hospital Day: 2     Assessment & Plan  Bipolar disorder with psychotic features (HCC) vs Substance induced psychosis  Continue Zyprexa 15 mg HS for symptoms of psychosis  Continue lithium 300 mg BID for mood stabilization; can obtain repeat level on 3/11/25   Explosive personality disorder (HCC)      Treatment Planning:      - Encourage early mobility and having a structured day  - Provide frequent re-orientation, and cognitive stimulation  - Ensure assistive devices are in proper working order (eye-glasses, hearing aids)  - Encourage adequate hydration, nutrition and monitor bowel movements  - Maintain sleep-wake cycle: Uninterrupted sleep time; low-level lighting at night  - f/u SLIM recs regarding the medical problems   - Continue medication titration and treatment plan; adjust medication to optimize treatment response and as clinically indicated.   - Observation:Routine  - Legal Status: 201  - VS: as per unit protocol  - Encourage group attendance and milieu therapy  - Dispo: To be determined  ------------------------------------------------------------      Subjective: Patient's chart was reviewed and patient's progress and plan was discussed with treatment team. Per nursing report, Osei has been mostly withdrawn to room, out for meals, medication compliant and cooperative. Patient moderate anxiety and depression, denies SI/HI and hallucinations. Appears to have slept the majority of the overnight hours.       Osei was evaluated this morning for continuity of care. Upon evaluation, Osei is found eating breakfast in the community room, guarded on assessment. He states his mood is \"fine\" with affect that is increased in intensity. Appears suspicious of provider and fearful of surroundings. He endorses continued auditory hallucinations that " "he says have somewhat improved since restarting the Zyprexa. He denies suicidal ideation but continues to endorse thoughts of violence towards others if provoked. He believes his sleep has improved since admission and reports increased appetite. Remains medication compliant.           Psychiatric Review of Systems:  Behavior over the last 24 hours:  minimal improvement  Sleep: improving  Appetite: improving  Medication side effects: none verbalized  Medical ROS: Complete review of systems is negative except as noted above.    Vital signs in last 24 hours:  Temp:  [97.6 °F (36.4 °C)-97.9 °F (36.6 °C)] 97.6 °F (36.4 °C)  HR:  [70-82] 70  BP: ()/(54-79) 92/54  Resp:  [16-18] 16  SpO2:  [96 %-98 %] 97 %  O2 Device: None (Room air)    Mental Status Evaluation:    Appearance:  disheveled, marginal hygiene, dressed in hospital attire   Behavior:  guarded   Speech:  normal rate and volume, scant    Mood:  \"fine\"   Affect:  increased in intensity   Thought Process:  slightly less disorganized   Associations: tangential associations   Thought Content:  paranoid ideation, ruminating thoughts    Perceptual Disturbances: Vague auditory hallucinations and appears to be responding to internal stimuli   Risk Potential: Suicidal ideation - None at present  Homicidal ideation - angry, hostile feelings with no homicidal plan   Sensorium:  oriented to person, place, and time/date         Memory:  recent and remote memory mildly impaired      Consciousness:  alert and awake      Attention: decreased concentration and decreased attention span      Insight:  limited      Judgment: limited      Gait/Station: normal gait/station      Motor Activity: no abnormal movements       Laboratory results: I have personally reviewed all pertinent laboratory/tests results.  Recent Results (from the past 48 hours)   Fingerstick Glucose (POCT)    Collection Time: 03/06/25 10:09 PM   Result Value Ref Range    POC Glucose 174 (H) 65 - 140 mg/dl "   Fingerstick Glucose (POCT)    Collection Time: 03/07/25  7:34 AM   Result Value Ref Range    POC Glucose 118 65 - 140 mg/dl        Current Medications:  Current Facility-Administered Medications   Medication Dose Route Frequency Provider Last Rate    acetaminophen  650 mg Oral Q4H PRN [place] Corby-Anom, CRNP      acetaminophen  650 mg Oral Q4H PRN [facility]-Anom, CRNP      acetaminophen  975 mg Oral Q6H PRN [facility]-Anom, CRNP      benztropine  1 mg Intramuscular Q4H PRN Max 6/day [facility]-Anom, CRNP      benztropine  0.5 mg Oral Q4H PRN Max 6/day [facility]-Anom, CRNP      cyanocobalamin  500 mcg Oral Daily Lakesha L DagHERVE fabian-RICHARD      Diclofenac Sodium  2 g Topical 4x Daily PRN Lakesha Hernandez PA-C      [START ON 3/11/2025] ergocalciferol  50,000 Units Oral Weekly Lakesha L AMADA Hernandez      folic acid  1 mg Oral Daily Lakesha L DagAMADA fabian      guaiFENesin  600 mg Oral Q12H [facility] Lakesha L DagAMADA fabian      hydrOXYzine HCL  25 mg Oral Q6H PRN Max 4/day [facility], CRNP      lidocaine  2 patch Topical Daily PRN Lakesha L AMADA Hernandez      lithium carbonate  300 mg Oral Q12H [facility]-Anom, CRNP      LORazepam  1 mg Intramuscular Q6H PRN Max 3/day [facility]-Anom, CRNP      LORazepam  0.5 mg Oral Q6H PRN Max 4/day [facility]-Anom, CRNP      LORazepam  1 mg Oral Q6H PRN Max 3/day [facility]-Anom, CRNP      melatonin  3 mg Oral HS [facility], CRNP      OLANZapine  5 mg Intramuscular Q3H PRN Max 3/day [facility]-Anom, CRNP      OLANZapine  15 mg Oral HS [facility]-Anom, CRNP      OLANZapine  2.5 mg Oral Q4H PRN Max 6/day [facility]-Anom, CRNP      OLANZapine  5 mg Oral Q4H PRN Max 3/day [facility]-Anom, CRNP      OLANZapine  5 mg Oral Q3H PRN Max 3/day PIERRE Garcia      sitaGLIPtin  100 mg Oral Daily Lakesha Hernandez PA-C      traZODone  50 mg Oral HS PRN PIERRE Garcia         Risks, benefits and possible side effects of  Medications: Risks, benefits, and possible side effects of medications have been explained to the patient, who verbalizes understanding    Counseling / Coordination of Care:  Total floor / unit time spent today 25 minutes. Greater than 50% of total time was spent with the patient and / or family counseling and / or coordination of care. A description of counseling / coordination of care:  Patient's progress discussed with staff in treatment team meeting.  Medications, treatment progress and treatment plan reviewed with patient.      Odilia Gill MD 03/07/25  Psychiatry Resident, PGY-2

## 2025-03-07 NOTE — NUTRITION
"   25 1407   Biochemical Data,Medical Tests, and Procedures   Biochemical Data/Medical Tests/Procedures Lab values reviewed;Meds reviewed   Labs (Comment) 3/7 B, H&H:11.3/34.6. 3/5 positive amph/meth.  A1c:5.6%   Meds (Comment) cogentin, Vit D, folvite, atarax, ativan, melatonin, zyprexa, januvia, desyrel   Nutrition-Focused Physical Exam   Nutrition-Focused Physical Exam Findings RN skin assessment reviewed;No skin issues documented   Medical-Related Concerns Allergic     Bipolar disorder in partial remission (HCC)     Diabetes (HCC)     Erectile dysfunction  unspecified erectile dysfunction type   Hypertension     Kidney stone   Adequacy of Intake   Nutrition Modality PO   Feeding Route   PO Independent   Current PO Intake   Current Diet Order CCD 3 diet thin liquids   Current Meal Intake %   Estimated calorie intake compared to estimated need Nutrient needs met.   PES Statement   Problem No nutrition diagnosis   Recommendations/Interventions   Malnutrition/BMI Present No  (does not meet criteria)   Summary Consult: nutrition assessment. CCD 3 diet thin liquids. Meal completions 100%. Patient seen by this writer on recent previous admission. Concurs with previous discussion. Reports no diet plan. Appetite is \"so-so\". Consumes at least one meal per day. Lives alone. Reports he does not consume many vegetables- eats only peas and corn. Reports his sister checks his BG in the morning and occasionally at night. Tomato allergy; patient states he can eat things such as ketchup or tomato sauce but can not eat tomatoes plain. Weight loss unintentional. Trace RLE edema. Missing teeth. Skin intact. 3/5/#; #; #, 10#(4.5%) loss; 9/10/#, 21#(9%) loss; #. Weight loss present however not significant at this time. Skin intact. Continue diet as prescribed.   Interventions/Recommendations Continue current diet order   Education Assessment   Education " Patient/caregiver not appropriate for education at this time;Patient declined nutrition education   Nutrition Complexity Risk   Nutrition complexity level Low risk   Follow up date 03/21/25

## 2025-03-07 NOTE — PROGRESS NOTES
"Progress Note - Osei Trimble 50 y.o. male MRN: [id number]    Unit/Bed#: [facility] 200-02 Encounter: [id number]        Subjective:   Patient seen and examined at bedside after reviewing the chart and discussing the case with the caring staff.      Patient examined at bedside.  Patient has no acute symptoms.    Labs 3/7:  hgb 11.3, sodium 138, TSH 3.934    Physical Exam   Vitals: Blood pressure 92/54, pulse 70, temperature 97.6 °F (36.4 °C), temperature source Temporal, resp. rate 16, height 6' 1\" (1.854 m), weight 94.3 kg (208 lb), SpO2 97%.,Body mass index is 27.44 kg/m².  Constitutional: Patient in no acute distress.  HEENT: PERR, EOMI, MMM.  Cardiovascular: Normal rate and regular rhythm.    Pulmonary/Chest: Effort normal and breath sounds normal.   Abdomen: Soft, + BS, NT.    Assessment/Plan:  Osei Trimble is a(n) 50 y.o. male with mood disorder.     T2DM.  Hgb A1c 5.6% on 2/18/25.  Continue Januvia 100 mg daily.  Carb controlled diet.  Accu-cheks twice daily.    Neuroendocrine carcinoma of small bowel.  Diagnosed 7/2022.  Pt is following with hem/onc Dr. Green on outpt basis.  He is on lanreotide on every 4-week basis (last infusion 2/5/25, missed dose on 3/5/25).  Hypertension.  Lisinopril was discontinued last admission due to low BP.  Cont to monitor vitals.  Of note, pt on lithium.   Drug abuse.  UDS on 3/5 +amph/meth.   Folate deficiency.  Continue folic acid 1 mg daily.   Vitamin B12 deficiency.  Continue vitamin B12 500 mcg daily.  Vitamin D deficiency.  Continue vitamin D2 55869 units weekly.  Malnutrition.  Nutrition consulted.    Cough/congestion.  Start Mucinex BID 3/6.  Cont to monitor.  Hyponatremia.  Resolved.  Level 132 -> 138 on 3/7.    The patient was discussed with Dr. Pillai and he is in agreement with the above note.  "

## 2025-03-08 LAB
GLUCOSE SERPL-MCNC: 123 MG/DL (ref 65–140)
GLUCOSE SERPL-MCNC: 124 MG/DL (ref 65–140)

## 2025-03-08 PROCEDURE — 82948 REAGENT STRIP/BLOOD GLUCOSE: CPT

## 2025-03-08 PROCEDURE — 99232 SBSQ HOSP IP/OBS MODERATE 35: CPT

## 2025-03-08 RX ADMIN — FOLIC ACID 1 MG: 1 TABLET ORAL at 08:17

## 2025-03-08 RX ADMIN — Medication 3 MG: at 21:20

## 2025-03-08 RX ADMIN — SITAGLIPTIN 100 MG: 100 TABLET, FILM COATED ORAL at 08:16

## 2025-03-08 RX ADMIN — GUAIFENESIN 600 MG: 600 TABLET ORAL at 21:20

## 2025-03-08 RX ADMIN — OLANZAPINE 15 MG: 15 TABLET, FILM COATED ORAL at 21:20

## 2025-03-08 RX ADMIN — LITHIUM CARBONATE 300 MG: 300 TABLET, EXTENDED RELEASE ORAL at 08:17

## 2025-03-08 RX ADMIN — CYANOCOBALAMIN TAB 500 MCG 500 MCG: 500 TAB at 08:17

## 2025-03-08 RX ADMIN — GUAIFENESIN 600 MG: 600 TABLET ORAL at 08:16

## 2025-03-08 RX ADMIN — LITHIUM CARBONATE 300 MG: 300 TABLET, EXTENDED RELEASE ORAL at 21:20

## 2025-03-08 NOTE — NURSING NOTE
Pt up ad indra & gait is steady. Pt is flat & withdrawn to his room in between meals. Pt is cooperative & compliant with medications. Pt denies any depression or anxiety. Pt denies any hallucinations, suicidal or homicidal ideations. Q 15 min checks maintained to monitor pt's behavior & safety. Pt socializes minimally with other patients.

## 2025-03-08 NOTE — PROGRESS NOTES
"Progress Note - Osei Trimble 50 y.o. male MRN: [id number]    Unit/Bed#: [facility] 200-02 Encounter: [id number]        Subjective:   Patient seen and examined at bedside after reviewing the chart and discussing the case with the caring staff.      Patient examined at bedside.  Patient has no acute symptoms.    Physical Exam   Vitals: Blood pressure (!) 110/46, pulse 70, temperature 98.7 °F (37.1 °C), temperature source Temporal, resp. rate 16, height 6' 1\" (1.854 m), weight 94.3 kg (208 lb), SpO2 98%.,Body mass index is 27.44 kg/m².  Constitutional: Patient in no acute distress.  HEENT: PERR, EOMI, MMM.  Cardiovascular: Normal rate and regular rhythm.    Pulmonary/Chest: Effort normal and breath sounds normal.   Abdomen: Soft, + BS, NT.    Assessment/Plan:  Osei Trimble is a(n) 50 y.o. male with mood disorder.     T2DM.  Hgb A1c 5.6% on 2/18/25.  Continue Januvia 100 mg daily.  Carb controlled diet.  Accu-cheks twice daily.    Neuroendocrine carcinoma of small bowel.  Diagnosed 7/2022.  Pt is following with hem/onc Dr. Green on outpt basis.  He is on lanreotide on every 4-week basis (last infusion 2/5/25, missed dose on 3/5/25).  Hypertension.  Lisinopril was discontinued last admission due to low BP.  Cont to monitor vitals.  Of note, pt on lithium.   Drug abuse.  UDS on 3/5 +amph/meth.   Folate deficiency.  Continue folic acid 1 mg daily.   Vitamin B12 deficiency.  Continue vitamin B12 500 mcg daily.  Vitamin D deficiency.  Continue vitamin D2 85730 units weekly.  Malnutrition.  Nutrition consulted.    Cough/congestion.  Start Mucinex BID 3/6.  Cont to monitor.  Hyponatremia.  Resolved.  Level 132 -> 138 on 3/7.    The patient was discussed with Dr. Pillai and he is in agreement with the above note.  "

## 2025-03-08 NOTE — NURSING NOTE
Patient withdrawn to room. Minimal in conversation. Denies SI, HI, and hallucinations. Mild anxiety and depression. No reports of pain. Q 15 minute safety rounds maintained.

## 2025-03-08 NOTE — PROGRESS NOTES
Progress Note - Behavioral Health   Name: Osei Trimble 50 y.o. male I MRN: [id number]  Unit/Bed#: [facility] 200-02 I Date of Admission: 3/5/2025   Date of Service: 3/8/2025 I Hospital Day: 3     Assessment & Plan  Bipolar disorder with psychotic features (HCC) vs Substance induced psychosis  Continue Zyprexa 15 mg HS for symptoms of psychosis  Continue lithium 300 mg BID for mood stabilization; can obtain repeat level on 3/11/25   Explosive personality disorder (HCC)      Current medications:  Current Facility-Administered Medications   Medication Dose Route Frequency Provider Last Rate    acetaminophen  650 mg Oral Q4H PRN Addy Corby-Anom, CRNP      acetaminophen  650 mg Oral Q4H PRN Addy Corby-Anom, CRNP      acetaminophen  975 mg Oral Q6H PRN Addy Corby-Anom, CRNP      benztropine  1 mg Intramuscular Q4H PRN Max 6/day Addy Corby-Anom, CRNP      benztropine  0.5 mg Oral Q4H PRN Max 6/day Addy Corby-Anom, CRNP      cyanocobalamin  500 mcg Oral Daily Lakesha L Dagnall, PA-C      Diclofenac Sodium  2 g Topical 4x Daily PRN Lakesha L HERVE Hernandez-C      [START ON 3/11/2025] ergocalciferol  50,000 Units Oral Weekly Lakesha L Dagnall, PA-C      folic acid  1 mg Oral Daily Lakesha L Dagnall, PA-C      guaiFENesin  600 mg Oral Q12H BERENICE Lakesha L Dagnall PA-C      hydrOXYzine HCL  25 mg Oral Q6H PRN Max 4/day Addy Corby-Anom, CRNP      lidocaine  2 patch Topical Daily PRN Lakesha L Dagnall, PA-C      lithium carbonate  300 mg Oral Q12H BERENICE Addy Corby-Anom, CRNP      LORazepam  1 mg Intramuscular Q6H PRN Max 3/day Addy Corby-Anom, CRNP      LORazepam  0.5 mg Oral Q6H PRN Max 4/day Addy Corby-Anom, CRNP      LORazepam  1 mg Oral Q6H PRN Max 3/day Addy Corby-Anom, CRNP      melatonin  3 mg Oral HS Addy Corby-Anom, CRNP      OLANZapine  5 mg Intramuscular Q3H PRN Max 3/day PIERRE Garcia      OLANZapine  15 mg Oral HS PIERRE Garcia      OLANZapine  2.5 mg Oral Q4H PRN Max  6/day Addy Chambers, CRHERMILA      OLANZapine  5 mg Oral Q4H PRN Max 3/day Addy TavarezDallas, CRNP      OLANZapine  5 mg Oral Q3H PRN Max 3/day Addy Reyes, CRNP      sitaGLIPtin  100 mg Oral Daily Lakesha Hernandez PA-C      traZODone  50 mg Oral HS PRN Addy CorbySuly, CRHERMILA          Risks/Benefits of Treatment:     Risks, benefits, and possible side effects of medications explained to patient. Patient has limited understanding of risks and benefits of treatment at this time, but agrees to take medications as prescribed.        Treatment Planning:      - Encourage early mobility and having a structured day  - Provide frequent re-orientation, and cognitive stimulation  - Ensure assistive devices are in proper working order (eye-glasses, hearing aids)  - Encourage adequate hydration, nutrition and monitor bowel movements  - Maintain sleep-wake cycle: Uninterrupted sleep time; low-level lighting at night  - Fall precaution  - f/u SLIM recs regarding the medical problems   - Continue medication titration and treatment plan; adjust medication to optimize treatment response and as clinically indicated.   - Observation:Routine  - Legal Status: 201  - VS: as per unit protocol  - Encourage group attendance and milieu therapy  - Dispo: To be determined  - Long Stay Certification : Not Applicable  - Estimated Discharge Day: 3/12/2025     Subjective       Patient was visited on unit for continuing care; chart reviewed and discussed with multidisciplinary treatment team.  On approach, the patient was calm and superficially cooperative.  No  behavior issues reported.  Lying in bed with no signs of distress, uninterested in conversation.  Denied all psychiatric symptoms including anxiety, depression, hallucinations at this time.  Denies suicidal or homicidal thoughts.  Reports improvement in sleep and appetite with length of stay.    Patient continues to be selectively interactive with staff and peers. No reports of  aggression or self-injurious behavior on unit. No PRN medications used in the past 24 hours.    Patient accepted all offered medications and no adverse effects of medications noted or reported.    Sleep: improved  Appetite: fair  Medication side effects: No  ROS: review of systems as noted above in HPI/Subjective report, all other systems are negative    Objective :  Temp:  [97.6 °F (36.4 °C)-98.7 °F (37.1 °C)] 98.7 °F (37.1 °C)  HR:  [67-70] 70  BP: (110-131)/(46-75) 110/46  Resp:  [16-18] 16  SpO2:  [97 %-99 %] 98 %  O2 Device: None (Room air)    Temp:  [97.6 °F (36.4 °C)-98.7 °F (37.1 °C)] 98.7 °F (37.1 °C)  HR:  [67-70] 70  BP: (110-131)/(46-75) 110/46  Resp:  [16-18] 16  SpO2:  [97 %-99 %] 98 %  O2 Device: None (Room air)    Mental Status Evaluation:    Appearance:  disheveled   Behavior:  guarded   Speech:  normal rate and volume   Mood:  depressed   Affect:  flat   Thought Process:  concrete   Thought Content:  ruminating thoughts   Perceptual Disturbances: denies auditory hallucinations when asked   Risk Potential: Suicidal Ideation -  None at present  Homicidal Ideation -  None at present  Potential for Aggression - No   Sensorium:  oriented to person, place, and time/date   Memory:  recent and remote memory grossly intact   Consciousness:  alert and awake   Attention/Concentration: attention span and concentration appear shorter than expected for age   Insight:  limited   Judgment: limited   Gait/Station: normal gait/station, normal balance   Motor Activity: no abnormal movements           Lab Results: I have reviewed the following results:  Most Recent Labs:   Lab Results   Component Value Date    WBC 6.51 03/07/2025    RBC 3.84 (L) 03/07/2025    HGB 11.3 (L) 03/07/2025    HCT 34.6 (L) 03/07/2025     03/07/2025    RDW 13.7 03/07/2025    NEUTROABS 4.41 03/07/2025    SODIUM 138 03/07/2025    K 4.0 03/07/2025     03/07/2025    CO2 25 03/07/2025    BUN 16 03/07/2025    CREATININE 0.92 03/07/2025     GLUC 127 03/07/2025    CALCIUM 9.2 03/07/2025    AST 29 03/07/2025    ALT 34 03/07/2025    ALKPHOS 90 03/07/2025    TP 6.8 03/07/2025    ALB 3.9 03/07/2025    TBILI 0.86 03/07/2025    CHOLESTEROL 189 02/18/2025    HDL 36 (L) 02/18/2025    TRIG 129 02/18/2025    LDLCALC 127 (H) 02/18/2025    NONHDLC 153 02/18/2025    LITHIUM 0.73 02/24/2025    AMMONIA 38 02/14/2025    [id number] 3.934 03/07/2025    FREET4 0.85 02/17/2025    HGBA1C 5.6 02/18/2025     02/18/2025       Administrative Statements     Counseling / Coordination of Care:   Patient's progress discussed with staff in treatment team meeting..    PIERRE Valenzuela 03/08/25

## 2025-03-09 LAB
GLUCOSE SERPL-MCNC: 118 MG/DL (ref 65–140)
GLUCOSE SERPL-MCNC: 133 MG/DL (ref 65–140)

## 2025-03-09 PROCEDURE — 99232 SBSQ HOSP IP/OBS MODERATE 35: CPT

## 2025-03-09 PROCEDURE — 82948 REAGENT STRIP/BLOOD GLUCOSE: CPT

## 2025-03-09 RX ADMIN — GUAIFENESIN 600 MG: 600 TABLET ORAL at 08:08

## 2025-03-09 RX ADMIN — OLANZAPINE 15 MG: 15 TABLET, FILM COATED ORAL at 21:11

## 2025-03-09 RX ADMIN — CYANOCOBALAMIN TAB 500 MCG 500 MCG: 500 TAB at 08:09

## 2025-03-09 RX ADMIN — FOLIC ACID 1 MG: 1 TABLET ORAL at 08:08

## 2025-03-09 RX ADMIN — SITAGLIPTIN 100 MG: 100 TABLET, FILM COATED ORAL at 08:09

## 2025-03-09 RX ADMIN — LITHIUM CARBONATE 300 MG: 300 TABLET, EXTENDED RELEASE ORAL at 21:10

## 2025-03-09 RX ADMIN — LITHIUM CARBONATE 300 MG: 300 TABLET, EXTENDED RELEASE ORAL at 08:08

## 2025-03-09 RX ADMIN — Medication 3 MG: at 21:10

## 2025-03-09 RX ADMIN — GUAIFENESIN 600 MG: 600 TABLET ORAL at 21:11

## 2025-03-09 NOTE — NURSING NOTE
BLEPS assessment completed & see Head to Toe assessment. Lungs clear upon auscultation & no peripheral edema noted. Pt c/o mild depression & mild anxiety noted. Pt denies any hallucinations, suicidal or homicidal ideations. Q 15 min checks maintained to monitor pt's behavior & safety. Pt is flat & withdrawn to his room in between meals. Pt is cooperative & compliant with medications. Pt up ad indra with cane.

## 2025-03-09 NOTE — PROGRESS NOTES
Progress Note - Behavioral Health   Name: Osei Trimble 50 y.o. male I MRN: [id number]  Unit/Bed#: [facility] 200-02 I Date of Admission: 3/5/2025   Date of Service: 3/9/2025 I Hospital Day: 4     Assessment & Plan  Bipolar disorder with psychotic features (HCC) vs Substance induced psychosis  Continue Zyprexa 15 mg HS for symptoms of psychosis  Continue lithium 300 mg BID for mood stabilization; can obtain repeat level on 3/11/25   Explosive personality disorder (HCC)          Current medications:  Current Facility-Administered Medications   Medication Dose Route Frequency Provider Last Rate    acetaminophen  650 mg Oral Q4H PRN Addy Corby-Anom, CRNP      acetaminophen  650 mg Oral Q4H PRN Addy Corby-Anom, CRNP      acetaminophen  975 mg Oral Q6H PRN Addy Corby-Anom, CRNP      benztropine  1 mg Intramuscular Q4H PRN Max 6/day Addy Corby-Anom, CRNP      benztropine  0.5 mg Oral Q4H PRN Max 6/day Addy Corby-Anom, CRNP      cyanocobalamin  500 mcg Oral Daily Lakesha L Dagnall, PA-C      Diclofenac Sodium  2 g Topical 4x Daily PRN Lakesha L HERVE Hernandez-C      [START ON 3/11/2025] ergocalciferol  50,000 Units Oral Weekly Alkesha L Dagnall, PA-C      folic acid  1 mg Oral Daily Lakesha L Dagnall, PA-C      guaiFENesin  600 mg Oral Q12H BERENICE Lakesha L Dagnall PA-C      hydrOXYzine HCL  25 mg Oral Q6H PRN Max 4/day Addy Corby-Anom, CRNP      lidocaine  2 patch Topical Daily PRN Lakesha L Dagnall, PA-C      lithium carbonate  300 mg Oral Q12H BERENICE Addy Corby-Anom, CRNP      LORazepam  1 mg Intramuscular Q6H PRN Max 3/day Addy Corby-Anom, CRNP      LORazepam  0.5 mg Oral Q6H PRN Max 4/day Addy Corby-Anom, CRNP      LORazepam  1 mg Oral Q6H PRN Max 3/day Addy Corby-Anom, CRNP      melatonin  3 mg Oral HS Addy Corby-Anom, CRNP      OLANZapine  5 mg Intramuscular Q3H PRN Max 3/day PIERRE Garcia      OLANZapine  15 mg Oral HS PIERER Garcia      OLANZapine  2.5 mg Oral Q4H PRN  Max 6/day Addy Shankarm, CRNP      OLANZapine  5 mg Oral Q4H PRN Max 3/day Addy Vonniem, CRNP      OLANZapine  5 mg Oral Q3H PRN Max 3/day Addy DaliaAnom, CRNP      sitaGLIPtin  100 mg Oral Daily Lakesha Hernandez PA-C      traZODone  50 mg Oral HS PRN Addy Chambers, CRNP          Risks/Benefits of Treatment:     Risks, benefits, and possible side effects of medications explained to patient. Patient has limited understanding of risks and benefits of treatment at this time, but agrees to take medications as prescribed.        Treatment Planning:      - Encourage early mobility and having a structured day  - Provide frequent re-orientation, and cognitive stimulation  - Ensure assistive devices are in proper working order (eye-glasses, hearing aids)  - Encourage adequate hydration, nutrition and monitor bowel movements  - Maintain sleep-wake cycle: Uninterrupted sleep time; low-level lighting at night  - Fall precaution  - f/u SLIM recs regarding the medical problems   - Continue medication titration and treatment plan; adjust medication to optimize treatment response and as clinically indicated.   - Observation:Routine  - Legal Status: 201  - VS: as per unit protocol  - Encourage group attendance and milieu therapy  - Dispo: To be determined  - Long Stay Certification : Not Applicable  - Estimated Discharge Day: 3/12/2025     Subjective       Patient was visited on unit for continuing care; chart reviewed and discussed with multidisciplinary treatment team.  On approach, the patient was calm and superficially cooperative.  No behavioral issues over the past 24 hours.  Remains flat, withdrawn to bed with moderate depressive and anxiety symptoms.  No evidence of mood elevation.  Compliant with Zyprexa and lithium combination, lithium level scheduled for 3/11.  Denies SI.    Patient continues to be selectively interactive with staff and peers. No reports of aggression or self-injurious behavior on  unit. No PRN medications used in the past 24 hours.    Patient accepted all offered medications and no adverse effects of medications noted or reported.    Sleep: improved  Appetite: fair  Medication side effects: No  ROS: review of systems as noted above in HPI/Subjective report, all other systems are negative    Objective :  Temp:  [97.6 °F (36.4 °C)-97.9 °F (36.6 °C)] 97.6 °F (36.4 °C)  HR:  [67-81] 67  BP: (119-128)/(68-81) 128/81  Resp:  [18] 18  SpO2:  [96 %-97 %] 96 %  O2 Device: None (Room air)    Temp:  [97.6 °F (36.4 °C)-97.9 °F (36.6 °C)] 97.6 °F (36.4 °C)  HR:  [67-81] 67  BP: (119-128)/(68-81) 128/81  Resp:  [18] 18  SpO2:  [96 %-97 %] 96 %  O2 Device: None (Room air)    Mental Status Evaluation:    Appearance:  disheveled   Behavior:  guarded, slow responses   Speech:  normal rate and volume   Mood:  depressed   Affect:  constricted   Thought Process:  concrete   Thought Content:  ruminating thoughts   Perceptual Disturbances: denies auditory hallucinations when asked   Risk Potential: Suicidal Ideation -  None at present  Homicidal Ideation -  None at present  Potential for Aggression - No   Sensorium:  oriented to person, place, and time/date   Memory:  recent and remote memory grossly intact   Consciousness:  alert and awake   Attention/Concentration: attention span and concentration appear shorter than expected for age   Insight:  limited   Judgment: limited   Gait/Station: normal gait/station, normal balance   Motor Activity: no abnormal movements           Lab Results: I have reviewed the following results:  Most Recent Labs:   Lab Results   Component Value Date    WBC 6.51 03/07/2025    RBC 3.84 (L) 03/07/2025    HGB 11.3 (L) 03/07/2025    HCT 34.6 (L) 03/07/2025     03/07/2025    RDW 13.7 03/07/2025    NEUTROABS 4.41 03/07/2025    SODIUM 138 03/07/2025    K 4.0 03/07/2025     03/07/2025    CO2 25 03/07/2025    BUN 16 03/07/2025    CREATININE 0.92 03/07/2025    GLUC 127 03/07/2025     CALCIUM 9.2 03/07/2025    AST 29 03/07/2025    ALT 34 03/07/2025    ALKPHOS 90 03/07/2025    TP 6.8 03/07/2025    ALB 3.9 03/07/2025    TBILI 0.86 03/07/2025    CHOLESTEROL 189 02/18/2025    HDL 36 (L) 02/18/2025    TRIG 129 02/18/2025    LDLCALC 127 (H) 02/18/2025    NONHDLC 153 02/18/2025    LITHIUM 0.73 02/24/2025    AMMONIA 38 02/14/2025    [id number] 3.934 03/07/2025    FREET4 0.85 02/17/2025    HGBA1C 5.6 02/18/2025     02/18/2025       Administrative Statements     Counseling / Coordination of Care:   Patient's progress discussed with staff in treatment team meeting..    PIERRE Valenzuela 03/09/25

## 2025-03-09 NOTE — PLAN OF CARE
Problem: Ineffective Coping  Goal: Free from restraint events  Description: - Utilize least restrictive measures   - Provide behavioral interventions   - Redirect inappropriate behaviors   Outcome: Progressing     Problem: Risk for Violence/Aggression Toward Others  Goal: Refrain from harming others  Outcome: Progressing  Goal: Control angry outbursts  Description: Interventions:  - Monitor patient closely, per order  - Ensure early verbal de-escalation  - Monitor prn medication needs  - Set reasonable/therapeutic limits, outline behavioral expectations, and consequences   - Provide a non-threatening milieu, utilizing the least restrictive interventions   Outcome: Progressing

## 2025-03-09 NOTE — PROGRESS NOTES
"Progress Note - Osei Trimble 50 y.o. male MRN: [id number]    Unit/Bed#: [facility] 200-02 Encounter: [id number]        Subjective:   Patient seen and examined at bedside after reviewing the chart and discussing the case with the caring staff.      Patient examined at bedside.  Patient has no acute symptoms.    Physical Exam   Vitals: Blood pressure 128/81, pulse 67, temperature 97.6 °F (36.4 °C), temperature source Temporal, resp. rate 18, height 6' 1\" (1.854 m), weight 94.3 kg (208 lb), SpO2 96%.,Body mass index is 27.44 kg/m².  Constitutional: Patient in no acute distress.  HEENT: PERR, EOMI, MMM.  Cardiovascular: Normal rate and regular rhythm.    Pulmonary/Chest: Effort normal and breath sounds normal.   Abdomen: Soft, + BS, NT.    Assessment/Plan:  Osei Trimble is a(n) 50 y.o. male with mood disorder.     T2DM.  Hgb A1c 5.6% on 2/18/25.  Continue Januvia 100 mg daily.  Carb controlled diet.  Accu-cheks twice daily.    Neuroendocrine carcinoma of small bowel.  Diagnosed 7/2022.  Pt is following with hem/onc Dr. Green on outpt basis.  He is on lanreotide on every 4-week basis (last infusion 2/5/25, missed dose on 3/5/25).  Hypertension.  Lisinopril was discontinued last admission due to low BP.  Cont to monitor vitals.  Of note, pt on lithium.   Drug abuse.  UDS on 3/5 +amph/meth.   Folate deficiency.  Continue folic acid 1 mg daily.   Vitamin B12 deficiency.  Continue vitamin B12 500 mcg daily.  Vitamin D deficiency.  Continue vitamin D2 28951 units weekly.  Malnutrition.  Nutrition consulted.    Cough/congestion.  Start Mucinex BID 3/6.  Cont to monitor.  Hyponatremia.  Resolved.  Level 132 -> 138 on 3/7.    The patient was discussed with Dr. Pillai and he is in agreement with the above note.  "

## 2025-03-09 NOTE — NURSING NOTE
Patient out for snack then withdrawn to room. Appears flat and depressed. Denies SI, HI, hallucinations, anxiety, and depression. Compliant with medications. Will continue to monitor and access. Q 15 minute safety rounds maintained.

## 2025-03-10 ENCOUNTER — TELEPHONE (OUTPATIENT)
Age: 50
End: 2025-03-10

## 2025-03-10 LAB
GLUCOSE SERPL-MCNC: 105 MG/DL (ref 65–140)
GLUCOSE SERPL-MCNC: 126 MG/DL (ref 65–140)

## 2025-03-10 PROCEDURE — 99232 SBSQ HOSP IP/OBS MODERATE 35: CPT | Performed by: PSYCHIATRY & NEUROLOGY

## 2025-03-10 PROCEDURE — 82948 REAGENT STRIP/BLOOD GLUCOSE: CPT

## 2025-03-10 RX ADMIN — Medication 3 MG: at 20:44

## 2025-03-10 RX ADMIN — GUAIFENESIN 600 MG: 600 TABLET ORAL at 08:26

## 2025-03-10 RX ADMIN — OLANZAPINE 15 MG: 15 TABLET, FILM COATED ORAL at 20:43

## 2025-03-10 RX ADMIN — SITAGLIPTIN 100 MG: 100 TABLET, FILM COATED ORAL at 08:26

## 2025-03-10 RX ADMIN — TRAZODONE HYDROCHLORIDE 50 MG: 50 TABLET ORAL at 00:30

## 2025-03-10 RX ADMIN — FOLIC ACID 1 MG: 1 TABLET ORAL at 08:26

## 2025-03-10 RX ADMIN — LITHIUM CARBONATE 300 MG: 300 TABLET, EXTENDED RELEASE ORAL at 20:43

## 2025-03-10 RX ADMIN — CYANOCOBALAMIN TAB 500 MCG 500 MCG: 500 TAB at 08:26

## 2025-03-10 RX ADMIN — TRAZODONE HYDROCHLORIDE 50 MG: 50 TABLET ORAL at 20:43

## 2025-03-10 RX ADMIN — LITHIUM CARBONATE 300 MG: 300 TABLET, EXTENDED RELEASE ORAL at 08:26

## 2025-03-10 RX ADMIN — GUAIFENESIN 600 MG: 600 TABLET ORAL at 20:44

## 2025-03-10 NOTE — PROGRESS NOTES
"Progress Note - Behavioral Health   Name: Osei Trimble 50 y.o. male I MRN: [id number]  Unit/Bed#: [facility] 200-02 I Date of Admission: 3/5/2025   Date of Service: 3/10/2025 I Hospital Day: 5     Assessment & Plan  Bipolar disorder with psychotic features (HCC) vs Substance induced psychosis  Continue Zyprexa 15 mg HS for symptoms of psychosis  Continue lithium 300 mg BID for mood stabilization; can obtain repeat level on 3/11/25   Explosive personality disorder (HCC)      Treatment Planning:      - Provide frequent re-orientation, and cognitive stimulation  - Encourage adequate hydration, nutrition and monitor bowel movements  - Maintain sleep-wake cycle: Uninterrupted sleep time; low-level lighting at night  - f/u SLIM recs regarding the medical problems   - Continue medication titration and treatment plan; adjust medication to optimize treatment response and as clinically indicated.   - Observation:Routine  - Legal Status: 201  - VS: as per unit protocol  - Encourage group attendance and milieu therapy  - Dispo: To be determined    ------------------------------------------------------------      Subjective: Patient's chart was reviewed and patient's progress and plan was discussed with treatment team. Per nursing report, Osei has been visible on the unit, cooperative with staff. Withdrawn to self and does not attend groups. Denies SI/HI/AVH. Endorses mild anxiety and depression. Participated in snack and able to make needs known. Some difficulty falling and staying asleep overnight.       Osei was evaluated this morning for continuity of care. Upon evaluation, Osei is found lying in bed and uninterested in interview assessment. He states his mood is \"so-so\" with blunted affect. Encouraged to attend groups; Osei did not respond. He denies current auditory hallucinations but states that he \"thinks about them often\". He endorses general lethargy and fatigue; discussed typical symptoms of methamphetamine " "withdrawal and he believes this may be causing his fatigue and lack of motivation. Denies suicidal ideation and \"doesn't have the energy\" to become violent with others. Remains medication compliant.           Psychiatric Review of Systems:  Behavior over the last 24 hours:  improved  Sleep: difficulty falling asleep  Appetite: adequate  Medication side effects: none verbalized  Medical ROS: Complete review of systems is negative except as noted above.    Vital signs in last 24 hours:  Temp:  [97.7 °F (36.5 °C)-98 °F (36.7 °C)] 98 °F (36.7 °C)  HR:  [74-80] 80  BP: (113-126)/(64-81) 113/64  Resp:  [18] 18  SpO2:  [96 %-97 %] 96 %  O2 Device: None (Room air)    Mental Status Evaluation:    Appearance:  disheveled, dressed in hospital attire, looks stated age   Behavior:  minimally cooperative   Speech:  normal rate and volume    Mood:  \"So-so\"   Affect:  blunted   Thought Process:  poverty of thought   Associations: less tangential   Thought Content:  mild paranoia, ruminating thoughts    Perceptual Disturbances: no reported hallucinations and does not appear to be responding to internal stimuli at this time   Risk Potential: Suicidal ideation - None at present  Homicidal ideation - None at present   Sensorium:  oriented to person, place, and time/date         Memory:  recent and remote memory grossly intact      Consciousness:  alert and awake      Attention: decreased concentration and decreased attention span      Insight:  limited      Judgment: limited      Gait/Station: normal gait/station      Motor Activity: no abnormal movements       Laboratory results: I have personally reviewed all pertinent laboratory/tests results.  Recent Results (from the past 48 hours)   Fingerstick Glucose (POCT)    Collection Time: 03/08/25  4:22 PM   Result Value Ref Range    POC Glucose 123 65 - 140 mg/dl   Fingerstick Glucose (POCT)    Collection Time: 03/09/25  7:45 AM   Result Value Ref Range    POC Glucose 133 65 - 140 mg/dl "   Fingerstick Glucose (POCT)    Collection Time: 03/09/25  5:34 PM   Result Value Ref Range    POC Glucose 118 65 - 140 mg/dl   Fingerstick Glucose (POCT)    Collection Time: 03/10/25  7:29 AM   Result Value Ref Range    POC Glucose 126 65 - 140 mg/dl        Current Medications:  Current Facility-Administered Medications   Medication Dose Route Frequency Provider Last Rate    acetaminophen  650 mg Oral Q4H PRN [facility], CRNP      acetaminophen  650 mg Oral Q4H PRN [facility], CRNP      acetaminophen  975 mg Oral Q6H PRN [facility], CRNP      benztropine  1 mg Intramuscular Q4H PRN Max 6/day [facility], CRNP      benztropine  0.5 mg Oral Q4H PRN Max 6/day [facility], CRNP      cyanocobalamin  500 mcg Oral Daily Lakesha L DagHERVE fabian-C      Diclofenac Sodium  2 g Topical 4x Daily PRN Lakesha Hernandez PA-C      [START ON 3/11/2025] ergocalciferol  50,000 Units Oral Weekly Lakeshaanalilia Hernandez PA-C      folic acid  1 mg Oral Daily Lakesha HERVE Faustin-RICHARD      guaiFENesin  600 mg Oral Q12H [facility] Lakesha L AMADA Hernandez      hydrOXYzine HCL  25 mg Oral Q6H PRN Max 4/day [facility], CRNP      lidocaine  2 patch Topical Daily PRN Lakesha Hernandez PA-C      lithium carbonate  300 mg Oral Q12H [facility], CRNP      LORazepam  1 mg Intramuscular Q6H PRN Max 3/day [facility], CRNP      LORazepam  0.5 mg Oral Q6H PRN Max 4/day [facility], CRNP      LORazepam  1 mg Oral Q6H PRN Max 3/day [facility], CRNP      melatonin  3 mg Oral HS [facility], CRNP      OLANZapine  5 mg Intramuscular Q3H PRN Max 3/day [facility], CRNP      OLANZapine  15 mg Oral HS [facility], CRNP      OLANZapine  2.5 mg Oral Q4H PRN Max 6/day PIERRE Garcia      OLANZapine  5 mg Oral Q4H PRN Max 3/day PIERRE Garcia      OLANZapine  5 mg Oral Q3H PRN Max 3/day PIERRE Garcia      sitaGLIPtin  100 mg Oral Daily Lakesha ARGUETA  AMADA Hernandez      traZODone  50 mg Oral HS PRN PIERRE Garcia         Risks, benefits and possible side effects of Medications: Risks, benefits, and possible side effects of medications have been explained to the patient, who verbalizes understanding    Counseling / Coordination of Care:  Total floor / unit time spent today 25 minutes. Greater than 50% of total time was spent with the patient and / or family counseling and / or coordination of care. A description of counseling / coordination of care:  Patient's progress discussed with staff in treatment team meeting.  Medications, treatment progress and treatment plan reviewed with patient.      Odilia Gill MD 03/10/25  Psychiatry Resident, PGY-2

## 2025-03-10 NOTE — TELEPHONE ENCOUNTER
Patient's sister Ariadne calling in to reschedule follow up with Dr. Green due to hospitalization. Rescheduled to next available appointment 6/3/25. He is an active treatment patient. If this appointment is not appropriate please call to rescheduled [phone]

## 2025-03-10 NOTE — SOCIAL WORK
03/07/25    Team Meeting   Meeting Type Daily Rounds   Team Members Present   Team Members Present Physician;Nurse;;Occupational Therapist   Physician Team Member Grecia HOGUE, Bridget Lau MD   Nursing Team Member Balta ASTUDILLO   Social Work Team Member Leticia RASHID , Julito RASHID   OT Team Member Nasir SMITH   Patient/Family Present   Patient Present No   Patient's Family Present No   201, slept mild anxiety/depression and AH, appears flat, withdrawn to room out for meals. No D/C date med management.

## 2025-03-10 NOTE — CASE MANAGEMENT
03/10/25    Team Meeting   Meeting Type Daily Rounds   Team Members Present   Team Members Present Physician;Nurse;;Occupational Therapist   Physician Team Member Alex HOGUE, Bridget HOGUE, Corby JAY   Nursing Team Member Fernando ASTUDILLO   Social Work Team Member Leticia RASHID , Julito RASHID   OT Team Member Nasir SMITH   Patient/Family Present   Patient Present No   Patient's Family Present No   201, Minimal in conversation would not answer assessment questions, med complaint visible for meals. D/C Friday .

## 2025-03-10 NOTE — NURSING NOTE
Pt has been cooperative with medications since he has been here. He is withdrawn to self but does like to sit in the small tv room during the day. Pt does not attend groups but is encouraged. He did not want to answer any psychiatric questions today and would not respond. Pt appears flat, guarded and minimal in conversation. Continuous safety monitoring in place.

## 2025-03-10 NOTE — PROGRESS NOTES
"Progress Note - Osei Trimble 50 y.o. male MRN: [id number]    Unit/Bed#: [facility] 200-02 Encounter: [id number]        Subjective:   Patient seen and examined at bedside after reviewing the chart and discussing the case with the caring staff.      Patient examined at bedside.  Patient has no acute symptoms.    Physical Exam   Vitals: Blood pressure 113/64, pulse 80, temperature 98 °F (36.7 °C), temperature source Temporal, resp. rate 18, height 6' 1\" (1.854 m), weight 94.3 kg (208 lb), SpO2 96%.,Body mass index is 27.44 kg/m².  Constitutional: Patient in no acute distress.  HEENT: PERR, EOMI, MMM.  Cardiovascular: Normal rate and regular rhythm.    Pulmonary/Chest: Effort normal and breath sounds normal.   Abdomen: Soft, + BS, NT.    Assessment/Plan:  Osei Trimble is a(n) 50 y.o. male with mood disorder.     T2DM.  Hgb A1c 5.6% on 2/18/25.  Continue Januvia 100 mg daily.  Carb controlled diet.  Accu-cheks twice daily.    Neuroendocrine carcinoma of small bowel.  Diagnosed 7/2022.  Pt is following with hem/onc Dr. Green on outpt basis.  He is on lanreotide on every 4-week basis (last infusion 2/5/25, missed dose on 3/5/25).  Hypertension.  Lisinopril was discontinued last admission due to low BP.  Cont to monitor vitals.  Of note, pt on lithium.   Drug abuse.  UDS on 3/5 +amph/meth.   Folate deficiency.  Continue folic acid 1 mg daily.   Vitamin B12 deficiency.  Continue vitamin B12 500 mcg daily.  Vitamin D deficiency.  Continue vitamin D2 07214 units weekly.  Malnutrition.  Nutrition consulted.    Cough/congestion.  Start Mucinex BID 3/6.  Cont to monitor.  Hyponatremia.  Resolved.  Level 132 -> 138 on 3/7.  "

## 2025-03-10 NOTE — TELEPHONE ENCOUNTER
Patient's sister Ariadne calling in to reschedule missed infusion appointment. Please call to reschedule [phone]

## 2025-03-10 NOTE — DISCHARGE INSTR - OTHER ORDERS
You are being discharged to your home at [address] [place] [address] Phone :[phone]     Triggers you have identified during your hospitalization that led to your admission of a regressed mood include ineffective coping skills and medication management. Coping skills you have identified during your hospitalization include relaxing . If you are unable to deal with your distressed mood alone please contact tell your sister Ariadne at [phone] and ask for assistance. If that is not effective and you continue to have (ex: suicidal ideation, homicidal ideation, distressed mood, overwhelmed, in crisis) please contact Crisis by dialing [facility] 1 [phone], dial 911 or go to the nearest emergency center.      *[facility] Crisis Hotline: 1 852.731.3204  *National Suicide Prevention Lifeline:  1-301.832.7054  *Alcohol Anonymous: 380.274.3517  *[facility] Drug & Alcohol Commission: (364) 320-7235  *National [place] on Mental Illness (DELMI) HELPLINE: 689.918.6940/Website: www.delmi.org  *Substance Abuse and Mental Health Services Administration([facility]) National Helpline, which is a confidential, free, 24-hour-a-day, 365-day-a-year, information service for individuals and family members facing mental health and/or substance use disorders. This service provides referrals to local treatment facilities, support groups, and community-based organizations. Callers can also order free publications and other information.  Call 1-485.323.3040/Website: www.[facility].gov  *[facility] 2-1-1: This is a toll free, confidential, 24-hour-a-day service which connects you to a community  in your area who can help you find services and resources that are available to you locally and provide critical services that can improve and save lives.  Call: 211  /Website: http://www.[employer]/       Mariela, or Michelle, our Behavioral Health Nurse Navigators, will be calling you after your discharge, on the  phone number that you provided.  They will be available as an additional support, if needed.   If you wish to speak with one of them, you may contact Mariela at [phone] or Michelle at [phone].

## 2025-03-10 NOTE — NURSING NOTE
Patient visible on the unit. Cooperative.  Denies SI, HI,and  hallucinations. Mild anxiety and depression. Compliant with medications. Participated in snack.  Able to make needs known. Q 15 minute checks maintained. Will continue to monitor and access.

## 2025-03-10 NOTE — PLAN OF CARE
Problem: Risk for Violence/Aggression Toward Others  Goal: Refrain from harming others  Outcome: Progressing  Goal: Control angry outbursts  Description: Interventions:  - Monitor patient closely, per order  - Ensure early verbal de-escalation  - Monitor prn medication needs  - Set reasonable/therapeutic limits, outline behavioral expectations, and consequences   - Provide a non-threatening milieu, utilizing the least restrictive interventions   Outcome: Progressing

## 2025-03-11 LAB
GLUCOSE SERPL-MCNC: 123 MG/DL (ref 65–140)
LITHIUM SERPL-SCNC: 0.67 MMOL/L (ref 0.6–1.2)

## 2025-03-11 PROCEDURE — 82948 REAGENT STRIP/BLOOD GLUCOSE: CPT

## 2025-03-11 PROCEDURE — 80178 ASSAY OF LITHIUM: CPT

## 2025-03-11 PROCEDURE — 99232 SBSQ HOSP IP/OBS MODERATE 35: CPT | Performed by: PSYCHIATRY & NEUROLOGY

## 2025-03-11 RX ADMIN — SITAGLIPTIN 100 MG: 100 TABLET, FILM COATED ORAL at 09:00

## 2025-03-11 RX ADMIN — ERGOCALCIFEROL 50000 UNITS: 1.25 CAPSULE, LIQUID FILLED ORAL at 08:59

## 2025-03-11 RX ADMIN — OLANZAPINE 15 MG: 15 TABLET, FILM COATED ORAL at 21:37

## 2025-03-11 RX ADMIN — CYANOCOBALAMIN TAB 500 MCG 500 MCG: 500 TAB at 09:00

## 2025-03-11 RX ADMIN — LITHIUM CARBONATE 300 MG: 300 TABLET, EXTENDED RELEASE ORAL at 21:37

## 2025-03-11 RX ADMIN — Medication 3 MG: at 21:37

## 2025-03-11 RX ADMIN — LITHIUM CARBONATE 300 MG: 300 TABLET, EXTENDED RELEASE ORAL at 09:00

## 2025-03-11 RX ADMIN — FOLIC ACID 1 MG: 1 TABLET ORAL at 09:00

## 2025-03-11 RX ADMIN — GUAIFENESIN 600 MG: 600 TABLET ORAL at 08:59

## 2025-03-11 RX ADMIN — TRAZODONE HYDROCHLORIDE 50 MG: 50 TABLET ORAL at 21:37

## 2025-03-11 RX ADMIN — GUAIFENESIN 600 MG: 600 TABLET ORAL at 21:37

## 2025-03-11 NOTE — SOCIAL WORK
03/11/25    Team Meeting   Meeting Type Daily Rounds   Team Members Present   Team Members Present Physician;Nurse;;Occupational Therapist   Physician Team Member Alex HOGUE , Bridget Lau MD   Nursing Team Member Fernando ASTUDILLO   Social Work Team Member Leticia RASHID , Julito RASHID   OT Team Member Nasir SMITH   Patient/Family Present   Patient Present No   Patient's Family Present No   201, slept , withdrawn , appears flat , med complaint Denies SI/HI/AVH depression and anxiety . D/C Friday with Follow up to at [facility] services .

## 2025-03-11 NOTE — NURSING NOTE
Pt has been visible on the unit but withdrawn to self. He denied all psychiatric symptoms today and pain. Pt is able to make needs known and does not have any at this time. Pt is minimal in conversation and has a blunted affect. Family stated patient presents this way at home also. Continuous safety monitoring in place.

## 2025-03-11 NOTE — ASSESSMENT & PLAN NOTE
Continue Zyprexa 15 mg HS for symptoms of psychosis  Continue lithium 300 mg BID for mood stabilization; level on 3/11/25 is .67 and in therapeutic range

## 2025-03-11 NOTE — PROGRESS NOTES
"Progress Note - Osei Trimble 50 y.o. male MRN: [id number]    Unit/Bed#: [facility] 200-02 Encounter: [id number]        Subjective:   Patient seen and examined at bedside after reviewing the chart and discussing the case with the caring staff.      Patient examined at bedside.  Patient has no acute symptoms.    Patient is a possible discharge on Friday, 3/14/25.    Physical Exam   Vitals: Blood pressure 105/63, pulse 70, temperature 98 °F (36.7 °C), temperature source Temporal, resp. rate 18, height 6' 1\" (1.854 m), weight 94.3 kg (208 lb), SpO2 98%.,Body mass index is 27.44 kg/m².  Constitutional: Patient in no acute distress.  HEENT: PERR, EOMI, MMM.  Cardiovascular: Normal rate and regular rhythm.    Pulmonary/Chest: Effort normal and breath sounds normal.   Abdomen: Soft, + BS, NT.    Assessment/Plan:  Osei Trimble is a(n) 50 y.o. male with mood disorder.     T2DM.  Hgb A1c 5.6% on 2/18/25.  Continue Januvia 100 mg daily.  Carb controlled diet.  Accu-cheks twice daily.    Neuroendocrine carcinoma of small bowel.  Diagnosed 7/2022.  Pt is following with hem/onc Dr. Green on outpt basis.  He is on lanreotide on every 4-week basis (last infusion 2/5/25, missed dose on 3/5/25).  Hypertension.  Lisinopril was discontinued last admission due to low BP.  Cont to monitor vitals.  Of note, pt on lithium.   Drug abuse.  UDS on 3/5 +amph/meth.   Folate deficiency.  Continue folic acid 1 mg daily.   Vitamin B12 deficiency.  Continue vitamin B12 500 mcg daily.  Vitamin D deficiency.  Continue vitamin D2 43321 units weekly.  Malnutrition.  Nutrition consulted.    Cough/congestion.  Start Mucinex BID 3/6.  Cont to monitor.  Hyponatremia.  Resolved.  Level 132 -> 138 on 3/7.    The patient was discussed with Dr. Pillai and he is in agreement with the above note.  "

## 2025-03-11 NOTE — PLAN OF CARE
Problem: Risk for Violence/Aggression Toward Others  Goal: Control angry outbursts  Description: Interventions:  - Monitor patient closely, per order  - Ensure early verbal de-escalation  - Monitor prn medication needs  - Set reasonable/therapeutic limits, outline behavioral expectations, and consequences   - Provide a non-threatening milieu, utilizing the least restrictive interventions   Outcome: Progressing     Problem: Depression  Goal: Refrain from isolation  Description: Interventions:  - Develop a trusting relationship   - Encourage socialization   Outcome: Progressing

## 2025-03-11 NOTE — PROGRESS NOTES
"Progress Note - Behavioral Health   Name: Osei Trimble 50 y.o. male I MRN: [id number]  Unit/Bed#: [facility] 200-02 I Date of Admission: 3/5/2025   Date of Service: 3/11/2025 I Hospital Day: 6     Assessment & Plan  Bipolar disorder with psychotic features (HCC) vs Substance induced psychosis  Continue Zyprexa 15 mg HS for symptoms of psychosis  Continue lithium 300 mg BID for mood stabilization; level on 3/11/25 is .67 and in therapeutic range  Explosive personality disorder (HCC)      Treatment Planning:      - Provide frequent re-orientation, and cognitive stimulation  - Ensure assistive devices are in proper working order (eye-glasses, hearing aids)  - Encourage adequate hydration, nutrition and monitor bowel movements  - Maintain sleep-wake cycle: Uninterrupted sleep time; low-level lighting at night  - f/u SLIM recs regarding the medical problems   - Continue medication titration and treatment plan; adjust medication to optimize treatment response and as clinically indicated.   - Observation:Routine  - Legal Status: 201  - VS: as per unit protocol  - Encourage group attendance and milieu therapy  - Dispo: To be determined    ------------------------------------------------------------      Subjective: Patient's chart was reviewed and patient's progress and plan was discussed with treatment team. Per nursing report, Osei has been has been visible on the unit. Attended evening snack and has been observed interacting with peers. Patient has mumbled speech. Disorganized thoughts. Can be tangential. Appears disheveled. Talked a lot about his sister and her boyfriend. Reports feeling his thoughts are clearer however he seemed disorganized.      Osei was evaluated this morning for continuity of care. Upon evaluation, Osei is found lying in bed and minimally engaged. He states his mood is \"fine\" with constricted affect. Reports completing blood draw earlier this morning. He states his auditory hallucinations are less " "prominent but can still hear mumbling at times. Denies visual hallucinations, suicidal and homicidal ideation. Appears less disorganized but becomes tangential in conversation. Remains medication compliant.           Psychiatric Review of Systems:  Behavior over the last 24 hours: unchanged  Sleep: normal  Appetite: adequate  Medication side effects: none verbalized  Medical ROS: Complete review of systems is negative except as noted above.    Vital signs in last 24 hours:  Temp:  [97.8 °F (36.6 °C)-98.9 °F (37.2 °C)] 98 °F (36.7 °C)  HR:  [70-89] 70  BP: (105-132)/(63-84) 105/63  Resp:  [18] 18  SpO2:  [97 %-98 %] 98 %  O2 Device: None (Room air)    Mental Status Evaluation:    Appearance:  disheveled, marginal hygiene, dressed in hospital attire, looks stated age   Behavior:  guarded   Speech:  normal rate and volume    Mood:  \"fine\"   Affect:  constricted   Thought Process:  less disorganized   Associations: tangential associations   Thought Content:  mild paranoia    Perceptual Disturbances: auditory hallucinations of mumbling; denies visual hallucinations   Risk Potential: Suicidal ideation - None at present  Homicidal ideation - None at present   Sensorium:  oriented to person, place, and time/date         Memory:  recent and remote memory improving      Consciousness:  alert and awake      Attention: decreased concentration and decreased attention span      Insight:  limited      Judgment: limited      Gait/Station: normal gait/station      Motor Activity: no abnormal movements       Laboratory results: I have personally reviewed all pertinent laboratory/tests results.  Recent Results (from the past 48 hours)   Fingerstick Glucose (POCT)    Collection Time: 03/09/25  5:34 PM   Result Value Ref Range    POC Glucose 118 65 - 140 mg/dl   Fingerstick Glucose (POCT)    Collection Time: 03/10/25  7:29 AM   Result Value Ref Range    POC Glucose 126 65 - 140 mg/dl   Fingerstick Glucose (POCT)    Collection Time: " 03/10/25  4:49 PM   Result Value Ref Range    POC Glucose 105 65 - 140 mg/dl   Lithium level    Collection Time: 03/11/25  5:15 AM   Result Value Ref Range    Lithium Lvl 0.67 0.60 - 1.20 mmol/L   Fingerstick Glucose (POCT)    Collection Time: 03/11/25  7:21 AM   Result Value Ref Range    POC Glucose 123 65 - 140 mg/dl        Current Medications:  Current Facility-Administered Medications   Medication Dose Route Frequency Provider Last Rate    acetaminophen  650 mg Oral Q4H PRN [facility], CRNP      acetaminophen  650 mg Oral Q4H PRN [facility], CRNP      acetaminophen  975 mg Oral Q6H PRN [facility], CRNP      benztropine  1 mg Intramuscular Q4H PRN Max 6/day [facility], CRNP      benztropine  0.5 mg Oral Q4H PRN Max 6/day [facility], CRNP      cyanocobalamin  500 mcg Oral Daily HERVE Ponce-C      Diclofenac Sodium  2 g Topical 4x Daily PRN Lakeshaanalilia Hernandez PA-RICHARD      ergocalciferol  50,000 Units Oral Weekly Lakeshaanalilia Hernandez PA-C      folic acid  1 mg Oral Daily Lakesha L HERVE Hernandez-RICHARD      guaiFENesin  600 mg Oral Q12H [facility] Lakesha L Mary PA-RICHARD      hydrOXYzine HCL  25 mg Oral Q6H PRN Max 4/day [facility], CRNP      lidocaine  2 patch Topical Daily PRN Lakesha Hernandez PA-C      lithium carbonate  300 mg Oral Q12H [facility], CRNP      LORazepam  1 mg Intramuscular Q6H PRN Max 3/day [facility], CRNP      LORazepam  0.5 mg Oral Q6H PRN Max 4/day [facility], CRNP      LORazepam  1 mg Oral Q6H PRN Max 3/day [facility], CRNP      melatonin  3 mg Oral HS [facility], CRNP      OLANZapine  5 mg Intramuscular Q3H PRN Max 3/day [facility], CRNP      OLANZapine  15 mg Oral HS [facility], CRNP      OLANZapine  2.5 mg Oral Q4H PRN Max 6/day PIERRE Garcia      OLANZapine  5 mg Oral Q4H PRN Max 3/day PIERRE Garcia      OLANZapine  5 mg Oral Q3H PRN Max 3/day PIERRE Garcia       sitaGLIPtin  100 mg Oral Daily Lakesha Hernandez PA-C      traZODone  50 mg Oral HS PRN PIERRE Garcia         Risks, benefits and possible side effects of Medications: Risks, benefits, and possible side effects of medications have been explained to the patient, who verbalizes understanding    Counseling / Coordination of Care:  Total floor / unit time spent today 25 minutes. Greater than 50% of total time was spent with the patient and / or family counseling and / or coordination of care. A description of counseling / coordination of care:  Patient's progress discussed with staff in treatment team meeting.  Medications, treatment progress and treatment plan reviewed with patient.      Odilia Gill MD 03/11/25  Psychiatry Resident, PGY-2

## 2025-03-11 NOTE — NURSING NOTE
Patient has been visible on the unit.  Attended evening snack and has been observed interacting with peers.  Patient has mumbled speech. Disorganized thoughts. Can be tangential.  Appears disheveled. Talked a lot about his sister and her boyfriend.  Reports feeling his thoughts are clearer however he seemed disorganized to this nurse

## 2025-03-12 LAB — GLUCOSE SERPL-MCNC: 105 MG/DL (ref 65–140)

## 2025-03-12 PROCEDURE — 99232 SBSQ HOSP IP/OBS MODERATE 35: CPT | Performed by: PSYCHIATRY & NEUROLOGY

## 2025-03-12 PROCEDURE — 82948 REAGENT STRIP/BLOOD GLUCOSE: CPT

## 2025-03-12 RX ORDER — OLANZAPINE 10 MG/1
10 TABLET ORAL
Status: DISCONTINUED | OUTPATIENT
Start: 2025-03-12 | End: 2025-03-14 | Stop reason: HOSPADM

## 2025-03-12 RX ORDER — TRAZODONE HYDROCHLORIDE 50 MG/1
50 TABLET ORAL
Status: DISCONTINUED | OUTPATIENT
Start: 2025-03-12 | End: 2025-03-14 | Stop reason: HOSPADM

## 2025-03-12 RX ADMIN — LITHIUM CARBONATE 300 MG: 300 TABLET, EXTENDED RELEASE ORAL at 09:00

## 2025-03-12 RX ADMIN — GUAIFENESIN 600 MG: 600 TABLET ORAL at 09:00

## 2025-03-12 RX ADMIN — GUAIFENESIN 600 MG: 600 TABLET ORAL at 20:37

## 2025-03-12 RX ADMIN — SITAGLIPTIN 100 MG: 100 TABLET, FILM COATED ORAL at 09:00

## 2025-03-12 RX ADMIN — LITHIUM CARBONATE 300 MG: 300 TABLET, EXTENDED RELEASE ORAL at 20:37

## 2025-03-12 RX ADMIN — OLANZAPINE 10 MG: 10 TABLET, FILM COATED ORAL at 20:37

## 2025-03-12 RX ADMIN — TRAZODONE HYDROCHLORIDE 50 MG: 50 TABLET ORAL at 20:37

## 2025-03-12 RX ADMIN — Medication 3 MG: at 20:37

## 2025-03-12 RX ADMIN — CYANOCOBALAMIN TAB 500 MCG 500 MCG: 500 TAB at 09:00

## 2025-03-12 RX ADMIN — FOLIC ACID 1 MG: 1 TABLET ORAL at 09:00

## 2025-03-12 NOTE — NURSING NOTE
Patient is withdrawn to his room most of the day.  He is only visible during meal times but is isolative to self.  Patient denies all psych s/s to this writer.  Safety precautions maintained.

## 2025-03-12 NOTE — NURSING NOTE
Patient cooperative and med compliant, participated in a snack tonight. He denies all psychiatric symptoms. Pt is visible and social. Trazodone given at 2137, will re-assess. Safety precautions maintained q15 mins.

## 2025-03-12 NOTE — SOCIAL WORK
03/12/25    Team Meeting   Meeting Type Daily Rounds   Team Members Present   Team Members Present Physician;Nurse;;Occupational Therapist   Physician Team Member Alex HOGUE, Bridget Lau MD   Nursing Team Member Richardson ASTUDILLO   Social Work Team Member Leticia RASHID , Julito RASHID   OT Team Member Nasir SMITH   Patient/Family Present   Patient Present No   Patient's Family Present No   201, Slept , Denies SI/HI/AVH anxiety and depression visible , social . D/C Friday home with follow up with Dr. Morin .

## 2025-03-12 NOTE — PROGRESS NOTES
"Progress Note - Osei Trimble 50 y.o. male MRN: [id number]    Unit/Bed#: [facility] 200-02 Encounter: [id number]        Subjective:   Patient seen and examined at bedside after reviewing the chart and discussing the case with the caring staff.      Patient examined at bedside.  Patient has no acute symptoms.    Patient is a possible discharge on Friday, 3/14/25.    Physical Exam   Vitals: Blood pressure 104/62, pulse 73, temperature 97.6 °F (36.4 °C), temperature source Temporal, resp. rate 18, height 6' 1\" (1.854 m), weight 94.3 kg (208 lb), SpO2 98%.,Body mass index is 27.44 kg/m².  Constitutional: Patient in no acute distress.  HEENT: PERR, EOMI, MMM.  Cardiovascular: Normal rate and regular rhythm.    Pulmonary/Chest: Effort normal and breath sounds normal.   Abdomen: Soft, + BS, NT.    Assessment/Plan:  Osei Trimble is a(n) 50 y.o. male with mood disorder.     T2DM.  Hgb A1c 5.6% on 2/18/25.  Continue Januvia 100 mg daily.  Carb controlled diet.  Accu-cheks twice daily.    Neuroendocrine carcinoma of small bowel.  Diagnosed 7/2022.  Pt is following with hem/onc Dr. Green on outpt basis.  He is on lanreotide on every 4-week basis (last infusion 2/5/25, missed dose on 3/5/25).  Hypertension.  Lisinopril was discontinued last admission due to low BP.  Cont to monitor vitals.  Of note, pt on lithium.   Drug abuse.  UDS on 3/5 +amph/meth.   Folate deficiency.  Continue folic acid 1 mg daily.   Vitamin B12 deficiency.  Continue vitamin B12 500 mcg daily.  Vitamin D deficiency.  Continue vitamin D2 44862 units weekly.  Malnutrition.  Nutrition consulted.    Cough/congestion.  Start Mucinex BID 3/6.  Cont to monitor.  Hyponatremia.  Resolved.  Level 132 -> 138 on 3/7.    The patient was discussed with Dr. Pillai and he is in agreement with the above note.  "

## 2025-03-12 NOTE — CASE MANAGEMENT
Joanie reached out to PT's sister Ariadne at [phone] to notify that PT will D/C on Friday 3/14/25. Ariadne stated that she can pick PT up at 11 am.

## 2025-03-12 NOTE — PROGRESS NOTES
Progress Note - Behavioral Health   Name: Osei Trimble 50 y.o. male I MRN: [id number]  Unit/Bed#: [facility]-02 I Date of Admission: 3/5/2025   Date of Service: 3/12/2025 I Hospital Day: 7     Assessment & Plan  Bipolar disorder with psychotic features (HCC) vs Substance induced psychosis  Decrease Zyprexa 15 mg HS to Zyprexa 10 mg HS due to daytime sedation and improved psychotic symptoms  Begin trazodone 50 mg HS for insomnia  Continue lithium 300 mg BID for mood stabilization; level on 3/11/25 is .67 and in therapeutic range  Explosive personality disorder (HCC)      Treatment Planning:      - Encourage early mobility and having a structured day  - Provide frequent re-orientation, and cognitive stimulation  - Ensure assistive devices are in proper working order (eye-glasses, hearing aids)  - Encourage adequate hydration, nutrition and monitor bowel movements  - Maintain sleep-wake cycle: Uninterrupted sleep time; low-level lighting at night  - Fall precaution  - f/u SLIM recs regarding the medical problems   - Continue medication titration and treatment plan; adjust medication to optimize treatment response and as clinically indicated.   - Observation:Routine  - Legal Status: 201  - VS: as per unit protocol  - Encourage group attendance and milieu therapy  - Dispo: Tentative discharge on 3/14/25    ------------------------------------------------------------      Subjective: Patient's chart was reviewed and patient's progress and plan was discussed with treatment team. Per nursing report, Osei has been cooperative and medication compliant. He is withdrawn during the day, minimal in conversation with blunted affect. He is selectively social in the evening and visible on the unit. Received PRN trazodone 50 mg with positive effect.       Osei was evaluated this morning for continuity of care. Upon evaluation, sOei is found eating breakfast in the day room and cooperative with assessment. He states his mood is  "\"pretty good\" with blunted affect. He believes his auditory hallucinations have improved but reports dysregulated sleep-wake cycle and decreased energy. Endorses mild depression and anxiety. Discussed symptoms in relation to methamphetamine use; patient acknowledges there may be a correlation but is uninterested in behavioral changes. Denies suicidal and homicidal ideation. Remains medication compliant.           Psychiatric Review of Systems:  Behavior over the last 24 hours:  some improvement  Sleep: hypersomnia and difficulty falling asleep  Appetite: adequate  Medication side effects: fatigue  Medical ROS: Complete review of systems is negative except as noted above.    Vital signs in last 24 hours:  Temp:  [97.6 °F (36.4 °C)-98.4 °F (36.9 °C)] 97.6 °F (36.4 °C)  HR:  [73-78] 73  BP: (104-117)/(62-76) 104/62  Resp:  [18] 18  SpO2:  [95 %-98 %] 98 %  O2 Device: None (Room air)    Mental Status Evaluation:    Appearance:  disheveled, marginal hygiene, dressed in hospital attire, looks stated age   Behavior:  more cooperative, less guarded   Speech:  normal rate and volume    Mood:  \"Pretty good\"   Affect:  blunted   Thought Process:  circumstantial   Associations: circumstantial associations   Thought Content:  no overt delusions, mild paranoia   Perceptual Disturbances: no reported hallucinations and does not appear to be responding to internal stimuli at this time   Risk Potential: Suicidal ideation - None at present  Homicidal ideation - None at present   Sensorium:  oriented to person, place, and time/date         Memory:  recent and remote memory grossly intact      Consciousness:  alert and awake      Attention: attention span and concentration appear shorter than expected for age      Insight:  limited      Judgment: limited      Gait/Station: normal gait/station      Motor Activity: no abnormal movements       Laboratory results: I have personally reviewed all pertinent laboratory/tests results.  Recent " Results (from the past 48 hours)   Fingerstick Glucose (POCT)    Collection Time: 03/10/25  4:49 PM   Result Value Ref Range    POC Glucose 105 65 - 140 mg/dl   Lithium level    Collection Time: 03/11/25  5:15 AM   Result Value Ref Range    Lithium Lvl 0.67 0.60 - 1.20 mmol/L   Fingerstick Glucose (POCT)    Collection Time: 03/11/25  7:21 AM   Result Value Ref Range    POC Glucose 123 65 - 140 mg/dl        Current Medications:  Current Facility-Administered Medications   Medication Dose Route Frequency Provider Last Rate    acetaminophen  650 mg Oral Q4H PRN [facility], CRNP      acetaminophen  650 mg Oral Q4H PRN [facility], CRNP      acetaminophen  975 mg Oral Q6H PRN [facility], CRNP      benztropine  1 mg Intramuscular Q4H PRN Max 6/day [facility], CRNP      benztropine  0.5 mg Oral Q4H PRN Max 6/day [facility], CRNP      cyanocobalamin  500 mcg Oral Daily Lakesha L Dagnall, PA-C      Diclofenac Sodium  2 g Topical 4x Daily PRN Lakesha L Dagnall, PA-C      ergocalciferol  50,000 Units Oral Weekly Lakesha L Dagnall, PA-C      folic acid  1 mg Oral Daily Lakesha L Dagnall, PA-C      guaiFENesin  600 mg Oral Q12H [facility] Lakesha L Dagnall, PA-C      hydrOXYzine HCL  25 mg Oral Q6H PRN Max 4/day [facility], CRNP      lidocaine  2 patch Topical Daily PRN Lakesha L Dagnall, PA-C      lithium carbonate  300 mg Oral Q12H [facility], CRNP      LORazepam  1 mg Intramuscular Q6H PRN Max 3/day [facility], CRNP      LORazepam  0.5 mg Oral Q6H PRN Max 4/day [facility], CRNP      LORazepam  1 mg Oral Q6H PRN Max 3/day [facility], CRNP      melatonin  3 mg Oral HS [facility], CRNP      OLANZapine  5 mg Intramuscular Q3H PRN Max 3/day [facility], CRNP      OLANZapine  15 mg Oral HS PIERRE Garcia      OLANZapine  2.5 mg Oral Q4H PRN Max 6/day PIERRE Garcia      OLANZapine  5 mg Oral Q4H PRN Max 3/day Addy  PIERRE Chambers      OLANZapine  5 mg Oral Q3H PRN Max 3/day PIERRE Garcia      sitaGLIPtin  100 mg Oral Daily Lakesha Hernandez PA-C      traZODone  50 mg Oral HS PRN PIERRE Garcia         Risks, benefits and possible side effects of Medications: Risks, benefits, and possible side effects of medications have been explained to the patient, who verbalizes understanding    Counseling / Coordination of Care:  Total floor / unit time spent today 25 minutes. Greater than 50% of total time was spent with the patient and / or family counseling and / or coordination of care. A description of counseling / coordination of care:  Patient's progress discussed with staff in treatment team meeting.  Medications, treatment progress and treatment plan reviewed with patient.      Odilia Gill MD 03/12/25  Psychiatry Resident, PGY-2

## 2025-03-12 NOTE — ASSESSMENT & PLAN NOTE
Decrease Zyprexa 15 mg HS to Zyprexa 10 mg HS due to daytime sedation and improved psychotic symptoms  Begin trazodone 50 mg HS for insomnia  Continue lithium 300 mg BID for mood stabilization; level on 3/11/25 is .67 and in therapeutic range

## 2025-03-13 LAB
GLUCOSE SERPL-MCNC: 109 MG/DL (ref 65–140)
GLUCOSE SERPL-MCNC: 134 MG/DL (ref 65–140)

## 2025-03-13 PROCEDURE — 99232 SBSQ HOSP IP/OBS MODERATE 35: CPT | Performed by: PSYCHIATRY & NEUROLOGY

## 2025-03-13 PROCEDURE — 82948 REAGENT STRIP/BLOOD GLUCOSE: CPT

## 2025-03-13 RX ORDER — ERGOCALCIFEROL 1.25 MG/1
50000 CAPSULE, LIQUID FILLED ORAL WEEKLY
Qty: 9 CAPSULE | Refills: 0 | Status: SHIPPED | OUTPATIENT
Start: 2025-03-13 | End: 2025-05-09

## 2025-03-13 RX ORDER — FOLIC ACID 1 MG/1
1 TABLET ORAL DAILY
Qty: 30 TABLET | Refills: 0 | Status: SHIPPED | OUTPATIENT
Start: 2025-03-13

## 2025-03-13 RX ADMIN — GUAIFENESIN 600 MG: 600 TABLET ORAL at 08:33

## 2025-03-13 RX ADMIN — CYANOCOBALAMIN TAB 500 MCG 500 MCG: 500 TAB at 08:33

## 2025-03-13 RX ADMIN — Medication 3 MG: at 20:57

## 2025-03-13 RX ADMIN — LIDOCAINE 1 PATCH: 700 PATCH TOPICAL at 21:00

## 2025-03-13 RX ADMIN — SITAGLIPTIN 100 MG: 100 TABLET, FILM COATED ORAL at 08:33

## 2025-03-13 RX ADMIN — FOLIC ACID 1 MG: 1 TABLET ORAL at 08:33

## 2025-03-13 RX ADMIN — LITHIUM CARBONATE 300 MG: 300 TABLET, EXTENDED RELEASE ORAL at 20:57

## 2025-03-13 RX ADMIN — LITHIUM CARBONATE 300 MG: 300 TABLET, EXTENDED RELEASE ORAL at 08:33

## 2025-03-13 RX ADMIN — OLANZAPINE 10 MG: 10 TABLET, FILM COATED ORAL at 20:57

## 2025-03-13 RX ADMIN — GUAIFENESIN 600 MG: 600 TABLET ORAL at 20:57

## 2025-03-13 RX ADMIN — TRAZODONE HYDROCHLORIDE 50 MG: 50 TABLET ORAL at 20:57

## 2025-03-13 NOTE — PROGRESS NOTES
"Progress Note - Osei Trimble 50 y.o. male MRN: [id number]    Unit/Bed#: [facility] 200-02 Encounter: [id number]        Subjective:   Patient seen and examined at bedside after reviewing the chart and discussing the case with the caring staff.      Patient examined at bedside.  Patient has no acute symptoms.    Patient is a possible discharge on Friday, 3/14/25.    Physical Exam   Vitals: Blood pressure 94/56, pulse 65, temperature 98.1 °F (36.7 °C), temperature source Temporal, resp. rate 16, height 6' 1\" (1.854 m), weight 94.3 kg (208 lb), SpO2 97%.,Body mass index is 27.44 kg/m².  Constitutional: Patient in no acute distress.  HEENT: PERR, EOMI, MMM.  Cardiovascular: Normal rate and regular rhythm.    Pulmonary/Chest: Effort normal and breath sounds normal.   Abdomen: Soft, + BS, NT.    Assessment/Plan:  Osei Trimble is a(n) 50 y.o. male with mood disorder.     T2DM.  Hgb A1c 5.6% on 2/18/25.  Continue Januvia 100 mg daily.  Carb controlled diet.  Accu-cheks twice daily.    Neuroendocrine carcinoma of small bowel.  Diagnosed 7/2022.  Pt is following with hem/onc Dr. Green on outpt basis.  He is on lanreotide on every 4-week basis (last infusion 2/5/25, missed dose on 3/5/25).  Hypertension.  Lisinopril was discontinued last admission due to low BP.  Cont to monitor vitals.  Of note, pt on lithium.   Drug abuse.  UDS on 3/5 +amph/meth.   Folate deficiency.  Continue folic acid 1 mg daily.   Vitamin B12 deficiency.  Continue vitamin B12 500 mcg daily.  Vitamin D deficiency.  Continue vitamin D2 35427 units weekly.  Malnutrition.  Nutrition consulted.    Cough/congestion.  Start Mucinex BID 3/6.  Cont to monitor.  Hyponatremia.  Resolved.  Level 132 -> 138 on 3/7.    The patient was discussed with Dr. Pillai and he is in agreement with the above note.  "

## 2025-03-13 NOTE — SOCIAL WORK
03/13/25    Team Meeting   Meeting Type Daily Rounds   Team Members Present   Team Members Present Physician;Nurse;;Occupational Therapist   Physician Team Member Alex HOGUE, Leroy HOGUE   Nursing Team Member Richardson ASTUDILLO   Social Work Team Member Leticia RASHID, Julito RASHID   OT Team Member Nasir SMITH   Patient/Family Present   Patient Present No   Patient's Family Present No   201,Slept , Denies SI/HI/AVH anxiety and depression Trazodone 50 at HS , more visible on unit withdrawn during the day. D/C Friday home with [facility] .

## 2025-03-13 NOTE — PROGRESS NOTES
"Progress Note - Behavioral Health   Name: Osei Trimble 50 y.o. male I MRN: [id number]  Unit/Bed#: [facility]-02 I Date of Admission: 3/5/2025   Date of Service: 3/13/2025 I Hospital Day: 8     Assessment & Plan  Bipolar disorder with psychotic features (HCC) vs Substance induced psychosis  Continue Zyprexa 10 mg HS   Continue trazodone 50 mg HS for insomnia  Continue lithium 300 mg BID for mood stabilization; level on 3/11/25 is .67 and in therapeutic range  Explosive personality disorder (HCC)      Treatment Planning:      - Encourage early mobility and having a structured day  - Provide frequent re-orientation, and cognitive stimulation  - Ensure assistive devices are in proper working order (eye-glasses, hearing aids)  - Encourage adequate hydration, nutrition and monitor bowel movements  - Maintain sleep-wake cycle: Uninterrupted sleep time; low-level lighting at night  - Fall precaution  - f/u SLIM recs regarding the medical problems   - Continue medication titration and treatment plan; adjust medication to optimize treatment response and as clinically indicated.   - Observation:Routine  - Legal Status: 201  - VS: as per unit protocol  - Encourage group attendance and milieu therapy  - Dispo: Tentative discharge tomorrow    ------------------------------------------------------------      Subjective: Patient's chart was reviewed and patient's progress and plan was discussed with treatment team. Per nursing report, Osei has been visible this evening, socializing with peers. Patient brightens during interactions. He denies SI/HI and hallucinations. Slept well during most q 15 minute safety checks.       Osei was evaluated this morning for continuity of care. Upon evaluation, Osei is cooperative, found sitting in community room. He states his mood is \"good\" with less constricted affect. Discussed discharge planning and Osei is in agreement to discharge tomorrow. He denies auditory hallucinations and feels " "more alert during the day with decrease in Zyprexa dosage. No other noted side effects. Denies SI/HI/AVH. Remains medication compliant.           Psychiatric Review of Systems:  Behavior over the last 24 hours: improved  Sleep: normal  Appetite: adequate  Medication side effects: none verbalized  Medical ROS: Complete review of systems is negative except as noted above.    Vital signs in last 24 hours:  Temp:  [98.1 °F (36.7 °C)] 98.1 °F (36.7 °C)  HR:  [65-82] 65  BP: ()/(56-71) 94/56  Resp:  [16-18] 16  SpO2:  [95 %-97 %] 97 %  O2 Device: None (Room air)    Mental Status Evaluation:    Appearance:  adequate grooming, wearing hospital clothes   Behavior:  cooperative   Speech:  normal rate and volume    Mood:  \"good\"   Affect:  less constricted   Thought Process:  coherent, goal directed   Associations: intact associations   Thought Content:  no overt delusions    Perceptual Disturbances: no reported hallucinations and does not appear to be responding to internal stimuli at this time   Risk Potential: Suicidal ideation - None at present  Homicidal ideation - None at present   Sensorium:  oriented to person, place, and time/date         Memory:  recent and remote memory grossly intact      Consciousness:  alert and awake      Attention: attention span and concentration are improving      Insight:  partial      Judgment: partial      Gait/Station: normal gait/station      Motor Activity: no abnormal movements       Laboratory results: I have personally reviewed all pertinent laboratory/tests results.  Recent Results (from the past 48 hours)   Fingerstick Glucose (POCT)    Collection Time: 03/12/25  4:03 PM   Result Value Ref Range    POC Glucose 105 65 - 140 mg/dl   Fingerstick Glucose (POCT)    Collection Time: 03/13/25  7:12 AM   Result Value Ref Range    POC Glucose 109 65 - 140 mg/dl        Current Medications:  Current Facility-Administered Medications   Medication Dose Route Frequency Provider Last Rate "    acetaminophen  650 mg Oral Q4H PRN [facility], CRNP      acetaminophen  650 mg Oral Q4H PRN [facility], CRNP      acetaminophen  975 mg Oral Q6H PRN [facility], CRNP      benztropine  1 mg Intramuscular Q4H PRN Max 6/day [facility], CRNP      benztropine  0.5 mg Oral Q4H PRN Max 6/day [facility], CRNP      cyanocobalamin  500 mcg Oral Daily Lakesha L Dagnall, PA-C      Diclofenac Sodium  2 g Topical 4x Daily PRN Lakesha L Dagnall, PA-C      ergocalciferol  50,000 Units Oral Weekly Lakesha L Dagnall, PA-C      folic acid  1 mg Oral Daily Lakesha L Dagnall, PA-C      guaiFENesin  600 mg Oral Q12H [facility] Lakesha L Dagnall, PA-C      hydrOXYzine HCL  25 mg Oral Q6H PRN Max 4/day [facility], CRNP      lidocaine  2 patch Topical Daily PRN Lakesha L Dagnall, PA-C      lithium carbonate  300 mg Oral Q12H [facility], CRNP      LORazepam  1 mg Intramuscular Q6H PRN Max 3/day [facility], CRNP      LORazepam  0.5 mg Oral Q6H PRN Max 4/day [facility], CRNP      LORazepam  1 mg Oral Q6H PRN Max 3/day [facility], CRNP      melatonin  3 mg Oral HS [facility], CRNP      OLANZapine  5 mg Intramuscular Q3H PRN Max 3/day [facility], CRNP      OLANZapine  10 mg Oral HS Odilia Gill MD      OLANZapine  2.5 mg Oral Q4H PRN Max 6/day [facility], CRNP      OLANZapine  5 mg Oral Q4H PRN Max 3/day [facility], CRNP      OLANZapine  5 mg Oral Q3H PRN Max 3/day [facility], CRNP      sitaGLIPtin  100 mg Oral Daily Lakesha L Dagnall, PA-C      traZODone  50 mg Oral HS PRN [facility], CRNP      traZODone  50 mg Oral HS Odilia Gill MD         Risks, benefits and possible side effects of Medications: Risks, benefits, and possible side effects of medications have been explained to the patient, who verbalizes understanding    Counseling / Coordination of Care:  Total floor / unit time spent today 25 minutes.  Greater than 50% of total time was spent with the patient and / or family counseling and / or coordination of care. A description of counseling / coordination of care:  Patient's progress discussed with staff in treatment team meeting.  Medications, treatment progress and treatment plan reviewed with patient.      Odilia Gill MD 03/13/25  Psychiatry Resident, PGY-2

## 2025-03-13 NOTE — NURSING NOTE
Patient is pleasant and talkative this morning and appearance improved. Patient is visible in milieu for breakfast meal and group. Patient denies anxiety and depression. Denies SI/HI/AVH. Patient is compliant with medications. No acute behaviors noted. Q 15 min safety checks maintained.

## 2025-03-13 NOTE — PLAN OF CARE
Problem: Alteration in Thoughts and Perception  Goal: Agree to be compliant with medication regime, as prescribed and report medication side effects  Description: Interventions:  - Offer appropriate PRN medication and supervise ingestion; conduct AIMS, as needed   Outcome: Progressing     Problem: Anxiety  Goal: Anxiety is at manageable level  Description: Interventions:  - Assess and monitor patient's anxiety level.   - Monitor for signs and symptoms (heart palpitations, chest pain, shortness of breath, headaches, nausea, feeling jumpy, restlessness, irritable, apprehensive).   - Collaborate with interdisciplinary team and initiate plan and interventions as ordered.  - Jarales patient to unit/surroundings  - Explain treatment plan  - Encourage participation in care  - Encourage verbalization of concerns/fears  - Identify coping mechanisms  - Assist in developing anxiety-reducing skills  - Administer/offer alternative therapies  - Limit or eliminate stimulants  Outcome: Progressing

## 2025-03-13 NOTE — NURSING NOTE
Patient visible this evening, socializing with peers. Patient brightens during interactions. He denies SI/HI and hallucinations. Patient is compliant with medications. Continuous q15 minute rounding and safety checks ongoing.

## 2025-03-13 NOTE — ASSESSMENT & PLAN NOTE
Continue Zyprexa 10 mg HS   Continue trazodone 50 mg HS for insomnia  Continue lithium 300 mg BID for mood stabilization; level on 3/11/25 is .67 and in therapeutic range

## 2025-03-13 NOTE — PROGRESS NOTES
03/13/25 1348   Activity/Group Checklist   Group Admission/Discharge   Attendance Attended   Attendance Duration (min) 0-15   Interactions Interacted appropriately   Affect/Mood Appropriate   Goals Achieved Identified feelings;Identified triggers;Identified relapse prevention strategies;Discussed safety plan;Discussed coping strategies;Discussed discharge plans;Identified resources and support systems;Able to listen to others;Able to engage in interactions;Able to reflect/comment on own behavior;Able to manage/cope with feelings;Able to self-disclose;Able to recieve feedback;Able to experience relief/decrease in symptoms     Patient agreeable to meet and complete safety/relapse prevention plan with [facility].  Patient information from forms can be found in media.

## 2025-03-14 VITALS
RESPIRATION RATE: 18 BRPM | HEART RATE: 75 BPM | DIASTOLIC BLOOD PRESSURE: 72 MMHG | HEIGHT: 73 IN | SYSTOLIC BLOOD PRESSURE: 117 MMHG | WEIGHT: 208 LBS | OXYGEN SATURATION: 96 % | TEMPERATURE: 97.6 F | BODY MASS INDEX: 27.57 KG/M2

## 2025-03-14 PROBLEM — F31.9 BIPOLAR DISORDER WITH PSYCHOTIC FEATURES (HCC): Status: RESOLVED | Noted: 2025-03-06 | Resolved: 2025-03-14

## 2025-03-14 LAB — GLUCOSE SERPL-MCNC: 129 MG/DL (ref 65–140)

## 2025-03-14 PROCEDURE — 99238 HOSP IP/OBS DSCHRG MGMT 30/<: CPT | Performed by: PSYCHIATRY & NEUROLOGY

## 2025-03-14 PROCEDURE — 82948 REAGENT STRIP/BLOOD GLUCOSE: CPT

## 2025-03-14 RX ORDER — TRAZODONE HYDROCHLORIDE 50 MG/1
50 TABLET ORAL
Qty: 30 TABLET | Refills: 0 | Status: SHIPPED | OUTPATIENT
Start: 2025-03-14

## 2025-03-14 RX ORDER — LITHIUM CARBONATE 300 MG/1
300 TABLET, FILM COATED, EXTENDED RELEASE ORAL EVERY 12 HOURS SCHEDULED
Qty: 60 TABLET | Refills: 0 | Status: SHIPPED | OUTPATIENT
Start: 2025-03-14

## 2025-03-14 RX ORDER — OLANZAPINE 10 MG/1
10 TABLET ORAL
Qty: 30 TABLET | Refills: 0 | Status: SHIPPED | OUTPATIENT
Start: 2025-03-14

## 2025-03-14 RX ADMIN — SITAGLIPTIN 100 MG: 100 TABLET, FILM COATED ORAL at 08:12

## 2025-03-14 RX ADMIN — FOLIC ACID 1 MG: 1 TABLET ORAL at 08:11

## 2025-03-14 RX ADMIN — GUAIFENESIN 600 MG: 600 TABLET ORAL at 08:11

## 2025-03-14 RX ADMIN — LITHIUM CARBONATE 300 MG: 300 TABLET, EXTENDED RELEASE ORAL at 08:12

## 2025-03-14 RX ADMIN — CYANOCOBALAMIN TAB 500 MCG 500 MCG: 500 TAB at 08:11

## 2025-03-14 NOTE — DISCHARGE SUMMARY
Discharge Summary - Behavioral Health   Name: Osei Trimble 50 y.o. male I MRN: [id number]  Unit/Bed#: [facility] 200-02 I Date of Admission: 3/5/2025   Date of Service: 3/14/2025 I Hospital Day: 9       Assessment & Plan  Bipolar disorder with psychotic features (HCC) vs Substance induced psychosis (Resolved: 3/14/2025)  Hallucinations and paranoia have resolved; thought process is logical and coherent  Continue Zyprexa 10 mg HS   Continue trazodone 50 mg HS for insomnia  Continue lithium 300 mg BID for maintenance treatment; level on 3/11/25 is .67 and in therapeutic range    Explosive personality disorder (HCC)      Admission Date: 3/5/2025         Discharge Date: 3/14/25    Attending Psychiatrist: Jef Paige MD    Reason for Admission/HPI: Major depressive disorder, single episode, mild [F32.0]  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 male with past psychiatric history of intermittent explosive disorder, bipolar disorder, and substance use disorder who presented with psychotic symptoms, paranoia, and increased agitation.    Hospital Course: On 3/5/25, the patient presented to [facility] ED due to manic symptoms, insomnia, auditory hallucinations, and increased agitation. Patient reportedly used methamphetamines and alcohol to celebrate his birthday two days prior. Patient's sister endorsed bizarre behaviors, self-discontinuation of psychiatric medications, and inability to sleep. After evaluation in the ED, the patient was admitted on a voluntary 201 commitment to the [facility] inpatient psychiatric unit. Patient was started on every 15 minutes precautions. During the hospitalization, the patient was attending individual, group, milieu, and occupational therapy.    To address manic symptoms the patient was restarted on PTA mood stabilizer Lithium 300 BID. To address psychotic symptoms the patient was restarted on PTA antipsychotic medication Zyprexa and  "titrated to Zyprexa 10 mg HS. Prior to beginning of treatment, medications risks and benefits and possible side effects including risk of kidney impairment related to treatment with Lithium and risk of parkinsonian symptoms, Tardive Dyskinesia and metabolic syndrome related to treatment with antipsychotic medications were reviewed with the patient. The patient verbalized understanding and agreement for treatment.     Patient's symptoms gradually improved over the hospital course. At the end of treatment, the patient was doing well and mood was stable. The patient denied suicidal ideation, intent or plan and denied homicidal ideation, intent or plan at the time of discharge. There was no overt psychosis at the time of discharge. Sleep and appetite improved. The patient was tolerating medications and not reporting any significant side effects at the time of discharge.    Since Osei was doing well at the end of the hospitalization, treatment team felt that he could be safely discharged to outpatient care.    The outpatient follow up with  Dr. Morin on March 20, 2025 at 11:30 am at [facility] psychiatric services  was arranged by the unit  upon discharge.      Mental Status at time of Discharge:     Appearance:  age appropriate and casually dressed   Behavior:  normal   Speech:  normal pitch and normal volume   Mood:  \"Good\"   Affect:  mood-congruent   Thought Process:  logical   Thought Content:  No overt delusions   Perceptual Disturbances: None   Risk Potential: Suicidal Ideations none, Homicidal Ideations none, and Potential for Aggression No   Sensorium:  person, place, time/date, and situation   Cognition:  recent and remote memory grossly intact   Consciousness:  alert and awake    Attention: attention span and concentration were age appropriate   Insight:  fair   Judgment: fair   Gait/Station: normal gait/station   Motor Activity: no abnormal movements     Suicide/Homicide Risk Assessment:    Risk of Harm " to Self:  The following ratings are based on assessment at the time of discharge and review of records  Demographic risk factors include: , male, age: over 50 or older  Historical Risk Factors include: chronic psychiatric problems, history of substance use  Current Specific Risk Factors include: recent inpatient psychiatric admission - being discharged today, mental illness diagnosis, poor impulse control, substance use  Protective Factors: no current suicidal ideation, no current anxiety symptoms, no current psychotic symptoms, improved mood, ability to make plans for the future, outpatient psychiatric follow up established, family support established, stable housing, restricted access to lethal means, safe and stable living environment  Weapons/Firearms: none. The following steps have been taken to ensure weapons are properly secured: not applicable  Based on today's assessment, Osie presents the following risk of harm to self: low    Risk of Harm to Others:  The following ratings are based on assessment at the time of discharge and review of records  Demographic Risk Factors include: male.  Historical Risk Factors include: history of aggressive behavior, history of substance use.  Current Specific Risk Factors include: recent episode of mood instability, recent aggressive behavior, poor impulse control  Protective Factors: no current homicidal ideation, improved impulse control, stable mood, willing to continue psychiatric treatment, outpatient follow up established, stable living environment, supportive family, restricted access to lethal means, access to mental health treatment  Weapons/Firearms: none. The following steps have been taken to ensure weapons are properly secured: not applicable  Based on today's assessmentOsei presents the following risk of harm to others: low    The following interventions are recommended: continue medication management, contracts for safety at present - agrees to go  to ED if feeling unsafe. At the current moment, Osei is future-oriented, forward-thinking, and demonstrates ability to act in a self-preserving manner as evidenced by maintaining safety on the unit, seeking treatment.To mitigate future risk, patient should adhere to the recommendations of this writer, avoid alcohol/illicit substance use, utilize community-based resources and familiar support and prioritize mental health treatment.     Presently, patient denies suicidal/homicidal ideation in addition to thoughts of self-injury; contracts for safety. Patient is amenable to calling/contacting crisis and/or attending to the nearest emergency department if their clinical condition deteriorates to assure their safety and stability, stating that they are able to appropriately confide in their sister regarding their psychiatric state.      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]    Discharge Diagnosis:   Principal Problem (Resolved):    Bipolar disorder with psychotic features (HCC) vs Substance induced psychosis  Active Problems:    Explosive personality disorder (HCC)        Lab results:  Admission on 03/05/2025   Component Date Value    POC Glucose 03/06/2025 174 (H)     Sodium 03/07/2025 138     Potassium 03/07/2025 4.0     Chloride 03/07/2025 105     CO2 03/07/2025 25     ANION GAP 03/07/2025 8     BUN 03/07/2025 16     Creatinine 03/07/2025 0.92     Glucose 03/07/2025 127     Glucose, Fasting 03/07/2025 127 (H)     Calcium 03/07/2025 9.2     AST 03/07/2025 29     ALT 03/07/2025 34     Alkaline Phosphatase 03/07/2025 90     Total Protein 03/07/2025 6.8     Albumin 03/07/2025 3.9     Total Bilirubin 03/07/2025 0.86     eGFR 03/07/2025 95     TSH 3RD GENERATON 03/07/2025 3.934     WBC 03/07/2025 6.51     RBC 03/07/2025 3.84 (L)     Hemoglobin 03/07/2025 11.3 (L)     Hematocrit 03/07/2025 34.6 (L)     MCV 03/07/2025 90     MCH  03/07/2025 29.4     MCHC 03/07/2025 32.7     RDW 03/07/2025 13.7     MPV 03/07/2025 9.8     Platelets 03/07/2025 228     nRBC 03/07/2025 0     Segmented % 03/07/2025 68     Immature Grans % 03/07/2025 1     Lymphocytes % 03/07/2025 22     Monocytes % 03/07/2025 5     Eosinophils Relative 03/07/2025 4     Basophils Relative 03/07/2025 0     Absolute Neutrophils 03/07/2025 4.41     Absolute Immature Grans 03/07/2025 0.03     Absolute Lymphocytes 03/07/2025 1.45     Absolute Monocytes 03/07/2025 0.32     Eosinophils Absolute 03/07/2025 0.28     Basophils Absolute 03/07/2025 0.02     POC Glucose 03/07/2025 118     POC Glucose 03/07/2025 112     POC Glucose 03/08/2025 124     POC Glucose 03/08/2025 123     POC Glucose 03/09/2025 133     POC Glucose 03/09/2025 118     POC Glucose 03/10/2025 126     POC Glucose 03/10/2025 105     [place] Lvl 03/11/2025 0.67     POC Glucose 03/11/2025 123     POC Glucose 03/12/2025 105     POC Glucose 03/13/2025 109     POC Glucose 03/13/2025 134     POC Glucose 03/14/2025 129        Discharge Medications:  Current Discharge Medication List        START taking these medications    Details   !! lithium carbonate (LITHOBID) 300 mg CR tablet Take 1 tablet (300 mg total) by mouth every 12 (twelve) hours  Qty: 60 tablet, Refills: 0    Associated Diagnoses: Bipolar disorder with psychotic features (HCC)      traZODone (DESYREL) 50 mg tablet Take 1 tablet (50 mg total) by mouth daily at bedtime  Qty: 30 tablet, Refills: 0    Associated Diagnoses: Bipolar disorder with psychotic features (HCC)       !! - Potential duplicate medications found. Please discuss with provider.           Current Discharge Medication List        STOP taking these medications       Diclofenac Sodium (VOLTAREN) 1 % Comments:   Reason for Stopping:         hydrOXYzine HCL (ATARAX) 50 mg tablet Comments:   Reason for Stopping:         lisinopril (ZESTRIL) 2.5 mg tablet Comments:   Reason for Stopping:         nicotine  polacrilex (NICORETTE) 4 mg gum Comments:   Reason for Stopping:                Current Discharge Medication List        CONTINUE these medications which have CHANGED    Details   cyanocobalamin (VITAMIN B-12) 500 MCG tablet Take 1 tablet (500 mcg total) by mouth daily  Qty: 30 tablet, Refills: 0    Associated Diagnoses: Vitamin B12 deficiency      ergocalciferol (VITAMIN D2) 50,000 units Take 1 capsule (50,000 Units total) by mouth once a week for 9 doses  Qty: 9 capsule, Refills: 0    Associated Diagnoses: Vitamin D deficiency      folic acid (FOLVITE) 1 mg tablet Take 1 tablet (1 mg total) by mouth daily  Qty: 30 tablet, Refills: 0    Associated Diagnoses: Alcohol abuse      melatonin 3 mg Take 1 tablet (3 mg total) by mouth daily at bedtime  Qty: 30 tablet, Refills: 0    Associated Diagnoses: Methamphetamine-induced psychotic disorder (HCC)      OLANZapine (ZyPREXA) 10 mg tablet Take 1 tablet (10 mg total) by mouth daily at bedtime  Qty: 30 tablet, Refills: 0    Associated Diagnoses: Bipolar disorder with psychotic features (HCC)      sitaGLIPtin (JANUVIA) 100 mg tablet Take 1 tablet (100 mg total) by mouth daily  Qty: 30 tablet, Refills: 0    Associated Diagnoses: Diabetes mellitus (HCC)              Current Discharge Medication List        CONTINUE these medications which have NOT CHANGED    Details   Alcohol Swabs 70 % PADS May substitute brand based on insurance coverage. Check glucose BID.  Qty: 100 each, Refills: 1    Associated Diagnoses: Hyperglycemia      Blood Glucose Monitoring Suppl (OneTouch Verio Reflect) w/Device KIT May substitute brand based on insurance coverage. Check glucose BID.  Qty: 1 kit, Refills: 0    Associated Diagnoses: Hyperglycemia      glucose blood (OneTouch Verio) test strip May substitute brand based on insurance coverage. Check glucose BID.  Qty: 100 each, Refills: 1    Associated Diagnoses: Hyperglycemia      lidocaine (LIDODERM) 5 % Apply 2 patches topically over 12 hours  daily as needed (pain) Remove & Discard patch within 12 hours or as directed by MD  Qty: 60 patch, Refills: 0    Associated Diagnoses: Lumbago      !! lithium carbonate (LITHOBID) 300 mg CR tablet Take 300 mg by mouth 2 (two) times a day      OneTouch Delica Lancets 33G MISC May substitute brand based on insurance coverage. Check glucose BID.  Qty: 100 each, Refills: 1    Associated Diagnoses: Hyperglycemia       !! - Potential duplicate medications found. Please discuss with provider.           Discharge instructions/Information to patient and family:   See after visit summary for information provided to patient and family.      Provisions for Follow-Up Care:  See after visit summary for information related to follow-up care and any pertinent home health orders.      Discharge Statement   I spent 30 minutes discharging the patient. This time was spent on the day of discharge. I had direct contact with the patient on the day of discharge. Additional documentation is required if more than 30 minutes were spent on discharge.

## 2025-03-14 NOTE — NURSING NOTE
Patient cooperative and med compliant, participated in a snack tonight. He denies all psychiatric symptoms. Pt is minimal but visible in the milieu and social with peers. Lidocaine patch applied to mid back at 2100, order calls for 2 but pt only requested 1. Safety precautions maintained q15 mins.

## 2025-03-14 NOTE — PROGRESS NOTES
"Progress Note - Osei Trimble 50 y.o. male MRN: [id number]    Unit/Bed#: [facility] 200-02 Encounter: [id number]        Subjective:   Patient seen and examined at bedside after reviewing the chart and discussing the case with the caring staff.      Patient examined at bedside.  Patient has no acute symptoms.    Patient is being discharged today, Friday, 3/14/25.    Physical Exam   Vitals: Blood pressure 117/72, pulse 75, temperature 97.6 °F (36.4 °C), temperature source Temporal, resp. rate 18, height 6' 1\" (1.854 m), weight 94.3 kg (208 lb), SpO2 96%.,Body mass index is 27.44 kg/m².  Constitutional: Patient in no acute distress.  HEENT: PERR, EOMI, MMM.  Cardiovascular: Normal rate and regular rhythm.    Pulmonary/Chest: Effort normal and breath sounds normal.   Abdomen: Soft, + BS, NT.    Assessment/Plan:  Osei Trimble is a(n) 50 y.o. male with mood disorder.    Medical clearance.  Patient is medically cleared for discharge.  All scripts were sent out for the patient.     T2DM.  Hgb A1c 5.6% on 2/18/25.  Continue Januvia 100 mg daily.  Carb controlled diet.  Accu-cheks twice daily.    Neuroendocrine carcinoma of small bowel.  Diagnosed 7/2022.  Pt is following with hem/onc Dr. Green on outpt basis.  He is on lanreotide on every 4-week basis (last infusion 2/5/25, missed dose on 3/5/25).  Hypertension.  Lisinopril was discontinued last admission due to low BP.  Cont to monitor vitals.  Of note, pt on lithium.   Drug abuse.  UDS on 3/5 +amph/meth.   Folate deficiency.  Continue folic acid 1 mg daily.   Vitamin B12 deficiency.  Continue vitamin B12 500 mcg daily.  Vitamin D deficiency.  Continue vitamin D2 71239 units weekly.  Malnutrition.  Nutrition consulted.    Cough/congestion.  Start Mucinex BID 3/6.  Cont to monitor.  Hyponatremia.  Resolved.  Level 132 -> 138 on 3/7.    The patient was discussed with Dr. Pillai and he is in agreement with the above note.  "

## 2025-03-14 NOTE — NURSING NOTE
Patient visible in milieu with peers, medication compliant and cooperative. Patient denies all, no anxiety/depression, denies SI/HI and hallucinations. Patient attends group therapies and interacts appropriately with peers. Patient is set to discharge from unit this morning. Continued care and safety rounds in progress.

## 2025-03-14 NOTE — ASSESSMENT & PLAN NOTE
Hallucinations and paranoia have resolved; thought process is logical and coherent  Continue Zyprexa 10 mg HS   Continue trazodone 50 mg HS for insomnia  Continue lithium 300 mg BID for maintenance treatment; level on 3/11/25 is .67 and in therapeutic range

## 2025-03-14 NOTE — PLAN OF CARE
Problem: PAIN - ADULT  Goal: Verbalizes/displays adequate comfort level or baseline comfort level  Description: Interventions:  - Encourage patient to monitor pain and request assistance  - Assess pain using appropriate pain scale  - Administer analgesics based on type and severity of pain and evaluate response  - Implement non-pharmacological measures as appropriate and evaluate response  - Consider cultural and social influences on pain and pain management  - Notify physician/advanced practitioner if interventions unsuccessful or patient reports new pain  Outcome: Completed     Problem: SAFETY ADULT  Goal: Patient will remain free of falls  Description: INTERVENTIONS:  - Educate patient/family on patient safety including physical limitations  - Instruct patient to call for assistance with activity   - Consult OT/PT to assist with strengthening/mobility   - Keep Call bell within reach  - Keep bed low and locked with side rails adjusted as appropriate  - Keep care items and personal belongings within reach  - Initiate and maintain comfort rounds  - Make Fall Risk Sign visible to staff  - Apply yellow socks and bracelet for high fall risk patients  - Consider moving patient to room near nurses station  Outcome: Completed  Goal: Maintain or return to baseline ADL function  Description: INTERVENTIONS:  -  Assess patient's ability to carry out ADLs; assess patient's baseline for ADL function and identify physical deficits which impact ability to perform ADLs (bathing, care of mouth/teeth, toileting, grooming, dressing, etc.)  - Assess/evaluate cause of self-care deficits   - Assess range of motion  - Assess patient's mobility; develop plan if impaired  - Assess patient's need for assistive devices and provide as appropriate  - Encourage maximum independence but intervene and supervise when necessary  - Involve family in performance of ADLs  - Assess for home care needs following discharge   - Consider OT consult to  assist with ADL evaluation and planning for discharge  - Provide patient education as appropriate  Outcome: Completed  Goal: Maintains/Returns to pre admission functional level  Description: INTERVENTIONS:  - Perform AM-PAC 6 Click Basic Mobility/ Daily Activity assessment daily.  - Set and communicate daily mobility goal to care team and patient/family/caregiver.   - Collaborate with rehabilitation services on mobility goals if consulted  - Out of bed for toileting  - Record patient progress and toleration of activity level   Outcome: Completed     Problem: DISCHARGE PLANNING  Goal: Discharge to home or other facility with appropriate resources  Description: INTERVENTIONS:  - Identify barriers to discharge w/patient and caregiver  - Arrange for needed discharge resources and transportation as appropriate  - Identify discharge learning needs (meds, wound care, etc.)  - Arrange for interpretive services to assist at discharge as needed  - Refer to Case Management Department for coordinating discharge planning if the patient needs post-hospital services based on physician/advanced practitioner order or complex needs related to functional status, cognitive ability, or social support system  Outcome: Completed     Problem: Alteration in Thoughts and Perception  Goal: Treatment Goal: Gain control of psychotic behaviors/thinking, reduce/eliminate presenting symptoms and demonstrate improved reality functioning upon discharge  Outcome: Completed  Goal: Refrain from acting on delusional thinking/internal stimuli  Description: Interventions:  - Monitor patient closely, per order   - Utilize least restrictive measures   - Set reasonable limits, give positive feedback for acceptable   - Administer medications as ordered and monitor of potential side effects  Outcome: Completed  Goal: Agree to be compliant with medication regime, as prescribed and report medication side effects  Description: Interventions:  - Offer appropriate  PRN medication and supervise ingestion; conduct AIMS, as needed   Outcome: Completed  Goal: Attend and participate in unit activities, including therapeutic, recreational, and educational groups  Description: Interventions:  -Encourage Visitation and family involvement in care  Outcome: Completed  Goal: Recognize dysfunctional thoughts, communicate reality-based thoughts at the time of discharge  Description: Interventions:  - Provide medication and psycho-education to assist patient in compliance and developing insight into his/her illness   Outcome: Completed  Goal: Complete daily ADLs, including personal hygiene independently, as able  Description: Interventions:  - Observe, teach, and assist patient with ADLS  - Monitor and promote a balance of rest/activity, with adequate nutrition and elimination   Outcome: Completed     Problem: Ineffective Coping  Goal: Identifies ineffective coping skills  Outcome: Completed  Goal: Identifies healthy coping skills  Outcome: Completed  Goal: Demonstrates healthy coping skills  Outcome: Completed  Goal: Participates in unit activities  Description: Interventions:  - Provide therapeutic environment   - Provide required programming   - Redirect inappropriate behaviors   Outcome: Completed  Goal: Patient/Family participate in treatment and DC plans  Description: Interventions:  - Provide therapeutic environment  Outcome: Completed  Goal: Patient/Family verbalizes awareness of resources  Outcome: Completed  Goal: Understands least restrictive measures  Description: Interventions:  - Utilize least restrictive behavior  Outcome: Completed  Goal: Free from restraint events  Description: - Utilize least restrictive measures   - Provide behavioral interventions   - Redirect inappropriate behaviors   Outcome: Completed     Problem: Risk for Self Injury/Neglect  Goal: Treatment Goal: Remain safe during length of stay, learn and adopt new coping skills, and be free of self-injurious  ideation, impulses and acts at the time of discharge  Outcome: Completed  Goal: Verbalize thoughts and feelings  Description: Interventions:  - Assess and re-assess patient's lethality and potential for self-injury  - Engage patient in 1:1 interactions, daily, for a minimum of 15 minutes  - Encourage patient to express feelings, fears, frustrations, hopes  - Establish rapport/trust with patient   Outcome: Completed  Goal: Recognize maladaptive responses and adopt new coping mechanisms  Outcome: Completed     Problem: Anxiety  Goal: Anxiety is at manageable level  Description: Interventions:  - Assess and monitor patient's anxiety level.   - Monitor for signs and symptoms (heart palpitations, chest pain, shortness of breath, headaches, nausea, feeling jumpy, restlessness, irritable, apprehensive).   - Collaborate with interdisciplinary team and initiate plan and interventions as ordered.  - [place] patient to unit/surroundings  - Explain treatment plan  - Encourage participation in care  - Encourage verbalization of concerns/fears  - Identify coping mechanisms  - Assist in developing anxiety-reducing skills  - Administer/offer alternative therapies  - Limit or eliminate stimulants  Outcome: Completed     Problem: Risk for Violence/Aggression Toward Others  Goal: Treatment Goal: Refrain from acts of violence/aggression during length of stay, and demonstrate improved impulse control at the time of discharge  Outcome: Completed  Goal: Refrain from harming others  Outcome: Completed  Goal: Refrain from destructive acts on the environment or property  Outcome: Completed  Goal: Control angry outbursts  Description: Interventions:  - Monitor patient closely, per order  - Ensure early verbal de-escalation  - Monitor prn medication needs  - Set reasonable/therapeutic limits, outline behavioral expectations, and consequences   - Provide a non-threatening milieu, utilizing the least restrictive interventions   Outcome:  Completed  Goal: Identify appropriate positive anger management techniques  Description: Interventions:  - Offer anger management and coping skills groups   - Staff will provide positive feedback for appropriate anger control  Outcome: Completed     Problem: Alteration in Orientation  Goal: Treatment Goal: Demonstrate a reduction of confusion and improved orientation to person, place, time and/or situation upon discharge, according to optimum baseline/ability  Outcome: Completed  Goal: Interact with staff daily  Description: Interventions:  - Assess and re-assess patient's level of orientation  - Engage patient in 1 on 1 interactions, daily, for a minimum of 15 minutes   - Establish rapport/trust with patient   Outcome: Completed  Goal: Express concerns related to confused thinking related to:  Description: Interventions:  - Encourage patient to express feelings, fears, frustrations, hopes  - Assign consistent caregivers   - [place]/re-orient patient as needed  - Allow comfort items, as appropriate  - Provide visual cues, signs, etc.   Outcome: Completed  Goal: Allow medical examinations, as recommended  Description: Interventions:  - Provide physical/neurological exams and/or referrals, per provider   Outcome: Completed  Goal: Cooperate with recommended testing/procedures  Description: Interventions:  - Determine need for ancillary testing  - Observe for mental status changes  - Implement falls/precaution protocol   Outcome: Completed  Goal: Complete daily ADLs, including personal hygiene independently, as able  Description: Interventions:  - Observe, teach, and assist patient with ADLS  Outcome: Completed     Problem: Depression  Goal: Treatment Goal: Demonstrate behavioral control of depressive symptoms, verbalize feelings of improved mood/affect, and adopt new coping skills prior to discharge  Outcome: Completed  Goal: Verbalize thoughts and feelings  Description: Interventions:  - Assess and re-assess patient's  level of risk   - Engage patient in 1:1 interactions, daily, for a minimum of 15 minutes   - Encourage patient to express feelings, fears, frustrations, hopes   Outcome: Completed  Goal: Refrain from harming self  Description: Interventions:  - Monitor patient closely, per order   - Supervise medication ingestion, monitor effects and side effects   Outcome: Completed  Goal: Refrain from isolation  Description: Interventions:  - Develop a trusting relationship   - Encourage socialization   Outcome: Completed  Goal: Refrain from self-neglect  Outcome: Completed  Goal: Attend and participate in unit activities, including therapeutic, recreational, and educational groups  Description: Interventions:  - Provide therapeutic and educational activities daily, encourage attendance and participation, and document same in the medical record   Outcome: Completed  Goal: Complete daily ADLs, including personal hygiene independently, as able  Description: Interventions:  - Observe, teach, and assist patient with ADLS  -  Monitor and promote a balance of rest/activity, with adequate nutrition and elimination   Outcome: Completed     Problem: Nutrition/Hydration-ADULT  Goal: Nutrient/Hydration intake appropriate for improving, restoring or maintaining nutritional needs  Description: Monitor and assess patient's nutrition/hydration status for malnutrition. Collaborate with interdisciplinary team and initiate plan and interventions as ordered.  Monitor patient's weight and dietary intake as ordered or per policy. Utilize nutrition screening tool and intervene as necessary. Determine patient's food preferences and provide high-protein, high-caloric foods as appropriate.     INTERVENTIONS:  - Monitor oral intake, urinary output, labs, and treatment plans  - Assess nutrition and hydration status and recommend course of action  - Evaluate amount of meals eaten  - Assist patient with eating if necessary   - Allow adequate time for meals  -  Recommend/ encourage appropriate diets, oral nutritional supplements, and vitamin/mineral supplements  - Order, calculate, and assess calorie counts as needed  - Recommend, monitor, and adjust tube feedings and TPN/PPN based on assessed needs  - Assess need for intravenous fluids  - Provide specific nutrition/hydration education as appropriate  - Include patient/family/caregiver in decisions related to nutrition  Outcome: Completed

## 2025-03-14 NOTE — SOCIAL WORK
03/14/25   Team Meeting   Meeting Type Daily Rounds   Team Members Present   Team Members Present Physician;Nurse;;Occupational Therapist   Physician Team Member Alex HOGUE, Bridget Lau MD   Nursing Team Member Richardson ASTUDILLO   Social Work Team Member Leticia RASHID   OT Team Member Nasir SMITH   Patient/Family Present   Patient Present No   Patient's Family Present No

## 2025-03-14 NOTE — BH TRANSITION RECORD
Contact Information: If you have any questions, concerns, pended studies, tests and/or procedures, or emergencies regarding your inpatient behavioral health visit. Please contact [facility] older adult behavioral health unit [phone] and ask to speak to a , nurse or physician. A contact is available 24 hours/ 7 days a week at this number.     Summary of Procedures Performed During your Stay:  Below is a list of major procedures performed during your hospital stay and a summary of results:  - No major procedures performed.    If studies are pending at discharge, follow up with your PCP and/or referring provider.

## 2025-03-17 DIAGNOSIS — C7A.8 NEUROENDOCRINE CARCINOMA OF SMALL BOWEL (HCC): ICD-10-CM

## 2025-03-17 DIAGNOSIS — D3A.8 NEUROENDOCRINE TUMOR: Primary | ICD-10-CM

## 2025-03-19 ENCOUNTER — HOSPITAL ENCOUNTER (OUTPATIENT)
Dept: INFUSION CENTER | Facility: HOSPITAL | Age: 50
Discharge: HOME | End: 2025-03-19
Attending: INTERNAL MEDICINE
Payer: COMMERCIAL

## 2025-03-19 DIAGNOSIS — C7A.8 NEUROENDOCRINE CARCINOMA OF SMALL BOWEL (HCC): ICD-10-CM

## 2025-03-19 DIAGNOSIS — D3A.8 NEUROENDOCRINE TUMOR: Primary | ICD-10-CM

## 2025-03-19 PROCEDURE — 96372 THER/PROPH/DIAG INJ SC/IM: CPT

## 2025-03-19 RX ORDER — LANREOTIDE ACETATE 120 MG/.5ML
120 INJECTION SUBCUTANEOUS ONCE
Status: COMPLETED | OUTPATIENT
Start: 2025-03-19 | End: 2025-03-19

## 2025-03-19 RX ORDER — LANREOTIDE ACETATE 120 MG/.5ML
120 INJECTION SUBCUTANEOUS ONCE
OUTPATIENT
Start: 2025-04-16

## 2025-03-19 RX ORDER — LANREOTIDE ACETATE 120 MG/.5ML
120 INJECTION SUBCUTANEOUS ONCE
Status: CANCELLED | OUTPATIENT
Start: 2025-03-19

## 2025-03-19 RX ADMIN — LANREOTIDE ACETATE 120 MG: 120 INJECTION SUBCUTANEOUS at 14:48

## 2025-03-19 NOTE — PROGRESS NOTES
Osei Trimble  tolerated treatment well with no complications.  Lanreotide to L glute.    Osei Trimble is aware of future appt on 4/16 at 12:30 pm.     Appt card provided per request.

## 2025-03-24 ENCOUNTER — APPOINTMENT (EMERGENCY)
Dept: CT IMAGING | Facility: HOSPITAL | Age: 50
End: 2025-03-24
Payer: COMMERCIAL

## 2025-03-24 ENCOUNTER — HOSPITAL ENCOUNTER (EMERGENCY)
Facility: HOSPITAL | Age: 50
Discharge: HOME | End: 2025-03-24
Attending: EMERGENCY MEDICINE
Payer: COMMERCIAL

## 2025-03-24 VITALS
WEIGHT: 224 LBS | OXYGEN SATURATION: 99 % | SYSTOLIC BLOOD PRESSURE: 152 MMHG | RESPIRATION RATE: 18 BRPM | TEMPERATURE: 98.3 F | DIASTOLIC BLOOD PRESSURE: 100 MMHG | HEART RATE: 81 BPM | BODY MASS INDEX: 29.55 KG/M2

## 2025-03-24 DIAGNOSIS — R07.81 RIB PAIN: Primary | ICD-10-CM

## 2025-03-24 LAB
ALBUMIN SERPL BCG-MCNC: 4.3 G/DL (ref 3.5–5)
ALP SERPL-CCNC: 96 U/L (ref 34–104)
ALT SERPL W P-5'-P-CCNC: 21 U/L (ref 7–52)
ANION GAP SERPL CALCULATED.3IONS-SCNC: 11 MMOL/L (ref 4–13)
APTT PPP: 27 SECONDS (ref 23–34)
AST SERPL W P-5'-P-CCNC: 18 U/L (ref 13–39)
BASOPHILS # BLD AUTO: 0.02 THOUSANDS/ÂΜL (ref 0–0.1)
BASOPHILS NFR BLD AUTO: 0 % (ref 0–1)
BILIRUB SERPL-MCNC: 1.1 MG/DL (ref 0.2–1)
BUN SERPL-MCNC: 13 MG/DL (ref 5–25)
CALCIUM SERPL-MCNC: 9.1 MG/DL (ref 8.4–10.2)
CHLORIDE SERPL-SCNC: 103 MMOL/L (ref 96–108)
CO2 SERPL-SCNC: 23 MMOL/L (ref 21–32)
CREAT SERPL-MCNC: 0.8 MG/DL (ref 0.6–1.3)
EOSINOPHIL # BLD AUTO: 0.28 THOUSAND/ÂΜL (ref 0–0.61)
EOSINOPHIL NFR BLD AUTO: 4 % (ref 0–6)
ERYTHROCYTE [DISTWIDTH] IN BLOOD BY AUTOMATED COUNT: 13.4 % (ref 11.6–15.1)
GFR SERPL CREATININE-BSD FRML MDRD: 103 ML/MIN/1.73SQ M
GLUCOSE SERPL-MCNC: 228 MG/DL (ref 65–140)
HCT VFR BLD AUTO: 40 % (ref 36.5–49.3)
HGB BLD-MCNC: 13.5 G/DL (ref 12–17)
IMM GRANULOCYTES # BLD AUTO: 0.03 THOUSAND/UL (ref 0–0.2)
IMM GRANULOCYTES NFR BLD AUTO: 0 % (ref 0–2)
INR PPP: 1.05 (ref 0.85–1.19)
LYMPHOCYTES # BLD AUTO: 1.05 THOUSANDS/ÂΜL (ref 0.6–4.47)
LYMPHOCYTES NFR BLD AUTO: 13 % (ref 14–44)
MAGNESIUM SERPL-MCNC: 1.7 MG/DL (ref 1.9–2.7)
MCH RBC QN AUTO: 29.7 PG (ref 26.8–34.3)
MCHC RBC AUTO-ENTMCNC: 33.8 G/DL (ref 31.4–37.4)
MCV RBC AUTO: 88 FL (ref 82–98)
MONOCYTES # BLD AUTO: 0.39 THOUSAND/ÂΜL (ref 0.17–1.22)
MONOCYTES NFR BLD AUTO: 5 % (ref 4–12)
NEUTROPHILS # BLD AUTO: 6.33 THOUSANDS/ÂΜL (ref 1.85–7.62)
NEUTS SEG NFR BLD AUTO: 78 % (ref 43–75)
NRBC BLD AUTO-RTO: 0 /100 WBCS
PLATELET # BLD AUTO: 203 THOUSANDS/UL (ref 149–390)
PMV BLD AUTO: 9.1 FL (ref 8.9–12.7)
POTASSIUM SERPL-SCNC: 3.1 MMOL/L (ref 3.5–5.3)
PROT SERPL-MCNC: 7.5 G/DL (ref 6.4–8.4)
PROTHROMBIN TIME: 14.2 SECONDS (ref 12.3–15)
RBC # BLD AUTO: 4.55 MILLION/UL (ref 3.88–5.62)
SODIUM SERPL-SCNC: 137 MMOL/L (ref 135–147)
WBC # BLD AUTO: 8.1 THOUSAND/UL (ref 4.31–10.16)

## 2025-03-24 PROCEDURE — 80053 COMPREHEN METABOLIC PANEL: CPT | Performed by: EMERGENCY MEDICINE

## 2025-03-24 PROCEDURE — 99285 EMERGENCY DEPT VISIT HI MDM: CPT | Performed by: EMERGENCY MEDICINE

## 2025-03-24 PROCEDURE — 71275 CT ANGIOGRAPHY CHEST: CPT

## 2025-03-24 PROCEDURE — 36415 COLL VENOUS BLD VENIPUNCTURE: CPT | Performed by: EMERGENCY MEDICINE

## 2025-03-24 PROCEDURE — 85025 COMPLETE CBC W/AUTO DIFF WBC: CPT | Performed by: EMERGENCY MEDICINE

## 2025-03-24 PROCEDURE — 85610 PROTHROMBIN TIME: CPT | Performed by: EMERGENCY MEDICINE

## 2025-03-24 PROCEDURE — 99284 EMERGENCY DEPT VISIT MOD MDM: CPT

## 2025-03-24 PROCEDURE — 96374 THER/PROPH/DIAG INJ IV PUSH: CPT

## 2025-03-24 PROCEDURE — 83735 ASSAY OF MAGNESIUM: CPT | Performed by: EMERGENCY MEDICINE

## 2025-03-24 PROCEDURE — 85730 THROMBOPLASTIN TIME PARTIAL: CPT | Performed by: EMERGENCY MEDICINE

## 2025-03-24 RX ORDER — CYCLOBENZAPRINE HCL 5 MG
5 TABLET ORAL 3 TIMES DAILY PRN
Qty: 5 TABLET | Refills: 0 | Status: SHIPPED | OUTPATIENT
Start: 2025-03-24

## 2025-03-24 RX ORDER — MORPHINE SULFATE 4 MG/ML
4 INJECTION, SOLUTION INTRAMUSCULAR; INTRAVENOUS ONCE
Status: COMPLETED | OUTPATIENT
Start: 2025-03-24 | End: 2025-03-24

## 2025-03-24 RX ORDER — KETOROLAC TROMETHAMINE 30 MG/ML
15 INJECTION, SOLUTION INTRAMUSCULAR; INTRAVENOUS ONCE
Status: DISCONTINUED | OUTPATIENT
Start: 2025-03-24 | End: 2025-03-24

## 2025-03-24 RX ORDER — CYCLOBENZAPRINE HCL 10 MG
5 TABLET ORAL ONCE
Status: COMPLETED | OUTPATIENT
Start: 2025-03-24 | End: 2025-03-24

## 2025-03-24 RX ADMIN — CYCLOBENZAPRINE HYDROCHLORIDE 5 MG: 10 TABLET, FILM COATED ORAL at 20:04

## 2025-03-24 RX ADMIN — IOHEXOL 90 ML: 350 INJECTION, SOLUTION INTRAVENOUS at 18:39

## 2025-03-24 RX ADMIN — MORPHINE SULFATE 4 MG: 4 INJECTION INTRAVENOUS at 18:16

## 2025-03-24 NOTE — DISCHARGE INSTRUCTIONS
Your blood work and CT scan today were reassuring.  I recommend you follow-up with comprehensive spine.  I suspect your symptoms are likely due to a pinched nerve in your back or rib.  Continue with Motrin and Tylenol if needed for pain.  Take Flexeril if needed for more significant pain.  Continue to do light activity as tolerated avoid heavy lifting.

## 2025-03-24 NOTE — ED PROVIDER NOTES
Time reflects when diagnosis was documented in both MDM as applicable and the Disposition within this note       Time User Action Codes Description Comment    3/24/2025  7:41 PM Aj Chirinos Add [R07.81] Rib pain           ED Disposition       ED Disposition   Discharge    Condition   Stable    Date/Time   Mon Mar 24, 2025  7:41 PM    Comment   Osei Trimble discharge to home/self care.                   Assessment & Plan       Medical Decision Making  50-year-old male with history of psychiatric disturbance, methamphetamine use, presents with right-sided chest pain in the right flank rating to the right anterior chest.  Worse with deep and elation and movement.  He denies injury falls trauma.  Denies history of PE however is a poor historian.    Will get CTA r/o PE, rib fracture, pneumonia. Doubt pneumothorax  No abdominal pain/ symptoms  CBC to assess for anemia or leukocytosis  Morphine for now until further causes ruled out.    Symptoms and not consistent with ACS.  Not consistent with hepatobiliary cause.  Not consistent with  cause.        Problems Addressed:  Rib pain: acute illness or injury    Amount and/or Complexity of Data Reviewed  Labs: ordered. Decision-making details documented in ED Course.  Radiology: ordered and independent interpretation performed. Decision-making details documented in ED Course.     Details: CT viewed interpreted and by me no acute disease was appreciated.    Risk  Prescription drug management.        ED Course as of 03/24/25 2017   Mon Mar 24, 2025   2016 Patient pain controlled, comfortable with discharge.       Medications   morphine injection 4 mg (4 mg Intravenous Given 3/24/25 1816)   iohexol (OMNIPAQUE) 350 MG/ML injection (MULTI-DOSE) 90 mL (90 mL Intravenous Given 3/24/25 1839)   cyclobenzaprine (FLEXERIL) tablet 5 mg (5 mg Oral Given 3/24/25 2004)       ED Risk Strat Scores                            SBIRT 22yo+      Flowsheet Row Most Recent Value   Initial  Alcohol Screen: US AUDIT-C     1. How often do you have a drink containing alcohol? 1 Filed at: 03/24/2025 1857   2. How many drinks containing alcohol do you have on a typical day you are drinking?  1 Filed at: 03/24/2025 1857   3a. Male UNDER 65: How often do you have five or more drinks on one occasion? 0 Filed at: 03/24/2025 1857   3b. FEMALE Any Age, or MALE 65+: How often do you have 4 or more drinks on one occassion? 0 Filed at: 03/24/2025 1857   Audit-C Score 2 Filed at: 03/24/2025 1857   JURGEN: How many times in the past year have you...    Used an illegal drug or used a prescription medication for non-medical reasons? Never Filed at: 03/24/2025 1857                            History of Present Illness       Chief Complaint   Patient presents with    Rib Pain     Right sided rib pain,   Onset this morning       Past Medical History:   Diagnosis Date    Allergic     Bipolar disorder in partial remission (HCC)     Diabetes (HCC)     Erectile dysfunction     unspecified erectile dysfunction type    Hypertension     Kidney stone       Past Surgical History:   Procedure Laterality Date    COLONOSCOPY      EXPLORATORY LAPAROTOMY W/ BOWEL RESECTION N/A 7/21/2022    Procedure: LAPAROTOMY EXPLORATORY W/ SMALL BOWEL RESECTION, liver biopsy;  Surgeon: Rose Mary Lara MD;  Location:  MAIN OR;  Service: General    FL RETROGRADE PYELOGRAM  10/06/2021    HERNIA REPAIR      GA CYSTOURETHROSCOPY W/URETERAL CATHETERIZATION Right 10/06/2021    Procedure: CYSTOSCOPY RETROGRADE PYELOGRAM WITH INSERTION STENT URETERAL, RIGHT URETEROSCOPY, STONE EXTRACTION;  Surgeon: Bradly Rivera MD;  Location:  MAIN OR;  Service: Urology      Family History   Problem Relation Age of Onset    Diabetes Mother     Thyroid disease Mother     Cancer Father     Thyroid disease Sister     Depression Brother     Cancer Maternal Aunt     Cancer Paternal Uncle     Cancer Paternal Grandmother     No Known Problems Brother     Thyroid disease Sister        Social History     Tobacco Use    Smoking status: Former     Current packs/day: 0.00     Types: Pipe, Cigarettes     Quit date: 2003     Years since quittin.2    Smokeless tobacco: Never   Vaping Use    Vaping status: Some Days    Substances: THC   Substance Use Topics    Alcohol use: Not Currently     Comment: Drinks a quart of moonshine/night-As of 21 will quit drinking    Drug use: Yes     Types: Marijuana     Comment: Socially (1-2 Monthly)      E-Cigarette/Vaping    E-Cigarette Use Current Some Day User       E-Cigarette/Vaping Substances    Nicotine No     THC Yes     CBD No     Flavoring No       I have reviewed and agree with the history as documented.     50-year-old male presents with right-sided rib pain          Review of Systems        Objective       ED Triage Vitals   Temperature Pulse Blood Pressure Respirations SpO2 Patient Position - Orthostatic VS   25 17325   98.8 °F (37.1 °C) 93 165/95 16 98 % Sitting      Temp Source Heart Rate Source BP Location FiO2 (%) Pain Score    25 17325 1737 25 -- 25    Temporal Monitor Left arm  9      Vitals      Date and Time Temp Pulse SpO2 Resp BP Pain Score FACES Pain Rating User   25 98.3 °F (36.8 °C) 81 99 % 18 152/100 -- -- EM   25 1858 -- 87 96 % 18 138/102 -- -- EM   25 1816 -- -- -- -- -- 10 - Worst Possible Pain -- NAD   25 1738 -- 93 98 % -- 165/95 -- -- NAD   25 173 98.8 °F (37.1 °C) -- -- 16 -- 9 -- NAD            Physical Exam  Vitals and nursing note reviewed.   Constitutional:       Appearance: Normal appearance. He is well-developed.   HENT:      Head: Normocephalic and atraumatic.   Eyes:      Conjunctiva/sclera: Conjunctivae normal.      Pupils: Pupils are equal, round, and reactive to light.   Neck:      Trachea: No tracheal deviation.   Cardiovascular:      Rate and Rhythm: Normal rate  and regular rhythm.      Heart sounds: Normal heart sounds. No murmur heard.  Pulmonary:      Effort: Pulmonary effort is normal. No respiratory distress.      Breath sounds: Normal breath sounds. No wheezing or rales.   Abdominal:      General: Bowel sounds are normal. There is no distension.      Palpations: Abdomen is soft.      Tenderness: There is no abdominal tenderness.   Musculoskeletal:         General: No deformity.      Cervical back: Normal range of motion and neck supple.      Comments: Tenderness around the lower ribs around 5 through 8 posteriorly and anteriorly   Skin:     General: Skin is warm and dry.      Capillary Refill: Capillary refill takes less than 2 seconds.   Neurological:      General: No focal deficit present.      Mental Status: He is alert and oriented to person, place, and time.      Sensory: No sensory deficit.   Psychiatric:         Mood and Affect: Mood normal. Affect is flat.         Judgment: Judgment normal.         Results Reviewed       Procedure Component Value Units Date/Time    Comprehensive metabolic panel [[id number]]  (Abnormal) Collected: 03/24/25 1812    Lab Status: Final result Specimen: Blood from Arm, Left Updated: 03/24/25 1838     Sodium 137 mmol/L      Potassium 3.1 mmol/L      Chloride 103 mmol/L      CO2 23 mmol/L      ANION GAP 11 mmol/L      BUN 13 mg/dL      Creatinine 0.80 mg/dL      Glucose 228 mg/dL      Calcium 9.1 mg/dL      AST 18 U/L      ALT 21 U/L      Alkaline Phosphatase 96 U/L      Total Protein 7.5 g/dL      Albumin 4.3 g/dL      Total Bilirubin 1.10 mg/dL      eGFR 103 ml/min/1.73sq m     Narrative:      National Kidney Disease Foundation guidelines for Chronic Kidney Disease (CKD):     Stage 1 with normal or high GFR (GFR > 90 mL/min/1.73 square meters)    Stage 2 Mild CKD (GFR = 60-89 mL/min/1.73 square meters)    Stage 3A Moderate CKD (GFR = 45-59 mL/min/1.73 square meters)    Stage 3B Moderate CKD (GFR = 30-44 mL/min/1.73 square meters)     Stage 4 Severe CKD (GFR = 15-29 mL/min/1.73 square meters)    Stage 5 End Stage CKD (GFR <15 mL/min/1.73 square meters)  Note: GFR calculation is accurate only with a steady state creatinine    Magnesium [[id number]]  (Abnormal) Collected: 03/24/25 1812    Lab Status: Final result Specimen: Blood from Arm, Left Updated: 03/24/25 1838     Magnesium 1.7 mg/dL     Protime-INR [[id number]]  (Normal) Collected: 03/24/25 1812    Lab Status: Final result Specimen: Blood from Arm, Left Updated: 03/24/25 1838     Protime 14.2 seconds      INR 1.05    Narrative:      INR Therapeutic Range    Indication                                             INR Range      Atrial Fibrillation                                               2.0-3.0  Hypercoagulable State                                    2.0.2.3  Left Ventricular Asist Device                            2.0-3.0  Mechanical Heart Valve                                  -    Aortic(with afib, MI, embolism, HF, LA enlargement,    and/or coagulopathy)                                     2.0-3.0 (2.5-3.5)     Mitral                                                             2.5-3.5  Prosthetic/Bioprosthetic Heart Valve               2.0-3.0  Venous thromboembolism (VTE: VT, PE        2.0-3.0    APTT [[id number]]  (Normal) Collected: 03/24/25 1812    Lab Status: Final result Specimen: Blood from Arm, Left Updated: 03/24/25 1838     PTT 27 seconds     CBC and differential [[id number]]  (Abnormal) Collected: 03/24/25 1812    Lab Status: Final result Specimen: Blood from Arm, Left Updated: 03/24/25 1822     WBC 8.10 Thousand/uL      RBC 4.55 Million/uL      Hemoglobin 13.5 g/dL      Hematocrit 40.0 %      MCV 88 fL      MCH 29.7 pg      MCHC 33.8 g/dL      RDW 13.4 %      MPV 9.1 fL      Platelets 203 Thousands/uL      nRBC 0 /100 WBCs      Segmented % 78 %      Immature Grans % 0 %      Lymphocytes % 13 %      Monocytes % 5 %      Eosinophils Relative 4 %      Basophils Relative 0 %       Absolute Neutrophils 6.33 Thousands/µL      Absolute Immature Grans 0.03 Thousand/uL      Absolute Lymphocytes 1.05 Thousands/µL      Absolute Monocytes 0.39 Thousand/µL      Eosinophils Absolute 0.28 Thousand/µL      Basophils Absolute 0.02 Thousands/µL             CTA chest pe study   Final Interpretation by Fitz Church MD (03/24 1939)      1.  No acute pulmonary embolism to the segmental level noted with evaluation of a few more peripheral subsegmental branch vessels limited by mild motion artifact on this study.   2.  No thoracic aortic aneurysm or dissection.   3.  No lobar airspace consolidation/pneumonia. There is extensive bibasilar atelectasis noted. A nonspecific 8 mm right middle lobe lung nodule is again seen and not significantly changed compared to the prior exam given differences in measurement    technique.   4.  Mild diffuse hepatic steatosis.                  Workstation performed: [id number]             Procedures    ED Medication and Procedure Management   Prior to Admission Medications   Prescriptions Last Dose Informant Patient Reported? Taking?   Alcohol Swabs 70 % PADS  Family Member No No   Sig: May substitute brand based on insurance coverage. Check glucose BID.   Blood Glucose Monitoring Suppl (OneTouch Verio Reflect) w/Device KIT  Family Member No No   Sig: May substitute brand based on insurance coverage. Check glucose BID.   OLANZapine (ZyPREXA) 10 mg tablet   No No   Sig: Take 1 tablet (10 mg total) by mouth daily at bedtime   OneTouch Delica Lancets 33G MISC  Family Member No No   Sig: May substitute brand based on insurance coverage. Check glucose BID.   cyanocobalamin (VITAMIN B-12) 500 MCG tablet   No No   Sig: Take 1 tablet (500 mcg total) by mouth daily   ergocalciferol (VITAMIN D2) 50,000 units   No No   Sig: Take 1 capsule (50,000 Units total) by mouth once a week for 9 doses   folic acid (FOLVITE) 1 mg tablet   No No   Sig: Take 1 tablet (1 mg total) by mouth daily    glucose blood (OneTouch Verio) test strip  Family Member No No   Sig: May substitute brand based on insurance coverage. Check glucose BID.   lidocaine (LIDODERM) 5 %   No No   Sig: Apply 2 patches topically over 12 hours daily as needed (pain) Remove & Discard patch within 12 hours or as directed by MD   lithium carbonate (LITHOBID) 300 mg CR tablet  Family Member Yes No   Sig: Take 300 mg by mouth 2 (two) times a day   lithium carbonate (LITHOBID) 300 mg CR tablet   No No   Sig: Take 1 tablet (300 mg total) by mouth every 12 (twelve) hours   melatonin 3 mg   No No   Sig: Take 1 tablet (3 mg total) by mouth daily at bedtime   sitaGLIPtin (JANUVIA) 100 mg tablet   No No   Sig: Take 1 tablet (100 mg total) by mouth daily   traZODone (DESYREL) 50 mg tablet   No No   Sig: Take 1 tablet (50 mg total) by mouth daily at bedtime      Facility-Administered Medications: None     Discharge Medication List as of 3/24/2025  7:57 PM        START taking these medications    Details   cyclobenzaprine (FLEXERIL) 5 mg tablet Take 1 tablet (5 mg total) by mouth 3 (three) times a day as needed for muscle spasms, Starting Mon 3/24/2025, Normal           CONTINUE these medications which have NOT CHANGED    Details   Alcohol Swabs 70 % PADS May substitute brand based on insurance coverage. Check glucose BID., Print      Blood Glucose Monitoring Suppl (OneTouch Verio Reflect) w/Device KIT May substitute brand based on insurance coverage. Check glucose BID., Print      cyanocobalamin (VITAMIN B-12) 500 MCG tablet Take 1 tablet (500 mcg total) by mouth daily, Starting Thu 3/13/2025, Normal      ergocalciferol (VITAMIN D2) 50,000 units Take 1 capsule (50,000 Units total) by mouth once a week for 9 doses, Starting Thu 3/13/2025, Until Fri 5/9/2025, Normal      folic acid (FOLVITE) 1 mg tablet Take 1 tablet (1 mg total) by mouth daily, Starting Thu 3/13/2025, Normal      glucose blood (OneTouch Verio) test strip May substitute brand based on  insurance coverage. Check glucose BID., Print      lidocaine (LIDODERM) 5 % Apply 2 patches topically over 12 hours daily as needed (pain) Remove & Discard patch within 12 hours or as directed by MD, Starting Mon 2/24/2025, Normal      !! lithium carbonate (LITHOBID) 300 mg CR tablet Take 300 mg by mouth 2 (two) times a day, Starting Thu 10/19/2023, Historical Med      !! lithium carbonate (LITHOBID) 300 mg CR tablet Take 1 tablet (300 mg total) by mouth every 12 (twelve) hours, Starting Fri 3/14/2025, Normal      melatonin 3 mg Take 1 tablet (3 mg total) by mouth daily at bedtime, Starting Fri 3/14/2025, Normal      OLANZapine (ZyPREXA) 10 mg tablet Take 1 tablet (10 mg total) by mouth daily at bedtime, Starting Fri 3/14/2025, Normal      OneTouch Delica Lancets 33G MISC May substitute brand based on insurance coverage. Check glucose BID., Print      sitaGLIPtin (JANUVIA) 100 mg tablet Take 1 tablet (100 mg total) by mouth daily, Starting Thu 3/13/2025, Normal      traZODone (DESYREL) 50 mg tablet Take 1 tablet (50 mg total) by mouth daily at bedtime, Starting Fri 3/14/2025, Normal       !! - Potential duplicate medications found. Please discuss with provider.          ED SEPSIS DOCUMENTATION   Time reflects when diagnosis was documented in both MDM as applicable and the Disposition within this note       Time User Action Codes Description Comment    3/24/2025  7:41 PM Aj Chirinos Add [R07.81] Rib pain                  Aj Chirinos,   03/24/25 2017

## 2025-03-25 ENCOUNTER — TELEPHONE (OUTPATIENT)
Dept: PHYSICAL THERAPY | Facility: OTHER | Age: 50
End: 2025-03-25

## 2025-03-25 NOTE — TELEPHONE ENCOUNTER
Call placed to the patient per [facility] referral.    Voice message left for patient to call back. Phone number and hours of business provided.     This is the 1st attempt to reach the patient.  Will defer per protocol.    NOTE: On AVS to see PM  DX on comp spine ref Rib Pain? Any neck, back or spine pain?

## 2025-04-02 ENCOUNTER — HOSPITAL ENCOUNTER (OUTPATIENT)
Dept: INFUSION CENTER | Facility: HOSPITAL | Age: 50
End: 2025-04-02
Attending: INTERNAL MEDICINE

## 2025-04-04 NOTE — TELEPHONE ENCOUNTER
"Nurse reached out to discuss the recent referral entered for  [facility].    RN provided the patient with an overview of the program including the Triage & referral entry.  Nurse provided a brief overview to the patients sister Ariadne as well. She \"takes care of his appointments\".  Patient declined to participate in the program at this time. \"I'll think about it\". RN asked if his pain improved and if he planned on seeing his provider (FM). Ariadne stated her brother has an appointment on 4/17/25. Both agreed to discuss any health issues at that time. Sister confirmed the patient no longer has the \"side pain\".    Nurse confirmed patient has [facility] contact information and encouraged to call the program back if needed. Patient/sister agreed and were very appreciative of the f/u call and information.    Nurse wished them well and the referral was closed per protocol. Informed the sister that the patients chart would be noted.  "

## 2025-04-16 ENCOUNTER — HOSPITAL ENCOUNTER (OUTPATIENT)
Dept: INFUSION CENTER | Facility: HOSPITAL | Age: 50
Discharge: HOME | End: 2025-04-16
Attending: INTERNAL MEDICINE
Payer: COMMERCIAL

## 2025-04-16 VITALS — TEMPERATURE: 96.9 F

## 2025-04-16 DIAGNOSIS — C7A.8 NEUROENDOCRINE CARCINOMA OF SMALL BOWEL (HCC): Primary | ICD-10-CM

## 2025-04-16 DIAGNOSIS — D3A.8 NEUROENDOCRINE TUMOR: ICD-10-CM

## 2025-04-16 PROCEDURE — 96372 THER/PROPH/DIAG INJ SC/IM: CPT

## 2025-04-16 RX ORDER — LANREOTIDE ACETATE 120 MG/.5ML
120 INJECTION SUBCUTANEOUS ONCE
Status: COMPLETED | OUTPATIENT
Start: 2025-04-16 | End: 2025-04-16

## 2025-04-16 RX ORDER — LANREOTIDE ACETATE 120 MG/.5ML
120 INJECTION SUBCUTANEOUS ONCE
OUTPATIENT
Start: 2025-05-14

## 2025-04-16 RX ADMIN — LANREOTIDE ACETATE 120 MG: 120 INJECTION SUBCUTANEOUS at 13:02

## 2025-04-16 NOTE — PROGRESS NOTES
Osei Trimble  tolerated Lanreotide injection to right glute well with no complications.      Osei Trimble is aware of future appt on 5/14 at 12:30PM.     AVS printed and given to Osei Trimble: appointment card provided.

## 2025-05-03 ENCOUNTER — HOSPITAL ENCOUNTER (EMERGENCY)
Facility: HOSPITAL | Age: 50
Discharge: STILL ADMITTED OR ADMINISTRATIVE | End: 2025-05-05
Attending: STUDENT IN AN ORGANIZED HEALTH CARE EDUCATION/TRAINING PROGRAM
Payer: COMMERCIAL

## 2025-05-03 DIAGNOSIS — R73.9 HYPERGLYCEMIA: ICD-10-CM

## 2025-05-03 DIAGNOSIS — I10 ESSENTIAL HYPERTENSION: ICD-10-CM

## 2025-05-03 DIAGNOSIS — R17 TOTAL BILIRUBIN, ELEVATED: ICD-10-CM

## 2025-05-03 DIAGNOSIS — R44.0 AUDITORY HALLUCINATION: ICD-10-CM

## 2025-05-03 DIAGNOSIS — R74.8 ELEVATED CK: ICD-10-CM

## 2025-05-03 DIAGNOSIS — F15.10 METHAMPHETAMINE ABUSE (HCC): Primary | ICD-10-CM

## 2025-05-03 DIAGNOSIS — R07.9 CHEST PAIN, UNSPECIFIED TYPE: ICD-10-CM

## 2025-05-03 LAB
ALBUMIN SERPL BCG-MCNC: 4.4 G/DL (ref 3.5–5)
ALP SERPL-CCNC: 97 U/L (ref 34–104)
ALT SERPL W P-5'-P-CCNC: 23 U/L (ref 7–52)
AMPHETAMINES SERPL QL SCN: POSITIVE
ANION GAP SERPL CALCULATED.3IONS-SCNC: 10 MMOL/L (ref 4–13)
AST SERPL W P-5'-P-CCNC: 20 U/L (ref 13–39)
BARBITURATES UR QL: NEGATIVE
BASOPHILS # BLD AUTO: 0.02 THOUSANDS/ÂΜL (ref 0–0.1)
BASOPHILS NFR BLD AUTO: 0 % (ref 0–1)
BENZODIAZ UR QL: NEGATIVE
BILIRUB SERPL-MCNC: 1.16 MG/DL (ref 0.2–1)
BUN SERPL-MCNC: 15 MG/DL (ref 5–25)
CALCIUM SERPL-MCNC: 9.4 MG/DL (ref 8.4–10.2)
CHLORIDE SERPL-SCNC: 102 MMOL/L (ref 96–108)
CO2 SERPL-SCNC: 23 MMOL/L (ref 21–32)
COCAINE UR QL: NEGATIVE
CREAT SERPL-MCNC: 1.06 MG/DL (ref 0.6–1.3)
EOSINOPHIL # BLD AUTO: 0.24 THOUSAND/ÂΜL (ref 0–0.61)
EOSINOPHIL NFR BLD AUTO: 2 % (ref 0–6)
ERYTHROCYTE [DISTWIDTH] IN BLOOD BY AUTOMATED COUNT: 13.3 % (ref 11.6–15.1)
ETHANOL SERPL-MCNC: <10 MG/DL
FENTANYL UR QL SCN: NEGATIVE
GFR SERPL CREATININE-BSD FRML MDRD: 80 ML/MIN/1.73SQ M
GLUCOSE SERPL-MCNC: 237 MG/DL (ref 65–140)
HCT VFR BLD AUTO: 40.4 % (ref 36.5–49.3)
HGB BLD-MCNC: 13.8 G/DL (ref 12–17)
HYDROCODONE UR QL SCN: NEGATIVE
IMM GRANULOCYTES # BLD AUTO: 0.02 THOUSAND/UL (ref 0–0.2)
IMM GRANULOCYTES NFR BLD AUTO: 0 % (ref 0–2)
LYMPHOCYTES # BLD AUTO: 1.54 THOUSANDS/ÂΜL (ref 0.6–4.47)
LYMPHOCYTES NFR BLD AUTO: 16 % (ref 14–44)
MCH RBC QN AUTO: 29.2 PG (ref 26.8–34.3)
MCHC RBC AUTO-ENTMCNC: 34.2 G/DL (ref 31.4–37.4)
MCV RBC AUTO: 86 FL (ref 82–98)
METHADONE UR QL: NEGATIVE
MONOCYTES # BLD AUTO: 0.47 THOUSAND/ÂΜL (ref 0.17–1.22)
MONOCYTES NFR BLD AUTO: 5 % (ref 4–12)
NEUTROPHILS # BLD AUTO: 7.61 THOUSANDS/ÂΜL (ref 1.85–7.62)
NEUTS SEG NFR BLD AUTO: 77 % (ref 43–75)
NRBC BLD AUTO-RTO: 0 /100 WBCS
OPIATES UR QL SCN: NEGATIVE
OXYCODONE+OXYMORPHONE UR QL SCN: NEGATIVE
PCP UR QL: NEGATIVE
PLATELET # BLD AUTO: 239 THOUSANDS/UL (ref 149–390)
PMV BLD AUTO: 9.1 FL (ref 8.9–12.7)
POTASSIUM SERPL-SCNC: 3.6 MMOL/L (ref 3.5–5.3)
PROT SERPL-MCNC: 7.8 G/DL (ref 6.4–8.4)
RBC # BLD AUTO: 4.72 MILLION/UL (ref 3.88–5.62)
SODIUM SERPL-SCNC: 135 MMOL/L (ref 135–147)
THC UR QL: POSITIVE
TSH SERPL DL<=0.05 MIU/L-ACNC: 4.38 UIU/ML (ref 0.45–4.5)
WBC # BLD AUTO: 9.9 THOUSAND/UL (ref 4.31–10.16)

## 2025-05-03 PROCEDURE — 99285 EMERGENCY DEPT VISIT HI MDM: CPT | Performed by: STUDENT IN AN ORGANIZED HEALTH CARE EDUCATION/TRAINING PROGRAM

## 2025-05-03 PROCEDURE — 84443 ASSAY THYROID STIM HORMONE: CPT | Performed by: STUDENT IN AN ORGANIZED HEALTH CARE EDUCATION/TRAINING PROGRAM

## 2025-05-03 PROCEDURE — 82077 ASSAY SPEC XCP UR&BREATH IA: CPT | Performed by: STUDENT IN AN ORGANIZED HEALTH CARE EDUCATION/TRAINING PROGRAM

## 2025-05-03 PROCEDURE — 99285 EMERGENCY DEPT VISIT HI MDM: CPT

## 2025-05-03 PROCEDURE — 93005 ELECTROCARDIOGRAM TRACING: CPT

## 2025-05-03 PROCEDURE — 80307 DRUG TEST PRSMV CHEM ANLYZR: CPT | Performed by: STUDENT IN AN ORGANIZED HEALTH CARE EDUCATION/TRAINING PROGRAM

## 2025-05-03 PROCEDURE — 36415 COLL VENOUS BLD VENIPUNCTURE: CPT | Performed by: STUDENT IN AN ORGANIZED HEALTH CARE EDUCATION/TRAINING PROGRAM

## 2025-05-03 PROCEDURE — 85025 COMPLETE CBC W/AUTO DIFF WBC: CPT | Performed by: STUDENT IN AN ORGANIZED HEALTH CARE EDUCATION/TRAINING PROGRAM

## 2025-05-03 PROCEDURE — 80053 COMPREHEN METABOLIC PANEL: CPT | Performed by: STUDENT IN AN ORGANIZED HEALTH CARE EDUCATION/TRAINING PROGRAM

## 2025-05-03 RX ORDER — OLANZAPINE 10 MG/1
10 TABLET, ORALLY DISINTEGRATING ORAL ONCE
Status: COMPLETED | OUTPATIENT
Start: 2025-05-03 | End: 2025-05-03

## 2025-05-03 RX ADMIN — OLANZAPINE 10 MG: 10 TABLET, ORALLY DISINTEGRATING ORAL at 23:07

## 2025-05-04 LAB — GLUCOSE SERPL-MCNC: 189 MG/DL (ref 65–140)

## 2025-05-04 PROCEDURE — 82948 REAGENT STRIP/BLOOD GLUCOSE: CPT

## 2025-05-04 RX ORDER — CYCLOBENZAPRINE HCL 10 MG
5 TABLET ORAL 3 TIMES DAILY PRN
Status: DISCONTINUED | OUTPATIENT
Start: 2025-05-04 | End: 2025-05-05 | Stop reason: HOSPADM

## 2025-05-04 RX ORDER — ERGOCALCIFEROL 1.25 MG/1
50000 CAPSULE, LIQUID FILLED ORAL WEEKLY
Status: DISCONTINUED | OUTPATIENT
Start: 2025-05-04 | End: 2025-05-05 | Stop reason: HOSPADM

## 2025-05-04 RX ORDER — LITHIUM CARBONATE 300 MG/1
300 TABLET, FILM COATED, EXTENDED RELEASE ORAL EVERY 12 HOURS SCHEDULED
Status: DISCONTINUED | OUTPATIENT
Start: 2025-05-04 | End: 2025-05-05 | Stop reason: HOSPADM

## 2025-05-04 RX ORDER — TRAZODONE HYDROCHLORIDE 50 MG/1
50 TABLET ORAL
Status: DISCONTINUED | OUTPATIENT
Start: 2025-05-04 | End: 2025-05-05 | Stop reason: HOSPADM

## 2025-05-04 RX ORDER — OLANZAPINE 2.5 MG/1
10 TABLET, FILM COATED ORAL
Status: DISCONTINUED | OUTPATIENT
Start: 2025-05-04 | End: 2025-05-05 | Stop reason: HOSPADM

## 2025-05-04 RX ORDER — FOLIC ACID 1 MG/1
1 TABLET ORAL DAILY
Status: DISCONTINUED | OUTPATIENT
Start: 2025-05-04 | End: 2025-05-05 | Stop reason: HOSPADM

## 2025-05-04 RX ADMIN — TRAZODONE HYDROCHLORIDE 50 MG: 50 TABLET ORAL at 21:17

## 2025-05-04 RX ADMIN — SITAGLIPTIN 100 MG: 25 TABLET, FILM COATED ORAL at 09:47

## 2025-05-04 RX ADMIN — Medication 3 MG: at 21:17

## 2025-05-04 RX ADMIN — ERGOCALCIFEROL 50000 UNITS: 1.25 CAPSULE, LIQUID FILLED ORAL at 11:24

## 2025-05-04 RX ADMIN — CYANOCOBALAMIN TAB 500 MCG 500 MCG: 500 TAB at 09:48

## 2025-05-04 RX ADMIN — LITHIUM CARBONATE 300 MG: 300 TABLET, EXTENDED RELEASE ORAL at 21:17

## 2025-05-04 RX ADMIN — LITHIUM CARBONATE 300 MG: 300 TABLET, EXTENDED RELEASE ORAL at 09:48

## 2025-05-04 RX ADMIN — OLANZAPINE 10 MG: 2.5 TABLET, FILM COATED ORAL at 21:17

## 2025-05-04 RX ADMIN — FOLIC ACID 1 MG: 1 TABLET ORAL at 09:48

## 2025-05-04 NOTE — ED NOTES
Per patient sister Osei is due for an infusion related to cancer with Dr espinoza on the 14th      Yariel Singh, RN  05/03/25 [id number]

## 2025-05-04 NOTE — ED NOTES
[facility] sent message to intake to inquire about bed availability. Patient is requesting [facility] only at this time.

## 2025-05-04 NOTE — ED NOTES
201 with intake, no beds     Pt wants to wait until Monday for [facility] to be available     Sister would like updates-

## 2025-05-04 NOTE — ED NOTES
Insurance Authorization for admission:   Phone call placed to [facility]  Phone number: [phone].     Spoke to Erasto     5 days approved.  Level of care: 201.  Review on and Auth # - will be provided when facility is provided
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
## 2025-05-04 NOTE — ED PROVIDER NOTES
Time reflects when diagnosis was documented in both MDM as applicable and the Disposition within this note       Time User Action Codes Description Comment    5/3/2025 10:17 PM Christopher Correa [F15.10] Methamphetamine abuse (HCC)     5/3/2025 10:18 PM Christopher Correa [R44.0] Auditory hallucination           ED Disposition       ED Disposition   Transfer to Behavioral Health Condition   --    Date/Time   Sat May 3, 2025 10:18 PM    Comment   --             Assessment & Plan       Medical Decision Making  Dx. Depression, anxiety, meth induced hallucinations      No SI at this time    Problems Addressed:  Auditory hallucination: acute illness or injury  Methamphetamine abuse (HCC): acute illness or injury    Amount and/or Complexity of Data Reviewed  Independent Historian: parent  External Data Reviewed: labs, radiology, ECG and notes.  Labs: ordered. Decision-making details documented in ED Course.  Radiology:  Decision-making details documented in ED Course.    Risk  Prescription drug management.  Decision regarding hospitalization.        ED Course as of 05/04/25 0323   Sat May 03, 2025   2157 GLUCOSE(!): 237   2218 This patient was interviewed and examined by me and found to be medically stable for transfer and admission to a psychiatric treatment facility if needed.  There are no medical conditions which are untreated, unstable, or requiring acute intervention.       2222 Patient signed 201         Medications   OLANZapine (ZyPREXA ZYDIS) dispersible tablet 10 mg (10 mg Oral Given 5/3/25 2307)       ED Risk Strat Scores                    No data recorded        SBIRT 22yo+      Flowsheet Row Most Recent Value   Initial Alcohol Screen: US AUDIT-C     1. How often do you have a drink containing alcohol? 1 Filed at: 05/03/2025 2231   2. How many drinks containing alcohol do you have on a typical day you are drinking?  2 Filed at: 05/03/2025 2231   3a. Male UNDER 65: How often do you have five or more drinks  "on one occasion? 0 Filed at: 05/03/2025 2231   3b. FEMALE Any Age, or MALE 65+: How often do you have 4 or more drinks on one occassion? 0 Filed at: 05/03/2025 2109   Audit-C Score 3 Filed at: 05/03/2025 2231   JURGEN: How many times in the past year have you...    Used an illegal drug or used a prescription medication for non-medical reasons? Monthly Filed at: 05/03/2025 2231   DAST-10: In the past 12 months...    1. Have you used drugs other than those required for medical reasons? 1 Filed at: 05/03/2025 2231   2. Do you use more than one drug at a time? 1 Filed at: 05/03/2025 2231   3. Have you had medical problems as a result of your drug use (e.g., memory loss, hepatitis, convulsions, bleeding, etc.)? 1 Filed at: 05/03/2025 2231   4. Have you had \"blackouts\" or \"flashbacks\" as a result of drug use?YesNo 0 Filed at: 05/03/2025 2231   5. Do you ever feel bad or guilty about your drug use? 1 Filed at: 05/03/2025 2231   6. Does your spouse (or parent) ever complain about your involvement with drugs? 1 Filed at: 05/03/2025 2231   7. Have you neglected your family because of your use of drugs? 0 Filed at: 05/03/2025 2231   8. Have you engaged in illegal activities in order to obtain drugs? 1 Filed at: 05/03/2025 2231   9. Have you ever experienced withdrawal symptoms (felt sick) when you stopped taking drugs? 0 Filed at: 05/03/2025 2231   10. Are you always able to stop using drugs when you want to? 0 Filed at: 05/03/2025 2231   DAST-10 Score 6 Filed at: 05/03/2025 2231                            History of Present Illness       Chief Complaint   Patient presents with    Psychiatric Evaluation     Reports perico/patricia shoemaker ([facility]  tell him to hurt self and others) reports he had Si with plan wouldn't disclose plan. Admits to using meth hx of bipolar.        Past Medical History:   Diagnosis Date    Allergic     Bipolar disorder in partial remission (HCC)     Diabetes (HCC)     Erectile dysfunction     unspecified erectile " dysfunction type    Hypertension     Kidney stone       Past Surgical History:   Procedure Laterality Date    COLONOSCOPY      EXPLORATORY LAPAROTOMY W/ BOWEL RESECTION N/A 2022    Procedure: LAPAROTOMY EXPLORATORY W/ SMALL BOWEL RESECTION, liver biopsy;  Surgeon: Rose Mary Lara MD;  Location:  MAIN OR;  Service: [facility] RETROGRADE PYELOGRAM  10/06/2021    HERNIA REPAIR      [place] CYSTOURETHROSCOPY W/URETERAL CATHETERIZATION Right 10/06/2021    Procedure: CYSTOSCOPY RETROGRADE PYELOGRAM WITH INSERTION STENT URETERAL, RIGHT URETEROSCOPY, STONE EXTRACTION;  Surgeon: Bradly Rivera MD;  Location:  MAIN OR;  Service: Urology      Family History   Problem Relation Age of Onset    Diabetes Mother     Thyroid disease Mother     Cancer Father     Thyroid disease Sister     Depression Brother     Cancer Maternal Aunt     Cancer Paternal Uncle     Cancer Paternal Grandmother     No Known Problems Brother     Thyroid disease Sister       Social History     Tobacco Use    Smoking status: Former     Current packs/day: 0.00     Types: Pipe, Cigarettes     Quit date: 2003     Years since quittin.3    Smokeless tobacco: Never   Vaping Use    Vaping status: Some Days    Substances: THC   Substance Use Topics    Alcohol use: Not Currently     Comment: Drinks a quart of moonshine/night-As of 21 will quit drinking    Drug use: Yes     Types: Marijuana     Comment: Socially (1-2 Monthly)      E-Cigarette/Vaping    E-Cigarette Use Current Some Day User       E-Cigarette/Vaping Substances    Nicotine No     THC Yes     CBD No     Flavoring No       I have reviewed and agree with the history as documented.     50-year-old male presents the emergency department with what he describes as 48 hours of auditory hallucinations with people telling him to shoot other individuals.  Patient Dors this is a recurrent event for him and usually comes following methamphetamine intoxication.  Patient endorses using meth  approximately 48 hours ago.  He states he is scared and fearful that he will act on these voices.  He presents to the emergency department seeking to avoid admission.  Is companied by his wife          Review of Systems   Constitutional:  Negative for activity change, appetite change, chills, fatigue and fever.   HENT:  Negative for congestion, dental problem, drooling, ear discharge, ear pain, facial swelling, postnasal drip, rhinorrhea and sinus pain.    Eyes:  Negative for photophobia, pain, discharge and itching.   Respiratory:  Negative for apnea, cough, chest tightness and shortness of breath.    Cardiovascular:  Negative for chest pain and leg swelling.   Gastrointestinal:  Negative for abdominal distention, abdominal pain, anal bleeding, constipation, diarrhea and nausea.   Endocrine: Negative for cold intolerance, heat intolerance and polydipsia.   Genitourinary:  Negative for difficulty urinating.   Musculoskeletal:  Negative for arthralgias, gait problem, joint swelling and myalgias.   Skin:  Negative for color change and pallor.   Allergic/Immunologic: Negative for immunocompromised state.   Neurological:  Negative for dizziness, seizures, facial asymmetry, weakness, light-headedness, numbness and headaches.   Psychiatric/Behavioral:  Positive for hallucinations. Negative for agitation, behavioral problems, confusion, decreased concentration and dysphoric mood. The patient is nervous/anxious.    All other systems reviewed and are negative.          Objective       ED Triage Vitals [05/03/25 2106]   Temperature Pulse Blood Pressure Respirations SpO2 Patient Position - Orthostatic VS   98 °F (36.7 °C) 96 (!) 165/108 18 98 % --      Temp Source Heart Rate Source BP Location FiO2 (%) Pain Score    Oral Monitor -- -- No Pain      Vitals      Date and Time Temp Pulse SpO2 Resp BP Pain Score FACES Pain Rating User   05/03/25 2106 98 °F (36.7 °C) 96 98 % 18 165/108 No Pain -- RTM            Physical  Exam  Constitutional:       Appearance: He is well-developed. He is obese.   HENT:      Head: Normocephalic.   Eyes:      Pupils: Pupils are equal, round, and reactive to light.   Cardiovascular:      Rate and Rhythm: Normal rate and regular rhythm.   Pulmonary:      Effort: Pulmonary effort is normal.      Breath sounds: Normal breath sounds.   Abdominal:      General: Bowel sounds are normal.      Palpations: Abdomen is soft.   Musculoskeletal:         General: Normal range of motion.      Cervical back: Normal range of motion and neck supple.   Skin:     General: Skin is warm.   Psychiatric:         Attention and Perception: He is attentive. He perceives auditory hallucinations.         Results Reviewed       Procedure Component Value Units Date/Time    Rapid drug screen, urine [[id number]]  (Abnormal) Collected: 05/03/25 2214    Lab Status: Final result Specimen: Urine, Clean Catch Updated: 05/03/25 2231     Amph/Meth UR Positive     Barbiturate Ur Negative     Benzodiazepine Urine Negative     Cocaine Urine Negative     Methadone Urine Negative     Opiate Urine Negative     PCP Ur Negative     THC Urine Positive     Oxycodone Urine Negative     Fentanyl Urine Negative     HYDROCODONE URINE Negative    Narrative:      Presumptive report. If requested, specimen will be sent to reference lab for confirmation.  FOR MEDICAL PURPOSES ONLY.   IF CONFIRMATION NEEDED PLEASE CONTACT THE LAB WITHIN 5 DAYS.    Drug Screen Cutoff Levels:  AMPHETAMINE/METHAMPHETAMINES  1000 ng/mL  BARBITURATES     200 ng/mL  BENZODIAZEPINES     200 ng/mL  COCAINE      300 ng/mL  METHADONE      300 ng/mL  OPIATES      300 ng/mL  PHENCYCLIDINE     25 ng/mL  THC       50 ng/mL  OXYCODONE      100 ng/mL  FENTANYL      5 ng/mL  HYDROCODONE     300 ng/mL    TSH [[id number]]  (Normal) Collected: 05/03/25 2128    Lab Status: Final result Specimen: Blood from Arm, Right Updated: 05/03/25 2210     TSH 3RD GENERATON 4.384 uIU/mL     Ethanol [[id number]]   (Normal) Collected: 05/03/25 2128    Lab Status: Final result Specimen: Blood from Arm, Right Updated: 05/03/25 2157     Ethanol Lvl <10 mg/dL     Comprehensive metabolic panel [[id number]]  (Abnormal) Collected: 05/03/25 2128    Lab Status: Final result Specimen: Blood from Arm, Right Updated: 05/03/25 2157     Sodium 135 mmol/L      Potassium 3.6 mmol/L      Chloride 102 mmol/L      CO2 23 mmol/L      ANION GAP 10 mmol/L      BUN 15 mg/dL      Creatinine 1.06 mg/dL      Glucose 237 mg/dL      Calcium 9.4 mg/dL      AST 20 U/L      ALT 23 U/L      Alkaline Phosphatase 97 U/L      Total Protein 7.8 g/dL      Albumin 4.4 g/dL      Total Bilirubin 1.16 mg/dL      eGFR 80 ml/min/1.73sq m     Narrative:      National Kidney Disease Foundation guidelines for Chronic Kidney Disease (CKD):     Stage 1 with normal or high GFR (GFR > 90 mL/min/1.73 square meters)    Stage 2 Mild CKD (GFR = 60-89 mL/min/1.73 square meters)    Stage 3A Moderate CKD (GFR = 45-59 mL/min/1.73 square meters)    Stage 3B Moderate CKD (GFR = 30-44 mL/min/1.73 square meters)    Stage 4 Severe CKD (GFR = 15-29 mL/min/1.73 square meters)    Stage 5 End Stage CKD (GFR <15 mL/min/1.73 square meters)  Note: GFR calculation is accurate only with a steady state creatinine    CBC and differential [[id number]]  (Abnormal) Collected: 05/03/25 2128    Lab Status: Final result Specimen: Blood from Arm, Right Updated: 05/03/25 2134     WBC 9.90 Thousand/uL      RBC 4.72 Million/uL      Hemoglobin 13.8 g/dL      Hematocrit 40.4 %      MCV 86 fL      MCH 29.2 pg      MCHC 34.2 g/dL      RDW 13.3 %      MPV 9.1 fL      Platelets 239 Thousands/uL      nRBC 0 /100 WBCs      Segmented % 77 %      Immature Grans % 0 %      Lymphocytes % 16 %      Monocytes % 5 %      Eosinophils Relative 2 %      Basophils Relative 0 %      Absolute Neutrophils 7.61 Thousands/µL      Absolute Immature Grans 0.02 Thousand/uL      Absolute Lymphocytes 1.54 Thousands/µL      Absolute  Monocytes 0.47 Thousand/µL      Eosinophils Absolute 0.24 Thousand/µL      Basophils Absolute 0.02 Thousands/µL             No orders to display       ECG 12 Lead Documentation Only    Date/Time: 5/3/2025 9:58 PM    Performed by: Christopher Correa MD  Authorized by: Christopher Correa MD    Indications / Diagnosis:  Psychosis  ECG reviewed by me, the ED Provider: no    Patient location:  ED  Previous ECG:     Previous ECG:  Compared to current    Similarity:  No change    Comparison to cardiac monitor: No    Interpretation:     Interpretation: normal    Rate:     ECG rate:  85    ECG rate assessment: normal    Rhythm:     Rhythm: sinus rhythm    Ectopy:     Ectopy: none    QRS:     QRS axis:  Normal  Conduction:     Conduction: normal    ST segments:     ST segments:  Normal  T waves:     T waves: normal        ED Medication and Procedure Management   Prior to Admission Medications   Prescriptions Last Dose Informant Patient Reported? Taking?   Alcohol Swabs 70 % PADS 5/3/2025 Family Member No Yes   Sig: May substitute brand based on insurance coverage. Check glucose BID.   Blood Glucose Monitoring Suppl (OneTouch Verio Reflect) w/Device KIT 5/3/2025 Family Member No Yes   Sig: May substitute brand based on insurance coverage. Check glucose BID.   OLANZapine (ZyPREXA) 10 mg tablet 5/2/2025  No Yes   Sig: Take 1 tablet (10 mg total) by mouth daily at bedtime   OneTouch Delica Lancets 33G MISC 5/3/2025 Family Member No Yes   Sig: May substitute brand based on insurance coverage. Check glucose BID.   cyanocobalamin (VITAMIN B-12) 500 MCG tablet 5/3/2025  No Yes   Sig: Take 1 tablet (500 mcg total) by mouth daily   cyclobenzaprine (FLEXERIL) 5 mg tablet Not Taking  No No   Sig: Take 1 tablet (5 mg total) by mouth 3 (three) times a day as needed for muscle spasms   Patient not taking: Reported on 5/3/2025   ergocalciferol (VITAMIN D2) 50,000 units Past Week  No Yes   Sig: Take 1 capsule (50,000 Units total) by mouth once a week  for 9 doses   folic acid (FOLVITE) 1 mg tablet 5/3/2025  No Yes   Sig: Take 1 tablet (1 mg total) by mouth daily   glucose blood (OneTouch Verio) test strip  Family Member No No   Sig: May substitute brand based on insurance coverage. Check glucose BID.   lidocaine (LIDODERM) 5 % Past Week  No Yes   Sig: Apply 2 patches topically over 12 hours daily as needed (pain) Remove & Discard patch within 12 hours or as directed by MD   lithium carbonate (LITHOBID) 300 mg CR tablet 5/3/2025  No Yes   Sig: Take 1 tablet (300 mg total) by mouth every 12 (twelve) hours   melatonin 3 mg 5/2/2025  No Yes   Sig: Take 1 tablet (3 mg total) by mouth daily at bedtime   sitaGLIPtin (JANUVIA) 100 mg tablet 5/3/2025  No Yes   Sig: Take 1 tablet (100 mg total) by mouth daily   traZODone (DESYREL) 50 mg tablet 5/2/2025  No Yes   Sig: Take 1 tablet (50 mg total) by mouth daily at bedtime      Facility-Administered Medications: None     Patient's Medications   Discharge Prescriptions    No medications on file     No discharge procedures on file.  ED SEPSIS DOCUMENTATION   Time reflects when diagnosis was documented in both MDM as applicable and the Disposition within this note       Time User Action Codes Description Comment    5/3/2025 10:17 PM Christopher Correa [F15.10] Methamphetamine abuse (HCC)     5/3/2025 10:18 PM Christopher Correa [R44.0] Auditory hallucination                  Christopher Correa MD  05/03/25 2219       Christopher Correa MD  05/03/25 2222       Christopher Correa MD  05/04/25 5376

## 2025-05-04 NOTE — ED NOTES
Patient screened upon arrival using [place] Suicide Risk Assessment with result of high   Re-screening not required unless change in behavior or suicidal ideation.  Patient's environment appears to be free of unnecessary equipment/cords, and other objects commonly identified for self harm or harm to others.  Behavioral Health Assessment deferred as patient is sleeping and would benefit from additional rest.  Vital signs deferred until patient awake, no signs or symptoms of respiratory distress at this time.    Once patient is awake and able to participate, will complete assessments.  Will continue to monitor patient until Crisis and the Care team can make appropriate disposition and/or transfer/admission accommodations.       Caroline Kuhn, WILDA  05/04/25 5628

## 2025-05-05 ENCOUNTER — HOSPITAL ENCOUNTER (INPATIENT)
Facility: HOSPITAL | Age: 50
LOS: 8 days | Discharge: HOME | DRG: 753 | End: 2025-05-13
Attending: PSYCHIATRY & NEUROLOGY | Admitting: PSYCHIATRY & NEUROLOGY
Payer: COMMERCIAL

## 2025-05-05 VITALS
RESPIRATION RATE: 16 BRPM | WEIGHT: 225 LBS | DIASTOLIC BLOOD PRESSURE: 94 MMHG | BODY MASS INDEX: 29.82 KG/M2 | TEMPERATURE: 97 F | SYSTOLIC BLOOD PRESSURE: 135 MMHG | OXYGEN SATURATION: 97 % | HEART RATE: 75 BPM | HEIGHT: 73 IN

## 2025-05-05 DIAGNOSIS — F20.9 SCHIZOPHRENIA, UNSPECIFIED TYPE (HCC): ICD-10-CM

## 2025-05-05 DIAGNOSIS — F10.10 ALCOHOL ABUSE: ICD-10-CM

## 2025-05-05 DIAGNOSIS — E55.9 VITAMIN D DEFICIENCY: ICD-10-CM

## 2025-05-05 DIAGNOSIS — I10 HYPERTENSION: ICD-10-CM

## 2025-05-05 DIAGNOSIS — R07.81 RIB PAIN: ICD-10-CM

## 2025-05-05 DIAGNOSIS — F15.959 METHAMPHETAMINE-INDUCED PSYCHOTIC DISORDER (HCC): ICD-10-CM

## 2025-05-05 DIAGNOSIS — E53.8 VITAMIN B12 DEFICIENCY: ICD-10-CM

## 2025-05-05 DIAGNOSIS — F31.9 BIPOLAR DISORDER WITH PSYCHOTIC FEATURES (HCC): ICD-10-CM

## 2025-05-05 DIAGNOSIS — R09.81 NASAL CONGESTION: Primary | ICD-10-CM

## 2025-05-05 DIAGNOSIS — M54.50 LUMBAGO: ICD-10-CM

## 2025-05-05 DIAGNOSIS — E11.9 DIABETES MELLITUS (HCC): ICD-10-CM

## 2025-05-05 DIAGNOSIS — F31.70 BIPOLAR DISORDER IN PARTIAL REMISSION, MOST RECENT EPISODE UNSPECIFIED TYPE (HCC): ICD-10-CM

## 2025-05-05 LAB
ATRIAL RATE: 85 BPM
P AXIS: 168 DEGREES
PR INTERVAL: 164 MS
QRS AXIS: 218 DEGREES
QRSD INTERVAL: 84 MS
QT INTERVAL: 366 MS
QTC INTERVAL: 436 MS
T WAVE AXIS: 169 DEGREES
VENTRICULAR RATE: 85 BPM

## 2025-05-05 PROCEDURE — GZ59ZZZ INDIVIDUAL PSYCHOTHERAPY, PSYCHOPHYSIOLOGICAL: ICD-10-PCS | Performed by: PSYCHIATRY & NEUROLOGY

## 2025-05-05 PROCEDURE — 93010 ELECTROCARDIOGRAM REPORT: CPT | Performed by: INTERNAL MEDICINE

## 2025-05-05 PROCEDURE — GZHZZZZ GROUP PSYCHOTHERAPY: ICD-10-PCS | Performed by: PSYCHIATRY & NEUROLOGY

## 2025-05-05 RX ORDER — LORAZEPAM 2 MG/ML
1 INJECTION INTRAMUSCULAR
Status: CANCELLED | OUTPATIENT
Start: 2025-05-05

## 2025-05-05 RX ORDER — TRAZODONE HYDROCHLORIDE 50 MG/1
50 TABLET ORAL
Status: DISCONTINUED | OUTPATIENT
Start: 2025-05-05 | End: 2025-05-13 | Stop reason: HOSPADM

## 2025-05-05 RX ORDER — BENZTROPINE MESYLATE 0.5 MG/1
0.5 TABLET ORAL
Status: CANCELLED | OUTPATIENT
Start: 2025-05-05

## 2025-05-05 RX ORDER — HYDROXYZINE HYDROCHLORIDE 25 MG/1
25 TABLET, FILM COATED ORAL
Status: DISCONTINUED | OUTPATIENT
Start: 2025-05-05 | End: 2025-05-13 | Stop reason: HOSPADM

## 2025-05-05 RX ORDER — OLANZAPINE 5 MG/1
5 TABLET, FILM COATED ORAL
Status: DISCONTINUED | OUTPATIENT
Start: 2025-05-05 | End: 2025-05-13 | Stop reason: HOSPADM

## 2025-05-05 RX ORDER — BENZTROPINE MESYLATE 1 MG/ML
1 INJECTION, SOLUTION INTRAMUSCULAR; INTRAVENOUS
Status: DISCONTINUED | OUTPATIENT
Start: 2025-05-05 | End: 2025-05-13 | Stop reason: HOSPADM

## 2025-05-05 RX ORDER — HYDROXYZINE HYDROCHLORIDE 50 MG/1
25 TABLET, FILM COATED ORAL
Status: CANCELLED | OUTPATIENT
Start: 2025-05-05

## 2025-05-05 RX ORDER — OLANZAPINE 10 MG/1
10 TABLET, FILM COATED ORAL
Status: DISCONTINUED | OUTPATIENT
Start: 2025-05-05 | End: 2025-05-13 | Stop reason: HOSPADM

## 2025-05-05 RX ORDER — LIDOCAINE 50 MG/G
1 PATCH TOPICAL DAILY
Status: DISCONTINUED | OUTPATIENT
Start: 2025-05-05 | End: 2025-05-06

## 2025-05-05 RX ORDER — ACETAMINOPHEN 325 MG/1
975 TABLET ORAL EVERY 6 HOURS PRN
Status: CANCELLED | OUTPATIENT
Start: 2025-05-05

## 2025-05-05 RX ORDER — LORAZEPAM 1 MG/1
0.5 TABLET ORAL
Status: CANCELLED | OUTPATIENT
Start: 2025-05-05

## 2025-05-05 RX ORDER — ECHINACEA PURPUREA EXTRACT 125 MG
1 TABLET ORAL
Status: DISCONTINUED | OUTPATIENT
Start: 2025-05-05 | End: 2025-05-13 | Stop reason: HOSPADM

## 2025-05-05 RX ORDER — FOLIC ACID 1 MG/1
1 TABLET ORAL DAILY
Status: CANCELLED | OUTPATIENT
Start: 2025-05-06

## 2025-05-05 RX ORDER — BENZTROPINE MESYLATE 1 MG/ML
1 INJECTION, SOLUTION INTRAMUSCULAR; INTRAVENOUS
Status: CANCELLED | OUTPATIENT
Start: 2025-05-05

## 2025-05-05 RX ORDER — ACETAMINOPHEN 325 MG/1
975 TABLET ORAL EVERY 6 HOURS PRN
Status: DISCONTINUED | OUTPATIENT
Start: 2025-05-05 | End: 2025-05-13 | Stop reason: HOSPADM

## 2025-05-05 RX ORDER — LORAZEPAM 1 MG/1
1 TABLET ORAL
Status: DISCONTINUED | OUTPATIENT
Start: 2025-05-05 | End: 2025-05-13 | Stop reason: HOSPADM

## 2025-05-05 RX ORDER — OLANZAPINE 2.5 MG/1
5 TABLET, FILM COATED ORAL
Status: CANCELLED | OUTPATIENT
Start: 2025-05-05

## 2025-05-05 RX ORDER — OLANZAPINE 2.5 MG/1
2.5 TABLET, FILM COATED ORAL
Status: DISCONTINUED | OUTPATIENT
Start: 2025-05-05 | End: 2025-05-13 | Stop reason: HOSPADM

## 2025-05-05 RX ORDER — ACETAMINOPHEN 325 MG/1
650 TABLET ORAL EVERY 4 HOURS PRN
Status: CANCELLED | OUTPATIENT
Start: 2025-05-05

## 2025-05-05 RX ORDER — ERGOCALCIFEROL 1.25 MG/1
50000 CAPSULE, LIQUID FILLED ORAL WEEKLY
Status: CANCELLED | OUTPATIENT
Start: 2025-05-11

## 2025-05-05 RX ORDER — CYCLOBENZAPRINE HCL 10 MG
5 TABLET ORAL 3 TIMES DAILY PRN
Status: DISCONTINUED | OUTPATIENT
Start: 2025-05-05 | End: 2025-05-13 | Stop reason: HOSPADM

## 2025-05-05 RX ORDER — LORAZEPAM 1 MG/1
1 TABLET ORAL
Status: CANCELLED | OUTPATIENT
Start: 2025-05-05

## 2025-05-05 RX ORDER — ACETAMINOPHEN 325 MG/1
650 TABLET ORAL EVERY 4 HOURS PRN
Status: DISCONTINUED | OUTPATIENT
Start: 2025-05-05 | End: 2025-05-13 | Stop reason: HOSPADM

## 2025-05-05 RX ORDER — ERGOCALCIFEROL 1.25 MG/1
50000 CAPSULE, LIQUID FILLED ORAL WEEKLY
Status: DISCONTINUED | OUTPATIENT
Start: 2025-05-11 | End: 2025-05-13 | Stop reason: HOSPADM

## 2025-05-05 RX ORDER — LORAZEPAM 2 MG/ML
1 INJECTION INTRAMUSCULAR
Status: DISCONTINUED | OUTPATIENT
Start: 2025-05-05 | End: 2025-05-13 | Stop reason: HOSPADM

## 2025-05-05 RX ORDER — OLANZAPINE 2.5 MG/1
2.5 TABLET, FILM COATED ORAL
Status: CANCELLED | OUTPATIENT
Start: 2025-05-05

## 2025-05-05 RX ORDER — FOLIC ACID 1 MG/1
1 TABLET ORAL DAILY
Status: DISCONTINUED | OUTPATIENT
Start: 2025-05-06 | End: 2025-05-13 | Stop reason: HOSPADM

## 2025-05-05 RX ORDER — OLANZAPINE 2.5 MG/1
10 TABLET, FILM COATED ORAL
Status: CANCELLED | OUTPATIENT
Start: 2025-05-05

## 2025-05-05 RX ORDER — LISINOPRIL 5 MG/1
5 TABLET ORAL DAILY
Status: DISCONTINUED | OUTPATIENT
Start: 2025-05-05 | End: 2025-05-13 | Stop reason: HOSPADM

## 2025-05-05 RX ORDER — LITHIUM CARBONATE 300 MG/1
300 TABLET, FILM COATED, EXTENDED RELEASE ORAL EVERY 12 HOURS SCHEDULED
Status: CANCELLED | OUTPATIENT
Start: 2025-05-05

## 2025-05-05 RX ORDER — BENZTROPINE MESYLATE 0.5 MG/1
0.5 TABLET ORAL
Status: DISCONTINUED | OUTPATIENT
Start: 2025-05-05 | End: 2025-05-13 | Stop reason: HOSPADM

## 2025-05-05 RX ORDER — OLANZAPINE 10 MG/2ML
5 INJECTION, POWDER, FOR SOLUTION INTRAMUSCULAR
Status: CANCELLED | OUTPATIENT
Start: 2025-05-05

## 2025-05-05 RX ORDER — LORAZEPAM 0.5 MG/1
0.5 TABLET ORAL
Status: DISCONTINUED | OUTPATIENT
Start: 2025-05-05 | End: 2025-05-13 | Stop reason: HOSPADM

## 2025-05-05 RX ORDER — LITHIUM CARBONATE 300 MG/1
300 TABLET, FILM COATED, EXTENDED RELEASE ORAL EVERY 12 HOURS SCHEDULED
Status: DISCONTINUED | OUTPATIENT
Start: 2025-05-05 | End: 2025-05-13 | Stop reason: HOSPADM

## 2025-05-05 RX ORDER — TRAZODONE HYDROCHLORIDE 50 MG/1
50 TABLET ORAL
Status: CANCELLED | OUTPATIENT
Start: 2025-05-05

## 2025-05-05 RX ORDER — OLANZAPINE 10 MG/2ML
5 INJECTION, POWDER, FOR SOLUTION INTRAMUSCULAR
Status: DISCONTINUED | OUTPATIENT
Start: 2025-05-05 | End: 2025-05-13 | Stop reason: HOSPADM

## 2025-05-05 RX ORDER — CYCLOBENZAPRINE HCL 10 MG
5 TABLET ORAL 3 TIMES DAILY PRN
Status: CANCELLED | OUTPATIENT
Start: 2025-05-05

## 2025-05-05 RX ADMIN — LISINOPRIL 5 MG: 5 TABLET ORAL at 17:07

## 2025-05-05 RX ADMIN — TRAZODONE HYDROCHLORIDE 50 MG: 50 TABLET ORAL at 20:43

## 2025-05-05 RX ADMIN — LITHIUM CARBONATE 300 MG: 300 TABLET, EXTENDED RELEASE ORAL at 20:43

## 2025-05-05 RX ADMIN — LITHIUM CARBONATE 300 MG: 300 TABLET, EXTENDED RELEASE ORAL at 10:41

## 2025-05-05 RX ADMIN — CYANOCOBALAMIN TAB 500 MCG 500 MCG: 500 TAB at 10:41

## 2025-05-05 RX ADMIN — FOLIC ACID 1 MG: 1 TABLET ORAL at 10:41

## 2025-05-05 RX ADMIN — OLANZAPINE 10 MG: 10 TABLET, FILM COATED ORAL at 20:43

## 2025-05-05 RX ADMIN — Medication 3 MG: at 20:43

## 2025-05-05 RX ADMIN — SITAGLIPTIN 100 MG: 25 TABLET, FILM COATED ORAL at 10:40

## 2025-05-05 RX ADMIN — LIDOCAINE 1 PATCH: 50 PATCH CUTANEOUS at 17:07

## 2025-05-05 NOTE — ED NOTES
Patient screened upon arrival using [place] Suicide Risk Assessment with result of high   Re-screening not required unless change in behavior or suicidal ideation.  Patient's environment appears to be free of unnecessary equipment/cords, and other objects commonly identified for self harm or harm to others.  Behavioral Health Assessment deferred as patient is sleeping and would benefit from additional rest.  Vital signs deferred until patient awake, no signs or symptoms of respiratory distress at this time.    Once patient is awake and able to participate, will complete assessments.  Will continue to monitor patient until Crisis and the Care team can make appropriate disposition and/or transfer/admission accommodations.        Caroline Kuhn, WILDA  05/05/25 0721

## 2025-05-05 NOTE — ED NOTES
Pt accepted at [facility].  Accepting provider Dr.Kim salomon Corrigan with intake     [facility]

## 2025-05-05 NOTE — ED NOTES
[facility]  [facility] 1300. Pt appears to be understanding and denies any questions at this tme

## 2025-05-05 NOTE — ED NOTES
Patient screened upon arrival using [place] Suicide Risk Assessment with result of HIGH  Re-screening not required unless change in behavior or suicidal ideation.  Patient's environment appears to be free of unnecessary equipment/cords, and other objects commonly identified for self harm or harm to others.  Behavioral Health Assessment deferred as patient is sleeping and would benefit from additional rest.  Vital signs deferred until patient awake, no signs or symptoms of respiratory distress at this time.    Once patient is awake and able to participate, will complete assessments.  Will continue to monitor patient until Crisis and the Care team can make appropriate disposition and/or transfer/admission accommodations.       Michelle Menchaca, WILDA  05/05/25 6110

## 2025-05-05 NOTE — ED NOTES
Call placed to [facility] to confirm accepting facility and auth #. Spoke with Maddy.    Authorization #[id number]    5 days approved    Review on Friday 5/9/2025

## 2025-05-05 NOTE — H&P
Vanda Trimble#  :1975 M  MRN:[id number]    [facility]:[id number]  Adm Date: 2025 1309  1:09 PM   ATT PHY: Jef Paige Md  [facility]         Chief Complaint:   Worsening depression and anxiety    History of Presenting Illness: Osei Trimble is a(n) 50 y.o. year old male who was admitted through ED due to worsening depression and anxiety along with auditory hallucinations.    Patient examined at bedside.  Patient denied any active suicidal homicidal ideations.    Allergies   Allergen Reactions    Tomato (Diagnostic) - Food Allergy Anaphylaxis    Tomato - Food Allergy Anaphylaxis     raw    Poison Ivy Extract Hives    Poison Sumac Extract Hives       Current Facility-Administered Medications on File Prior to Encounter   Medication Dose Route Frequency Provider Last Rate Last Admin    [DISCONTINUED] cyanocobalamin (VITAMIN B-12) tablet 500 mcg  500 mcg Oral Daily Christopher Correa MD   500 mcg at 25    [DISCONTINUED] cyclobenzaprine (FLEXERIL) tablet 5 mg  5 mg Oral TID PRN Christopher Correa MD        [DISCONTINUED] ergocalciferol (VITAMIN D2) capsule 50,000 Units  50,000 Units Oral Weekly Christopher Correa MD   50,000 Units at 25 1124    [DISCONTINUED] folic acid (FOLVITE) tablet 1 mg  1 mg Oral Daily Christopher Correa MD   1 mg at 25    [DISCONTINUED] lithium carbonate (LITHOBID) CR tablet 300 mg  300 mg Oral Q12H BERENICE Christopher Correa MD   300 mg at 25    [DISCONTINUED] melatonin tablet 3 mg  3 mg Oral HS Christopher Correa MD   3 mg at 25    [DISCONTINUED] OLANZapine (ZyPREXA) tablet 10 mg  10 mg Oral HS Christopher Correa MD   10 mg at 25    [DISCONTINUED] sitaGLIPtin (JANUVIA) tablet 100 mg  100 mg Oral Daily Christopher Correa MD   100 mg at 25    [DISCONTINUED] traZODone (DESYREL) tablet 50 mg  50 mg Oral HS Christopher Correa MD   50 mg at 25     Current Outpatient Medications on File Prior  to Encounter   Medication Sig Dispense Refill    cyanocobalamin (VITAMIN B-12) 500 MCG tablet Take 1 tablet (500 mcg total) by mouth daily 30 tablet 0    ergocalciferol (VITAMIN D2) 50,000 units Take 1 capsule (50,000 Units total) by mouth once a week for 9 doses 9 capsule 0    folic acid (FOLVITE) 1 mg tablet Take 1 tablet (1 mg total) by mouth daily 30 tablet 0    lithium carbonate (LITHOBID) 300 mg CR tablet Take 1 tablet (300 mg total) by mouth every 12 (twelve) hours 60 tablet 0    melatonin 3 mg Take 1 tablet (3 mg total) by mouth daily at bedtime 30 tablet 0    OLANZapine (ZyPREXA) 10 mg tablet Take 1 tablet (10 mg total) by mouth daily at bedtime 30 tablet 0    sitaGLIPtin (JANUVIA) 100 mg tablet Take 1 tablet (100 mg total) by mouth daily 30 tablet 0    traZODone (DESYREL) 50 mg tablet Take 1 tablet (50 mg total) by mouth daily at bedtime 30 tablet 0    Alcohol Swabs 70 % PADS May substitute brand based on insurance coverage. Check glucose BID. (Patient not taking: Reported on 5/5/2025) 100 each 1    Blood Glucose Monitoring Suppl (OneTouch Verio Reflect) w/Device KIT May substitute brand based on insurance coverage. Check glucose BID. (Patient not taking: Reported on 5/5/2025) 1 kit 0    cyclobenzaprine (FLEXERIL) 5 mg tablet Take 1 tablet (5 mg total) by mouth 3 (three) times a day as needed for muscle spasms (Patient not taking: Reported on 5/5/2025) 5 tablet 0    glucose blood (OneTouch Verio) test strip May substitute brand based on insurance coverage. Check glucose BID. (Patient not taking: Reported on 5/5/2025) 100 each 1    lidocaine (LIDODERM) 5 % Apply 2 patches topically over 12 hours daily as needed (pain) Remove & Discard patch within 12 hours or as directed by MD (Patient not taking: Reported on 5/5/2025) 60 patch 0    OneTouch Delica Lancets 33G MISC May substitute brand based on insurance coverage. Check glucose BID. 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    Abdominal pain 03/27/2023    Chest pain 06/21/2024    Explosive personality disorder (HCC) 06/21/2024    Hyperglycemia 06/22/2024    Acute encephalopathy 06/22/2024    Shock (HCC) 06/22/2024    Thrombocytopenia (HCC) 06/22/2024    Total bilirubin, elevated 06/22/2024    Back pain 06/23/2024    Palliative care by specialist 06/24/2024    Goals of care, counseling/discussion 06/24/2024    Alcohol abuse 06/26/2024    Moderate protein-calorie malnutrition (HCC) 06/26/2024    Ankle sprain 11/14/2024    SIRS (systemic inflammatory response syndrome) (HCC) 02/14/2025    Elevated CK 02/14/2025    Drug abuse (HCC) 02/14/2025    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     Resolved Ambulatory Problems     Diagnosis Date Noted    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 (HCC) 08/05/2022    Acute respiratory failure (HCC) 06/22/2024    Hypotension 06/22/2024    Lactic acidosis 06/22/2024    Hypophosphatemia 06/22/2024    Fever 06/23/2024    Colon cancer screening 01/17/2025    Bipolar disorder with psychotic features (HCC) vs Substance induced psychosis 03/06/2025     Past Medical History:   Diagnosis Date    Allergic     Bipolar disorder in partial remission (HCC)     Diabetes (HCC)     Erectile dysfunction     Hypertension     Kidney stone     Substance abuse (HCC)        Past Surgical History:   Procedure Laterality Date    COLONOSCOPY      EXPLORATORY LAPAROTOMY W/ BOWEL RESECTION N/A 7/21/2022    Procedure: LAPAROTOMY EXPLORATORY W/ SMALL BOWEL RESECTION, liver biopsy;  Surgeon: Rose Mary Lara MD;  Location: [facility];   Service: General    FL RETROGRADE PYELOGRAM  10/06/2021    HERNIA REPAIR      SC CYSTOURETHROSCOPY W/URETERAL CATHETERIZATION Right 10/06/2021    Procedure: CYSTOSCOPY RETROGRADE PYELOGRAM WITH INSERTION STENT URETERAL, RIGHT URETEROSCOPY, STONE EXTRACTION;  Surgeon: Bradly Rivera MD;  Location:  MAIN OR;  Service: Urology       Social History:   Social History     Socioeconomic History    Marital status: Single     Spouse name: None    Number of children: None    Years of education: None    Highest education level: None   Occupational History    None   Tobacco Use    Smoking status: Former     Current packs/day: 0.00     Types: Pipe, Cigarettes     Quit date: 2003     Years since quittin.4    Smokeless tobacco: Never   Vaping Use    Vaping status: Some Days    Substances: THC   Substance and Sexual Activity    Alcohol use: Not Currently     Comment: Drinks a quart of moonshine/night-As of 21 will quit drinking    Drug use: Yes     Types: Marijuana     Comment: Socially (1-2 Monthly)    Sexual activity: Not Currently   Other Topics Concern    None   Social History Narrative    None     Social Drivers of Health     Financial Resource Strain: Not on file   Food Insecurity: No Food Insecurity (3/14/2025)    Hunger Vital Sign     Worried About Running Out of Food in the Last Year: Never true     Ran Out of Food in the Last Year: Never true   Transportation Needs: No Transportation Needs (3/14/2025)    PRAPARE - Transportation     Lack of Transportation (Medical): No     Lack of Transportation (Non-Medical): No   Recent Concern: Transportation Needs - Unmet Transportation Needs (2025)    Nursing - Transportation Risk Classification     Lack of Transportation: Not on file     Lack of Transportation: Yes   Physical Activity: Not on file   Stress: Not on file   Social Connections: Not on file   Intimate Partner Violence: Unknown (3/5/2025)    Nursing IPS     Feels Physically and Emotionally Safe: Not on  "file     Physically Hurt by Someone: Not on file     Humiliated or Emotionally Abused by Someone: Not on file     Physically Hurt by Someone: No     Hurt or Threatened by Someone: No   Housing Stability: Low Risk  (3/14/2025)    Housing Stability Vital Sign     Unable to Pay for Housing in the Last Year: No     Number of Times Moved in the Last Year: 1     Homeless in the Last Year: No       Family History:   Family History   Problem Relation Age of Onset    Diabetes Mother     Thyroid disease Mother     Cancer Father     Thyroid disease Sister     Depression Brother     Cancer Maternal Aunt     Cancer Paternal Uncle     Cancer Paternal Grandmother     No Known Problems Brother     Thyroid disease Sister        Review of Systems   HENT:  Positive for sinus pressure.    Musculoskeletal:  Positive for arthralgias and back pain.   Neurological:  Positive for headaches.   Psychiatric/Behavioral:  Positive for sleep disturbance.    All other systems reviewed and are negative.      Physical Exam   Vitals: Blood pressure 137/100, pulse 92, temperature 97.6 °F (36.4 °C), temperature source Temporal, resp. rate 22, height 6' 1\" (1.854 m), weight 100 kg (221 lb), SpO2 95%.,Body mass index is 29.16 kg/m².  Constitutional: Awake and Alert. Well-developed and well-nourished. No distress.   HENT: PERR,EOMI, conjunctiva normal  Head: Normocephalic and atraumatic.   Mouth/Throat: Oropharynx is clear and moist.    Eyes: Conjunctivae and EOM are normal. Pupils are equal, round, and reactive to light. Right and left eye exhibits no discharge.  Neck: Neck supple. No tracheal deviation present. No thyromegaly present.   Cardiovascular: Normal rate, regular rhythm and normal heart sounds.  Exam reveals no friction rub. No murmur heard.  Pulmonary/Chest: Effort normal and breath sounds normal. No respiratory distress. She has no wheezes.   Abdominal: Soft. Bowel sounds are normal. She exhibits no distension. There is no tenderness. There " is no rebound and no guarding.   Neurological: Cranial Nerves grossly intact. No sensory deficit. Coordination normal.   Musculoskeletal:   Nontender spine  Skin: Skin is warm and dry. No rash noted. No diaphoresis. No erythema. No edema. No cyanosis.    Assessment     Osei Trimble is a(n) 50 y.o. year old male with MDD     Cardiac with history of hypertension.  I will put the patient on lisinopril 5 mg daily.  Type 2 diabetes mellitus.  Patient is on Januvia 100 mg daily.  Patient has been put on carb controlled diet.  I will get Accu-Cheks twice daily.  Patient's most recent hemoglobin A1c on record was 5.6 on 2/18/2025.  I will repeat hemoglobin A1c.  Arthralgia/headache/back pain.  Patient may get Tylenol as needed.  Patient may also get Lidoderm patch over the lower back.  Insomnia.  Patient is on melatonin 3 mg at bedtime.  Folate deficiency.  Patient is on folic acid 1 mg daily.  Vitamin D deficiency.  Patient is on vitamin D bolus doses weekly.  I will repeat vitamin D level  Vitamin B12 deficiency.  Patient is on vitamin B12 supplements.  I will repeat vitamin B12 level.  Nasal congestion.  I will put the patient on Ocean nasal spray as needed.  Psych with MDD.  Patient is being managed by psych.     Prognosis: Fair.     Discharge Plan: In progress.     Advanced Directives: I have discussed in detail the patient the advanced directives.  Patient has not appointed anyone as his POA and has no living will with advanced healthcare directives.  Patient's first contact is his sister Ariadne Velazquez and her phone number is [phone].  When discussing cardiac and pulmonary resuscitation efforts with the patient, the patient wishes to be FULL CODE.     I have spent more than 50 minutes gathering data, doing physical examination, and discussing the advanced directives, which was witnessed by caring staff.

## 2025-05-05 NOTE — ED NOTES
[facility] attempted to check in with patient this morning. Pt appears to be asleep in bed at this time. Will check back in at a later time.

## 2025-05-05 NOTE — ED NOTES
Patient screened upon arrival using [facility] Suicide Risk Assessment with result of high   Re-screening not required unless change in behavior or suicidal ideation.  Patient's environment appears to be free of unnecessary equipment/cords, and other objects commonly identified for self harm or harm to others.  Behavioral Health Assessment deferred as patient is sleeping and would benefit from additional rest.  Vital signs deferred until patient awake, no signs or symptoms of respiratory distress at this time.    Once patient is awake and able to participate, will complete assessments.  Will continue to monitor patient until Crisis and the Care team can make appropriate disposition and/or transfer/admission accommodations.        Caroline Kuhn, WILDA  05/05/25 1838

## 2025-05-05 NOTE — NURSING NOTE
"Patient arrived on 201 from [place] ED in paper scrubs. Patient showered and skin check was performed. Skin was warm, dry, and intact. Reviewed PTA med list. List complete, provider notified. Patient stated he came to the ED after doing meth and THC which increased the severity of his command auditory hallucination. He stated they were telling him to hurt the tech at the ED and kill himself. He currently states he does have CAH, but they are not \"loud\" and he can ignore them. He would not elaborate on plan, but stated he is safe on the unit, has no HI and no SI. Patient stated his sister confiscated his meth. THC use, no tobacco. Social drinker. Patient did not have any belongings. Patient pleasant and cooperative, declined unit tour, and participated in group after admission. Continuous visual safety checks performed throughout the shift. All safety precautions maintained.  "

## 2025-05-06 PROBLEM — F29 PSYCHOTIC DISORDER (HCC): Status: ACTIVE | Noted: 2025-05-06

## 2025-05-06 PROBLEM — F29 PSYCHOTIC DISORDER (HCC): Status: RESOLVED | Noted: 2025-05-06 | Resolved: 2025-05-06

## 2025-05-06 LAB
GLUCOSE SERPL-MCNC: 193 MG/DL (ref 65–140)
GLUCOSE SERPL-MCNC: 204 MG/DL (ref 65–140)

## 2025-05-06 PROCEDURE — 99223 1ST HOSP IP/OBS HIGH 75: CPT | Performed by: PSYCHIATRY & NEUROLOGY

## 2025-05-06 PROCEDURE — 82948 REAGENT STRIP/BLOOD GLUCOSE: CPT

## 2025-05-06 RX ORDER — LIDOCAINE 50 MG/G
1 PATCH TOPICAL
Status: DISCONTINUED | OUTPATIENT
Start: 2025-05-06 | End: 2025-05-13 | Stop reason: HOSPADM

## 2025-05-06 RX ADMIN — LITHIUM CARBONATE 300 MG: 300 TABLET, EXTENDED RELEASE ORAL at 20:47

## 2025-05-06 RX ADMIN — LIDOCAINE 1 PATCH: 50 PATCH TOPICAL at 22:33

## 2025-05-06 RX ADMIN — Medication 3 MG: at 20:47

## 2025-05-06 RX ADMIN — TRAZODONE HYDROCHLORIDE 50 MG: 50 TABLET ORAL at 20:47

## 2025-05-06 RX ADMIN — SITAGLIPTIN 100 MG: 100 TABLET, FILM COATED ORAL at 08:13

## 2025-05-06 RX ADMIN — CYANOCOBALAMIN TAB 500 MCG 500 MCG: 500 TAB at 08:13

## 2025-05-06 RX ADMIN — OLANZAPINE 10 MG: 10 TABLET, FILM COATED ORAL at 20:47

## 2025-05-06 RX ADMIN — FOLIC ACID 1 MG: 1 TABLET ORAL at 08:13

## 2025-05-06 RX ADMIN — LITHIUM CARBONATE 300 MG: 300 TABLET, EXTENDED RELEASE ORAL at 08:13

## 2025-05-06 NOTE — PROGRESS NOTES
05/06/25 0909   Activity/Group Checklist   Group Admission/Discharge   Attendance Attended   Attendance Duration (min) 0-15   Interactions Interacted appropriately   Affect/Mood Appropriate   Goals Achieved Identified feelings;Discussed coping strategies;Able to listen to others;Able to engage in interactions;Able to reflect/comment on own behavior;Able to manage/cope with feelings;Able to self-disclose;Able to recieve feedback     Patient agreeable to meet and complete self assessment with [facility].  Patient information from form can be found in media.

## 2025-05-06 NOTE — CMS CERTIFICATION NOTE
Based on mental, physical, social evaluation I certify that inpatient treatment continues to be medically necessary for this patient for duration of 10 midnight's for treatment of psychosis.  Available alternative community resources do not meet mental health needs at this time.  I further attest that establish written individualized plan of care is updated in outline and patient's medical records.
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
## 2025-05-06 NOTE — PLAN OF CARE
Problem: Alteration in Thoughts and Perception  Goal: Treatment Goal: Gain control of psychotic behaviors/thinking, reduce/eliminate presenting symptoms and demonstrate improved reality functioning upon discharge  Outcome: Progressing  Goal: Verbalize thoughts and feelings  Description: Interventions:- Promote a nonjudgmental and trusting relationship with the patient through active listening and therapeutic communication- Assess patient's level of functioning, behavior and potential for risk- Engage patient in 1 on 1 interactions- Encourage patient to express fears, feelings, frustrations, and discuss symptoms  - [place] patient to reality, help patient recognize reality-based thinking - Administer medications as ordered and assess for potential side effects- Provide the patient education related to the signs and symptoms of the illness and desired effects of prescribed medications  Outcome: Progressing  Goal: Refrain from acting on delusional thinking/internal stimuli  Description: Interventions:- Monitor patient closely, per order - Utilize least restrictive measures - Set reasonable limits, give positive feedback for acceptable - Administer medications as ordered and monitor of potential side effects  Outcome: Progressing  Goal: Agree to be compliant with medication regime, as prescribed and report medication side effects  Description: Interventions:- Offer appropriate PRN medication and supervise ingestion; conduct AIMS, as needed   Outcome: Progressing  Goal: Attend and participate in unit activities, including therapeutic, recreational, and educational groups  Description: Interventions:-Encourage Visitation and family involvement in care  Outcome: Progressing  Goal: Recognize dysfunctional thoughts, communicate reality-based thoughts at the time of discharge  Description: Interventions:- Provide medication and psycho-education to assist patient in compliance and developing insight into his/her illness    Outcome: Progressing  Goal: Complete daily ADLs, including personal hygiene independently, as able  Description: Interventions:- Observe, teach, and assist patient with ADLS- Monitor and promote a balance of rest/activity, with adequate nutrition and elimination   Outcome: Progressing     Problem: Risk for Self Injury/Neglect  Goal: Treatment Goal: Remain safe during length of stay, learn and adopt new coping skills, and be free of self-injurious ideation, impulses and acts at the time of discharge  Outcome: Progressing  Goal: Verbalize thoughts and feelings  Description: Interventions:- Assess and re-assess patient's lethality and potential for self-injury- Engage patient in 1:1 interactions, daily, for a minimum of 15 minutes- Encourage patient to express feelings, fears, frustrations, hopes- Establish rapport/trust with patient   Outcome: Progressing  Goal: Refrain from harming self  Description: Interventions:- Monitor patient closely, per order- Develop a trusting relationship- Supervise medication ingestion, monitor effects and side effects   Outcome: Progressing  Goal: Attend and participate in unit activities, including therapeutic, recreational, and educational groups  Description: Interventions:- Provide therapeutic and educational activities daily, encourage attendance and participation, and document same in the medical record- Obtain collateral information, encourage visitation and family involvement in care   Outcome: Progressing  Goal: Recognize maladaptive responses and adopt new coping mechanisms  Outcome: Progressing  Goal: Complete daily ADLs, including personal hygiene independently, as able  Description: Interventions:- Observe, teach, and assist patient with ADLS- Monitor and promote a balance of rest/activity, with adequate nutrition and elimination  Outcome: Progressing     Problem: Depression  Goal: Treatment Goal: Demonstrate behavioral control of depressive symptoms, verbalize feelings of  improved mood/affect, and adopt new coping skills prior to discharge  Outcome: Progressing  Goal: Verbalize thoughts and feelings  Description: Interventions:- Assess and re-assess patient's level of risk - Engage patient in 1:1 interactions, daily, for a minimum of 15 minutes - Encourage patient to express feelings, fears, frustrations, hopes   Outcome: Progressing  Goal: Refrain from harming self  Description: Interventions:- Monitor patient closely, per order - Supervise medication ingestion, monitor effects and side effects   Outcome: Progressing  Goal: Refrain from isolation  Description: Interventions:- Develop a trusting relationship - Encourage socialization   Outcome: Progressing  Goal: Refrain from self-neglect  Outcome: Progressing  Goal: Attend and participate in unit activities, including therapeutic, recreational, and educational groups  Description: Interventions:- Provide therapeutic and educational activities daily, encourage attendance and participation, and document same in the medical record   Outcome: Progressing  Goal: Complete daily ADLs, including personal hygiene independently, as able  Description: Interventions:- Observe, teach, and assist patient with ADLS-  Monitor and promote a balance of rest/activity, with adequate nutrition and elimination   Outcome: Progressing     Problem: Anxiety  Goal: Anxiety is at manageable level  Description: Interventions:- Assess and monitor patient's anxiety level. - Monitor for signs and symptoms (heart palpitations, chest pain, shortness of breath, headaches, nausea, feeling jumpy, restlessness, irritable, apprehensive). - Collaborate with interdisciplinary team and initiate plan and interventions as ordered.- [place] patient to unit/surroundings- Explain treatment plan- Encourage participation in care- Encourage verbalization of concerns/fears- Identify coping mechanisms- Assist in developing anxiety-reducing skills- Administer/offer alternative  therapies- Limit or eliminate stimulants  Outcome: Progressing     Problem: Alteration in Orientation  Goal: Treatment Goal: Demonstrate a reduction of confusion and improved orientation to person, place, time and/or situation upon discharge, according to optimum baseline/ability  Outcome: Progressing  Goal: Interact with staff daily  Description: Interventions:- Assess and re-assess patient's level of orientation- Engage patient in 1 on 1 interactions, daily, for a minimum of 15 minutes - Establish rapport/trust with patient   Outcome: Progressing  Goal: Express concerns related to confused thinking related to:  Description: Interventions:- Encourage patient to express feelings, fears, frustrations, hopes- Assign consistent caregivers - [place]/re-orient patient as needed- Allow comfort items, as appropriate- Provide visual cues, signs, etc.   Outcome: Progressing  Goal: Allow medical examinations, as recommended  Description: Interventions:- Provide physical/neurological exams and/or referrals, per provider   Outcome: Progressing  Goal: Cooperate with recommended testing/procedures  Description: Interventions:- Determine need for ancillary testing- Observe for mental status changes- Implement falls/precaution protocol   Outcome: Progressing  Goal: Attend and participate in unit activities, including therapeutic, recreational, and educational groups  Description: Interventions:- Provide therapeutic and educational activities daily, encourage attendance and participation, and document same in the medical record - Provide appropriate opportunities for reminiscence - Provide a consistent daily routine - Encourage family contact/visitation   Outcome: Progressing  Goal: Complete daily ADLs, including personal hygiene independently, as able  Description: Interventions:- Observe, teach, and assist patient with ADLS  Outcome: Progressing

## 2025-05-06 NOTE — PLAN OF CARE
Problem: Alteration in Thoughts and Perception  Goal: Treatment Goal: Gain control of psychotic behaviors/thinking, reduce/eliminate presenting symptoms and demonstrate improved reality functioning upon discharge  5/5/2025 2341 by Aj Calr RN  Outcome: Progressing  5/5/2025 2317 by Aj Carl RN  Outcome: Progressing  Goal: Verbalize thoughts and feelings  Description: Interventions:- Promote a nonjudgmental and trusting relationship with the patient through active listening and therapeutic communication- Assess patient's level of functioning, behavior and potential for risk- Engage patient in 1 on 1 interactions- Encourage patient to express fears, feelings, frustrations, and discuss symptoms  - [place] patient to reality, help patient recognize reality-based thinking - Administer medications as ordered and assess for potential side effects- Provide the patient education related to the signs and symptoms of the illness and desired effects of prescribed medications  5/5/2025 2341 by Aj Carl RN  Outcome: Progressing  5/5/2025 2317 by Aj Carl RN  Outcome: Progressing  Goal: Refrain from acting on delusional thinking/internal stimuli  Description: Interventions:- Monitor patient closely, per order - Utilize least restrictive measures - Set reasonable limits, give positive feedback for acceptable - Administer medications as ordered and monitor of potential side effects  5/5/2025 2341 by Aj Carl RN  Outcome: Progressing  5/5/2025 2317 by Aj Carl RN  Outcome: Progressing  Goal: Agree to be compliant with medication regime, as prescribed and report medication side effects  Description: Interventions:- Offer appropriate PRN medication and supervise ingestion; conduct AIMS, as needed   5/5/2025 2341 by Aj Carl RN  Outcome: Progressing  5/5/2025 2317 by jA Carl RN  Outcome: Progressing  Goal: Attend and  participate in unit activities, including therapeutic, recreational, and educational groups  Description: Interventions:-Encourage Visitation and family involvement in care  5/5/2025 2341 by Aj Carl RN  Outcome: Progressing  5/5/2025 2317 by Aj Carl RN  Outcome: Progressing  Goal: Recognize dysfunctional thoughts, communicate reality-based thoughts at the time of discharge  Description: Interventions:- Provide medication and psycho-education to assist patient in compliance and developing insight into his/her illness   5/5/2025 2341 by Aj Carl RN  Outcome: Progressing  5/5/2025 2317 by Aj Carl RN  Outcome: Progressing  Goal: Complete daily ADLs, including personal hygiene independently, as able  Description: Interventions:- Observe, teach, and assist patient with ADLS- Monitor and promote a balance of rest/activity, with adequate nutrition and elimination   5/5/2025 2341 by Aj Carl RN  Outcome: Progressing  5/5/2025 2317 by Aj Carl RN  Outcome: Progressing     Problem: Risk for Self Injury/Neglect  Goal: Treatment Goal: Remain safe during length of stay, learn and adopt new coping skills, and be free of self-injurious ideation, impulses and acts at the time of discharge  5/5/2025 2341 by Aj Carl RN  Outcome: Progressing  5/5/2025 2317 by Aj Carl RN  Outcome: Progressing  Goal: Verbalize thoughts and feelings  Description: Interventions:- Assess and re-assess patient's lethality and potential for self-injury- Engage patient in 1:1 interactions, daily, for a minimum of 15 minutes- Encourage patient to express feelings, fears, frustrations, hopes- Establish rapport/trust with patient   5/5/2025 2341 by Aj Carl RN  Outcome: Progressing  5/5/2025 2317 by Aj Carl RN  Outcome: Progressing  Goal: Refrain from harming self  Description: Interventions:- Monitor patient closely,  per order- Develop a trusting relationship- Supervise medication ingestion, monitor effects and side effects   5/5/2025 2341 by Aj Carl RN  Outcome: Progressing  5/5/2025 2317 by Aj Carl RN  Outcome: Progressing  Goal: Attend and participate in unit activities, including therapeutic, recreational, and educational groups  Description: Interventions:- Provide therapeutic and educational activities daily, encourage attendance and participation, and document same in the medical record- Obtain collateral information, encourage visitation and family involvement in care   5/5/2025 2341 by Aj Carl RN  Outcome: Progressing  5/5/2025 2317 by Aj Carl RN  Outcome: Progressing  Goal: Recognize maladaptive responses and adopt new coping mechanisms  5/5/2025 2341 by Aj Carl RN  Outcome: Progressing  5/5/2025 2317 by Aj Carl RN  Outcome: Progressing  Goal: Complete daily ADLs, including personal hygiene independently, as able  Description: Interventions:- Observe, teach, and assist patient with ADLS- Monitor and promote a balance of rest/activity, with adequate nutrition and elimination  5/5/2025 2341 by Aj Carl RN  Outcome: Progressing  5/5/2025 2317 by Aj Carl RN  Outcome: Progressing     Problem: Depression  Goal: Treatment Goal: Demonstrate behavioral control of depressive symptoms, verbalize feelings of improved mood/affect, and adopt new coping skills prior to discharge  5/5/2025 2341 by Aj Carl RN  Outcome: Progressing  5/5/2025 2317 by Aj Carl RN  Outcome: Progressing  Goal: Verbalize thoughts and feelings  Description: Interventions:- Assess and re-assess patient's level of risk - Engage patient in 1:1 interactions, daily, for a minimum of 15 minutes - Encourage patient to express feelings, fears, frustrations, hopes   5/5/2025 2341 by Aj Carl RN  Outcome:  Progressing  5/5/2025 2317 by Aj Carl RN  Outcome: Progressing  Goal: Refrain from harming self  Description: Interventions:- Monitor patient closely, per order - Supervise medication ingestion, monitor effects and side effects   5/5/2025 2341 by Aj Carl RN  Outcome: Progressing  5/5/2025 2317 by Aj Carl RN  Outcome: Progressing  Goal: Refrain from isolation  Description: Interventions:- Develop a trusting relationship - Encourage socialization   5/5/2025 2341 by Aj Carl RN  Outcome: Progressing  5/5/2025 2317 by Aj Carl RN  Outcome: Progressing  Goal: Refrain from self-neglect  5/5/2025 2341 by Aj Carl RN  Outcome: Progressing  5/5/2025 2317 by Aj Carl RN  Outcome: Progressing  Goal: Attend and participate in unit activities, including therapeutic, recreational, and educational groups  Description: Interventions:- Provide therapeutic and educational activities daily, encourage attendance and participation, and document same in the medical record   5/5/2025 2341 by Aj Carl RN  Outcome: Progressing  5/5/2025 2317 by Aj Carl RN  Outcome: Progressing  Goal: Complete daily ADLs, including personal hygiene independently, as able  Description: Interventions:- Observe, teach, and assist patient with ADLS-  Monitor and promote a balance of rest/activity, with adequate nutrition and elimination   5/5/2025 2341 by Aj Carl RN  Outcome: Progressing  5/5/2025 2317 by Aj Carl RN  Outcome: Progressing     Problem: Anxiety  Goal: Anxiety is at manageable level  Description: Interventions:- Assess and monitor patient's anxiety level. - Monitor for signs and symptoms (heart palpitations, chest pain, shortness of breath, headaches, nausea, feeling jumpy, restlessness, irritable, apprehensive). - Collaborate with interdisciplinary team and initiate plan and interventions as  ordered.- [place] patient to unit/surroundings- Explain treatment plan- Encourage participation in care- Encourage verbalization of concerns/fears- Identify coping mechanisms- Assist in developing anxiety-reducing skills- Administer/offer alternative therapies- Limit or eliminate stimulants  5/5/2025 2341 by Aj Carl RN  Outcome: Progressing  5/5/2025 2317 by Aj Carl RN  Outcome: Progressing     Problem: Alteration in Orientation  Goal: Treatment Goal: Demonstrate a reduction of confusion and improved orientation to person, place, time and/or situation upon discharge, according to optimum baseline/ability  5/5/2025 2341 by Aj Carl RN  Outcome: Progressing  5/5/2025 2317 by Aj aCrl RN  Outcome: Progressing  Goal: Interact with staff daily  Description: Interventions:- Assess and re-assess patient's level of orientation- Engage patient in 1 on 1 interactions, daily, for a minimum of 15 minutes - Establish rapport/trust with patient   5/5/2025 2341 by Aj Carl RN  Outcome: Progressing  5/5/2025 2317 by Aj Carl RN  Outcome: Progressing  Goal: Express concerns related to confused thinking related to:  Description: Interventions:- Encourage patient to express feelings, fears, frustrations, hopes- Assign consistent caregivers - [place]/re-orient patient as needed- Allow comfort items, as appropriate- Provide visual cues, signs, etc.   5/5/2025 2341 by Aj Carl RN  Outcome: Progressing  5/5/2025 2317 by Aj Carl RN  Outcome: Progressing  Goal: Allow medical examinations, as recommended  Description: Interventions:- Provide physical/neurological exams and/or referrals, per provider   5/5/2025 2341 by Aj Carl RN  Outcome: Progressing  5/5/2025 2317 by Aj Carl RN  Outcome: Progressing  Goal: Cooperate with recommended testing/procedures  Description: Interventions:- Determine need for  ancillary testing- Observe for mental status changes- Implement falls/precaution protocol   5/5/2025 2341 by Aj Carl RN  Outcome: Progressing  5/5/2025 2317 by Aj Carl RN  Outcome: Progressing  Goal: Attend and participate in unit activities, including therapeutic, recreational, and educational groups  Description: Interventions:- Provide therapeutic and educational activities daily, encourage attendance and participation, and document same in the medical record - Provide appropriate opportunities for reminiscence - Provide a consistent daily routine - Encourage family contact/visitation   5/5/2025 2341 by Aj Carl RN  Outcome: Progressing  5/5/2025 2317 by Aj Carl RN  Outcome: Progressing  Goal: Complete daily ADLs, including personal hygiene independently, as able  Description: Interventions:- Observe, teach, and assist patient with ADLS  5/5/2025 2341 by Aj Carl RN  Outcome: Progressing  5/5/2025 2317 by Aj Carl RN  Outcome: Progressing

## 2025-05-06 NOTE — NURSING NOTE
Patient has been visible on the unit for meals and then returns to his room. He slept well last night. He denied anxiety,depression,SI, or HI. He endorsed having auditory hallucinations of voices telling him to hurt himself. He stated he feels safe here and will not hurt himself while on the unit. Patient pleasant and cooperative. Medication compliant. Safety precautions maintained.

## 2025-05-06 NOTE — PROGRESS NOTES
"Progress Note - Osei Trimble 50 y.o. male MRN: [id number]    Unit/Bed#: [facility] 209-02 Encounter: [id number]        Subjective:   Patient seen and examined at bedside after reviewing the chart and discussing the case with the caring staff.      Patient examined at bedside.  Patient has no acute symptoms.    Labs waiting to be collected.     Physical Exam   Vitals: Blood pressure 101/61, pulse 64, temperature 97.6 °F (36.4 °C), temperature source Temporal, resp. rate 18, height 6' 1\" (1.854 m), weight 100 kg (221 lb), SpO2 97%.,Body mass index is 29.16 kg/m².  Constitutional: Patient in no acute distress.  HEENT: PERR, EOMI, MMM.  Cardiovascular: Normal rate and regular rhythm.  Pulmonary/Chest: Effort normal and breath sounds normal.  Abdomen: Soft, + BS, NT.    Assessment/Plan:  Osei Trimble is a(n) 50 y.o. year old male with MDD.     Hypertension.  Pt started on lisinopril 5 mg daily 5/5 by Dr. Pillai.  Cont to monitor vitals.  Of note, pt on lithium.   T2DM.  Hgb A1c 5.6% on 2/18/25.  Continue Januvia 100 mg daily.  Carb controlled diet.  Accu-cheks twice daily.    Neuroendocrine carcinoma of small bowel.  Diagnosed 7/2022.  Pt is following with hem/onc Dr. Green on outpt basis.  He is on lanreotide on every 4-week basis (last infusion 4/16/25, next 5/14/25).  Substance abuse.  UDS on 5/3 +amph/meth, THC.  DJD/OA/back pain.  Tylenol as needed.  Apply Lidoderm patch to lower back.  Folate deficiency.  Patient is on folic acid 1 mg daily.  Vitamin D deficiency.  Patient is on vitamin D bolus doses weekly.   Vitamin B12 deficiency.  Patient is on vitamin B12 500 mcg daily  Nasal congestion.  Saline nasal spray as needed.    The patient was discussed with Dr. Pillai and he is in agreement with the above note.  "

## 2025-05-06 NOTE — TREATMENT PLAN
TREATMENT PLAN REVIEW - Behavioral Health Osei Trimble 50 y.o. 1975 male MRN: [id number]    [facility] Room / Bed: [facility] 209/[facility] 209-02 Encounter: [id number]          Admit Date/Time:  5/5/2025  1:09 PM    Treatment Team:   MD Jordan Moore MD Jacqueline F Wagner Patricia McEvoy Nicole L Saas Patricia Solt, RN Paul F Klose, RN Nelly Vesga Denise Rehrig, RN    Diagnosis: Active Problems:    Explosive personality disorder (HCC)    Methamphetamine-induced psychotic disorder (HCC)      Patient Strengths/Assets: capable of independent living, financial means, supportive sister    Patient Barriers/Limitations: chronic mental illness, limited insight, limited motivation, noncompliant with treatment, poor interpersonal skills, poor reasoning ability    Short Term Goals: decrease in depressive symptoms, decrease in paranoid thoughts, decrease in psychotic symptoms, decrease in suicidal thoughts, decrease in level of agitation, ability to stay safe on the unit, ability to stay free of restraints, improvement in ability to express basic needs    Long Term Goals: free of suicidal thoughts, resolution of psychotic symptoms, improvement in reality testing, able to express basic needs, acceptance of need for psychiatric medications, acceptance of need for psychiatric treatment    Progress Towards Goals: starting psychiatric medications as prescribed    Recommended Treatment: medication management, patient medication education, group therapy, milieu therapy, continued Behavioral Health psychiatric evaluation/assessment process    Treatment Frequency: daily medication monitoring, group and milieu therapy daily, monitoring through interdisciplinary rounds, monitoring through weekly patient care conferences    Expected Discharge Date:  5/16/2025    Discharge Plan: discharge to home    Treatment Plan Created/Updated By: Rodolfo Johnson MD

## 2025-05-06 NOTE — NURSING NOTE
AM Lab draw deferred due to patient being a new admission.  Will attempt after seen by Unit Provider and all specimens can be obtained at one time to prevent excessive sticks.

## 2025-05-06 NOTE — PLAN OF CARE
Problem: DISCHARGE PLANNING - CARE MANAGEMENT  Goal: Discharge to post-acute care or home with appropriate resources  Description: INTERVENTIONS:- Conduct assessment to determine patient/family and health care team treatment goals, and need for post-acute services based on payer coverage, community resources, and patient preferences, and barriers to discharge- Address psychosocial, clinical, and financial barriers to discharge as identified in assessment in conjunction with the patient/family and health care team- Arrange appropriate level of post-acute services according to patient’s   needs and preference and payer coverage in collaboration with the physician and health care team- Communicate with and update the patient/family, physician, and health care team regarding progress on the discharge plan- Arrange appropriate transportation to post-acute venues  Outcome: Progressing     New , Goal initiated.

## 2025-05-06 NOTE — PLAN OF CARE
Problem: Alteration in Thoughts and Perception  Goal: Treatment Goal: Gain control of psychotic behaviors/thinking, reduce/eliminate presenting symptoms and demonstrate improved reality functioning upon discharge  Outcome: Progressing  Goal: Verbalize thoughts and feelings  Description: Interventions:- Promote a nonjudgmental and trusting relationship with the patient through active listening and therapeutic communication- Assess patient's level of functioning, behavior and potential for risk- Engage patient in 1 on 1 interactions- Encourage patient to express fears, feelings, frustrations, and discuss symptoms  - [place] patient to reality, help patient recognize reality-based thinking - Administer medications as ordered and assess for potential side effects- Provide the patient education related to the signs and symptoms of the illness and desired effects of prescribed medications  Outcome: Progressing  Goal: Refrain from acting on delusional thinking/internal stimuli  Description: Interventions:- Monitor patient closely, per order - Utilize least restrictive measures - Set reasonable limits, give positive feedback for acceptable - Administer medications as ordered and monitor of potential side effects  Outcome: Progressing  Goal: Agree to be compliant with medication regime, as prescribed and report medication side effects  Description: Interventions:- Offer appropriate PRN medication and supervise ingestion; conduct AIMS, as needed   Outcome: Progressing  Goal: Attend and participate in unit activities, including therapeutic, recreational, and educational groups  Description: Interventions:-Encourage Visitation and family involvement in care  Outcome: Progressing  Goal: Recognize dysfunctional thoughts, communicate reality-based thoughts at the time of discharge  Description: Interventions:- Provide medication and psycho-education to assist patient in compliance and developing insight into his/her illness    Outcome: Progressing  Goal: Complete daily ADLs, including personal hygiene independently, as able  Description: Interventions:- Observe, teach, and assist patient with ADLS- Monitor and promote a balance of rest/activity, with adequate nutrition and elimination   Outcome: Progressing     Problem: Risk for Self Injury/Neglect  Goal: Treatment Goal: Remain safe during length of stay, learn and adopt new coping skills, and be free of self-injurious ideation, impulses and acts at the time of discharge  Outcome: Progressing  Goal: Verbalize thoughts and feelings  Description: Interventions:- Assess and re-assess patient's lethality and potential for self-injury- Engage patient in 1:1 interactions, daily, for a minimum of 15 minutes- Encourage patient to express feelings, fears, frustrations, hopes- Establish rapport/trust with patient   Outcome: Progressing  Goal: Refrain from harming self  Description: Interventions:- Monitor patient closely, per order- Develop a trusting relationship- Supervise medication ingestion, monitor effects and side effects   Outcome: Progressing  Goal: Attend and participate in unit activities, including therapeutic, recreational, and educational groups  Description: Interventions:- Provide therapeutic and educational activities daily, encourage attendance and participation, and document same in the medical record- Obtain collateral information, encourage visitation and family involvement in care   Outcome: Progressing  Goal: Recognize maladaptive responses and adopt new coping mechanisms  Outcome: Progressing  Goal: Complete daily ADLs, including personal hygiene independently, as able  Description: Interventions:- Observe, teach, and assist patient with ADLS- Monitor and promote a balance of rest/activity, with adequate nutrition and elimination  Outcome: Progressing     Problem: Depression  Goal: Treatment Goal: Demonstrate behavioral control of depressive symptoms, verbalize feelings of  improved mood/affect, and adopt new coping skills prior to discharge  Outcome: Progressing  Goal: Verbalize thoughts and feelings  Description: Interventions:- Assess and re-assess patient's level of risk - Engage patient in 1:1 interactions, daily, for a minimum of 15 minutes - Encourage patient to express feelings, fears, frustrations, hopes   Outcome: Progressing  Goal: Refrain from harming self  Description: Interventions:- Monitor patient closely, per order - Supervise medication ingestion, monitor effects and side effects   Outcome: Progressing  Goal: Refrain from isolation  Description: Interventions:- Develop a trusting relationship - Encourage socialization   Outcome: Progressing  Goal: Refrain from self-neglect  Outcome: Progressing  Goal: Attend and participate in unit activities, including therapeutic, recreational, and educational groups  Description: Interventions:- Provide therapeutic and educational activities daily, encourage attendance and participation, and document same in the medical record   Outcome: Progressing  Goal: Complete daily ADLs, including personal hygiene independently, as able  Description: Interventions:- Observe, teach, and assist patient with ADLS-  Monitor and promote a balance of rest/activity, with adequate nutrition and elimination   Outcome: Progressing     Problem: Anxiety  Goal: Anxiety is at manageable level  Description: Interventions:- Assess and monitor patient's anxiety level. - Monitor for signs and symptoms (heart palpitations, chest pain, shortness of breath, headaches, nausea, feeling jumpy, restlessness, irritable, apprehensive). - Collaborate with interdisciplinary team and initiate plan and interventions as ordered.- [place] patient to unit/surroundings- Explain treatment plan- Encourage participation in care- Encourage verbalization of concerns/fears- Identify coping mechanisms- Assist in developing anxiety-reducing skills- Administer/offer alternative  therapies- Limit or eliminate stimulants  Outcome: Progressing     Problem: Alteration in Orientation  Goal: Treatment Goal: Demonstrate a reduction of confusion and improved orientation to person, place, time and/or situation upon discharge, according to optimum baseline/ability  Outcome: Progressing  Goal: Interact with staff daily  Description: Interventions:- Assess and re-assess patient's level of orientation- Engage patient in 1 on 1 interactions, daily, for a minimum of 15 minutes - Establish rapport/trust with patient   Outcome: Progressing  Goal: Express concerns related to confused thinking related to:  Description: Interventions:- Encourage patient to express feelings, fears, frustrations, hopes- Assign consistent caregivers - [place]/re-orient patient as needed- Allow comfort items, as appropriate- Provide visual cues, signs, etc.   Outcome: Progressing  Goal: Allow medical examinations, as recommended  Description: Interventions:- Provide physical/neurological exams and/or referrals, per provider   Outcome: Progressing  Goal: Cooperate with recommended testing/procedures  Description: Interventions:- Determine need for ancillary testing- Observe for mental status changes- Implement falls/precaution protocol   Outcome: Progressing  Goal: Attend and participate in unit activities, including therapeutic, recreational, and educational groups  Description: Interventions:- Provide therapeutic and educational activities daily, encourage attendance and participation, and document same in the medical record - Provide appropriate opportunities for reminiscence - Provide a consistent daily routine - Encourage family contact/visitation   Outcome: Progressing  Goal: Complete daily ADLs, including personal hygiene independently, as able  Description: Interventions:- Observe, teach, and assist patient with ADLS  Outcome: Progressing     Problem: DISCHARGE PLANNING - CARE MANAGEMENT  Goal: Discharge to post-acute care  or home with appropriate resources  Description: INTERVENTIONS:- Conduct assessment to determine patient/family and health care team treatment goals, and need for post-acute services based on payer coverage, community resources, and patient preferences, and barriers to discharge- Address psychosocial, clinical, and financial barriers to discharge as identified in assessment in conjunction with the patient/family and health care team- Arrange appropriate level of post-acute services according to patient’s   needs and preference and payer coverage in collaboration with the physician and health care team- Communicate with and update the patient/family, physician, and health care team regarding progress on the discharge plan- Arrange appropriate transportation to post-acute venues  Outcome: Progressing

## 2025-05-06 NOTE — NURSING NOTE
Sleep observed as sound during shift, no respiratory distress.  No signs of withdrawal observed. Continues on q 15 minute safety checks.  Clutter free environment continued to prevent falls. Fluids maintained at beside to promote hydration.  Will continue to monitor.

## 2025-05-06 NOTE — NURSING NOTE
Patient out in community, minimally social.  Pleasant during assessment.  No current acute behaviors noted.  Denies current suicidal ideations, rated anxiety and depression both as moderate.  Talked at length with nurse about issue with daughter. Maintained on q 15 minute safety checks. Continues on fall risk protocols.  Will continue to monitor.

## 2025-05-07 LAB
25(OH)D3 SERPL-MCNC: 18.6 NG/ML (ref 30–100)
CHOLEST SERPL-MCNC: 160 MG/DL (ref ?–200)
EST. AVERAGE GLUCOSE BLD GHB EST-MCNC: 148 MG/DL
FOLATE SERPL-MCNC: >22.3 NG/ML
GLUCOSE SERPL-MCNC: 176 MG/DL (ref 65–140)
GLUCOSE SERPL-MCNC: 180 MG/DL (ref 65–140)
HBA1C MFR BLD: 6.8 %
HDLC SERPL-MCNC: 26 MG/DL
LDLC SERPL CALC-MCNC: 117 MG/DL (ref 0–100)
MAGNESIUM SERPL-MCNC: 1.9 MG/DL (ref 1.9–2.7)
NONHDLC SERPL-MCNC: 134 MG/DL
TRIGL SERPL-MCNC: 84 MG/DL (ref ?–150)
VIT B12 SERPL-MCNC: 569 PG/ML (ref 180–914)

## 2025-05-07 PROCEDURE — 83735 ASSAY OF MAGNESIUM: CPT

## 2025-05-07 PROCEDURE — 82746 ASSAY OF FOLIC ACID SERUM: CPT | Performed by: PSYCHIATRY & NEUROLOGY

## 2025-05-07 PROCEDURE — 99232 SBSQ HOSP IP/OBS MODERATE 35: CPT | Performed by: PSYCHIATRY & NEUROLOGY

## 2025-05-07 PROCEDURE — 80061 LIPID PANEL: CPT | Performed by: PSYCHIATRY & NEUROLOGY

## 2025-05-07 PROCEDURE — 82948 REAGENT STRIP/BLOOD GLUCOSE: CPT

## 2025-05-07 PROCEDURE — 82306 VITAMIN D 25 HYDROXY: CPT | Performed by: PSYCHIATRY & NEUROLOGY

## 2025-05-07 PROCEDURE — 82607 VITAMIN B-12: CPT | Performed by: PSYCHIATRY & NEUROLOGY

## 2025-05-07 PROCEDURE — 83036 HEMOGLOBIN GLYCOSYLATED A1C: CPT | Performed by: PSYCHIATRY & NEUROLOGY

## 2025-05-07 RX ADMIN — OLANZAPINE 10 MG: 10 TABLET, FILM COATED ORAL at 20:58

## 2025-05-07 RX ADMIN — TRAZODONE HYDROCHLORIDE 50 MG: 50 TABLET ORAL at 20:58

## 2025-05-07 RX ADMIN — LIDOCAINE 1 PATCH: 50 PATCH TOPICAL at 21:00

## 2025-05-07 RX ADMIN — LITHIUM CARBONATE 300 MG: 300 TABLET, EXTENDED RELEASE ORAL at 20:58

## 2025-05-07 RX ADMIN — CYANOCOBALAMIN TAB 500 MCG 500 MCG: 500 TAB at 08:44

## 2025-05-07 RX ADMIN — FOLIC ACID 1 MG: 1 TABLET ORAL at 08:44

## 2025-05-07 RX ADMIN — LISINOPRIL 5 MG: 5 TABLET ORAL at 08:44

## 2025-05-07 RX ADMIN — LITHIUM CARBONATE 300 MG: 300 TABLET, EXTENDED RELEASE ORAL at 08:44

## 2025-05-07 RX ADMIN — Medication 3 MG: at 20:58

## 2025-05-07 RX ADMIN — SITAGLIPTIN 100 MG: 100 TABLET, FILM COATED ORAL at 08:44

## 2025-05-07 NOTE — NURSING NOTE
"Patient isolative to his room this morning but more visible in the afternoon  He attended 1 group this shift..  He is noted to be sleeping when he is in his room.  Patient denies depression and SI/HI but does endorse \"low anxiety\" and AH but reports \"they are getting softer and softer so I really have to listen to hear them now\".  He was noted to be sitting in the small TV room with his peers.  Minimal interaction noted but he is pleasant when interacting.  Medication and meal complaint.  Safety precautions maintained.  "

## 2025-05-07 NOTE — NURSING NOTE
Received patient at 1900. Out in community.  Social with select peers. Pleasant during evening assessment. Denied depression and rated anxiety as mild. Denied any suicidal or homicidal ideations.  Safety maintained via clutter-free environment, ID band, and given non-skid socks.  No change in medical condition.  Compliant with medications and snack.  Hygiene is good.  Medication education given.  Care Plan reviewed and amended. Maintained on q 15 minute checks. Will continue to monitor.

## 2025-05-07 NOTE — PROGRESS NOTES
Progress Note - Behavioral Health     Osei Trimble 50 y.o. male MRN: [id number]   Unit/Bed#: [facility] 209-02 Encounter: [id number]    Behavior over the last 24 hours: improving.     Osei seen today, per staff report has been isolative on the unit.  The patient has been sleeping in his room.  He attended 1 group.  He denied depression to the staff or any SI/HI.  He reported that his voices have been getting softer.  Remains pleasant.  No agitation or aggressive behaviors have been reported.    The patient was seen for follow-up.  He reports that he has been doing well.  He states that the voices are decreasing with current medication.  The patient is not reporting any depressed mood or loss of interest motivation.  He says that he is not experiencing any thoughts of self-harm or harm to others.  The patient reports no changes in appetite.  He stated that he did sleep well.  His hygiene has been good.         Sleep: slept better  Appetite: normal  Medication side effects: No side effects are reported.      Mental Status Evaluation:    Appearance:  age appropriate, adequate grooming   Behavior:  pleasant, cooperative   Speech:  normal rate, normal volume   Mood:  anxious   Affect:  appropriate   Thought Process:  logical, coherent, goal directed   Associations: intact associations   Thought Content:  no overt delusions   Perceptual Disturbances: no visual hallucinations, auditory hallucinations still present, but less frequent   Risk Potential: Suicidal ideation - None at present  Homicidal ideation - None at present   Sensorium:  oriented to person, place, and time/date   Memory:  recent and remote memory grossly intact   Consciousness:  alert and awake   Attention: attention span and concentration are age appropriate   Insight:  fair   Judgment: fair   Gait/Station: normal gait/station   Motor Activity: no abnormal movements     Vital signs in last 24 hours:    Temp:  [97.8 °F (36.6 °C)-98.4 °F (36.9 °C)] 97.8 °F  (36.6 °C)  HR:  [65-85] 68  BP: (107-138)/(62-86) 112/73  Resp:  [18] 18  SpO2:  [95 %-96 %] 96 %  O2 Device: None (Room air)    Laboratory results: I have personally reviewed all pertinent laboratory/tests results.        Assessment & Plan   Principal Problem:    Bipolar disorder (HCC)  Active Problems:    Explosive personality disorder (HCC)    Methamphetamine-induced psychotic disorder (HCC)    Recommended Treatment:     Planned medication and treatment changes:      All current active medications have been reviewed  Encourage group therapy, milieu therapy and occupational therapy  Behavioral Health checks for safety monitoring  He is continued on lithium carbonate olanzapine.  Current Facility-Administered Medications   Medication Dose Route Frequency Provider Last Rate    acetaminophen  650 mg Oral Q4H PRN Rodolfo Johnson MD      acetaminophen  650 mg Oral Q4H PRN Rodolfo Johnson MD      acetaminophen  975 mg Oral Q6H PRN Rodolfo Johnson MD      benztropine  1 mg Intramuscular Q4H PRN Max 6/day Rodolfo Johnson MD      benztropine  0.5 mg Oral Q4H PRN Max 6/day Rodolfo Johnson MD      cyanocobalamin  500 mcg Oral Daily Rodolfo Johnson MD      cyclobenzaprine  5 mg Oral TID PRN Rodolfo Johnson MD      [START ON 5/11/2025] ergocalciferol  50,000 Units Oral Weekly Rodolfo Johnson MD      folic acid  1 mg Oral Daily Rodolfo Johnson MD      hydrOXYzine HCL  25 mg Oral Q6H PRN Max 4/day Rodolfo Johnson MD      lidocaine  1 patch Topical HS Lakesha Hernandez PA-C      lisinopril  5 mg Oral Daily Jordan Pillai MD      lithium carbonate  300 mg Oral Q12H [facility] Rodolfo Johnson MD      LORazepam  1 mg Intramuscular Q6H PRN Max 3/day Rodolfo Johnson MD      LORazepam  0.5 mg Oral Q6H PRN Max 4/day Rodolfo Johnson MD      LORazepam  1 mg Oral Q6H PRN Max 3/day Rodolfo Johnson MD      melatonin  3 mg Oral HS Rodolfo Johnson MD      OLANZapine  5 mg Intramuscular Q3H PRN Max 3/day Rodolfo Johnson MD      OLANZapine  10 mg Oral  Rodolfo  MD Alex      OLANZapine  2.5 mg Oral Q4H PRN Max 6/day Rodolfo Johnson MD      OLANZapine  5 mg Oral Q4H PRN Max 3/day Rodolfo Johnson MD      OLANZapine  5 mg Oral Q3H PRN Max 3/day Rodolfo Johnson MD      sitaGLIPtin  100 mg Oral Daily Rodlofo Johnson MD      sodium chloride  1 spray Each Nare Q1H PRN Jordan Pillai MD      traZODone  50 mg Oral HS Rodolfo Johnson MD         Risks / Benefits of Treatment:    Risks, benefits, and possible side effects of medications explained to patient and patient verbalizes understanding and agreement for treatment.    Counseling / Coordination of Care:    Total floor / unit time spent today 15 minutes. Greater than 50% of total time was spent with the patient and / or family counseling and / or coordination of care. A description of counseling / coordination of care:  Patient's progress discussed with staff in treatment team meeting.    Rodolfo Johnson MD 05/07/25

## 2025-05-07 NOTE — NURSING NOTE
No noted suicidal ideations or homicidal behaviors.  Fluids at bedside to promote hydration. No aspiration risks noted. Patient observed sleeping during most q 15 minute safety checks.  No observable distress or changes in medical condition.  No complaints of pain.  Will continue to monitor.

## 2025-05-07 NOTE — PROGRESS NOTES
"Progress Note - Osei Trimble 50 y.o. male MRN: [id number]    Unit/Bed#: [facility] 209-02 Encounter: [id number]        Subjective:   Patient seen and examined at bedside after reviewing the chart and discussing the case with the caring staff.      Patient examined at bedside.  Patient has no acute symptoms.    Labs pending.    Physical Exam   Vitals: Blood pressure 112/73, pulse 68, temperature 97.8 °F (36.6 °C), temperature source Temporal, resp. rate 18, height 6' 1\" (1.854 m), weight 100 kg (221 lb), SpO2 96%.,Body mass index is 29.16 kg/m².  Constitutional: Patient in no acute distress.  HEENT: PERR, EOMI, MMM.  Cardiovascular: Normal rate and regular rhythm.  Pulmonary/Chest: Effort normal and breath sounds normal.  Abdomen: Soft, + BS, NT.    Assessment/Plan:  Osei Trimble is a(n) 50 y.o. year old male with MDD.     Hypertension.  Pt started on lisinopril 5 mg daily 5/5 by Dr. Pillai.  Cont to monitor vitals.  Of note, pt on lithium.   T2DM.  Hgb A1c 5.6% on 2/18/25.  Continue Januvia 100 mg daily.  Carb controlled diet.  Accu-cheks twice daily.    Neuroendocrine carcinoma of small bowel.  Diagnosed 7/2022.  Pt is following with hem/onc Dr. Green on outpt basis.  He is on lanreotide on every 4-week basis (last infusion 4/16/25, next 5/14/25).  Substance abuse.  UDS on 5/3 +amph/meth, THC.  DJD/OA/back pain.  Tylenol as needed.  Apply Lidoderm patch to lower back.  Folate deficiency.  Patient is on folic acid 1 mg daily.  Vitamin D deficiency.  Patient is on vitamin D bolus doses weekly.   Vitamin B12 deficiency.  Patient is on vitamin B12 500 mcg daily  Nasal congestion.  Saline nasal spray as needed.    The patient was discussed with Dr. Pillai and he is in agreement with the above note.  "

## 2025-05-07 NOTE — PLAN OF CARE
Problem: Alteration in Thoughts and Perception  Goal: Attend and participate in unit activities, including therapeutic, recreational, and educational groups  Description: Interventions:-Encourage Visitation and family involvement in care  Outcome: Progressing     Problem: Ineffective Coping  Goal: Participates in unit activities  Description: Interventions:- Provide therapeutic environment - Provide required programming - Redirect inappropriate behaviors   Outcome: Progressing     Problem: Risk for Self Injury/Neglect  Goal: Attend and participate in unit activities, including therapeutic, recreational, and educational groups  Description: Interventions:- Provide therapeutic and educational activities daily, encourage attendance and participation, and document same in the medical record- Obtain collateral information, encourage visitation and family involvement in care   Outcome: Progressing     Problem: Depression  Goal: Attend and participate in unit activities, including therapeutic, recreational, and educational groups  Description: Interventions:- Provide therapeutic and educational activities daily, encourage attendance and participation, and document same in the medical record   Outcome: Progressing     Problem: Alteration in Orientation  Goal: Attend and participate in unit activities, including therapeutic, recreational, and educational groups  Description: Interventions:- Provide therapeutic and educational activities daily, encourage attendance and participation, and document same in the medical record - Provide appropriate opportunities for reminiscence - Provide a consistent daily routine - Encourage family contact/visitation   Outcome: Progressing

## 2025-05-07 NOTE — SOCIAL WORK
05/06/25    Team Meeting   Meeting Type Daily Rounds   Team Members Present   Team Members Present Physician;Nurse;;Occupational Therapist   Physician Team Member Alex HOGUE, Corby JAY   Nursing Team Member Michael RN   Care Management Team Member Leticia RASHID   OT Team Member Nasir SMITH   Patient/Family Present   Patient Present No   Patient's Family Present No   New PT  UDS+  SI with a plan  endorses AH  slept on unit Denies SI/HI and depression . No D/C date Med adjustments.

## 2025-05-08 LAB
GLUCOSE SERPL-MCNC: 135 MG/DL (ref 65–140)
GLUCOSE SERPL-MCNC: 178 MG/DL (ref 65–140)

## 2025-05-08 PROCEDURE — 99232 SBSQ HOSP IP/OBS MODERATE 35: CPT

## 2025-05-08 PROCEDURE — 82948 REAGENT STRIP/BLOOD GLUCOSE: CPT

## 2025-05-08 RX ADMIN — TRAZODONE HYDROCHLORIDE 50 MG: 50 TABLET ORAL at 21:26

## 2025-05-08 RX ADMIN — CYANOCOBALAMIN TAB 500 MCG 500 MCG: 500 TAB at 08:24

## 2025-05-08 RX ADMIN — LITHIUM CARBONATE 300 MG: 300 TABLET, EXTENDED RELEASE ORAL at 21:26

## 2025-05-08 RX ADMIN — OLANZAPINE 10 MG: 10 TABLET, FILM COATED ORAL at 21:26

## 2025-05-08 RX ADMIN — LITHIUM CARBONATE 300 MG: 300 TABLET, EXTENDED RELEASE ORAL at 08:24

## 2025-05-08 RX ADMIN — LIDOCAINE 1 PATCH: 50 PATCH TOPICAL at 21:26

## 2025-05-08 RX ADMIN — LISINOPRIL 5 MG: 5 TABLET ORAL at 08:24

## 2025-05-08 RX ADMIN — SITAGLIPTIN 100 MG: 100 TABLET, FILM COATED ORAL at 08:24

## 2025-05-08 RX ADMIN — Medication 3 MG: at 21:26

## 2025-05-08 RX ADMIN — FOLIC ACID 1 MG: 1 TABLET ORAL at 08:24

## 2025-05-08 NOTE — PROGRESS NOTES
05/08/25    Team Meeting   Meeting Type Daily Rounds   Initial Conference Date 05/08/25   Team Members Present   Team Members Present Physician;Nurse;;Occupational Therapist   Physician Team Member Alex HOGUE, Corby JAY   Nursing Team Member Richardson RN   Care Management Team Member Leticia RASHID   OT Team Member Nasir SMITH   Patient/Family Present   Patient Present No   Patient's Family Present No   201, slept Denies Anxiety/depression states he has AH but not as loud  D/C next week.

## 2025-05-08 NOTE — PROGRESS NOTES
"Progress Note - Osei Trimble 50 y.o. male MRN: [id number]    Unit/Bed#: [facility] 209-02 Encounter: [id number]        Subjective:   Patient seen and examined at bedside after reviewing the chart and discussing the case with the caring staff.      Patient examined at bedside.  Patient has no acute symptoms.    Physical Exam   Vitals: Blood pressure 151/80, pulse 75, temperature 98 °F (36.7 °C), temperature source Temporal, resp. rate 17, height 6' 1\" (1.854 m), weight 100 kg (221 lb), SpO2 96%.,Body mass index is 29.16 kg/m².  Constitutional: Patient in no acute distress.  HEENT: PERR, EOMI, MMM.  Cardiovascular: Normal rate and regular rhythm.  Pulmonary/Chest: Effort normal and breath sounds normal.  Abdomen: Soft, + BS, NT.    Assessment/Plan:  Osei Trimble is a(n) 50 y.o. year old male with MDD.     Hypertension.  Pt started on lisinopril 5 mg daily 5/5 by Dr. Pillai.  Cont to monitor vitals.  Of note, pt on lithium.   T2DM.  Hgb A1c 6.8% on 5/7/25.  Continue Januvia 100 mg daily.  Carb controlled diet.  Accu-cheks twice daily.    Neuroendocrine carcinoma of small bowel.  Diagnosed 7/2022.  Pt is following with hem/onc Dr. Green on outpt basis.  He is on lanreotide on every 4-week basis (last infusion 4/16/25, next 5/14/25).  Substance abuse.  UDS on 5/3 +amph/meth, THC.  DJD/OA/back pain.  Tylenol as needed.  Apply Lidoderm patch to lower back.  Folate deficiency.  Patient is on folic acid 1 mg daily.  Vitamin D deficiency.  Patient is on vitamin D bolus doses weekly.   Vitamin B12 deficiency.  Patient is on vitamin B12 500 mcg daily  Nasal congestion.  Saline nasal spray as needed.    The patient was discussed with Dr. Pillai and he is in agreement with the above note.  "

## 2025-05-08 NOTE — NURSING NOTE
Patient out in community, social with select peers.  Pleasant during assessment.  No current acute behaviors noted.  Denies suicidal ideations. Rated anxiety as mild and denied depression. Osei was complaint with medications and evening snack. Maintained on q 15 minute safety checks.  Continues on fall risk protocols.  Will continue to monitor.

## 2025-05-08 NOTE — NURSING NOTE
"Patient has been intermittently visible on the unit. He attended group. He slept well last night. Denied anxiety,depression,SI or HI. He stated the auditory hallucinations of the voices are less and not as loud. They are saying they \"won \" to the patient because he got out of the house. He is medication compliant. Safety precautions maintained.   "

## 2025-05-08 NOTE — NURSING NOTE
No noted suicidal ideations or homicidal behaviors.  Fluids at bedside to promote hydration. No aspiration risks noted. Patient observed sleeping during q 15 minute safety checks.  No observable distress or changes in medical condition.  No complaints of pain.  Will continue to monitor.

## 2025-05-08 NOTE — PLAN OF CARE
Problem: Alteration in Thoughts and Perception  Goal: Treatment Goal: Gain control of psychotic behaviors/thinking, reduce/eliminate presenting symptoms and demonstrate improved reality functioning upon discharge  Outcome: Progressing  Goal: Verbalize thoughts and feelings  Description: Interventions:- Promote a nonjudgmental and trusting relationship with the patient through active listening and therapeutic communication- Assess patient's level of functioning, behavior and potential for risk- Engage patient in 1 on 1 interactions- Encourage patient to express fears, feelings, frustrations, and discuss symptoms  - [place] patient to reality, help patient recognize reality-based thinking - Administer medications as ordered and assess for potential side effects- Provide the patient education related to the signs and symptoms of the illness and desired effects of prescribed medications  Outcome: Progressing  Goal: Refrain from acting on delusional thinking/internal stimuli  Description: Interventions:- Monitor patient closely, per order - Utilize least restrictive measures - Set reasonable limits, give positive feedback for acceptable - Administer medications as ordered and monitor of potential side effects  Outcome: Progressing  Goal: Agree to be compliant with medication regime, as prescribed and report medication side effects  Description: Interventions:- Offer appropriate PRN medication and supervise ingestion; conduct AIMS, as needed   Outcome: Progressing  Goal: Attend and participate in unit activities, including therapeutic, recreational, and educational groups  Description: Interventions:-Encourage Visitation and family involvement in care  Outcome: Progressing  Goal: Recognize dysfunctional thoughts, communicate reality-based thoughts at the time of discharge  Description: Interventions:- Provide medication and psycho-education to assist patient in compliance and developing insight into his/her illness    Outcome: Progressing  Goal: Complete daily ADLs, including personal hygiene independently, as able  Description: Interventions:- Observe, teach, and assist patient with ADLS- Monitor and promote a balance of rest/activity, with adequate nutrition and elimination   Outcome: Progressing     Problem: Risk for Self Injury/Neglect  Goal: Treatment Goal: Remain safe during length of stay, learn and adopt new coping skills, and be free of self-injurious ideation, impulses and acts at the time of discharge  Outcome: Progressing  Goal: Verbalize thoughts and feelings  Description: Interventions:- Assess and re-assess patient's lethality and potential for self-injury- Engage patient in 1:1 interactions, daily, for a minimum of 15 minutes- Encourage patient to express feelings, fears, frustrations, hopes- Establish rapport/trust with patient   Outcome: Progressing  Goal: Refrain from harming self  Description: Interventions:- Monitor patient closely, per order- Develop a trusting relationship- Supervise medication ingestion, monitor effects and side effects   Outcome: Progressing  Goal: Attend and participate in unit activities, including therapeutic, recreational, and educational groups  Description: Interventions:- Provide therapeutic and educational activities daily, encourage attendance and participation, and document same in the medical record- Obtain collateral information, encourage visitation and family involvement in care   Outcome: Progressing  Goal: Recognize maladaptive responses and adopt new coping mechanisms  Outcome: Progressing  Goal: Complete daily ADLs, including personal hygiene independently, as able  Description: Interventions:- Observe, teach, and assist patient with ADLS- Monitor and promote a balance of rest/activity, with adequate nutrition and elimination  Outcome: Progressing     Problem: Depression  Goal: Treatment Goal: Demonstrate behavioral control of depressive symptoms, verbalize feelings of  improved mood/affect, and adopt new coping skills prior to discharge  Outcome: Progressing  Goal: Verbalize thoughts and feelings  Description: Interventions:- Assess and re-assess patient's level of risk - Engage patient in 1:1 interactions, daily, for a minimum of 15 minutes - Encourage patient to express feelings, fears, frustrations, hopes   Outcome: Progressing  Goal: Refrain from harming self  Description: Interventions:- Monitor patient closely, per order - Supervise medication ingestion, monitor effects and side effects   Outcome: Progressing  Goal: Refrain from isolation  Description: Interventions:- Develop a trusting relationship - Encourage socialization   Outcome: Progressing  Goal: Refrain from self-neglect  Outcome: Progressing  Goal: Attend and participate in unit activities, including therapeutic, recreational, and educational groups  Description: Interventions:- Provide therapeutic and educational activities daily, encourage attendance and participation, and document same in the medical record   Outcome: Progressing  Goal: Complete daily ADLs, including personal hygiene independently, as able  Description: Interventions:- Observe, teach, and assist patient with ADLS-  Monitor and promote a balance of rest/activity, with adequate nutrition and elimination   Outcome: Progressing     Problem: Anxiety  Goal: Anxiety is at manageable level  Description: Interventions:- Assess and monitor patient's anxiety level. - Monitor for signs and symptoms (heart palpitations, chest pain, shortness of breath, headaches, nausea, feeling jumpy, restlessness, irritable, apprehensive). - Collaborate with interdisciplinary team and initiate plan and interventions as ordered.- [place] patient to unit/surroundings- Explain treatment plan- Encourage participation in care- Encourage verbalization of concerns/fears- Identify coping mechanisms- Assist in developing anxiety-reducing skills- Administer/offer alternative  therapies- Limit or eliminate stimulants  Outcome: Progressing     Problem: Alteration in Orientation  Goal: Treatment Goal: Demonstrate a reduction of confusion and improved orientation to person, place, time and/or situation upon discharge, according to optimum baseline/ability  Outcome: Progressing  Goal: Interact with staff daily  Description: Interventions:- Assess and re-assess patient's level of orientation- Engage patient in 1 on 1 interactions, daily, for a minimum of 15 minutes - Establish rapport/trust with patient   Outcome: Progressing  Goal: Express concerns related to confused thinking related to:  Description: Interventions:- Encourage patient to express feelings, fears, frustrations, hopes- Assign consistent caregivers - [place]/re-orient patient as needed- Allow comfort items, as appropriate- Provide visual cues, signs, etc.   Outcome: Progressing  Goal: Allow medical examinations, as recommended  Description: Interventions:- Provide physical/neurological exams and/or referrals, per provider   Outcome: Progressing  Goal: Cooperate with recommended testing/procedures  Description: Interventions:- Determine need for ancillary testing- Observe for mental status changes- Implement falls/precaution protocol   Outcome: Progressing  Goal: Attend and participate in unit activities, including therapeutic, recreational, and educational groups  Description: Interventions:- Provide therapeutic and educational activities daily, encourage attendance and participation, and document same in the medical record - Provide appropriate opportunities for reminiscence - Provide a consistent daily routine - Encourage family contact/visitation   Outcome: Progressing  Goal: Complete daily ADLs, including personal hygiene independently, as able  Description: Interventions:- Observe, teach, and assist patient with ADLS  Outcome: Progressing     Problem: DISCHARGE PLANNING - CARE MANAGEMENT  Goal: Discharge to post-acute care  or home with appropriate resources  Description: INTERVENTIONS:- Conduct assessment to determine patient/family and health care team treatment goals, and need for post-acute services based on payer coverage, community resources, and patient preferences, and barriers to discharge- Address psychosocial, clinical, and financial barriers to discharge as identified in assessment in conjunction with the patient/family and health care team- Arrange appropriate level of post-acute services according to patient’s   needs and preference and payer coverage in collaboration with the physician and health care team- Communicate with and update the patient/family, physician, and health care team regarding progress on the discharge plan- Arrange appropriate transportation to post-acute venues  Outcome: Progressing

## 2025-05-08 NOTE — ASSESSMENT & PLAN NOTE
Continue lithium 300 mg every 12 hours  Continue Zyprexa 10 mg p.o. daily at bedtime  Continue trazodone 50 mg p.o. daily at bedtime  Patient level ordered for 5/10/2025 @ 1800 GMT

## 2025-05-09 LAB
GLUCOSE SERPL-MCNC: 159 MG/DL (ref 65–140)
GLUCOSE SERPL-MCNC: 163 MG/DL (ref 65–140)

## 2025-05-09 PROCEDURE — 99232 SBSQ HOSP IP/OBS MODERATE 35: CPT

## 2025-05-09 PROCEDURE — 82948 REAGENT STRIP/BLOOD GLUCOSE: CPT

## 2025-05-09 RX ADMIN — LIDOCAINE 1 PATCH: 50 PATCH TOPICAL at 21:49

## 2025-05-09 RX ADMIN — LITHIUM CARBONATE 300 MG: 300 TABLET, EXTENDED RELEASE ORAL at 21:49

## 2025-05-09 RX ADMIN — LITHIUM CARBONATE 300 MG: 300 TABLET, EXTENDED RELEASE ORAL at 08:38

## 2025-05-09 RX ADMIN — Medication 3 MG: at 21:49

## 2025-05-09 RX ADMIN — TRAZODONE HYDROCHLORIDE 50 MG: 50 TABLET ORAL at 21:49

## 2025-05-09 RX ADMIN — SITAGLIPTIN 100 MG: 100 TABLET, FILM COATED ORAL at 08:38

## 2025-05-09 RX ADMIN — OLANZAPINE 10 MG: 10 TABLET, FILM COATED ORAL at 21:49

## 2025-05-09 RX ADMIN — LISINOPRIL 5 MG: 5 TABLET ORAL at 08:38

## 2025-05-09 RX ADMIN — CYANOCOBALAMIN TAB 500 MCG 500 MCG: 500 TAB at 08:38

## 2025-05-09 RX ADMIN — FOLIC ACID 1 MG: 1 TABLET ORAL at 08:38

## 2025-05-09 NOTE — ASSESSMENT & PLAN NOTE
Continue lithium 300 mg every 12 hours  Continue Zyprexa 10 mg p.o. daily at bedtime  Continue trazodone 50 mg p.o. daily at bedtime  Depakote level ordered for 5/10/2025 @ 1800 GMT

## 2025-05-09 NOTE — NURSING NOTE
Pt up ad indra & gait is steady. Pt is cooperative & compliant with medications. Q 15 min checks maintained to monitor pt's behavior & safety. Pt denies any depression or anxiety. Pt denies any hallucinations, suicidal ideations. Pt socializes minimally with other patients.

## 2025-05-09 NOTE — NURSING NOTE
"Patient was observed to be visible in the community this evening; spending time in the dining area with peers.  He is calm and cooperative during staff interactions.  He states anxiety and depression \"are pretty good\", denies SI,  HI.  Pertaining to hallucinations, he states, \"the voices are very, very little\".  Denies any pain.  Osei was medication compliant at HS.  No acute behaviors observed.  Denies any pain.  Continuous safety rounding in progress.   "

## 2025-05-09 NOTE — PROGRESS NOTES
05/09/25    Team Meeting   Meeting Type Daily Rounds   Team Members Present   Team Members Present Physician;Nurse;;Occupational Therapist   Physician Team Member Alex JAY   Nursing Team Member Fernando ASTUDILLO   Social Work Team Member Julito RASHID   OT Team Member Nasir CTRS   Patient/Family Present   Patient Present No   Patient's Family Present No   201, visible, compliant, endorses avh, d/c home with psych f/u after med adjust

## 2025-05-09 NOTE — PLAN OF CARE
Problem: Alteration in Thoughts and Perception  Goal: Verbalize thoughts and feelings  Description: Interventions:- Promote a nonjudgmental and trusting relationship with the patient through active listening and therapeutic communication- Assess patient's level of functioning, behavior and potential for risk- Engage patient in 1 on 1 interactions- Encourage patient to express fears, feelings, frustrations, and discuss symptoms  - [place] patient to reality, help patient recognize reality-based thinking - Administer medications as ordered and assess for potential side effects- Provide the patient education related to the signs and symptoms of the illness and desired effects of prescribed medications  Outcome: Progressing  Goal: Agree to be compliant with medication regime, as prescribed and report medication side effects  Description: Interventions:- Offer appropriate PRN medication and supervise ingestion; conduct AIMS, as needed   Outcome: Progressing  Goal: Attend and participate in unit activities, including therapeutic, recreational, and educational groups  Description: Interventions:-Encourage Visitation and family involvement in care  Outcome: Progressing     Problem: Ineffective Coping  Goal: Participates in unit activities  Description: Interventions:- Provide therapeutic environment - Provide required programming - Redirect inappropriate behaviors   Outcome: Progressing  Goal: Understands least restrictive measures  Description: Interventions:- Utilize least restrictive behavior  Outcome: Progressing  Goal: Free from restraint events  Description: - Utilize least restrictive measures - Provide behavioral interventions - Redirect inappropriate behaviors   Outcome: Progressing     Problem: Risk for Self Injury/Neglect  Goal: Refrain from harming self  Description: Interventions:- Monitor patient closely, per order- Develop a trusting relationship- Supervise medication ingestion, monitor effects and side  effects   Outcome: Progressing

## 2025-05-09 NOTE — PROGRESS NOTES
Data- Met with the patient to complete his relapse prevention. The patients strengths are that he is cooperative, family support, and his home. Triggers and stressors for the patient include things people say to him, losses, negative thoughts, being alone, sleep problems, financial problems, and physical problems. Warning signs for the patient include using meth, and hurting himself or others.Coping skills for the patient include deep breathing, meditation, hot shower, and music. Support for the patient is his sister Ariadne.  Assessment- The patient was cooperative while completing the assessment. The patient states that he always thinks negative and still would like some help developing a positive mindset. The patient appeared to be forward thinking and states he is doing well.  Plan- the patient is encouraged to attend all groups and listen to doctor and nurse recommendations.

## 2025-05-09 NOTE — PROGRESS NOTES
"Progress Note - Osei Trimble 50 y.o. male MRN: [id number]    Unit/Bed#: [facility] 209-02 Encounter: [id number]        Subjective:   Patient seen and examined at bedside after reviewing the chart and discussing the case with the caring staff.      Patient examined at bedside.  Patient has no acute symptoms.    Physical Exam   Vitals: Blood pressure 131/90, pulse 75, temperature 97.6 °F (36.4 °C), temperature source Temporal, resp. rate 17, height 6' 1\" (1.854 m), weight 100 kg (221 lb), SpO2 97%.,Body mass index is 29.16 kg/m².  Constitutional: Patient in no acute distress.  HEENT: PERR, EOMI, MMM.  Cardiovascular: Normal rate and regular rhythm.  Pulmonary/Chest: Effort normal and breath sounds normal.  Abdomen: Soft, + BS, NT.    Assessment/Plan:  Osei Trimble is a(n) 50 y.o. year old male with MDD.     Hypertension.  Pt started on lisinopril 5 mg daily 5/5 by Dr. Pillai.  Cont to monitor vitals.  Of note, pt on lithium.   T2DM.  Hgb A1c 6.8% on 5/7/25.  Continue Januvia 100 mg daily.  Carb controlled diet.  Accu-cheks twice daily.    Neuroendocrine carcinoma of small bowel.  Diagnosed 7/2022.  Pt is following with hem/onc Dr. Green on outpt basis.  He is on lanreotide on every 4-week basis (last infusion 4/16/25, next 5/14/25).  Substance abuse.  UDS on 5/3 +amph/meth, THC.  DJD/OA/back pain.  Tylenol as needed.  Apply Lidoderm patch to lower back.  Folate deficiency.  Patient is on folic acid 1 mg daily.  Vitamin D deficiency.  Patient is on vitamin D bolus doses weekly.   Vitamin B12 deficiency.  Patient is on vitamin B12 500 mcg daily  Nasal congestion.  Saline nasal spray as needed.    The patient was discussed with Dr. Pillai and he is in agreement with the above note.  "

## 2025-05-09 NOTE — PROGRESS NOTES
Progress Note - Behavioral Health   Name: Osei Trimble 50 y.o. male I MRN: [id number]  Unit/Bed#: [facility] 209-02 I Date of Admission: 5/5/2025   Date of Service: 5/9/2025 I Hospital Day: 4    Assessment & Plan  Bipolar disorder (HCC)  Continue lithium 300 mg every 12 hours  Continue Zyprexa 10 mg p.o. daily at bedtime  Continue trazodone 50 mg p.o. daily at bedtime  Depakote level ordered for 5/10/2025 @ 1800 GMT  Explosive personality disorder (HCC)    Methamphetamine-induced psychotic disorder (HCC)  Will discuss rehab when patient is stable    Current Medications:    Current Facility-Administered Medications:     cyanocobalamin (VITAMIN B-12) tablet 500 mcg, Oral, Daily    [START ON 5/11/2025] ergocalciferol (VITAMIN D2) capsule 50,000 Units, Oral, Weekly    folic acid (FOLVITE) tablet 1 mg, Oral, Daily    lidocaine (LIDODERM) 5 % patch 1 patch, Topical, HS    lisinopril (ZESTRIL) tablet 5 mg, Oral, Daily    lithium carbonate (LITHOBID) CR tablet 300 mg, Oral, Q12H BERENICE    melatonin tablet 3 mg, Oral, HS    OLANZapine (ZyPREXA) tablet 10 mg, Oral, HS    sitaGLIPtin (JANUVIA) tablet 100 mg, Oral, Daily    traZODone (DESYREL) tablet 50 mg, Oral, HS    Current Facility-Administered Medications:     acetaminophen (TYLENOL) tablet 650 mg, Oral, Q4H PRN    acetaminophen (TYLENOL) tablet 650 mg, Oral, Q4H PRN    acetaminophen (TYLENOL) tablet 975 mg, Oral, Q6H PRN    benztropine (COGENTIN) injection 1 mg, Intramuscular, Q4H PRN Max 6/day    benztropine (COGENTIN) tablet 0.5 mg, Oral, Q4H PRN Max 6/day    cyclobenzaprine (FLEXERIL) tablet 5 mg, Oral, TID PRN    hydrOXYzine HCL (ATARAX) tablet 25 mg, Oral, Q6H PRN Max 4/day    LORazepam (ATIVAN) injection 1 mg, Intramuscular, Q6H PRN Max 3/day    LORazepam (ATIVAN) tablet 0.5 mg, Oral, Q6H PRN Max 4/day    LORazepam (ATIVAN) tablet 1 mg, Oral, Q6H PRN Max 3/day    OLANZapine (ZyPREXA) IM injection 5 mg, Intramuscular, Q3H PRN Max 3/day    OLANZapine (ZyPREXA) tablet 2.5  mg, Oral, Q4H PRN Max 6/day    OLANZapine (ZyPREXA) tablet 5 mg, Oral, Q4H PRN Max 3/day    OLANZapine (ZyPREXA) tablet 5 mg, Oral, Q3H PRN Max 3/day    sodium chloride (OCEAN) 0.65 % nasal spray 1 spray, Each Nare, Q1H PRN    Risks/Benefits of Treatment:     Risks, benefits, and possible side effects of medications explained to patient and patient verbalizes understanding and agreement for treatment.    Progress Toward Goals:  unchanged    Treatment Planning:      - Encourage early mobility and having a structured day  - Provide frequent re-orientation, and cognitive stimulation  - Ensure assistive devices are in proper working order (eye-glasses, hearing aids)  - Encourage adequate hydration, nutrition and monitor bowel movements  - Maintain sleep-wake cycle: Uninterrupted sleep time; low-level lighting at night  - Fall precaution  - Follow up with [facility] regarding the medical problems   - Continue medication titration and treatment plan; adjust medication to optimize treatment response and as clinically indicated.   - Observation:  N/A  - VS: as per unit protocol  - Encourage group attendance and milieu therapy  - Dispo: To be determined  - Estimated Discharge Day: 5/20/2025 6 days (5/15/2025)  - Legal Status : Voluntary 201 commitment.  - Long Stay Certification : Not Applicable    Subjective     Osei was seen today for psychiatric follow-up. Patient is calm, cooperative. He is intermittently visible on the unit for meals. He is mostly withdrawn to his room selectively social. Patient is minimal in conversation. He reports vague non command AH of voices.  He denied any  SI/HI/VH. He did not appear internally preoccupied    Sleep: normal  Appetite: normal  Medication side effects: No  ROS: review of systems as noted above in HPI/Subjective report, all other systems are negative    Objective :  Temp:  [97.6 °F (36.4 °C)-98.2 °F (36.8 °C)] 97.6 °F (36.4 °C)  HR:  [70-75] 75  BP: (113-145)/(73-90) 131/90  Resp:   [17-18] 17  SpO2:  [97 %-98 %] 97 %  O2 Device: None (Room air)    Mental Status Evaluation:    Appearance:  Age appropriate, disheveled, bearded   Behavior:  cooperative   Speech:  normal rate and volume, scant   Mood:  dysphoric   Affect:  blunted   Thought Process:  concrete   Thought Content:  no overt delusions   Perceptual Disturbances: Endorsed vague AH of voices   Risk Potential: Suicidal Ideation -  None at present  Homicidal Ideation -  None at present  Potential for Aggression - No   Sensorium:  oriented to person, place, and time/date   Memory:  recent and remote memory grossly intact   Consciousness:  alert and awake   Attention/Concentration: attention span and concentration are age appropriate   Insight:  fair   Judgment: fair   Gait/Station: normal gait/station   Motor Activity: no abnormal movements     Cognitive Assessment : N/A  Pain : denied  Pain Scale : 0      Lab Results: I have reviewed the following results:  Most Recent Labs:   Lab Results   Component Value Date    WBC 9.90 05/03/2025    RBC 4.72 05/03/2025    HGB 13.8 05/03/2025    HCT 40.4 05/03/2025     05/03/2025    RDW 13.3 05/03/2025    NEUTROABS 7.61 05/03/2025    SODIUM 135 05/03/2025    K 3.6 05/03/2025     05/03/2025    CO2 23 05/03/2025    BUN 15 05/03/2025    CREATININE 1.06 05/03/2025    GLUC 237 (H) 05/03/2025    CALCIUM 9.4 05/03/2025    AST 20 05/03/2025    ALT 23 05/03/2025    ALKPHOS 97 05/03/2025    TP 7.8 05/03/2025    ALB 4.4 05/03/2025    TBILI 1.16 (H) 05/03/2025    CHOLESTEROL 160 05/07/2025    HDL 26 (L) 05/07/2025    TRIG 84 05/07/2025    LDLCALC 117 (H) 05/07/2025    NONHDLC 134 05/07/2025    LITHIUM 0.67 03/11/2025    AMMONIA 38 02/14/2025    CXA2AHIQLKUP 4.384 05/03/2025    FREET4 0.85 02/17/2025    TREPONEMAPA Non-reactive 02/18/2025    HGBA1C 6.8 (H) 05/07/2025     05/07/2025       Administrative Statements     Counseling / Coordination of Care:   Patient's progress discussed with staff in  "treatment team meeting.  Medication changes reviewed with staff in treatment team meeting.    Portions of the record may have been created with voice recognition software. Occasional wrong word or \"sound a like\" substitutions may have occurred due to the inherent limitations of voice recognition software. Read the chart carefully and recognize, using context, where substitutions have occurred.    PIERRE Garcia 05/09/25  "

## 2025-05-10 LAB
GLUCOSE SERPL-MCNC: 121 MG/DL (ref 65–140)
GLUCOSE SERPL-MCNC: 152 MG/DL (ref 65–140)
LITHIUM SERPL-SCNC: 0.56 MMOL/L (ref 0.6–1.2)

## 2025-05-10 PROCEDURE — 99232 SBSQ HOSP IP/OBS MODERATE 35: CPT | Performed by: PSYCHIATRY & NEUROLOGY

## 2025-05-10 PROCEDURE — 80178 ASSAY OF LITHIUM: CPT

## 2025-05-10 PROCEDURE — 82948 REAGENT STRIP/BLOOD GLUCOSE: CPT

## 2025-05-10 RX ADMIN — SITAGLIPTIN 100 MG: 100 TABLET, FILM COATED ORAL at 08:27

## 2025-05-10 RX ADMIN — LISINOPRIL 5 MG: 5 TABLET ORAL at 08:27

## 2025-05-10 RX ADMIN — OLANZAPINE 10 MG: 10 TABLET, FILM COATED ORAL at 21:24

## 2025-05-10 RX ADMIN — CYANOCOBALAMIN TAB 500 MCG 500 MCG: 500 TAB at 08:27

## 2025-05-10 RX ADMIN — TRAZODONE HYDROCHLORIDE 50 MG: 50 TABLET ORAL at 21:25

## 2025-05-10 RX ADMIN — LITHIUM CARBONATE 300 MG: 300 TABLET, EXTENDED RELEASE ORAL at 08:27

## 2025-05-10 RX ADMIN — LITHIUM CARBONATE 300 MG: 300 TABLET, EXTENDED RELEASE ORAL at 21:25

## 2025-05-10 RX ADMIN — Medication 3 MG: at 21:25

## 2025-05-10 RX ADMIN — LIDOCAINE 1 PATCH: 50 PATCH TOPICAL at 21:26

## 2025-05-10 RX ADMIN — FOLIC ACID 1 MG: 1 TABLET ORAL at 08:27

## 2025-05-10 NOTE — PROGRESS NOTES
"Progress Note - Osei Trimble 50 y.o. male MRN: [id number]    Unit/Bed#: [facility] 209-02 Encounter: [id number]        Subjective:   Patient seen and examined at bedside after reviewing the chart and discussing the case with the caring staff.      Patient examined at bedside.  Patient has no acute symptoms.    Physical Exam   Vitals: Blood pressure 134/80, pulse 75, temperature (!) 97.1 °F (36.2 °C), temperature source Temporal, resp. rate 16, height 6' 1\" (1.854 m), weight 103 kg (227 lb 3.2 oz), SpO2 97%.,Body mass index is 29.98 kg/m².  Constitutional: Patient in no acute distress.  HEENT: PERR, EOMI, MMM.  Cardiovascular: Normal rate and regular rhythm.  Pulmonary/Chest: Effort normal and breath sounds normal.  Abdomen: Soft, + BS, NT.    Assessment/Plan:  Osei Trimble is a(n) 50 y.o. year old male with MDD.     Hypertension.  Pt started on lisinopril 5 mg daily 5/5 by Dr. Pillai.  Cont to monitor vitals.  Of note, pt on lithium.   T2DM.  Hgb A1c 6.8% on 5/7/25.  Continue Januvia 100 mg daily.  Carb controlled diet.  Accu-cheks twice daily.    Neuroendocrine carcinoma of small bowel.  Diagnosed 7/2022.  Pt is following with hem/onc Dr. Green on outpt basis.  He is on lanreotide on every 4-week basis (last infusion 4/16/25, next 5/14/25).  Substance abuse.  UDS on 5/3 +amph/meth, THC.  DJD/OA/back pain.  Tylenol as needed.  Apply Lidoderm patch to lower back.  Folate deficiency.  Patient is on folic acid 1 mg daily.  Vitamin D deficiency.  Patient is on vitamin D bolus doses weekly.   Vitamin B12 deficiency.  Patient is on vitamin B12 500 mcg daily  Nasal congestion.  Saline nasal spray as needed.  "

## 2025-05-10 NOTE — ASSESSMENT & PLAN NOTE
Continue lithium 300 mg every 12 hours  Continue Zyprexa 10 mg p.o. daily at bedtime  Continue trazodone 50 mg p.o. daily at bedtime  Depakote level ordered for 5/10/2025 @ 1800 GMT   28-Jul-2023 17:33

## 2025-05-10 NOTE — NURSING NOTE
Pt is flat & withdrawn to his room in between meals. Pt is cooperative & compliant with medications. Pt denies any depression or anxiety. Pt denies any hallucinations, suicidal or homicidal ideations. Q 15 min checks maintained to monitor pt's behavior & safety. Pt up ad indra & gait is steady.

## 2025-05-10 NOTE — NURSING NOTE
Patient was visible in the community tonight, spending time in the dining area with peers.  He is selectively social with peers.  He has been calm and cooperative, no acute behaviors observed.  He denies feeling anxious and/ or depressed, denies SI, HI and hallucinations. Osei was medication compliant at HS.  Positive for snack and fluids tonight.  Continuous safety rounding in progress.

## 2025-05-10 NOTE — PROGRESS NOTES
"Progress Note - Behavioral Health   Name: Osei Trimble 50 y.o. male I MRN: [id number]  Unit/Bed#: [facility] 209-02 I Date of Admission: 5/5/2025   Date of Service: 5/10/2025 I Hospital Day: 5     Assessment & Plan  Bipolar disorder (HCC)  Continue lithium 300 mg every 12 hours  Continue Zyprexa 10 mg p.o. daily at bedtime  Continue trazodone 50 mg p.o. daily at bedtime  Depakote level ordered for 5/10/2025 @ 1800 GMT  Explosive personality disorder (HCC)    Methamphetamine-induced psychotic disorder (HCC)  Will discuss rehab when patient is stable      Subjective:    Behavior over the last 24 hours: unchanged.     The patient was evaluated this morning for continuity of care and no acute distress noted throughout the evaluation.  Over the past 24 hours staff noted the patient was denying all symptoms to staff.      Patient was visited on unit for continuing care; chart reviewed and discussed with multidisciplinary treatment team.  On approach, the patient was calm and cooperative.  States that the auditory hallucinations have \"deteriorated.\"  Reports that he believes auditory hallucinations were due to drug use.  He states that his goal is to be sober and that he has already called his sister and \"told her to get all of the stuff out of my house.\" Denied any changes in mood, appetite, and energy level. Denied A/VH currently.  Denied SI/HI, intent or plan upon direct inquiry at this time.    No reports of aggression or self-injurious behavior on unit. No PRN medications used in the past 24 hours.    Sleep: normal  Appetite: normal  Medication side effects: No   ROS: no complaints    Mental Status Examination:  Appearance:  disheveled, looks stated age, bearded   Behavior:  pleasant, cooperative   Speech:  normal rate and volume   Mood:  dysphoric   Affect:  blunted   Thought Process:  negative thinking, concrete   Associations: concrete associations   Thought Content:  no overt delusions, negative thoughts "   Perceptual Disturbances: no visual hallucinations, denies auditory hallucinations when asked, does not appear responding to internal stimuli   Risk Potential: Suicidal ideation - None at present  Homicidal ideation - None at present  Potential for aggression - No   Sensorium:  oriented to person, place, and time/date   Memory:  recent and remote memory grossly intact   Consciousness:  alert and awake   Attention/Concentration: attention span and concentration are age appropriate   Insight:  fair   Judgment: fair   Gait/Station: normal gait/station   Motor Activity: no abnormal movements        Vital signs in last 24 hours:    Temp:  [97.1 °F (36.2 °C)-97.9 °F (36.6 °C)] 97.6 °F (36.4 °C)  HR:  [64-75] 64  BP: (117-134)/(80-91) 125/86  Resp:  [16-18] 18  SpO2:  [97 %] 97 %  O2 Device: None (Room air)         Laboratory results: I have personally reviewed all pertinent laboratory/tests results    Results from the past 24 hours:   Recent Results (from the past 24 hours)   Fingerstick Glucose (POCT)    Collection Time: 05/10/25  7:46 AM   Result Value Ref Range    POC Glucose 152 (H) 65 - 140 mg/dl   Fingerstick Glucose (POCT)    Collection Time: 05/10/25  4:29 PM   Result Value Ref Range    POC Glucose 121 65 - 140 mg/dl       Counseling / Coordination of Care:    Patient's progress reviewed with nursing staff.  Medications, treatment progress and treatment plan reviewed with patient.  Medication changes discussed with patient.  Medication education provided to patient.  Reassurance and supportive therapy provided.  Group attendance encouraged.  Encouraged participation in milieu and group therapy on the unit.    Marsha Nagel DO 05/10/25    This note was completed in part utilizing Dragon dictation Software. Grammatical, translation, syntax errors, random word insertions, spelling mistakes, and incomplete sentences may be an occasional consequence of this system secondary to software limitations with voice  recognition, ambient noise, and hardware issues. If you have any questions or concerns about the content, text, or information contained within the body of this dictation, please contact the provider for clarification.

## 2025-05-11 LAB
GLUCOSE SERPL-MCNC: 143 MG/DL (ref 65–140)
GLUCOSE SERPL-MCNC: 165 MG/DL (ref 65–140)

## 2025-05-11 PROCEDURE — 82948 REAGENT STRIP/BLOOD GLUCOSE: CPT

## 2025-05-11 PROCEDURE — 99232 SBSQ HOSP IP/OBS MODERATE 35: CPT | Performed by: PSYCHIATRY & NEUROLOGY

## 2025-05-11 RX ADMIN — LISINOPRIL 5 MG: 5 TABLET ORAL at 08:40

## 2025-05-11 RX ADMIN — TRAZODONE HYDROCHLORIDE 50 MG: 50 TABLET ORAL at 21:42

## 2025-05-11 RX ADMIN — CYANOCOBALAMIN TAB 500 MCG 500 MCG: 500 TAB at 08:39

## 2025-05-11 RX ADMIN — LIDOCAINE 1 PATCH: 50 PATCH TOPICAL at 21:43

## 2025-05-11 RX ADMIN — OLANZAPINE 10 MG: 10 TABLET, FILM COATED ORAL at 21:42

## 2025-05-11 RX ADMIN — SITAGLIPTIN 100 MG: 100 TABLET, FILM COATED ORAL at 08:40

## 2025-05-11 RX ADMIN — Medication 3 MG: at 21:42

## 2025-05-11 RX ADMIN — LITHIUM CARBONATE 300 MG: 300 TABLET, EXTENDED RELEASE ORAL at 21:42

## 2025-05-11 RX ADMIN — LITHIUM CARBONATE 300 MG: 300 TABLET, EXTENDED RELEASE ORAL at 08:40

## 2025-05-11 RX ADMIN — FOLIC ACID 1 MG: 1 TABLET ORAL at 08:40

## 2025-05-11 RX ADMIN — ERGOCALCIFEROL 50000 UNITS: 1.25 CAPSULE, LIQUID FILLED ORAL at 08:52

## 2025-05-11 NOTE — ASSESSMENT & PLAN NOTE
Continue lithium 300 mg every 12 hours  Lithium level -0.56  Patient reporting stability of symptoms - defer decision to increase lithium to primary team  Continue Zyprexa 10 mg p.o. daily at bedtime  Continue trazodone 50 mg p.o. daily at bedtime

## 2025-05-11 NOTE — PROGRESS NOTES
"Progress Note - Osei Trimble 50 y.o. male MRN: [id number]    Unit/Bed#: [facility] 209-02 Encounter: [id number]        Subjective:   Patient seen and examined at bedside after reviewing the chart and discussing the case with the caring staff.      Patient examined at bedside.  Patient has no acute symptoms.    Physical Exam   Vitals: Blood pressure 115/85, pulse 83, temperature 97.5 °F (36.4 °C), temperature source Temporal, resp. rate 16, height 6' 1\" (1.854 m), weight 103 kg (227 lb 3.2 oz), SpO2 96%.,Body mass index is 29.98 kg/m².  Constitutional: Patient in no acute distress.  HEENT: PERR, EOMI, MMM.  Cardiovascular: Normal rate and regular rhythm.  Pulmonary/Chest: Effort normal and breath sounds normal.  Abdomen: Soft, + BS, NT.    Assessment/Plan:  Osei Trimble is a(n) 50 y.o. year old male with MDD.     Hypertension.  Pt started on lisinopril 5 mg daily 5/5 by Dr. Pillai.  Cont to monitor vitals.  Of note, pt on lithium.   T2DM.  Hgb A1c 6.8% on 5/7/25.  Continue Januvia 100 mg daily.  Carb controlled diet.  Accu-cheks twice daily.    Neuroendocrine carcinoma of small bowel.  Diagnosed 7/2022.  Pt is following with hem/onc Dr. Green on outpt basis.  He is on lanreotide on every 4-week basis (last infusion 4/16/25, next 5/14/25).  Substance abuse.  UDS on 5/3 +amph/meth, THC.  DJD/OA/back pain.  Tylenol as needed.  Apply Lidoderm patch to lower back.  Folate deficiency.  Patient is on folic acid 1 mg daily.  Vitamin D deficiency.  Patient is on vitamin D bolus doses weekly.   Vitamin B12 deficiency.  Patient is on vitamin B12 500 mcg daily  Nasal congestion.  Saline nasal spray as needed.  "

## 2025-05-11 NOTE — PLAN OF CARE
Problem: Alteration in Thoughts and Perception  Goal: Verbalize thoughts and feelings  Description: Interventions:- Promote a nonjudgmental and trusting relationship with the patient through active listening and therapeutic communication- Assess patient's level of functioning, behavior and potential for risk- Engage patient in 1 on 1 interactions- Encourage patient to express fears, feelings, frustrations, and discuss symptoms  - [place] patient to reality, help patient recognize reality-based thinking - Administer medications as ordered and assess for potential side effects- Provide the patient education related to the signs and symptoms of the illness and desired effects of prescribed medications  Outcome: Progressing     Problem: Ineffective Coping  Goal: Free from restraint events  Description: - Utilize least restrictive measures - Provide behavioral interventions - Redirect inappropriate behaviors   Outcome: Progressing     Problem: Risk for Self Injury/Neglect  Goal: Refrain from harming self  Description: Interventions:- Monitor patient closely, per order- Develop a trusting relationship- Supervise medication ingestion, monitor effects and side effects   Outcome: Progressing

## 2025-05-11 NOTE — PROGRESS NOTES
Progress Note - Behavioral Health   Name: Osei Trimble 50 y.o. male I MRN: [id number]  Unit/Bed#: [facility] 209-02 I Date of Admission: 5/5/2025   Date of Service: 5/11/2025 I Hospital Day: 6    Assessment & Plan  Bipolar disorder (HCC)  Continue lithium 300 mg every 12 hours  Lithium level -0.56  Patient reporting stability of symptoms - defer decision to increase lithium to primary team  Continue Zyprexa 10 mg p.o. daily at bedtime  Continue trazodone 50 mg p.o. daily at bedtime    Explosive personality disorder (HCC)    Methamphetamine-induced psychotic disorder (HCC)  Will discuss rehab when patient is stable      Subjective:    Behavior over the last 24 hours: improving.     The patient was evaluated this morning for continuity of care and no acute distress noted throughout the evaluation.  Over the past 24 hours staff noted the patient was visible and social.      Patient was visited on unit for continuing care; chart reviewed and discussed with multidisciplinary treatment team.  On approach, the patient was calm and cooperative.  Osei states that his symptoms are under control, he describes his mood as good, states that the voices have decreased and he is able to ignore them.  He states he now only hears his peers on the unit.  He states that his sister has already helped remove all drugs from his house.  Denied any changes in mood, appetite, and energy level. Denied SI/HI, intent or plan upon direct inquiry at this time.    No reports of aggression or self-injurious behavior on unit.     Sleep: improved  Appetite: normal  Medication side effects: No   ROS: no complaints, all other systems are negative    Mental Status Examination:  Appearance:  disheveled, looks stated age, bearded   Behavior:  cooperative, calm   Speech:  normal rate and volume   Mood:  dysphoric   Affect:  blunted   Thought Process:  impaired abstract reasoning, concrete   Associations: concrete associations   Thought Content:  no overt  delusions   Perceptual Disturbances: no visual hallucinations, auditory hallucinations still present, but less frequent, cannot understand voices, able to ignore them   Risk Potential: Suicidal ideation - None at present  Homicidal ideation - None at present  Potential for aggression - No   Sensorium:  oriented to person, place, and time/date   Memory:  recent and remote memory grossly intact   Consciousness:  alert and awake   Attention/Concentration: attention span and concentration are age appropriate   Insight:  fair   Judgment: fair   Gait/Station: normal gait/station   Motor Activity: no abnormal movements        Vital signs in last 24 hours:    Temp:  [97.5 °F (36.4 °C)-97.9 °F (36.6 °C)] 97.5 °F (36.4 °C)  HR:  [64-83] 83  BP: (115-133)/(85-86) 115/85  Resp:  [16-18] 16  SpO2:  [96 %-100 %] 96 %  O2 Device: None (Room air)         Laboratory results: I have personally reviewed all pertinent laboratory/tests results    Results from the past 24 hours:   Recent Results (from the past 24 hours)   Fingerstick Glucose (POCT)    Collection Time: 05/10/25  4:29 PM   Result Value Ref Range    POC Glucose 121 65 - 140 mg/dl   Lithium level    Collection Time: 05/10/25  5:35 PM   Result Value Ref Range    Lithium Lvl 0.56 (L) 0.60 - 1.20 mmol/L   Fingerstick Glucose (POCT)    Collection Time: 05/11/25  7:41 AM   Result Value Ref Range    POC Glucose 165 (H) 65 - 140 mg/dl       Counseling / Coordination of Care:    Patient's progress reviewed with nursing staff.  Medications, treatment progress and treatment plan reviewed with patient.  Medication changes discussed with patient.  Medication education provided to patient.  Reassurance and supportive therapy provided.  Group attendance encouraged.  Encouraged participation in milieu and group therapy on the unit.    Marsha Nagel DO 05/11/25    This note was completed in part utilizing Dragon dictation Software. Grammatical, translation, syntax errors, random word  insertions, spelling mistakes, and incomplete sentences may be an occasional consequence of this system secondary to software limitations with voice recognition, ambient noise, and hardware issues. If you have any questions or concerns about the content, text, or information contained within the body of this dictation, please contact the provider for clarification.

## 2025-05-11 NOTE — NURSING NOTE
BLEPS assessment completed & see Head to Toe assessment. Lungs clear upon auscultation & trace edema noted BLE. Pt up ad indra & gait is steady. Pt is pleasant & does socialize with other patients. Pt is cooperative & compliant with medications. Pt c/o moderate depression & moderate anxiety. Pt denies any hallucinations, suicidal or homicidal ideations. Q 15 min checks maintained to monitor pt's behavior & safety.

## 2025-05-11 NOTE — NURSING NOTE
Pt was visible on the milieu this evening, social w/ peers. Pt denied all psych symptoms. Pt was pleasant, cooperative, and med compliant. Will CTM. Q15 minute pt safety checks ongoing.

## 2025-05-12 ENCOUNTER — TELEPHONE (OUTPATIENT)
Age: 50
End: 2025-05-12

## 2025-05-12 PROBLEM — F19.10 DRUG ABUSE (HCC): Status: RESOLVED | Noted: 2025-02-14 | Resolved: 2025-05-12

## 2025-05-12 PROBLEM — R65.10 SIRS (SYSTEMIC INFLAMMATORY RESPONSE SYNDROME) (HCC): Status: RESOLVED | Noted: 2025-02-14 | Resolved: 2025-05-12

## 2025-05-12 PROBLEM — S93.409A ANKLE SPRAIN: Status: RESOLVED | Noted: 2024-11-14 | Resolved: 2025-05-12

## 2025-05-12 PROBLEM — D69.6 THROMBOCYTOPENIA (HCC): Status: RESOLVED | Noted: 2024-06-22 | Resolved: 2025-05-12

## 2025-05-12 PROBLEM — R17 TOTAL BILIRUBIN, ELEVATED: Status: RESOLVED | Noted: 2024-06-22 | Resolved: 2025-05-12

## 2025-05-12 PROBLEM — Z71.89 GOALS OF CARE, COUNSELING/DISCUSSION: Status: RESOLVED | Noted: 2024-06-24 | Resolved: 2025-05-12

## 2025-05-12 PROBLEM — E44.0 MODERATE PROTEIN-CALORIE MALNUTRITION (HCC): Status: RESOLVED | Noted: 2024-06-26 | Resolved: 2025-05-12

## 2025-05-12 PROBLEM — R74.8 ELEVATED CK: Status: RESOLVED | Noted: 2025-02-14 | Resolved: 2025-05-12

## 2025-05-12 PROBLEM — R57.9 SHOCK (HCC): Status: RESOLVED | Noted: 2024-06-22 | Resolved: 2025-05-12

## 2025-05-12 PROBLEM — R10.9 ABDOMINAL PAIN: Status: RESOLVED | Noted: 2023-03-27 | Resolved: 2025-05-12

## 2025-05-12 PROBLEM — F32.0 MAJOR DEPRESSIVE DISORDER, SINGLE EPISODE, MILD (HCC): Status: ACTIVE | Noted: 2025-05-12

## 2025-05-12 PROBLEM — F32.0 MAJOR DEPRESSIVE DISORDER, SINGLE EPISODE, MILD (HCC): Status: RESOLVED | Noted: 2025-05-12 | Resolved: 2025-05-12

## 2025-05-12 PROBLEM — G93.40 ACUTE ENCEPHALOPATHY: Status: RESOLVED | Noted: 2024-06-22 | Resolved: 2025-05-12

## 2025-05-12 PROBLEM — M54.9 BACK PAIN: Status: RESOLVED | Noted: 2024-06-23 | Resolved: 2025-05-12

## 2025-05-12 PROBLEM — F10.10 ALCOHOL ABUSE: Status: RESOLVED | Noted: 2024-06-26 | Resolved: 2025-05-12

## 2025-05-12 PROBLEM — R07.9 CHEST PAIN: Status: RESOLVED | Noted: 2024-06-21 | Resolved: 2025-05-12

## 2025-05-12 PROBLEM — Z51.5 PALLIATIVE CARE BY SPECIALIST: Status: RESOLVED | Noted: 2024-06-24 | Resolved: 2025-05-12

## 2025-05-12 PROBLEM — R73.9 HYPERGLYCEMIA: Status: RESOLVED | Noted: 2024-06-22 | Resolved: 2025-05-12

## 2025-05-12 LAB
GLUCOSE SERPL-MCNC: 148 MG/DL (ref 65–140)
GLUCOSE SERPL-MCNC: 175 MG/DL (ref 65–140)

## 2025-05-12 PROCEDURE — 99232 SBSQ HOSP IP/OBS MODERATE 35: CPT

## 2025-05-12 PROCEDURE — 82948 REAGENT STRIP/BLOOD GLUCOSE: CPT

## 2025-05-12 RX ORDER — OLANZAPINE 10 MG/1
10 TABLET, FILM COATED ORAL
Qty: 30 TABLET | Refills: 0 | Status: SHIPPED | OUTPATIENT
Start: 2025-05-12

## 2025-05-12 RX ORDER — LITHIUM CARBONATE 300 MG/1
300 TABLET, FILM COATED, EXTENDED RELEASE ORAL EVERY 12 HOURS SCHEDULED
Qty: 60 TABLET | Refills: 0 | Status: SHIPPED | OUTPATIENT
Start: 2025-05-12

## 2025-05-12 RX ORDER — FOLIC ACID 1 MG/1
1 TABLET ORAL DAILY
Qty: 30 TABLET | Refills: 0 | Status: SHIPPED | OUTPATIENT
Start: 2025-05-12

## 2025-05-12 RX ORDER — LIDOCAINE 50 MG/G
1 PATCH TOPICAL
Qty: 30 PATCH | Refills: 0 | Status: SHIPPED | OUTPATIENT
Start: 2025-05-12

## 2025-05-12 RX ORDER — ERGOCALCIFEROL 1.25 MG/1
50000 CAPSULE, LIQUID FILLED ORAL WEEKLY
Qty: 9 CAPSULE | Refills: 0 | Status: SHIPPED | OUTPATIENT
Start: 2025-05-12 | End: 2025-07-08

## 2025-05-12 RX ORDER — TRAZODONE HYDROCHLORIDE 50 MG/1
50 TABLET ORAL
Qty: 30 TABLET | Refills: 0 | Status: SHIPPED | OUTPATIENT
Start: 2025-05-12

## 2025-05-12 RX ORDER — CYCLOBENZAPRINE HCL 5 MG
5 TABLET ORAL 3 TIMES DAILY PRN
Qty: 60 TABLET | Refills: 0 | Status: SHIPPED | OUTPATIENT
Start: 2025-05-12

## 2025-05-12 RX ORDER — LISINOPRIL 5 MG/1
5 TABLET ORAL DAILY
Qty: 30 TABLET | Refills: 0 | Status: SHIPPED | OUTPATIENT
Start: 2025-05-13

## 2025-05-12 RX ORDER — ECHINACEA PURPUREA EXTRACT 125 MG
1 TABLET ORAL
Qty: 104 ML | Refills: 0 | Status: SHIPPED | OUTPATIENT
Start: 2025-05-12

## 2025-05-12 RX ADMIN — LITHIUM CARBONATE 300 MG: 300 TABLET, EXTENDED RELEASE ORAL at 08:14

## 2025-05-12 RX ADMIN — SITAGLIPTIN 100 MG: 100 TABLET, FILM COATED ORAL at 08:14

## 2025-05-12 RX ADMIN — LIDOCAINE 1 PATCH: 50 PATCH TOPICAL at 21:00

## 2025-05-12 RX ADMIN — OLANZAPINE 10 MG: 10 TABLET, FILM COATED ORAL at 20:53

## 2025-05-12 RX ADMIN — TRAZODONE HYDROCHLORIDE 50 MG: 50 TABLET ORAL at 20:52

## 2025-05-12 RX ADMIN — LISINOPRIL 5 MG: 5 TABLET ORAL at 08:14

## 2025-05-12 RX ADMIN — LITHIUM CARBONATE 300 MG: 300 TABLET, EXTENDED RELEASE ORAL at 20:53

## 2025-05-12 RX ADMIN — FOLIC ACID 1 MG: 1 TABLET ORAL at 08:14

## 2025-05-12 RX ADMIN — Medication 3 MG: at 20:53

## 2025-05-12 RX ADMIN — CYANOCOBALAMIN TAB 500 MCG 500 MCG: 500 TAB at 08:14

## 2025-05-12 RX ADMIN — LIDOCAINE 1 PATCH: 50 PATCH TOPICAL at 23:05
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
## 2025-05-12 NOTE — PROGRESS NOTES
05/12/25    Team Meeting   Meeting Type Daily Rounds   Initial Conference Date 05/12/25   Team Members Present   Team Members Present Physician;Nurse;;Occupational Therapist   Physician Team Member Grecia HOGUE, Corby JAY   Nursing Team Member Richardson RN   Care Management Team Member Leticia RASHID   OT Team Member Nasir SMITH   Patient/Family Present   Patient Present No   Patient's Family Present No   201, slept little anxiety denies SI/HI/VH Selectively social with peers. D/C Tuesday.

## 2025-05-12 NOTE — NURSING NOTE
Patient has been visible on the unit. He is pleasant and cooperative. Social with peers. Attends groups. Medication compliant. He slept . Endorsed a little anxiety. Denied depression,SI,HI, or hallucinations. Attends groups. Safety precautions maintained.

## 2025-05-12 NOTE — NURSING NOTE
Pt was visible on the milieu, social w/ peers. Pt denied all psych symptoms. Pt was pleasant, cooperative, and med compliant. Will CTM. Q15 minute pt safety checks ongoing.

## 2025-05-12 NOTE — TELEPHONE ENCOUNTER
[facility] referral for med management scheduled with Dr. Saul in [place] 6/5/25 at 8am. Info sent to DARCY Kelly.

## 2025-05-12 NOTE — BH TRANSITION RECORD
Contact Information: If you have any questions, concerns, pended studies, tests and/or procedures, or emergencies regarding your inpatient behavioral health visit. Please contact [facility] [phone] and ask to speak to a , nurse or physician. A contact is available 24 hours/ 7 days a week at this number.     Summary of Procedures Performed During your Stay:  Below is a list of major procedures performed during your hospital stay and a summary of results:  - No major procedures performed.    Pending Studies (From admission, onward)      None          Please follow up on the above pending studies with your PCP and/or referring provider.   Notified patient of below message. She verbalized understanding, referral placed to IV therapy. Therapy plan entered and routed to Dr Ioana Hernandez to review and sign.

## 2025-05-12 NOTE — DISCHARGE INSTR - OTHER ORDERS
You are being discharged to your home at [address] [place] [address]  TELEPHONE :[phone]     Triggers you have identified during your hospitalization that led to your admission of a regressed mood include ineffective coping skills and medication management. Coping skills you have identified during your hospitalization include listening to music and sitting on the deck. If you are unable to deal with your distressed mood alone please contact  your sister Ariadne for assistance. If that is not effective and you continue to have (ex: suicidal ideation, homicidal ideation, distressed mood, overwhelmed, in crisis) please contact Crisis by dialing [facility] 1 [phone], dial 911 or go to the nearest emergency center.      *[facility] Crisis Hotline: 1 267.850.6256  *National Suicide Prevention Lifeline:  1-715.643.1074  *Alcohol Anonymous: 900.510.4372  *[place] Drug & Alcohol Commission: (554) 651-7733  *National [place] on Mental Illness (DELMI) HELPLINE: 342.420.9919/Website: www.delmi.org  *Substance Abuse and Mental Health Services Administration([facility]) National Helpline, which is a confidential, free, 24-hour-a-day, 365-day-a-year, information service for individuals and family members facing mental health and/or substance use disorders. This service provides referrals to local treatment facilities, support groups, and community-based organizations. Callers can also order free publications and other information.  Call 1-275.610.8614/Website: www.[facility].gov  *[facility] 2-1-1: This is a toll free, confidential, 24-hour-a-day service which connects you to a community  in your area who can help you find services and resources that are available to you locally and provide critical services that can improve and save lives.  Call: 211  /Website: http://www.[employer]org/       Mariela, or Michelle, our Behavioral Health Nurse Navigators, will be calling you after your discharge, on  the phone number that you provided.  They will be available as an additional support, if needed.   If you wish to speak with one of them, you may contact Mariela at [phone] or Michelle at [phone].

## 2025-05-12 NOTE — PROGRESS NOTES
Progress Note - Behavioral Health   Name: Osei Trimble 50 y.o. male I MRN: [id number]  Unit/Bed#: [facility] 209-02 I Date of Admission: 5/5/2025   Date of Service: 5/12/2025 I Hospital Day: 7    Assessment & Plan  Bipolar disorder (HCC)  Continue lithium 300 mg every 12 hours  Lithium level -0.56  Patient reporting stability of symptoms - defer decision to increase lithium to primary team  Continue Zyprexa 10 mg p.o. daily at bedtime  Continue trazodone 50 mg p.o. daily at bedtime    Explosive personality disorder (HCC)    Methamphetamine-induced psychotic disorder (HCC)  Will discuss rehab when patient is stable    Current Medications:    Current Facility-Administered Medications:     cyanocobalamin (VITAMIN B-12) tablet 500 mcg, Oral, Daily    ergocalciferol (VITAMIN D2) capsule 50,000 Units, Oral, Weekly    folic acid (FOLVITE) tablet 1 mg, Oral, Daily    lidocaine (LIDODERM) 5 % patch 1 patch, Topical, HS    lisinopril (ZESTRIL) tablet 5 mg, Oral, Daily    lithium carbonate (LITHOBID) CR tablet 300 mg, Oral, Q12H BERENICE    melatonin tablet 3 mg, Oral, HS    OLANZapine (ZyPREXA) tablet 10 mg, Oral, HS    sitaGLIPtin (JANUVIA) tablet 100 mg, Oral, Daily    traZODone (DESYREL) tablet 50 mg, Oral, HS    Current Facility-Administered Medications:     acetaminophen (TYLENOL) tablet 650 mg, Oral, Q4H PRN    acetaminophen (TYLENOL) tablet 650 mg, Oral, Q4H PRN    acetaminophen (TYLENOL) tablet 975 mg, Oral, Q6H PRN    benztropine (COGENTIN) injection 1 mg, Intramuscular, Q4H PRN Max 6/day    benztropine (COGENTIN) tablet 0.5 mg, Oral, Q4H PRN Max 6/day    cyclobenzaprine (FLEXERIL) tablet 5 mg, Oral, TID PRN    hydrOXYzine HCL (ATARAX) tablet 25 mg, Oral, Q6H PRN Max 4/day    LORazepam (ATIVAN) injection 1 mg, Intramuscular, Q6H PRN Max 3/day    LORazepam (ATIVAN) tablet 0.5 mg, Oral, Q6H PRN Max 4/day    LORazepam (ATIVAN) tablet 1 mg, Oral, Q6H PRN Max 3/day    OLANZapine (ZyPREXA) IM injection 5 mg, Intramuscular, Q3H  PRN Max 3/day    OLANZapine (ZyPREXA) tablet 2.5 mg, Oral, Q4H PRN Max 6/day    OLANZapine (ZyPREXA) tablet 5 mg, Oral, Q4H PRN Max 3/day    OLANZapine (ZyPREXA) tablet 5 mg, Oral, Q3H PRN Max 3/day    sodium chloride (OCEAN) 0.65 % nasal spray 1 spray, Each Nare, Q1H PRN    Risks/Benefits of Treatment:     Risks, benefits, and possible side effects of medications explained to patient and patient verbalizes understanding and agreement for treatment.    Progress Toward Goals:  improved    Treatment Planning:      - Encourage early mobility and having a structured day  - Provide frequent re-orientation, and cognitive stimulation  - Ensure assistive devices are in proper working order (eye-glasses, hearing aids)  - Encourage adequate hydration, nutrition and monitor bowel movements  - Maintain sleep-wake cycle: Uninterrupted sleep time; low-level lighting at night  - Fall precaution  - Follow up with [facility] regarding the medical problems   - Continue medication titration and treatment plan; adjust medication to optimize treatment response and as clinically indicated.   - Observation:  N/A  - VS: as per unit protocol  - Encourage group attendance and milieu therapy  - Dispo: To be determined  - Estimated Discharge Day: 5/20/2025 1 day (5/13/2025)  - Legal Status : Voluntary 201 commitment.  - Long Stay Certification : Not Applicable    Subjective     Osei was seen today for psychiatric follow-up. Patient is calm, cooperative. He is visible on the unit, social with peers. His mood si controlled with no recent behavioral outbursts. He denies any depression, anxiety, SI/HI/AVH. According to nursing staff report, he is medication compliant.    Sleep: normal  Appetite: normal  Medication side effects: No  ROS: review of systems as noted above in HPI/Subjective report, all other systems are negative    Objective :  Temp:  [97.6 °F (36.4 °C)-97.9 °F (36.6 °C)] 97.6 °F (36.4 °C)  HR:  [75-79] 79  BP: (121-165)/(66-85)  165/85  Resp:  [18-19] 19  SpO2:  [97 %-98 %] 98 %    Mental Status Evaluation:    Appearance:  age appropriate   Behavior:  cooperative, calm   Speech:  normal rate and volume   Mood:  euthymic   Affect:  mood-congruent   Thought Process:  goal directed   Thought Content:  no overt delusions   Perceptual Disturbances: Denied AVH, did not appear internally preoccupied   Risk Potential: Suicidal Ideation -  None at present  Homicidal Ideation -  None at present  Potential for Aggression - No   Sensorium:  oriented to person, place, and time/date   Memory:  recent and remote memory grossly intact   Consciousness:  alert and awake   Attention/Concentration: attention span and concentration are age appropriate   Insight:  good   Judgment: good   Gait/Station: normal gait/station   Motor Activity: no abnormal movements     Cognitive Assessment : N/A  Pain : denied  Pain Scale : 0      Lab Results: I have reviewed the following results:  Most Recent Labs:   Lab Results   Component Value Date    WBC 9.90 05/03/2025    RBC 4.72 05/03/2025    HGB 13.8 05/03/2025    HCT 40.4 05/03/2025     05/03/2025    RDW 13.3 05/03/2025    NEUTROABS 7.61 05/03/2025    SODIUM 135 05/03/2025    K 3.6 05/03/2025     05/03/2025    CO2 23 05/03/2025    BUN 15 05/03/2025    CREATININE 1.06 05/03/2025    GLUC 237 (H) 05/03/2025    CALCIUM 9.4 05/03/2025    AST 20 05/03/2025    ALT 23 05/03/2025    ALKPHOS 97 05/03/2025    TP 7.8 05/03/2025    ALB 4.4 05/03/2025    TBILI 1.16 (H) 05/03/2025    CHOLESTEROL 160 05/07/2025    HDL 26 (L) 05/07/2025    TRIG 84 05/07/2025    LDLCALC 117 (H) 05/07/2025    NONHDLC 134 05/07/2025    LITHIUM 0.56 (L) 05/10/2025    AMMONIA 38 02/14/2025    XFE9PWYDUNJA 4.384 05/03/2025    FREET4 0.85 02/17/2025    TREPONEMAPA Non-reactive 02/18/2025    HGBA1C 6.8 (H) 05/07/2025     05/07/2025       Administrative Statements     Counseling / Coordination of Care:   Patient's progress discussed with staff  "in treatment team meeting.  Medication changes reviewed with staff in treatment team meeting.    Portions of the record may have been created with voice recognition software. Occasional wrong word or \"sound a like\" substitutions may have occurred due to the inherent limitations of voice recognition software. Read the chart carefully and recognize, using context, where substitutions have occurred.    PIERRE Garcia 05/12/25  "

## 2025-05-12 NOTE — PROGRESS NOTES
"Progress Note - Osei Trimble 50 y.o. male MRN: [id number]    Unit/Bed#: [facility] 209-02 Encounter: [id number]        Subjective:   Patient seen and examined at bedside after reviewing the chart and discussing the case with the caring staff.      Patient examined at bedside.  Patient has no acute symptoms.    Patient is being discharged on Tuesday, 5/13/2025.  Patient is requesting prescriptions.  I reviewed and reconciled patient's problem list and medications.    Physical Exam   Vitals: Blood pressure 165/85, pulse 79, temperature 97.6 °F (36.4 °C), temperature source Temporal, resp. rate 19, height 6' 1\" (1.854 m), weight 103 kg (227 lb 3.2 oz), SpO2 98%.,Body mass index is 29.98 kg/m².  Constitutional: Patient in no acute distress.  HEENT: PERR, EOMI, MMM.  Cardiovascular: Normal rate and regular rhythm.  Pulmonary/Chest: Effort normal and breath sounds normal.  Abdomen: Soft, + BS, NT.    Assessment/Plan:  Osei Trimble is a(n) 50 y.o. year old male with MDD.     Medical clearance.  Patient is medically cleared for discharge.  All scripts will be sent out for the patient.    Hypertension.  Pt started on lisinopril 5 mg daily 5/5 by Dr. Pillai.  Cont to monitor vitals.  Of note, pt on lithium.   T2DM.  Hgb A1c 6.8% on 5/7/25.  Continue Januvia 100 mg daily.  Carb controlled diet.  Accu-cheks twice daily.    Neuroendocrine carcinoma of small bowel.  Diagnosed 7/2022.  Pt is following with hem/onc Dr. Green on outpt basis.  He is on lanreotide on every 4-week basis (last infusion 4/16/25, next 5/14/25).  Substance abuse.  UDS on 5/3 +amph/meth, THC.  DJD/OA/back pain.  Tylenol as needed.  Apply Lidoderm patch to lower back.  Folate deficiency.  Patient is on folic acid 1 mg daily.  Vitamin D deficiency.  Patient is on vitamin D bolus doses weekly.   Vitamin B12 deficiency.  Patient is on vitamin B12 500 mcg daily  Nasal congestion.  Saline nasal spray as needed.  "

## 2025-05-12 NOTE — PLAN OF CARE
Problem: Risk for Self Injury/Neglect  Goal: Verbalize thoughts and feelings  Description: Interventions:- Assess and re-assess patient's lethality and potential for self-injury- Engage patient in 1:1 interactions, daily, for a minimum of 15 minutes- Encourage patient to express feelings, fears, frustrations, hopes- Establish rapport/trust with patient   Outcome: Progressing  Goal: Refrain from harming self  Description: Interventions:- Monitor patient closely, per order- Develop a trusting relationship- Supervise medication ingestion, monitor effects and side effects   Outcome: Progressing

## 2025-05-13 VITALS
HEART RATE: 75 BPM | RESPIRATION RATE: 19 BRPM | TEMPERATURE: 97.4 F | WEIGHT: 227.2 LBS | BODY MASS INDEX: 30.11 KG/M2 | SYSTOLIC BLOOD PRESSURE: 128 MMHG | DIASTOLIC BLOOD PRESSURE: 80 MMHG | OXYGEN SATURATION: 96 % | HEIGHT: 73 IN

## 2025-05-13 LAB — GLUCOSE SERPL-MCNC: 159 MG/DL (ref 65–140)

## 2025-05-13 PROCEDURE — 82948 REAGENT STRIP/BLOOD GLUCOSE: CPT

## 2025-05-13 PROCEDURE — 99238 HOSP IP/OBS DSCHRG MGMT 30/<: CPT | Performed by: PSYCHIATRY & NEUROLOGY

## 2025-05-13 RX ADMIN — CYANOCOBALAMIN TAB 500 MCG 500 MCG: 500 TAB at 08:29

## 2025-05-13 RX ADMIN — LITHIUM CARBONATE 300 MG: 300 TABLET, EXTENDED RELEASE ORAL at 08:29

## 2025-05-13 RX ADMIN — SITAGLIPTIN 100 MG: 100 TABLET, FILM COATED ORAL at 08:29

## 2025-05-13 RX ADMIN — LISINOPRIL 5 MG: 5 TABLET ORAL at 08:29

## 2025-05-13 RX ADMIN — FOLIC ACID 1 MG: 1 TABLET ORAL at 08:29

## 2025-05-13 NOTE — NURSING NOTE
Patient noted to be visible and social on the milieu. Pleasant and cooperative throughout interaction. Denied SI/HI/AVH and depression. Endorsed mild anxiety. Vocalized readiness for scheduled discharge. Compliant w/ hs med regimen. Offers no complaints. Q 15 min safety checks continuous and maintained.

## 2025-05-13 NOTE — PROGRESS NOTES
05/13/25    Discharge Notification   Notification of Discharge Provided to: Family;Psychiatrist   Family Notified via: Phone call   Psychiatrist Notified via: Phone call   CM contacted Ariadne  PT's sister  Via phone to notify of D/C   Cm contacted [facility] to schedule follow up and notify of PT's  D/C.

## 2025-05-13 NOTE — NURSING NOTE
Pt calm, pleasant and cooperative through DC process.   + meals and meds. Good appetite. Compliant w/ mouth checks. On assessment Pt describes feeling ready and excited for DC. Plans to take medications as prescribed and follow up w/ aftercare. AVS reviewed w/ Pt who has no questions at this time. Pt walked off unit by staff; all belongings accounted for and in hand.

## 2025-05-13 NOTE — PROGRESS NOTES
"Progress Note - Osei Trimble 50 y.o. male MRN: [id number]    Unit/Bed#: [facility] 209-02 Encounter: [id number]        Subjective:   Patient seen and examined at bedside after reviewing the chart and discussing the case with the caring staff.      Patient examined at bedside.  Patient has no acute symptoms.    Patient is being discharged today, Tuesday, 5/13/2025.      Physical Exam   Vitals: Blood pressure 128/80, pulse 75, temperature (!) 97.4 °F (36.3 °C), temperature source Temporal, resp. rate 19, height 6' 1\" (1.854 m), weight 103 kg (227 lb 3.2 oz), SpO2 96%.,Body mass index is 29.98 kg/m².  Constitutional: Patient in no acute distress.  HEENT: PERR, EOMI, MMM.  Cardiovascular: Normal rate and regular rhythm.  Pulmonary/Chest: Effort normal and breath sounds normal.  Abdomen: Soft, + BS, NT.    Assessment/Plan:  Osei Trimble is a(n) 50 y.o. year old male with MDD.     Medical clearance.  Patient is medically cleared for discharge.  All scripts will be sent out for the patient.    Hypertension.  Pt started on lisinopril 5 mg daily 5/5 by Dr. Pillai.  Cont to monitor vitals.  Of note, pt on lithium.   T2DM.  Hgb A1c 6.8% on 5/7/25.  Continue Januvia 100 mg daily.  Carb controlled diet.  Accu-cheks twice daily.    Neuroendocrine carcinoma of small bowel.  Diagnosed 7/2022.  Pt is following with hem/onc Dr. Green on outpt basis.  He is on lanreotide on every 4-week basis (last infusion 4/16/25, next 5/14/25).  Substance abuse.  UDS on 5/3 +amph/meth, THC.  DJD/OA/back pain.  Tylenol as needed.  Apply Lidoderm patch to lower back.  Folate deficiency.  Patient is on folic acid 1 mg daily.  Vitamin D deficiency.  Patient is on vitamin D bolus doses weekly.   Vitamin B12 deficiency.  Patient is on vitamin B12 500 mcg daily  Nasal congestion.  Saline nasal spray as needed.    The patient was discussed with Dr. Pillai and he is in agreement with the above note.  "

## 2025-05-13 NOTE — PROGRESS NOTES
05/13/25    Discharge Planning   Living Arrangements Lives Alone   Support Systems Self;Family members   Type of Current Residence Private residence   Discharge Communications   Discharge planning discussed with: PT   Freedom of Choice Yes   CM contacted family/caregiver? Yes   Were Treatment Team discharge recommendations reviewed with patient/caregiver? Yes   Did patient/caregiver verbalize understanding of patient care needs? Yes   Were patient/caregiver advised of the risks associated with not following Treatment Team discharge recommendations? Yes   Contacts   Patient Contacts Ariadne (Sister)   Relationship to Patient: Family   Contact Method Phone   Phone Number [phone]   Reason/Outcome Continuity of Care;Discharge Planning   [facility]   Would you like to participate in our [facility] service program?   No - Declined

## 2025-05-13 NOTE — PLAN OF CARE
Problem: Alteration in Thoughts and Perception  Goal: Treatment Goal: Gain control of psychotic behaviors/thinking, reduce/eliminate presenting symptoms and demonstrate improved reality functioning upon discharge  Outcome: Completed  Goal: Verbalize thoughts and feelings  Description: Interventions:- Promote a nonjudgmental and trusting relationship with the patient through active listening and therapeutic communication- Assess patient's level of functioning, behavior and potential for risk- Engage patient in 1 on 1 interactions- Encourage patient to express fears, feelings, frustrations, and discuss symptoms  - [place] patient to reality, help patient recognize reality-based thinking - Administer medications as ordered and assess for potential side effects- Provide the patient education related to the signs and symptoms of the illness and desired effects of prescribed medications  Outcome: Completed  Goal: Refrain from acting on delusional thinking/internal stimuli  Description: Interventions:- Monitor patient closely, per order - Utilize least restrictive measures - Set reasonable limits, give positive feedback for acceptable - Administer medications as ordered and monitor of potential side effects  Outcome: Completed  Goal: Agree to be compliant with medication regime, as prescribed and report medication side effects  Description: Interventions:- Offer appropriate PRN medication and supervise ingestion; conduct AIMS, as needed   Outcome: Completed  Goal: Attend and participate in unit activities, including therapeutic, recreational, and educational groups  Description: Interventions:-Encourage Visitation and family involvement in care  Outcome: Completed  Goal: Recognize dysfunctional thoughts, communicate reality-based thoughts at the time of discharge  Description: Interventions:- Provide medication and psycho-education to assist patient in compliance and developing insight into his/her illness   Outcome:  Completed  Goal: Complete daily ADLs, including personal hygiene independently, as able  Description: Interventions:- Observe, teach, and assist patient with ADLS- Monitor and promote a balance of rest/activity, with adequate nutrition and elimination   Outcome: Completed     Problem: Ineffective Coping  Goal: Identifies ineffective coping skills  Outcome: Completed  Goal: Identifies healthy coping skills  Outcome: Completed  Goal: Demonstrates healthy coping skills  Outcome: Completed  Goal: Participates in unit activities  Description: Interventions:- Provide therapeutic environment - Provide required programming - Redirect inappropriate behaviors   Outcome: Completed  Goal: Patient/Family participate in treatment and DC plans  Description: Interventions:- Provide therapeutic environment  Outcome: Completed  Goal: Patient/Family verbalizes awareness of resources  Outcome: Completed  Goal: Understands least restrictive measures  Description: Interventions:- Utilize least restrictive behavior  Outcome: Completed  Goal: Free from restraint events  Description: - Utilize least restrictive measures - Provide behavioral interventions - Redirect inappropriate behaviors   Outcome: Completed     Problem: Risk for Self Injury/Neglect  Goal: Treatment Goal: Remain safe during length of stay, learn and adopt new coping skills, and be free of self-injurious ideation, impulses and acts at the time of discharge  Outcome: Completed  Goal: Verbalize thoughts and feelings  Description: Interventions:- Assess and re-assess patient's lethality and potential for self-injury- Engage patient in 1:1 interactions, daily, for a minimum of 15 minutes- Encourage patient to express feelings, fears, frustrations, hopes- Establish rapport/trust with patient   Outcome: Completed  Goal: Refrain from harming self  Description: Interventions:- Monitor patient closely, per order- Develop a trusting relationship- Supervise medication ingestion,  monitor effects and side effects   Outcome: Completed  Goal: Attend and participate in unit activities, including therapeutic, recreational, and educational groups  Description: Interventions:- Provide therapeutic and educational activities daily, encourage attendance and participation, and document same in the medical record- Obtain collateral information, encourage visitation and family involvement in care   Outcome: Completed  Goal: Recognize maladaptive responses and adopt new coping mechanisms  Outcome: Completed  Goal: Complete daily ADLs, including personal hygiene independently, as able  Description: Interventions:- Observe, teach, and assist patient with ADLS- Monitor and promote a balance of rest/activity, with adequate nutrition and elimination  Outcome: Completed     Problem: Depression  Goal: Treatment Goal: Demonstrate behavioral control of depressive symptoms, verbalize feelings of improved mood/affect, and adopt new coping skills prior to discharge  Outcome: Completed  Goal: Verbalize thoughts and feelings  Description: Interventions:- Assess and re-assess patient's level of risk - Engage patient in 1:1 interactions, daily, for a minimum of 15 minutes - Encourage patient to express feelings, fears, frustrations, hopes   Outcome: Completed  Goal: Refrain from harming self  Description: Interventions:- Monitor patient closely, per order - Supervise medication ingestion, monitor effects and side effects   Outcome: Completed  Goal: Refrain from isolation  Description: Interventions:- Develop a trusting relationship - Encourage socialization   Outcome: Completed  Goal: Refrain from self-neglect  Outcome: Completed  Goal: Attend and participate in unit activities, including therapeutic, recreational, and educational groups  Description: Interventions:- Provide therapeutic and educational activities daily, encourage attendance and participation, and document same in the medical record   Outcome:  Completed  Goal: Complete daily ADLs, including personal hygiene independently, as able  Description: Interventions:- Observe, teach, and assist patient with ADLS-  Monitor and promote a balance of rest/activity, with adequate nutrition and elimination   Outcome: Completed     Problem: Anxiety  Goal: Anxiety is at manageable level  Description: Interventions:- Assess and monitor patient's anxiety level. - Monitor for signs and symptoms (heart palpitations, chest pain, shortness of breath, headaches, nausea, feeling jumpy, restlessness, irritable, apprehensive). - Collaborate with interdisciplinary team and initiate plan and interventions as ordered.- [place] patient to unit/surroundings- Explain treatment plan- Encourage participation in care- Encourage verbalization of concerns/fears- Identify coping mechanisms- Assist in developing anxiety-reducing skills- Administer/offer alternative therapies- Limit or eliminate stimulants  Outcome: Completed     Problem: Alteration in Orientation  Goal: Treatment Goal: Demonstrate a reduction of confusion and improved orientation to person, place, time and/or situation upon discharge, according to optimum baseline/ability  Outcome: Completed  Goal: Interact with staff daily  Description: Interventions:- Assess and re-assess patient's level of orientation- Engage patient in 1 on 1 interactions, daily, for a minimum of 15 minutes - Establish rapport/trust with patient   Outcome: Completed  Goal: Express concerns related to confused thinking related to:  Description: Interventions:- Encourage patient to express feelings, fears, frustrations, hopes- Assign consistent caregivers - [place]/re-orient patient as needed- Allow comfort items, as appropriate- Provide visual cues, signs, etc.   Outcome: Completed  Goal: Allow medical examinations, as recommended  Description: Interventions:- Provide physical/neurological exams and/or referrals, per provider   Outcome: Completed  Goal:  Cooperate with recommended testing/procedures  Description: Interventions:- Determine need for ancillary testing- Observe for mental status changes- Implement falls/precaution protocol   Outcome: Completed  Goal: Attend and participate in unit activities, including therapeutic, recreational, and educational groups  Description: Interventions:- Provide therapeutic and educational activities daily, encourage attendance and participation, and document same in the medical record - Provide appropriate opportunities for reminiscence - Provide a consistent daily routine - Encourage family contact/visitation   Outcome: Completed  Goal: Complete daily ADLs, including personal hygiene independently, as able  Description: Interventions:- Observe, teach, and assist patient with ADLS  Outcome: Completed     Problem: DISCHARGE PLANNING - CARE MANAGEMENT  Goal: Discharge to post-acute care or home with appropriate resources  Description: INTERVENTIONS:- Conduct assessment to determine patient/family and health care team treatment goals, and need for post-acute services based on payer coverage, community resources, and patient preferences, and barriers to discharge- Address psychosocial, clinical, and financial barriers to discharge as identified in assessment in conjunction with the patient/family and health care team- Arrange appropriate level of post-acute services according to patient’s   needs and preference and payer coverage in collaboration with the physician and health care team- Communicate with and update the patient/family, physician, and health care team regarding progress on the discharge plan- Arrange appropriate transportation to post-acute venues  Outcome: Completed

## 2025-05-14 ENCOUNTER — HOSPITAL ENCOUNTER (OUTPATIENT)
Dept: INFUSION CENTER | Facility: HOSPITAL | Age: 50
Discharge: HOME | End: 2025-05-14
Attending: INTERNAL MEDICINE
Payer: COMMERCIAL

## 2025-05-14 DIAGNOSIS — C7A.8 NEUROENDOCRINE CARCINOMA OF SMALL BOWEL (HCC): Primary | ICD-10-CM

## 2025-05-14 DIAGNOSIS — D3A.8 NEUROENDOCRINE TUMOR: ICD-10-CM

## 2025-05-14 RX ORDER — LANREOTIDE ACETATE 120 MG/.5ML
120 INJECTION SUBCUTANEOUS ONCE
Status: COMPLETED | OUTPATIENT
Start: 2025-05-14 | End: 2025-05-14

## 2025-05-14 RX ORDER — LANREOTIDE ACETATE 120 MG/.5ML
120 INJECTION SUBCUTANEOUS ONCE
OUTPATIENT
Start: 2025-06-11

## 2025-05-14 RX ADMIN — LANREOTIDE ACETATE 120 MG: 120 INJECTION SUBCUTANEOUS at 11:57

## 2025-05-14 NOTE — PROGRESS NOTES
Patient tolerated Lanreotide injection in left upper outer gluteal area without reaction or issues.      Osei Trimble is aware of future appt on 6/11/25 at 1200.     AVS printed and given to Osei Trimble:  Yes.      Patient ambulated off unit without incident.  All personal belongings taken with patient.

## 2025-05-19 ENCOUNTER — TELEPHONE (OUTPATIENT)
Dept: PSYCHIATRY | Facility: CLINIC | Age: 50
End: 2025-05-19

## 2025-05-19 NOTE — TELEPHONE ENCOUNTER
One week follow up call for New Patient appointment with Eduar Riley on 6/5/25  was made on 5/19/25. Writer informed patient of New Patient paperwork needing to be completed 5 days prior to the appointment. Writer confirmed paperwork has been sent via [employer].    Appointment was made on: 5/12/25

## 2025-05-31 ENCOUNTER — OFFICE VISIT (OUTPATIENT)
Dept: URGENT CARE | Facility: CLINIC | Age: 50
End: 2025-05-31
Payer: COMMERCIAL

## 2025-05-31 VITALS
HEIGHT: 73 IN | BODY MASS INDEX: 31.28 KG/M2 | WEIGHT: 236 LBS | OXYGEN SATURATION: 100 % | TEMPERATURE: 97.9 F | SYSTOLIC BLOOD PRESSURE: 130 MMHG | HEART RATE: 79 BPM | RESPIRATION RATE: 20 BRPM | DIASTOLIC BLOOD PRESSURE: 80 MMHG

## 2025-05-31 DIAGNOSIS — L73.9 FOLLICULITIS: Primary | ICD-10-CM

## 2025-05-31 PROCEDURE — 99213 OFFICE O/P EST LOW 20 MIN: CPT

## 2025-05-31 RX ORDER — LORAZEPAM 1 MG/1
TABLET ORAL
COMMUNITY
Start: 2025-04-24

## 2025-05-31 RX ORDER — LISINOPRIL 20 MG/1
TABLET ORAL
COMMUNITY
Start: 2025-04-09

## 2025-05-31 RX ORDER — HYDROXYZINE HYDROCHLORIDE 50 MG/1
TABLET, FILM COATED ORAL
COMMUNITY
Start: 2025-05-27

## 2025-05-31 RX ORDER — CEPHALEXIN 500 MG/1
500 CAPSULE ORAL EVERY 8 HOURS SCHEDULED
Qty: 21 CAPSULE | Refills: 0 | Status: SHIPPED | OUTPATIENT
Start: 2025-05-31 | End: 2025-06-07

## 2025-05-31 RX ORDER — QUETIAPINE FUMARATE 50 MG/1
TABLET, FILM COATED ORAL
COMMUNITY
Start: 2025-04-09

## 2025-05-31 RX ORDER — AMLODIPINE BESYLATE 5 MG/1
5 TABLET ORAL DAILY
COMMUNITY
Start: 2025-04-28 | End: 2026-04-28

## 2025-05-31 NOTE — PATIENT INSTRUCTIONS
Take Antibiotics as directed.  Refrain from using cosmetic products/perfumes until resolved.  Try hypoallergenic laundry detergents/soap.    Monitor for fevers, chills, swelling of throat/mouth, blistering rash.

## 2025-05-31 NOTE — PROGRESS NOTES
[facility]  Name: Osei Trimble      : 1975      MRN: [id number]  Encounter Provider: Andrew Martel PA-C  Encounter Date: 2025   Encounter department: [facility] [place]  :  Assessment & Plan  Folliculitis    Orders:    cephalexin (KEFLEX) 500 mg capsule; Take 1 capsule (500 mg total) by mouth every 8 (eight) hours for 7 days    Discussed with patient and caregiver that rash appears follicular in nature, due to pain and pruritus most likely bacterial.  Will treat with 7-day course of Keflex.  Advised with any signs of TENS/SJS as listed below patient presents to ER for further evaluation and management.      Patient Instructions  Take Antibiotics as directed.  Refrain from using cosmetic products/perfumes until resolved.  Try hypoallergenic laundry detergents/soap.    Monitor for fevers, chills, swelling of throat/mouth, blistering rash.    Follow up with PCP in 3-5 days.  Proceed to  ER if symptoms worsen.    If tests are performed, our office will contact you with results only if changes need to made to the care plan discussed with you at the visit. [facility]    Chief Complaint:   Chief Complaint   Patient presents with    Rash     Bilateral arm rash, red and raised for one week. Recently was outside cleaning porch.      History of Present Illness   50-year-old male with past medical history of HTN, bipolar disorder, diabetes presenting with a diffuse rash across arms, chest, back, legs X 1 week.  Patient denies any new hygiene products, foods, medications.  Patient notes rash began after working within his screened in porch last weekend.  Patient notes that the rash is both painful and pruritic.  He is also having generalized malaise but is denying fevers, chills, URI symptoms.    Rash  Associated symptoms include fatigue. Pertinent negatives include no cough, fever, shortness of breath, sore throat or vomiting.         Review  of Systems   Constitutional:  Positive for fatigue. Negative for chills and fever.   HENT:  Negative for ear pain and sore throat.    Eyes:  Negative for pain and visual disturbance.   Respiratory:  Negative for cough and shortness of breath.    Cardiovascular:  Negative for chest pain and palpitations.   Gastrointestinal:  Negative for abdominal pain and vomiting.   Genitourinary:  Negative for dysuria and hematuria.   Musculoskeletal:  Negative for arthralgias and back pain.   Skin:  Positive for rash. Negative for color change.   Neurological:  Negative for seizures and syncope.   All other systems reviewed and are negative.    Past Medical History   Past Medical History[1]  Past Surgical History[2]  Family History[3]  he reports that he quit smoking about 21 years ago. His smoking use included pipe and cigarettes. He has never used smokeless tobacco. He reports that he does not currently use alcohol. He reports current drug use. Drug: Marijuana.  Current Outpatient Medications   Medication Instructions    Alcohol Swabs 70 % PADS May substitute brand based on insurance coverage. Check glucose BID.    amLODIPine (NORVASC) 5 mg, Daily    Blood Glucose Monitoring Suppl (OneTouch Verio Reflect) w/Device KIT May substitute brand based on insurance coverage. Check glucose BID.    cephalexin (KEFLEX) 500 mg, Oral, Every 8 hours scheduled    cyanocobalamin (VITAMIN B-12) 500 mcg, Oral, Daily    cyclobenzaprine (FLEXERIL) 5 mg, Oral, 3 times daily PRN    ergocalciferol (VITAMIN D2) 50,000 Units, Oral, Weekly    folic acid (FOLVITE) 1 mg, Oral, Daily    glucose blood (OneTouch Verio) test strip May substitute brand based on insurance coverage. Check glucose BID.    hydrOXYzine HCL (ATARAX) 50 mg tablet     lidocaine (LIDODERM) 5 % 1 patch, Topical, Daily at bedtime, Remove & Discard patch within 12 hours or as directed by MD    lisinopril (ZESTRIL) 20 mg tablet     lisinopril (ZESTRIL) 5 mg, Oral, Daily    lithium  "carbonate (LITHOBID) 300 mg, Oral, Every 12 hours scheduled    LORazepam (ATIVAN) 1 mg tablet     melatonin 3 mg, Oral, Daily at bedtime    metFORMIN (GLUCOPHAGE) 500 mg tablet     OLANZapine (ZYPREXA) 10 mg, Oral, Daily at bedtime    OneTouch Delica Lancets 33G MISC May substitute brand based on insurance coverage. Check glucose BID.    QUEtiapine (SEROquel) 50 mg tablet     sitaGLIPtin (JANUVIA) 100 mg, Oral, Daily    sodium chloride (OCEAN) 0.65 % nasal spray 1 spray, Nasal, Every 1 hour PRN    traZODone (DESYREL) 50 mg, Oral, Daily at bedtime   Allergies[4]     Objective   /80   Pulse 79   Temp 97.9 °F (36.6 °C)   Resp 20   Ht 6' 1\" (1.854 m)   Wt 107 kg (236 lb)   SpO2 100%   BMI 31.14 kg/m²      Physical Exam  Vitals and nursing note reviewed.   Constitutional:       General: He is not in acute distress.     Appearance: He is well-developed.   HENT:      Head: Normocephalic and atraumatic.      Nose: Nose normal.      Mouth/Throat:      Mouth: Mucous membranes are moist.      Pharynx: Oropharynx is clear.     Eyes:      Conjunctiva/sclera: Conjunctivae normal.       Cardiovascular:      Rate and Rhythm: Normal rate and regular rhythm.      Heart sounds: No murmur heard.  Pulmonary:      Effort: Pulmonary effort is normal. No respiratory distress.      Breath sounds: Normal breath sounds.   Abdominal:      Palpations: Abdomen is soft.      Tenderness: There is no abdominal tenderness.     Musculoskeletal:         General: No swelling.      Cervical back: Neck supple.     Skin:     General: Skin is warm and dry.      Capillary Refill: Capillary refill takes less than 2 seconds.      Findings: Rash present. Rash is macular. Rash is not crusting or purpuric.      Comments: Diffuse Follicular raised rash across back, chest, b/l arms. Sparing palms/soles, and oral mucosa.     Neurological:      General: No focal deficit present.      Mental Status: He is alert and oriented to person, place, and time. " "    Psychiatric:         Mood and Affect: Mood normal.         Portions of the record may have been created with voice recognition software.  Occasional wrong word or \"sound a like\" substitutions may have occurred due to the inherent limitations of voice recognition software.  Read the chart carefully and recognize, using context, where substitutions have occurred.       [1]   Past Medical History:  Diagnosis Date    Allergic     Bipolar disorder in partial remission (HCC)     Diabetes (HCC)     Erectile dysfunction     unspecified erectile dysfunction type    Hypertension     Kidney stone     Substance abuse (HCC)    [2]   Past Surgical History:  Procedure Laterality Date    COLONOSCOPY      EXPLORATORY LAPAROTOMY W/ BOWEL RESECTION N/A 7/21/2022    Procedure: LAPAROTOMY EXPLORATORY W/ SMALL BOWEL RESECTION, liver biopsy;  Surgeon: Rose Mary Lara MD;  Location:  MAIN OR;  Service: General    FL RETROGRADE PYELOGRAM  10/06/2021    HERNIA REPAIR      GA CYSTOURETHROSCOPY W/URETERAL CATHETERIZATION Right 10/06/2021    Procedure: CYSTOSCOPY RETROGRADE PYELOGRAM WITH INSERTION STENT URETERAL, RIGHT URETEROSCOPY, STONE EXTRACTION;  Surgeon: Bradly Rivera MD;  Location:  MAIN OR;  Service: Urology   [3]   Family History  Problem Relation Name Age of Onset    Diabetes Mother      Thyroid disease Mother      Cancer Father      Thyroid disease Sister Ariadne     Depression Brother Bradly     Cancer Maternal Aunt Elen     Cancer Paternal Uncle Norm     Cancer Paternal Grandmother      No Known Problems Brother Irving     Thyroid disease Sister Fany    [4]   Allergies  Allergen Reactions    Other Anaphylaxis    Tomato (Diagnostic) - Food Allergy Anaphylaxis    Tomato - Food Allergy Anaphylaxis     raw    Poison Ivy Extract Hives    Poison Sumac Extract Hives     "

## 2025-06-01 ENCOUNTER — HOSPITAL ENCOUNTER (EMERGENCY)
Facility: HOSPITAL | Age: 50
Discharge: HOME | End: 2025-06-01
Attending: EMERGENCY MEDICINE | Admitting: EMERGENCY MEDICINE
Payer: COMMERCIAL

## 2025-06-01 VITALS
RESPIRATION RATE: 18 BRPM | TEMPERATURE: 97.5 F | BODY MASS INDEX: 31.28 KG/M2 | DIASTOLIC BLOOD PRESSURE: 94 MMHG | HEIGHT: 73 IN | HEART RATE: 70 BPM | OXYGEN SATURATION: 99 % | SYSTOLIC BLOOD PRESSURE: 140 MMHG | WEIGHT: 236 LBS

## 2025-06-01 DIAGNOSIS — R21 RASH AND NONSPECIFIC SKIN ERUPTION: Primary | ICD-10-CM

## 2025-06-01 LAB
ALBUMIN SERPL BCG-MCNC: 4 G/DL (ref 3.5–5)
ALP SERPL-CCNC: 81 U/L (ref 34–104)
ALT SERPL W P-5'-P-CCNC: 21 U/L (ref 7–52)
ANION GAP SERPL CALCULATED.3IONS-SCNC: 8 MMOL/L (ref 4–13)
AST SERPL W P-5'-P-CCNC: 16 U/L (ref 13–39)
BASOPHILS # BLD AUTO: 0.01 THOUSANDS/ÂΜL (ref 0–0.1)
BASOPHILS NFR BLD AUTO: 0 % (ref 0–1)
BILIRUB SERPL-MCNC: 0.9 MG/DL (ref 0.2–1)
BUN SERPL-MCNC: 9 MG/DL (ref 5–25)
CALCIUM SERPL-MCNC: 9.2 MG/DL (ref 8.4–10.2)
CHLORIDE SERPL-SCNC: 104 MMOL/L (ref 96–108)
CO2 SERPL-SCNC: 25 MMOL/L (ref 21–32)
CREAT SERPL-MCNC: 0.99 MG/DL (ref 0.6–1.3)
EOSINOPHIL # BLD AUTO: 0.19 THOUSAND/ÂΜL (ref 0–0.61)
EOSINOPHIL NFR BLD AUTO: 3 % (ref 0–6)
ERYTHROCYTE [DISTWIDTH] IN BLOOD BY AUTOMATED COUNT: 13.8 % (ref 11.6–15.1)
GFR SERPL CREATININE-BSD FRML MDRD: 87 ML/MIN/1.73SQ M
GLUCOSE SERPL-MCNC: 201 MG/DL (ref 65–140)
HCT VFR BLD AUTO: 37.9 % (ref 36.5–49.3)
HGB BLD-MCNC: 12.9 G/DL (ref 12–17)
IMM GRANULOCYTES # BLD AUTO: 0.01 THOUSAND/UL (ref 0–0.2)
IMM GRANULOCYTES NFR BLD AUTO: 0 % (ref 0–2)
LACTATE SERPL-SCNC: 1.5 MMOL/L (ref 0.5–2)
LYMPHOCYTES # BLD AUTO: 0.81 THOUSANDS/ÂΜL (ref 0.6–4.47)
LYMPHOCYTES NFR BLD AUTO: 11 % (ref 14–44)
MCH RBC QN AUTO: 29.3 PG (ref 26.8–34.3)
MCHC RBC AUTO-ENTMCNC: 34 G/DL (ref 31.4–37.4)
MCV RBC AUTO: 86 FL (ref 82–98)
MONOCYTES # BLD AUTO: 0.36 THOUSAND/ÂΜL (ref 0.17–1.22)
MONOCYTES NFR BLD AUTO: 5 % (ref 4–12)
NEUTROPHILS # BLD AUTO: 6.11 THOUSANDS/ÂΜL (ref 1.85–7.62)
NEUTS SEG NFR BLD AUTO: 81 % (ref 43–75)
NRBC BLD AUTO-RTO: 0 /100 WBCS
PLATELET # BLD AUTO: 170 THOUSANDS/UL (ref 149–390)
PMV BLD AUTO: 9.9 FL (ref 8.9–12.7)
POTASSIUM SERPL-SCNC: 3.8 MMOL/L (ref 3.5–5.3)
PROCALCITONIN SERPL-MCNC: 0.07 NG/ML
PROT SERPL-MCNC: 6.5 G/DL (ref 6.4–8.4)
RBC # BLD AUTO: 4.41 MILLION/UL (ref 3.88–5.62)
SODIUM SERPL-SCNC: 137 MMOL/L (ref 135–147)
WBC # BLD AUTO: 7.49 THOUSAND/UL (ref 4.31–10.16)

## 2025-06-01 PROCEDURE — 85025 COMPLETE CBC W/AUTO DIFF WBC: CPT

## 2025-06-01 PROCEDURE — 83605 ASSAY OF LACTIC ACID: CPT

## 2025-06-01 PROCEDURE — 84145 PROCALCITONIN (PCT): CPT

## 2025-06-01 PROCEDURE — 99284 EMERGENCY DEPT VISIT MOD MDM: CPT | Performed by: EMERGENCY MEDICINE

## 2025-06-01 PROCEDURE — 80053 COMPREHEN METABOLIC PANEL: CPT

## 2025-06-01 PROCEDURE — 96374 THER/PROPH/DIAG INJ IV PUSH: CPT

## 2025-06-01 PROCEDURE — 96361 HYDRATE IV INFUSION ADD-ON: CPT

## 2025-06-01 PROCEDURE — 99284 EMERGENCY DEPT VISIT MOD MDM: CPT

## 2025-06-01 PROCEDURE — 36415 COLL VENOUS BLD VENIPUNCTURE: CPT

## 2025-06-01 RX ORDER — DIPHENHYDRAMINE HYDROCHLORIDE 50 MG/ML
25 INJECTION, SOLUTION INTRAMUSCULAR; INTRAVENOUS ONCE
Status: COMPLETED | OUTPATIENT
Start: 2025-06-01 | End: 2025-06-01

## 2025-06-01 RX ORDER — PREDNISONE 20 MG/1
40 TABLET ORAL DAILY
Qty: 10 TABLET | Refills: 0 | Status: SHIPPED | OUTPATIENT
Start: 2025-06-01 | End: 2025-06-06

## 2025-06-01 RX ADMIN — SODIUM CHLORIDE 1000 ML: 0.9 INJECTION, SOLUTION INTRAVENOUS at 13:39

## 2025-06-01 RX ADMIN — DIPHENHYDRAMINE HYDROCHLORIDE 25 MG: 50 INJECTION, SOLUTION INTRAMUSCULAR; INTRAVENOUS at 13:45

## 2025-06-01 NOTE — ED PROVIDER NOTES
Time reflects when diagnosis was documented in both MDM as applicable and the Disposition within this note       Time User Action Codes Description Comment    6/1/2025  3:26 PM Luis Holguin Add [R21] Rash and nonspecific skin eruption           ED Disposition       ED Disposition   Discharge    Condition   Stable    Date/Time   Sun Jun 1, 2025  3:34 PM    Comment   Osei S Nai discharge to home/self care.                   Assessment & Plan       Medical Decision Making   50 y.o.  male with a history significant for neuroendocrine tumor presents to ED with complaint 1 week history of rash involving hands. Patient reports a sudden development of a rash occurring approximately a week ago, he was evaluated by urgent care on 5/31  who prescribed a course of Keflex and encouraged ER follow-up for any worsening symptoms.  On evaluation patient is well appearing, with stable vital signs. Exam significant for diffuse erythematous, tender, blanching, papular rash involving the upper extremities anterior abdomen, back, and anterior thighs.  Clear pulmonary fields on examination, normal rate normal rhythm, soft nontender nondistended abdomen.     Differential Diagnosis: Includes but is not limited to  - Rash eruption secondary to carcinoid pathology with active treatment of neuroendocrine tumor  - Rash eruption secondary to infectious etiology  - Rash eruption secondary to contact dermatitis    Evaluation and Management: (see ED course for additioinal details)  - Labs: CBC, CMP, lactic for evaluation of etiology and underlying carcinoid pathology  - Imaging: None  - Interventions: Benadryl for pruritus  - Discussed patient with on-call oncologist and provided associated images, based off of their examination interpretation of the attached imaging is less likely an eruption secondary to chemotherapeutic therapy and underlying carcinoid tumor.    Clinical impression is most consistent with skin eruption    Plan and  Disposition:   - Prescribed a 5-day course of prednisone and advised use of over-the-counter Benadryl and Pepcid for any increased rash.  Encouraged follow-up with dermatology and oncology for further evaluation.  - Reviewed diagnosis, treatment plan, and expectant course  - Verbal and written instructions given for outpatient follow up   - Discussed reasons to Return to ED.      Amount and/or Complexity of Data Reviewed  Labs: ordered.    Risk  Prescription drug management.        ED Course as of 06/01/25 1631   Sun Jun 01, 2025   1333 Patient reports itchiness and requesting medication        Medications   sodium chloride 0.9 % bolus 1,000 mL (0 mL Intravenous Stopped 6/1/25 1548)   diphenhydrAMINE (BENADRYL) injection 25 mg (25 mg Intravenous Given 6/1/25 1345)       ED Risk Strat Scores                    No data recorded        SBIRT 22yo+      Flowsheet Row Most Recent Value   Initial Alcohol Screen: US AUDIT-C     1. How often do you have a drink containing alcohol? 0 Filed at: 06/01/2025 1248   2. How many drinks containing alcohol do you have on a typical day you are drinking?  0 Filed at: 06/01/2025 1248   3a. Male UNDER 65: How often do you have five or more drinks on one occasion? 0 Filed at: 06/01/2025 1248   3b. FEMALE Any Age, or MALE 65+: How often do you have 4 or more drinks on one occassion? 0 Filed at: 06/01/2025 1248   Audit-C Score 0 Filed at: 06/01/2025 1248   JURGEN: How many times in the past year have you...    Used an illegal drug or used a prescription medication for non-medical reasons? Never Filed at: 06/01/2025 1248                            History of Present Illness       Chief Complaint   Patient presents with    Rash     Pt seen at urgent care yesterday for rash to arms; given antibiotics; reports increase in spreading of rash - states he recently was cleaning the porch that may have caused        Past Medical History[1]   Past Surgical History[2]   Family History[3]   Social  History[4]   E-Cigarette/Vaping    E-Cigarette Use Current Some Day User       E-Cigarette/Vaping Substances    Nicotine No     THC Yes     CBD No     Flavoring No       I have reviewed and agree with the history as documented.     Patient is a 50 year old male with a past medical history signficant for carcinoid tumor receiving active treatment presents to the ED for a 1 week history of rash and malaise.  Patient reports that he was evaluated in urgent care yesterday after having approximately 1 week of rash and was started on a course of Keflex.  Patient denies any cough, shortness of breath, sore throat, chest pain, abdominal pain, nausea.  Patient reports that the rashes warm, tender to touch, and worsened with flexion of the joints.  He describes the rash as originating on his upper extremities chest and abdomen, and subsequently spread to his back, and this morning spread to his upper thighs.  He denies any recent medication changes or IV drug use.      Rash  Associated symptoms: fatigue    Associated symptoms: no abdominal pain, no diarrhea, no fever, no headaches, no myalgias, no nausea, no shortness of breath and not vomiting        Review of Systems   Constitutional:  Positive for fatigue. Negative for chills and fever.   Respiratory:  Negative for cough, chest tightness and shortness of breath.    Cardiovascular:  Negative for palpitations.   Gastrointestinal:  Negative for abdominal pain, diarrhea, nausea and vomiting.   Musculoskeletal:  Negative for myalgias and neck pain.   Skin:  Positive for rash.   Neurological:  Negative for weakness, numbness and headaches.   All other systems reviewed and are negative.          Objective       ED Triage Vitals [06/01/25 1248]   Temperature Pulse Blood Pressure Respirations SpO2 Patient Position - Orthostatic VS   (!) 97 °F (36.1 °C) 80 142/89 20 98 % Sitting      Temp Source Heart Rate Source BP Location FiO2 (%) Pain Score    Temporal Monitor Left arm -- 6       Vitals      Date and Time Temp Pulse SpO2 Resp BP Pain Score FACES Pain Rating User   06/01/25 1545 97.5 °F (36.4 °C) -- -- -- -- -- -- TMS   06/01/25 1530 -- 70 99 % 18 140/94 -- -- TAB   06/01/25 1248 97 °F (36.1 °C) 80 98 % 20 142/89 6 -- TAB            Physical Exam  Vitals and nursing note reviewed.   Constitutional:       General: He is not in acute distress.     Appearance: He is not ill-appearing.   HENT:      Head: Normocephalic and atraumatic.     Eyes:      Pupils: Pupils are equal, round, and reactive to light.       Cardiovascular:      Rate and Rhythm: Normal rate and regular rhythm.   Pulmonary:      Effort: Pulmonary effort is normal.   Abdominal:      General: Abdomen is flat.     Musculoskeletal:         General: No swelling or tenderness.     Skin:     General: Skin is warm and dry.      Findings: Erythema and rash present.      Comments: Diffuse rash with predominance in upper extremities, chest, and abdomen. Appreciable papular and tender to the left elbow. Small macules appreciated to the anterior upper thighs.      Neurological:      General: No focal deficit present.      Mental Status: Mental status is at baseline.     Psychiatric:         Mood and Affect: Mood normal.         Behavior: Behavior normal.                   Results Reviewed       Procedure Component Value Units Date/Time    Procalcitonin [[id number]]  (Normal) Collected: 06/01/25 1342    Lab Status: Final result Specimen: Blood from Arm, Right Updated: 06/01/25 1412     Procalcitonin 0.07 ng/ml     Comprehensive metabolic panel [[id number]]  (Abnormal) Collected: 06/01/25 1342    Lab Status: Final result Specimen: Blood from Arm, Right Updated: 06/01/25 1401     Sodium 137 mmol/L      Potassium 3.8 mmol/L      Chloride 104 mmol/L      CO2 25 mmol/L      ANION GAP 8 mmol/L      BUN 9 mg/dL      Creatinine 0.99 mg/dL      Glucose 201 mg/dL      Calcium 9.2 mg/dL      AST 16 U/L      ALT 21 U/L      Alkaline Phosphatase 81 U/L       Total Protein 6.5 g/dL      Albumin 4.0 g/dL      Total Bilirubin 0.90 mg/dL      eGFR 87 ml/min/1.73sq m     Narrative:      [employer] Kid[employer] guidelines for Chronic Kidney Disease (CKD):     Stage 1 with normal or high GFR (GFR > 90 mL/min/1.73 square meters)    Stage 2 Mild CKD (GFR = 60-89 mL/min/1.73 square meters)    Stage 3A Moderate CKD (GFR = 45-59 mL/min/1.73 square meters)    Stage 3B Moderate CKD (GFR = 30-44 mL/min/1.73 square meters)    Stage 4 Severe CKD (GFR = 15-29 mL/min/1.73 square meters)    Stage 5 End Stage CKD (GFR <15 mL/min/1.73 square meters)  Note: GFR calculation is accurate only with a steady state creatinine    Lactic acid, plasma (w/reflex if result > 2.0) [[id number]]  (Normal) Collected: 06/01/25 1342    Lab Status: Final result Specimen: Blood from Arm, Right Updated: 06/01/25 1401     LACTIC ACID 1.5 mmol/L     Narrative:      Result may be elevated if tourniquet was used during collection.    CBC and differential [[id number]]  (Abnormal) Collected: 06/01/25 1342    Lab Status: Final result Specimen: Blood from Arm, Right Updated: 06/01/25 1348     WBC 7.49 Thousand/uL      RBC 4.41 Million/uL      Hemoglobin 12.9 g/dL      Hematocrit 37.9 %      MCV 86 fL      MCH 29.3 pg      MCHC 34.0 g/dL      RDW 13.8 %      MPV 9.9 fL      Platelets 170 Thousands/uL      nRBC 0 /100 WBCs      Segmented % 81 %      Immature Grans % 0 %      Lymphocytes % 11 %      Monocytes % 5 %      Eosinophils Relative 3 %      Basophils Relative 0 %      Absolute Neutrophils 6.11 Thousands/µL      Absolute Immature Grans 0.01 Thousand/uL      Absolute Lymphocytes 0.81 Thousands/µL      Absolute Monocytes 0.36 Thousand/µL      Eosinophils Absolute 0.19 Thousand/µL      Basophils Absolute 0.01 Thousands/µL             No orders to display       Procedures    ED Medication and Procedure Management   Prior to Admission Medications   Prescriptions Last Dose Informant Patient Reported? Taking?    Alcohol Swabs 70 % PADS  Family Member No No   Sig: May substitute brand based on insurance coverage. Check glucose BID.   Patient not taking: Reported on 5/5/2025   Blood Glucose Monitoring Suppl (OneTouch Verio Reflect) w/Device KIT  Family Member No No   Sig: May substitute brand based on insurance coverage. Check glucose BID.   Patient not taking: Reported on 5/5/2025   LORazepam (ATIVAN) 1 mg tablet   Yes No   OLANZapine (ZyPREXA) 10 mg tablet   No No   Sig: Take 1 tablet (10 mg total) by mouth daily at bedtime   OneTouch Delica Lancets 33G MISC  Family Member No No   Sig: May substitute brand based on insurance coverage. Check glucose BID.   Patient not taking: Reported on 5/5/2025   QUEtiapine (SEROquel) 50 mg tablet   Yes No   amLODIPine (NORVASC) 5 mg tablet   Yes No   Sig: Take 5 mg by mouth daily   cephalexin (KEFLEX) 500 mg capsule   No No   Sig: Take 1 capsule (500 mg total) by mouth every 8 (eight) hours for 7 days   cyanocobalamin (VITAMIN B-12) 500 MCG tablet   No No   Sig: Take 1 tablet (500 mcg total) by mouth daily   cyclobenzaprine (FLEXERIL) 5 mg tablet   No No   Sig: Take 1 tablet (5 mg total) by mouth 3 (three) times a day as needed for muscle spasms   ergocalciferol (VITAMIN D2) 50,000 units   No No   Sig: Take 1 capsule (50,000 Units total) by mouth once a week for 9 doses   folic acid (FOLVITE) 1 mg tablet   No No   Sig: Take 1 tablet (1 mg total) by mouth daily   glucose blood (OneTouch Verio) test strip  Family Member No No   Sig: May substitute brand based on insurance coverage. Check glucose BID.   hydrOXYzine HCL (ATARAX) 50 mg tablet   Yes No   lidocaine (LIDODERM) 5 %   No No   Sig: Apply 1 patch topically over 12 hours daily at bedtime Remove & Discard patch within 12 hours or as directed by MD   lisinopril (ZESTRIL) 20 mg tablet   Yes No   lisinopril (ZESTRIL) 5 mg tablet   No No   Sig: Take 1 tablet (5 mg total) by mouth daily   lithium carbonate (LITHOBID) 300 mg CR tablet   No  No   Sig: Take 1 tablet (300 mg total) by mouth every 12 (twelve) hours   melatonin 3 mg   No No   Sig: Take 1 tablet (3 mg total) by mouth daily at bedtime   metFORMIN (GLUCOPHAGE) 500 mg tablet   Yes No   sitaGLIPtin (JANUVIA) 100 mg tablet   No No   Sig: Take 1 tablet (100 mg total) by mouth daily   sodium chloride (OCEAN) 0.65 % nasal spray   No No   Si spray into each nostril every hour as needed for congestion   traZODone (DESYREL) 50 mg tablet   No No   Sig: Take 1 tablet (50 mg total) by mouth daily at bedtime      Facility-Administered Medications: None     Discharge Medication List as of 2025  3:38 PM        START taking these medications    Details   predniSONE 20 mg tablet Take 2 tablets (40 mg total) by mouth daily for 5 days, Starting Sun 2025, Until 2025, Normal           CONTINUE these medications which have NOT CHANGED    Details   Alcohol Swabs 70 % PADS May substitute brand based on insurance coverage. Check glucose BID., Print      amLODIPine (NORVASC) 5 mg tablet Take 5 mg by mouth daily, Starting 2025, Until 2026, Historical Med      Blood Glucose Monitoring Suppl (OneTouch Verio Reflect) w/Device KIT May substitute brand based on insurance coverage. Check glucose BID., Print      cephalexin (KEFLEX) 500 mg capsule Take 1 capsule (500 mg total) by mouth every 8 (eight) hours for 7 days, Starting Sat 2025, Until Sat 2025, Normal      cyanocobalamin (VITAMIN B-12) 500 MCG tablet Take 1 tablet (500 mcg total) by mouth daily, Starting Mon 2025, Normal      cyclobenzaprine (FLEXERIL) 5 mg tablet Take 1 tablet (5 mg total) by mouth 3 (three) times a day as needed for muscle spasms, Starting Mon 2025, Normal      ergocalciferol (VITAMIN D2) 50,000 units Take 1 capsule (50,000 Units total) by mouth once a week for 9 doses, Starting Mon 2025, Until 2025, Normal      folic acid (FOLVITE) 1 mg tablet Take 1 tablet (1 mg total) by mouth  daily, Starting Mon 5/12/2025, Normal      glucose blood (OneTouch Verio) test strip May substitute brand based on insurance coverage. Check glucose BID., Print      hydrOXYzine HCL (ATARAX) 50 mg tablet Historical Med      lidocaine (LIDODERM) 5 % Apply 1 patch topically over 12 hours daily at bedtime Remove & Discard patch within 12 hours or as directed by MD, Starting Mon 5/12/2025, Normal      !! lisinopril (ZESTRIL) 20 mg tablet Historical Med      !! lisinopril (ZESTRIL) 5 mg tablet Take 1 tablet (5 mg total) by mouth daily, Starting Tue 5/13/2025, Normal      lithium carbonate (LITHOBID) 300 mg CR tablet Take 1 tablet (300 mg total) by mouth every 12 (twelve) hours, Starting Mon 5/12/2025, Normal      LORazepam (ATIVAN) 1 mg tablet Historical Med      melatonin 3 mg Take 1 tablet (3 mg total) by mouth daily at bedtime, Starting Mon 5/12/2025, Normal      metFORMIN (GLUCOPHAGE) 500 mg tablet Historical Med      OLANZapine (ZyPREXA) 10 mg tablet Take 1 tablet (10 mg total) by mouth daily at bedtime, Starting Mon 5/12/2025, Normal      OneTouch Delica Lancets 33G MISC May substitute brand based on insurance coverage. Check glucose BID., Print      QUEtiapine (SEROquel) 50 mg tablet Historical Med      sitaGLIPtin (JANUVIA) 100 mg tablet Take 1 tablet (100 mg total) by mouth daily, Starting Mon 5/12/2025, Normal      sodium chloride (OCEAN) 0.65 % nasal spray 1 spray into each nostril every hour as needed for congestion, Starting Mon 5/12/2025, Normal      traZODone (DESYREL) 50 mg tablet Take 1 tablet (50 mg total) by mouth daily at bedtime, Starting Mon 5/12/2025, Normal       !! - Potential duplicate medications found. Please discuss with provider.          ED SEPSIS DOCUMENTATION   Time reflects when diagnosis was documented in both MDM as applicable and the Disposition within this note       Time User Action Codes Description Comment    6/1/2025  3:26 PM Luis Holguin Add [R21] Rash and nonspecific skin  eruption                      [1]   Past Medical History:  Diagnosis Date    Allergic     Bipolar disorder in partial remission (HCC)     Diabetes (HCC)     Erectile dysfunction     unspecified erectile dysfunction type    Hypertension     Kidney stone     Substance abuse (HCC)    [2]   Past Surgical History:  Procedure Laterality Date    COLONOSCOPY      EXPLORATORY LAPAROTOMY W/ BOWEL RESECTION N/A 2022    Procedure: LAPAROTOMY EXPLORATORY W/ SMALL BOWEL RESECTION, liver biopsy;  Surgeon: Rose Mary Lara MD;  Location:  MAIN OR;  Service: General    FL RETROGRADE PYELOGRAM  10/06/2021    HERNIA REPAIR      MI CYSTOURETHROSCOPY W/URETERAL CATHETERIZATION Right 10/06/2021    Procedure: CYSTOSCOPY RETROGRADE PYELOGRAM WITH INSERTION STENT URETERAL, RIGHT URETEROSCOPY, STONE EXTRACTION;  Surgeon: Bradly Rivera MD;  Location:  MAIN OR;  Service: Urology   [3]   Family History  Problem Relation Name Age of Onset    Diabetes Mother      Thyroid disease Mother      Cancer Father      Thyroid disease Sister Ariadne     Depression Brother Bradly     Cancer Maternal Aunt Elen     Cancer Paternal Uncle Norm     Cancer Paternal Grandmother      No Known Problems Brother Irving     Thyroid disease Sister Fany    [4]   Social History  Tobacco Use    Smoking status: Former     Current packs/day: 0.00     Types: Pipe, Cigarettes     Quit date: 2003     Years since quittin.4    Smokeless tobacco: Never   Vaping Use    Vaping status: Some Days    Substances: THC   Substance Use Topics    Alcohol use: Not Currently     Comment: Drinks a quart of moonshine/night-As of 21 will quit drinking    Drug use: Yes     Types: Marijuana     Comment: Socially (1-2 Monthly)        Luis Holguin PA-C  [phone]

## 2025-06-01 NOTE — DISCHARGE INSTRUCTIONS
Take 5 day steroid course. Take otc benadryl for any continue itchiness. Follow up with oncologist for review of rash and dermatology for reevaluation.     Follow with primary care provider for review and continuation of care. For worsening symptoms or the development of severe headache, chest pain, SOB, abdominal pain, nausea, vomiting, diarrhea, or weakness, call 911 or return to the emergency department for further evaluation.

## 2025-06-01 NOTE — ED ATTENDING ATTESTATION
6/1/2025  I, Christopher Monsalve DO, saw and evaluated the patient. I have discussed the patient with the resident/non-physician practitioner and agree with the resident's/non-physician practitioner's findings, Plan of Care, and MDM as documented in the resident's/non-physician practitioner's note, except where noted. All available labs and Radiology studies were reviewed.  I was present for key portions of any procedure(s) performed by the resident/non-physician practitioner and I was immediately available to provide assistance.       At this point I agree with the current assessment done in the Emergency Department.  I have conducted an independent evaluation of this patient a history and physical is as follows:    ED Course     I supervised the Advanced Practitioner.? I performed, in its entirety, the assessment and plan component of the visit.  I agree with the Advanced Practitioner's note with the following assessment and plan:      Last week increasing fatigue and weakness, not feeling like himself. Reports diffuse rash arms, chest, abdomen. Initially managing at home for a few days without relief. Went to urgent care yesterday and started on keflex. Today rash noted to be on the legs as well today.   Reports tenderness, and itchiness. Denies fevers, chills, any other pain or sick like symptoms. Continues to actively have therapy. Has been on this same therapy for roughly 2 years.       ROS: Negative unless otherwise stated above.        Physical Exam  Vitals and nursing note reviewed.   Constitutional:       General: He is not in acute distress.     Appearance: He is well-developed. He is not ill-appearing, toxic-appearing or diaphoretic.   HENT:      Head: Normocephalic and atraumatic.      Right Ear: External ear normal.      Left Ear: External ear normal.      Nose: Nose normal.     Eyes:      General: No scleral icterus.        Right eye: No discharge.         Left eye: No discharge.       Conjunctiva/sclera: Conjunctivae normal.       Cardiovascular:      Rate and Rhythm: Normal rate and regular rhythm.      Heart sounds: Normal heart sounds. No murmur heard.     No friction rub. No gallop.   Pulmonary:      Effort: Pulmonary effort is normal. No respiratory distress.      Breath sounds: Normal breath sounds. No wheezing or rales.   Abdominal:      General: Bowel sounds are normal. There is no distension.      Palpations: Abdomen is soft. There is no mass.      Tenderness: There is no abdominal tenderness. There is no guarding.     Musculoskeletal:         General: No tenderness or deformity. Normal range of motion.      Cervical back: Normal range of motion and neck supple.     Skin:     General: Skin is warm and dry.      Coloration: Skin is not pale.      Findings: Erythema and rash present.      Comments: Diffusely erythematous rash which is maculopapular in nature.  Negative Nikolsky sign.  Tylenol blanchable.     Neurological:      Mental Status: He is alert and oriented to person, place, and time.     Psychiatric:         Behavior: Behavior normal.         Thought Content: Thought content normal.         Judgment: Judgment normal.         Patient with known carcinoid along with being on long term therapy. Rash does not appear infectious. Areas of urticara and other macular papular in nature. PA discussed with Onc and as patient on therapy long term less concerning for medication related. Will start on steroids and dc abx at this time with strict return precautions.   Christopher Monsalve DO 06/01/25             Critical Care Time  Procedures

## 2025-06-03 ENCOUNTER — OFFICE VISIT (OUTPATIENT)
Dept: HEMATOLOGY ONCOLOGY | Facility: CLINIC | Age: 50
End: 2025-06-03
Payer: COMMERCIAL

## 2025-06-03 ENCOUNTER — TELEPHONE (OUTPATIENT)
Dept: HEMATOLOGY ONCOLOGY | Facility: CLINIC | Age: 50
End: 2025-06-03

## 2025-06-03 VITALS
OXYGEN SATURATION: 98 % | SYSTOLIC BLOOD PRESSURE: 130 MMHG | BODY MASS INDEX: 31.28 KG/M2 | HEART RATE: 88 BPM | TEMPERATURE: 98.6 F | DIASTOLIC BLOOD PRESSURE: 80 MMHG | RESPIRATION RATE: 18 BRPM | HEIGHT: 73 IN | WEIGHT: 236 LBS

## 2025-06-03 DIAGNOSIS — C7A.8 NEUROENDOCRINE CARCINOMA OF SMALL BOWEL (HCC): Primary | ICD-10-CM

## 2025-06-03 DIAGNOSIS — R21 SKIN RASH: ICD-10-CM

## 2025-06-03 PROCEDURE — 99214 OFFICE O/P EST MOD 30 MIN: CPT | Performed by: INTERNAL MEDICINE

## 2025-06-03 NOTE — ASSESSMENT & PLAN NOTE
Small bowel resection on 7/21/2022 at the area where he was thought to be high risk for obstruction.  The pathology revealed well-differentiated neuroendocrine tumor, G1, multifocal, through muscularis propria into the pericolorectal tissue.  Ki-67 was 1.7%.  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      The patient has been on lanreotide on every 4-week basis.    CT scan of the chest abdomen pelvis on 3/3/2025 showed stable disease.  There is a questionable new very tiny lesion in the liver which will be monitored closely.  We did discuss pursuing another CT scan of the chest and pelvis with contrast prior to his next visit in about 3 months from now for close monitoring.  Meanwhile, he will be continued on the lanreotide on every 4-week basis.  Orders:    CT chest abdomen pelvis w contrast; Future    CBC and differential; Future    Comprehensive metabolic panel; Future    Magnesium; Future    Chromogranin A; Future

## 2025-06-03 NOTE — PROGRESS NOTES
Name: Osei Trimble      : 1975      MRN: [id number]  Encounter Provider: Jordan Green MD  Encounter Date: 6/3/2025   Encounter department: [facility] [place]  :  Assessment & Plan  Neuroendocrine carcinoma of small bowel (HCC)  Small bowel resection on 2022 at the area where he was thought to be high risk for obstruction.  The pathology revealed well-differentiated neuroendocrine tumor, G1, multifocal, through muscularis propria into the pericolorectal tissue.  Ki-67 was 1.7%.  pT3(m)[id number] multifocal well differentiated small bowel neuroendocrine tumor with known extensive mesenteric galo involvement that was unresectable based on extension to the root of the SMA.      The patient has been on lanreotide on every 4-week basis.    CT scan of the chest abdomen pelvis on 3/3/2025 showed stable disease.  There is a questionable new very tiny lesion in the liver which will be monitored closely.  We did discuss pursuing another CT scan of the chest and pelvis with contrast prior to his next visit in about 3 months from now for close monitoring.  Meanwhile, he will be continued on the lanreotide on every 4-week basis.  Orders:    CT chest abdomen pelvis w contrast; Future    CBC and differential; Future    Comprehensive metabolic panel; Future    Magnesium; Future    Chromogranin A; Future    Skin rash  The exact etiology of his diffuse erythema of the skin is not entirely clear.  Allergic reaction is possible.  He was evaluated in the emergency room just recently and was started on 5 days course of prednisone 40 mg daily.  He stated that his skin rash improved after 1 or 2 days worth of prednisone.  The patient asked for referral to the dermatologist for further evaluation which was placed today.  He was asked to go to the emergency room or get in touch with his PCP if his skin rash returns after the completion of the short course of prednisone.  Orders:    Ambulatory  Referral to Dermatology; Future        Return in about 15 weeks (around 9/16/2025) for Office Visit 20 min, Labs, Imaging, Infusion.    History of Present Illness   Chief Complaint   Patient presents with    Follow-up   The patient came today for follow-up visit accompanied by his mother or a family member.  He apparently developed a significant erythema of his skin all over his body just recently which was evaluated in the emergency room.  He was then started on prednisone 40 mg daily for 5 days.  He is already noticing some improvement of his skin rash/erythema after 2 days worth of prednisone.  He did have a CT scan of the chest and pelvis on 3/3/2025 which showed:  IMPRESSION:     1.  No significant change in partially calcified mesenteric mass with adjacent subcentimeter mesenteric lymph nodes in keeping with known neuroendocrine tumor.     2.  Question new 2-3 mm inferior right hepatic lobe hypodensity. Stable 6 mm right lobe hypodensity. Follow up MRI abdomen with IV contrast (Eovist) in 3 months to allow for tincture of time to assess interval change and further characterize this finding   is recommended.     3.  Stable 7 mm right middle lobe nodule.    Blood work on 6/1/2025 showed hemoglobin of 12.9 with normal white cells and platelets.  Creatinine 0.9 with normal calcium and liver enzymes.    Oncology History   Cancer Staging   Neuroendocrine carcinoma of small bowel (HCC)  Staging form: Small Intestine - Other Histologies, AJCC 8th Edition  - Pathologic stage from 7/21/2022: pTX, pM1 - Signed by Jordan Green MD on 5/2/2024  Stage prefix: Initial diagnosis  Histologic grade (G): G1  Histologic grading system: 4 grade system  Residual tumor (R): R2  Oncology History Overview Note   Patient with history of bipolar disorder, schizophrenic personality, hypertension, etc.  The patient apparently was admitted the hospital around October of 2021 for symptomatic left ureteral calculus.  Incidentally he was noted  to have mesenteric masses with calcification with cefepime on the CTA from 10/02/2021.  Another CT scan of the abdomen pelvis with contrast was done on 02/01/2022 which showed no significant change in the partially calcified mesenteric masses with mesenteric tethering concerning for neuroendocrine tumor.  He had a DOTATATE PET-CT scan on 05/04/2022 which showed:  IMPRESSION:  1.  Dotatate avid clustered central mesenteric masses suspicious for underlying neuroendocrine neoplasm.  2.  There does appear to be subjacent focal radiotracer uptake in a proximal small bowel loop for which underlying small bowel lesion should be excluded.  Consider further evaluation with CT enterography or small bowel pill study.  3.  A few low-level foci of radiotracer uptake in the bilateral scapula, non-specific.  Activity is lower than expected for neuroendocrine metastasis given the intensity of the mesenteric foci, however early metastasis is not entirely excluded.  Consider   further evaluation with MRI of these regions vs reassessment on follow up Dotatate PET.  4.  Diffuse prostatic activity, non-specific, question inflammatory.  Correlate with PSA levels and urologic evaluation as warranted.  5.   Stable 8 mm nodule in the right mid lung.  No suspicious radiotracer uptake here.  Given the stability since 10/2/2021 this can be followed up with chest CT in 12 months.    Chromogranin A serum level on 10/06/2021 was 134.5.  On 06/24/2022 was 164.    The patient then underwent small bowel resection on 07/21/2022 at the area where he was thought to be high-risk for obstruction.  The pathology revealed well-differentiated neuroendocrine tumor, G1 come multifocal, the tumor invaded through the muscularis propria into the taye colorectal tissue.  He also had liver lesion biopsy at segment 5 which also was compatible with metastatic well-differentiated neuroendocrine tumor consistent with intestinal primary.  Ki 67 was 1.7%.  The final  "pathology was pT3(m)[id number] multifocal well differentiated small bowel neuroendocrine tumor with known extensive mesenteric galo involvement that was unresectable based on extension to the root of the SMA.      Neuroendocrine carcinoma of small bowel (HCC)   7/21/2022 -  Cancer Staged    Staging form: Small Intestine - Other Histologies, AJCC 8th Edition  - Pathologic stage from 7/21/2022: pTX, pM1 - Signed by Jordan Green MD on 5/2/2024  Stage prefix: Initial diagnosis  Histologic grade (G): G1  Histologic grading system: 4 grade system  Residual tumor (R): R2 - Macroscopic       8/5/2022 Initial Diagnosis    Neuroendocrine carcinoma of small bowel (HCC)             Review of Systems   Constitutional:  Positive for appetite change and fatigue. Negative for chills and fever.   HENT:  Negative for ear pain and sore throat.    Eyes:  Negative for pain and visual disturbance.   Respiratory:  Negative for cough and shortness of breath.    Cardiovascular:  Negative for chest pain and palpitations.   Gastrointestinal:  Positive for constipation. Negative for abdominal pain and vomiting.   Genitourinary:  Negative for dysuria and hematuria.   Musculoskeletal:  Negative for arthralgias and back pain.   Skin:  Positive for rash. Negative for color change.   Neurological:  Positive for dizziness. Negative for seizures and syncope.   Psychiatric/Behavioral:  Positive for sleep disturbance.    All other systems reviewed and are negative.    Medical History Reviewed by provider this encounter:     .  Medications Ordered Prior to Encounter[1]   Social History[2]      Objective   /80 (BP Location: Left arm, Patient Position: Sitting, Cuff Size: Adult)   Pulse 88   Temp 98.6 °F (37 °C)   Resp 18   Ht 6' 1\" (1.854 m)   Wt 107 kg (236 lb)   SpO2 98%   BMI 31.14 kg/m²     Pain Screening:  Pain Score:   5  ECOG   2  Physical Exam  Constitutional:       Appearance: He is well-developed.   HENT:      Head: Normocephalic and " atraumatic.      Nose: Nose normal.     Eyes:      General: No scleral icterus.        Right eye: No discharge.         Left eye: No discharge.      Conjunctiva/sclera: Conjunctivae normal.      Pupils: Pupils are equal, round, and reactive to light.     Neck:      Thyroid: No thyromegaly.      Trachea: No tracheal deviation.     Cardiovascular:      Rate and Rhythm: Normal rate and regular rhythm.      Heart sounds: Normal heart sounds. No murmur heard.     No friction rub.   Pulmonary:      Effort: Pulmonary effort is normal. No respiratory distress.      Breath sounds: Normal breath sounds. No wheezing or rales.   Chest:      Chest wall: No tenderness.   Abdominal:      General: Bowel sounds are normal. There is no distension.      Palpations: Abdomen is soft. There is no hepatomegaly or splenomegaly.      Tenderness: There is no abdominal tenderness. There is no guarding or rebound.     Musculoskeletal:         General: No tenderness or deformity. Normal range of motion.      Cervical back: Normal range of motion and neck supple.   Lymphadenopathy:      Cervical: No cervical adenopathy.     Skin:     General: Skin is warm and dry.      Coloration: Skin is not pale.      Findings: No erythema or rash.     Neurological:      Mental Status: He is alert and oriented to person, place, and time.      Cranial Nerves: No cranial nerve deficit.      Coordination: Coordination normal.      Deep Tendon Reflexes: Reflexes are normal and symmetric.     Psychiatric:         Behavior: Behavior normal.         Thought Content: Thought content normal.         Judgment: Judgment normal.         Labs: I have reviewed the following labs:  Lab Results   Component Value Date/Time    WBC 7.49 06/01/2025 01:42 PM    RBC 4.41 06/01/2025 01:42 PM    Hemoglobin 12.9 06/01/2025 01:42 PM    Hematocrit 37.9 06/01/2025 01:42 PM    MCV 86 06/01/2025 01:42 PM    MCH 29.3 06/01/2025 01:42 PM    RDW 13.8 06/01/2025 01:42 PM    Platelets 170  06/01/2025 01:42 PM    Segmented % 81 (H) 06/01/2025 01:42 PM    Lymphocytes % 11 (L) 06/01/2025 01:42 PM    Monocytes % 5 06/01/2025 01:42 PM    Eosinophils Relative 3 06/01/2025 01:42 PM    Basophils Relative 0 06/01/2025 01:42 PM    Immature Grans % 0 06/01/2025 01:42 PM    Absolute Neutrophils 6.11 06/01/2025 01:42 PM     Lab Results   Component Value Date/Time    Potassium 3.8 06/01/2025 01:42 PM    Chloride 104 06/01/2025 01:42 PM    CO2 25 06/01/2025 01:42 PM    BUN 9 06/01/2025 01:42 PM    Creatinine 0.99 06/01/2025 01:42 PM    Glucose, Fasting 127 (H) 03/07/2025 05:26 AM    Calcium 9.2 06/01/2025 01:42 PM    AST 16 06/01/2025 01:42 PM    ALT 21 06/01/2025 01:42 PM    Alkaline Phosphatase 81 06/01/2025 01:42 PM    Total Protein 6.5 06/01/2025 01:42 PM    Albumin 4.0 06/01/2025 01:42 PM    Total Bilirubin 0.90 06/01/2025 01:42 PM    eGFR 87 06/01/2025 01:42 PM     Lab Results   Component Value Date/Time    WBC 7.49 06/01/2025 01:42 PM    RBC 4.41 06/01/2025 01:42 PM    Hemoglobin 12.9 06/01/2025 01:42 PM    Hematocrit 37.9 06/01/2025 01:42 PM    MCV 86 06/01/2025 01:42 PM    MCH 29.3 06/01/2025 01:42 PM    RDW 13.8 06/01/2025 01:42 PM    Platelets 170 06/01/2025 01:42 PM    Segmented % 81 (H) 06/01/2025 01:42 PM    Lymphocytes % 11 (L) 06/01/2025 01:42 PM    Monocytes % 5 06/01/2025 01:42 PM    Eosinophils Relative 3 06/01/2025 01:42 PM    Basophils Relative 0 06/01/2025 01:42 PM    Immature Grans % 0 06/01/2025 01:42 PM    Absolute Neutrophils 6.11 06/01/2025 01:42 PM      Lab Results   Component Value Date/Time    Sodium 137 06/01/2025 01:42 PM    Potassium 3.8 06/01/2025 01:42 PM    Chloride 104 06/01/2025 01:42 PM    CO2 25 06/01/2025 01:42 PM    ANION GAP 8 06/01/2025 01:42 PM    BUN 9 06/01/2025 01:42 PM    Creatinine 0.99 06/01/2025 01:42 PM    Glucose 201 (H) 06/01/2025 01:42 PM    Glucose, Fasting 127 (H) 03/07/2025 05:26 AM    Calcium 9.2 06/01/2025 01:42 PM    AST 16 06/01/2025 01:42 PM    ALT 21  06/01/2025 01:42 PM    Alkaline Phosphatase 81 06/01/2025 01:42 PM    Total Protein 6.5 06/01/2025 01:42 PM    Albumin 4.0 06/01/2025 01:42 PM    Total Bilirubin 0.90 06/01/2025 01:42 PM    eGFR 87 06/01/2025 01:42 PM      Lab Results   Component Value Date/Time    Vitamin B-12 569 05/07/2025 05:38 AM    Folate >22.3 05/07/2025 05:38 AM      Results for orders placed or performed during the hospital encounter of 06/01/25   CBC and differential   Result Value Ref Range    WBC 7.49 4.31 - 10.16 Thousand/uL    RBC 4.41 3.88 - 5.62 Million/uL    Hemoglobin 12.9 12.0 - 17.0 g/dL    Hematocrit 37.9 36.5 - 49.3 %    MCV 86 82 - 98 fL    MCH 29.3 26.8 - 34.3 pg    MCHC 34.0 31.4 - 37.4 g/dL    RDW 13.8 11.6 - 15.1 %    MPV 9.9 8.9 - 12.7 fL    Platelets 170 149 - 390 Thousands/uL    nRBC 0 /100 WBCs    Segmented % 81 (H) 43 - 75 %    Immature Grans % 0 0 - 2 %    Lymphocytes % 11 (L) 14 - 44 %    Monocytes % 5 4 - 12 %    Eosinophils Relative 3 0 - 6 %    Basophils Relative 0 0 - 1 %    Absolute Neutrophils 6.11 1.85 - 7.62 Thousands/µL    Absolute Immature Grans 0.01 0.00 - 0.20 Thousand/uL    Absolute Lymphocytes 0.81 0.60 - 4.47 Thousands/µL    Absolute Monocytes 0.36 0.17 - 1.22 Thousand/µL    Eosinophils Absolute 0.19 0.00 - 0.61 Thousand/µL    Basophils Absolute 0.01 0.00 - 0.10 Thousands/µL   Comprehensive metabolic panel   Result Value Ref Range    Sodium 137 135 - 147 mmol/L    Potassium 3.8 3.5 - 5.3 mmol/L    Chloride 104 96 - 108 mmol/L    CO2 25 21 - 32 mmol/L    ANION GAP 8 4 - 13 mmol/L    BUN 9 5 - 25 mg/dL    Creatinine 0.99 0.60 - 1.30 mg/dL    Glucose 201 (H) 65 - 140 mg/dL    Calcium 9.2 8.4 - 10.2 mg/dL    AST 16 13 - 39 U/L    ALT 21 7 - 52 U/L    Alkaline Phosphatase 81 34 - 104 U/L    Total Protein 6.5 6.4 - 8.4 g/dL    Albumin 4.0 3.5 - 5.0 g/dL    Total Bilirubin 0.90 0.20 - 1.00 mg/dL    eGFR 87 ml/min/1.73sq m   Lactic acid, plasma (w/reflex if result > 2.0)   Result Value Ref Range    LACTIC  ACID 1.5 0.5 - 2.0 mmol/L   Result Value Ref Range    Procalcitonin 0.07 <=0.25 ng/ml                   [1]   Current Outpatient Medications on File Prior to Visit   Medication Sig Dispense Refill    amLODIPine (NORVASC) 5 mg tablet Take 5 mg by mouth daily      cyanocobalamin (VITAMIN B-12) 500 MCG tablet Take 1 tablet (500 mcg total) by mouth daily 30 tablet 0    cyclobenzaprine (FLEXERIL) 5 mg tablet Take 1 tablet (5 mg total) by mouth 3 (three) times a day as needed for muscle spasms 60 tablet 0    ergocalciferol (VITAMIN D2) 50,000 units Take 1 capsule (50,000 Units total) by mouth once a week for 9 doses 9 capsule 0    folic acid (FOLVITE) 1 mg tablet Take 1 tablet (1 mg total) by mouth daily 30 tablet 0    glucose blood (OneTouch Verio) test strip May substitute brand based on insurance coverage. Check glucose BID. 100 each 1    hydrOXYzine HCL (ATARAX) 50 mg tablet       lidocaine (LIDODERM) 5 % Apply 1 patch topically over 12 hours daily at bedtime Remove & Discard patch within 12 hours or as directed by MD 30 patch 0    lisinopril (ZESTRIL) 20 mg tablet       lisinopril (ZESTRIL) 5 mg tablet Take 1 tablet (5 mg total) by mouth daily 30 tablet 0    lithium carbonate (LITHOBID) 300 mg CR tablet Take 1 tablet (300 mg total) by mouth every 12 (twelve) hours 60 tablet 0    LORazepam (ATIVAN) 1 mg tablet       melatonin 3 mg Take 1 tablet (3 mg total) by mouth daily at bedtime 30 tablet 0    metFORMIN (GLUCOPHAGE) 500 mg tablet       OLANZapine (ZyPREXA) 10 mg tablet Take 1 tablet (10 mg total) by mouth daily at bedtime 30 tablet 0    predniSONE 20 mg tablet Take 2 tablets (40 mg total) by mouth daily for 5 days 10 tablet 0    QUEtiapine (SEROquel) 50 mg tablet       sitaGLIPtin (JANUVIA) 100 mg tablet Take 1 tablet (100 mg total) by mouth daily 30 tablet 0    sodium chloride (OCEAN) 0.65 % nasal spray 1 spray into each nostril every hour as needed for congestion 104 mL 0    traZODone (DESYREL) 50 mg tablet Take  1 tablet (50 mg total) by mouth daily at bedtime 30 tablet 0    Alcohol Swabs 70 % PADS May substitute brand based on insurance coverage. Check glucose BID. (Patient not taking: Reported on 2025) 100 each 1    Blood Glucose Monitoring Suppl (OneTouch Verio Reflect) w/Device KIT May substitute brand based on insurance coverage. Check glucose BID. (Patient not taking: Reported on 2025) 1 kit 0    cephalexin (KEFLEX) 500 mg capsule Take 1 capsule (500 mg total) by mouth every 8 (eight) hours for 7 days (Patient not taking: Reported on 6/3/2025) 21 capsule 0    OneTouch Delica Lancets 33G MISC May substitute brand based on insurance coverage. Check glucose BID. (Patient not taking: Reported on 2025) 100 each 1     No current facility-administered medications on file prior to visit.   [2]   Social History  Tobacco Use    Smoking status: Former     Current packs/day: 0.00     Types: Pipe, Cigarettes     Quit date: 2003     Years since quittin.4    Smokeless tobacco: Never   Vaping Use    Vaping status: Some Days    Substances: THC   Substance and Sexual Activity    Alcohol use: Not Currently     Comment: Drinks a quart of moonshine/night-As of 21 will quit drinking    Drug use: Yes     Types: Marijuana     Comment: Socially (1-2 Monthly)    Sexual activity: Not Currently

## 2025-06-04 ENCOUNTER — TELEPHONE (OUTPATIENT)
Age: 50
End: 2025-06-04

## 2025-06-04 NOTE — TELEPHONE ENCOUNTER
Scheduled date of colonoscopy (as of today):6/27/25  Physician performing colonoscopy:Dr Quinones  Location of colonoscopy:[facility]  Bowel prep reviewed with patient:reviewed mirdul prep with pt sister, requesting prep instructions be mailed to address on file along with diabetic instructions  Instructions reviewed with patient by:anson  Clearances: na    diabetic    Outbound call for the pt sister who also needed to schedule the pt colonoscopy that had been cancelled order still in the system. Pt not on blood thinners and has not been hospitalized/abd surgery within the last 6 months.

## 2025-06-05 ENCOUNTER — OFFICE VISIT (OUTPATIENT)
Dept: PSYCHIATRY | Facility: CLINIC | Age: 50
End: 2025-06-05

## 2025-06-05 NOTE — PSYCH
No Call. No Show. No Charge    Osei Trimble no showed 06/05/25 appointment , staff called and left message to reschedule appointment     Treatment Plan not completed within required time limits due to: Osei Trimble no show appointment on 06/05/25

## 2025-06-11 ENCOUNTER — HOSPITAL ENCOUNTER (OUTPATIENT)
Dept: INFUSION CENTER | Facility: HOSPITAL | Age: 50
Discharge: HOME | End: 2025-06-11
Attending: INTERNAL MEDICINE
Payer: COMMERCIAL

## 2025-06-11 VITALS — TEMPERATURE: 97.1 F

## 2025-06-11 DIAGNOSIS — D3A.8 NEUROENDOCRINE TUMOR: ICD-10-CM

## 2025-06-11 DIAGNOSIS — C7A.8 NEUROENDOCRINE CARCINOMA OF SMALL BOWEL (HCC): Primary | ICD-10-CM

## 2025-06-11 PROCEDURE — 96372 THER/PROPH/DIAG INJ SC/IM: CPT

## 2025-06-11 RX ORDER — LANREOTIDE ACETATE 120 MG/.5ML
120 INJECTION SUBCUTANEOUS ONCE
OUTPATIENT
Start: 2025-07-09

## 2025-06-11 RX ORDER — LANREOTIDE ACETATE 120 MG/.5ML
120 INJECTION SUBCUTANEOUS ONCE
Status: COMPLETED | OUTPATIENT
Start: 2025-06-11 | End: 2025-06-11

## 2025-06-11 RX ADMIN — LANREOTIDE ACETATE 120 MG: 120 INJECTION SUBCUTANEOUS at 12:10

## 2025-06-11 NOTE — PROGRESS NOTES
Osei Trimble  tolerated lanreotide injection well with no complications.  Injection in right upper outer gluetal area    Osei Trimble is aware of future appt on 07/09/25 at 1400  .     AVS printed and given to Osei Trimble:  Yes

## 2025-06-11 NOTE — PLAN OF CARE
Problem: SAFETY ADULT  Goal: Patient will remain free of falls  Description: INTERVENTIONS:  - Educate patient/family on patient safety including physical limitations  - Instruct patient to call for assistance with activity   - Consider consulting OT/PT to assist with strengthening/mobility based on AM PAC & JH-HLM score  - Consult OT/PT to assist with strengthening/mobility   - Keep Call bell within reach  - Keep bed low and locked with side rails adjusted as appropriate  - Keep care items and personal belongings within reach  - Initiate and maintain comfort rounds  - Make Fall Risk Sign visible to staff  - Apply yellow socks and bracelet for high fall risk patients  - Consider moving patient to room near nurses station  Outcome: Progressing

## 2025-07-03 ENCOUNTER — TELEPHONE (OUTPATIENT)
Age: 50
End: 2025-07-03

## 2025-07-09 ENCOUNTER — HOSPITAL ENCOUNTER (OUTPATIENT)
Dept: INFUSION CENTER | Facility: HOSPITAL | Age: 50
Discharge: HOME | End: 2025-07-09
Attending: INTERNAL MEDICINE
Payer: COMMERCIAL

## 2025-07-09 VITALS
RESPIRATION RATE: 16 BRPM | SYSTOLIC BLOOD PRESSURE: 132 MMHG | DIASTOLIC BLOOD PRESSURE: 96 MMHG | TEMPERATURE: 96.8 F | HEART RATE: 91 BPM

## 2025-07-09 DIAGNOSIS — C7A.8 NEUROENDOCRINE CARCINOMA OF SMALL BOWEL (HCC): Primary | ICD-10-CM

## 2025-07-09 DIAGNOSIS — D3A.8 NEUROENDOCRINE TUMOR: ICD-10-CM

## 2025-07-09 PROCEDURE — 96372 THER/PROPH/DIAG INJ SC/IM: CPT

## 2025-07-09 RX ORDER — LANREOTIDE ACETATE 120 MG/.5ML
120 INJECTION SUBCUTANEOUS ONCE
Status: COMPLETED | OUTPATIENT
Start: 2025-07-09 | End: 2025-07-09

## 2025-07-09 RX ORDER — LANREOTIDE ACETATE 120 MG/.5ML
120 INJECTION SUBCUTANEOUS ONCE
OUTPATIENT
Start: 2025-08-06

## 2025-07-09 RX ADMIN — LANREOTIDE ACETATE 120 MG: 120 INJECTION SUBCUTANEOUS at 14:25

## 2025-07-09 NOTE — PROGRESS NOTES
Patient tolerated Lanreotide injection in left upper outer gluteal area without reaction or issues.        Osei Trimble is aware of future appt on 8/6/25 at 1300.     AVS -  No (Declined by Osei Trimble) Appointment card given to patient.    Patient ambulated off unit without incident.  All personal belongings taken with patient.

## 2025-07-18 ENCOUNTER — TELEPHONE (OUTPATIENT)
Age: 50
End: 2025-07-18

## 2025-07-18 ENCOUNTER — OFFICE VISIT (OUTPATIENT)
Dept: PSYCHIATRY | Facility: CLINIC | Age: 50
End: 2025-07-18
Payer: COMMERCIAL

## 2025-07-18 DIAGNOSIS — F15.959 METHAMPHETAMINE-INDUCED PSYCHOTIC DISORDER (HCC): ICD-10-CM

## 2025-07-18 DIAGNOSIS — F31.9 BIPOLAR DISORDER WITH PSYCHOTIC FEATURES (HCC): ICD-10-CM

## 2025-07-18 DIAGNOSIS — F31.0 BIPOLAR AFFECTIVE DISORDER, CURRENT EPISODE HYPOMANIC (HCC): Primary | ICD-10-CM

## 2025-07-18 PROCEDURE — 90791 PSYCH DIAGNOSTIC EVALUATION: CPT | Performed by: NURSE PRACTITIONER

## 2025-07-18 RX ORDER — TRAZODONE HYDROCHLORIDE 50 MG/1
50 TABLET ORAL
Qty: 30 TABLET | Refills: 2 | Status: SHIPPED | OUTPATIENT
Start: 2025-07-18

## 2025-07-18 RX ORDER — HYDROXYZINE HYDROCHLORIDE 50 MG/1
50 TABLET, FILM COATED ORAL EVERY 8 HOURS PRN
Qty: 60 TABLET | Refills: 2 | Status: SHIPPED | OUTPATIENT
Start: 2025-07-18

## 2025-07-18 RX ORDER — OLANZAPINE 15 MG/1
15 TABLET, FILM COATED ORAL
Qty: 30 TABLET | Refills: 2 | Status: SHIPPED | OUTPATIENT
Start: 2025-07-18

## 2025-07-18 RX ORDER — LITHIUM CARBONATE 300 MG/1
300 TABLET, FILM COATED, EXTENDED RELEASE ORAL EVERY 12 HOURS SCHEDULED
Qty: 60 TABLET | Refills: 2 | Status: SHIPPED | OUTPATIENT
Start: 2025-07-18

## 2025-07-18 NOTE — TELEPHONE ENCOUNTER
Patient's sister called in to inquire about patient's two medications, Hydroxyzine and Olanzapine, that were prescribed to patient today by Pam Alejandro.    Patient's sister stated that the paperwork they received does not state how the patient should take these medications.    Additionally, when the patient's sister went to their pharmacy to  prescriptions, she was only provided with Trazadone from a previous prescriber.    Writer successfully transferred patient's sister to nursing for further assistance.    Writer confirmed that there is a communication consent form on file to speak with patient's sister.

## 2025-07-18 NOTE — BH TREATMENT PLAN
TREATMENT PLAN (Medication Management Only)        [facility]    Name and Date of Birth:  Osei Trimble 50 y.o. 1975  MRN: [id number]  Date of Treatment Plan: July 18, 2025  Diagnosis/Diagnoses:    1. Methamphetamine-induced psychotic disorder (HCC)    2. Bipolar affective disorder, current episode hypomanic (HCC)    3. Bipolar disorder with psychotic features (HCC) vs Substance induced psychosis      Strengths/Personal Resources for Self-Care: supportive family.  Area/Areas of need (in own words): mood instability  1. Long Term Goal:   continue improvement in psychotic symptoms.  Target Date:6 months - 1/18/2026  Person/Persons responsible for completion of goal: Osei  2.  Short Term Objective (s) - How will we reach this goal?:   A.  Provider new recommended medication/dosage changes and/or continue medication(s): Medication changes: I have also changed his hydrOXYzine HCL. Additionally, I have increased his OLANZapine. Lastly, I am having him maintain his melatonin, traZODone, and lithium carbonate..  B.  Keep busy by fixing bicycles.  C.  Not isolate.  Target Date:6 months - 1/18/2026  Person/Persons Responsible for Completion of Goal: Osei  Progress Towards Goals: starting treatment  Treatment Modality: medication management every 1 month, medication education at every visit  Review due 180 days from date of this plan: 6 months - 1/18/2026  Expected length of service: ongoing treatment unless revised  My Physician/PA/NP and I have developed this plan together and I agree to work on the goals and objectives. I understand the treatment goals that were developed for my treatment.   Electronic Signatures: on file (unless signed below)    PIERRE Finch 07/18/25

## 2025-07-18 NOTE — ASSESSMENT & PLAN NOTE
Patient reports he has not used Methamphetamine since July 4 weekend. Will continue to monitor.

## 2025-07-18 NOTE — BH CRISIS PLAN
"Client Name: Osei Trimble       Client YOB: 1975    Jules Safety Plan      Creation Date: 7/18/25 Update Date: 7/18/25   Created By: KIARA Finch Last Updated By: KIARA Finch      Step 1: Warning Signs:   Warning Signs   Voices get louder   Black out            Step 2: Internal Coping Strategies:   Internal Coping Strategies   work on bikes            Step 3: People and social settings that provide distraction:   Name Contact Information   sister in phone, next door            Step 4: People whom I can ask for help during a crisis:      Name Contact Information    sister in phone, next door      Step 5: Professionals or agencies I can contact during a crisis:      Clinican/Agency Name Phone Emergency Contact    [facility]/kiara juarez  [phone]        Crisis Phone Numbers:   Suicide Prevention Lifeline: Call or Text  98 Crisis Text Line: Text HOME to 201-003   Please note: Some [facility] do not have a separate number for Child/Adolescent specific crisis. If your county is not listed under Child/Adolescent, please call the adult number for your county      Adult Crisis Numbers: Child/Adolescent Crisis Numbers   [facility]: [phone] [facility]: [phone]   [facility]: [phone] [facility]: [phone]   [facility]: [phone] [place]: [phone]   [facility]: [phone] [place]: [phone]   [facility]: [phone]   [facility]: [phone]   [facility]: [phone]   [facility]: [phone] (daytime) [phone] (after hours, weekends, holidays)      Step 6: Making the environment safer (plan for lethal means safety):   Plan: No guns, has swords     Optional: What is most important to me and worth living for?   \"Getting the house fixed up\".     Jules Safety Plan. Ana Fry and Aj Wilson. Used with permission of " the authors.

## 2025-07-18 NOTE — ASSESSMENT & PLAN NOTE
"Patient tangential and disorganized in speech. Endorsing AH of voices that he cannot always make out. Have told him in past to \"kill others\".  Will increase Zyprexa to 15mg PO HS at this time. Continue all other medications as ordered. Trazodone 50mg PO HS, Lithium 300mg PO Q12 hours, Hydroxyzine 50mg PO Q8 PRN for anxiety  Orders:    traZODone (DESYREL) 50 mg tablet; Take 1 tablet (50 mg total) by mouth daily at bedtime    OLANZapine (ZyPREXA) 15 mg tablet; Take 1 tablet (15 mg total) by mouth daily at bedtime    lithium carbonate (LITHOBID) 300 mg CR tablet; Take 1 tablet (300 mg total) by mouth every 12 (twelve) hours    hydrOXYzine HCL (ATARAX) 50 mg tablet; Take 1 tablet (50 mg total) by mouth every 8 (eight) hours as needed for anxiety    Lithium level; Future    "

## 2025-07-18 NOTE — PSYCH
"PSYCHIATRIC EVALUATION     Name: Osei Trimble      : 1975      MRN: [id number]  Encounter Provider: PIERRE Finch  Encounter Date: 2025   Encounter department: [facility]    Insurance: Payor: [facility] / Plan: [facility] / Product Type: [facility] /      Reason for visit: No chief complaint on file.  :  Assessment & Plan  Methamphetamine-induced psychotic disorder (HCC)  Patient reports he has not used Methamphetamine since  weekend. Will continue to monitor.        Bipolar affective disorder, current episode hypomanic (HCC)  Patient tangential and disorganized in speech. Endorsing AH of voices that he cannot always make out. Have told him in past to \"kill others\".  Will increase Zyprexa to 15mg PO HS at this time. Continue all other medications as ordered. Trazodone 50mg PO HS, Lithium 300mg PO Q12 hours, Hydroxyzine 50mg PO Q8 PRN for anxiety  Orders:    traZODone (DESYREL) 50 mg tablet; Take 1 tablet (50 mg total) by mouth daily at bedtime    OLANZapine (ZyPREXA) 15 mg tablet; Take 1 tablet (15 mg total) by mouth daily at bedtime    lithium carbonate (LITHOBID) 300 mg CR tablet; Take 1 tablet (300 mg total) by mouth every 12 (twelve) hours    hydrOXYzine HCL (ATARAX) 50 mg tablet; Take 1 tablet (50 mg total) by mouth every 8 (eight) hours as needed for anxiety    Lithium level; Future    Bipolar disorder with psychotic features (HCC) vs Substance induced psychosis  Will continue to monitor for substance-induced psychosis           Treatment Recommendations/Precautions:    Educated about diagnosis and treatment modalities. Verbalizes understanding and agreement with the treatment plan.  Discussed self monitoring of symptoms, and symptom monitoring tools.  Discussed medications and if treatment adjustment was needed or desired.  Medication management every 1 month  Aware of need to follow up with family " "physician for medical issues  Aware of 24 hour and weekend coverage for urgent situations accessed by calling [facility] main practice number  I am scheduling this patient out for greater than 3 months: No    Medications Risks/Benefits:      Risks, Benefits And Possible Side Effects Of Medications:    Risks, benefits, and possible side effects of medications explained to Osei and he (or legal representative) verbalizes understanding and agreement for treatment.    Controlled Medication Discussion:     Not applicable - controlled prescriptions are not prescribed by this practice.      History of Present Illness     Chief Complaint / reason for visit: \" I'm here for my medications. \"     Osei is a 50 y.o. male with a history of methamphetamine use, CAH, bipolar d/o, possible schizoaffective d/o, HTN, and type 2 diabetes who presents for psychiatric evaluation due to need for medication management.  Patient is single, domiciled independently, but lives next door to his sister who is primary support for patient and has a homeless friend sleeping on his back porch.  He reports he grew up in [place], with one brother and one sister, of whom he was the middle child. He states that he dropped out of school after the 9th grade and never went to college. His father is . He is not , but has 3 children.  Reports he has used marijuana and methamphetamine but denies any other drug use.  He is currently on disability.     Osei reports he is currently experiencing auditory hallucinations of people who in the past have told him to kill others. States he owns a sword.  He has no intent or plan to hurt anyone at this time.  He is disorganized in conversation, tangential and labile. Becomes tearful when speaking about his father passing away.  He states he is depressed, rating his depression 7-8/10. He rates his anxiety 5/10.      In terms of bipolar disorder, patient endorses " irritability, past history of elevated mood, mood swings, and is currently endorsing manic symptoms.  Patient appears to have an elevated/expansive mood, lengthy periods without sleep, and intense/prolonged irritability. Endorses extreme mood lability. During today's evaluation, Osei exhibits objective evidence of hypomania/kin. Osei is disorganized in thought with tangential speech. Speech is rapid and is difficult to redirect at times.    Osei denies suicidal and homicidal ideations currently, reports occasional visual hallucinations of shadows. Reports some paranoia, stating he believes that sometimes people are outside of his home with guns.  States he has been inpatient numerous times in the past and has a history of suicide attempts stating he has jumped off a bridge in past, has cut arm open, and stabbed himself.  He is not agitated and has no aggression in the office. He is cooperative throughout the appointment.    His sister brought him to his appointment, and is in the lobby, states that they are switching providers from \[facility]\"" because they were unhappy with their care.     Education on psychosis and methamphetamine use provided. Encouraged medication compliance.    PLAN  Continue Hydroxyzine 50mg PO Q8 hours PRN for anxiety  Continue Lithium 300mg PO Q12 for mood stabilization; Lithium level ordered for baseline level  Increase Zyprexa to 15mg PO HS for ongoing kin and auditory hallucinations  Continue Trazodone 50mg PO HS for sleep    Stressors: auditory hallucinations     In terms of depression, the patient endorses depressed mood, change in appetite or weight, thoughts about death or suicide.     In terms of bipolar disorder, the patient endorses yes, history of periods of elevated mood, past mixed episodes, history of periods of irritable mood, history of mood swings, currently manic symptoms. Symptoms include  Irritability, decreased sleep , more talkativeness/pressured speech , flight of  ideas/racing thoughts , distractibility, increased goal-directed behavior, and increased energy.    CHRIS symptoms: difficulty concentrating, insomnia, irritable, and 3+ symptoms and worry are significantly detrimental.    Panic Disorder symptoms: no symptoms suggestive of panic disorder.    Social Anxiety symptoms: no symptoms suggestive of social anxiety.    OCD Symptoms: No significant symptoms supportive of OCD.    Eating Disorder symptoms: no historical or current eating disorder; no binge eating disorder; no anorexia nervosa; no symptoms of bulimia.    In terms of PTSD, the patient endorses exposure to trauma involving: mom's death in December 2024; intrusive symptoms including (1+): no intrusive symptoms; avoidance symptoms including (1+): no avoidance symptoms; negative alterations including (2+): no negative alteration symptoms; hyperarousal symptoms including (2+): no arousal symptoms.     In terms of psychotic symptoms, the patient reports auditory hallucinations (of voices), visual hallcinations (shadows), paranoid ideation, disorganization.    Psychiatric Review Of Systems:    Pertinent items are noted in HPI; all others negative    Review Of Systems: A review of systems is obtained and is negative except for the pertinent positives listed in HPI/Subjective above.      Current Rating Scores:         Areas of Improvement: reviewed in HPI/Subjective Section and reviewed in Assessment and Plan Section      Historical Information      Past Psychiatric History:     Previous diagnoses include:   Bipolar d/o, Schizoaffective   Prior outpatient psychiatric treatment:  [facility]   Prior therapy:  unknown   Prior inpatient psychiatric treatment:  yes   Prior suicide attempts: yes, has jumped off a bridge in past, has cut arm open, stabbed self  Prior self harm: n/a   Prior violence or aggression: just thoughts   Previous psychotropic medication trials: unknown     Traumatic History:     Abuse:unknown  Other Traumatic  "Events: unknown    Family Psychiatric History:     Family History[1]    Substance Use History:    Tobacco, Alcohol and Drug Use History     Tobacco Use    Smoking status: Former     Current packs/day: 0.00     Types: Pipe, Cigarettes     Quit date: 2003     Years since quittin.6    Smokeless tobacco: Never   Vaping Use    Vaping status: Former    Substances: THC   Substance Use Topics    Alcohol use: Not Currently     Comment: occasionally - 2 beers per month    Drug use: Yes     Types: Marijuana     Comment: Socially (1-2 Monthly)        Additional Substance Use Detail       Questions Responses    Cannabis frequency Daily    Comment:  Never used on 2025 Daily on 2025     Cocaine frequency Never used    Comment:  Never used on 2025     Amphetamine frequency 1-2 times/week    Comment:  1-2 times/week on 2025     Inhalant frequency Never used    Comment:  Never used on 2025     Hallucinogen frequency Never used    Comment:  Never used on 2025     Ecstasy frequency Never used    Comment:  Never used on 2025     Other drug frequency Never used    Comment:  Never used on 2025     Last reviewed by PIERRE Finch on 2025            Social History:    The patient grew up in [place].  Childhood was described as \"ok\".   During childhood, parents were . They have 1 sister(s) and 1 brother(s). Patient is middle in birth order   As far as the patient (or present family member) is aware, overall childhood development was described as normal   Education level: 9th grade    Current occupation: disabled   Marital status: single   Children: 3   Current Living Situation: the patient currently lives alone, a homeless friend is sleeping on his porch and he lives next door to his sister .   Social support: sister   Shinto/Spiritual belief: n/a    experience: n/a   Legal history: yes, attempted murder had been in detention many years ago   Access to Guns: pellet " froy     Social History     Socioeconomic History    Marital status: Single     Spouse name: Not on file    Number of children: 3    Years of education: 9th grade    Highest education level: Not on file   Occupational History    Not on file   Other Topics Concern    Not on file   Social History Narrative    Not on file     Past Medical History[2]  Past Surgical History[3]  Allergies: Allergies[4]    Current Outpatient Medications   Medication Instructions    Alcohol Swabs 70 % PADS May substitute brand based on insurance coverage. Check glucose BID.    amLODIPine (NORVASC) 5 mg, Daily    Blood Glucose Monitoring Suppl (OneTouch Verio Reflect) w/Device KIT May substitute brand based on insurance coverage. Check glucose BID.    cyanocobalamin (VITAMIN B-12) 500 mcg, Oral, Daily    cyclobenzaprine (FLEXERIL) 5 mg, Oral, 3 times daily PRN    ergocalciferol (VITAMIN D2) 50,000 Units, Oral, Weekly    folic acid (FOLVITE) 1 mg, Oral, Daily    glucose blood (OneTouch Verio) test strip May substitute brand based on insurance coverage. Check glucose BID.    hydrOXYzine HCL (ATARAX) 50 mg, Oral, Every 8 hours PRN    lidocaine (LIDODERM) 5 % 1 patch, Topical, Daily at bedtime, Remove & Discard patch within 12 hours or as directed by MD    lisinopril (ZESTRIL) 20 mg tablet     lisinopril (ZESTRIL) 5 mg, Oral, Daily    lithium carbonate (LITHOBID) 300 mg, Oral, Every 12 hours scheduled    LORazepam (ATIVAN) 1 mg tablet     melatonin 3 mg, Oral, Daily at bedtime    metFORMIN (GLUCOPHAGE) 500 mg tablet     OLANZapine (ZYPREXA) 15 mg, Oral, Daily at bedtime    OneTouch Delica Lancets 33G MISC May substitute brand based on insurance coverage. Check glucose BID.    sitaGLIPtin (JANUVIA) 100 mg, Oral, Daily    sodium chloride (OCEAN) 0.65 % nasal spray 1 spray, Nasal, Every 1 hour PRN    traZODone (DESYREL) 50 mg, Oral, Daily at bedtime        Medical History Reviewed by provider this encounter:  Tobacco  Meds  Med Hx  Surg Hx  Fam  "Hx  Soc Hx        Objective   There were no vitals taken for this visit.     Mental Status Evaluation:    Appearance disheveled   Behavior cooperative   Speech slightly pressured, tangential, disorganized   Mood somewhat hypomanic   Affect labile   Thought Processes tangential   Thought Content paranoid ideation   Perceptual Disturbances: auditory hallucinations of \"voices\", vague visual hallucinations of people outside his house   Abnormal Thoughts  Risk Potential Suicidal ideation - None at present  Homicidal ideation - None at present  Potential for aggression - Not at present   Orientation oriented to person, place, time/date, and situation   Memory recent and remote memory grossly intact   Consciousness alert and awake   Attention Span Concentration Span attention span and concentration appear shorter than expected for age   Intellect appears to be below average intelligence   Insight impaired   Judgement impaired   Muscle Strength and  Gait normal muscle strength and normal muscle tone, normal gait and normal balance   Motor activity no abnormal movements   Language no difficulty naming common objects, no difficulty repeating a phrase, no difficulty writing a sentence   Fund of Knowledge adequate knowledge of current events  adequate fund of knowledge regarding past history  adequate fund of knowledge regarding vocabulary          Laboratory Results: I have personally reviewed all pertinent laboratory/tests results    Recent Labs (last 2 months):   Admission on 06/01/2025, Discharged on 06/01/2025   Component Date Value    WBC 06/01/2025 7.49     RBC 06/01/2025 4.41     Hemoglobin 06/01/2025 12.9     Hematocrit 06/01/2025 37.9     MCV 06/01/2025 86     MCH 06/01/2025 29.3     MCHC 06/01/2025 34.0     RDW 06/01/2025 13.8     MPV 06/01/2025 9.9     Platelets 06/01/2025 170     nRBC 06/01/2025 0     Segmented % 06/01/2025 81 (H)     Immature Grans % 06/01/2025 0     Lymphocytes % 06/01/2025 11 (L)     Monocytes " % 06/01/2025 5     Eosinophils Relative 06/01/2025 3     Basophils Relative 06/01/2025 0     Absolute Neutrophils 06/01/2025 6.11     Absolute Immature Grans 06/01/2025 0.01     Absolute Lymphocytes 06/01/2025 0.81     Absolute Monocytes 06/01/2025 0.36     Eosinophils Absolute 06/01/2025 0.19     Basophils Absolute 06/01/2025 0.01     Sodium 06/01/2025 137     Potassium 06/01/2025 3.8     Chloride 06/01/2025 104     CO2 06/01/2025 25     ANION GAP 06/01/2025 8     BUN 06/01/2025 9     Creatinine 06/01/2025 0.99     Glucose 06/01/2025 201 (H)     Calcium 06/01/2025 9.2     AST 06/01/2025 16     ALT 06/01/2025 21     Alkaline Phosphatase 06/01/2025 81     Total Protein 06/01/2025 6.5     Albumin 06/01/2025 4.0     Total Bilirubin 06/01/2025 0.90     eGFR 06/01/2025 87     LACTIC ACID 06/01/2025 1.5     Procalcitonin 06/01/2025 0.07        Suicide/Homicide Risk Assessment:    Risk of Harm to Self:  The following ratings are based on assessment at the time of the interview  Demographic Risk Factors include: , male, age: over 50 or older  Historical Risk Factors include: chronic depressive symptoms, chronic anxiety symptoms, chronic mood disorder, chronic psychotic symptoms, history of suicide attempts, history of serious suicide attempts, drug use  Recent Specific Risk Factors include: diagnosis of mood disorder, current depressive symptoms, current anxiety symptoms, presence of hallucinations, presence of paranoid ideation, substance abuse  Protective Factors: no current suicidal ideation, access to mental health treatment, compliant with medications, compliant with mental health treatment, stable living environment, supportive family  Weapons/Firearms: sword. The following steps have been taken to ensure weapons are properly secured: n/a  Based on today's assessment, Osei presents the following risk of harm to self: low    Risk of Harm to Others:  The following ratings are based on assessment at the time of  "the interview  Demographic Risk Factors include: male, unemployed, lower intelligence   Historical Risk Factors include: drug abuse, prior arrest  Recent Specific Risk Factors include: weapons or other means available, concomitant mood disorder, concomitant thought disorder, paranoid ideation, behavior suggesting impulsivity  Protective Factors: no current homicidal ideation, access to mental health treatment, compliant with medications, compliant with mental health treatment, strong relationships, supportive family  Weapons/Firearms: sword. The following steps have been taken to ensure weapons are properly secured: not applicable  Based on today's assessment, Osei presents the following risk of harm to others: low    The following interventions are recommended: Continue medication management. Safety plan completed/updated and signed. No other intervention changes indicated at this time.    Treatment Plan:    Completed and signed during the session: Yes - with Osei.    Depression Follow-up Plan Completed: Not applicable    Note Share: This note was not shared with the patient due to reasonable likelihood of causing patient harm    Administrative Statements       Visit Time  Visit Start Time: 1300  Visit Stop Time: 1400  Total Visit Duration: 60 minutes    Portions of the record may have been created with voice recognition software. Occasional wrong word or \"sound a like\" substitutions may have occurred due to the inherent limitations of voice recognition software. Read the chart carefully and recognize, using context, where substitutions have occurred.    PIERRE Finch 07/22/25         [1]   Family History  Problem Relation Name Age of Onset    Diabetes Mother      Thyroid disease Mother      Cancer Father      Thyroid disease Sister Ariadne     Depression Brother Bradly     Cancer Maternal Aunt Elen     Cancer Paternal Uncle Norm     Cancer Paternal Grandmother      No Known Problems Brother Bourbon     " Thyroid disease Sister Fany    [2]   Past Medical History:  Diagnosis Date    Allergic     Bipolar disorder in partial remission (HCC)     Diabetes (HCC)     Erectile dysfunction     unspecified erectile dysfunction type    Hypertension     Kidney stone     Substance abuse (HCC)    [3]   Past Surgical History:  Procedure Laterality Date    COLONOSCOPY      EXPLORATORY LAPAROTOMY W/ BOWEL RESECTION N/A 7/21/2022    Procedure: LAPAROTOMY EXPLORATORY W/ SMALL BOWEL RESECTION, liver biopsy;  Surgeon: Rose Mary Lara MD;  Location:  MAIN OR;  Service: General    FL RETROGRADE PYELOGRAM  10/06/2021    HERNIA REPAIR      AR CYSTOURETHROSCOPY W/URETERAL CATHETERIZATION Right 10/06/2021    Procedure: CYSTOSCOPY RETROGRADE PYELOGRAM WITH INSERTION STENT URETERAL, RIGHT URETEROSCOPY, STONE EXTRACTION;  Surgeon: Bradly Rivera MD;  Location:  MAIN OR;  Service: Urology   [4]   Allergies  Allergen Reactions    Other Anaphylaxis    Tomato (Diagnostic) - Food Allergy Anaphylaxis    Tomato - Food Allergy Anaphylaxis     raw    Poison Ivy Extract Hives    Poison Sumac Extract Hives

## 2025-07-18 NOTE — TELEPHONE ENCOUNTER
"Spoke with Ariadne. Concerns as already noted. Reviewed dosing as in medication list and prescriptions they were given or that they have on hand. Confirmed hydroxyzine 50 mg q8hr as needed. Trazodone 50 mg HS;  she was concerned as the provider listed on the bottle is his previous, not Pam. Advised pharmacy filled the last prescription with remaining refills, but the one Pam sent was received. The next RF should have Pam as the provider. The were not given a new prescription for olanzapine. Per \"Dispenses,\" Osei filled the 10 mg on 7/14/25, as a 30 day supply, so it is looks \"too soon.\" She will give 10 mg, one and a half for the dose of 15 mg. If they have an difficulty filling the higher dose when the next RF is needed, they will call the office for assistance. She appreciated the clarification.      "

## 2025-08-06 ENCOUNTER — HOSPITAL ENCOUNTER (OUTPATIENT)
Dept: INFUSION CENTER | Facility: HOSPITAL | Age: 50
Discharge: HOME | End: 2025-08-06
Attending: INTERNAL MEDICINE
Payer: COMMERCIAL

## 2025-08-06 ENCOUNTER — APPOINTMENT (OUTPATIENT)
Dept: LAB | Facility: CLINIC | Age: 50
End: 2025-08-06
Attending: INTERNAL MEDICINE
Payer: COMMERCIAL

## 2025-08-15 ENCOUNTER — HOSPITAL ENCOUNTER (OUTPATIENT)
Facility: HOSPITAL | Age: 50
Setting detail: OBSERVATION
Discharge: HOME | End: 2025-08-16
Attending: EMERGENCY MEDICINE | Admitting: HOSPITALIST
Payer: COMMERCIAL

## 2025-08-15 ENCOUNTER — APPOINTMENT (EMERGENCY)
Dept: CT IMAGING | Facility: HOSPITAL | Age: 50
End: 2025-08-15
Payer: COMMERCIAL

## 2025-08-15 ENCOUNTER — APPOINTMENT (OUTPATIENT)
Dept: MRI IMAGING | Facility: HOSPITAL | Age: 50
End: 2025-08-15
Payer: COMMERCIAL

## 2025-08-15 DIAGNOSIS — R53.1 SUBJECTIVE WEAKNESS: ICD-10-CM

## 2025-08-15 DIAGNOSIS — R10.10 PAIN OF UPPER ABDOMEN: ICD-10-CM

## 2025-08-15 DIAGNOSIS — R73.9 HYPERGLYCEMIA: Primary | ICD-10-CM

## 2025-08-15 DIAGNOSIS — R41.82 AMS (ALTERED MENTAL STATUS): ICD-10-CM

## 2025-08-15 DIAGNOSIS — R42 LIGHTHEADEDNESS: ICD-10-CM

## 2025-08-15 PROBLEM — I10 PRIMARY HYPERTENSION: Status: ACTIVE | Noted: 2017-04-12

## 2025-08-15 PROBLEM — R55 NEAR SYNCOPE: Status: ACTIVE | Noted: 2024-06-22

## 2025-08-15 PROBLEM — E11.65 TYPE 2 DIABETES MELLITUS WITH HYPERGLYCEMIA, WITHOUT LONG-TERM CURRENT USE OF INSULIN (HCC): Status: ACTIVE | Noted: 2025-08-15

## 2025-08-15 PROBLEM — N17.9 AKI (ACUTE KIDNEY INJURY) (HCC): Status: RESOLVED | Noted: 2021-10-04 | Resolved: 2025-08-15

## 2025-08-15 LAB
ALBUMIN SERPL BCG-MCNC: 4.5 G/DL (ref 3.5–5)
ALP SERPL-CCNC: 103 U/L (ref 34–104)
ALT SERPL W P-5'-P-CCNC: 35 U/L (ref 7–52)
AMPHETAMINES SERPL QL SCN: POSITIVE
ANION GAP SERPL CALCULATED.3IONS-SCNC: 7 MMOL/L (ref 4–13)
AST SERPL W P-5'-P-CCNC: 32 U/L (ref 13–39)
ATRIAL RATE: 86 BPM
B-OH-BUTYR SERPL-MCNC: <0.05 MMOL/L (ref 0.2–0.6)
BARBITURATES UR QL: NEGATIVE
BASE EX.OXY STD BLDV CALC-SCNC: 95 % (ref 60–80)
BASE EXCESS BLDV CALC-SCNC: -2.8 MMOL/L
BASOPHILS # BLD AUTO: 0.02 THOUSANDS/ÂΜL (ref 0–0.1)
BASOPHILS NFR BLD AUTO: 0 % (ref 0–1)
BENZODIAZ UR QL: NEGATIVE
BILIRUB SERPL-MCNC: 1.14 MG/DL (ref 0.2–1)
BILIRUB UR QL STRIP: NEGATIVE
BUN SERPL-MCNC: 8 MG/DL (ref 5–25)
CALCIUM SERPL-MCNC: 9.6 MG/DL (ref 8.4–10.2)
CARDIAC TROPONIN I PNL SERPL HS: <2 NG/L (ref ?–50)
CHLORIDE SERPL-SCNC: 103 MMOL/L (ref 96–108)
CLARITY UR: CLEAR
CO2 SERPL-SCNC: 23 MMOL/L (ref 21–32)
COCAINE UR QL: NEGATIVE
COLOR UR: YELLOW
CREAT SERPL-MCNC: 0.94 MG/DL (ref 0.6–1.3)
EOSINOPHIL # BLD AUTO: 0.16 THOUSAND/ÂΜL (ref 0–0.61)
EOSINOPHIL NFR BLD AUTO: 2 % (ref 0–6)
ERYTHROCYTE [DISTWIDTH] IN BLOOD BY AUTOMATED COUNT: 13.4 % (ref 11.6–15.1)
FENTANYL UR QL SCN: NEGATIVE
GFR SERPL CREATININE-BSD FRML MDRD: 93 ML/MIN/1.73SQ M
GLUCOSE SERPL-MCNC: 225 MG/DL (ref 65–140)
GLUCOSE SERPL-MCNC: 275 MG/DL (ref 65–140)
GLUCOSE SERPL-MCNC: 278 MG/DL (ref 65–140)
GLUCOSE UR STRIP-MCNC: ABNORMAL MG/DL
HCO3 BLDV-SCNC: 20.4 MMOL/L (ref 24–30)
HCT VFR BLD AUTO: 38.4 % (ref 36.5–49.3)
HGB BLD-MCNC: 13.5 G/DL (ref 12–17)
HGB UR QL STRIP.AUTO: NEGATIVE
HYDROCODONE UR QL SCN: NEGATIVE
IMM GRANULOCYTES # BLD AUTO: 0.03 THOUSAND/UL (ref 0–0.2)
IMM GRANULOCYTES NFR BLD AUTO: 0 % (ref 0–2)
KETONES UR STRIP-MCNC: NEGATIVE MG/DL
LEUKOCYTE ESTERASE UR QL STRIP: NEGATIVE
LIPASE SERPL-CCNC: 10 U/L (ref 11–82)
LITHIUM SERPL-SCNC: 0.67 MMOL/L (ref 0.6–1.2)
LYMPHOCYTES # BLD AUTO: 1.43 THOUSANDS/ÂΜL (ref 0.6–4.47)
LYMPHOCYTES NFR BLD AUTO: 18 % (ref 14–44)
MCH RBC QN AUTO: 30.3 PG (ref 26.8–34.3)
MCHC RBC AUTO-ENTMCNC: 35.2 G/DL (ref 31.4–37.4)
MCV RBC AUTO: 86 FL (ref 82–98)
METHADONE UR QL: NEGATIVE
MONOCYTES # BLD AUTO: 0.39 THOUSAND/ÂΜL (ref 0.17–1.22)
MONOCYTES NFR BLD AUTO: 5 % (ref 4–12)
NEUTROPHILS # BLD AUTO: 6.08 THOUSANDS/ÂΜL (ref 1.85–7.62)
NEUTS SEG NFR BLD AUTO: 75 % (ref 43–75)
NITRITE UR QL STRIP: NEGATIVE
NRBC BLD AUTO-RTO: 0 /100 WBCS
O2 CT BLDV-SCNC: 19.3 ML/DL
OPIATES UR QL SCN: NEGATIVE
OXYCODONE+OXYMORPHONE UR QL SCN: NEGATIVE
P AXIS: 34 DEGREES
PCO2 BLDV: 31.3 MM HG (ref 42–50)
PCP UR QL: NEGATIVE
PH BLDV: 7.43 [PH] (ref 7.3–7.4)
PH UR STRIP.AUTO: 6 [PH]
PLATELET # BLD AUTO: 178 THOUSANDS/UL (ref 149–390)
PMV BLD AUTO: 9.9 FL (ref 8.9–12.7)
PO2 BLDV: 95.7 MM HG (ref 35–45)
POTASSIUM SERPL-SCNC: 3.4 MMOL/L (ref 3.5–5.3)
PR INTERVAL: 174 MS
PROT SERPL-MCNC: 7.4 G/DL (ref 6.4–8.4)
PROT UR STRIP-MCNC: NEGATIVE MG/DL
QRS AXIS: -33 DEGREES
QRSD INTERVAL: 84 MS
QT INTERVAL: 356 MS
QTC INTERVAL: 426 MS
RBC # BLD AUTO: 4.45 MILLION/UL (ref 3.88–5.62)
SODIUM SERPL-SCNC: 133 MMOL/L (ref 135–147)
SP GR UR STRIP.AUTO: <=1.005
T WAVE AXIS: 38 DEGREES
THC UR QL: NEGATIVE
UROBILINOGEN UR QL STRIP.AUTO: 1 E.U./DL
VENTRICULAR RATE: 86 BPM
WBC # BLD AUTO: 8.11 THOUSAND/UL (ref 4.31–10.16)

## 2025-08-15 PROCEDURE — 83690 ASSAY OF LIPASE: CPT | Performed by: EMERGENCY MEDICINE

## 2025-08-15 PROCEDURE — 70450 CT HEAD/BRAIN W/O DYE: CPT

## 2025-08-15 PROCEDURE — 82948 REAGENT STRIP/BLOOD GLUCOSE: CPT

## 2025-08-15 PROCEDURE — 74177 CT ABD & PELVIS W/CONTRAST: CPT

## 2025-08-15 PROCEDURE — 36415 COLL VENOUS BLD VENIPUNCTURE: CPT | Performed by: EMERGENCY MEDICINE

## 2025-08-15 PROCEDURE — 81003 URINALYSIS AUTO W/O SCOPE: CPT

## 2025-08-15 PROCEDURE — 80178 ASSAY OF LITHIUM: CPT | Performed by: EMERGENCY MEDICINE

## 2025-08-15 PROCEDURE — 84484 ASSAY OF TROPONIN QUANT: CPT | Performed by: EMERGENCY MEDICINE

## 2025-08-15 PROCEDURE — 99285 EMERGENCY DEPT VISIT HI MDM: CPT | Performed by: EMERGENCY MEDICINE

## 2025-08-15 PROCEDURE — 93005 ELECTROCARDIOGRAM TRACING: CPT

## 2025-08-15 PROCEDURE — 85025 COMPLETE CBC W/AUTO DIFF WBC: CPT | Performed by: EMERGENCY MEDICINE

## 2025-08-15 PROCEDURE — 93010 ELECTROCARDIOGRAM REPORT: CPT | Performed by: INTERNAL MEDICINE

## 2025-08-15 PROCEDURE — 80307 DRUG TEST PRSMV CHEM ANLYZR: CPT

## 2025-08-15 PROCEDURE — 82010 KETONE BODYS QUAN: CPT | Performed by: EMERGENCY MEDICINE

## 2025-08-15 PROCEDURE — 99285 EMERGENCY DEPT VISIT HI MDM: CPT

## 2025-08-15 PROCEDURE — A9585 GADOBUTROL INJECTION: HCPCS | Performed by: HOSPITALIST

## 2025-08-15 PROCEDURE — 74183 MRI ABD W/O CNTR FLWD CNTR: CPT

## 2025-08-15 PROCEDURE — 80053 COMPREHEN METABOLIC PANEL: CPT | Performed by: EMERGENCY MEDICINE

## 2025-08-15 PROCEDURE — 99223 1ST HOSP IP/OBS HIGH 75: CPT | Performed by: HOSPITALIST

## 2025-08-15 PROCEDURE — 82805 BLOOD GASES W/O2 SATURATION: CPT | Performed by: EMERGENCY MEDICINE

## 2025-08-15 PROCEDURE — 96360 HYDRATION IV INFUSION INIT: CPT

## 2025-08-15 RX ORDER — AMLODIPINE BESYLATE 5 MG/1
5 TABLET ORAL DAILY
Status: DISCONTINUED | OUTPATIENT
Start: 2025-08-16 | End: 2025-08-16 | Stop reason: HOSPADM

## 2025-08-15 RX ORDER — MAGNESIUM HYDROXIDE/ALUMINUM HYDROXICE/SIMETHICONE 120; 1200; 1200 MG/30ML; MG/30ML; MG/30ML
30 SUSPENSION ORAL EVERY 4 HOURS PRN
Status: DISCONTINUED | OUTPATIENT
Start: 2025-08-15 | End: 2025-08-16 | Stop reason: HOSPADM

## 2025-08-15 RX ORDER — POLYETHYLENE GLYCOL 3350 17 G/17G
17 POWDER, FOR SOLUTION ORAL DAILY
Status: DISCONTINUED | OUTPATIENT
Start: 2025-08-16 | End: 2025-08-16 | Stop reason: HOSPADM

## 2025-08-15 RX ORDER — LITHIUM CARBONATE 300 MG/1
300 TABLET, FILM COATED, EXTENDED RELEASE ORAL EVERY 12 HOURS SCHEDULED
Status: DISCONTINUED | OUTPATIENT
Start: 2025-08-15 | End: 2025-08-16 | Stop reason: HOSPADM

## 2025-08-15 RX ORDER — ACETAMINOPHEN 325 MG/1
650 TABLET ORAL EVERY 6 HOURS PRN
Status: DISCONTINUED | OUTPATIENT
Start: 2025-08-15 | End: 2025-08-16 | Stop reason: HOSPADM

## 2025-08-15 RX ORDER — POTASSIUM CHLORIDE 1500 MG/1
20 TABLET, EXTENDED RELEASE ORAL ONCE
Status: COMPLETED | OUTPATIENT
Start: 2025-08-15 | End: 2025-08-15

## 2025-08-15 RX ORDER — HYDROXYZINE HYDROCHLORIDE 50 MG/1
50 TABLET, FILM COATED ORAL EVERY 8 HOURS PRN
Status: DISCONTINUED | OUTPATIENT
Start: 2025-08-15 | End: 2025-08-16 | Stop reason: HOSPADM

## 2025-08-15 RX ORDER — ECHINACEA PURPUREA EXTRACT 125 MG
1 TABLET ORAL
Status: DISCONTINUED | OUTPATIENT
Start: 2025-08-15 | End: 2025-08-16 | Stop reason: HOSPADM

## 2025-08-15 RX ORDER — TRAZODONE HYDROCHLORIDE 50 MG/1
50 TABLET ORAL
Status: DISCONTINUED | OUTPATIENT
Start: 2025-08-15 | End: 2025-08-16 | Stop reason: HOSPADM

## 2025-08-15 RX ORDER — LIDOCAINE 50 MG/G
1 PATCH TOPICAL
Status: DISCONTINUED | OUTPATIENT
Start: 2025-08-16 | End: 2025-08-16 | Stop reason: HOSPADM

## 2025-08-15 RX ORDER — ONDANSETRON 2 MG/ML
4 INJECTION INTRAMUSCULAR; INTRAVENOUS EVERY 4 HOURS PRN
Status: DISCONTINUED | OUTPATIENT
Start: 2025-08-15 | End: 2025-08-16 | Stop reason: HOSPADM

## 2025-08-15 RX ORDER — INSULIN LISPRO 100 [IU]/ML
1-6 INJECTION, SOLUTION INTRAVENOUS; SUBCUTANEOUS
Status: DISCONTINUED | OUTPATIENT
Start: 2025-08-15 | End: 2025-08-16

## 2025-08-15 RX ORDER — LORAZEPAM 0.5 MG/1
0.5 TABLET ORAL EVERY 8 HOURS PRN
Status: DISCONTINUED | OUTPATIENT
Start: 2025-08-15 | End: 2025-08-16 | Stop reason: HOSPADM

## 2025-08-15 RX ORDER — IBUPROFEN 400 MG/1
400 TABLET, FILM COATED ORAL EVERY 6 HOURS PRN
Status: DISCONTINUED | OUTPATIENT
Start: 2025-08-15 | End: 2025-08-16 | Stop reason: HOSPADM

## 2025-08-15 RX ORDER — FOLIC ACID 1 MG/1
1 TABLET ORAL DAILY
Status: DISCONTINUED | OUTPATIENT
Start: 2025-08-16 | End: 2025-08-16 | Stop reason: HOSPADM

## 2025-08-15 RX ORDER — GADOBUTROL 604.72 MG/ML
9 INJECTION INTRAVENOUS
Status: COMPLETED | OUTPATIENT
Start: 2025-08-15 | End: 2025-08-15

## 2025-08-15 RX ORDER — ENOXAPARIN SODIUM 100 MG/ML
40 INJECTION SUBCUTANEOUS DAILY
Status: DISCONTINUED | OUTPATIENT
Start: 2025-08-16 | End: 2025-08-16 | Stop reason: HOSPADM

## 2025-08-15 RX ADMIN — IOHEXOL 100 ML: 350 INJECTION, SOLUTION INTRAVENOUS at 15:26

## 2025-08-15 RX ADMIN — Medication 3 MG: at 21:24

## 2025-08-15 RX ADMIN — SODIUM CHLORIDE 1000 ML: 0.9 INJECTION, SOLUTION INTRAVENOUS at 15:34

## 2025-08-15 RX ADMIN — INSULIN LISPRO 2 UNITS: 100 INJECTION, SOLUTION INTRAVENOUS; SUBCUTANEOUS at 21:24

## 2025-08-15 RX ADMIN — SODIUM CHLORIDE 1000 ML: 0.9 INJECTION, SOLUTION INTRAVENOUS at 18:30

## 2025-08-15 RX ADMIN — GADOBUTROL 9 ML: 604.72 INJECTION INTRAVENOUS at 20:56

## 2025-08-15 RX ADMIN — TRAZODONE HYDROCHLORIDE 50 MG: 50 TABLET ORAL at 21:24

## 2025-08-15 RX ADMIN — POTASSIUM CHLORIDE 20 MEQ: 1500 TABLET, EXTENDED RELEASE ORAL at 18:31

## 2025-08-15 RX ADMIN — LITHIUM CARBONATE 300 MG: 300 TABLET, EXTENDED RELEASE ORAL at 21:24

## 2025-08-16 VITALS
HEART RATE: 79 BPM | OXYGEN SATURATION: 96 % | TEMPERATURE: 97.8 F | DIASTOLIC BLOOD PRESSURE: 99 MMHG | RESPIRATION RATE: 18 BRPM | BODY MASS INDEX: 30.62 KG/M2 | WEIGHT: 231 LBS | SYSTOLIC BLOOD PRESSURE: 137 MMHG | HEIGHT: 73 IN

## 2025-08-16 LAB
AMMONIA PLAS-SCNC: 42 UMOL/L (ref 18–72)
ANION GAP SERPL CALCULATED.3IONS-SCNC: 6 MMOL/L (ref 4–13)
BUN SERPL-MCNC: 7 MG/DL (ref 5–25)
CALCIUM SERPL-MCNC: 9 MG/DL (ref 8.4–10.2)
CHLORIDE SERPL-SCNC: 105 MMOL/L (ref 96–108)
CO2 SERPL-SCNC: 23 MMOL/L (ref 21–32)
CREAT SERPL-MCNC: 0.8 MG/DL (ref 0.6–1.3)
ERYTHROCYTE [DISTWIDTH] IN BLOOD BY AUTOMATED COUNT: 13.7 % (ref 11.6–15.1)
EST. AVERAGE GLUCOSE BLD GHB EST-MCNC: 177 MG/DL
GFR SERPL CREATININE-BSD FRML MDRD: 103 ML/MIN/1.73SQ M
GLUCOSE P FAST SERPL-MCNC: 250 MG/DL (ref 65–99)
GLUCOSE SERPL-MCNC: 177 MG/DL (ref 65–140)
GLUCOSE SERPL-MCNC: 209 MG/DL (ref 65–140)
GLUCOSE SERPL-MCNC: 245 MG/DL (ref 65–140)
GLUCOSE SERPL-MCNC: 250 MG/DL (ref 65–140)
HBA1C MFR BLD: 7.8 %
HCT VFR BLD AUTO: 37 % (ref 36.5–49.3)
HGB BLD-MCNC: 12.9 G/DL (ref 12–17)
LITHIUM SERPL-SCNC: 0.47 MMOL/L (ref 0.6–1.2)
MCH RBC QN AUTO: 30.1 PG (ref 26.8–34.3)
MCHC RBC AUTO-ENTMCNC: 34.9 G/DL (ref 31.4–37.4)
MCV RBC AUTO: 86 FL (ref 82–98)
PLATELET # BLD AUTO: 146 THOUSANDS/UL (ref 149–390)
PMV BLD AUTO: 9.6 FL (ref 8.9–12.7)
POTASSIUM SERPL-SCNC: 3.6 MMOL/L (ref 3.5–5.3)
RBC # BLD AUTO: 4.29 MILLION/UL (ref 3.88–5.62)
SODIUM SERPL-SCNC: 134 MMOL/L (ref 135–147)
T4 FREE SERPL-MCNC: 0.83 NG/DL (ref 0.61–1.12)
TSH SERPL DL<=0.05 MIU/L-ACNC: 7.76 UIU/ML (ref 0.45–4.5)
WBC # BLD AUTO: 6.45 THOUSAND/UL (ref 4.31–10.16)

## 2025-08-16 PROCEDURE — 80178 ASSAY OF LITHIUM: CPT

## 2025-08-16 PROCEDURE — 86618 LYME DISEASE ANTIBODY: CPT

## 2025-08-16 PROCEDURE — 99239 HOSP IP/OBS DSCHRG MGMT >30: CPT | Performed by: HOSPITALIST

## 2025-08-16 PROCEDURE — 82948 REAGENT STRIP/BLOOD GLUCOSE: CPT

## 2025-08-16 PROCEDURE — 84443 ASSAY THYROID STIM HORMONE: CPT

## 2025-08-16 PROCEDURE — 97163 PT EVAL HIGH COMPLEX 45 MIN: CPT

## 2025-08-16 PROCEDURE — 82140 ASSAY OF AMMONIA: CPT

## 2025-08-16 PROCEDURE — 97166 OT EVAL MOD COMPLEX 45 MIN: CPT

## 2025-08-16 PROCEDURE — 80048 BASIC METABOLIC PNL TOTAL CA: CPT

## 2025-08-16 PROCEDURE — 85027 COMPLETE CBC AUTOMATED: CPT

## 2025-08-16 PROCEDURE — 83036 HEMOGLOBIN GLYCOSYLATED A1C: CPT

## 2025-08-16 PROCEDURE — 84439 ASSAY OF FREE THYROXINE: CPT

## 2025-08-16 RX ORDER — IBUPROFEN 400 MG/1
400 TABLET, FILM COATED ORAL EVERY 6 HOURS PRN
Qty: 120 TABLET | Refills: 1 | Status: SHIPPED | OUTPATIENT
Start: 2025-08-16

## 2025-08-16 RX ORDER — POLYETHYLENE GLYCOL 3350 17 G/17G
17 POWDER, FOR SOLUTION ORAL DAILY
Qty: 510 G | Refills: 3 | Status: SHIPPED | OUTPATIENT
Start: 2025-08-17

## 2025-08-16 RX ORDER — INSULIN LISPRO 100 [IU]/ML
1-6 INJECTION, SOLUTION INTRAVENOUS; SUBCUTANEOUS ONCE
Status: COMPLETED | OUTPATIENT
Start: 2025-08-16 | End: 2025-08-16

## 2025-08-16 RX ORDER — ACETAMINOPHEN 325 MG/1
650 TABLET ORAL EVERY 6 HOURS PRN
Qty: 120 TABLET | Refills: 3 | Status: SHIPPED | OUTPATIENT
Start: 2025-08-16

## 2025-08-16 RX ORDER — INSULIN LISPRO 100 [IU]/ML
1-6 INJECTION, SOLUTION INTRAVENOUS; SUBCUTANEOUS
Status: DISCONTINUED | OUTPATIENT
Start: 2025-08-16 | End: 2025-08-16 | Stop reason: HOSPADM

## 2025-08-16 RX ADMIN — ENOXAPARIN SODIUM 40 MG: 40 INJECTION SUBCUTANEOUS at 09:03

## 2025-08-16 RX ADMIN — CYANOCOBALAMIN TAB 500 MCG 500 MCG: 500 TAB at 09:03

## 2025-08-16 RX ADMIN — INSULIN LISPRO 1 UNITS: 100 INJECTION, SOLUTION INTRAVENOUS; SUBCUTANEOUS at 13:29

## 2025-08-16 RX ADMIN — POLYETHYLENE GLYCOL 3350 17 G: 17 POWDER, FOR SOLUTION ORAL at 09:03

## 2025-08-16 RX ADMIN — FOLIC ACID 1 MG: 1 TABLET ORAL at 09:03

## 2025-08-16 RX ADMIN — AMLODIPINE BESYLATE 5 MG: 5 TABLET ORAL at 09:03

## 2025-08-16 RX ADMIN — INSULIN LISPRO 1 UNITS: 100 INJECTION, SOLUTION INTRAVENOUS; SUBCUTANEOUS at 16:49

## 2025-08-16 RX ADMIN — INSULIN LISPRO 3 UNITS: 100 INJECTION, SOLUTION INTRAVENOUS; SUBCUTANEOUS at 07:56

## 2025-08-16 RX ADMIN — LITHIUM CARBONATE 300 MG: 300 TABLET, EXTENDED RELEASE ORAL at 09:03

## 2025-08-16 RX ADMIN — SITAGLIPTIN 100 MG: 100 TABLET, FILM COATED ORAL at 09:32

## 2025-08-17 LAB — B BURGDOR IGG+IGM SER QL IA: NEGATIVE

## 2025-08-18 ENCOUNTER — TELEPHONE (OUTPATIENT)
Age: 50
End: 2025-08-18

## 2025-08-25 PROBLEM — G47.39 SLEEP APNEA-LIKE BEHAVIOR: Status: ACTIVE | Noted: 2025-08-25

## 2025-08-25 PROBLEM — R40.0 DAYTIME SOMNOLENCE: Status: ACTIVE | Noted: 2025-08-25

## 2025-08-25 PROBLEM — R06.83 SNORING: Status: ACTIVE | Noted: 2025-08-25

## (undated) DEVICE — PROXIMATE SKIN STAPLERS (35 WIDE) CONTAINS 35 STAINLESS STEEL STAPLES (FIXED HEAD): Brand: PROXIMATE

## (undated) DEVICE — PROXIMATE RELOADABLE LINEAR STAPLER, 60MM: Brand: PROXIMATE

## (undated) DEVICE — GLOVE SRG BIOGEL 7

## (undated) DEVICE — PROXIMATE LINEAR CUTTER RELOAD, BLUE, 75MM: Brand: PROXIMATE

## (undated) DEVICE — TOWEL SURG XR DETECT GREEN STRL RFD

## (undated) DEVICE — SYRINGE 20ML LL

## (undated) DEVICE — CATH URETERAL 5FR X 70 CM FLEX TIP POLYUR BARD

## (undated) DEVICE — CHLORAPREP HI-LITE 26ML ORANGE

## (undated) DEVICE — SUT SILK 2-0 SH CR/8 18 IN C012D

## (undated) DEVICE — GLOVE SRG BIOGEL 7.5

## (undated) DEVICE — INFLATION DEVICE EAGLE 10ML LF

## (undated) DEVICE — GAUZE SPONGES,16 PLY: Brand: CURITY

## (undated) DEVICE — SPECIMEN CONTAINER STERILE PEEL PACK

## (undated) DEVICE — LEGGINGS: Brand: CONVERTORS

## (undated) DEVICE — STERILE SURGICAL LUBRICANT,  TUBE: Brand: SURGILUBE

## (undated) DEVICE — SUT SILK 2-0 18 IN A185H

## (undated) DEVICE — BAG DRAINAGE UROCATCHER 4 SKYTRON

## (undated) DEVICE — GLOVE INDICATOR PI UNDERGLOVE SZ 7 BLUE

## (undated) DEVICE — INTENDED FOR TISSUE SEPARATION, AND OTHER PROCEDURES THAT REQUIRE A SHARP SURGICAL BLADE TO PUNCTURE OR CUT.: Brand: BARD-PARKER SAFETY BLADES SIZE 15, STERILE

## (undated) DEVICE — PACK CUSTOM GU CYSTO PACK RF

## (undated) DEVICE — MEDI-VAC NON-CONDUCTIVE SUCTION TUBING 6MM X 1.8M (6FT.) L: Brand: CARDINAL HEALTH

## (undated) DEVICE — INTENDED FOR TISSUE SEPARATION, AND OTHER PROCEDURES THAT REQUIRE A SHARP SURGICAL BLADE TO PUNCTURE OR CUT.: Brand: BARD-PARKER SAFETY BLADES SIZE 10, STERILE

## (undated) DEVICE — PLUMEPEN PRO 10FT

## (undated) DEVICE — VESSEL LOOPS X-RAY DETECTABLE: Brand: DEROYAL

## (undated) DEVICE — SUT VICRYL 3-0 SH 27 IN J416H

## (undated) DEVICE — SUT VICRYL 2-0 D-SPECIAL 27 IN D8114

## (undated) DEVICE — DRAPE C-ARM X-RAY

## (undated) DEVICE — SUT SILK 3-0 SH CR/8 18 IN C013D

## (undated) DEVICE — BETHLEHEM MAJOR GENERAL PACK: Brand: CARDINAL HEALTH

## (undated) DEVICE — GLOVE SRG BIOGEL ECLIPSE 7

## (undated) DEVICE — Device

## (undated) DEVICE — ENSEAL 20 CM SHAFT, LARGE JAW: Brand: ENSEAL X1

## (undated) DEVICE — LIGACLIP MCA MULTIPLE CLIP APPLIERS, 20 MEDIUM CLIPS: Brand: LIGACLIP

## (undated) DEVICE — SUT PROLENE 3-0 SH 30 IN 8832H

## (undated) DEVICE — MEDI-VAC YANK SUCT HNDL W/TPRD BULBOUS TIP: Brand: CARDINAL HEALTH

## (undated) DEVICE — 4-PORT MANIFOLD: Brand: NEPTUNE 2

## (undated) DEVICE — PROXIMATE RELOADABLE LINEAR CUTTER WITH SAFETY LOCK-OUT, 75MM: Brand: PROXIMATE

## (undated) DEVICE — 3M™ TEGADERM™ TRANSPARENT FILM DRESSING FRAME STYLE, 1628, 6 IN X 8 IN (15 CM X 20 CM), 10/CT 8CT/CASE: Brand: 3M™ TEGADERM™

## (undated) DEVICE — TRAY FOLEY 16FR URIMETER SURESTEP

## (undated) DEVICE — GUIDEWIRE STRGHT TIP 0.038 IN SOLO PLUS